# Patient Record
Sex: FEMALE | Race: BLACK OR AFRICAN AMERICAN | Employment: OTHER | URBAN - METROPOLITAN AREA
[De-identification: names, ages, dates, MRNs, and addresses within clinical notes are randomized per-mention and may not be internally consistent; named-entity substitution may affect disease eponyms.]

---

## 2017-01-31 ENCOUNTER — OFFICE VISIT (OUTPATIENT)
Dept: FAMILY MEDICINE CLINIC | Age: 47
End: 2017-01-31

## 2017-01-31 VITALS
RESPIRATION RATE: 16 BRPM | HEIGHT: 62 IN | SYSTOLIC BLOOD PRESSURE: 110 MMHG | OXYGEN SATURATION: 96 % | BODY MASS INDEX: 46.01 KG/M2 | WEIGHT: 250 LBS | HEART RATE: 57 BPM | TEMPERATURE: 97.9 F | DIASTOLIC BLOOD PRESSURE: 70 MMHG

## 2017-01-31 DIAGNOSIS — I10 ESSENTIAL HYPERTENSION: Primary | ICD-10-CM

## 2017-01-31 DIAGNOSIS — Z71.89 ADVANCE CARE PLANNING: ICD-10-CM

## 2017-01-31 DIAGNOSIS — R73.03 PREDIABETES: ICD-10-CM

## 2017-01-31 DIAGNOSIS — Z12.31 ENCOUNTER FOR SCREENING MAMMOGRAM FOR MALIGNANT NEOPLASM OF BREAST: ICD-10-CM

## 2017-01-31 DIAGNOSIS — Z00.00 ROUTINE GENERAL MEDICAL EXAMINATION AT A HEALTH CARE FACILITY: ICD-10-CM

## 2017-01-31 DIAGNOSIS — E78.2 MIXED HYPERLIPIDEMIA: ICD-10-CM

## 2017-01-31 DIAGNOSIS — Z13.39 SCREENING FOR ALCOHOLISM: ICD-10-CM

## 2017-01-31 NOTE — ACP (ADVANCE CARE PLANNING)
Advance Care Planning (ACP) Provider Conversation Snapshot    Date of ACP Conversation: 01/31/17  Persons included in Conversation:  patient  Length of ACP Conversation in minutes:  16 minutes    Authorized Decision Maker (if patient is incapable of making informed decisions): This person is:   Juni Hoang ([phone]) -daughter          For Patients with Decision Making Capacity:   Values/Goals: Exploration of values, goals, and preferences if recovery is not expected, even with continued medical treatment in the event of:  Imminent death  Severe, permanent brain injury  \"In these circumstances, what matters most to you? \"  Care focused more on comfort or quality of life.     Conversation Outcomes / Follow-Up Plan:   Recommended completion of Advance Directive form after review of ACP materials and conversation with prospective healthcare agent   Recommended communicating the plan and making copies for the healthcare agent, personal physician, and others as appropriate (e.g., health system)  Recommended review of completed ACP document annually or upon change in health status

## 2017-01-31 NOTE — PROGRESS NOTES
Chief Complaint   Patient presents with    Hypertension    Other     Pre-DM    Cholesterol Problem    Annual Wellness Visit       This is a Subsequent Medicare Annual Wellness Visit providing Personalized Prevention Plan Services (PPPS) (Performed 12 months after initial AWV and PPPS )    I have reviewed the patient's medical history in detail and updated the computerized patient record. History     Past Medical History   Diagnosis Date    AR (allergic rhinitis)      seasonal    Depression      Dr. Regine Rodriguez, suicide attempt     Endometriosis      s/p hysterectomy     GERD (gastroesophageal reflux disease)     Glaucoma      Dr. Dawna Lefort HTN (hypertension)     Hx of suicide attempt     Hyperlipidemia LDL goal < 130     Insomnia     Nausea & vomiting     OA (osteoarthritis)      both knees, lower back    Preeclampsia     PTSD (post-traumatic stress disorder)     Sleep apnea      does not use cpap machine    Spinal stenosis      Dr. Sonam Myers      Past Surgical History   Procedure Laterality Date    Pr total abdom hysterectomy  2006    Multiple delivery        1990    Hx tubal ligation      Hx salpingo-oophorectomy  2008    Hx  section      Hx hysterectomy      Hx knee replacement Bilateral      (R)  (L)     Hx knee arthroscopy Left      left knee    Pr femur/knee surg unlisted Left      External fixator     Current Outpatient Prescriptions   Medication Sig Dispense Refill    simvastatin (ZOCOR) 20 mg tablet take 1 tablet by mouth at bedtime 90 Tab 2    carisoprodol (SOMA) 350 mg tablet take 1 tablet by mouth twice a day for SPASMS  0    ziprasidone (GEODON) 20 mg capsule Take 1 Cap by mouth two (2) times daily (with meals). 22 Cap 0    ziprasidone (GEODON) 20 mg capsule Take 1 Cap by mouth two (2) times daily (with meals). 22 Cap 0    Dexlansoprazole 60 mg CpDB Take 1 Cap by mouth.       oxyCODONE-acetaminophen (PERCOCET) 7.5-325 mg per tablet Take 1 Tab by mouth every four (4) hours as needed for Pain. Max Daily Amount: 6 Tabs. 20 Tab 0    valsartan (DIOVAN) 320 mg tablet take 1 tablet by mouth once daily 90 Tab 3    amLODIPine (NORVASC) 5 mg tablet Take 1 Tab by mouth daily. 90 Tab 3    hydrochlorothiazide (HYDRODIURIL) 25 mg tablet Take 1 Tab by mouth daily. 90 Tab 3    melatonin 3 mg tablet Take 3 mg by mouth nightly.  PROAIR HFA 90 mcg/actuation inhaler as needed. 0    montelukast (SINGULAIR) 10 mg tablet Take 10 mg by mouth daily. 1    mometasone-formoterol (DULERA) 100-5 mcg/actuation HFA inhaler Take 2 Puffs by inhalation two (2) times a day. 1 Inhaler 5    FIBER, PSYLLIUM HUSK, PO Take  by mouth.  levocetirizine (XYZAL) 5 mg tablet Take 5 mg by mouth daily.  latanoprost (XALATAN) 0.005 % ophthalmic solution Administer 1 Drop to both eyes nightly.          Allergies   Allergen Reactions    Other Medication Hives     seafood    Shellfish Derived Hives     Family History   Problem Relation Age of Onset    Heart Disease Mother      CHF    Diabetes Mother     Hypertension Mother     Kidney Disease Mother     Breast Cancer Mother     Stroke Mother     Diabetes Father     Hypertension Father     Heart Attack Father      MI    Cancer Maternal Grandmother      stomach    Cancer Other      breast    Heart Attack Other      MI    Ovarian Cancer Maternal Aunt      Social History   Substance Use Topics    Smoking status: Never Smoker    Smokeless tobacco: Never Used    Alcohol use No     Patient Active Problem List   Diagnosis Code    OA (osteoarthritis) M19.90    Obesity E66.9    Mixed hyperlipidemia E78.2    Depression F32.9    Menopause Z78.0    Knee pain, right M25.561    GERD (gastroesophageal reflux disease) K21.9    S/P TKR (total knee replacement) Z96.659    PTSD (post-traumatic stress disorder) F43.10    Essential hypertension I10    Morbid obesity ([facility] [address] E66.01    Arthritis, degenerative M19.90    Gastroesophageal reflux disease without esophagitis K21.9    ANAMIKA on CPAP G47.33    Prediabetes R73.03    Advance directive discussed with patient Z70.80    SUAZO (dyspnea on exertion) R06.09    Dental caries K02.9    Chronic periodontal disease K05.6       Depression Risk Factor Screening:   No flowsheet data found. Alcohol Risk Factor Screening:   None per patient report      Functional Ability and Level of Safety:     Hearing Loss   none    Activities of Daily Living   Self-care. Requires assistance with: no ADLs    Fall Risk   No flowsheet data found. Abuse Screen   Patient is not abused    Review of Systems   Pertinent items are noted in HPI. Physical Examination     Evaluation of Cognitive Function:  Mood/affect:  happy  Appearance: age appropriate and casually dressed  Family member/caregiver input: None present        Patient Care Team:  Anders Lam MD as PCP - General (Family Practice)  Cristiane De Souza MD (Orthopedic Surgery)  Ron Ovalle MD (Gastroenterology)  Kasandra Chase (Psychiatry)  Vidhi Benjamin MD (General Surgery)  Vidhi Harris (Psychiatry)  kendra Arango (Psychology)  Olga Cramer MD (Pulmonary Disease)  Brittany Frank MD (Gastroenterology)  Jensen Lockhart MD (Cardiology)  Dr. Nancie Nichols (Psychiatry)  [facility], [facility] as Physician Assistant (Otolaryngology)  Fernando Marcelo MD (Gastroenterology)  [facility], [address] (Physician Assistant)    Advice/Referrals/Counseling   Education and counseling provided:  Are appropriate based on today's review and evaluation  End-of-Life planning (with patient's consent)- discussed, provided form  Screening Mammography-update 3/17, ordered today  Colorectal cancer screening tests- pt reports upcoming colonoscopy next month for chronic RLQ pain and h/o diverticulitis, Dr. Treasure Guevara. Assessment/Plan   reviewed diet, exercise and weight control.       Ileana Coreas, 51 y.o., female    SUBJECTIVE  Ff-up    HTN-taking norvasc/hctz/ diovan. She denies any cp, sob with exetion,  leg edema. Sees pulm dr. Geovanny Moncada for RAD. Prediabetes-has lost weight, following 1500 lynn/diet. Depression follows psychiatrist     Upcoming colonoscopy with dr. Treasure Guevara for chronic RLQ pain and h/o diverticulitis,     ROS:  See HPI, all others negative        Patient Active Problem List   Diagnosis Code    OA (osteoarthritis) M19.90    Obesity E66.9    Mixed hyperlipidemia E78.2    Depression F32.9    Menopause Z78.0    Knee pain, right M25.561    GERD (gastroesophageal reflux disease) K21.9    S/P TKR (total knee replacement) Z96.659    PTSD (post-traumatic stress disorder) F43.10    Essential hypertension I10    Morbid obesity ([facility] [address] E66.01    Arthritis, degenerative M19.90    Gastroesophageal reflux disease without esophagitis K21.9    ANAMIKA on CPAP G47.33    Prediabetes R73.03    SUAZO (dyspnea on exertion) R06.09    Dental caries K02.9    Chronic periodontal disease K05.6    Advance care planning Z71.89       Current Outpatient Prescriptions   Medication Sig Dispense Refill    simvastatin (ZOCOR) 20 mg tablet take 1 tablet by mouth at bedtime 90 Tab 2    carisoprodol (SOMA) 350 mg tablet take 1 tablet by mouth twice a day for SPASMS  0    ziprasidone (GEODON) 20 mg capsule Take 1 Cap by mouth two (2) times daily (with meals). 22 Cap 0    Dexlansoprazole 60 mg CpDB Take 1 Cap by mouth.  oxyCODONE-acetaminophen (PERCOCET) 7.5-325 mg per tablet Take 1 Tab by mouth every four (4) hours as needed for Pain. Max Daily Amount: 6 Tabs. 20 Tab 0    valsartan (DIOVAN) 320 mg tablet take 1 tablet by mouth once daily 90 Tab 3    amLODIPine (NORVASC) 5 mg tablet Take 1 Tab by mouth daily. 90 Tab 3    hydrochlorothiazide (HYDRODIURIL) 25 mg tablet Take 1 Tab by mouth daily. 90 Tab 3    melatonin 3 mg tablet Take 3 mg by mouth nightly.  PROAIR HFA 90 mcg/actuation inhaler as needed. 0    montelukast (SINGULAIR) 10 mg tablet Take 10 mg by mouth daily. 1    mometasone-formoterol (DULERA) 100-5 mcg/actuation HFA inhaler Take 2 Puffs by inhalation two (2) times a day. 1 Inhaler 5    FIBER, PSYLLIUM HUSK, PO Take  by mouth.  latanoprost (XALATAN) 0.005 % ophthalmic solution Administer 1 Drop to both eyes nightly.            Allergies   Allergen Reactions    Other Medication Hives     seafood    Shellfish Derived Hives       Past Medical History   Diagnosis Date    AR (allergic rhinitis)      seasonal    Depression      Dr. Magy Quispe, suicide attempt 2/12    Endometriosis      s/p hysterectomy 2006    GERD (gastroesophageal reflux disease)     Glaucoma      Dr. Terrie Zhou HTN (hypertension)     Hx of suicide attempt     Hyperlipidemia LDL goal < 130     Insomnia     Nausea & vomiting     OA (osteoarthritis)      both knees, lower back    Preeclampsia     PTSD (post-traumatic stress disorder)     Sleep apnea      does not use cpap machine    Spinal stenosis      Dr. Dickson Tejeda       Social History     Social History    Marital status: SINGLE     Spouse name: N/A    Number of children: N/A    Years of education: N/A     Occupational History    disabled     student      Social History Main Topics    Smoking status: Never Smoker    Smokeless tobacco: Never Used    Alcohol use No    Drug use: No    Sexual activity: Not on file     Other Topics Concern    Not on file     Social History Narrative       Family History   Problem Relation Age of Onset    Heart Disease Mother      CHF    Diabetes Mother     Hypertension Mother     Kidney Disease Mother     Breast Cancer Mother     Stroke Mother     Diabetes Father     Hypertension Father     Heart Attack Father      MI    Cancer Maternal Grandmother      stomach    Cancer Other      breast    Heart Attack Other      MI    Ovarian Cancer Maternal Aunt          OBJECTIVE    Physical Exam:     Visit Vitals    /70 (BP 1 Location: Left arm, BP Patient Position: Sitting)    Pulse (!) 57    Temp 97.9 °F (36.6 °C) (Oral)    Resp 16    Ht 5' 2\" (1.575 m)    Wt 250 lb (113.4 kg)    LMP 01/31/2008    SpO2 96%    BMI 45.73 kg/m2       General: alert, well-appearing, obese, AA, in no apparent distress or pain  Breasts: breasts appear normal, no suspicious masses, no skin or nipple changes or axillary nodes. CVS: normal rate, regular rhythm, distinct S1 and S2  Lungs:clear to ausculation bilaterally, no crackles, wheezing or rhonchi noted  Abdomen: normoactive bowel sounds, soft, non-tender  Extremities: no edema, no cyanosis,  Skin: warm, no lesions, rashes noted  Psych:  mood and affect normal    Results for orders placed or performed during the hospital encounter of 05/26/16   GLUCOSE, POC   Result Value Ref Range    Glucose (POC) 100 70 - 110 mg/dL         ASSESSMENT/PLAN  Diaz Bee was seen today for hypertension, other and other. Diagnoses and all orders for this visit:    Essential hypertension-controlled  Cont diovan,  Norvasc, hctz    Moderate single current episode of major depressive disorder ([facility] [address]  Cont care with psych dr. Dan Kain    Mixed hyperlipidemia  At target, cont zocor  Update lipid panel/cmp soon      BMI 45.0-49.9, adult ([facility] [address]  Commended on wt loss, cont 1500 lynn/d    Prediabetes  persistent  TLCs, monitoring    Ff-up in 6 months    Patient understands plan of care. Patient has provided input and agrees with goals.

## 2017-01-31 NOTE — MR AVS SNAPSHOT
Visit Information Date & Time Provider Department Dept. Phone Encounter #  
 1/31/2017 11:00 AM Liv Milligan, [address] [id number] Follow-up Instructions Return in about 6 months (around 7/31/2017), or if symptoms worsen or fail to improve. Your Appointments 4/11/2017  9:40 AM  
Follow Up with Linda Kinsey MD  
[facility] ([address]) Appt Note: 1 year follow up [address] [place] [id number] [phone] [address] [place] [address] [address] Upcoming Health Maintenance Date Due COLONOSCOPY 12/3/2017 DTaP/Tdap/Td series (3 - Td) 12/6/2026 Allergies as of 1/31/2017  Review Complete On: 1/31/2017 By: Liv Milligan MD  
  
 Severity Noted Reaction Type Reactions Other Medication  11/13/2012   Side Effect Hives  
 seafood Shellfish Derived  10/14/2013    Hives Current Immunizations  Never Reviewed Name Date Influenza Vaccine 10/17/2014 Tdap 10/8/2013 Not reviewed this visit You Were Diagnosed With   
  
 Codes Comments Essential hypertension    -  Primary ICD-10-CM: I10 
ICD-9-CM: 401.9 Routine general medical examination at a health care facility     ICD-10-CM: Z00.00 ICD-9-CM: V70.0 Screening for alcoholism     ICD-10-CM: Z13.89 ICD-9-CM: V79.1 Encounter for screening mammogram for malignant neoplasm of breast     ICD-10-CM: Z12.31 
ICD-9-CM: V76.12 Advance care planning     ICD-10-CM: Z71.89 ICD-9-CM: V65.49 Prediabetes     ICD-10-CM: R73.03 
ICD-9-CM: 790.29 Mixed hyperlipidemia     ICD-10-CM: E78.2 ICD-9-CM: 272.2 BMI 45.0-49.9, adult [facility])     ICD-10-CM: R08.61 
ICD-9-CM: V85.42 Vitals BP Pulse Temp Resp Height(growth percentile) Weight(growth percentile)  110/70 (BP 1 Location: Left arm, BP Patient Position: Sitting) (!) 57 97.9 °F (36.6 °C) (Oral) 16 5' 2\" (1.575 m) 250 lb (113.4 kg) LMP SpO2 BMI OB Status Smoking Status 01/31/2008 96% 45.73 kg/m2 Hysterectomy Never Smoker BMI and BSA Data Body Mass Index Body Surface Area 45.73 kg/m 2 2.23 m 2 Preferred Pharmacy Pharmacy Name Phone [phone], [phone] Your Updated Medication List  
  
   
This list is accurate as of: 1/31/17 11:42 AM.  Always use your most recent med list. amLODIPine 5 mg tablet Commonly known as:  Falk Del Take 1 Tab by mouth daily. carisoprodol 350 mg tablet Commonly known as:  SOMA  
take 1 tablet by mouth twice a day for SPASMS Dexlansoprazole 60 mg Cpdb Take 1 Cap by mouth. FIBER (PSYLLIUM HUSK) PO Take  by mouth. hydroCHLOROthiazide 25 mg tablet Commonly known as:  HYDRODIURIL Take 1 Tab by mouth daily. latanoprost 0.005 % ophthalmic solution Commonly known as:  Tonye Rist Administer 1 Drop to both eyes nightly. melatonin 3 mg tablet Take 3 mg by mouth nightly. mometasone-formoterol 100-5 mcg/actuation HFA inhaler Commonly known as:  Alverna Cable Take 2 Puffs by inhalation two (2) times a day. montelukast 10 mg tablet Commonly known as:  SINGULAIR Take 10 mg by mouth daily. oxyCODONE-acetaminophen 7.5-325 mg per tablet Commonly known as:  PERCOCET Take 1 Tab by mouth every four (4) hours as needed for Pain. Max Daily Amount: 6 Tabs. PROAIR HFA 90 mcg/actuation inhaler Generic drug:  albuterol  
as needed. simvastatin 20 mg tablet Commonly known as:  ZOCOR  
take 1 tablet by mouth at bedtime  
  
 valsartan 320 mg tablet Commonly known as:  DIOVAN  
take 1 tablet by mouth once daily  
  
 ziprasidone 20 mg capsule Commonly known as:  Genita Readfield Take 1 Cap by mouth two (2) times daily (with meals). We Performed the Following ADVANCE CARE PLANNING FIRST 30 MINS [59698 CPT(R)] Follow-up Instructions Return in about 6 months (around 7/31/2017), or if symptoms worsen or fail to improve. To-Do List   
 01/31/2017 Lab:  LIPID PANEL   
  
 01/31/2017 Imaging:  [facility] MAMMO BI SCREENING INCL CAD   
  
 01/31/2017 Lab:  METABOLIC PANEL, COMPREHENSIVE Patient Instructions Medicare Part B Preventive Services Limitations Recommendation Scheduled Bone Mass Measurement 
(age 72 & older, biennial) Requires diagnosis related to osteoporosis or estrogen deficiency. Biennial benefit unless patient has history of long-term glucocorticoid tx or baseline is needed because initial test was by other method Not applicable Cardiovascular Screening Blood Tests (every 5 years) Total cholesterol, HDL, Triglycerides Order as a panel if possible 05/20/2016 Colorectal Cancer Screening 
-Fecal occult blood test (annual) -Flexible sigmoidoscopy (5y) 
-Screening colonoscopy (10y) -Barium Enema  12/3/2007 Q10 years Counseling to Prevent Tobacco Use (up to 8 sessions per year) - Counseling greater than 3 and up to 10 minutes - Counseling greater than 10 minutes Patients must be asymptomatic of tobacco-related conditions to receive as preventive service Not applicable Diabetes Screening Tests (at least every 3 years, Medicare covers annually or at 6-month intervals for prediabetic patients) Fasting blood sugar (FBS) or glucose tolerance test (GTT) Patient must be diagnosed with one of the following: 
-Hypertension, Dyslipidemia, obesity, previous impaired FBS or GTT 
Or any two of the following: overweight, FH of diabetes, age ? 72, history of gestational diabetes, birth of baby weighing more than 9 pounds 05/20/2016 Diabetes Self-Management Training (DSMT) (no USPSTF recommendation) Requires referral by treating physician for patient with diabetes or renal disease. 10 hours of initial DSMT session of no less than 30 minutes each in a continuous 12-month period. 2 hours of follow-up DSMT in subsequent years. Not applicable Glaucoma Screening (no USPSTF recommendation) Diabetes mellitus, family history, , age 48 or over,  American, age 72 or over Not applicable Human Immunodeficiency Virus (HIV) Screening (annually for increased risk patients) HIV-1 and HIV-2 by EIA, LYLA, rapid antibody test, or oral mucosa transudate Patient must be at increased risk for HIV infection per USPSTF guidelines or pregnant. Tests covered annually for patients at increased risk. Pregnant patients may receive up to 3 test during pregnancy. Not applicable Medical Nutrition Therapy (MNT) (for diabetes or renal disease not recommended schedule) Requires referral by treating physician for patient with diabetes or renal disease. Can be provided in same year as diabetes self-management training (DSMT), and CMS recommends medical nutrition therapy take place after DSMT. Up to 3 hours for initial year and 2 hours in subsequent years. Not applicable Shingles Vaccination A shingles vaccine is also recommended once in a lifetime after age 61 Not applicable Seasonal Influenza Vaccination (annually) Pneumococcal Vaccination (once after 65) Hepatitis B Vaccinations (if medium/high risk) Medium/high risk factors:  End-stage renal disease, Hemophiliacs who received Factor VIII or IX concentrates, Clients of institutions for the mentally retarded, Persons who live in the same house as a HepB virus carrier, Homosexual men, Illicit injectable drug abusers. Not applicable Screening Mammography (biennial age 54-69) Annually (age 36 or over) 03/22/2016 Screening Pap Tests and Pelvic Examination (up to age 79 and after 79 if unknown history or abnormal study last 10 years) Every 24 months except high risk Not applicable Ultrasound Screening for Abdominal Aortic Aneurysm (AAA) (once) Patient must be referred through IPPE and not have had a screening for abdominal aortic aneurysm before under Medicare. Limited to patients who meet one of the following criteria: 
- Men who are 73-68 years old and have smoked more than 100 cigarettes in their lifetime. 
-Anyone with a FH of AAA 
-Anyone recommended for screening by USPSTF Not applicable Introducing [facility]! [facility] introduces [employer] patient portal. Now you can access parts of your medical record, email your doctor's office, and request medication refills online. 1. In your internet browser, go to [email or web address]/[employer] 2. Click on the First Time User? Click Here link in the Sign In box. You will see the New Member Sign Up page. 3. Enter your [employer] Access Code exactly as it appears below. You will not need to use this code after youve completed the sign-up process. If you do not sign up before the expiration date, you must request a new code. · [employer] Access Code: [email or web address] Expires: 5/1/2017 11:03 AM 
 
4. Enter the last four digits of your Social Security Number (xxxx) and Date of Birth (mm/dd/yyyy) as indicated and click Submit. You will be taken to the next sign-up page. 5. Create a [employer] ID. This will be your [employer] login ID and cannot be changed, so think of one that is secure and easy to remember. 6. Create a [employer] password. You can change your password at any time. 7. Enter your Password Reset Question and Answer. This can be used at a later time if you forget your password. 8. Enter your e-mail address. You will receive e-mail notification when new information is available in [address] 9. Click Sign Up. You can now view and download portions of your medical record. 10. Click the Download Summary menu link to download a portable copy of your medical information. If you have questions, please visit the Frequently Asked Questions section of the [employer] website. Remember, [employer] is NOT to be used for urgent needs. For medical emergencies, dial 911. Now available from your iPhone and Android! Please provide this summary of care documentation to your next provider. Your primary care clinician is listed as Liv Milligan. If you have any questions after today's visit, please call [phone].

## 2017-01-31 NOTE — PATIENT INSTRUCTIONS
Medicare Part B Preventive Services Limitations Recommendation Scheduled   Bone Mass Measurement  (age 72 & older, biennial) Requires diagnosis related to osteoporosis or estrogen deficiency. Biennial benefit unless patient has history of long-term glucocorticoid tx or baseline is needed because initial test was by other method Not applicable    Cardiovascular Screening Blood Tests (every 5 years)  Total cholesterol, HDL, Triglycerides Order as a panel if possible 05/20/2016    Colorectal Cancer Screening  -Fecal occult blood test (annual)  -Flexible sigmoidoscopy (5y)  -Screening colonoscopy (10y)  -Barium Enema  12/3/2007 Q10 years   Counseling to Prevent Tobacco Use (up to 8 sessions per year)  - Counseling greater than 3 and up to 10 minutes  - Counseling greater than 10 minutes Patients must be asymptomatic of tobacco-related conditions to receive as preventive service Not applicable    Diabetes Screening Tests (at least every 3 years, Medicare covers annually or at 6-month intervals for prediabetic patients)    Fasting blood sugar (FBS) or glucose tolerance test (GTT) Patient must be diagnosed with one of the following:  -Hypertension, Dyslipidemia, obesity, previous impaired FBS or GTT  Or any two of the following: overweight, FH of diabetes, age ? 72, history of gestational diabetes, birth of baby weighing more than 9 pounds 05/20/2016    Diabetes Self-Management Training (DSMT) (no USPSTF recommendation) Requires referral by treating physician for patient with diabetes or renal disease. 10 hours of initial DSMT session of no less than 30 minutes each in a continuous 12-month period. 2 hours of follow-up DSMT in subsequent years.  Not applicable    Glaucoma Screening (no USPSTF recommendation) Diabetes mellitus, family history, , age 48 or over,  American, age 72 or over Not applicable    Human Immunodeficiency Virus (HIV) Screening (annually for increased risk patients)  HIV-1 and HIV-2 by EIA, LYLA, rapid antibody test, or oral mucosa transudate Patient must be at increased risk for HIV infection per USPSTF guidelines or pregnant. Tests covered annually for patients at increased risk. Pregnant patients may receive up to 3 test during pregnancy. Not applicable    Medical Nutrition Therapy (MNT) (for diabetes or renal disease not recommended schedule) Requires referral by treating physician for patient with diabetes or renal disease. Can be provided in same year as diabetes self-management training (DSMT), and CMS recommends medical nutrition therapy take place after DSMT. Up to 3 hours for initial year and 2 hours in subsequent years. Not applicable    Shingles Vaccination A shingles vaccine is also recommended once in a lifetime after age 61 Not applicable    Seasonal Influenza Vaccination (annually)  declines    Pneumococcal Vaccination (once after 72)  declines    Hepatitis B Vaccinations (if medium/high risk) Medium/high risk factors:  End-stage renal disease,  Hemophiliacs who received Factor VIII or IX concentrates, Clients of institutions for the mentally retarded, Persons who live in the same house as a HepB virus carrier, Homosexual men, Illicit injectable drug abusers. Not applicable    Screening Mammography (biennial age 54-69) Annually (age 36 or over) 03/22/2016    Screening Pap Tests and Pelvic Examination (up to age 79 and after 79 if unknown history or abnormal study last 10 years) Every 25 months except high risk Not applicable    Ultrasound Screening for Abdominal Aortic Aneurysm (AAA) (once) Patient must be referred through [facility] and not have had a screening for abdominal aortic aneurysm before under Medicare.   Limited to patients who meet one of the following criteria:  - Men who are 73-68 years old and have smoked more than 100 cigarettes in their lifetime.  -Anyone with a FH of AAA  -Anyone recommended for screening by USPSTF Not applicable

## 2017-03-23 RX ORDER — MELOXICAM 15 MG/1
15 TABLET ORAL DAILY
COMMUNITY
End: 2017-09-20

## 2017-03-23 RX ORDER — CYCLOBENZAPRINE HCL 10 MG
TABLET ORAL
COMMUNITY
End: 2018-02-09

## 2017-03-23 RX ORDER — ZIPRASIDONE HYDROCHLORIDE 40 MG/1
40 CAPSULE ORAL
COMMUNITY
End: 2020-05-15

## 2017-03-23 RX ORDER — DICYCLOMINE HYDROCHLORIDE 20 MG/1
20 TABLET ORAL
COMMUNITY
End: 2018-02-09

## 2017-04-04 ENCOUNTER — ANESTHESIA EVENT (OUTPATIENT)
Dept: ENDOSCOPY | Age: 47
End: 2017-04-04
Payer: MEDICARE

## 2017-04-05 ENCOUNTER — ANESTHESIA (OUTPATIENT)
Dept: ENDOSCOPY | Age: 47
End: 2017-04-05
Payer: MEDICARE

## 2017-04-05 ENCOUNTER — SURGERY (OUTPATIENT)
Age: 47
End: 2017-04-05

## 2017-04-05 ENCOUNTER — HOSPITAL ENCOUNTER (OUTPATIENT)
Age: 47
Setting detail: OUTPATIENT SURGERY
Discharge: HOME | End: 2017-04-05
Attending: INTERNAL MEDICINE | Admitting: INTERNAL MEDICINE
Payer: MEDICARE

## 2017-04-05 VITALS
DIASTOLIC BLOOD PRESSURE: 72 MMHG | SYSTOLIC BLOOD PRESSURE: 116 MMHG | RESPIRATION RATE: 20 BRPM | TEMPERATURE: 97.6 F | BODY MASS INDEX: 40.96 KG/M2 | HEIGHT: 63 IN | OXYGEN SATURATION: 100 % | HEART RATE: 64 BPM | WEIGHT: 231.2 LBS

## 2017-04-05 LAB
BUN BLD-MCNC: 16 MG/DL (ref 7–18)
CHLORIDE BLD-SCNC: 97 MMOL/L (ref 100–108)
GLUCOSE BLD STRIP.AUTO-MCNC: 97 MG/DL (ref 74–106)
HCT VFR BLD CALC: 38 % (ref 36–49)
HGB BLD-MCNC: 12.9 G/DL (ref 12–16)
POTASSIUM BLD-SCNC: 4.6 MMOL/L (ref 3.5–5.5)
SODIUM BLD-SCNC: 135 MMOL/L (ref 136–145)

## 2017-04-05 PROCEDURE — 74011250636 HC RX REV CODE- 250/636

## 2017-04-05 PROCEDURE — 77030018846 HC SOL IRR STRL H20 ICUM -A: Performed by: INTERNAL MEDICINE

## 2017-04-05 PROCEDURE — 77030008565 HC TBNG SUC IRR ERBE -B: Performed by: INTERNAL MEDICINE

## 2017-04-05 PROCEDURE — 77030009426 HC FCPS BIOP ENDOSC BSC -B: Performed by: INTERNAL MEDICINE

## 2017-04-05 PROCEDURE — 76040000019: Performed by: INTERNAL MEDICINE

## 2017-04-05 PROCEDURE — 74011250636 HC RX REV CODE- 250/636: Performed by: NURSE ANESTHETIST, CERTIFIED REGISTERED

## 2017-04-05 PROCEDURE — 77030013992 HC SNR POLYP ENDOSC BSC -B: Performed by: INTERNAL MEDICINE

## 2017-04-05 PROCEDURE — 74011000250 HC RX REV CODE- 250

## 2017-04-05 PROCEDURE — 76060000031 HC ANESTHESIA FIRST 0.5 HR: Performed by: INTERNAL MEDICINE

## 2017-04-05 PROCEDURE — 74011000250 HC RX REV CODE- 250: Performed by: NURSE ANESTHETIST, CERTIFIED REGISTERED

## 2017-04-05 PROCEDURE — 82947 ASSAY GLUCOSE BLOOD QUANT: CPT

## 2017-04-05 RX ORDER — ONDANSETRON 2 MG/ML
4 INJECTION INTRAMUSCULAR; INTRAVENOUS AS NEEDED
Status: DISCONTINUED | OUTPATIENT
Start: 2017-04-05 | End: 2017-04-05 | Stop reason: HOSPADM

## 2017-04-05 RX ORDER — SODIUM CHLORIDE, SODIUM LACTATE, POTASSIUM CHLORIDE, CALCIUM CHLORIDE 600; 310; 30; 20 MG/100ML; MG/100ML; MG/100ML; MG/100ML
75 INJECTION, SOLUTION INTRAVENOUS CONTINUOUS
Status: DISCONTINUED | OUTPATIENT
Start: 2017-04-05 | End: 2017-04-05 | Stop reason: HOSPADM

## 2017-04-05 RX ORDER — SODIUM CHLORIDE, SODIUM LACTATE, POTASSIUM CHLORIDE, CALCIUM CHLORIDE 600; 310; 30; 20 MG/100ML; MG/100ML; MG/100ML; MG/100ML
50 INJECTION, SOLUTION INTRAVENOUS CONTINUOUS
Status: DISCONTINUED | OUTPATIENT
Start: 2017-04-05 | End: 2017-04-05 | Stop reason: HOSPADM

## 2017-04-05 RX ORDER — LIDOCAINE HYDROCHLORIDE 10 MG/ML
0.1 INJECTION, SOLUTION EPIDURAL; INFILTRATION; INTRACAUDAL; PERINEURAL AS NEEDED
Status: DISCONTINUED | OUTPATIENT
Start: 2017-04-05 | End: 2017-04-05 | Stop reason: HOSPADM

## 2017-04-05 RX ORDER — SODIUM CHLORIDE 0.9 % (FLUSH) 0.9 %
5-10 SYRINGE (ML) INJECTION AS NEEDED
Status: DISCONTINUED | OUTPATIENT
Start: 2017-04-05 | End: 2017-04-05 | Stop reason: HOSPADM

## 2017-04-05 RX ORDER — LIDOCAINE HYDROCHLORIDE 20 MG/ML
INJECTION, SOLUTION EPIDURAL; INFILTRATION; INTRACAUDAL; PERINEURAL AS NEEDED
Status: DISCONTINUED | OUTPATIENT
Start: 2017-04-05 | End: 2017-04-05 | Stop reason: HOSPADM

## 2017-04-05 RX ORDER — PROPOFOL 10 MG/ML
INJECTION, EMULSION INTRAVENOUS AS NEEDED
Status: DISCONTINUED | OUTPATIENT
Start: 2017-04-05 | End: 2017-04-05 | Stop reason: HOSPADM

## 2017-04-05 RX ORDER — ONDANSETRON 2 MG/ML
INJECTION INTRAMUSCULAR; INTRAVENOUS
Status: COMPLETED
Start: 2017-04-05 | End: 2017-04-05

## 2017-04-05 RX ORDER — FAMOTIDINE 10 MG/ML
20 INJECTION INTRAVENOUS ONCE
Status: COMPLETED | OUTPATIENT
Start: 2017-04-05 | End: 2017-04-05

## 2017-04-05 RX ADMIN — SODIUM CHLORIDE, SODIUM LACTATE, POTASSIUM CHLORIDE, AND CALCIUM CHLORIDE 50 ML/HR: 600; 310; 30; 20 INJECTION, SOLUTION INTRAVENOUS at 08:03

## 2017-04-05 RX ADMIN — ONDANSETRON 4 MG: 2 INJECTION INTRAMUSCULAR; INTRAVENOUS at 09:08

## 2017-04-05 RX ADMIN — PROPOFOL 30 MG: 10 INJECTION, EMULSION INTRAVENOUS at 08:43

## 2017-04-05 RX ADMIN — PROPOFOL 30 MG: 10 INJECTION, EMULSION INTRAVENOUS at 08:55

## 2017-04-05 RX ADMIN — FAMOTIDINE 20 MG: 10 INJECTION, SOLUTION INTRAVENOUS at 08:03

## 2017-04-05 RX ADMIN — PROPOFOL 30 MG: 10 INJECTION, EMULSION INTRAVENOUS at 08:47

## 2017-04-05 RX ADMIN — PROPOFOL 30 MG: 10 INJECTION, EMULSION INTRAVENOUS at 08:51

## 2017-04-05 RX ADMIN — ONDANSETRON HYDROCHLORIDE 4 MG: 2 SOLUTION INTRAMUSCULAR; INTRAVENOUS at 09:08

## 2017-04-05 RX ADMIN — PROPOFOL 30 MG: 10 INJECTION, EMULSION INTRAVENOUS at 08:57

## 2017-04-05 RX ADMIN — PROPOFOL 30 MG: 10 INJECTION, EMULSION INTRAVENOUS at 08:45

## 2017-04-05 RX ADMIN — PROPOFOL 30 MG: 10 INJECTION, EMULSION INTRAVENOUS at 08:53

## 2017-04-05 RX ADMIN — PROPOFOL 10 MG: 10 INJECTION, EMULSION INTRAVENOUS at 08:59

## 2017-04-05 RX ADMIN — LIDOCAINE HYDROCHLORIDE 40 MG: 20 INJECTION, SOLUTION EPIDURAL; INFILTRATION; INTRACAUDAL; PERINEURAL at 08:43

## 2017-04-05 RX ADMIN — PROPOFOL 30 MG: 10 INJECTION, EMULSION INTRAVENOUS at 08:49

## 2017-04-05 NOTE — H&P
Chief Complaint: History of abdominal pain. History of present illness: No complaints. PMH:   Past Medical History:   Diagnosis Date    AR (allergic rhinitis)     seasonal    Asthma     Depression     Dr. Teofilo Sanches, suicide attempt 2/12    Endometriosis     s/p hysterectomy 2006    GERD (gastroesophageal reflux disease)     Glaucoma     Dr. Kristine Infante HTN (hypertension)     Hx of suicide attempt     Hyperlipidemia LDL goal < 130     IBS (irritable bowel syndrome)     Insomnia     Nausea & vomiting     OA (osteoarthritis)     both knees, lower back    Preeclampsia     PTSD (post-traumatic stress disorder)     Sleep apnea     does not use cpap machine    Spinal stenosis     Dr. Bartolo Peterson     Allergies:    Allergies   Allergen Reactions    Other Medication Hives     seafood    Shellfish Derived Hives     Medications:   Current Facility-Administered Medications:     lidocaine (PF) (XYLOCAINE) 10 mg/mL (1 %) injection 0.1 mL, 0.1 mL, SubCUTAneous, PRN, Liv Beard, CRNA    lactated ringers infusion, 50 mL/hr, IntraVENous, CONTINUOUS, Liv Beard, CRNA, Last Rate: 50 mL/hr at 04/05/17 0803, 50 mL/hr at 04/05/17 0803  FH:   Family History   Problem Relation Age of Onset    Heart Disease Mother      CHF    Diabetes Mother     Hypertension Mother     Kidney Disease Mother     Breast Cancer Mother     Stroke Mother     Diabetes Father     Hypertension Father     Heart Attack Father      MI    Cancer Maternal Grandmother      stomach    Cancer Other      breast    Heart Attack Other      MI    Ovarian Cancer Maternal Aunt      Social:   Social History     Social History    Marital status: SINGLE     Spouse name: N/A    Number of children: N/A    Years of education: N/A     Occupational History    disabled     student      Social History Main Topics    Smoking status: Never Smoker    Smokeless tobacco: Never Used    Alcohol use No    Drug use: No    Sexual activity: Not Asked Other Topics Concern    None     Social History Narrative     Surgical H:   Past Surgical History:   Procedure Laterality Date    FEMUR/KNEE SURG UNLISTED Left 2009    External fixator    HX  SECTION      HX HYSTERECTOMY      HX KNEE ARTHROSCOPY Left     left knee    HX KNEE REPLACEMENT Bilateral     (R)  (L)     HX OTHER SURGICAL  2016    all teeth removed    HX SALPINGO-OOPHORECTOMY  2008    HX TUBAL LIGATION      MULTIPLE DELIVERY       1990    TOTAL ABDOM HYSTERECTOMY         ROS: negative    Physical Exam:   Visit Vitals    /79    Pulse 72    Temp 98 °F (36.7 °C)    Resp 18    Ht 5' 3\" (1.6 m)    Wt 104.9 kg (231 lb 3.2 oz)    LMP 2008    SpO2 100%    BMI 40.96 kg/m2     General appearance: alert, no distress  Eyes: pupils equal and reactive, extraocular eye movements intact  Nodes: No gross adenopathy in neck. Skin: no spider angiomata, jaundice, palmar erythema   Respiratory: clear to auscultation bilaterally  Cardiovascular: regular heart rate, no murmurs, no JVD, normal rate and regular rhythm  Abdomen: soft, non-tender, liver not enlarged, spleen not palpable, no obvious ascites  Extremities: no muscle wasting, no gross arthritic changes  Neurologic: alert and oriented, cranial nerves grossly intact, no asterixis    Labs:   Recent Results (from the past 24 hour(s))   POC 6 PLUS    Collection Time: 17  8:04 AM   Result Value Ref Range    Sodium (POC) 135 (L) 136 - 145 MMOL/L    Potassium (POC) 4.6 3.5 - 5.5 MMOL/L    Chloride (POC) 97 (L) 100 - 108 MMOL/L    BUN (POC) 16 7 - 18 MG/DL    Glucose (POC) 97 74 - 106 MG/DL    Hematocrit (POC) 38 36 - 49 %    Hemoglobin (POC) 12.9 12 - 16 G/DL         Imp/ Plan: Will proceed with colonoscopy as planned. Risk benefits alternative including but not limited to infection, bleeding, perforation of viscous, allergic reaction and resultant morbidity and mortality was discussed. Chance of missing a significant lesion due to various reasons were discussed.       Lincoln Mcleod MD  [facility], [place]

## 2017-04-05 NOTE — ANESTHESIA POSTPROCEDURE EVALUATION
Post-Anesthesia Evaluation and Assessment    Patient: Solo Phoenix MRN: [id number]  SSN: [id number]    YOB: 1970  Age: 51 y.o. Sex: female       Cardiovascular Function/Vital Signs  Visit Vitals    /63    Pulse 68    Temp 36.9 °C (98.4 °F)    Resp 18    Ht 5' 3\" (1.6 m)    Wt 104.9 kg (231 lb 3.2 oz)    SpO2 100%    BMI 40.96 kg/m2       Patient is status post MAC anesthesia for Procedure(s):  COLONOSCOPY. Nausea/Vomiting: None    Postoperative hydration reviewed and adequate. Pain:  Pain Scale 1: Numeric (0 - 10) (04/05/17 0910)  Pain Intensity 1: 0 (04/05/17 0910)   Managed    Neurological Status: At baseline    Mental Status and Level of Consciousness: Arousable    Pulmonary Status:   O2 Device: Room air (04/05/17 0910)   Adequate oxygenation and airway patent    Complications related to anesthesia: None    Post-anesthesia assessment completed.  No concerns    Signed By: Dharmesh Messer MD     April 5, 2017

## 2017-04-05 NOTE — IP AVS SNAPSHOT
[address] [address] 
[phone] Patient: Kayla Knox MRN: [id number] :1970 You are allergic to the following Allergen Reactions Other Medication Hives  
 seafood Shellfish Derived Hives Recent Documentation Height Weight BMI OB Status Smoking Status 1.6 m 104.9 kg 40.96 kg/m2 Hysterectomy Never Smoker Emergency Contacts Name Discharge Info Relation Home Work Mobile DanielShana DISCHARGE CAREGIVER [3] Daughter [21]   [phone] About your hospitalization You were admitted on:  2017 You last received care in the:  [facility] PACU You were discharged on:  2017 Unit phone number:  [phone] Why you were hospitalized Your primary diagnosis was:  Not on File Providers Seen During Your Hospitalizations Provider Role Specialty Primary office phone Fritz Hobbs MD Attending Provider Gastroenterology [phone] Your Primary Care Physician (PCP) Primary Care Physician Office Phone Office Fax Perico Dawkins [phone] Follow-up Information Follow up With Details Comments Contact Info [place] [id number] [facility] [address] [facility] [address] 
[phone] Fritz Hobbs MD Call today Arrange an 8-12 week follow up 8 [facility] [address] [facility] [address] [facility] [address] 
[phone] Your Appointments 2017 COLONOSCOPY with Fritz Hobbs MD  
[facility] ENDOSCOPY [facility]) [address] [place] [address] [address] 2017  9:40 AM EDT Follow Up with Ya Anderson MD  
[facility] ([address]) [address] [address] [phone] Current Discharge Medication List  
  
CONTINUE these medications which have NOT CHANGED Dose & Instructions Dispensing Information Comments Morning Noon Evening Bedtime  
 amLODIPine 5 mg tablet Commonly known as:  Brionna Anne Your last dose was: Your next dose is:    
   
   
 Dose:  5 mg Take 1 Tab by mouth daily. Quantity:  90 Tab Refills:  3 BENTYL 20 mg tablet Generic drug:  dicyclomine Your last dose was: Your next dose is:    
   
   
 Dose:  20 mg Take 20 mg by mouth every six (6) hours as needed. Refills:  0  
     
   
   
   
  
 cyclobenzaprine 10 mg tablet Commonly known as:  FLEXERIL Your last dose was: Your next dose is: Take  by mouth three (3) times daily as needed for Muscle Spasm(s). Refills:  0 Dexlansoprazole 60 mg Cpdb Your last dose was: Your next dose is:    
   
   
 Dose:  1 Cap Take 1 Cap by mouth. Refills:  0 FIBER (PSYLLIUM HUSK) PO Your last dose was: Your next dose is: Take  by mouth. Refills:  0  
     
   
   
   
  
 GEODON 40 mg capsule Generic drug:  ziprasidone Your last dose was: Your next dose is:    
   
   
 Dose:  40 mg Take 40 mg by mouth daily. Refills:  0  
     
   
   
   
  
 hydroCHLOROthiazide 25 mg tablet Commonly known as:  HYDRODIURIL Your last dose was: Your next dose is:    
   
   
 Dose:  25 mg Take 1 Tab by mouth daily. Quantity:  90 Tab Refills:  3  
     
   
   
   
  
 latanoprost 0.005 % ophthalmic solution Commonly known as:  Kristene Meme Your last dose was: Your next dose is:    
   
   
 Dose:  1 Drop Administer 1 Drop to both eyes nightly. Refills:  0  
     
   
   
   
  
 melatonin 3 mg tablet Your last dose was: Your next dose is:    
   
   
 Dose:  3 mg Take 3 mg by mouth nightly. Refills:  0 MOBIC 15 mg tablet Generic drug:  meloxicam  
   
Your last dose was: Your next dose is:    
   
   
 Dose:  15 mg Take 15 mg by mouth daily. Refills:  0  
     
   
   
   
  
 mometasone-formoterol 100-5 mcg/actuation HFA inhaler Commonly known as:  Thierry Caicedo Your last dose was: Your next dose is:    
   
   
 Dose:  2 Puff Take 2 Puffs by inhalation two (2) times a day. Quantity:  1 Inhaler Refills:  5  
     
   
   
   
  
 montelukast 10 mg tablet Commonly known as:  SINGULAIR Your last dose was: Your next dose is:    
   
   
 Dose:  10 mg Take 10 mg by mouth nightly. Refills:  1 PROAIR HFA 90 mcg/actuation inhaler Generic drug:  albuterol Your last dose was: Your next dose is:    
   
   
 as needed. Refills:  0  
     
   
   
   
  
 simvastatin 20 mg tablet Commonly known as:  ZOCOR Your last dose was: Your next dose is:    
   
   
 take 1 tablet by mouth at bedtime Quantity:  90 Tab Refills:  2  
     
   
   
   
  
 valsartan 320 mg tablet Commonly known as:  DIOVAN Your last dose was: Your next dose is:    
   
   
 take 1 tablet by mouth once daily Quantity:  90 Tab Refills:  3 Discharge Instructions [facility] After you have a colonoscopy, you will stay at the clinic for 1 to 2 hours until the medicines wear off. Then you can go home. But you will need to arrange for a ride. Your doctor will tell you when you can eat and do your other usual activities. Your doctor will talk to you about when you will need your next colonoscopy. Your doctor can help you decide how often you need to be checked. This will depend on the results of your test and your risk for colorectal cancer. After the test, you may be bloated or have gas pains. You may need to pass gas. If a biopsy was done or a polyp was removed, you may have streaks of blood in your stool (feces) for a few days. This care sheet gives you a general idea about how long it will take for you to recover. But each person recovers at a different pace. Follow the steps below to get better as quickly as possible. How can you care for yourself at home? Activity · Rest when you feel tired. · You can do your normal activities when it feels okay to do so. Diet · Follow your doctor's directions for eating. · Unless your doctor has told you not to, drink plenty of fluids. This helps to replace the fluids that were lost during the colon prep. · Do not drink alcohol. Medicines · Your doctor will tell you if and when you can restart your medicines. He or she will also give you instructions about taking any new medicines. · If you take blood thinners, such as warfarin (Coumadin), clopidogrel (Plavix), or aspirin, be sure to talk to your doctor. He or she will tell you if and when to start taking those medicines again. Make sure that you understand exactly what your doctor wants you to do. · If polyps were removed or a biopsy was done during the test, your doctor may tell you not to take aspirin or other anti-inflammatory medicines for a few days. These include ibuprofen (Advil, Motrin) and naproxen (Aleve). Other instructions · For your safety, do not drive or operate machinery until the medicine wears off and you can think clearly. Your doctor may tell you not to drive or operate machinery until the day after your test. 
· Do not sign legal documents or make major decisions until the medicine wears off and you can think clearly. The anesthesia can make it hard for you to fully understand what you are agreeing to. Follow-up care is a key part of your treatment and safety.  Be sure to make and go to all appointments, and call your doctor if you are having problems. It's also a good idea to know your test results and keep a list of the medicines you take. When should you call for help? Call 911 anytime you think you may need emergency care. For example, call if: 
· You passed out (lost consciousness). · You pass maroon or bloody stools. · You have severe belly pain. Call your doctor now or seek immediate medical care if: 
· Your stools are black and tarlike. · Your stools have streaks of blood, but you did not have a biopsy or any polyps removed. · You have belly pain, or your belly is swollen and firm. · You vomit. · You have a fever. · You are very dizzy. Watch closely for changes in your health, and be sure to contact your doctor if you have any problems. Where can you learn more? Go to http://raul-vee.info/. Enter E264 in the search box to learn more about \"Colonoscopy: What to Expect at Home. \" Current as of: August 9, 2016 Content Version: 11.2 © 8424-8864 Likeeds. Care instructions adapted under license by [employer] (which disclaims liability or warranty for this information). If you have questions about a medical condition or this instruction, always ask your healthcare professional. [address] any warranty or liability for your use of this information. Hemorrhoids: Care Instructions Your Care Instructions Hemorrhoids are enlarged veins that develop in the anal canal. Bleeding during bowel movements, itching, swelling, and rectal pain are the most common symptoms. They can be uncomfortable at times, but hemorrhoids rarely are a serious problem. You can treat most hemorrhoids with simple changes to your diet and bowel habits. These changes include eating more fiber and not straining to pass stools.  Most hemorrhoids do not need surgery or other treatment unless they are very large and painful or bleed a lot. Follow-up care is a key part of your treatment and safety. Be sure to make and go to all appointments, and call your doctor if you are having problems. Its also a good idea to know your test results and keep a list of the medicines you take. How can you care for yourself at home? · Sit in a few inches of warm water (sitz bath) 3 times a day and after bowel movements. The warm water helps with pain and itching. · Put ice on your anal area several times a day for 10 minutes at a time. Put a thin cloth between the ice and your skin. Follow this by placing a warm, wet towel on the area for another 10 to 20 minutes. · Take pain medicines exactly as directed. ¨ If the doctor gave you a prescription medicine for pain, take it as prescribed. ¨ If you are not taking a prescription pain medicine, ask your doctor if you can take an over-the-counter medicine. · Keep the anal area clean, but be gentle. Use water and a fragrance-free soap, such as [place], or use baby wipes or medicated pads, such as Tucks. · Wear cotton underwear and loose clothing to decrease moisture in the anal area. · Eat more fiber. Include foods such as whole-grain breads and cereals, raw vegetables, raw and dried fruits, and beans. · Drink plenty of fluids, enough so that your urine is light yellow or clear like water. If you have kidney, heart, or liver disease and have to limit fluids, talk with your doctor before you increase the amount of fluids you drink. · Use a stool softener that contains bran or psyllium. You can save money by buying bran or psyllium (available in bulk at most health food stores) and sprinkling it on foods or stirring it into fruit juice. Or you can use a product such as Metamucil or Hydrocil. · Practice healthy bowel habits. ¨ Go to the bathroom as soon as you have the urge. ¨ Avoid straining to pass stools.  Relax and give yourself time to let things happen naturally. ¨ Do not hold your breath while passing stools. ¨ Do not read while sitting on the toilet. Get off the toilet as soon as you have finished. · Take your medicines exactly as prescribed. Call your doctor if you think you are having a problem with your medicine. When should you call for help? Call 911 anytime you think you may need emergency care. For example, call if: 
· You pass maroon or very bloody stools. Call your doctor now or seek immediate medical care if: 
· You have increased pain. · You have increased bleeding. Watch closely for changes in your health, and be sure to contact your doctor if: 
· Your symptoms have not improved after 3 or 4 days. Where can you learn more? Go to http://raul-vee.info/. Enter F228 in the search box to learn more about \"Hemorrhoids: Care Instructions. \" Current as of: August 9, 2016 Content Version: 11.2 © 4621-9784 Trademob. Care instructions adapted under license by [employer] (which disclaims liability or warranty for this information). If you have questions about a medical condition or this instruction, always ask your healthcare professional. [address] any warranty or liability for your use of this information. Learning About Diverticulosis and Diverticulitis What are diverticulosis and diverticulitis? In diverticulosis and diverticulitis, pouches called diverticula form in the wall of the large intestine, or colon. · In diverticulosis, the pouches do not cause any pain or other symptoms. · In diverticulitis, the pouches get inflamed or infected and cause symptoms. Doctors aren't sure what causes these pouches in the colon. But they think that a low-fiber diet may play a role. Without fiber to add bulk to the stool, the colon has to work harder than normal to push the stool forward.  The pressure from this may cause pouches to form in weak spots along the colon. 
Some people with diverticulosis get diverticulitis. But experts don't know why this happens. What are the symptoms? · In diverticulosis, most people don't have symptoms. But pouches sometimes bleed. · In diverticulitis, symptoms may last from a few hours to a week or more. They include: ¨ Belly pain. This is usually in the lower left side. It is sometimes worse when you move. This is the most common symptom. ¨ Fever and chills. ¨ Bloating and gas. ¨ Diarrhea or constipation. ¨ Nausea and sometimes vomiting. ¨ Not feeling like eating. How can you prevent these problems? You may be able to lower your chance of getting diverticulitis. You can do this by taking steps to prevent constipation. · Eat fruits, vegetables, beans, and whole grains every day. These foods are high in fiber. · Drink plenty of fluids (enough so that your urine is light yellow or clear like water). If you have kidney, heart, or liver disease and have to limit fluids, talk with your doctor before you increase the amount of fluids you drink. · Get at least 30 minutes of exercise on most days of the week. Walking is a good choice. You also may want to do other activities, such as running, swimming, cycling, or playing tennis or team sports. · Take a fiber supplement, such as Citrucel or Metamucil, every day if needed. Read and follow all instructions on the label. · Schedule time each day for a bowel movement. Having a daily routine may help. Take your time and do not strain when having a bowel movement. Some people avoid nuts, seeds, berries, and popcorn. They believe that these foods might get trapped in the diverticula and cause pain. But there is no proof that these foods cause diverticulitis or make it worse. How are these problems treated? · The best way to treat diverticulosis is to avoid constipation. (See the tips above.) · Treatment for diverticulitis includes antibiotics and often a change in your diet. You may need only liquids at first. Your doctor may suggest pain medicines for pain or belly cramps. In some cases, surgery may be needed. Follow-up care is a key part of your treatment and safety. Be sure to make and go to all appointments, and call your doctor if you are having problems. It's also a good idea to know your test results and keep a list of the medicines you take. Where can you learn more? Go to http://raul-vee.info/. Enter P708 in the search box to learn more about \"Learning About Diverticulosis and Diverticulitis. \" Current as of: August 9, 2016 Content Version: 11.2 © 2175-4409 NetManage. Care instructions adapted under license by [employer] (which disclaims liability or warranty for this information). If you have questions about a medical condition or this instruction, always ask your healthcare professional. [place] [address] any warranty or liability for your use of this information. DISCHARGE SUMMARY from Nurse The following personal items are in your possession at time of discharge: 
 
Dental Appliances: None Visual Aid: Glasses PATIENT INSTRUCTIONS: 
 
 
F-face looks uneven A-arms unable to move or move unevenly S-speech slurred or non-existent T-time-call 911 as soon as signs and symptoms begin-DO NOT go Back to bed or wait to see if you get better-TIME IS BRAIN. Warning Signs of HEART ATTACK Call 911 if you have these symptoms: 
? Chest discomfort. Most heart attacks involve discomfort in the center of the chest that lasts more than a few minutes, or that goes away and comes back. It can feel like uncomfortable pressure, squeezing, fullness, or pain. ? Discomfort in other areas of the upper body. Symptoms can include pain or discomfort in one or both arms, the back, neck, jaw, or stomach. ? Shortness of breath with or without chest discomfort. ? Other signs may include breaking out in a cold sweat, nausea, or lightheadedness. Don't wait more than five minutes to call [address]! Fast action can save your life. Calling 911 is almost always the fastest way to get lifesaving treatment. Emergency Medical Services staff can begin treatment when they arrive  up to an hour sooner than if someone gets to the hospital by car. The discharge information has been reviewed with the patient. The patient verbalized understanding. Discharge medications reviewed with the patient and appropriate educational materials and side effects teaching were provided. Discharge Orders None Introducing hospitals & [facility] SERVICES! Nya Sanders introduces [employer] patient portal. Now you can access parts of your medical record, email your doctor's office, and request medication refills online. 1. In your internet browser, go to [email or web address] 2. Click on the First Time User? Click Here link in the Sign In box. You will see the New Member Sign Up page. 3. Enter your [employer] Access Code exactly as it appears below. You will not need to use this code after youve completed the sign-up process. If you do not sign up before the expiration date, you must request a new code. · [employer] Access Code: [email or web address] Expires: 5/1/2017 12:03 PM 
 
4. Enter the last four digits of your Social Security Number (xxxx) and Date of Birth (mm/dd/yyyy) as indicated and click Submit. You will be taken to the next sign-up page. 5. Create a [employer] ID. This will be your [employer] login ID and cannot be changed, so think of one that is secure and easy to remember. 6. Create a [employer] password. You can change your password at any time. 7. Enter your Password Reset Question and Answer. This can be used at a later time if you forget your password. 8. Enter your e-mail address. You will receive e-mail notification when new information is available in [address] 9. Click Sign Up. You can now view and download portions of your medical record. 10. Click the Download Summary menu link to download a portable copy of your medical information. If you have questions, please visit the Frequently Asked Questions section of the [employer] website. Remember, [employer] is NOT to be used for urgent needs. For medical emergencies, dial 911. Now available from your iPhone and Android! General Information Please provide this summary of care documentation to your next provider. Patient Signature:  ____________________________________________________________ Date:  ____________________________________________________________  
  
Kailey Alfred Provider Signature:  ____________________________________________________________ Date:  ____________________________________________________________

## 2017-04-05 NOTE — PROCEDURES
Ronnie  [facility], [address]      Brief Procedure Note    Emmy Greenberg  1970  [id number]    Date of Procedure: 4/5/2017    Preoperative diagnosis: Abdominal cramping in right lower quadrant [R10.31]  Chronic constipation [K59.09]  Acute diverticulitis [K57.92]    Postoperative diagnosis:  Diverticulosis    Type of Anesthesia: MAC (monitered anesthesia care)    Description of Findings: same as post op dx    Procedure: Procedure(s):  COLONOSCOPY    :  Dr. Emma Suarez MD    Assistant(s): [unfilled]    Type of Anesthesia:MAC     EBL:None    Specimens: * No specimens in log *    Findings: See printed and scanned procedure note    Complications: None    Dr. Emma Suarez MD  4/5/2017  9:08 AM

## 2017-04-05 NOTE — ANESTHESIA PREPROCEDURE EVALUATION
Anesthetic History     PONV          Review of Systems / Medical History  Patient summary reviewed, nursing notes reviewed and pertinent labs reviewed    Pulmonary        Sleep apnea    Asthma        Neuro/Psych         Psychiatric history     Cardiovascular    Hypertension                   GI/Hepatic/Renal     GERD           Endo/Other        Arthritis     Other Findings   Comments:   Risk Factors for Postoperative nausea/vomiting:       History of postoperative nausea/vomiting? YES       Female? YES       Motion sickness? NO       Intended opioid administration for postoperative analgesia?   NO           Physical Exam    Airway  Mallampati: I  TM Distance: 4 - 6 cm  Neck ROM: normal range of motion   Mouth opening: Normal     Cardiovascular    Rhythm: regular  Rate: normal         Dental    Dentition: Full upper dentures     Pulmonary  Breath sounds clear to auscultation               Abdominal  GI exam deferred       Other Findings            Anesthetic Plan    ASA: 3  Anesthesia type: MAC          Induction: Intravenous  Anesthetic plan and risks discussed with: Patient

## 2017-04-05 NOTE — DISCHARGE INSTRUCTIONS
Colonoscopy: What to Expect at [address] [place] [address]  After you have a colonoscopy, you will stay at the clinic for 1 to 2 hours until the medicines wear off. Then you can go home. But you will need to arrange for a ride. Your doctor will tell you when you can eat and do your other usual activities. Your doctor will talk to you about when you will need your next colonoscopy. Your doctor can help you decide how often you need to be checked. This will depend on the results of your test and your risk for colorectal cancer. After the test, you may be bloated or have gas pains. You may need to pass gas. If a biopsy was done or a polyp was removed, you may have streaks of blood in your stool (feces) for a few days. This care sheet gives you a general idea about how long it will take for you to recover. But each person recovers at a different pace. Follow the steps below to get better as quickly as possible. How can you care for yourself at home? Activity  · Rest when you feel tired. · You can do your normal activities when it feels okay to do so. Diet  · Follow your doctor's directions for eating. · Unless your doctor has told you not to, drink plenty of fluids. This helps to replace the fluids that were lost during the colon prep. · Do not drink alcohol. Medicines  · Your doctor will tell you if and when you can restart your medicines. He or she will also give you instructions about taking any new medicines. · If you take blood thinners, such as warfarin (Coumadin), clopidogrel (Plavix), or aspirin, be sure to talk to your doctor. He or she will tell you if and when to start taking those medicines again. Make sure that you understand exactly what your doctor wants you to do. · If polyps were removed or a biopsy was done during the test, your doctor may tell you not to take aspirin or other anti-inflammatory medicines for a few days. These include ibuprofen (Advil, Motrin) and naproxen (Aleve).   Other instructions  · For your safety, do not drive or operate machinery until the medicine wears off and you can think clearly. Your doctor may tell you not to drive or operate machinery until the day after your test.  · Do not sign legal documents or make major decisions until the medicine wears off and you can think clearly. The anesthesia can make it hard for you to fully understand what you are agreeing to. Follow-up care is a key part of your treatment and safety. Be sure to make and go to all appointments, and call your doctor if you are having problems. It's also a good idea to know your test results and keep a list of the medicines you take. When should you call for help? Call 911 anytime you think you may need emergency care. For example, call if:  · You passed out (lost consciousness). · You pass maroon or bloody stools. · You have severe belly pain. Call your doctor now or seek immediate medical care if:  · Your stools are black and tarlike. · Your stools have streaks of blood, but you did not have a biopsy or any polyps removed. · You have belly pain, or your belly is swollen and firm. · You vomit. · You have a fever. · You are very dizzy. Watch closely for changes in your health, and be sure to contact your doctor if you have any problems. Where can you learn more? Go to http://raul-vee.info/. Enter E264 in the search box to learn more about \"Colonoscopy: What to Expect at Home. \"  Current as of: August 9, 2016  Content Version: 11.2  © 2373-8598 [employer]. Care instructions adapted under license by [employer] (which disclaims liability or warranty for this information). If you have questions about a medical condition or this instruction, always ask your healthcare professional. [address] any warranty or liability for your use of this information.   Hemorrhoids: Care Instructions  Your Care Instructions    Hemorrhoids are enlarged veins that develop in the anal canal. Bleeding during bowel movements, itching, swelling, and rectal pain are the most common symptoms. They can be uncomfortable at times, but hemorrhoids rarely are a serious problem. You can treat most hemorrhoids with simple changes to your diet and bowel habits. These changes include eating more fiber and not straining to pass stools. Most hemorrhoids do not need surgery or other treatment unless they are very large and painful or bleed a lot. Follow-up care is a key part of your treatment and safety. Be sure to make and go to all appointments, and call your doctor if you are having problems. Its also a good idea to know your test results and keep a list of the medicines you take. How can you care for yourself at home? · Sit in a few inches of warm water (sitz bath) 3 times a day and after bowel movements. The warm water helps with pain and itching. · Put ice on your anal area several times a day for 10 minutes at a time. Put a thin cloth between the ice and your skin. Follow this by placing a warm, wet towel on the area for another 10 to 20 minutes. · Take pain medicines exactly as directed. ¨ If the doctor gave you a prescription medicine for pain, take it as prescribed. ¨ If you are not taking a prescription pain medicine, ask your doctor if you can take an over-the-counter medicine. · Keep the anal area clean, but be gentle. Use water and a fragrance-free soap, such as [place], or use baby wipes or medicated pads, such as Tucks. · Wear cotton underwear and loose clothing to decrease moisture in the anal area. · Eat more fiber. Include foods such as whole-grain breads and cereals, raw vegetables, raw and dried fruits, and beans. · Drink plenty of fluids, enough so that your urine is light yellow or clear like water.  If you have kidney, heart, or liver disease and have to limit fluids, talk with your doctor before you increase the amount of fluids you drink.  · Use a stool softener that contains bran or psyllium. You can save money by buying bran or psyllium (available in bulk at most health food stores) and sprinkling it on foods or stirring it into fruit juice. Or you can use a product such as Metamucil or Hydrocil. · Practice healthy bowel habits. ¨ Go to the bathroom as soon as you have the urge. ¨ Avoid straining to pass stools. Relax and give yourself time to let things happen naturally. ¨ Do not hold your breath while passing stools. ¨ Do not read while sitting on the toilet. Get off the toilet as soon as you have finished. · Take your medicines exactly as prescribed. Call your doctor if you think you are having a problem with your medicine. When should you call for help? Call 911 anytime you think you may need emergency care. For example, call if:  · You pass maroon or very bloody stools. Call your doctor now or seek immediate medical care if:  · You have increased pain. · You have increased bleeding. Watch closely for changes in your health, and be sure to contact your doctor if:  · Your symptoms have not improved after 3 or 4 days. Where can you learn more? Go to http://raul-vee.info/. Enter F228 in the search box to learn more about \"Hemorrhoids: Care Instructions. \"  Current as of: August 9, 2016  Content Version: 11.2  © 0569-2546 [employer]. Care instructions adapted under license by [employer] (which disclaims liability or warranty for this information). If you have questions about a medical condition or this instruction, always ask your healthcare professional. [place] [address] any warranty or liability for your use of this information. Learning About Diverticulosis and Diverticulitis  What are diverticulosis and diverticulitis? In diverticulosis and diverticulitis, pouches called diverticula form in the wall of the large intestine, or colon.   · In diverticulosis, the pouches do not cause any pain or other symptoms. · In diverticulitis, the pouches get inflamed or infected and cause symptoms. Doctors aren't sure what causes these pouches in the colon. But they think that a low-fiber diet may play a role. Without fiber to add bulk to the stool, the colon has to work harder than normal to push the stool forward. The pressure from this may cause pouches to form in weak spots along the colon. Some people with diverticulosis get diverticulitis. But experts don't know why this happens. What are the symptoms? · In diverticulosis, most people don't have symptoms. But pouches sometimes bleed. · In diverticulitis, symptoms may last from a few hours to a week or more. They include:  ¨ Belly pain. This is usually in the lower left side. It is sometimes worse when you move. This is the most common symptom. ¨ Fever and chills. ¨ Bloating and gas. ¨ Diarrhea or constipation. ¨ Nausea and sometimes vomiting. ¨ Not feeling like eating. How can you prevent these problems? You may be able to lower your chance of getting diverticulitis. You can do this by taking steps to prevent constipation. · Eat fruits, vegetables, beans, and whole grains every day. These foods are high in fiber. · Drink plenty of fluids (enough so that your urine is light yellow or clear like water). If you have kidney, heart, or liver disease and have to limit fluids, talk with your doctor before you increase the amount of fluids you drink. · Get at least 30 minutes of exercise on most days of the week. Walking is a good choice. You also may want to do other activities, such as running, swimming, cycling, or playing tennis or team sports. · Take a fiber supplement, such as Citrucel or Metamucil, every day if needed. Read and follow all instructions on the label. · Schedule time each day for a bowel movement. Having a daily routine may help.  Take your time and do not strain when having a bowel movement. Some people avoid nuts, seeds, berries, and popcorn. They believe that these foods might get trapped in the diverticula and cause pain. But there is no proof that these foods cause diverticulitis or make it worse. How are these problems treated? · The best way to treat diverticulosis is to avoid constipation. (See the tips above.)  · Treatment for diverticulitis includes antibiotics and often a change in your diet. You may need only liquids at first. Your doctor may suggest pain medicines for pain or belly cramps. In some cases, surgery may be needed. Follow-up care is a key part of your treatment and safety. Be sure to make and go to all appointments, and call your doctor if you are having problems. It's also a good idea to know your test results and keep a list of the medicines you take. Where can you learn more? Go to http://raul-vee.info/. Enter L183 in the search box to learn more about \"Learning About Diverticulosis and Diverticulitis. \"  Current as of: August 9, 2016  Content Version: 11.2  © 4964-7113 [employer]. Care instructions adapted under license by [employer] (which disclaims liability or warranty for this information). If you have questions about a medical condition or this instruction, always ask your healthcare professional. [address] any warranty or liability for your use of this information.       DISCHARGE SUMMARY from Nurse    The following personal items are in your possession at time of discharge:    Dental Appliances: None  Visual Aid: Glasses                  PATIENT INSTRUCTIONS:    After general anesthesia or intravenous sedation, for 24 hours or while taking prescription Narcotics:  · Limit your activities  · Do not drive and operate hazardous machinery  · Do not make important personal or business decisions  · Do  not drink alcoholic beverages  · If you have not urinated within 8 hours after discharge, please contact your surgeon on call. Report the following to your surgeon:  · Excessive pain, swelling, redness or odor of or around the surgical area  · Temperature over 100.5  · Nausea and vomiting lasting longer than 4 hours or if unable to take medications  · Any signs of decreased circulation or nerve impairment to extremity: change in color, persistent  numbness, tingling, coldness or increase pain  · Any questions      *  Please give a list of your current medications to your Primary Care Provider. *  Please update this list whenever your medications are discontinued, doses are      changed, or new medications (including over-the-counter products) are added. *  Please carry medication information at all times in case of emergency situations. These are general instructions for a healthy lifestyle:    No smoking/ No tobacco products/ Avoid exposure to second hand smoke    Surgeon General's Warning:  Quitting smoking now greatly reduces serious risk to your health. Obesity, smoking, and sedentary lifestyle greatly increases your risk for illness    A healthy diet, regular physical exercise & weight monitoring are important for maintaining a healthy lifestyle    You may be retaining fluid if you have a history of heart failure or if you experience any of the following symptoms:  Weight gain of 3 pounds or more overnight or 5 pounds in a week, increased swelling in our hands or feet or shortness of breath while lying flat in bed. Please call your doctor as soon as you notice any of these symptoms; do not wait until your next office visit. Recognize signs and symptoms of STROKE:    F-face looks uneven    A-arms unable to move or move unevenly    S-speech slurred or non-existent    T-time-call 911 as soon as signs and symptoms begin-DO NOT go       Back to bed or wait to see if you get better-TIME IS BRAIN.     Warning Signs of HEART ATTACK     Call 911 if you have these symptoms:   Chest discomfort. Most heart attacks involve discomfort in the center of the chest that lasts more than a few minutes, or that goes away and comes back. It can feel like uncomfortable pressure, squeezing, fullness, or pain.  Discomfort in other areas of the upper body. Symptoms can include pain or discomfort in one or both arms, the back, neck, jaw, or stomach.  Shortness of breath with or without chest discomfort.  Other signs may include breaking out in a cold sweat, nausea, or lightheadedness. Don't wait more than five minutes to call 911 - MINUTES MATTER! Fast action can save your life. Calling 911 is almost always the fastest way to get lifesaving treatment. Emergency Medical Services staff can begin treatment when they arrive -- up to an hour sooner than if someone gets to the hospital by car. The discharge information has been reviewed with the patient. The patient verbalized understanding. Discharge medications reviewed with the patient and appropriate educational materials and side effects teaching were provided.

## 2017-04-10 RX ORDER — VALSARTAN 320 MG/1
TABLET ORAL
Qty: 90 TAB | Refills: 3 | Status: SHIPPED | OUTPATIENT
Start: 2017-04-10 | End: 2018-01-30 | Stop reason: SDUPTHER

## 2017-04-10 RX ORDER — AMLODIPINE BESYLATE 5 MG/1
TABLET ORAL
Qty: 90 TAB | Refills: 3 | Status: SHIPPED | OUTPATIENT
Start: 2017-04-10 | End: 2018-02-09 | Stop reason: DRUGHIGH

## 2017-05-08 RX ORDER — SIMVASTATIN 20 MG/1
TABLET, FILM COATED ORAL
Qty: 90 TAB | Refills: 0 | Status: SHIPPED | OUTPATIENT
Start: 2017-05-08 | End: 2017-07-07 | Stop reason: SDUPTHER

## 2017-05-08 NOTE — TELEPHONE ENCOUNTER
Remind to have fasting labs done soon, this week and  pls reschedule appt tomorrow, to next week for us to be able to review labs together.

## 2017-05-26 ENCOUNTER — HOSPITAL ENCOUNTER (OUTPATIENT)
Dept: MAMMOGRAPHY | Age: 47
Discharge: HOME | End: 2017-05-26
Attending: FAMILY MEDICINE
Payer: MEDICARE

## 2017-05-26 DIAGNOSIS — Z12.31 VISIT FOR SCREENING MAMMOGRAM: ICD-10-CM

## 2017-05-26 PROCEDURE — 77067 SCR MAMMO BI INCL CAD: CPT

## 2017-05-26 PROCEDURE — 77063 BREAST TOMOSYNTHESIS BI: CPT

## 2017-05-26 RX ORDER — HYDROCHLOROTHIAZIDE 25 MG/1
25 TABLET ORAL DAILY
Qty: 90 TAB | Refills: 0 | Status: SHIPPED | OUTPATIENT
Start: 2017-05-26 | End: 2017-07-23 | Stop reason: SDUPTHER

## 2017-05-26 NOTE — TELEPHONE ENCOUNTER
Patient called this afternoon. She would like to know if Dr. Melissa Johnson can give her a 90 day supply of her hydrochlorothiazide (HYDRODIURIL) 25 mg tablet.  Please call patient back per request.

## 2017-05-31 ENCOUNTER — OFFICE VISIT (OUTPATIENT)
Dept: CARDIOLOGY CLINIC | Age: 47
End: 2017-05-31

## 2017-05-31 VITALS
BODY MASS INDEX: 41.82 KG/M2 | HEART RATE: 60 BPM | HEIGHT: 63 IN | SYSTOLIC BLOOD PRESSURE: 120 MMHG | WEIGHT: 236 LBS | OXYGEN SATURATION: 98 % | DIASTOLIC BLOOD PRESSURE: 84 MMHG

## 2017-05-31 DIAGNOSIS — E78.2 MIXED HYPERLIPIDEMIA: ICD-10-CM

## 2017-05-31 DIAGNOSIS — I10 ESSENTIAL HYPERTENSION: ICD-10-CM

## 2017-05-31 DIAGNOSIS — R06.09 DOE (DYSPNEA ON EXERTION): Primary | ICD-10-CM

## 2017-05-31 RX ORDER — LEVOCETIRIZINE DIHYDROCHLORIDE 5 MG/1
TABLET, FILM COATED ORAL
COMMUNITY
Start: 2017-05-29 | End: 2020-02-19 | Stop reason: ALTCHOICE

## 2017-05-31 NOTE — PROGRESS NOTES
1. Have you been to the ER, urgent care clinic since your last visit? Hospitalized since your last visit? [facility] ER April 2017 abd pain  2. Have you seen or consulted any other health care providers outside of the [address] [place] [address] since your last visit? Include any pap smears or colon screening.  no

## 2017-05-31 NOTE — PROGRESS NOTES
HISTORY OF PRESENT ILLNESS  Felix Maddox is a 51 y.o. female. HPI  She has been doing well. She denies any cardiac symptoms. She has been very motivated to exercise and lose weight. In fact, she has lost approximately thirty-five pounds in one year. She has been on a diet and exercise protocol. She denies chest pain, dyspnea, orthopnea or PND. She denies palpitations, dizziness or syncope. She has had no symptoms to indicate TIA or amaurosis fugax. She underwent stress nuclear cardiac imaging on 05/05/2016, which demonstrated no evidence of scarring or ischemia. Ejection fraction was estimated at 68%. Her cholesterol profile was not quite satisfactory in 2016, but I do not have the most recent cholesterol profile. Her BUN/creatinine was abnormal in 2016, but her BUN has come down to 16 in 2017. She indicates that she has been hydrating herself. She had a chest x-ray, which suggested cardiomegaly, and she subsequently underwent an echocardiogram on 04/11/16, which demonstrated normal cardiac size and normal LV function with EF in the 55-60% range. There were grade 1 diastolic dysfunction parameters. The right atrial dimension was normal with normal function. The left atrial dimension was normal. There was no significant valvular pathology. She is a nonsmoker. She has history of hypertension and morbid obesity. She has no history of diabetes mellitus. She is known to have had dyslipidemia and has been on simvastatin. She has a family history of coronary artery disease in that both parents had heart attacks in their fifties. Review of Systems   Constitutional: Positive for weight loss. Negative for malaise/fatigue. HENT: Negative for hearing loss. Eyes: Negative for blurred vision and double vision. Respiratory: Positive for shortness of breath. Cardiovascular: Negative for chest pain, palpitations, orthopnea, claudication, leg swelling and PND.    Gastrointestinal: Negative for blood in stool, heartburn and melena. Genitourinary: Negative for dysuria, frequency, hematuria and urgency. Musculoskeletal: Positive for back pain and joint pain. Skin: Negative for itching and rash. Neurological: Negative for dizziness, loss of consciousness, weakness and headaches. Psychiatric/Behavioral: Negative for depression and memory loss. Physical Exam   Constitutional: She is oriented to person, place, and time. She appears well-developed and well-nourished. HENT:   Head: Normocephalic and atraumatic. Eyes: Conjunctivae are normal. Pupils are equal, round, and reactive to light. Neck: Normal range of motion. Neck supple. No JVD present. Cardiovascular: Normal rate, regular rhythm, S1 normal and S2 normal.   No extrasystoles are present. PMI is not displaced. Exam reveals no gallop and no friction rub. No murmur heard. Pulses:       Carotid pulses are 3+ on the right side, and 3+ on the left side. Pulmonary/Chest: Effort normal. She has no rales. Abdominal: Soft. There is no tenderness. Musculoskeletal: She exhibits no edema. Neurological: She is alert and oriented to person, place, and time. No cranial nerve deficit. Skin: Skin is warm and dry. Psychiatric: She has a normal mood and affect. Her behavior is normal.     Visit Vitals    /84    Pulse 60    Ht 5' 3\" (1.6 m)    Wt 107 kg (236 lb)    LMP 01/31/2008    SpO2 98%    BMI 41.81 kg/m2       Past Medical History:   Diagnosis Date    AR (allergic rhinitis)     seasonal    Asthma     Cardiac echocardiogram 04/11/2016    Tech difficult. EF 55-60%. No RWMA. Mild conc LVH. Gr 1 DDfx. RVSP normal.      Cardiac nuclear imaging test 05/05/2016    Low risk. Very patchy radiotracer uptake. Mild anterior artifact, low suspicion for ischemia. EF 68%. No RWMA.   Normal EKG on pharm stress test.    Cardiovascular LE arterial duplex 10/07/2013    No significant arterial disease at rest bilaterally. R JUNE 1.21.  L JUNE 1.16. No significant sm vessel disease.  Depression     Dr. Idalmis Bobo, suicide attempt     Endometriosis     s/p hysterectomy     GERD (gastroesophageal reflux disease)     Glaucoma     Dr. Haley Magaña HTN (hypertension)     Hx of colonoscopy 2017    mild diverticulosis, g1 internal hemorrhoids, normal colon , repeat 10 year     Hx of suicide attempt     Hyperlipidemia LDL goal < 130     IBS (irritable bowel syndrome)     Insomnia     Nausea & vomiting     OA (osteoarthritis)     both knees, lower back    Preeclampsia     PTSD (post-traumatic stress disorder)     Sleep apnea     does not use cpap machine    Spinal stenosis     Dr. Soham Small       Social History     Social History    Marital status: SINGLE     Spouse name: N/A    Number of children: N/A    Years of education: N/A     Occupational History    disabled     student      Social History Main Topics    Smoking status: Never Smoker    Smokeless tobacco: Never Used    Alcohol use No    Drug use: No    Sexual activity: Not on file     Other Topics Concern    Not on file     Social History Narrative       Family History   Problem Relation Age of Onset    Heart Disease Mother      CHF    Diabetes Mother     Hypertension Mother     Kidney Disease Mother     Breast Cancer Mother     Stroke Mother     Diabetes Father     Hypertension Father     Heart Attack Father      MI    Cancer Maternal Grandmother      stomach    Cancer Other      breast    Heart Attack Other      MI    Ovarian Cancer Maternal Aunt        Past Surgical History:   Procedure Laterality Date    COLONOSCOPY N/A 2017    COLONOSCOPY performed by Dmitri Campos MD at [facility] ENDOSCOPY    FEMUR/KNEE SURG UNLISTED Left     External fixator    HX  SECTION      HX COLONOSCOPY  2017    DR. MCRAE 2017     HX HYSTERECTOMY      HX KNEE ARTHROSCOPY Left     left knee    HX KNEE REPLACEMENT Bilateral     (R) 2012 (L) 2013    HX OTHER SURGICAL  2016    all teeth removed    HX SALPINGO-OOPHORECTOMY  2008    HX TUBAL LIGATION      MULTIPLE DELIVERY       1990    TOTAL ABDOM HYSTERECTOMY         Current Outpatient Prescriptions   Medication Sig Dispense Refill    levocetirizine (XYZAL) 5 mg tablet       hydroCHLOROthiazide (HYDRODIURIL) 25 mg tablet Take 1 Tab by mouth daily. 90 Tab 0    simvastatin (ZOCOR) 20 mg tablet take 1 tablet by mouth at bedtime 90 Tab 0    amLODIPine (NORVASC) 5 mg tablet take 1 tablet by mouth once daily 90 Tab 3    valsartan (DIOVAN) 320 mg tablet take 1 tablet by mouth once daily 90 Tab 3    dicyclomine (BENTYL) 20 mg tablet Take 20 mg by mouth every six (6) hours as needed.  ziprasidone (GEODON) 40 mg capsule Take 40 mg by mouth daily.  meloxicam (MOBIC) 15 mg tablet Take 15 mg by mouth daily.  cyclobenzaprine (FLEXERIL) 10 mg tablet Take  by mouth three (3) times daily as needed for Muscle Spasm(s).  Dexlansoprazole 60 mg CpDB Take 1 Cap by mouth.  melatonin 3 mg tablet Take 3 mg by mouth nightly.  PROAIR HFA 90 mcg/actuation inhaler as needed. 0    montelukast (SINGULAIR) 10 mg tablet Take 10 mg by mouth nightly. 1    mometasone-formoterol (DULERA) 100-5 mcg/actuation HFA inhaler Take 2 Puffs by inhalation two (2) times a day. 1 Inhaler 5    FIBER, PSYLLIUM HUSK, PO Take  by mouth.  latanoprost (XALATAN) 0.005 % ophthalmic solution Administer 1 Drop to both eyes nightly. EKG: normal EKG, normal sinus rhythm, unchanged from previous tracings, nonspecific ST and T waves changes, incomplete RBBB  . ASSESSMENT and PLAN  Encounter Diagnoses   Name Primary?  SUAZO (dyspnea on exertion) Yes    Essential hypertension     Mixed hyperlipidemia    She has been doing very well.   Her dyspnea on exertion has improved and is almost completely resolved since she has lost thirty-five pounds by dieting and exercising. She denies any symptoms to indicate angina. Her blood pressure has been under control. She had negative stress nuclear cardiac imaging in May of 2016. She was given ample encouragement to continue to exercise and diet. She was advised to be aware of anginal symptoms such as exertional chest tightness or pressure, throat tightness, jaw pain, upper back pain or arm pain with exertion. For now, she will be continued on the current medical regimen.

## 2017-05-31 NOTE — MR AVS SNAPSHOT
Visit Information Date & Time Provider Department Dept. Phone Encounter #  
 5/31/2017  8:40 AM Padmini Arcos MD [facility] [address] [id number] Your Appointments 7/31/2017  9:30 AM  
ROUTINE CARE with Porsche Hines MD  
[facility] ([facility]) Appt Note: routine f/u 6mo [address] [facility] [address]  
[address] [facility] [address] [facility] [address] Upcoming Health Maintenance Date Due INFLUENZA AGE 9 TO ADULT 8/1/2017 DTaP/Tdap/Td series (3 - Td) 12/6/2026 COLONOSCOPY 4/5/2027 Allergies as of 5/31/2017  Review Complete On: 5/31/2017 By: Padmini Arcos MD  
  
 Severity Noted Reaction Type Reactions Other Medication  11/13/2012   Side Effect Hives  
 seafood Shellfish Derived  10/14/2013    Hives Current Immunizations  Never Reviewed Name Date Influenza Vaccine 10/17/2014 Tdap 10/8/2013 Not reviewed this visit You Were Diagnosed With   
  
 Codes Comments SUAZO (dyspnea on exertion)    -  Primary ICD-10-CM: R06.09 
ICD-9-CM: 786.09 Essential hypertension     ICD-10-CM: I10 
ICD-9-CM: 401.9 Mixed hyperlipidemia     ICD-10-CM: E78.2 ICD-9-CM: 272.2 Vitals BP Pulse Height(growth percentile) Weight(growth percentile) LMP SpO2  
 120/84 60 5' 3\" (1.6 m) 236 lb (107 kg) 01/31/2008 98% BMI OB Status Smoking Status 41.81 kg/m2 Hysterectomy Never Smoker Vitals History BMI and BSA Data Body Mass Index Body Surface Area  
 41.81 kg/m 2 2.18 m 2 Preferred Pharmacy Pharmacy Name Phone [address] [facility], [address] Your Updated Medication List  
  
   
This list is accurate as of: 5/31/17  9:36 AM.  Always use your most recent med list. amLODIPine 5 mg tablet Commonly known as:  Elise Tuttle take 1 tablet by mouth once daily BENTYL 20 mg tablet Generic drug:  dicyclomine Take 20 mg by mouth every six (6) hours as needed. cyclobenzaprine 10 mg tablet Commonly known as:  FLEXERIL Take  by mouth three (3) times daily as needed for Muscle Spasm(s). Dexlansoprazole 60 mg Cpdb Take 1 Cap by mouth. FIBER (PSYLLIUM HUSK) PO Take  by mouth. GEODON 40 mg capsule Generic drug:  ziprasidone Take 40 mg by mouth daily. hydroCHLOROthiazide 25 mg tablet Commonly known as:  HYDRODIURIL Take 1 Tab by mouth daily. latanoprost 0.005 % ophthalmic solution Commonly known as:  Desire Proffer Administer 1 Drop to both eyes nightly. levocetirizine 5 mg tablet Commonly known as:  XYZAL  
  
 melatonin 3 mg tablet Take 3 mg by mouth nightly. MOBIC 15 mg tablet Generic drug:  meloxicam  
Take 15 mg by mouth daily. mometasone-formoterol 100-5 mcg/actuation HFA inhaler Commonly known as:  Claretha Wilma Take 2 Puffs by inhalation two (2) times a day. montelukast 10 mg tablet Commonly known as:  SINGULAIR Take 10 mg by mouth nightly. PROAIR HFA 90 mcg/actuation inhaler Generic drug:  albuterol  
as needed. simvastatin 20 mg tablet Commonly known as:  ZOCOR  
take 1 tablet by mouth at bedtime  
  
 valsartan 320 mg tablet Commonly known as:  DIOVAN  
take 1 tablet by mouth once daily We Performed the Following AMB POC EKG ROUTINE W/ 12 LEADS, INTER & REP [63882 CPT(R)] Introducing [facility]! Emani Martinez introduces [employer] patient portal. Now you can access parts of your medical record, email your doctor's office, and request medication refills online. 1. In your internet browser, go to [email or web address] 2. Click on the First Time User? Click Here link in the Sign In box. You will see the New Member Sign Up page. 3. Enter your [employer] Access Code exactly as it appears below. You will not need to use this code after youve completed the sign-up process. If you do not sign up before the expiration date, you must request a new code. · [employer] Access Code: [email or web address] Expires: 8/16/2017 10:10 AM 
 
4. Enter the last four digits of your Social Security Number (xxxx) and Date of Birth (mm/dd/yyyy) as indicated and click Submit. You will be taken to the next sign-up page. 5. Create a [employer] ID. This will be your [employer] login ID and cannot be changed, so think of one that is secure and easy to remember. 6. Create a [employer] password. You can change your password at any time. 7. Enter your Password Reset Question and Answer. This can be used at a later time if you forget your password. 8. Enter your e-mail address. You will receive e-mail notification when new information is available in [address]. 9. Click Sign Up. You can now view and download portions of your medical record. 10. Click the Download Summary menu link to download a portable copy of your medical information. If you have questions, please visit the Frequently Asked Questions section of the [employer] website. Remember, [employer] is NOT to be used for urgent needs. For medical emergencies, dial 911. Now available from your iPhone and Android! Please provide this summary of care documentation to your next provider. Your primary care clinician is listed as Janae Brewster. If you have any questions after today's visit, please call [phone].

## 2017-07-24 RX ORDER — HYDROCHLOROTHIAZIDE 25 MG/1
TABLET ORAL
Qty: 90 TAB | Refills: 1 | Status: SHIPPED | OUTPATIENT
Start: 2017-07-24 | End: 2018-01-05 | Stop reason: SDUPTHER

## 2017-08-14 ENCOUNTER — HOSPITAL ENCOUNTER (OUTPATIENT)
Dept: GENERAL RADIOLOGY | Age: 47
Discharge: HOME | End: 2017-08-14
Payer: MEDICARE

## 2017-08-14 ENCOUNTER — HOSPITAL ENCOUNTER (OUTPATIENT)
Dept: LAB | Age: 47
Discharge: HOME | End: 2017-08-14
Payer: MEDICARE

## 2017-08-14 DIAGNOSIS — R14.0 ABDOMINAL BLOATING: ICD-10-CM

## 2017-08-14 DIAGNOSIS — K59.00 CONSTIPATION: ICD-10-CM

## 2017-08-14 DIAGNOSIS — R10.32 ABDOMINAL PAIN, LEFT LOWER QUADRANT: ICD-10-CM

## 2017-08-14 PROCEDURE — 74000 XR ABD (KUB): CPT

## 2017-08-14 PROCEDURE — 99001 SPECIMEN HANDLING PT-LAB: CPT | Performed by: PHYSICIAN ASSISTANT

## 2017-08-16 ENCOUNTER — HOSPITAL ENCOUNTER (OUTPATIENT)
Dept: GENERAL RADIOLOGY | Age: 47
Discharge: HOME | End: 2017-08-16
Payer: MEDICARE

## 2017-08-16 DIAGNOSIS — K59.09 CONSTIPATION, CHRONIC: ICD-10-CM

## 2017-08-16 DIAGNOSIS — R10.32 ABDOMINAL PAIN, LEFT LOWER QUADRANT: ICD-10-CM

## 2017-08-16 DIAGNOSIS — R14.0 ABDOMINAL BLOATING: ICD-10-CM

## 2017-08-16 PROCEDURE — 74000 XR ABD (KUB): CPT

## 2017-09-18 ENCOUNTER — HOSPITAL ENCOUNTER (OUTPATIENT)
Dept: LAB | Age: 47
Discharge: HOME | End: 2017-09-18
Payer: MEDICARE

## 2017-09-18 ENCOUNTER — HOSPITAL ENCOUNTER (OUTPATIENT)
Dept: GENERAL RADIOLOGY | Age: 47
Discharge: HOME | End: 2017-09-18
Payer: MEDICARE

## 2017-09-18 DIAGNOSIS — R10.32 LEFT LOWER QUADRANT PAIN: ICD-10-CM

## 2017-09-18 DIAGNOSIS — R14.0 ABDOMINAL BLOATING: ICD-10-CM

## 2017-09-18 DIAGNOSIS — K59.09 CONSTIPATION, CHRONIC: ICD-10-CM

## 2017-09-18 PROCEDURE — 99001 SPECIMEN HANDLING PT-LAB: CPT | Performed by: PHYSICIAN ASSISTANT

## 2017-09-18 PROCEDURE — 74000 XR ABD (KUB): CPT

## 2017-09-20 ENCOUNTER — OFFICE VISIT (OUTPATIENT)
Dept: FAMILY MEDICINE CLINIC | Age: 47
End: 2017-09-20

## 2017-09-20 ENCOUNTER — HOSPITAL ENCOUNTER (OUTPATIENT)
Dept: GENERAL RADIOLOGY | Age: 47
Discharge: HOME | End: 2017-09-20
Payer: MEDICARE

## 2017-09-20 ENCOUNTER — HOSPITAL ENCOUNTER (OUTPATIENT)
Dept: LAB | Age: 47
Discharge: HOME | End: 2017-09-20
Payer: MEDICARE

## 2017-09-20 VITALS
RESPIRATION RATE: 16 BRPM | TEMPERATURE: 98.5 F | HEIGHT: 63 IN | WEIGHT: 231.2 LBS | SYSTOLIC BLOOD PRESSURE: 110 MMHG | BODY MASS INDEX: 40.96 KG/M2 | HEART RATE: 94 BPM | DIASTOLIC BLOOD PRESSURE: 72 MMHG | OXYGEN SATURATION: 98 %

## 2017-09-20 DIAGNOSIS — E11.9 WELL CONTROLLED DIABETES MELLITUS (HCC): ICD-10-CM

## 2017-09-20 DIAGNOSIS — I10 ESSENTIAL HYPERTENSION: ICD-10-CM

## 2017-09-20 DIAGNOSIS — E78.1 HYPERTRIGLYCERIDEMIA: Primary | ICD-10-CM

## 2017-09-20 DIAGNOSIS — K64.9 HEMORRHOIDS, UNSPECIFIED HEMORRHOID TYPE: ICD-10-CM

## 2017-09-20 DIAGNOSIS — K59.09 OTHER CONSTIPATION: ICD-10-CM

## 2017-09-20 DIAGNOSIS — R42 DIZZINESS: ICD-10-CM

## 2017-09-20 DIAGNOSIS — K59.00 CONSTIPATION: ICD-10-CM

## 2017-09-20 DIAGNOSIS — R45.86 MOOD CHANGES: ICD-10-CM

## 2017-09-20 DIAGNOSIS — E66.3 OVERWEIGHT: ICD-10-CM

## 2017-09-20 DIAGNOSIS — Z78.0 POST-MENOPAUSAL: ICD-10-CM

## 2017-09-20 DIAGNOSIS — R23.2 HOT FLASHES: ICD-10-CM

## 2017-09-20 PROCEDURE — 74000 XR ABD (KUB): CPT

## 2017-09-20 PROCEDURE — 99001 SPECIMEN HANDLING PT-LAB: CPT | Performed by: FAMILY MEDICINE

## 2017-09-20 RX ORDER — FLUOCINOLONE ACETONIDE 0.11 MG/ML
OIL AURICULAR (OTIC)
Refills: 0 | COMMUNITY
Start: 2017-08-30 | End: 2018-02-09

## 2017-09-20 RX ORDER — CYCLOBENZAPRINE HCL 10 MG
TABLET ORAL
Status: CANCELLED | OUTPATIENT
Start: 2017-09-20

## 2017-09-20 RX ORDER — HYDROCORTISONE 25 MG/G
CREAM TOPICAL
COMMUNITY
Start: 2017-04-01 | End: 2018-02-09

## 2017-09-20 NOTE — PATIENT INSTRUCTIONS
Hot Flashes During Menopause: Care Instructions  Your Care Instructions  A hot flash is a sudden feeling of intense body heat. Your head, neck, and chest may get red. Your heartbeat may speed up, and you may feel anxious or irritable. You may find that hot flashes occur more often in warm rooms or during stressful times. Hot flashes and other symptoms are a normal response to the hormone changes that occur before your menstrual cycle goes away completely (menopause). Hot flashes often get better and go away with time. Making a few changes, such as exercising more, practicing meditation, quitting smoking, and drinking less alcohol, can help. Some women take hormone pills or other medicine to treat bothersome symptoms. Follow-up care is a key part of your treatment and safety. Be sure to make and go to all appointments, and call your doctor if you are having problems. Its also a good idea to know your test results and keep a list of the medicines you take. How can you care for yourself at home? · If you decide to take medicine to treat hot flashes, take it exactly as prescribed. Call your doctor if you think you are having a problem with your medicine. You will get more details on the specific medicine your doctor prescribes. · Learn to meditate. Sit quietly and focus on your breathing. Try to practice each day. Books, classes, and tapes can help you start a program.  · Wear natural fabrics, such as cotton and silk. Dress in layers so you can take off clothes as needed. · Keep the room temperature cool, or use a fan. You are more likely to have a hot flash when you are too warm than when you are cool. · Use fewer blankets when you sleep at night. · Drink cold fluids rather than hot ones. Limit your intake of caffeine and alcohol. · Eat smaller meals more often during the day so your body makes less heat than when digesting large amounts of food. Eat low-fat and high-fiber foods. · Do not smoke.  Smoking can make hot flashes worse. If you need help quitting, talk to your doctor about stop-smoking programs and medicines. These can increase your chances of quitting for good. · Get at least 30 minutes of exercise on most days of the week. Walking is a good choice. You also may want to do other activities, such as running, swimming, cycling, or playing tennis or team sports. Where can you learn more? Go to http://raul-vee.info/. Enter F700 in the search box to learn more about \"Hot Flashes During Menopause: Care Instructions. \"  Current as of: October 13, 2016  Content Version: 11.3  © 8580-5034 [employer]. Care instructions adapted under license by [employer] (which disclaims liability or warranty for this information). If you have questions about a medical condition or this instruction, always ask your healthcare professional. [place] [address] any warranty or liability for your use of this information. Learning About Diabetes Food Guidelines  Your Care Instructions  Meal planning is important to manage diabetes. It helps keep your blood sugar at a target level (which you set with your doctor). You don't have to eat special foods. You can eat what your family eats, including sweets once in a while. But you do have to pay attention to how often you eat and how much you eat of certain foods. You may want to work with a dietitian or a certified diabetes educator (CDE) to help you plan meals and snacks. A dietitian or CDE can also help you lose weight if that is one of your goals. What should you know about eating carbs? Managing the amount of carbohydrate (carbs) you eat is an important part of healthy meals when you have diabetes. Carbohydrate is found in many foods. · Learn which foods have carbs. And learn the amounts of carbs in different foods. ¨ Bread, cereal, pasta, and rice have about 15 grams of carbs in a serving.  A serving is 1 slice of bread (1 ounce), ½ cup of cooked cereal, or 1/3 cup of cooked pasta or rice. ¨ Fruits have 15 grams of carbs in a serving. A serving is 1 small fresh fruit, such as an apple or orange; ½ of a banana; ½ cup of cooked or canned fruit; ½ cup of fruit juice; 1 cup of melon or raspberries; or 2 tablespoons of dried fruit. ¨ Milk and no-sugar-added yogurt have 15 grams of carbs in a serving. A serving is 1 cup of milk or 2/3 cup of no-sugar-added yogurt. ¨ Starchy vegetables have 15 grams of carbs in a serving. A serving is ½ cup of mashed potatoes or sweet potato; 1 cup winter squash; ½ of a small baked potato; ½ cup of cooked beans; or ½ cup cooked corn or green peas. · Learn how much carbs to eat each day and at each meal. A dietitian or CDE can teach you how to keep track of the amount of carbs you eat. This is called carbohydrate counting. · If you are not sure how to count carbohydrate grams, use the Plate Method to plan meals. It is a good, quick way to make sure that you have a balanced meal. It also helps you spread carbs throughout the day. ¨ Divide your plate by types of foods. Put non-starchy vegetables on half the plate, meat or other protein food on one-quarter of the plate, and a grain or starchy vegetable in the final quarter of the plate. To this you can add a small piece of fruit and 1 cup of milk or yogurt, depending on how many carbs you are supposed to eat at a meal.  · Try to eat about the same amount of carbs at each meal. Do not \"save up\" your daily allowance of carbs to eat at one meal.  · Proteins have very little or no carbs per serving. Examples of proteins are beef, chicken, turkey, fish, eggs, tofu, cheese, cottage cheese, and peanut butter. A serving size of meat is 3 ounces, which is about the size of a deck of cards.  Examples of meat substitute serving sizes (equal to 1 ounce of meat) are 1/4 cup of cottage cheese, 1 egg, 1 tablespoon of peanut butter, and ½ cup of tofu.  How can you eat out and still eat healthy? · Learn to estimate the serving sizes of foods that have carbohydrate. If you measure food at home, it will be easier to estimate the amount in a serving of restaurant food. · If the meal you order has too much carbohydrate (such as potatoes, corn, or baked beans), ask to have a low-carbohydrate food instead. Ask for a salad or green vegetables. · If you use insulin, check your blood sugar before and after eating out to help you plan how much to eat in the future. · If you eat more carbohydrate at a meal than you had planned, take a walk or do other exercise. This will help lower your blood sugar. What else should you know? · Limit saturated fat, such as the fat from meat and dairy products. This is a healthy choice because people who have diabetes are at higher risk of heart disease. So choose lean cuts of meat and nonfat or low-fat dairy products. Use olive or canola oil instead of butter or shortening when cooking. · Don't skip meals. Your blood sugar may drop too low if you skip meals and take insulin or certain medicines for diabetes. · Check with your doctor before you drink alcohol. Alcohol can cause your blood sugar to drop too low. Alcohol can also cause a bad reaction if you take certain diabetes medicines. Follow-up care is a key part of your treatment and safety. Be sure to make and go to all appointments, and call your doctor if you are having problems. It's also a good idea to know your test results and keep a list of the medicines you take. Where can you learn more? Go to http://raul-vee.info/. Enter N337 in the search box to learn more about \"Learning About Diabetes Food Guidelines. \"  Current as of: March 13, 2017  Content Version: 11.3  © 3053-1219 [employer]. Care instructions adapted under license by [email or web address] (which disclaims liability or warranty for this information).  If you have questions about a medical condition or this instruction, always ask your healthcare professional. [address] any warranty or liability for your use of this information. Low Sodium Diet (2,000 Milligram): Care Instructions  Your Care Instructions  Too much sodium causes your body to hold on to extra water. This can raise your blood pressure and force your heart and kidneys to work harder. In very serious cases, this could cause you to be put in the hospital. It might even be life-threatening. By limiting sodium, you will feel better and lower your risk of serious problems. The most common source of sodium is salt. People get most of the salt in their diet from canned, prepared, and packaged foods. Fast food and restaurant meals also are very high in sodium. Your doctor will probably limit your sodium to less than 2,000 milligrams (mg) a day. This limit counts all the sodium in prepared and packaged foods and any salt you add to your food. Follow-up care is a key part of your treatment and safety. Be sure to make and go to all appointments, and call your doctor if you are having problems. It's also a good idea to know your test results and keep a list of the medicines you take. How can you care for yourself at home? Read food labels  · Read labels on cans and food packages. The labels tell you how much sodium is in each serving. Make sure that you look at the serving size. If you eat more than the serving size, you have eaten more sodium. · Food labels also tell you the Percent Daily Value for sodium. Choose products with low Percent Daily Values for sodium. · Be aware that sodium can come in forms other than salt, including monosodium glutamate (MSG), sodium citrate, and sodium bicarbonate (baking soda). MSG is often added to Asian food. When you eat out, you can sometimes ask for food without MSG or added salt.   Buy low-sodium foods  · Buy foods that are labeled \"unsalted\" (no salt added), \"sodium-free\" (less than 5 mg of sodium per serving), or \"low-sodium\" (less than 140 mg of sodium per serving). Foods labeled \"reduced-sodium\" and \"light sodium\" may still have too much sodium. Be sure to read the label to see how much sodium you are getting. · Buy fresh vegetables, or frozen vegetables without added sauces. Buy low-sodium versions of canned vegetables, soups, and other canned goods. Prepare low-sodium meals  · Cut back on the amount of salt you use in cooking. This will help you adjust to the taste. Do not add salt after cooking. One teaspoon of salt has about 2,300 mg of sodium. · Take the salt shaker off the table. · Flavor your food with garlic, lemon juice, onion, vinegar, herbs, and spices. Do not use soy sauce, lite soy sauce, steak sauce, onion salt, garlic salt, celery salt, mustard, or ketchup on your food. · Use low-sodium salad dressings, sauces, and ketchup. Or make your own salad dressings and sauces without adding salt. · Use less salt (or none) when recipes call for it. You can often use half the salt a recipe calls for without losing flavor. Other foods such as rice, pasta, and grains do not need added salt. · Rinse canned vegetables, and cook them in fresh water. This removes some--but not all--of the salt. · Avoid water that is naturally high in sodium or that has been treated with water softeners, which add sodium. Call your local water company to find out the sodium content of your water supply. If you buy bottled water, read the label and choose a sodium-free brand. Avoid high-sodium foods  · Avoid eating:  ¨ Smoked, cured, salted, and canned meat, fish, and poultry. ¨ Ham, silva, hot dogs, and luncheon meats. ¨ Regular, hard, and processed cheese and regular peanut butter. ¨ Crackers with salted tops, and other salted snack foods such as pretzels, chips, and salted popcorn. ¨ Frozen prepared meals, unless labeled low-sodium.   ¨ Canned and dried soups, broths, and bouillon, unless labeled sodium-free or low-sodium. ¨ Canned vegetables, unless labeled sodium-free or low-sodium. ¨ [place] fries, pizza, tacos, and other fast foods. ¨ Pickles, olives, ketchup, and other condiments, especially soy sauce, unless labeled sodium-free or low-sodium. Where can you learn more? Go to http://raul-vee.info/. Enter C207 in the search box to learn more about \"Low Sodium Diet (2,000 Milligram): Care Instructions. \"  Current as of: July 26, 2016  Content Version: 11.3  © 6118-2996 [employer]. Care instructions adapted under license by [employer] (which disclaims liability or warranty for this information). If you have questions about a medical condition or this instruction, always ask your healthcare professional. [place] [address] any warranty or liability for your use of this information. Learning About Physical Activity  What is physical activity? Physical activity is any kind of activity that gets your body moving. The types of physical activity that can help you get fit and stay healthy include:  · Aerobic or \"cardio\" activities that make your heart beat faster and make you breathe harder, such as brisk walking, riding a bike, or running. Aerobic activities strengthen your heart and lungs and build up your endurance. · Strength training activities that make your muscles work against, or \"resist,\" something, such as lifting weights or doing push-ups. These activities help tone and strengthen your muscles. · Stretches that allow you to move your joints and muscles through their full range of motion. Stretching helps you be more flexible and avoid injury. What are the benefits of physical activity? Being active is one of the best things you can do to get fit and stay healthy. It helps you to:  · Feel stronger and have more energy to do all the things you like to do.   · Focus better at school or work and perform better in sports. · Feel, think, and sleep better. · Reach and stay at a healthy weight. · Lose fat and build lean muscle. · Lower your risk for serious health problems. · Keep your bones, muscles, and joints strong. Being fit lets you do more physical activity. And it lets you work out harder without as much effort. How can you make physical activity part of your life? Get at least 30 minutes of exercise on most days of the week. Walking is a good choice. You also may want to do other activities, such as running, swimming, cycling, or playing tennis or team sports. Pick activities that you like--ones that make your heart beat faster, your muscles stronger, and your muscles and joints more flexible. If you find more than one thing you like doing, do them all. You don't have to do the same thing every day. Get your heart pumping every day. Any activity that makes your heart beat faster and keeps it at that rate for a while counts. Here are some great ways to get your heart beating faster:  · Go for a brisk walk, run, or bike ride. · Go for a hike or swim. · Go in-line skating. · Play a game of touch football, basketball, or soccer. · Ride a bike. · Play tennis or racquetball. · Climb stairs. Even some household chores can be aerobic--just do them at a faster pace. Vacuuming, raking or mowing the lawn, sweeping the garage, and washing and waxing the car all can help get your heart rate up. Strengthen your muscles during the week. You don't have to lift heavy weights or grow big, bulky muscles to get stronger. Doing a few simple activities that make your muscles work against, or \"resist,\" something can help you get stronger. For example, you can:  · Do push-ups or sit-ups, which use your own body weight as resistance. · Lift weights or dumbbells or use stretch bands at home or in a gym or community center. Stretch your muscles often. Stretching will help you as you become more active. It can help you stay flexible, loosen tight muscles, and avoid injury. It can also help improve your balance and posture and can be a great way to relax. Be sure to stretch the muscles you'll be using when you work out. It's best to warm your muscles slightly before you stretch them. Walk or do some other light aerobic activity for a few minutes, and then start stretching. When you stretch your muscles:  · Do it slowly. Stretching is not about going fast or making sudden movements. · Don't push or bounce during a stretch. · Hold each stretch for at least 15 to 30 seconds, if you can. You should feel a stretch in the muscle, but not pain. · Breathe out as you do the stretch. Then breathe in as you hold the stretch. Don't hold your breath. If you're worried about how more activity might affect your health, have a checkup before you start. Follow any special advice your doctor gives you for getting a smart start. Where can you learn more? Go to http://raul-vee.info/. Enter Z777 in the search box to learn more about \"Learning About Physical Activity. \"  Current as of: March 13, 2017  Content Version: 11.3  © 9686-9290 [employer]. Care instructions adapted under license by [employer] (which disclaims liability or warranty for this information). If you have questions about a medical condition or this instruction, always ask your healthcare professional. [place] [address] any warranty or liability for your use of this information. High-Fiber Diet: Care Instructions  Your Care Instructions  A high-fiber diet may help you relieve constipation and feel less bloated. Your doctor and dietitian will help you make a high-fiber eating plan based on your personal needs. The plan will include the things you like to eat. It will also make sure that you get 30 grams of fiber a day.   Before you make changes to the way you eat, be sure to talk with your doctor or dietitian. Follow-up care is a key part of your treatment and safety. Be sure to make and go to all appointments, and call your doctor if you are having problems. It's also a good idea to know your test results and keep a list of the medicines you take. How can you care for yourself at home? · You can increase how much fiber you get if you eat more of certain foods. These foods include:  ¨ Whole-grain breads and cereals. ¨ Fruits, such as pears, apples, and peaches. Eat the skins and peels if you can. ¨ Vegetables, such as broccoli, cabbage, spinach, carrots, asparagus, and squash. ¨ Starchy vegetables. These include potatoes with skins, kidney beans, and lima beans. · Take a fiber supplement every day if your doctor recommends it. Examples are Benefiber, Citrucel, FiberCon, and Metamucil. Ask your doctor how much to take. · Drink plenty of fluids, enough so that your urine is light yellow or clear like water. If you have kidney, heart, or liver disease and have to limit fluids, talk with your doctor before you increase the amount of fluids you drink. · Get some exercise every day. Exercise helps stool move through the colon. It also helps prevent constipation. · Keep a food diary. Try to notice and write down what foods cause gas, pain, or other symptoms. Then you can avoid these foods. Where can you learn more? Go to http://raul-vee.info/. Enter S805 in the search box to learn more about \"High-Fiber Diet: Care Instructions. \"  Current as of: December 15, 2016  Content Version: 11.3  © 6354-4416 [employer]. Care instructions adapted under license by [employer] (which disclaims liability or warranty for this information). If you have questions about a medical condition or this instruction, always ask your healthcare professional. [address] any warranty or liability for your use of this information.        Hemorrhoids: Care Instructions  Your Care Instructions    Hemorrhoids are enlarged veins that develop in the anal canal. Bleeding during bowel movements, itching, swelling, and rectal pain are the most common symptoms. They can be uncomfortable at times, but hemorrhoids rarely are a serious problem. You can treat most hemorrhoids with simple changes to your diet and bowel habits. These changes include eating more fiber and not straining to pass stools. Most hemorrhoids do not need surgery or other treatment unless they are very large and painful or bleed a lot. Follow-up care is a key part of your treatment and safety. Be sure to make and go to all appointments, and call your doctor if you are having problems. Its also a good idea to know your test results and keep a list of the medicines you take. How can you care for yourself at home? · Sit in a few inches of warm water (sitz bath) 3 times a day and after bowel movements. The warm water helps with pain and itching. · Put ice on your anal area several times a day for 10 minutes at a time. Put a thin cloth between the ice and your skin. Follow this by placing a warm, wet towel on the area for another 10 to 20 minutes. · Take pain medicines exactly as directed. ¨ If the doctor gave you a prescription medicine for pain, take it as prescribed. ¨ If you are not taking a prescription pain medicine, ask your doctor if you can take an over-the-counter medicine. · Keep the anal area clean, but be gentle. Use water and a fragrance-free soap, such as [place], or use baby wipes or medicated pads, such as Tucks. · Wear cotton underwear and loose clothing to decrease moisture in the anal area. · Eat more fiber. Include foods such as whole-grain breads and cereals, raw vegetables, raw and dried fruits, and beans. · Drink plenty of fluids, enough so that your urine is light yellow or clear like water.  If you have kidney, heart, or liver disease and have to limit fluids, talk with your doctor before you increase the amount of fluids you drink. · Use a stool softener that contains bran or psyllium. You can save money by buying bran or psyllium (available in bulk at most health food stores) and sprinkling it on foods or stirring it into fruit juice. Or you can use a product such as Metamucil or Hydrocil. · Practice healthy bowel habits. ¨ Go to the bathroom as soon as you have the urge. ¨ Avoid straining to pass stools. Relax and give yourself time to let things happen naturally. ¨ Do not hold your breath while passing stools. ¨ Do not read while sitting on the toilet. Get off the toilet as soon as you have finished. · Take your medicines exactly as prescribed. Call your doctor if you think you are having a problem with your medicine. When should you call for help? Call 911 anytime you think you may need emergency care. For example, call if:  · You pass maroon or very bloody stools. Call your doctor now or seek immediate medical care if:  · You have increased pain. · You have increased bleeding. Watch closely for changes in your health, and be sure to contact your doctor if:  · Your symptoms have not improved after 3 or 4 days. Where can you learn more? Go to http://raul-vee.info/. Enter F228 in the search box to learn more about \"Hemorrhoids: Care Instructions. \"  Current as of: August 9, 2016  Content Version: 11.3  © 9878-8957 [employer]. Care instructions adapted under license by [employer] (which disclaims liability or warranty for this information). If you have questions about a medical condition or this instruction, always ask your healthcare professional. [place] [address] any warranty or liability for your use of this information.

## 2017-09-20 NOTE — PROGRESS NOTES
HISTORY OF PRESENT ILLNESS  Stephon Warren is a 49 y.o. female. HPI: Dr. Ceferino Joel patient. Not seen in the office since more than six month. Multiple medical problem. Per her currently her hemorrhoids are giving her more pain. She has been on symptomatic treatment and also having close follow up with GI and following their recommendations. Has coming up appt as well. Continue sitz bath. Also on symptomatic treatment for constipation. H/o hypertension. Stable vitals today. Asymptomatic. Complaint with medication. Also feeling mood changes and hot flashes. She had hysterectomy almost 10 years ago. She is on black cohosh. On Geodon as well. Following psychiatrist. For now I have discussed with her treatment of Effexor which will help for hot flashes and for mood. She will discuss that with her psychiatrist and will f/u next visit. Not in an acute distress. Some sleep concern but that is due to her hemorrhoid discomfort. She has HBA1C 6.6. Last year. Was not ready to except diagnosis of diabetes so not on any medication. Now said she has to. Agree to do labs and will f/u after lab result regarding treatment plan. Discussed with her about diet modification and exercise as importance of weight loss to help diabetes and blood pressure. Said she was feeling on and off dizziness. Noted she is on HCTZ. Discuss that it can cut in half. Noted elevated triglyceride. Since then she has lost weight and also worked on diet modification. Recheck labs. Visit Vitals    /72 (BP 1 Location: Left arm, BP Patient Position: Sitting)    Pulse 94    Temp 98.5 °F (36.9 °C) (Oral)    Resp 16    Ht 5' 3\" (1.6 m)    Wt 231 lb 3.2 oz (104.9 kg)    SpO2 98%    BMI 40.96 kg/m2     Review medication list, vitals, problem list,allergies.    Lab Results   Component Value Date/Time    Hemoglobin A1c 6.6 05/20/2016 08:12 AM    Hemoglobin A1c (POC) 6.9 06/18/2010 12:32 PM     Lab Results   Component Value Date/Time Sodium 139 05/20/2016 08:13 AM    Potassium 3.9 05/20/2016 08:13 AM    Chloride 106 05/20/2016 08:13 AM    CO2 25 05/20/2016 08:13 AM    Anion gap 8 05/20/2016 08:13 AM    Glucose 102 05/20/2016 08:13 AM    BUN 29 05/20/2016 08:13 AM    Creatinine 1.36 05/20/2016 08:13 AM    BUN/Creatinine ratio 21 05/20/2016 08:13 AM    GFR est AA 51 05/20/2016 08:13 AM    GFR est non-AA 42 05/20/2016 08:13 AM    Calcium 9.3 05/20/2016 08:13 AM    Bilirubin, total 0.2 05/20/2016 08:13 AM    AST (SGOT) 22 05/20/2016 08:13 AM    Alk. phosphatase 92 05/20/2016 08:13 AM    Protein, total 7.4 05/20/2016 08:13 AM    Albumin 3.9 05/20/2016 08:13 AM    Globulin 3.5 05/20/2016 08:13 AM    A-G Ratio 1.1 05/20/2016 08:13 AM    ALT (SGPT) 35 05/20/2016 08:13 AM     Lab Results   Component Value Date/Time    Cholesterol, total 184 05/20/2016 08:13 AM    HDL Cholesterol 43 05/20/2016 08:13 AM    LDL, calculated 95.4 05/20/2016 08:13 AM    VLDL, calculated 45.6 05/20/2016 08:13 AM    Triglyceride 228 05/20/2016 08:13 AM    CHOL/HDL Ratio 4.3 05/20/2016 08:13 AM     Lab Results   Component Value Date/Time    TSH 3.610 05/16/2013 12:00 AM     Lab Results   Component Value Date/Time    WBC 7.2 04/12/2016 01:17 PM    WBC 6.1 05/30/2012 12:00 AM    Hemoglobin, POC 12.9 04/05/2017 08:04 AM    HGB 13.2 04/12/2016 01:17 PM    Hematocrit, POC 38 04/05/2017 08:04 AM    HCT 41.4 04/12/2016 01:17 PM    PLATELET 480 20/39/6480 01:17 PM    MCV 91.0 04/12/2016 01:17 PM           ROS: see HPI     Physical Exam   Constitutional: She is oriented to person, place, and time. No distress. Neck: No thyromegaly present. Cardiovascular: Normal rate, regular rhythm and normal heart sounds. Pulmonary/Chest:   CTA   Abdominal: Soft. Bowel sounds are normal. There is no tenderness. Musculoskeletal: She exhibits no edema. Neurological: She is oriented to person, place, and time.    Psychiatric: Her behavior is normal.       ASSESSMENT and PLAN    ICD-10-CM ICD-9-CM 1. Hypertriglyceridemia: for now recheck labs. On statin. Also trying to loose weight and diet modification. Will f/u after result. E78.1 272.1 REFERRAL TO NUTRITION   2. Dizziness: on and off. Discussed that it could be multiple reason. If persist will consider cut the dose half HCTZ. For now will f/u next visit with lab result. R42 780.4    3. Essential hypertension: stable at this time. Low salt diet. Exercise as tolerated. Will continue current plan. If dizziness persist will consider HCTZ half dose or d/c medication. I10 401.9 REFERRAL TO NUTRITION   4. Hemorrhoids, unspecified hemorrhoid type: continue sitz bath and GI recommendations. She is having close follow up. K64.9 455.6    5. Post-menopausal: having hot flashes and mood changes. Already following psychiatrist. Discussed effexor as a treatment. See HPI. She will talk to her psychiatrist and see if it can be started. F/u next visit. Mean time she will find out what dose of black cohosh she is taking and if she can go up on that dose. Z78.0 V49.81    6. Hot flashes R23.2 782.62    7. Well controlled diabetes mellitus ([address] E11.9 250.00 HEMOGLOBIN A1C WITH EAG      METABOLIC PANEL, COMPREHENSIVE      TSH 3RD GENERATION      CBC W/O DIFF      LIPID PANEL      MICROALBUMIN, UR, RAND W/ MICROALBUMIN/CREA RATIO   8. Overweight: she is working on diet and exercise. E66.3 278.02 REFERRAL TO NUTRITION   9. Mood changes ([facility] [address]): see above. F39 296.90    10. Other constipation: on high fiber diet. Will f/u and on symptomatic treatment. Following Gi. K59.09 564.09    Pt understood and agrees with above plan. Review    Follow-up Disposition:  Return in about 2 months (around 11/20/2017).

## 2017-09-20 NOTE — PROGRESS NOTES
1. Have you been to the ER, urgent care clinic since your last visit? Hospitalized since your last visit? [address] [facility] ED 8/21/17. 2. Have you seen or consulted any other health care providers outside of the [address] [place] [address] since your last visit? Include any pap smears or colon screening. No    Patient having diverticulitis flare-ups and issues with hemorrhoids; sees Dr. Anitha Fernando - [facility]: 8/17/17 and has upcoming appointment on Monday. Patient wants to discuss getting hemorrhoids removed surgically; patient states she notified [facility]. Patient declined flu vaccine. Patient states she had pneumonia vaccine at [address] [place] [address] on [address]  (\"months ago, maybe [place]").

## 2017-09-20 NOTE — MR AVS SNAPSHOT
Visit Information Date & Time Provider Department Dept. Phone Encounter #  
 9/20/2017  9:00 AM Raymundo Klein, 0 [facility] [phone] [id number] Follow-up Instructions Return in about 2 months (around 11/20/2017). Your Appointments 5/29/2018  9:40 AM  
Follow Up with Evelyn Buckner MD  
[facility] [place] ([facility]) Appt Note: 1 year follow up with an EKG  
 Rhina Herbert [id number] [phone] [facility] [place] [address] Upcoming Health Maintenance Date Due DTaP/Tdap/Td series (3 - Td) 12/6/2026 COLONOSCOPY 4/5/2027 Allergies as of 9/20/2017  Review Complete On: 9/20/2017 By: Raymundo Klein MD  
  
 Severity Noted Reaction Type Reactions Other Medication  11/13/2012   Side Effect Hives  
 seafood Shellfish Derived  10/14/2013    Hives Current Immunizations  Never Reviewed Name Date Influenza Vaccine 10/17/2014 Tdap 10/8/2013 Not reviewed this visit You Were Diagnosed With   
  
 Codes Comments Hypertriglyceridemia    -  Primary ICD-10-CM: E78.1 ICD-9-CM: 272.1 Dizziness     ICD-10-CM: R48 ICD-9-CM: 780.4 Essential hypertension     ICD-10-CM: I10 
ICD-9-CM: 401.9 Hemorrhoids, unspecified hemorrhoid type     ICD-10-CM: K64.9 ICD-9-CM: 455.6 Post-menopausal     ICD-10-CM: Z78.0 ICD-9-CM: V49.81 Hot flashes     ICD-10-CM: R23.2 ICD-9-CM: 782.62 Well controlled diabetes mellitus ([facility] [address]     ICD-10-CM: E11.9 ICD-9-CM: 250.00 Overweight     ICD-10-CM: J60.6 ICD-9-CM: 278.02 Mood changes (HCC)     ICD-10-CM: F39 
ICD-9-CM: 296.90 Other constipation     ICD-10-CM: K59.09 
ICD-9-CM: 564.09 Vitals BP Pulse Temp Resp Height(growth percentile) Weight(growth percentile)  110/72 (BP 1 Location: Left arm, BP Patient Position: Sitting) 94 98.5 °F (36.9 °C) (Oral) 16 5' 3\" (1.6 m) 231 lb 3.2 oz (104.9 kg) LMP SpO2 BMI OB Status Smoking Status 01/31/2008 98% 40.96 kg/m2 Hysterectomy Never Smoker Vitals History BMI and BSA Data Body Mass Index Body Surface Area 40.96 kg/m 2 2.16 m 2 Preferred Pharmacy Pharmacy Name Phone [phone] [facility], [address] Your Updated Medication List  
  
   
This list is accurate as of: 9/20/17  9:36 AM.  Always use your most recent med list. amLODIPine 5 mg tablet Commonly known as:  NORVASC  
take 1 tablet by mouth once daily BENTYL 20 mg tablet Generic drug:  dicyclomine Take 20 mg by mouth every six (6) hours as needed. BLACK COHOSH PO Take  by mouth. cyclobenzaprine 10 mg tablet Commonly known as:  FLEXERIL Take  by mouth three (3) times daily as needed for Muscle Spasm(s). Dexlansoprazole 60 mg Cpdb Take 1 Cap by mouth. FIBER (PSYLLIUM HUSK) PO Take  by mouth. fluocinolone acetonide oil 0.01 % Drop INSTILL 5 DROPS INTO AFFECTED EAR CANAL TWICE DAILY AS NEEDED  
  
 GEODON 40 mg capsule Generic drug:  ziprasidone Take 40 mg by mouth daily. hydroCHLOROthiazide 25 mg tablet Commonly known as:  HYDRODIURIL  
take 1 tablet by mouth once daily  
  
 hydrocortisone 2.5 % topical cream  
Commonly known as:  HYTONE Use 1 Application to affected area Twice Daily. latanoprost 0.005 % ophthalmic solution Commonly known as:  Omari Nunes Administer 1 Drop to both eyes nightly. levocetirizine 5 mg tablet Commonly known as:  XYZAL  
  
 melatonin 3 mg tablet Take 3 mg by mouth nightly. mometasone-formoterol 100-5 mcg/actuation HFA inhaler Commonly known as:  [place] Take 2 Puffs by inhalation two (2) times a day. montelukast 10 mg tablet Commonly known as:  SINGULAIR Take 10 mg by mouth nightly. PROBIOTIC PO Take  by mouth. simvastatin 20 mg tablet Commonly known as:  ZOCOR  
take 1 tablet by mouth at bedtime  
  
 valsartan 320 mg tablet Commonly known as:  DIOVAN  
take 1 tablet by mouth once daily We Performed the Following REFERRAL TO NUTRITION [REF50 Custom] Comments:  
 Please evaluate patient for diabetes, hypertension. Over weight Follow-up Instructions Return in about 2 months (around 11/20/2017). To-Do List   
 09/20/2017 Lab:  CBC W/O DIFF   
  
 09/20/2017 Lab:  HEMOGLOBIN A1C WITH EAG   
  
 09/20/2017 Lab:  LIPID PANEL   
  
 09/20/2017 Lab:  METABOLIC PANEL, COMPREHENSIVE   
  
 09/20/2017 Lab:  MICROALBUMIN, UR, RAND W/ MICROALBUMIN/CREA RATIO   
  
 09/20/2017 Lab:  TSH 3RD GENERATION   
  
 09/22/2017 7:00 AM  
  Appointment with [facility] MRI RM 1 at [address] [place] [address] ([phone]) GENERAL INSTRUCTIONS  Bring information (ID card) if you have any medically implanted devices. You will be required to lie still while the procedure is being performed. Remove any jewelry (including body piercing, hairpins) prior to MRI. If you have had a creatinine level drawn within the past 30 days, please bring most recent results to your appt. Bring any films, CD's, and reports related to your study with you on the day of your exam.  This only includes studies done outside of [address] [place] [address], [facility], [place], and [facility]. Bring a complete list of all medications you are currently taking to include prescriptions, over-the-counter meds, herbals, vitamins & any dietary supplements. If you were given medications for claustrophobia or anxiety, please arrange to have someone drive you to your appointment. QUESTIONS  Notify the MRI Department if you have any questions concerning your study. [place] - [phone] [address] [phone] Referral Information Referral ID Referred By Referred To [id number] [address] [facility], [address] [place] [address] R [place]. [facility], [address] Phone: [phone] Visits Status Start Date End Date 1 New Request 9/20/17 9/20/18 If your referral has a status of pending review or denied, additional information will be sent to support the outcome of this decision. Patient Instructions Hot Flashes During Menopause: Care Instructions Your Care Instructions A hot flash is a sudden feeling of intense body heat. Your head, neck, and chest may get red. Your heartbeat may speed up, and you may feel anxious or irritable. You may find that hot flashes occur more often in warm rooms or during stressful times. Hot flashes and other symptoms are a normal response to the hormone changes that occur before your menstrual cycle goes away completely (menopause). Hot flashes often get better and go away with time. Making a few changes, such as exercising more, practicing meditation, quitting smoking, and drinking less alcohol, can help. Some women take hormone pills or other medicine to treat bothersome symptoms. Follow-up care is a key part of your treatment and safety. Be sure to make and go to all appointments, and call your doctor if you are having problems. Its also a good idea to know your test results and keep a list of the medicines you take. How can you care for yourself at home? · If you decide to take medicine to treat hot flashes, take it exactly as prescribed. Call your doctor if you think you are having a problem with your medicine. You will get more details on the specific medicine your doctor prescribes. · Learn to meditate. Sit quietly and focus on your breathing. Try to practice each day. Books, classes, and tapes can help you start a program. 
· Wear natural fabrics, such as cotton and silk. Dress in layers so you can take off clothes as needed. · Keep the room temperature cool, or use a fan. You are more likely to have a hot flash when you are too warm than when you are cool. · Use fewer blankets when you sleep at night. · Drink cold fluids rather than hot ones. Limit your intake of caffeine and alcohol. · Eat smaller meals more often during the day so your body makes less heat than when digesting large amounts of food. Eat low-fat and high-fiber foods. · Do not smoke. Smoking can make hot flashes worse. If you need help quitting, talk to your doctor about stop-smoking programs and medicines. These can increase your chances of quitting for good. · Get at least 30 minutes of exercise on most days of the week. Walking is a good choice. You also may want to do other activities, such as running, swimming, cycling, or playing tennis or team sports. Where can you learn more? Go to http://raul-vee.info/. Enter F700 in the search box to learn more about \"Hot Flashes During Menopause: Care Instructions. \" Current as of: October 13, 2016 Content Version: 11.3 © 4985-8327 [employer]. Care instructions adapted under license by [employer] (which disclaims liability or warranty for this information). If you have questions about a medical condition or this instruction, always ask your healthcare professional. [address] any warranty or liability for your use of this information. Learning About Diabetes Food Guidelines Your Care Instructions Meal planning is important to manage diabetes. It helps keep your blood sugar at a target level (which you set with your doctor). You don't have to eat special foods. You can eat what your family eats, including sweets once in a while. But you do have to pay attention to how often you eat and how much you eat of certain foods.  
You may want to work with a dietitian or a certified diabetes educator (CDE) to help you plan meals and snacks. A dietitian or CDE can also help you lose weight if that is one of your goals. What should you know about eating carbs? Managing the amount of carbohydrate (carbs) you eat is an important part of healthy meals when you have diabetes. Carbohydrate is found in many foods. · Learn which foods have carbs. And learn the amounts of carbs in different foods. ¨ Bread, cereal, pasta, and rice have about 15 grams of carbs in a serving. A serving is 1 slice of bread (1 ounce), ½ cup of cooked cereal, or 1/3 cup of cooked pasta or rice. ¨ Fruits have 15 grams of carbs in a serving. A serving is 1 small fresh fruit, such as an apple or orange; ½ of a banana; ½ cup of cooked or canned fruit; ½ cup of fruit juice; 1 cup of melon or raspberries; or 2 tablespoons of dried fruit. ¨ Milk and no-sugar-added yogurt have 15 grams of carbs in a serving. A serving is 1 cup of milk or 2/3 cup of no-sugar-added yogurt. ¨ Starchy vegetables have 15 grams of carbs in a serving. A serving is ½ cup of mashed potatoes or sweet potato; 1 cup winter squash; ½ of a small baked potato; ½ cup of cooked beans; or ½ cup cooked corn or green peas. · Learn how much carbs to eat each day and at each meal. A dietitian or CDE can teach you how to keep track of the amount of carbs you eat. This is called carbohydrate counting. · If you are not sure how to count carbohydrate grams, use the Plate Method to plan meals. It is a good, quick way to make sure that you have a balanced meal. It also helps you spread carbs throughout the day. ¨ Divide your plate by types of foods. Put non-starchy vegetables on half the plate, meat or other protein food on one-quarter of the plate, and a grain or starchy vegetable in the final quarter of the plate.  To this you can add a small piece of fruit and 1 cup of milk or yogurt, depending on how many carbs you are supposed to eat at a meal. 
 · Try to eat about the same amount of carbs at each meal. Do not \"save up\" your daily allowance of carbs to eat at one meal. 
· Proteins have very little or no carbs per serving. Examples of proteins are beef, chicken, turkey, fish, eggs, tofu, cheese, cottage cheese, and peanut butter. A serving size of meat is 3 ounces, which is about the size of a deck of cards. Examples of meat substitute serving sizes (equal to 1 ounce of meat) are 1/4 cup of cottage cheese, 1 egg, 1 tablespoon of peanut butter, and ½ cup of tofu. How can you eat out and still eat healthy? · Learn to estimate the serving sizes of foods that have carbohydrate. If you measure food at home, it will be easier to estimate the amount in a serving of restaurant food. · If the meal you order has too much carbohydrate (such as potatoes, corn, or baked beans), ask to have a low-carbohydrate food instead. Ask for a salad or green vegetables. · If you use insulin, check your blood sugar before and after eating out to help you plan how much to eat in the future. · If you eat more carbohydrate at a meal than you had planned, take a walk or do other exercise. This will help lower your blood sugar. What else should you know? · Limit saturated fat, such as the fat from meat and dairy products. This is a healthy choice because people who have diabetes are at higher risk of heart disease. So choose lean cuts of meat and nonfat or low-fat dairy products. Use olive or canola oil instead of butter or shortening when cooking. · Don't skip meals. Your blood sugar may drop too low if you skip meals and take insulin or certain medicines for diabetes. · Check with your doctor before you drink alcohol. Alcohol can cause your blood sugar to drop too low. Alcohol can also cause a bad reaction if you take certain diabetes medicines. Follow-up care is a key part of your treatment and safety.  Be sure to make and go to all appointments, and call your doctor if you are having problems. It's also a good idea to know your test results and keep a list of the medicines you take. Where can you learn more? Go to http://raul-vee.info/. Enter I782 in the search box to learn more about \"Learning About Diabetes Food Guidelines. \" Current as of: March 13, 2017 Content Version: 11.3 © 2739-5853 [employer]. Care instructions adapted under license by [employer] (which disclaims liability or warranty for this information). If you have questions about a medical condition or this instruction, always ask your healthcare professional. [address] any warranty or liability for your use of this information. Low Sodium Diet (2,000 Milligram): Care Instructions Your Care Instructions Too much sodium causes your body to hold on to extra water. This can raise your blood pressure and force your heart and kidneys to work harder. In very serious cases, this could cause you to be put in the hospital. It might even be life-threatening. By limiting sodium, you will feel better and lower your risk of serious problems. The most common source of sodium is salt. People get most of the salt in their diet from canned, prepared, and packaged foods. Fast food and restaurant meals also are very high in sodium. Your doctor will probably limit your sodium to less than 2,000 milligrams (mg) a day. This limit counts all the sodium in prepared and packaged foods and any salt you add to your food. Follow-up care is a key part of your treatment and safety. Be sure to make and go to all appointments, and call your doctor if you are having problems. It's also a good idea to know your test results and keep a list of the medicines you take. How can you care for yourself at home? Read food labels · Read labels on cans and food packages.  The labels tell you how much sodium is in each serving. Make sure that you look at the serving size. If you eat more than the serving size, you have eaten more sodium. · Food labels also tell you the Percent Daily Value for sodium. Choose products with low Percent Daily Values for sodium. · Be aware that sodium can come in forms other than salt, including monosodium glutamate (MSG), sodium citrate, and sodium bicarbonate (baking soda). MSG is often added to Asian food. When you eat out, you can sometimes ask for food without MSG or added salt. Buy low-sodium foods · Buy foods that are labeled \"unsalted\" (no salt added), \"sodium-free\" (less than 5 mg of sodium per serving), or \"low-sodium\" (less than 140 mg of sodium per serving). Foods labeled \"reduced-sodium\" and \"light sodium\" may still have too much sodium. Be sure to read the label to see how much sodium you are getting. · Buy fresh vegetables, or frozen vegetables without added sauces. Buy low-sodium versions of canned vegetables, soups, and other canned goods. Prepare low-sodium meals · Cut back on the amount of salt you use in cooking. This will help you adjust to the taste. Do not add salt after cooking. One teaspoon of salt has about 2,300 mg of sodium. · Take the salt shaker off the table. · Flavor your food with garlic, lemon juice, onion, vinegar, herbs, and spices. Do not use soy sauce, lite soy sauce, steak sauce, onion salt, garlic salt, celery salt, mustard, or ketchup on your food. · Use low-sodium salad dressings, sauces, and ketchup. Or make your own salad dressings and sauces without adding salt. · Use less salt (or none) when recipes call for it. You can often use half the salt a recipe calls for without losing flavor. Other foods such as rice, pasta, and grains do not need added salt. · Rinse canned vegetables, and cook them in fresh water. This removes somebut not allof the salt.  
· Avoid water that is naturally high in sodium or that has been treated with water softeners, which add sodium. Call your local water company to find out the sodium content of your water supply. If you buy bottled water, read the label and choose a sodium-free brand. Avoid high-sodium foods · Avoid eating: ¨ Smoked, cured, salted, and canned meat, fish, and poultry. ¨ Ham, silva, hot dogs, and luncheon meats. ¨ Regular, hard, and processed cheese and regular peanut butter. ¨ Crackers with salted tops, and other salted snack foods such as pretzels, chips, and salted popcorn. ¨ Frozen prepared meals, unless labeled low-sodium. ¨ Canned and dried soups, broths, and bouillon, unless labeled sodium-free or low-sodium. ¨ Canned vegetables, unless labeled sodium-free or low-sodium. ¨ Western Madhuri fries, pizza, tacos, and other fast foods. ¨ Pickles, olives, ketchup, and other condiments, especially soy sauce, unless labeled sodium-free or low-sodium. Where can you learn more? Go to http://raulFastSoftvee.info/. Enter J687 in the search box to learn more about \"Low Sodium Diet (2,000 Milligram): Care Instructions. \" Current as of: July 26, 2016 Content Version: 11.3 © 8971-2605 [employer]. Care instructions adapted under license by [employer] (which disclaims liability or warranty for this information). If you have questions about a medical condition or this instruction, always ask your healthcare professional. [place] [address] any warranty or liability for your use of this information. Learning About Physical Activity What is physical activity? Physical activity is any kind of activity that gets your body moving. The types of physical activity that can help you get fit and stay healthy include: · Aerobic or \"cardio\" activities that make your heart beat faster and make you breathe harder, such as brisk walking, riding a bike, or running.  Aerobic activities strengthen your heart and lungs and build up your endurance. · Strength training activities that make your muscles work against, or \"resist,\" something, such as lifting weights or doing push-ups. These activities help tone and strengthen your muscles. · Stretches that allow you to move your joints and muscles through their full range of motion. Stretching helps you be more flexible and avoid injury. What are the benefits of physical activity? Being active is one of the best things you can do to get fit and stay healthy. It helps you to: · Feel stronger and have more energy to do all the things you like to do. · Focus better at school or work and perform better in sports. · Feel, think, and sleep better. · Reach and stay at a healthy weight. · Lose fat and build lean muscle. · Lower your risk for serious health problems. · Keep your bones, muscles, and joints strong. Being fit lets you do more physical activity. And it lets you work out harder without as much effort. How can you make physical activity part of your life? Get at least 30 minutes of exercise on most days of the week. Walking is a good choice. You also may want to do other activities, such as running, swimming, cycling, or playing tennis or team sports. Pick activities that you likeones that make your heart beat faster, your muscles stronger, and your muscles and joints more flexible. If you find more than one thing you like doing, do them all. You don't have to do the same thing every day. Get your heart pumping every day. Any activity that makes your heart beat faster and keeps it at that rate for a while counts. Here are some great ways to get your heart beating faster: · Go for a brisk walk, run, or bike ride. · Go for a hike or swim. · Go in-line skating. · Play a game of touch football, basketball, or soccer. · Ride a bike. · Play tennis or racquetball. · Climb stairs. Even some household chores can be aerobicjust do them at a faster pace. Vacuuming, raking or mowing the lawn, sweeping the garage, and washing and waxing the car all can help get your heart rate up. Strengthen your muscles during the week. You don't have to lift heavy weights or grow big, bulky muscles to get stronger. Doing a few simple activities that make your muscles work against, or \"resist,\" something can help you get stronger. For example, you can: · Do push-ups or sit-ups, which use your own body weight as resistance. · Lift weights or dumbbells or use stretch bands at home or in a gym or community center. Stretch your muscles often. Stretching will help you as you become more active. It can help you stay flexible, loosen tight muscles, and avoid injury. It can also help improve your balance and posture and can be a great way to relax. Be sure to stretch the muscles you'll be using when you work out. It's best to warm your muscles slightly before you stretch them. Walk or do some other light aerobic activity for a few minutes, and then start stretching. When you stretch your muscles: · Do it slowly. Stretching is not about going fast or making sudden movements. · Don't push or bounce during a stretch. · Hold each stretch for at least 15 to 30 seconds, if you can. You should feel a stretch in the muscle, but not pain. · Breathe out as you do the stretch. Then breathe in as you hold the stretch. Don't hold your breath. If you're worried about how more activity might affect your health, have a checkup before you start. Follow any special advice your doctor gives you for getting a smart start. Where can you learn more? Go to http://raul-vee.info/. Enter C508 in the search box to learn more about \"Learning About Physical Activity. \" Current as of: March 13, 2017 Content Version: 11.3 © 7988-3656 [employer].  Care instructions adapted under license by [employer] (which disclaims liability or warranty for this information). If you have questions about a medical condition or this instruction, always ask your healthcare professional. [address] any warranty or liability for your use of this information. High-Fiber Diet: Care Instructions Your Care Instructions A high-fiber diet may help you relieve constipation and feel less bloated. Your doctor and dietitian will help you make a high-fiber eating plan based on your personal needs. The plan will include the things you like to eat. It will also make sure that you get 30 grams of fiber a day. Before you make changes to the way you eat, be sure to talk with your doctor or dietitian. Follow-up care is a key part of your treatment and safety. Be sure to make and go to all appointments, and call your doctor if you are having problems. It's also a good idea to know your test results and keep a list of the medicines you take. How can you care for yourself at home? · You can increase how much fiber you get if you eat more of certain foods. These foods include: ¨ Whole-grain breads and cereals. ¨ Fruits, such as pears, apples, and peaches. Eat the skins and peels if you can. ¨ Vegetables, such as broccoli, cabbage, spinach, carrots, asparagus, and squash. ¨ Starchy vegetables. These include potatoes with skins, kidney beans, and lima beans. · Take a fiber supplement every day if your doctor recommends it. Examples are Benefiber, Citrucel, FiberCon, and Metamucil. Ask your doctor how much to take. · Drink plenty of fluids, enough so that your urine is light yellow or clear like water. If you have kidney, heart, or liver disease and have to limit fluids, talk with your doctor before you increase the amount of fluids you drink. · Get some exercise every day. Exercise helps stool move through the colon. It also helps prevent constipation. · Keep a food diary.  Try to notice and write down what foods cause gas, pain, or other symptoms. Then you can avoid these foods. Where can you learn more? Go to http://raul-vee.info/. Enter F882 in the search box to learn more about \"High-Fiber Diet: Care Instructions. \" Current as of: December 15, 2016 Content Version: 11.3 © 8554-2483 [employer]. Care instructions adapted under license by [employer] (which disclaims liability or warranty for this information). If you have questions about a medical condition or this instruction, always ask your healthcare professional. [address] any warranty or liability for your use of this information. Hemorrhoids: Care Instructions Your Care Instructions Hemorrhoids are enlarged veins that develop in the anal canal. Bleeding during bowel movements, itching, swelling, and rectal pain are the most common symptoms. They can be uncomfortable at times, but hemorrhoids rarely are a serious problem. You can treat most hemorrhoids with simple changes to your diet and bowel habits. These changes include eating more fiber and not straining to pass stools. Most hemorrhoids do not need surgery or other treatment unless they are very large and painful or bleed a lot. Follow-up care is a key part of your treatment and safety. Be sure to make and go to all appointments, and call your doctor if you are having problems. Its also a good idea to know your test results and keep a list of the medicines you take. How can you care for yourself at home? · Sit in a few inches of warm water (sitz bath) 3 times a day and after bowel movements. The warm water helps with pain and itching. · Put ice on your anal area several times a day for 10 minutes at a time. Put a thin cloth between the ice and your skin. Follow this by placing a warm, wet towel on the area for another 10 to 20 minutes. · Take pain medicines exactly as directed. ¨ If the doctor gave you a prescription medicine for pain, take it as prescribed. ¨ If you are not taking a prescription pain medicine, ask your doctor if you can take an over-the-counter medicine. · Keep the anal area clean, but be gentle. Use water and a fragrance-free soap, such as [place], or use baby wipes or medicated pads, such as Tucks. · Wear cotton underwear and loose clothing to decrease moisture in the anal area. · Eat more fiber. Include foods such as whole-grain breads and cereals, raw vegetables, raw and dried fruits, and beans. · Drink plenty of fluids, enough so that your urine is light yellow or clear like water. If you have kidney, heart, or liver disease and have to limit fluids, talk with your doctor before you increase the amount of fluids you drink. · Use a stool softener that contains bran or psyllium. You can save money by buying bran or psyllium (available in bulk at most health food stores) and sprinkling it on foods or stirring it into fruit juice. Or you can use a product such as Metamucil or Hydrocil. · Practice healthy bowel habits. ¨ Go to the bathroom as soon as you have the urge. ¨ Avoid straining to pass stools. Relax and give yourself time to let things happen naturally. ¨ Do not hold your breath while passing stools. ¨ Do not read while sitting on the toilet. Get off the toilet as soon as you have finished. · Take your medicines exactly as prescribed. Call your doctor if you think you are having a problem with your medicine. When should you call for help? Call 911 anytime you think you may need emergency care. For example, call if: 
· You pass maroon or very bloody stools. Call your doctor now or seek immediate medical care if: 
· You have increased pain. · You have increased bleeding. Watch closely for changes in your health, and be sure to contact your doctor if: 
· Your symptoms have not improved after 3 or 4 days. Where can you learn more? Go to http://raul-vee.info/. Enter F228 in the search box to learn more about \"Hemorrhoids: Care Instructions. \" Current as of: August 9, 2016 Content Version: 11.3 © 7918-0772 [employer]. Care instructions adapted under license by [employer] (which disclaims liability or warranty for this information). If you have questions about a medical condition or this instruction, always ask your healthcare professional. [place] [address] any warranty or liability for your use of this information. Introducing [facility]! [employer] introduces [employer] patient portal. Now you can access parts of your medical record, email your doctor's office, and request medication refills online. 1. In your internet browser, go to [email or web address] 2. Click on the First Time User? Click Here link in the Sign In box. You will see the New Member Sign Up page. 3. Enter your [employer] Access Code exactly as it appears below. You will not need to use this code after youve completed the sign-up process. If you do not sign up before the expiration date, you must request a new code. · [employer] Access Code: [email or web address] Expires: 11/28/2017  4:41 PM 
 
4. Enter the last four digits of your Social Security Number (xxxx) and Date of Birth (mm/dd/yyyy) as indicated and click Submit. You will be taken to the next sign-up page. 5. Create a [employer] ID. This will be your [employer] login ID and cannot be changed, so think of one that is secure and easy to remember. 6. Create a [employer] password. You can change your password at any time. 7. Enter your Password Reset Question and Answer. This can be used at a later time if you forget your password. 8. Enter your e-mail address. You will receive e-mail notification when new information is available in [address] 9. Click Sign Up. You can now view and download portions of your medical record. 10. Click the Download Summary menu link to download a portable copy of your medical information. If you have questions, please visit the Frequently Asked Questions section of the Happ[employer] website. Remember, [employer] is NOT to be used for urgent needs. For medical emergencies, dial 911. Now available from your iPhone and Android! Please provide this summary of care documentation to your next provider. Your primary care clinician is listed as Jarrett Urbano. If you have any questions after today's visit, please call [phone].

## 2017-09-22 ENCOUNTER — HOSPITAL ENCOUNTER (OUTPATIENT)
Dept: GENERAL RADIOLOGY | Age: 47
Discharge: HOME | End: 2017-09-22
Attending: PHYSICIAN ASSISTANT
Payer: MEDICARE

## 2017-09-22 ENCOUNTER — HOSPITAL ENCOUNTER (OUTPATIENT)
Age: 47
Discharge: HOME | End: 2017-09-22
Attending: PHYSICIAN ASSISTANT
Payer: MEDICARE

## 2017-09-22 DIAGNOSIS — M93.279 OSTEOCHONDRITIS DISSECANS OF ANKLE: ICD-10-CM

## 2017-09-22 DIAGNOSIS — K59.09 CONSTIPATION, CHRONIC: ICD-10-CM

## 2017-09-22 LAB
ALBUMIN SERPL-MCNC: 4.5 G/DL (ref 3.5–5.5)
ALBUMIN/CREAT UR: 60.2 MG/G CREAT (ref 0–30)
ALBUMIN/GLOB SERPL: 1.5 {RATIO} (ref 1.2–2.2)
ALP SERPL-CCNC: 69 IU/L (ref 39–117)
ALT SERPL-CCNC: 21 IU/L (ref 0–32)
AST SERPL-CCNC: 19 IU/L (ref 0–40)
BILIRUB SERPL-MCNC: 0.4 MG/DL (ref 0–1.2)
BUN SERPL-MCNC: 17 MG/DL (ref 6–24)
BUN/CREAT SERPL: 14 (ref 9–23)
CALCIUM SERPL-MCNC: 9.8 MG/DL (ref 8.7–10.2)
CHLORIDE SERPL-SCNC: 99 MMOL/L (ref 96–106)
CHOLEST SERPL-MCNC: 174 MG/DL (ref 100–199)
CO2 SERPL-SCNC: 26 MMOL/L (ref 18–29)
CREAT SERPL-MCNC: 1.2 MG/DL (ref 0.57–1)
CREAT UR-MCNC: 99.9 MG/DL
ERYTHROCYTE [DISTWIDTH] IN BLOOD BY AUTOMATED COUNT: 14.8 % (ref 12.3–15.4)
EST. AVERAGE GLUCOSE BLD GHB EST-MCNC: 114 MG/DL
GLOBULIN SER CALC-MCNC: 3 G/DL (ref 1.5–4.5)
GLUCOSE SERPL-MCNC: 84 MG/DL (ref 65–99)
HBA1C MFR BLD: 5.6 % (ref 4.8–5.6)
HCT VFR BLD AUTO: 38.9 % (ref 34–46.6)
HDLC SERPL-MCNC: 68 MG/DL
HGB BLD-MCNC: 13.3 G/DL (ref 11.1–15.9)
INTERPRETATION, 910389: NORMAL
INTERPRETATION: NORMAL
LDLC SERPL CALC-MCNC: 88 MG/DL (ref 0–99)
Lab: NORMAL
MCH RBC QN AUTO: 30.9 PG (ref 26.6–33)
MCHC RBC AUTO-ENTMCNC: 34.2 G/DL (ref 31.5–35.7)
MCV RBC AUTO: 91 FL (ref 79–97)
MICROALBUMIN UR-MCNC: 60.1 UG/ML
PDF IMAGE, 910387: NORMAL
PLATELET # BLD AUTO: 240 X10E3/UL (ref 150–379)
POTASSIUM SERPL-SCNC: 3.9 MMOL/L (ref 3.5–5.2)
PROT SERPL-MCNC: 7.5 G/DL (ref 6–8.5)
RBC # BLD AUTO: 4.3 X10E6/UL (ref 3.77–5.28)
SODIUM SERPL-SCNC: 142 MMOL/L (ref 134–144)
TRIGL SERPL-MCNC: 91 MG/DL (ref 0–149)
TSH SERPL DL<=0.005 MIU/L-ACNC: 1.86 UIU/ML (ref 0.45–4.5)
VLDLC SERPL CALC-MCNC: 18 MG/DL (ref 5–40)
WBC # BLD AUTO: 8 X10E3/UL (ref 3.4–10.8)

## 2017-09-22 PROCEDURE — 73721 MRI JNT OF LWR EXTRE W/O DYE: CPT

## 2017-09-22 PROCEDURE — 74000 XR ABD (KUB): CPT

## 2017-09-22 NOTE — PROGRESS NOTES
Let pt know that diabetes test is ok. For now will hold off starting any medication till next visit. Also noted mild elevated renal function but better than before. So for now continue current plan and further discussion on follow up visit. Thanks.

## 2017-09-26 ENCOUNTER — HOSPITAL ENCOUNTER (EMERGENCY)
Age: 47
Discharge: HOME | End: 2017-09-26
Attending: EMERGENCY MEDICINE
Payer: MEDICARE

## 2017-09-26 VITALS
HEART RATE: 86 BPM | WEIGHT: 231 LBS | DIASTOLIC BLOOD PRESSURE: 90 MMHG | HEIGHT: 63 IN | OXYGEN SATURATION: 98 % | SYSTOLIC BLOOD PRESSURE: 129 MMHG | RESPIRATION RATE: 18 BRPM | BODY MASS INDEX: 40.93 KG/M2 | TEMPERATURE: 99 F

## 2017-09-26 DIAGNOSIS — K64.9 HEMORRHOIDS, UNSPECIFIED HEMORRHOID TYPE: Primary | ICD-10-CM

## 2017-09-26 PROCEDURE — 99283 EMERGENCY DEPT VISIT LOW MDM: CPT

## 2017-09-26 PROCEDURE — 74011250637 HC RX REV CODE- 250/637: Performed by: EMERGENCY MEDICINE

## 2017-09-26 RX ORDER — ACETAMINOPHEN 500 MG
1000 TABLET ORAL ONCE
Status: COMPLETED | OUTPATIENT
Start: 2017-09-26 | End: 2017-09-26

## 2017-09-26 RX ORDER — HYDROCODONE BITARTRATE AND ACETAMINOPHEN 5; 325 MG/1; MG/1
1 TABLET ORAL
Qty: 5 TAB | Refills: 0 | Status: SHIPPED | OUTPATIENT
Start: 2017-09-26 | End: 2018-02-09

## 2017-09-26 RX ORDER — DOCUSATE SODIUM 100 MG/1
100 CAPSULE, LIQUID FILLED ORAL 2 TIMES DAILY
Qty: 14 CAP | Refills: 0 | Status: SHIPPED | OUTPATIENT
Start: 2017-09-26 | End: 2017-10-03

## 2017-09-26 RX ADMIN — ACETAMINOPHEN 1000 MG: 500 TABLET ORAL at 11:12

## 2017-09-26 NOTE — ED PROVIDER NOTES
HPI Comments: 10:32 AM Khadar Plata is a 49 y.o. female with a history of HTN, hyperlipidemia, GERD, glaucoma, and presents to ED c/o hemorrhoid related rectal pain that started last week and progressively worsened. Pt states she felt worse last week. She tried Sitz baths, used hemorrhoid cream and pads however had no relief. The pain stopped after the 4th day but then returned. She had abdominal pain which has subsided. She described the hemorrhoid pain as \"knives sticking in her\". No vaginal or anal bleeding. Talked to nurse at her PCP office and requested for hemorrhoids to be removed. She is waiting on consult for a specialist. Associated  symptoms include constipation. She hasn't had a BM in three days. Pt strains while taking bm and states she has to \"stick her finger\" inside of her. Denies any other symptoms or complaints at the moment. The history is provided by the patient. Past Medical History:   Diagnosis Date    AR (allergic rhinitis)     seasonal    Asthma     Cardiac echocardiogram 04/11/2016    Tech difficult. EF 55-60%. No RWMA. Mild conc LVH. Gr 1 DDfx. RVSP normal.      Cardiac nuclear imaging test 05/05/2016    Low risk. Very patchy radiotracer uptake. Mild anterior artifact, low suspicion for ischemia. EF 68%. No RWMA. Normal EKG on pharm stress test.    Cardiovascular LE arterial duplex 10/07/2013    No significant arterial disease at rest bilaterally. R JUNE 1.21.  L JUNE 1.16. No significant sm vessel disease.     Depression     Dr. Kike Asif, suicide attempt 2/12    Endometriosis     s/p hysterectomy 2006    GERD (gastroesophageal reflux disease)     Glaucoma     Dr. Caitlin Chanel HTN (hypertension)     Hx of colonoscopy 04/05/2017    mild diverticulosis, g1 internal hemorrhoids, normal colon , repeat 10 year 2027    Hx of suicide attempt     Hyperlipidemia LDL goal < 130     IBS (irritable bowel syndrome)     Insomnia     Nausea & vomiting     OA (osteoarthritis)     both knees, lower back    Preeclampsia     PTSD (post-traumatic stress disorder)     Sleep apnea     does not use cpap machine    Spinal stenosis     Dr. Lázaro Lyman       Past Surgical History:   Procedure Laterality Date    COLONOSCOPY N/A 2017    COLONOSCOPY performed by Saloni Jackson MD at [address] Left     External fixator    HX  SECTION      HX COLONOSCOPY  2017    DR. MCRAE 2017     HX HYSTERECTOMY      HX KNEE ARTHROSCOPY Left     left knee    HX KNEE REPLACEMENT Bilateral     (R)  (L)     HX OTHER SURGICAL  2016    all teeth removed    HX SALPINGO-OOPHORECTOMY  2008    HX TUBAL LIGATION      MULTIPLE DELIVERY       1990    TOTAL ABDOM HYSTERECTOMY           Family History:   Problem Relation Age of Onset    Heart Disease Mother      CHF    Diabetes Mother     Hypertension Mother     Kidney Disease Mother     Breast Cancer Mother     Stroke Mother     Diabetes Father     Hypertension Father     Heart Attack Father      MI    Cancer Maternal Grandmother      stomach    Cancer Other      breast    Heart Attack Other      MI    Ovarian Cancer Maternal Aunt        Social History     Social History    Marital status: SINGLE     Spouse name: N/A    Number of children: N/A    Years of education: N/A     Occupational History    disabled     student      Social History Main Topics    Smoking status: Never Smoker    Smokeless tobacco: Never Used    Alcohol use No    Drug use: No    Sexual activity: Not on file     Other Topics Concern    Not on file     Social History Narrative         ALLERGIES: Other medication and Shellfish derived    Review of Systems   Constitutional: Negative for chills, fatigue and fever. HENT: Negative for congestion, ear pain and sore throat. Eyes: Negative for pain, redness and itching.    Respiratory: Negative for chest tightness, shortness of breath and wheezing. Cardiovascular: Negative for chest pain, palpitations and leg swelling. Gastrointestinal: Positive for constipation and rectal pain. Negative for abdominal pain, blood in stool, diarrhea, nausea and vomiting. Genitourinary: Negative for dysuria, flank pain, hematuria, pelvic pain and vaginal bleeding. Musculoskeletal: Negative for arthralgias, back pain, joint swelling and myalgias. Skin: Negative for color change, pallor and rash. Neurological: Negative for dizziness, weakness and headaches. Hematological: Negative for adenopathy. Does not bruise/bleed easily. All other systems reviewed and are negative. Vitals:    09/26/17 0955   BP: 129/90   Pulse: 86   Resp: 18   Temp: 99 °F (37.2 °C)   SpO2: 98%   Weight: 104.8 kg (231 lb)   Height: 5' 3\" (1.6 m)            Physical Exam   Constitutional: No distress. HENT:   Head: Normocephalic and atraumatic. Mouth/Throat: Oropharynx is clear and moist.   Eyes: Conjunctivae and EOM are normal. Pupils are equal, round, and reactive to light. Neck: Normal range of motion. Neck supple. Cardiovascular: Normal rate, regular rhythm and normal heart sounds. No murmur heard. Pulmonary/Chest: Effort normal and breath sounds normal. She has no wheezes. She has no rales. Abdominal: Soft. Bowel sounds are normal. She exhibits no distension. There is no tenderness. Genitourinary: Rectal exam shows external hemorrhoid. Genitourinary Comments: Rectal exam: non-thrombosed external hemorrhoid. Tender. No fissure. RN Bronson Nino present for exam   Musculoskeletal: Normal range of motion. She exhibits no edema or deformity. Lymphadenopathy:     She has no cervical adenopathy. Neurological: She is alert. She exhibits normal muscle tone. Coordination normal.   Skin: Skin is warm and dry. No rash noted. She is not diaphoretic. No erythema. Psychiatric: She has a normal mood and affect.  Her behavior is normal. [facility]  ED Course       Procedures      Vitals:  Patient Vitals for the past 12 hrs:   Temp Pulse Resp BP SpO2   09/26/17 0955 99 °F (37.2 °C) 86 18 129/90 98 %   Patient is 98% O2 on RA, indicating adequate oxygenation. Medications ordered:   Medications   acetaminophen (TYLENOL) tablet 1,000 mg (1,000 mg Oral Given 9/26/17 1112)         Lab findings:  No results found for this or any previous visit (from the past 12 hour(s)). Progress notes, Consult notes or additional Procedure notes:     11:32 AM Presenting with worsening pain related to known h/o hemorrhoids. Colonoscopy in April showing grade 1 internal hemorrhoids, rec for high fiber diet at that time. Currently not bleeding, not thrombosed. Will refer back to GI for further eval.  Provide short rx of opiate analgesics, stool softener; counseled to continue high fiber, sitz baths, and topical therapy. Disposition:  Diagnosis:   1. Hemorrhoids, unspecified hemorrhoid type        Disposition: discharged. Follow-up Information     Follow up With Details Comments Contact Info    Cody Glover MD In 3 days Further evaluation [address]  [facility]  [phone]             Patient's Medications   Start Taking    DOCUSATE SODIUM (COLACE) 100 MG CAPSULE    Take 1 Cap by mouth two (2) times a day for 7 days. HYDROCODONE-ACETAMINOPHEN (NORCO) 5-325 MG PER TABLET    Take 1 Tab by mouth every six (6) hours as needed for Pain. Max Daily Amount: 4 Tabs. Continue Taking    AMLODIPINE (NORVASC) 5 MG TABLET    take 1 tablet by mouth once daily    BLACK COHOSH PO    Take  by mouth. CYCLOBENZAPRINE (FLEXERIL) 10 MG TABLET    Take  by mouth three (3) times daily as needed for Muscle Spasm(s). DEXLANSOPRAZOLE 60 MG CPDB    Take 1 Cap by mouth. DICYCLOMINE (BENTYL) 20 MG TABLET    Take 20 mg by mouth every six (6) hours as needed.     FIBER, PSYLLIUM HUSK, PO Take  by mouth. FLUOCINOLONE ACETONIDE OIL 0.01 % DROP    INSTILL 5 DROPS INTO AFFECTED EAR CANAL TWICE DAILY AS NEEDED    HYDROCHLOROTHIAZIDE (HYDRODIURIL) 25 MG TABLET    take 1 tablet by mouth once daily    HYDROCORTISONE (HYTONE) 2.5 % TOPICAL CREAM    Use 1 Application to affected area Twice Daily. LACTOBACILLUS ACIDOPHILUS (PROBIOTIC PO)    Take  by mouth. LATANOPROST (XALATAN) 0.005 % OPHTHALMIC SOLUTION    Administer 1 Drop to both eyes nightly. LEVOCETIRIZINE (XYZAL) 5 MG TABLET        MELATONIN 3 MG TABLET    Take 3 mg by mouth nightly. MOMETASONE-FORMOTEROL (DULERA) 100-5 MCG/ACTUATION HFA INHALER    Take 2 Puffs by inhalation two (2) times a day. MONTELUKAST (SINGULAIR) 10 MG TABLET    Take 10 mg by mouth nightly. SIMVASTATIN (ZOCOR) 20 MG TABLET    take 1 tablet by mouth at bedtime    VALSARTAN (DIOVAN) 320 MG TABLET    take 1 tablet by mouth once daily    ZIPRASIDONE (GEODON) 40 MG CAPSULE    Take 40 mg by mouth daily. These Medications have changed    No medications on file   Stop Taking    No medications on file         Scribe Attestation      Susanna Gutierrez (Aj) acting as a scribe for and in the presence of Daiva Kussmaul, MD      September 26, 2017 at 11:39 AM       Provider Attestation:      I personally performed the services described in the documentation, reviewed the documentation, as recorded by the scribe in my presence, and it accurately and completely recmords my words and actions.  September 26, 2017 at 11:39 AM - Daiva Kussmaul, MD

## 2017-09-26 NOTE — ED TRIAGE NOTES
Patient c/o hemorrhoid pain x 3 day, patient states her PCP suppose to consult a specialist for it but haven't heard anything.

## 2017-09-26 NOTE — ED NOTES
Geraldine Márquez is a 49 y.o. female that was discharged in good condition. The patients diagnosis, condition and treatment were explained to  patient and aftercare instructions were given. The patient verbalized understanding. Patient armband removed and shredded.

## 2017-09-26 NOTE — DISCHARGE INSTRUCTIONS
FOLLOW-UP WITH YOUR GI DOCTOR. CONTINUE HIGH FIBER DIET, WARM SITZ BATHS, AND TOPICAL CREAM.    IF YOU HAVE SEVERE PAIN, FEVER, HEAVY BLEEDING OR OTHER WORRYING SIGNS RETURN TO THE ER RIGHT AWAY. Hemorrhoids: Care Instructions  Your Care Instructions    Hemorrhoids are enlarged veins that develop in the anal canal. Bleeding during bowel movements, itching, swelling, and rectal pain are the most common symptoms. They can be uncomfortable at times, but hemorrhoids rarely are a serious problem. You can treat most hemorrhoids with simple changes to your diet and bowel habits. These changes include eating more fiber and not straining to pass stools. Most hemorrhoids do not need surgery or other treatment unless they are very large and painful or bleed a lot. Follow-up care is a key part of your treatment and safety. Be sure to make and go to all appointments, and call your doctor if you are having problems. Its also a good idea to know your test results and keep a list of the medicines you take. How can you care for yourself at home? · Sit in a few inches of warm water (sitz bath) 3 times a day and after bowel movements. The warm water helps with pain and itching. · Put ice on your anal area several times a day for 10 minutes at a time. Put a thin cloth between the ice and your skin. Follow this by placing a warm, wet towel on the area for another 10 to 20 minutes. · Take pain medicines exactly as directed. ¨ If the doctor gave you a prescription medicine for pain, take it as prescribed. ¨ If you are not taking a prescription pain medicine, ask your doctor if you can take an over-the-counter medicine. · Keep the anal area clean, but be gentle. Use water and a fragrance-free soap, such as [place], or use baby wipes or medicated pads, such as Tucks. · Wear cotton underwear and loose clothing to decrease moisture in the anal area. · Eat more fiber.  Include foods such as whole-grain breads and cereals, raw vegetables, raw and dried fruits, and beans. · Drink plenty of fluids, enough so that your urine is light yellow or clear like water. If you have kidney, heart, or liver disease and have to limit fluids, talk with your doctor before you increase the amount of fluids you drink. · Use a stool softener that contains bran or psyllium. You can save money by buying bran or psyllium (available in bulk at most health food stores) and sprinkling it on foods or stirring it into fruit juice. Or you can use a product such as Metamucil or Hydrocil. · Practice healthy bowel habits. ¨ Go to the bathroom as soon as you have the urge. ¨ Avoid straining to pass stools. Relax and give yourself time to let things happen naturally. ¨ Do not hold your breath while passing stools. ¨ Do not read while sitting on the toilet. Get off the toilet as soon as you have finished. · Take your medicines exactly as prescribed. Call your doctor if you think you are having a problem with your medicine. When should you call for help? Call 911 anytime you think you may need emergency care. For example, call if:  · You pass maroon or very bloody stools. Call your doctor now or seek immediate medical care if:  · You have increased pain. · You have increased bleeding. Watch closely for changes in your health, and be sure to contact your doctor if:  · Your symptoms have not improved after 3 or 4 days. Where can you learn more? Go to http://raul-vee.info/. Enter F228 in the search box to learn more about \"Hemorrhoids: Care Instructions. \"  Current as of: August 9, 2016  Content Version: 11.3  © 8699-6581 [employer]. Care instructions adapted under license by [employer] (which disclaims liability or warranty for this information).  If you have questions about a medical condition or this instruction, always ask your healthcare professional. Rodrigue Araya disclaims any warranty or liability for your use of this information. High-Fiber Diet: Care Instructions  Your Care Instructions  A high-fiber diet may help you relieve constipation and feel less bloated. Your doctor and dietitian will help you make a high-fiber eating plan based on your personal needs. The plan will include the things you like to eat. It will also make sure that you get 30 grams of fiber a day. Before you make changes to the way you eat, be sure to talk with your doctor or dietitian. Follow-up care is a key part of your treatment and safety. Be sure to make and go to all appointments, and call your doctor if you are having problems. It's also a good idea to know your test results and keep a list of the medicines you take. How can you care for yourself at home? · You can increase how much fiber you get if you eat more of certain foods. These foods include:  ¨ Whole-grain breads and cereals. ¨ Fruits, such as pears, apples, and peaches. Eat the skins and peels if you can. ¨ Vegetables, such as broccoli, cabbage, spinach, carrots, asparagus, and squash. ¨ Starchy vegetables. These include potatoes with skins, kidney beans, and lima beans. · Take a fiber supplement every day if your doctor recommends it. Examples are Benefiber, Citrucel, FiberCon, and Metamucil. Ask your doctor how much to take. · Drink plenty of fluids, enough so that your urine is light yellow or clear like water. If you have kidney, heart, or liver disease and have to limit fluids, talk with your doctor before you increase the amount of fluids you drink. · Get some exercise every day. Exercise helps stool move through the colon. It also helps prevent constipation. · Keep a food diary. Try to notice and write down what foods cause gas, pain, or other symptoms. Then you can avoid these foods. Where can you learn more? Go to http://raul-vee.info/.   Enter G740 in the search box to learn more about \"High-Fiber Diet: Care Instructions. \"  Current as of: December 15, 2016  Content Version: 11.3  © 0730-3413 [employer]. Care instructions adapted under license by [employer] (which disclaims liability or warranty for this information). If you have questions about a medical condition or this instruction, always ask your healthcare professional. [place] [address] any warranty or liability for your use of this information.

## 2017-10-10 ENCOUNTER — TELEPHONE (OUTPATIENT)
Dept: FAMILY MEDICINE CLINIC | Age: 47
End: 2017-10-10

## 2017-10-10 NOTE — TELEPHONE ENCOUNTER
Dr. Carmencita Muller, [facility] needs diabetes code added to nutrition referral. Please advise, thank you.

## 2017-10-10 NOTE — TELEPHONE ENCOUNTER
Gerber from [facility] called this afternoon. She states referral note states to [facility] evaluate patient for diabetes, hypertension. Over weight\". She needs Diabetes code. Please call her back, Ph# [phone].

## 2017-10-10 NOTE — PROGRESS NOTES
Patient identified with 2 identifiers (name and ). Patient aware of normal HgbA1c. Hold off on medication right now. Patient aware of mild elevated renal function but improving .

## 2017-10-11 DIAGNOSIS — E66.01 MORBID OBESITY (HCC): Primary | ICD-10-CM

## 2017-10-11 DIAGNOSIS — I10 ESSENTIAL HYPERTENSION: ICD-10-CM

## 2017-10-11 DIAGNOSIS — R73.03 PREDIABETES: ICD-10-CM

## 2017-10-11 NOTE — TELEPHONE ENCOUNTER
Noted. The revised order has been faxed to [facility]. They will contact patient to schedule.

## 2017-10-11 NOTE — TELEPHONE ENCOUNTER
Hypertension code is already on referral. [facility] needs prediabetes code added to referral before they can do consult. Please revise referral coding, so I can submit to [facility].

## 2018-01-24 ENCOUNTER — TELEPHONE (OUTPATIENT)
Dept: FAMILY MEDICINE CLINIC | Age: 48
End: 2018-01-24

## 2018-01-24 NOTE — TELEPHONE ENCOUNTER
Patient identified with 2 identifiers (name and ). Patient was advised no available appointment in the am schedule tomorrow.  I did advise patient that I will watch for cancellations and for her to check back with us tomorrow am.

## 2018-01-24 NOTE — TELEPHONE ENCOUNTER
Pt is being discharged today from [facility] today 1/24/2018. She was admitted because of her Diverticulitis and while she was there she was told that her salt in take was low and it might be from the fluid pill she is taking. She is wanting to come in tomorrow after her appt with Dr Salazar tomorrow at 9:00 am. Nyla Leal that the only appts we had for tomorrow was in the afternoon. She would like to know if she could come in the morning because she might not have transpotation for the afternoon appt. Went ahead and scheduled her for 1:30 pm just in case. Please advise.

## 2018-01-25 NOTE — TELEPHONE ENCOUNTER
Pt came by the office today to see if there had been in cancellations this mornings. Advised that there was an appt this afternoon but she was unable to get get transportation to bring her back up her. She would like to know if there is anyway that she can be worked in next Friday around 10:00 am or any other morning next week. She states that she needs to be seen within 7 days of her discharged from [facility]. Please advise.

## 2018-02-09 ENCOUNTER — OFFICE VISIT (OUTPATIENT)
Dept: FAMILY MEDICINE CLINIC | Age: 48
End: 2018-02-09

## 2018-02-09 VITALS
SYSTOLIC BLOOD PRESSURE: 158 MMHG | BODY MASS INDEX: 43.05 KG/M2 | OXYGEN SATURATION: 98 % | DIASTOLIC BLOOD PRESSURE: 88 MMHG | HEART RATE: 75 BPM | WEIGHT: 243 LBS | HEIGHT: 63 IN | TEMPERATURE: 98.3 F

## 2018-02-09 DIAGNOSIS — R73.03 PREDIABETES: ICD-10-CM

## 2018-02-09 DIAGNOSIS — E66.01 MORBID OBESITY (HCC): ICD-10-CM

## 2018-02-09 DIAGNOSIS — K58.2 IRRITABLE BOWEL SYNDROME WITH BOTH CONSTIPATION AND DIARRHEA: ICD-10-CM

## 2018-02-09 DIAGNOSIS — E87.1 HYPONATREMIA: ICD-10-CM

## 2018-02-09 DIAGNOSIS — R73.09 ELEVATED HEMOGLOBIN A1C: ICD-10-CM

## 2018-02-09 DIAGNOSIS — F31.9 BIPOLAR 1 DISORDER (HCC): ICD-10-CM

## 2018-02-09 DIAGNOSIS — E78.2 MIXED HYPERLIPIDEMIA: ICD-10-CM

## 2018-02-09 DIAGNOSIS — I10 ESSENTIAL HYPERTENSION: Primary | ICD-10-CM

## 2018-02-09 RX ORDER — LORAZEPAM 0.5 MG/1
0.5 TABLET ORAL
COMMUNITY
Start: 2018-01-16 | End: 2020-03-04

## 2018-02-09 RX ORDER — HYDROXYZINE PAMOATE 50 MG/1
CAPSULE ORAL
COMMUNITY
Start: 2017-12-27 | End: 2019-07-22

## 2018-02-09 RX ORDER — OXCARBAZEPINE 300 MG/1
300 TABLET, FILM COATED ORAL 2 TIMES DAILY
Refills: 0 | COMMUNITY
Start: 2018-02-07 | End: 2020-09-02

## 2018-02-09 RX ORDER — AMLODIPINE BESYLATE 10 MG/1
10 TABLET ORAL DAILY
Qty: 30 TAB | Refills: 1 | Status: SHIPPED | OUTPATIENT
Start: 2018-02-09 | End: 2018-04-10 | Stop reason: SDUPTHER

## 2018-02-09 RX ORDER — CARISOPRODOL 350 MG/1
TABLET ORAL
Refills: 0 | COMMUNITY
Start: 2017-11-03 | End: 2019-07-22

## 2018-02-09 NOTE — MR AVS SNAPSHOT
[address] [facility] [address] [place] [address] 
[phone] Patient: Shell Beltran MRN: [id number] :1970 Visit Information Date & Time Provider Department Dept. Phone Encounter #  
 2018  9:45 AM Tricia Bourne, [address] [id number] Follow-up Instructions Return in about 2 weeks (around 2018), or if symptoms worsen or fail to improve, plan for AWV then. Your Appointments 2018  9:40 AM  
Follow Up with Chelly Lovell MD  
[facility] ([facility]) Appt Note: 1 year follow up with an EKG  
 [facility] [address] [address] [id number] [phone] [facility] [address] Upcoming Health Maintenance Date Due DTaP/Tdap/Td series (3 - Td) 2026 COLONOSCOPY 2027 Allergies as of 2018  Review Complete On: 2018 By: Tricia Bourne MD  
  
 Severity Noted Reaction Type Reactions Other Medication  2012   Side Effect Hives  
 seafood Shellfish Derived  10/14/2013    Hives Current Immunizations  Never Reviewed Name Date Influenza Vaccine 10/17/2014 Tdap 10/8/2013 Not reviewed this visit You Were Diagnosed With   
  
 Codes Comments [facility]    -  Primary ICD-10-CM: E66.01 
ICD-9-CM: 278.01 Bipolar 1 disorder (HCC)     ICD-10-CM: F31.9 ICD-9-CM: 296.7 Mixed hyperlipidemia     ICD-10-CM: E78.2 ICD-9-CM: 272.2 Essential hypertension     ICD-10-CM: I10 
ICD-9-CM: 401.9 Prediabetes     ICD-10-CM: R73.03 
ICD-9-CM: 790.29 Hyponatremia     ICD-10-CM: E87.1 ICD-9-CM: 276.1 Elevated hemoglobin A1c     ICD-10-CM: R73.09 
ICD-9-CM: 790.29 Vitals  BP Pulse Temp Height(growth percentile) Weight(growth percentile) LMP  
 158/88 (BP 1 Location: Left arm, BP Patient Position: Sitting) 75 98.3 °F (36.8 °C) (Oral) 5' 3\" (1.6 m) 243 lb (110.2 kg) 01/31/2008 SpO2 BMI OB Status Smoking Status 98% 43.05 kg/m2 Hysterectomy Never Smoker BMI and BSA Data Body Mass Index Body Surface Area 43.05 kg/m 2 2.21 m 2 Preferred Pharmacy Pharmacy Name Phone [facility], [address] Your Updated Medication List  
  
   
This list is accurate as of: 2/9/18 10:18 AM.  Always use your most recent med list. amLODIPine 10 mg tablet Commonly known as:  Jillyn Boast Take 1 Tab by mouth daily. BLACK COHOSH PO Take  by mouth.  
  
 carisoprodol 350 mg tablet Commonly known as:  SOMA  
take 1 tablet by mouth three times a day and 1 tablet at bedtime FIBER (PSYLLIUM HUSK) PO Take  by mouth. GEODON 40 mg capsule Generic drug:  ziprasidone Take 40 mg by mouth daily. hydrOXYzine pamoate 50 mg capsule Commonly known as:  VISTARIL  
2 Caps 2 Times Daily As Needed. latanoprost 0.005 % ophthalmic solution Commonly known as:  Elverna Vita Administer 1 Drop to both eyes nightly. levocetirizine 5 mg tablet Commonly known as:  Fernandokavin Tillman LORazepam 0.5 mg tablet Commonly known as:  ATIVAN Take 0.5 mg by mouth two (2) times a day. melatonin 3 mg tablet Take 3 mg by mouth nightly. mometasone-formoterol 100-5 mcg/actuation HFA inhaler Commonly known as:  Kamini [place] Take 2 Puffs by inhalation two (2) times a day. montelukast 10 mg tablet Commonly known as:  SINGULAIR Take 10 mg by mouth nightly. OXcarbazepine 300 mg tablet Commonly known as:  TRILEPTAL Take 300 mg by mouth two (2) times a day. PROBIOTIC PO Take  by mouth. simvastatin 20 mg tablet Commonly known as:  ZOCOR  
take 1 tablet by mouth at bedtime  
  
 valsartan 320 mg tablet Commonly known as:  DIOVAN  
take 1 tablet by mouth once daily Prescriptions Sent to Pharmacy Refills  
 amLODIPine (NORVASC) 10 mg tablet 1 Sig: Take 1 Tab by mouth daily. Class: Normal  
 Pharmacy: [id number], [address] [place] [address] #: [phone] Route: Oral  
  
Follow-up Instructions Return in about 2 weeks (around 2/23/2018), or if symptoms worsen or fail to improve, plan for AWV then. To-Do List   
 02/09/2018 Lab:  HEMOGLOBIN A1C W/O EAG   
  
 02/09/2018 Lab:  LIPID PANEL   
  
 02/09/2018 Lab:  METABOLIC PANEL, COMPREHENSIVE Patient Instructions DASH Diet: Care Instructions Your Care Instructions The DASH diet is an eating plan that can help lower your blood pressure. DASH stands for Dietary Approaches to Stop Hypertension. Hypertension is high blood pressure. The DASH diet focuses on eating foods that are high in calcium, potassium, and magnesium. These nutrients can lower blood pressure. The foods that are highest in these nutrients are fruits, vegetables, low-fat dairy products, nuts, seeds, and legumes. But taking calcium, potassium, and magnesium supplements instead of eating foods that are high in those nutrients does not have the same effect. The DASH diet also includes whole grains, fish, and poultry. The DASH diet is one of several lifestyle changes your doctor may recommend to lower your high blood pressure. Your doctor may also want you to decrease the amount of sodium in your diet. Lowering sodium while following the DASH diet can lower blood pressure even further than just the DASH diet alone. Follow-up care is a key part of your treatment and safety. Be sure to make and go to all appointments, and call your doctor if you are having problems. It's also a good idea to know your test results and keep a list of the medicines you take. How can you care for yourself at home? Following the DASH diet · Eat 4 to 5 servings of fruit each day. A serving is 1 medium-sized piece of fruit, ½ cup chopped or canned fruit, 1/4 cup dried fruit, or 4 ounces (½ cup) of fruit juice. Choose fruit more often than fruit juice. · Eat 4 to 5 servings of vegetables each day. A serving is 1 cup of lettuce or raw leafy vegetables, ½ cup of chopped or cooked vegetables, or 4 ounces (½ cup) of vegetable juice. Choose vegetables more often than vegetable juice. · Get 2 to 3 servings of low-fat and fat-free dairy each day. A serving is 8 ounces of milk, 1 cup of yogurt, or 1 ½ ounces of cheese. · Eat 6 to 8 servings of grains each day. A serving is 1 slice of bread, 1 ounce of dry cereal, or ½ cup of cooked rice, pasta, or cooked cereal. Try to choose whole-grain products as much as possible. · Limit lean meat, poultry, and fish to 2 servings each day. A serving is 3 ounces, about the size of a deck of cards. · Eat 4 to 5 servings of nuts, seeds, and legumes (cooked dried beans, lentils, and split peas) each week. A serving is 1/3 cup of nuts, 2 tablespoons of seeds, or ½ cup of cooked beans or peas. · Limit fats and oils to 2 to 3 servings each day. A serving is 1 teaspoon of vegetable oil or 2 tablespoons of salad dressing. · Limit sweets and added sugars to 5 servings or less a week. A serving is 1 tablespoon jelly or jam, ½ cup sorbet, or 1 cup of lemonade. · Eat less than 2,300 milligrams (mg) of sodium a day. If you limit your sodium to 1,500 mg a day, you can lower your blood pressure even more. Tips for success · Start small. Do not try to make dramatic changes to your diet all at once. You might feel that you are missing out on your favorite foods and then be more likely to not follow the plan. Make small changes, and stick with them. Once those changes become habit, add a few more changes. · Try some of the following: ¨ Make it a goal to eat a fruit or vegetable at every meal and at snacks. This will make it easy to get the recommended amount of fruits and vegetables each day. ¨ Try yogurt topped with fruit and nuts for a snack or healthy dessert. ¨ Add lettuce, tomato, cucumber, and onion to sandwiches. ¨ Combine a ready-made pizza crust with low-fat mozzarella cheese and lots of vegetable toppings. Try using tomatoes, squash, spinach, broccoli, carrots, cauliflower, and onions. ¨ Have a variety of cut-up vegetables with a low-fat dip as an appetizer instead of chips and dip. ¨ Sprinkle sunflower seeds or chopped almonds over salads. Or try adding chopped walnuts or almonds to cooked vegetables. ¨ Try some vegetarian meals using beans and peas. Add garbanzo or kidney beans to salads. Make burritos and tacos with mashed lee beans or black beans. Where can you learn more? Go to http://raul-vee.info/. Enter G040 in the search box to learn more about \"DASH Diet: Care Instructions. \" Current as of: September 21, 2016 Content Version: 11.4 © 2513-8351 [employer]. Care instructions adapted under license by "[employer] (which disclaims liability or warranty for this information). If you have questions about a medical condition or this instruction, always ask your healthcare professional. [place] [address] any warranty or liability for your use of this information. Introducing [facility]! Jae Gonzales introduces Tradier patient portal. Now you can access parts of your medical record, email your doctor's office, and request medication refills online. 1. In your internet browser, go to [email or web address] 2. Click on the First Time User? Click Here link in the Sign In box. You will see the New Member Sign Up page. 3. Enter your Tradier Access Code exactly as it appears below. You will not need to use this code after youve completed the sign-up process.  If you do not sign up before the expiration date, you must request a new code. · Tab [employer] Access Code: [email or web address] Expires: 5/10/2018 10:18 AM 
 
4. Enter the last four digits of your Social Security Number (xxxx) and Date of Birth (mm/dd/yyyy) as indicated and click Submit. You will be taken to the next sign-up page. 5. Create a Tab Solutions ID. This will be your [employer] login ID and cannot be changed, so think of one that is secure and easy to remember. 6. Create a [employer] password. You can change your password at any time. 7. Enter your Password Reset Question and Answer. This can be used at a later time if you forget your password. 8. Enter your e-mail address. You will receive e-mail notification when new information is available in [address] 9. Click Sign Up. You can now view and download portions of your medical record. 10. Click the Download Summary menu link to download a portable copy of your medical information. If you have questions, please visit the Frequently Asked Questions section of the [employer] website. Remember, [employer] is NOT to be used for urgent needs. For medical emergencies, dial 911. Now available from your iPhone and Android! Please provide this summary of care documentation to your next provider. Your primary care clinician is listed as Bora Peace. If you have any questions after today's visit, please call [phone].

## 2018-02-09 NOTE — PROGRESS NOTES
Chief Complaint   Patient presents with   [facility] Follow Up     IPA-Obici- ABD pain- notes printed

## 2018-02-09 NOTE — PROGRESS NOTES
Moiz More, 49 y.o.,  female    [place] ff-up hyponatremia    Pt presented with bouts of vomiting and abdominal , she attributes to IBS. CT scan noted diverticulosis without inflammation. Low sodium level noted and admitted for work up. diurtec was discontinued and vomiting resolved. Reviewed discharge summary. She follows Psychiatry for bipolar d/o NP clarence álvarez [facility]  GI- Dr. Kaylyn Cesar, reports anal fissure management with colorectal specialist  HTN- currently on norvasc, valsartan. Diuretic was discontinued during admission    ROS:  See HPI, all others negative        Patient Active Problem List   Diagnosis Code    OA (osteoarthritis) M19.90    Obesity E66.9    Mixed hyperlipidemia E78.2    Depression F32.9    Menopause Z78.0    Knee pain, right M25.561    GERD (gastroesophageal reflux disease) K21.9    S/P TKR (total knee replacement) Z96.659    PTSD (post-traumatic stress disorder) F43.10    Essential hypertension I10    Morbid obesity ([facility] E66.01    Arthritis, degenerative M19.90    Gastroesophageal reflux disease without esophagitis K21.9    ANAMIKA on CPAP G47.33, Z99.89    Prediabetes R73.03    SUAZO (dyspnea on exertion) R06.09    Dental caries K02.9    Chronic periodontal disease K05.6    Advance care planning Z71.89    Bipolar 1 disorder (HCC) F31.9       Current Outpatient Prescriptions   Medication Sig Dispense Refill    OXcarbazepine (TRILEPTAL) 300 mg tablet Take 300 mg by mouth two (2) times a day.  0    LORazepam (ATIVAN) 0.5 mg tablet Take 0.5 mg by mouth two (2) times a day.  hydrOXYzine pamoate (VISTARIL) 50 mg capsule 2 Caps 2 Times Daily As Needed.  carisoprodol (SOMA) 350 mg tablet take 1 tablet by mouth three times a day and 1 tablet at bedtime  0    amLODIPine (NORVASC) 10 mg tablet Take 1 Tab by mouth daily.  30 Tab 1    valsartan (DIOVAN) 320 mg tablet take 1 tablet by mouth once daily 30 Tab 0    simvastatin (ZOCOR) 20 mg tablet take 1 tablet by mouth at bedtime 90 Tab 0    BLACK COHOSH PO Take  by mouth.  LACTOBACILLUS ACIDOPHILUS (PROBIOTIC PO) Take  by mouth.  levocetirizine (XYZAL) 5 mg tablet       ziprasidone (GEODON) 40 mg capsule Take 40 mg by mouth daily.  melatonin 3 mg tablet Take 3 mg by mouth nightly.  montelukast (SINGULAIR) 10 mg tablet Take 10 mg by mouth nightly. 1    FIBER, PSYLLIUM HUSK, PO Take  by mouth.  latanoprost (XALATAN) 0.005 % ophthalmic solution Administer 1 Drop to both eyes nightly.  mometasone-formoterol (DULERA) 100-5 mcg/actuation HFA inhaler Take 2 Puffs by inhalation two (2) times a day. 1 Inhaler 5       Allergies   Allergen Reactions    Other Medication Hives     seafood    Shellfish Derived Hives       Past Medical History:   Diagnosis Date    AR (allergic rhinitis)     seasonal    Asthma     Cardiac echocardiogram 04/11/2016    Tech difficult. EF 55-60%. No RWMA. Mild conc LVH. Gr 1 DDfx. RVSP normal.      Cardiac nuclear imaging test 05/05/2016    Low risk. Very patchy radiotracer uptake. Mild anterior artifact, low suspicion for ischemia. EF 68%. No RWMA. Normal EKG on pharm stress test.    Cardiovascular LE arterial duplex 10/07/2013    No significant arterial disease at rest bilaterally. R JUNE 1.21.  L JUNE 1.16. No significant sm vessel disease.     Depression     Dr. Doug Barlow, suicide attempt 2/12    Endometriosis     s/p hysterectomy 2006    GERD (gastroesophageal reflux disease)     Glaucoma     Dr. Tanner Espinoza HTN (hypertension)     Hx of colonoscopy 04/05/2017    mild diverticulosis, g1 internal hemorrhoids, normal colon , repeat 10 year 2027    Hx of suicide attempt     Hyperlipidemia LDL goal < 130     IBS (irritable bowel syndrome)     Insomnia     Nausea & vomiting     OA (osteoarthritis)     both knees, lower back    Preeclampsia     PTSD (post-traumatic stress disorder)     Sleep apnea     does not use cpap machine    Spinal stenosis     Dr. Kayleigh Perez       Social History     Social History    Marital status: SINGLE     Spouse name: N/A    Number of children: N/A    Years of education: N/A     Occupational History    disabled     student      Social History Main Topics    Smoking status: Never Smoker    Smokeless tobacco: Never Used    Alcohol use No    Drug use: No    Sexual activity: Not on file     Other Topics Concern    Not on file     Social History Narrative       Family History   Problem Relation Age of Onset    Heart Disease Mother      CHF    Diabetes Mother     Hypertension Mother     Kidney Disease Mother     Breast Cancer Mother     Stroke Mother     Diabetes Father     Hypertension Father     Heart Attack Father      MI    Cancer Maternal Grandmother      stomach    Cancer Other      breast    Heart Attack Other      MI    Ovarian Cancer Maternal Aunt          OBJECTIVE    Physical Exam:     Visit Vitals    /88 (BP 1 Location: Left arm, BP Patient Position: Sitting)    Pulse 75    Temp 98.3 °F (36.8 °C) (Oral)    Ht 5' 3\" (1.6 m)    Wt 243 lb (110.2 kg)    LMP 01/31/2008    SpO2 98%    BMI 43.05 kg/m2       General: alert, obese, AA, in no apparent distress or pain  CVS: normal rate, regular rhythm, distinct S1 and S2  Lungs:clear to ausculation bilaterally, no crackles, wheezing or rhonchi noted  Abdomen: normoactive bowel sounds, soft, non-tender  Extremities: no edema, no cyanosis,  Skin: warm, no lesions, rashes noted  Psych:  mood and affect normal        ASSESSMENT/PLAN  Diagnoses and all orders for this visit:    1. Essential hypertension  Needs better control  Increase norvasc dose  Cont arb  STOP HCTZ  BP Log    2. Morbid obesity ([address]  Encouraged wt loss  3. Bipolar 1 disorder ([address]  Advised to ff-up with psychiatrist and consider hyponatremia secondary to medications as well    4.  Mixed hyperlipidemia  On zocor  -     LIPID PANEL; Future    5. Prediabetes  Monitoring    6. Hyponatremia  -     METABOLIC PANEL, COMPREHENSIVE; Future    7. Elevated hemoglobin A1c  -     HEMOGLOBIN A1C W/O EAG; Future    Other orders  -     amLODIPine (NORVASC) 10 mg tablet; Take 1 Tab by mouth daily. Follow-up Disposition:  Return in about 2 weeks (around 2/23/2018), or if symptoms worsen or fail to improve, plan for AWV then. Patient understands plan of care. Patient has provided input and agrees with goals.

## 2018-02-09 NOTE — PATIENT INSTRUCTIONS

## 2018-02-19 RX ORDER — AMLODIPINE BESYLATE 5 MG/1
TABLET ORAL
Qty: 90 TAB | Refills: 3 | OUTPATIENT
Start: 2018-02-19

## 2018-02-22 RX ORDER — HYDROCHLOROTHIAZIDE 25 MG/1
TABLET ORAL
Qty: 90 TAB | Refills: 0 | OUTPATIENT
Start: 2018-02-22

## 2018-02-26 RX ORDER — SIMVASTATIN 20 MG/1
TABLET, FILM COATED ORAL
Qty: 90 TAB | Refills: 0 | Status: SHIPPED | OUTPATIENT
Start: 2018-02-26 | End: 2018-04-10 | Stop reason: SDUPTHER

## 2018-02-28 ENCOUNTER — HOSPITAL ENCOUNTER (OUTPATIENT)
Dept: LAB | Age: 48
Discharge: HOME | End: 2018-02-28

## 2018-02-28 PROCEDURE — 99001 SPECIMEN HANDLING PT-LAB: CPT | Performed by: FAMILY MEDICINE

## 2018-03-01 LAB
ALBUMIN SERPL-MCNC: 4.3 G/DL (ref 3.5–5.5)
ALBUMIN/GLOB SERPL: 1.7 {RATIO} (ref 1.2–2.2)
ALP SERPL-CCNC: 68 IU/L (ref 39–117)
ALT SERPL-CCNC: 22 IU/L (ref 0–32)
AST SERPL-CCNC: 23 IU/L (ref 0–40)
BILIRUB SERPL-MCNC: <0.2 MG/DL (ref 0–1.2)
BUN SERPL-MCNC: 11 MG/DL (ref 6–24)
BUN/CREAT SERPL: 13 (ref 9–23)
CALCIUM SERPL-MCNC: 9.2 MG/DL (ref 8.7–10.2)
CHLORIDE SERPL-SCNC: 100 MMOL/L (ref 96–106)
CHOLEST SERPL-MCNC: 156 MG/DL (ref 100–199)
CO2 SERPL-SCNC: 24 MMOL/L (ref 18–29)
CREAT SERPL-MCNC: 0.88 MG/DL (ref 0.57–1)
GFR SERPLBLD CREATININE-BSD FMLA CKD-EPI: 78 ML/MIN/1.73
GFR SERPLBLD CREATININE-BSD FMLA CKD-EPI: 90 ML/MIN/1.73
GLOBULIN SER CALC-MCNC: 2.6 G/DL (ref 1.5–4.5)
GLUCOSE SERPL-MCNC: 84 MG/DL (ref 65–99)
HBA1C MFR BLD: 5.6 % (ref 4.8–5.6)
HDLC SERPL-MCNC: 57 MG/DL
INTERPRETATION, 910389: NORMAL
LDLC SERPL CALC-MCNC: 76 MG/DL (ref 0–99)
POTASSIUM SERPL-SCNC: 4.4 MMOL/L (ref 3.5–5.2)
PROT SERPL-MCNC: 6.9 G/DL (ref 6–8.5)
SODIUM SERPL-SCNC: 139 MMOL/L (ref 134–144)
TRIGL SERPL-MCNC: 114 MG/DL (ref 0–149)
VLDLC SERPL CALC-MCNC: 23 MG/DL (ref 5–40)

## 2018-03-06 NOTE — PROGRESS NOTES
Contacted patient and verified identity using name and date of birth (2- identifiers). Patient informed labs look good and will discuss further on 3/16/18 appointment. Patient voiced understanding.

## 2018-04-05 ENCOUNTER — TELEPHONE (OUTPATIENT)
Dept: FAMILY MEDICINE CLINIC | Age: 48
End: 2018-04-05

## 2018-04-05 NOTE — TELEPHONE ENCOUNTER
Patient identified with 2 identifiers (name and ).   Patient has been scheduled appointment with Dr. Scotty Mcclain 04/10/2018

## 2018-04-05 NOTE — TELEPHONE ENCOUNTER
Pt states that sh has been taken off the fluid pill when she was in [facility]. She stats since she has been off the the fluid pill her BP is running high. Around 150//90. She would like to know if there is something else that she could take to help lower her BP and keep it stable. Please advise.

## 2018-04-10 ENCOUNTER — OFFICE VISIT (OUTPATIENT)
Dept: FAMILY MEDICINE CLINIC | Age: 48
End: 2018-04-10

## 2018-04-10 VITALS
SYSTOLIC BLOOD PRESSURE: 154 MMHG | WEIGHT: 239.4 LBS | RESPIRATION RATE: 15 BRPM | TEMPERATURE: 97.7 F | BODY MASS INDEX: 42.42 KG/M2 | DIASTOLIC BLOOD PRESSURE: 94 MMHG | HEART RATE: 77 BPM | OXYGEN SATURATION: 95 % | HEIGHT: 63 IN

## 2018-04-10 DIAGNOSIS — E78.00 PURE HYPERCHOLESTEROLEMIA: ICD-10-CM

## 2018-04-10 DIAGNOSIS — I10 ESSENTIAL HYPERTENSION: Primary | ICD-10-CM

## 2018-04-10 DIAGNOSIS — E66.01 MORBID OBESITY (HCC): ICD-10-CM

## 2018-04-10 DIAGNOSIS — R73.03 PREDIABETES: ICD-10-CM

## 2018-04-10 DIAGNOSIS — F31.9 BIPOLAR 1 DISORDER (HCC): ICD-10-CM

## 2018-04-10 RX ORDER — METOPROLOL SUCCINATE 50 MG/1
50 TABLET, EXTENDED RELEASE ORAL DAILY
Qty: 90 TAB | Refills: 0 | Status: SHIPPED | OUTPATIENT
Start: 2018-04-10 | End: 2018-07-04 | Stop reason: SDUPTHER

## 2018-04-10 RX ORDER — SIMVASTATIN 20 MG/1
TABLET, FILM COATED ORAL
Qty: 90 TAB | Refills: 3 | Status: SHIPPED | OUTPATIENT
Start: 2018-04-10 | End: 2019-02-26 | Stop reason: SDUPTHER

## 2018-04-10 RX ORDER — VALSARTAN 320 MG/1
TABLET ORAL
Qty: 90 TAB | Refills: 0 | Status: SHIPPED | OUTPATIENT
Start: 2018-04-10 | End: 2018-08-14

## 2018-04-10 RX ORDER — AMLODIPINE BESYLATE 10 MG/1
10 TABLET ORAL DAILY
Qty: 90 TAB | Refills: 0 | Status: SHIPPED | OUTPATIENT
Start: 2018-04-10 | End: 2018-05-10 | Stop reason: SDUPTHER

## 2018-04-10 NOTE — PATIENT INSTRUCTIONS

## 2018-04-10 NOTE — PROGRESS NOTES
Chief Complaint   Patient presents with    Hypertension    Other     prediabetes    Cough         1. Have you been to the ER, urgent care clinic since your last visit? Hospitalized since your last visit? No    2. Have you seen or consulted any other health care providers outside of the [address] [place] [address] since your last visit? Include any pap smears or colon screening.  No

## 2018-04-10 NOTE — PROGRESS NOTES
Chance Fay, 49 y.o.,  female    SUBJECTIVE  Ff-up HTN    HTN- increased norvasc dose and continued diovan from last visit. Diuretic was discontinued with h/o hyponatremia. Says BP readings 150/90's. Says following DASH diet    HL- on zocor, reviewed labs    Bipolar d/o- per pt doing well, following psychiatrist.    ROS:  See HPI, all others negative        Patient Active Problem List   Diagnosis Code    OA (osteoarthritis) M19.90    Obesity E66.9    Mixed hyperlipidemia E78.2    Depression F32.9    Menopause Z78.0    Knee pain, right M25.561    GERD (gastroesophageal reflux disease) K21.9    S/P TKR (total knee replacement) Z96.659    PTSD (post-traumatic stress disorder) F43.10    Essential hypertension I10    Morbid obesity ([facility] E66.01    Arthritis, degenerative M19.90    Gastroesophageal reflux disease without esophagitis K21.9    ANAMIKA on CPAP G47.33, Z99.89    Prediabetes R73.03    SUAZO (dyspnea on exertion) R06.09    Dental caries K02.9    Chronic periodontal disease K05.6    Advance care planning Z71.89    Bipolar 1 disorder ([facility] [address] F31.9    Irritable bowel syndrome with both constipation and diarrhea K58.2       Current Outpatient Prescriptions   Medication Sig Dispense Refill    amLODIPine (NORVASC) 10 mg tablet Take 1 Tab by mouth daily. 90 Tab 0    valsartan (DIOVAN) 320 mg tablet take 1 tablet by mouth once daily 90 Tab 0    metoprolol succinate (TOPROL-XL) 50 mg XL tablet Take 1 Tab by mouth daily. 90 Tab 0    simvastatin (ZOCOR) 20 mg tablet take 1 tablet by mouth at bedtime 90 Tab 3    OXcarbazepine (TRILEPTAL) 300 mg tablet Take 300 mg by mouth two (2) times a day.  0    LORazepam (ATIVAN) 0.5 mg tablet Take 0.5 mg by mouth two (2) times a day.  hydrOXYzine pamoate (VISTARIL) 50 mg capsule 2 Caps 2 Times Daily As Needed.  carisoprodol (SOMA) 350 mg tablet take 1 tablet by mouth three times a day and 1 tablet at bedtime  0    BLACK COHOSH PO Take  by mouth.  LACTOBACILLUS ACIDOPHILUS (PROBIOTIC PO) Take  by mouth.  levocetirizine (XYZAL) 5 mg tablet       ziprasidone (GEODON) 40 mg capsule Take 40 mg by mouth daily.  melatonin 3 mg tablet Take 3 mg by mouth nightly.  montelukast (SINGULAIR) 10 mg tablet Take 10 mg by mouth nightly. 1    FIBER, PSYLLIUM HUSK, PO Take  by mouth.  latanoprost (XALATAN) 0.005 % ophthalmic solution Administer 1 Drop to both eyes nightly.  mometasone-formoterol (DULERA) 100-5 mcg/actuation HFA inhaler Take 2 Puffs by inhalation two (2) times a day. 1 Inhaler 5       Allergies   Allergen Reactions    Other Medication Hives     seafood    Shellfish Derived Hives       Past Medical History:   Diagnosis Date    AR (allergic rhinitis)     seasonal    Asthma     Cardiac echocardiogram 04/11/2016    Tech difficult. EF 55-60%. No RWMA. Mild conc LVH. Gr 1 DDfx. RVSP normal.      Cardiac nuclear imaging test 05/05/2016    Low risk. Very patchy radiotracer uptake. Mild anterior artifact, low suspicion for ischemia. EF 68%. No RWMA. Normal EKG on pharm stress test.    Cardiovascular LE arterial duplex 10/07/2013    No significant arterial disease at rest bilaterally. R JUNE 1.21.  L JUNE 1.16. No significant sm vessel disease.     Depression     Dr. Terell Linares, suicide attempt 2/12    Endometriosis     s/p hysterectomy 2006    GERD (gastroesophageal reflux disease)     Glaucoma     Dr. Paul Gant HTN (hypertension)     Hx of colonoscopy 04/05/2017    mild diverticulosis, g1 internal hemorrhoids, normal colon , repeat 10 year 2027    Hx of suicide attempt     Hyperlipidemia LDL goal < 130     IBS (irritable bowel syndrome)     Insomnia     Nausea & vomiting     OA (osteoarthritis)     both knees, lower back    Preeclampsia     PTSD (post-traumatic stress disorder)     Sleep apnea     does not use cpap machine    Spinal stenosis     Dr. Pressley Soulier Marital status: SINGLE     Spouse name: N/A    Number of children: N/A    Years of education: N/A     Occupational History    disabled     student      Social History Main Topics    Smoking status: Never Smoker    Smokeless tobacco: Never Used    Alcohol use No    Drug use: No    Sexual activity: Not on file     Other Topics Concern    Not on file     Social History Narrative       Family History   Problem Relation Age of Onset    Heart Disease Mother      CHF    Diabetes Mother     Hypertension Mother     Kidney Disease Mother     Breast Cancer Mother     Stroke Mother     Diabetes Father     Hypertension Father     Heart Attack Father      MI    Cancer Maternal Grandmother      stomach    Cancer Other      breast    Heart Attack Other      MI    Ovarian Cancer Maternal Aunt          OBJECTIVE    Physical Exam:     Visit Vitals    BP (!) 154/94 (BP 1 Location: Left arm, BP Patient Position: Sitting)    Pulse 77    Temp 97.7 °F (36.5 °C) (Oral)    Resp 15    Ht 5' 3\" (1.6 m)    Wt 239 lb 6.4 oz (108.6 kg)    LMP 01/31/2008    SpO2 95%    BMI 42.41 kg/m2       General: alert, well-appearing, obese, AA, in no apparent distress or pain  CVS: normal rate, regular rhythm, distinct S1 and S2  Lungs:clear to ausculation bilaterally, no crackles, wheezing or rhonchi noted  Extremities: no edema  Psych:  mood and affect normal      Results for orders placed or performed in visit on 21/82/18   METABOLIC PANEL, COMPREHENSIVE   Result Value Ref Range    Glucose 84 65 - 99 mg/dL    BUN 11 6 - 24 mg/dL    Creatinine 0.88 0.57 - 1.00 mg/dL    GFR est non-AA 78 >59 mL/min/1.73    GFR est AA 90 >59 mL/min/1.73    BUN/Creatinine ratio 13 9 - 23    Sodium 139 134 - 144 mmol/L    Potassium 4.4 3.5 - 5.2 mmol/L    Chloride 100 96 - 106 mmol/L    CO2 24 18 - 29 mmol/L    Calcium 9.2 8.7 - 10.2 mg/dL    Protein, total 6.9 6.0 - 8.5 g/dL    Albumin 4.3 3.5 - 5.5 g/dL    GLOBULIN, TOTAL 2.6 1.5 - 4.5 g/dL A-G Ratio 1.7 1.2 - 2.2    Bilirubin, total <0.2 0.0 - 1.2 mg/dL    Alk. phosphatase 68 39 - 117 IU/L    AST (SGOT) 23 0 - 40 IU/L    ALT (SGPT) 22 0 - 32 IU/L   LIPID PANEL   Result Value Ref Range    Cholesterol, total 156 100 - 199 mg/dL    Triglyceride 114 0 - 149 mg/dL    HDL Cholesterol 57 >39 mg/dL    VLDL, calculated 23 5 - 40 mg/dL    LDL, calculated 76 0 - 99 mg/dL   HEMOGLOBIN A1C W/O EAG   Result Value Ref Range    Hemoglobin A1c 5.6 4.8 - 5.6 %   CVD REPORT   Result Value Ref Range    INTERPRETATION Note        ASSESSMENT/PLAN  Diagnoses and all orders for this visit:    1. Essential hypertension  Needs better control  Cont ccb, arb  Add metoprolol 50 mg OD if BP >130/80 in 2 weeks advised to titrate up to 100 mg  BP log  DASH diet  Monitoring  Avoiding diuretic with h/o hyponatremia    2. Bipolar 1 disorder [facility])  Following psychiatry    3. Morbid obesity ([address]  Encouraged wt loss    4. Pure hypercholesterolemia  Good range on zocor, to cont    5. Prediabetes  Improving  Tlcs, monitoring    Other orders  -     amLODIPine (NORVASC) 10 mg tablet; Take 1 Tab by mouth daily. -     valsartan (DIOVAN) 320 mg tablet; take 1 tablet by mouth once daily  -     metoprolol succinate (TOPROL-XL) 50 mg XL tablet; Take 1 Tab by mouth daily. -     simvastatin (ZOCOR) 20 mg tablet; take 1 tablet by mouth at bedtime    Follow-up Disposition:  Return in about 4 weeks (around 5/8/2018), or if symptoms worsen or fail to improve. Plan for medicare wellness on next visit. Patient understands plan of care. Patient has provided input and agrees with goals.

## 2018-04-10 NOTE — MR AVS SNAPSHOT
[address] [facility] 
[phone] Patient: Sophia Arzate MRN: [id number] :[id number] Visit Information Date & Time Provider Department Dept. Phone Encounter #  
 4/10/2018 10:45 AM Colt Santana, [address] [id number] Follow-up Instructions Return in about 4 weeks (around 2018), or if symptoms worsen or fail to improve. Your Appointments 2018 10:30 AM  
Office Visit with Colt Santana MD  
[facility] Appt Note: AWV [id number] and 3wk f/u; =  
 [address] [place] [address] [address] [facility] [address] [place] [id number]  
  
    
 2018  9:40 AM  
Follow Up with Lucio Haider MD  
[facility] ([facility] Appt Note: 1 year follow up with an EKG  
 [address] [address] [phone] [facility] [address] Upcoming Health Maintenance Date Due  
 MEDICARE YEARLY EXAM 3/28/2018 DTaP/Tdap/Td series (3 - Td) 2026 COLONOSCOPY 2027 Allergies as of 4/10/2018  Review Complete On: 4/10/2018 By: Eduar Ortega LPN Severity Noted Reaction Type Reactions Other Medication  2012   Side Effect Hives  
 seafood Shellfish Derived  10/14/2013    Hives Current Immunizations  Reviewed on 3/14/2018 Name Date Influenza Vaccine 10/17/2014 Pneumococcal Polysaccharide (PPSV-23) 2016 Tdap 2016, 10/8/2013 Not reviewed this visit You Were Diagnosed With   
  
 Codes Comments Essential hypertension    -  Primary ICD-10-CM: I10 
ICD-9-CM: 401.9 Bipolar 1 disorder (HCC)     ICD-10-CM: F31.9 ICD-9-CM: 296.7  Morbid obesity ([address]     ICD-10-CM: E66.01 
 ICD-9-CM: 278.01   
 Pure hypercholesterolemia     ICD-10-CM: E78.00 ICD-9-CM: 272.0 Vitals BP Pulse Temp Resp Height(growth percentile) Weight(growth percentile) (!) 154/94 (BP 1 Location: Left arm, BP Patient Position: Sitting) 77 97.7 °F (36.5 °C) (Oral) 15 5' 3\" (1.6 m) 239 lb 6.4 oz (108.6 kg) LMP SpO2 BMI OB Status Smoking Status 01/31/2008 95% 42.41 kg/m2 Hysterectomy Never Smoker Vitals History BMI and BSA Data Body Mass Index Body Surface Area  
 42.41 kg/m 2 2.2 m 2 Preferred Pharmacy Pharmacy Name Phone [address] [facility], [address] Your Updated Medication List  
  
   
This list is accurate as of 4/10/18 11:36 AM.  Always use your most recent med list. amLODIPine 10 mg tablet Commonly known as:  Ellen Russel Take 1 Tab by mouth daily. BLACK COHOSH PO Take  by mouth.  
  
 carisoprodol 350 mg tablet Commonly known as:  SOMA  
take 1 tablet by mouth three times a day and 1 tablet at bedtime FIBER (PSYLLIUM HUSK) PO Take  by mouth. GEODON 40 mg capsule Generic drug:  ziprasidone Take 40 mg by mouth daily. hydrOXYzine pamoate 50 mg capsule Commonly known as:  VISTARIL  
2 Caps 2 Times Daily As Needed. latanoprost 0.005 % ophthalmic solution Commonly known as:  Omari Nunes Administer 1 Drop to both eyes nightly. levocetirizine 5 mg tablet Commonly known as:  Leigh Wilson LORazepam 0.5 mg tablet Commonly known as:  ATIVAN Take 0.5 mg by mouth two (2) times a day. melatonin 3 mg tablet Take 3 mg by mouth nightly. metoprolol succinate 50 mg XL tablet Commonly known as:  TOPROL-XL Take 1 Tab by mouth daily. mometasone-formoterol 100-5 mcg/actuation HFA inhaler Commonly known as:  Mackenzie Clement Take 2 Puffs by inhalation two (2) times a day. montelukast 10 mg tablet Commonly known as:  SINGULAIR  
 Take 10 mg by mouth nightly. OXcarbazepine 300 mg tablet Commonly known as:  TRILEPTAL Take 300 mg by mouth two (2) times a day. PROBIOTIC PO Take  by mouth. simvastatin 20 mg tablet Commonly known as:  ZOCOR  
take 1 tablet by mouth at bedtime  
  
 valsartan 320 mg tablet Commonly known as:  DIOVAN  
take 1 tablet by mouth once daily Prescriptions Sent to Pharmacy Refills  
 amLODIPine (NORVASC) 10 mg tablet 0 Sig: Take 1 Tab by mouth daily. Class: Normal  
 Pharmacy: [address] [place] [address] Ph #: [phone] Route: Oral  
 valsartan (DIOVAN) 320 mg tablet 0 Sig: take 1 tablet by mouth once daily Class: Normal  
 Pharmacy: [address] [place] [address] Ph #: [phone]  
 metoprolol succinate (TOPROL-XL) 50 mg XL tablet 0 Sig: Take 1 Tab by mouth daily. Class: Normal  
 Pharmacy: [address] [place] [address] Ph #: [phone] Route: Oral  
 simvastatin (ZOCOR) 20 mg tablet 3 Sig: take 1 tablet by mouth at bedtime Class: Normal  
 Pharmacy: [address] [place] [address] Ph #: [phone] Follow-up Instructions Return in about 4 weeks (around 5/8/2018), or if symptoms worsen or fail to improve. Patient Instructions DASH Diet: Care Instructions Your Care Instructions The DASH diet is an eating plan that can help lower your blood pressure. DASH stands for Dietary Approaches to Stop Hypertension. Hypertension is high blood pressure. The DASH diet focuses on eating foods that are high in calcium, potassium, and magnesium. These nutrients can lower blood pressure. The foods that are highest in these nutrients are fruits, vegetables, low-fat dairy products, nuts, seeds, and legumes.  But taking calcium, potassium, and magnesium supplements instead of eating foods that are high in those nutrients does not have the same effect. The DASH diet also includes whole grains, fish, and poultry. The DASH diet is one of several lifestyle changes your doctor may recommend to lower your high blood pressure. Your doctor may also want you to decrease the amount of sodium in your diet. Lowering sodium while following the DASH diet can lower blood pressure even further than just the DASH diet alone. Follow-up care is a key part of your treatment and safety. Be sure to make and go to all appointments, and call your doctor if you are having problems. It's also a good idea to know your test results and keep a list of the medicines you take. How can you care for yourself at home? Following the DASH diet · Eat 4 to 5 servings of fruit each day. A serving is 1 medium-sized piece of fruit, ½ cup chopped or canned fruit, 1/4 cup dried fruit, or 4 ounces (½ cup) of fruit juice. Choose fruit more often than fruit juice. · Eat 4 to 5 servings of vegetables each day. A serving is 1 cup of lettuce or raw leafy vegetables, ½ cup of chopped or cooked vegetables, or 4 ounces (½ cup) of vegetable juice. Choose vegetables more often than vegetable juice. · Get 2 to 3 servings of low-fat and fat-free dairy each day. A serving is 8 ounces of milk, 1 cup of yogurt, or 1 ½ ounces of cheese. · Eat 6 to 8 servings of grains each day. A serving is 1 slice of bread, 1 ounce of dry cereal, or ½ cup of cooked rice, pasta, or cooked cereal. Try to choose whole-grain products as much as possible. · Limit lean meat, poultry, and fish to 2 servings each day. A serving is 3 ounces, about the size of a deck of cards. · Eat 4 to 5 servings of nuts, seeds, and legumes (cooked dried beans, lentils, and split peas) each week. A serving is 1/3 cup of nuts, 2 tablespoons of seeds, or ½ cup of cooked beans or peas. · Limit fats and oils to 2 to 3 servings each day. A serving is 1 teaspoon of vegetable oil or 2 tablespoons of salad dressing. · Limit sweets and added sugars to 5 servings or less a week. A serving is 1 tablespoon jelly or jam, ½ cup sorbet, or 1 cup of lemonade. · Eat less than 2,300 milligrams (mg) of sodium a day. If you limit your sodium to 1,500 mg a day, you can lower your blood pressure even more. Tips for success · Start small. Do not try to make dramatic changes to your diet all at once. You might feel that you are missing out on your favorite foods and then be more likely to not follow the plan. Make small changes, and stick with them. Once those changes become habit, add a few more changes. · Try some of the following: ¨ Make it a goal to eat a fruit or vegetable at every meal and at snacks. This will make it easy to get the recommended amount of fruits and vegetables each day. ¨ Try yogurt topped with fruit and nuts for a snack or healthy dessert. ¨ Add lettuce, tomato, cucumber, and onion to sandwiches. ¨ Combine a ready-made pizza crust with low-fat mozzarella cheese and lots of vegetable toppings. Try using tomatoes, squash, spinach, broccoli, carrots, cauliflower, and onions. ¨ Have a variety of cut-up vegetables with a low-fat dip as an appetizer instead of chips and dip. ¨ Sprinkle sunflower seeds or chopped almonds over salads. Or try adding chopped walnuts or almonds to cooked vegetables. ¨ Try some vegetarian meals using beans and peas. Add garbanzo or kidney beans to salads. Make burritos and tacos with mashed lee beans or black beans. Where can you learn more? Go to http://raul-vee.info/. Enter O647 in the search box to learn more about \"DASH Diet: Care Instructions. \" Current as of: September 21, 2016 Content Version: 11.4 © 9130-0362 [employer].  Care instructions adapted under license by [address] (which disclaims liability or warranty for this information). If you have questions about a medical condition or this instruction, always ask your healthcare professional. [address] any warranty or liability for your use of this information. Introducing [address]! [place] [employer] [facility] introduces Partigi patient portal. Now you can access parts of your medical record, email your doctor's office, and request medication refills online. 1. In your internet browser, go to [email or web address] 2. Click on the First Time User? Click Here link in the Sign In box. You will see the New Member Sign Up page. 3. Enter your Partigi Access Code exactly as it appears below. You will not need to use this code after youve completed the sign-up process. If you do not sign up before the expiration date, you must request a new code. · Partigi Access Code: [email or web address] Expires: 5/10/2018 11:18 AM 
 
4. Enter the last four digits of your Social Security Number (xxxx) and Date of Birth (mm/dd/yyyy) as indicated and click Submit. You will be taken to the next sign-up page. 5. Create a Partigi ID. This will be your Partigi login ID and cannot be changed, so think of one that is secure and easy to remember. 6. Create a Partigi password. You can change your password at any time. 7. Enter your Password Reset Question and Answer. This can be used at a later time if you forget your password. 8. Enter your e-mail address. You will receive e-mail notification when new information is available in [address] 9. Click Sign Up. You can now view and download portions of your medical record. 10. Click the Download Summary menu link to download a portable copy of your medical information. If you have questions, please visit the Frequently Asked Questions section of the Partigi website.  Remember, Partigi is NOT to be used for urgent needs. For medical emergencies, dial 911. Now available from your iPhone and Android! Please provide this summary of care documentation to your next provider. Your primary care clinician is listed as Edelmira Perez. If you have any questions after today's visit, please call [phone].

## 2018-05-11 RX ORDER — AMLODIPINE BESYLATE 10 MG/1
TABLET ORAL
Qty: 90 TAB | Refills: 0 | Status: SHIPPED | OUTPATIENT
Start: 2018-05-11 | End: 2018-09-26 | Stop reason: SDUPTHER

## 2018-05-21 ENCOUNTER — TELEPHONE (OUTPATIENT)
Dept: FAMILY MEDICINE CLINIC | Age: 48
End: 2018-05-21

## 2018-05-21 NOTE — TELEPHONE ENCOUNTER
Office called to request referral -     Dr. Sanjuana Cruz  NPI # [id number]  DX: [id number], [address] [facility] [address]  Appointment - 05/29/2018  Fax # [phone]

## 2018-05-21 NOTE — TELEPHONE ENCOUNTER
Spoke with Ellen Phipps. @ [place]. Ref# [id number]. No referral required. Faxed to Dr. Lady Jasso office.

## 2018-07-05 RX ORDER — METOPROLOL SUCCINATE 50 MG/1
TABLET, EXTENDED RELEASE ORAL
Qty: 90 TAB | Refills: 0 | Status: SHIPPED | OUTPATIENT
Start: 2018-07-05 | End: 2018-08-21 | Stop reason: DRUGHIGH

## 2018-07-24 ENCOUNTER — HOSPITAL ENCOUNTER (OUTPATIENT)
Dept: MAMMOGRAPHY | Age: 48
Discharge: HOME | End: 2018-07-24
Attending: FAMILY MEDICINE
Payer: MEDICARE

## 2018-07-24 ENCOUNTER — HOSPITAL ENCOUNTER (OUTPATIENT)
Dept: ULTRASOUND IMAGING | Age: 48
Discharge: HOME | End: 2018-07-24
Attending: FAMILY MEDICINE
Payer: MEDICARE

## 2018-07-24 DIAGNOSIS — N64.4 BREAST PAIN, LEFT: ICD-10-CM

## 2018-07-24 DIAGNOSIS — Z12.39 BREAST CANCER SCREENING: ICD-10-CM

## 2018-07-24 DIAGNOSIS — N64.4 BREAST PAIN, LEFT: Primary | ICD-10-CM

## 2018-07-24 PROCEDURE — 77066 DX MAMMO INCL CAD BI: CPT

## 2018-07-24 PROCEDURE — 76642 ULTRASOUND BREAST LIMITED: CPT

## 2018-07-24 NOTE — PROGRESS NOTES
Breast imaging show No evidence of malignancy   Recommend routine annual screening mammography  pls notify pt.

## 2018-07-25 NOTE — PROGRESS NOTES
Contacted patient and verified identity using name and date of birth (2- identifiers). Patient advised breast imaging shows no evidence of malignancy. Recommended routine annual mammogram screening. Patient voiced understanding.

## 2018-08-14 ENCOUNTER — TELEPHONE (OUTPATIENT)
Dept: FAMILY MEDICINE CLINIC | Age: 48
End: 2018-08-14

## 2018-08-14 RX ORDER — LOSARTAN POTASSIUM 100 MG/1
100 TABLET ORAL DAILY
Qty: 30 TAB | Refills: 0 | Status: SHIPPED | OUTPATIENT
Start: 2018-08-14 | End: 2018-08-21

## 2018-08-14 NOTE — TELEPHONE ENCOUNTER
Contacted patient and verified identity using name and date of birth (2- identifiers). Patient advised Valsartan on recall. Patient informed losartan 100 mg sent to pharmacy. Appointment scheduled for 8/21/18 for [facility] with Dr. Ever Mulligan. Patient voiced understanding.

## 2018-08-21 ENCOUNTER — OFFICE VISIT (OUTPATIENT)
Dept: FAMILY MEDICINE CLINIC | Age: 48
End: 2018-08-21

## 2018-08-21 VITALS
RESPIRATION RATE: 16 BRPM | BODY MASS INDEX: 42.24 KG/M2 | HEART RATE: 64 BPM | HEIGHT: 63 IN | OXYGEN SATURATION: 99 % | SYSTOLIC BLOOD PRESSURE: 138 MMHG | DIASTOLIC BLOOD PRESSURE: 84 MMHG | WEIGHT: 238.4 LBS | TEMPERATURE: 98.2 F

## 2018-08-21 DIAGNOSIS — Z00.00 MEDICARE ANNUAL WELLNESS VISIT, SUBSEQUENT: ICD-10-CM

## 2018-08-21 DIAGNOSIS — Z00.00 MEDICARE ANNUAL WELLNESS VISIT, SUBSEQUENT: Primary | ICD-10-CM

## 2018-08-21 DIAGNOSIS — E78.2 MIXED HYPERLIPIDEMIA: ICD-10-CM

## 2018-08-21 DIAGNOSIS — R23.2 HOT FLASHES: ICD-10-CM

## 2018-08-21 DIAGNOSIS — R73.03 PREDIABETES: ICD-10-CM

## 2018-08-21 DIAGNOSIS — Z71.89 ADVANCE DIRECTIVE DISCUSSED WITH PATIENT: ICD-10-CM

## 2018-08-21 DIAGNOSIS — F32.1 CURRENT MODERATE EPISODE OF MAJOR DEPRESSIVE DISORDER WITHOUT PRIOR EPISODE (HCC): ICD-10-CM

## 2018-08-21 DIAGNOSIS — I10 ESSENTIAL HYPERTENSION: ICD-10-CM

## 2018-08-21 RX ORDER — LOSARTAN POTASSIUM 100 MG/1
100 TABLET ORAL DAILY
Qty: 90 TAB | Refills: 0 | Status: SHIPPED | OUTPATIENT
Start: 2018-08-21 | End: 2018-12-04 | Stop reason: SDUPTHER

## 2018-08-21 RX ORDER — PLECANATIDE 3 MG/1
TABLET ORAL
COMMUNITY
End: 2020-09-02

## 2018-08-21 RX ORDER — LOSARTAN POTASSIUM AND HYDROCHLOROTHIAZIDE 12.5; 1 MG/1; MG/1
1 TABLET ORAL DAILY
Qty: 90 TAB | Refills: 0 | Status: SHIPPED | OUTPATIENT
Start: 2018-08-21 | End: 2018-08-21

## 2018-08-21 RX ORDER — METOPROLOL SUCCINATE 100 MG/1
100 TABLET, EXTENDED RELEASE ORAL DAILY
Qty: 90 TAB | Refills: 0 | Status: SHIPPED | OUTPATIENT
Start: 2018-08-21 | End: 2018-11-27 | Stop reason: SDUPTHER

## 2018-08-21 NOTE — PROGRESS NOTES
1. Have you been to the ER, urgent care clinic since your last visit? Hospitalized since your last visit? No    2. Have you seen or consulted any other health care providers outside of the [address] [place] [address] since your last visit? Include any pap smears or colon screening. Dr. Vivi Elise LT ankle swelling 8/16/18    This is the Subsequent Medicare Annual Wellness Exam, performed 12 months or more after the Initial AWV or the last Subsequent AWV    I have reviewed the patient's medical history in detail and updated the computerized patient record. History     Past Medical History:   Diagnosis Date    AR (allergic rhinitis)     seasonal    Asthma     Cardiac echocardiogram 04/11/2016    Tech difficult. EF 55-60%. No RWMA. Mild conc LVH. Gr 1 DDfx. RVSP normal.      Cardiac nuclear imaging test 05/05/2016    Low risk. Very patchy radiotracer uptake. Mild anterior artifact, low suspicion for ischemia. EF 68%. No RWMA. Normal EKG on pharm stress test.    Cardiovascular LE arterial duplex 10/07/2013    No significant arterial disease at rest bilaterally. R JUNE 1.21.  L JUNE 1.16. No significant sm vessel disease.     Depression     Dr. Gato Richard, suicide attempt 2/12    Endometriosis     s/p hysterectomy 2006    GERD (gastroesophageal reflux disease)     Glaucoma     Dr. Kimbrough Postin HTN (hypertension)     Hx of colonoscopy 04/05/2017    mild diverticulosis, g1 internal hemorrhoids, normal colon , repeat 10 year 2027    Hx of suicide attempt     Hyperlipidemia LDL goal < 130     IBS (irritable bowel syndrome)     Insomnia     Nausea & vomiting     OA (osteoarthritis)     both knees, lower back    Preeclampsia     PTSD (post-traumatic stress disorder)     Sleep apnea     does not use cpap machine    Spinal stenosis     Dr. Vivi Elise      Past Surgical History:   Procedure Laterality Date    COLONOSCOPY N/A 4/5/2017    COLONOSCOPY performed by Ale Owen MD at [facility] FEMUR/KNEE SURG UNLISTED Left     External fixator    HX  SECTION      HX COLONOSCOPY  2017    DR. MCRAE 2017     HX HYSTERECTOMY      HX KNEE ARTHROSCOPY Left     left knee    HX KNEE REPLACEMENT Bilateral     (R)  (L)     HX OTHER SURGICAL  2016    all teeth removed    HX SALPINGO-OOPHORECTOMY  2008    HX TUBAL LIGATION      MULTIPLE DELIVERY       1990    TOTAL ABDOM HYSTERECTOMY       Current Outpatient Prescriptions   Medication Sig Dispense Refill    plecanatide (TRULANCE) 3 mg tab Take  by mouth.  losartan (COZAAR) 100 mg tablet Take 1 Tab by mouth daily. 90 Tab 0    metoprolol succinate (TOPROL-XL) 100 mg tablet Take 1 Tab by mouth daily. 90 Tab 0    amLODIPine (NORVASC) 10 mg tablet take 1 tablet by mouth daily 90 Tab 0    simvastatin (ZOCOR) 20 mg tablet take 1 tablet by mouth at bedtime 90 Tab 3    OXcarbazepine (TRILEPTAL) 300 mg tablet Take 300 mg by mouth two (2) times a day.  0    LORazepam (ATIVAN) 0.5 mg tablet Take 0.5 mg by mouth two (2) times a day.  hydrOXYzine pamoate (VISTARIL) 50 mg capsule 2 Caps 2 Times Daily As Needed.  carisoprodol (SOMA) 350 mg tablet take 1 tablet by mouth three times a day and 1 tablet at bedtime  0    BLACK COHOSH PO Take  by mouth.  levocetirizine (XYZAL) 5 mg tablet       ziprasidone (GEODON) 40 mg capsule Take 40 mg by mouth daily.  melatonin 3 mg tablet Take 3 mg by mouth nightly.  montelukast (SINGULAIR) 10 mg tablet Take 10 mg by mouth nightly. 1    FIBER, PSYLLIUM HUSK, PO Take  by mouth.  latanoprost (XALATAN) 0.005 % ophthalmic solution Administer 1 Drop to both eyes nightly.  LACTOBACILLUS ACIDOPHILUS (PROBIOTIC PO) Take  by mouth.  mometasone-formoterol (DULERA) 100-5 mcg/actuation HFA inhaler Take 2 Puffs by inhalation two (2) times a day.  1 Inhaler 5     Allergies   Allergen Reactions    Other Medication Hives     seafood    Shellfish Derived Hives     Family History   Problem Relation Age of Onset    Heart Disease Mother      CHF    Diabetes Mother     Hypertension Mother     Kidney Disease Mother     Breast Cancer Mother     Stroke Mother     Diabetes Father     Hypertension Father     Heart Attack Father      MI    Cancer Maternal Grandmother      stomach    Cancer Other      breast    Heart Attack Other      MI    Ovarian Cancer Maternal Aunt      Social History   Substance Use Topics    Smoking status: Never Smoker    Smokeless tobacco: Never Used    Alcohol use No     Patient Active Problem List   Diagnosis Code    OA (osteoarthritis) M19.90    Obesity E66.9    Mixed hyperlipidemia E78.2    Depression F32.9    Menopause Z78.0    Knee pain, right M25.561    GERD (gastroesophageal reflux disease) K21.9    S/P TKR (total knee replacement) Z96.659    PTSD (post-traumatic stress disorder) F43.10    Essential hypertension I10    Morbid obesity ([address] E66.01    Arthritis, degenerative M19.90    Gastroesophageal reflux disease without esophagitis K21.9    ANAMIKA on CPAP G47.33, Z99.89    Prediabetes R73.03    SUAZO (dyspnea on exertion) R06.09    Dental caries K02.9    Chronic periodontal disease K05.6    Advance care planning Z71.89    Bipolar 1 disorder (HCC) F31.9    Irritable bowel syndrome with both constipation and diarrhea K58.2       Depression Risk Factor Screening:   No flowsheet data found. Alcohol Risk Factor Screening: You do not drink alcohol or very rarely. Functional Ability and Level of Safety:   Hearing Loss  Hearing is good. Activities of Daily Living  The home contains: handrails in shower  Patient does total self care    Fall Risk  Fall Risk Assessment, last 12 mths 2/9/2018   Able to walk? Yes   Fall in past 12 months?  No       Abuse Screen  Patient is not abused    Cognitive Screening   Evaluation of Cognitive Function:  Has your family/caregiver stated any concerns about your memory: no  Normal    Patient Care Team   Patient Care Team:  Jenna Zelaya MD as PCP - General (Family Practice)  Jodie Monroe MD (Orthopedic Surgery)  Rc Martin (Psychiatry)  Author Ruthy MD (General Surgery)  Anjelica Hardy (Psychiatry)  kendra Montano (Psychology)  Liz Galindo MD (Pulmonary Disease)  David Bradford MD (Cardiology)  Dr. Benson Gupta (Psychiatry)  JAMEY Jung as Physician Assistant (Otolaryngology)  Rufino Chung MD (Gastroenterology)  Audrey Rao MD (Gastroenterology)  Grace Frost, [address] (Physician Assistant)  Christi De La Cruz MD (Colon and Rectal Surgery)    Assessment/Plan   Education and counseling provided:  Are appropriate based on today's review and evaluation  End-of-Life planning (with patient's consent)-discussed, provided form  Influenza Vaccine-annually  Screening Mammography- 7/2018  Screening Pap and pelvic (covered once every 2 years)- no longer indicated s/p hysterectomy  Colorectal cancer screening tests- update 2027  Valeria Mon, 51 y.o.,  female    SUBJECTIVE  Ff-up    HTN- switched  diovan to losartan due to recall. Continues to take metoprolol and norvasc. Diuretic was discontinued with h/o hyponatremia. Says BP readings 160/90's.   Says following DASH diet    HL- on zocor    Bipolar d/o- per pt doing well, following psychiatrist.    ROS:  See HPI, all others negative        Patient Active Problem List   Diagnosis Code    OA (osteoarthritis) M19.90    Obesity E66.9    Mixed hyperlipidemia E78.2    Depression F32.9    Menopause Z78.0    Knee pain, right M25.561    GERD (gastroesophageal reflux disease) K21.9    S/P TKR (total knee replacement) Z96.659    PTSD (post-traumatic stress disorder) F43.10    Essential hypertension I10    Morbid obesity ([facility] [address] E66.01    Arthritis, degenerative M19.90    Gastroesophageal reflux disease without esophagitis K21.9    ANAMIKA on CPAP G47.33, Z99.89    Prediabetes R73.03    SUAZO (dyspnea on exertion) R06.09    Dental caries K02.9    Chronic periodontal disease K05.6    Advance care planning Z71.89    Bipolar 1 disorder (HCC) F31.9    Irritable bowel syndrome with both constipation and diarrhea K58.2       Current Outpatient Prescriptions   Medication Sig Dispense Refill    plecanatide (TRULANCE) 3 mg tab Take  by mouth.  losartan (COZAAR) 100 mg tablet Take 1 Tab by mouth daily. 90 Tab 0    metoprolol succinate (TOPROL-XL) 100 mg tablet Take 1 Tab by mouth daily. 90 Tab 0    amLODIPine (NORVASC) 10 mg tablet take 1 tablet by mouth daily 90 Tab 0    simvastatin (ZOCOR) 20 mg tablet take 1 tablet by mouth at bedtime 90 Tab 3    OXcarbazepine (TRILEPTAL) 300 mg tablet Take 300 mg by mouth two (2) times a day.  0    LORazepam (ATIVAN) 0.5 mg tablet Take 0.5 mg by mouth two (2) times a day.  hydrOXYzine pamoate (VISTARIL) 50 mg capsule 2 Caps 2 Times Daily As Needed.  carisoprodol (SOMA) 350 mg tablet take 1 tablet by mouth three times a day and 1 tablet at bedtime  0    BLACK COHOSH PO Take  by mouth.  levocetirizine (XYZAL) 5 mg tablet       ziprasidone (GEODON) 40 mg capsule Take 40 mg by mouth daily.  melatonin 3 mg tablet Take 3 mg by mouth nightly.  montelukast (SINGULAIR) 10 mg tablet Take 10 mg by mouth nightly. 1    FIBER, PSYLLIUM HUSK, PO Take  by mouth.  latanoprost (XALATAN) 0.005 % ophthalmic solution Administer 1 Drop to both eyes nightly.  LACTOBACILLUS ACIDOPHILUS (PROBIOTIC PO) Take  by mouth.  mometasone-formoterol (DULERA) 100-5 mcg/actuation HFA inhaler Take 2 Puffs by inhalation two (2) times a day. 1 Inhaler 5       Allergies   Allergen Reactions    Other Medication Hives     seafood    Shellfish Derived Hives       Past Medical History:   Diagnosis Date    AR (allergic rhinitis)     seasonal    Asthma     Cardiac echocardiogram 04/11/2016    Tech difficult. EF 55-60%. No RWMA. Mild conc LVH. Gr 1 DDfx. RVSP normal.      Cardiac nuclear imaging test 05/05/2016    Low risk. Very patchy radiotracer uptake. Mild anterior artifact, low suspicion for ischemia. EF 68%. No RWMA. Normal EKG on pharm stress test.    Cardiovascular LE arterial duplex 10/07/2013    No significant arterial disease at rest bilaterally. R JUNE 1.21.  L JUNE 1.16. No significant sm vessel disease.     Depression     Dr. Regine Rodriguez, suicide attempt 2/12    Endometriosis     s/p hysterectomy 2006    GERD (gastroesophageal reflux disease)     Glaucoma     Dr. Dawna Lefort HTN (hypertension)     Hx of colonoscopy 04/05/2017    mild diverticulosis, g1 internal hemorrhoids, normal colon , repeat 10 year 2027    Hx of suicide attempt     Hyperlipidemia LDL goal < 130     IBS (irritable bowel syndrome)     Insomnia     Nausea & vomiting     OA (osteoarthritis)     both knees, lower back    Preeclampsia     PTSD (post-traumatic stress disorder)     Sleep apnea     does not use cpap machine    Spinal stenosis     Dr. Sonam Myers       Social History     Social History    Marital status: SINGLE     Spouse name: N/A    Number of children: N/A    Years of education: N/A     Occupational History    disabled     student      Social History Main Topics    Smoking status: Never Smoker    Smokeless tobacco: Never Used    Alcohol use No    Drug use: No    Sexual activity: Not on file     Other Topics Concern    Not on file     Social History Narrative       Family History   Problem Relation Age of Onset    Heart Disease Mother      CHF    Diabetes Mother     Hypertension Mother     Kidney Disease Mother     Breast Cancer Mother     Stroke Mother     Diabetes Father     Hypertension Father     Heart Attack Father      MI    Cancer Maternal Grandmother      stomach    Cancer Other      breast    Heart Attack Other      MI    Ovarian Cancer Maternal Aunt          OBJECTIVE    Physical Exam:     Visit Vitals    /84 (BP 1 Location: Left arm, BP Patient Position: Sitting)    Pulse 64    Temp 98.2 °F (36.8 °C) (Oral)    Resp 16    Ht 5' 3\" (1.6 m)    Wt 238 lb 6.4 oz (108.1 kg)    LMP 01/31/2008    SpO2 99%    BMI 42.23 kg/m2       General: alert, well-appearing, obese, AA, in no apparent distress or pain  Breasts: breasts appear normal, no suspicious masses, no skin or nipple changes or axillary nodes. CVS: normal rate, regular rhythm, distinct S1 and S2  Lungs:clear to ausculation bilaterally, no crackles, wheezing or rhonchi noted  Abdomen: soft, NT,ND  Extremities: no edema  Psych:  mood and affect normal      Results for orders placed or performed in visit on 76/37/61   METABOLIC PANEL, COMPREHENSIVE   Result Value Ref Range    Glucose 84 65 - 99 mg/dL    BUN 11 6 - 24 mg/dL    Creatinine 0.88 0.57 - 1.00 mg/dL    GFR est non-AA 78 >59 mL/min/1.73    GFR est AA 90 >59 mL/min/1.73    BUN/Creatinine ratio 13 9 - 23    Sodium 139 134 - 144 mmol/L    Potassium 4.4 3.5 - 5.2 mmol/L    Chloride 100 96 - 106 mmol/L    CO2 24 18 - 29 mmol/L    Calcium 9.2 8.7 - 10.2 mg/dL    Protein, total 6.9 6.0 - 8.5 g/dL    Albumin 4.3 3.5 - 5.5 g/dL    GLOBULIN, TOTAL 2.6 1.5 - 4.5 g/dL    A-G Ratio 1.7 1.2 - 2.2    Bilirubin, total <0.2 0.0 - 1.2 mg/dL    Alk. phosphatase 68 39 - 117 IU/L    AST (SGOT) 23 0 - 40 IU/L    ALT (SGPT) 22 0 - 32 IU/L   LIPID PANEL   Result Value Ref Range    Cholesterol, total 156 100 - 199 mg/dL    Triglyceride 114 0 - 149 mg/dL    HDL Cholesterol 57 >39 mg/dL    VLDL, calculated 23 5 - 40 mg/dL    LDL, calculated 76 0 - 99 mg/dL   HEMOGLOBIN A1C W/O EAG   Result Value Ref Range    Hemoglobin A1c 5.6 4.8 - 5.6 %   CVD REPORT   Result Value Ref Range    INTERPRETATION Note        ASSESSMENT/PLAN  Diagnoses and all orders for this visit:    1.  Essential hypertension  Needs better control  Increase metoprolol XL to 100 mg  Cont losartan 100, amlodipine 10 mg  BP log  DASH diet  Monitoring  Avoiding diuretic with h/o hyponatremia  Check BMP    2. Bipolar 1 disorder [facility])  Following psychiatry    3. Morbid obesity ([address]  Encouraged wt loss    4. Pure hypercholesterolemia  Good range on zocor, to cont    5. Prediabetes  Improving  Tlcs, monitoring    Other orders  -     amLODIPine (NORVASC) 10 mg tablet; Take 1 Tab by mouth daily. -     valsartan (DIOVAN) 320 mg tablet; take 1 tablet by mouth once daily  -     metoprolol succinate (TOPROL-XL) 50 mg XL tablet; Take 1 Tab by mouth daily. -     simvastatin (ZOCOR) 20 mg tablet; take 1 tablet by mouth at bedtime    Follow-up Disposition:  Return in about 4 weeks (around 9/18/2018), or if symptoms worsen or fail to improve. Patient understands plan of care. Patient has provided input and agrees with goals.

## 2018-08-21 NOTE — ACP (ADVANCE CARE PLANNING)
Advance Care Planning (ACP) Provider Note - Comprehensive     Date of ACP Conversation: 08/21/18  Persons included in Conversation:  patient  Length of ACP Conversation in minutes:  16 minutes    Authorized Decision Maker (if patient is incapable of making informed decisions): This person is:  Has yet to decide, thinking daughters        General ACP for ALL Patients with Decision Making Capacity:   Importance of advance care planning, including choosing a healthcare agent to communicate patient's healthcare decisions if patient lost the ability to make decisions, such as after a sudden illness or accident  Understanding of the healthcare agent role was assessed and information provided  Exploration of values, goals, and preferences if recovery is not expected, even with continued medical treatment in the event of: Imminent death  Severe, permanent brain injury  \"In these circumstances, what matters most to you? \"  Care focused more on comfort or quality of life. For Serious or Chronic Illness:  Understanding of medical condition    Understanding of CPR, goals and expected outcomes, benefits and burdens discussed. Information on CPR success rates provided (e.g. for CPR in hospital, survival to d/c at two weeks is 22%, for chronically ill or elderly/frail survival is less than 3%); Individual asked to communicate understanding of information in his/her own words.   Explored fears and concerns regarding CPR or possible outcomes    Interventions Provided:  Recommended completion of Advance Directive form after review of ACP materials and conversation with prospective healthcare agent   Recommended communicating the plan and making copies for the healthcare agent, personal physician, and others as appropriate (e.g., health system)  Recommended review of completed ACP document annually or upon change in health status

## 2018-08-21 NOTE — MR AVS SNAPSHOT
[address] [facility] [address] 200 [facility] 
[phone] Patient: Jordan Munoz MRN: [id number] :1970 Visit Information Date & Time Provider Department Dept. Phone Encounter #  
 2018 11:30 AM Sweetie Kong, [address] [facility] [id number] Follow-up Instructions Return in about 4 weeks (around 2018), or if symptoms worsen or fail to improve. Your Appointments 2018 11:30 AM  
Office Visit with Sweetie Kong MD  
[address] [facility] ([facility]) Appt Note: AWV SUB -CQBV Medicare card [address] [facility] [address] 200 [facility] [address]  
[address] [facility] 200 [facility] Upcoming Health Maintenance Date Due  
 MEDICARE YEARLY EXAM 3/28/2018 Influenza Age 5 to Adult 2018 DTaP/Tdap/Td series (3 - Td) 2026 COLONOSCOPY 2027 Allergies as of 2018  Review Complete On: 2018 By: Sweetie Kong MD  
  
 Severity Noted Reaction Type Reactions Other Medication  2012   Side Effect Hives  
 seafood Shellfish Derived  10/14/2013    Hives Current Immunizations  Reviewed on 3/14/2018 Name Date Influenza Vaccine 10/17/2014 Pneumococcal Polysaccharide (PPSV-23) 2016 Tdap 2016, 10/8/2013 Not reviewed this visit You Were Diagnosed With   
  
 Codes Comments Medicare annual wellness visit, subsequent    -  Primary ICD-10-CM: Z00.00 ICD-9-CM: V70.0 Essential hypertension     ICD-10-CM: I10 
ICD-9-CM: 401.9 Current moderate episode of major depressive disorder without prior episode [facility]     ICD-10-CM: F32.1 ICD-9-CM: 296.22 Prediabetes     ICD-10-CM: R73.03 
ICD-9-CM: 790.29 Hot flashes     ICD-10-CM: R23.2 ICD-9-CM: 782.62 Mixed hyperlipidemia     ICD-10-CM: E78.2 ICD-9-CM: 272.2 Vitals BP Pulse Temp Resp Height(growth percentile) Weight(growth percentile) 148/84 (BP 1 Location: Left arm, BP Patient Position: Sitting) (!) 54 98.2 °F (36.8 °C) (Oral) 16 5' 3\" (1.6 m) 238 lb 6.4 oz (108.1 kg) LMP SpO2 BMI OB Status Smoking Status 01/31/2008 99% 42.23 kg/m2 Hysterectomy Never Smoker Vitals History BMI and BSA Data Body Mass Index Body Surface Area  
 42.23 kg/m 2 2.19 m 2 Preferred Pharmacy Pharmacy Name Phone [address] [facility], [address] Your Updated Medication List  
  
   
This list is accurate as of 8/21/18 10:35 AM.  Always use your most recent med list. amLODIPine 10 mg tablet Commonly known as:  NORVASC  
take 1 tablet by mouth daily BLACK COHOSH PO Take  by mouth.  
  
 carisoprodol 350 mg tablet Commonly known as:  SOMA  
take 1 tablet by mouth three times a day and 1 tablet at bedtime FIBER (PSYLLIUM HUSK) PO Take  by mouth. GEODON 40 mg capsule Generic drug:  ziprasidone Take 40 mg by mouth daily. hydrOXYzine pamoate 50 mg capsule Commonly known as:  VISTARIL  
2 Caps 2 Times Daily As Needed. latanoprost 0.005 % ophthalmic solution Commonly known as:  Fletcher Mathis Administer 1 Drop to both eyes nightly. levocetirizine 5 mg tablet Commonly known as:  Shakeel Pimentel LORazepam 0.5 mg tablet Commonly known as:  ATIVAN Take 0.5 mg by mouth two (2) times a day. losartan 100 mg tablet Commonly known as:  COZAAR Take 1 Tab by mouth daily. melatonin 3 mg tablet Take 3 mg by mouth nightly. metoprolol succinate 100 mg tablet Commonly known as:  TOPROL-XL Take 1 Tab by mouth daily. mometasone-formoterol 100-5 mcg/actuation HFA inhaler Commonly known as:  Consuella Bradley Take 2 Puffs by inhalation two (2) times a day. montelukast 10 mg tablet Commonly known as:  SINGULAIR  
 Take 10 mg by mouth nightly. OXcarbazepine 300 mg tablet Commonly known as:  TRILEPTAL Take 300 mg by mouth two (2) times a day. PROBIOTIC PO Take  by mouth. simvastatin 20 mg tablet Commonly known as:  ZOCOR  
take 1 tablet by mouth at bedtime TRULANCE 3 mg Tab Generic drug:  plecanatide Take  by mouth. Prescriptions Sent to Pharmacy Refills  
 losartan (COZAAR) 100 mg tablet 0 Sig: Take 1 Tab by mouth daily. Class: Normal  
 Pharmacy: [facility], [address] [place] [address] Ph #: [phone] Route: Oral  
 metoprolol succinate (TOPROL-XL) 100 mg tablet 0 Sig: Take 1 Tab by mouth daily. Class: Normal  
 Pharmacy: [address] [place] [address], [address] [place] [address] Ph #: [phone] Route: Oral  
  
We Performed the Following REFERRAL TO OBSTETRICS AND GYNECOLOGY [REF51 Custom] Follow-up Instructions Return in about 4 weeks (around 9/18/2018), or if symptoms worsen or fail to improve. To-Do List   
 08/21/2018 Lab:  METABOLIC PANEL, BASIC Referral Information Referral ID Referred By Referred To  
  
 [id number] Phone: [phone] Fax: [phone] Visits Status Start Date End Date 1 New Request 8/21/18 8/21/19 If your referral has a status of pending review or denied, additional information will be sent to support the outcome of this decision. Patient Instructions Medicare Wellness Visit, Female The best way to live healthy is to have a lifestyle where you eat a well-balanced diet, exercise regularly, limit alcohol use, and quit all forms of tobacco/nicotine, if applicable. Regular preventive services are another way to keep healthy.  Preventive services (vaccines, screening tests, monitoring & exams) can help personalize your care plan, which helps you manage your own care. Screening tests can find health problems at the earliest stages, when they are easiest to treat. Mal Fernandez follows the current, evidence-based guidelines published by the [facility] Reji Peter ([facility]) when recommending preventive services for our patients. Because we follow these guidelines, sometimes recommendations change over time as research supports it. (For example, mammograms used to be recommended annually. Even though Medicare will still pay for an annual mammogram, the newer guidelines recommend a mammogram every two years for women of average risk.) Of course, you and your doctor may decide to screen more often for some diseases, based on your risk and your health status. Preventive services for you include: - Medicare offers their members a free annual wellness visit, which is time for you and your primary care provider to discuss and plan for your preventive service needs. Take advantage of this benefit every year! 
-All adults over the age of 72 should receive the recommended pneumonia vaccines. Current USPSTF guidelines recommend a series of two vaccines for the best pneumonia protection.  
-All adults should have a flu vaccine yearly and a tetanus vaccine every 10 years. All adults age 61 and older should receive a shingles vaccine once in their lifetime.   
-A bone mass density test is recommended when a woman turns 65 to screen for osteoporosis. This test is only recommended one time, as a screening. Some providers will use this same test as a disease monitoring tool if you already have osteoporosis.  
-All adults age 38-68 who are overweight should have a diabetes screening test once every three years.  
-Other screening tests and preventive services for persons with diabetes include: an eye exam to screen for diabetic retinopathy, a kidney function test, a foot exam, and stricter control over your cholesterol.  
-Cardiovascular screening for adults with routine risk involves an electrocardiogram (ECG) at intervals determined by your doctor.  
-Colorectal cancer screenings should be done for adults age 54-65 with no increased risk factors for colorectal cancer. There are a number of acceptable methods of screening for this type of cancer. Each test has its own benefits and drawbacks. Discuss with your doctor what is most appropriate for you during your annual wellness visit. The different tests include: colonoscopy (considered the best screening method), a fecal occult blood test, a fecal DNA test, and sigmoidoscopy. -Breast cancer screenings are recommended every other year for women of normal risk, age 54-69. 
-Cervical cancer screenings for women over age 72 are only recommended with certain risk factors.  
-All adults born between [facility] should be screened once for Hepatitis C. Here is a list of your current Health Maintenance items (your personalized list of preventive services) with a due date: 
Health Maintenance Due Topic Date Due  
 Annual Well Visit  03/28/2018  Flu Vaccine  08/01/2018 Introducing [facility]! [facility] introduces ApplyMap patient portal. Now you can access parts of your medical record, email your doctor's office, and request medication refills online. 1. In your internet browser, go to [email or web address] Smartphone Physical 2. Click on the First Time User? Click Here link in the Sign In box. You will see the New Member Sign Up page. 3. Enter your ApplyMap Access Code exactly as it appears below. You will not need to use this code after youve completed the sign-up process. If you do not sign up before the expiration date, you must request a new code. · ApplyMap Access Code: [email or web address] Expires: 9/4/2018 10:20 AM 
 
 4. Enter the last four digits of your Social Security Number (xxxx) and Date of Birth (mm/dd/yyyy) as indicated and click Submit. You will be taken to the next sign-up page. 5. Create a CloudLock ID. This will be your CloudLock login ID and cannot be changed, so think of one that is secure and easy to remember. 6. Create a CloudLock password. You can change your password at any time. 7. Enter your Password Reset Question and Answer. This can be used at a later time if you forget your password. 8. Enter your e-mail address. You will receive e-mail notification when new information is available in [address] 9. Click Sign Up. You can now view and download portions of your medical record. 10. Click the Download Summary menu link to download a portable copy of your medical information. If you have questions, please visit the Frequently Asked Questions section of the CloudLock website. Remember, CloudLock is NOT to be used for urgent needs. For medical emergencies, dial 911. Now available from your iPhone and Android! Please provide this summary of care documentation to your next provider. Your primary care clinician is listed as Nisha Jaime. If you have any questions after today's visit, please call [phone].

## 2018-08-21 NOTE — PATIENT INSTRUCTIONS
Medicare Wellness Visit, Female     The best way to live healthy is to have a lifestyle where you eat a well-balanced diet, exercise regularly, limit alcohol use, and quit all forms of tobacco/nicotine, if applicable. Regular preventive services are another way to keep healthy. Preventive services (vaccines, screening tests, monitoring & exams) can help personalize your care plan, which helps you manage your own care. Screening tests can find health problems at the earliest stages, when they are easiest to treat. Mal Fernandez follows the current, evidence-based guidelines published by the [facility] Reji Brittani ([facility]) when recommending preventive services for our patients. Because we follow these guidelines, sometimes recommendations change over time as research supports it. (For example, mammograms used to be recommended annually. Even though Medicare will still pay for an annual mammogram, the newer guidelines recommend a mammogram every two years for women of average risk.)  Of course, you and your doctor may decide to screen more often for some diseases, based on your risk and your health status. Preventive services for you include:  - Medicare offers their members a free annual wellness visit, which is time for you and your primary care provider to discuss and plan for your preventive service needs. Take advantage of this benefit every year!  -All adults over the age of 72 should receive the recommended pneumonia vaccines. Current USPSTF guidelines recommend a series of two vaccines for the best pneumonia protection.   -All adults should have a flu vaccine yearly and a tetanus vaccine every 10 years. All adults age 61 and older should receive a shingles vaccine once in their lifetime.    -A bone mass density test is recommended when a woman turns 65 to screen for osteoporosis. This test is only recommended one time, as a screening.  Some providers will use this same test as a disease monitoring tool if you already have osteoporosis. -All adults age 38-68 who are overweight should have a diabetes screening test once every three years.   -Other screening tests and preventive services for persons with diabetes include: an eye exam to screen for diabetic retinopathy, a kidney function test, a foot exam, and stricter control over your cholesterol.   -Cardiovascular screening for adults with routine risk involves an electrocardiogram (ECG) at intervals determined by your doctor.   -Colorectal cancer screenings should be done for adults age 54-65 with no increased risk factors for colorectal cancer. There are a number of acceptable methods of screening for this type of cancer. Each test has its own benefits and drawbacks. Discuss with your doctor what is most appropriate for you during your annual wellness visit. The different tests include: colonoscopy (considered the best screening method), a fecal occult blood test, a fecal DNA test, and sigmoidoscopy. -Breast cancer screenings are recommended every other year for women of normal risk, age 54-69.  -Cervical cancer screenings for women over age 72 are only recommended with certain risk factors.   -All adults born between [facility] should be screened once for Hepatitis C.      Here is a list of your current Health Maintenance items (your personalized list of preventive services) with a due date:  Health Maintenance Due   Topic Date Due    Annual Well Visit  03/28/2018    Flu Vaccine  08/01/2018

## 2018-09-10 ENCOUNTER — HOSPITAL ENCOUNTER (OUTPATIENT)
Dept: LAB | Age: 48
Discharge: HOME | End: 2018-09-10

## 2018-09-10 LAB — XX-LABCORP SPECIMEN COL,LCBCF: NORMAL

## 2018-09-10 PROCEDURE — 99001 SPECIMEN HANDLING PT-LAB: CPT | Performed by: FAMILY MEDICINE

## 2018-09-11 LAB
BUN SERPL-MCNC: 13 MG/DL (ref 6–24)
BUN/CREAT SERPL: 12 (ref 9–23)
CALCIUM SERPL-MCNC: 9.3 MG/DL (ref 8.7–10.2)
CHLORIDE SERPL-SCNC: 98 MMOL/L (ref 96–106)
CO2 SERPL-SCNC: 22 MMOL/L (ref 20–29)
CREAT SERPL-MCNC: 1.05 MG/DL (ref 0.57–1)
GLUCOSE SERPL-MCNC: 82 MG/DL (ref 65–99)
POTASSIUM SERPL-SCNC: 5 MMOL/L (ref 3.5–5.2)
SODIUM SERPL-SCNC: 135 MMOL/L (ref 134–144)

## 2018-09-12 NOTE — PROGRESS NOTES
Contacted patient and verified identity using name and date of birth (2- identifiers). Patient advised labs essentially normal. Patient voiced understanding.

## 2018-09-26 RX ORDER — AMLODIPINE BESYLATE 10 MG/1
TABLET ORAL
Qty: 90 TAB | Refills: 0 | Status: SHIPPED | OUTPATIENT
Start: 2018-09-26 | End: 2019-01-02 | Stop reason: SDUPTHER

## 2018-11-27 RX ORDER — METOPROLOL SUCCINATE 100 MG/1
TABLET, EXTENDED RELEASE ORAL
Qty: 90 TAB | Refills: 0 | Status: SHIPPED | OUTPATIENT
Start: 2018-11-27 | End: 2019-02-25 | Stop reason: SDUPTHER

## 2018-12-04 RX ORDER — LOSARTAN POTASSIUM 100 MG/1
TABLET ORAL
Qty: 90 TAB | Refills: 0 | Status: SHIPPED | OUTPATIENT
Start: 2018-12-04 | End: 2019-02-25 | Stop reason: SDUPTHER

## 2019-01-02 RX ORDER — AMLODIPINE BESYLATE 10 MG/1
TABLET ORAL
Qty: 90 TAB | Refills: 0 | Status: SHIPPED | OUTPATIENT
Start: 2019-01-02 | End: 2019-04-02 | Stop reason: SDUPTHER

## 2019-01-02 NOTE — H&P (VIEW-ONLY)
[address], [address], [facility] 
[address] [facility], [address] [place], [address] [facility] 
[phone] Maylin Fagan  Patient ID: 
Name:  Ashly Trent MRN:  [id number] :  1970/48 y.o. Date:  2019 HISTORY OF PRESENT ILLNESS: 
Ashly Trent is a 51 y.o.  postmenopausal female referred by Dr. Joelle Garrett for pelvic mass and postmenopausal bleeding. Intitally seen be NP with reports of vaginal bleeding. Given pt's history of hysteretectomy with BSO for endometriosis in  a TVUS was ordered. Which revealed a 6.8 cm x 5.2 cm x 4.6 cm at midline vaginal cuff. This prompted pelvic CT with findings of a lesion measuring 7.6 x 5.8 x 7.2 cm and is compatible with a pelvic neoplasm vs endometrioma given prior history of endometriosis. Tumor markers done on 2018 all normal.  
Having urinary frequency. Has occasional spotting. Denies any abdominopelvic pain. Normal bowel movements. Labs: 
2018 : 9.3 
2018 CEA: 2.0 
2018 AFP: 3.8 Imaging 2018 CT PELVIS W/CONTRAST FINDINGS: 
 
LYMPH NODES: No enlarged lymph nodes. PELVIC GASTROINTESTINAL TRACT: No bowel dilation or wall thickening. PELVIC ORGANS:  
 
The uterus is absent. Along the right upper margin of the vaginal cuff within the right deep pelvis there is a large irregularly-shaped peripherally enhancing, septated appearing mass with regions of central low attenuation which could be low density-fluid type contents or regions of necrosis. Lesion measures 76 x 58 x 72 mm and is compatible with a pelvic neoplasm. VASCULATURE: Unremarkable. BONES: Lower lumbar hypertrophic osteophytes. Lower lumbar facet hypertrophy with vacuum phenomenon asymmetric leftward. Degenerative vacuum phenomenon within the SI joints noted as well.  No acute or aggressive osseous abnormalities identified. OTHER: Small fat-containing ventral umbilical hernia. Minimal nonspecific edema within the subcutaneous fat of the lumbar region, not uncommon. IMPRESSION: 1.  Along the right upper margin of the vaginal cuff within the right deep pelvis there is a large irregularly-shaped peripherally enhancing, septated appearing mass with regions of central low attenuation which could be low density-fluid type contents or regions of necrosis. Lesion measures 76 x 58 x 72 mm and is compatible with a pelvic neoplasm. Could be a endometrioma given prior history of endometriosis, malignancy not excluded and gynecologic oncology follow-up is suggested. 2.  Small fat-containing ventral umbilical hernia. 12/04/2018 TVUS Uterus: surgically absent Left ovary: surgically absent Right ovary: surgically absent Other findings: midline-at vaginal cuff: 6.8 cm X 5.2 cm x 4.6 cm, volume 85 ml Impression: Complex pelvic mass ROS:   
As above Patient Active Problem List  
 Diagnosis Date Noted  Bipolar 1 disorder ([facility] 02/09/2018  Irritable bowel syndrome with both constipation and diarrhea 02/09/2018  Advance care planning 01/31/2017  Dental caries 05/26/2016  Chronic periodontal disease 05/26/2016  SUAZO (dyspnea on exertion) 02/10/2016  Prediabetes 09/24/2015  Morbid obesity ([facility] 08/31/2015  Arthritis, degenerative 08/31/2015  Gastroesophageal reflux disease without esophagitis 08/31/2015  ANAMIKA on CPAP 08/31/2015  Essential hypertension 08/28/2015  PTSD (post-traumatic stress disorder) 03/24/2014  S/P TKR (total knee replacement) 11/14/2012  Depression 05/08/2012  Menopause 05/08/2012  Knee pain, right 05/08/2012  GERD (gastroesophageal reflux disease) 05/08/2012  OA (osteoarthritis) 06/18/2010  Obesity 06/18/2010  Mixed hyperlipidemia 06/18/2010 Past Medical History:  
Diagnosis Date  AR (allergic rhinitis) seasonal  
 Asthma  Cardiac echocardiogram 2016 Tech difficult. EF 55-60%. No RWMA. Mild conc LVH. Gr 1 DDfx. RVSP normal.    
 Cardiac nuclear imaging test 2016 Low risk. Very patchy radiotracer uptake. Mild anterior artifact, low suspicion for ischemia. EF 68%. No RWMA. Normal EKG on pharm stress test.  
 Cardiovascular LE arterial duplex 10/07/2013 No significant arterial disease at rest bilaterally. R JUNE 1.21.  L JUNE 1.16. No significant sm vessel disease.  Depression Dr. Ismael Hodgkins, suicide attempt   Diverticulosis  Endometriosis s/p hysterectomy   GERD (gastroesophageal reflux disease)  Glaucoma Dr. Ros Waller HTN (hypertension)  Hx of colonoscopy 2017  
 mild diverticulosis, g1 internal hemorrhoids, normal colon , repeat 10 year   Hx of suicide attempt  Hyperlipidemia LDL goal < 130   
 IBS (irritable bowel syndrome)  Insomnia  Nausea & vomiting  OA (osteoarthritis) both knees, lower back  Preeclampsia  PTSD (post-traumatic stress disorder)  Sleep apnea   
 does not use cpap machine  Spinal stenosis Dr. Natalie Montano Past Surgical History:  
Procedure Laterality Date  COLONOSCOPY N/A 2017 COLONOSCOPY performed by Kristopher Mcmullen MD at [facility]COPY  FEMUR/KNEE SURG UNLISTED Left  External fixator  HX  SECTION    
 HX COLONOSCOPY  2017 DR. MCRAE 2017  HX HYSTERECTOMY  HX KNEE ARTHROSCOPY Left   
 left knee  HX KNEE REPLACEMENT Bilateral   
 (R)  (L)   HX OTHER SURGICAL  2016  
 all teeth removed  HX SALPINGO-OOPHORECTOMY  2008  HX TUBAL LIGATION   [address] [place] [address]  TOTAL ABDOM HYSTERECTOMY   OB History  Para Term  AB Living 2 2 2 0 0 0 SAB TAB Ectopic Molar Multiple Live Births  
 0 0 0 0 0 0 Social History Tobacco Use  Smoking status: Never Smoker  Smokeless tobacco: Never Used Substance Use Topics  Alcohol use: No  
  
Family History Problem Relation Age of Onset  Heart Disease Mother CHF  Diabetes Mother  Hypertension Mother  Kidney Disease Mother  Breast Cancer Mother  Stroke Mother  Diabetes Father  Hypertension Father  Heart Attack Father MI  
 Cancer Maternal Grandmother   
     stomach  Ovarian Cancer Maternal Aunt  Cancer Maternal Uncle Current Outpatient Medications Medication Sig  
 amLODIPine (NORVASC) 10 mg tablet take 1 tablet by mouth once daily  losartan (COZAAR) 100 mg tablet take 1 tablet by mouth once daily  metoprolol succinate (TOPROL-XL) 100 mg tablet take 1 tablet by mouth once daily  simvastatin (ZOCOR) 20 mg tablet take 1 tablet by mouth at bedtime  OXcarbazepine (TRILEPTAL) 300 mg tablet Take 300 mg by mouth two (2) times a day.  LORazepam (ATIVAN) 0.5 mg tablet Take 0.5 mg by mouth two (2) times a day.  hydrOXYzine pamoate (VISTARIL) 50 mg capsule 2 Caps 2 Times Daily As Needed.  carisoprodol (SOMA) 350 mg tablet take 1 tablet by mouth three times a day and 1 tablet at bedtime  BLACK COHOSH PO Take  by mouth.  levocetirizine (XYZAL) 5 mg tablet  ziprasidone (GEODON) 40 mg capsule Take 40 mg by mouth daily.  melatonin 3 mg tablet Take 3 mg by mouth nightly.  montelukast (SINGULAIR) 10 mg tablet Take 10 mg by mouth nightly.  FIBER, PSYLLIUM HUSK, PO Take  by mouth daily as needed.  latanoprost (XALATAN) 0.005 % ophthalmic solution Administer 1 Drop to both eyes nightly.  plecanatide (TRULANCE) 3 mg tab Take  by mouth.  LACTOBACILLUS ACIDOPHILUS (PROBIOTIC PO) Take  by mouth.  mometasone-formoterol (DULERA) 100-5 mcg/actuation HFA inhaler Take 2 Puffs by inhalation two (2) times a day. No current facility-administered medications for this visit. Allergies Allergen Reactions  Other Medication Hives  
  seafood  Shellfish Derived Hives OBJECTIVE: 
 
Physical Exam 
VITAL SIGNS: Visit Vitals /76 (BP 1 Location: Left arm, BP Patient Position: Sitting) Pulse (!) 53 Temp 97.6 °F (36.4 °C) (Oral) Resp 16 Ht 5' 3\" (1.6 m) Wt 113.8 kg (250 lb 14.4 oz) LMP 01/31/2008 SpO2 97% BMI 44.44 kg/m² GENERAL EMILY: in no apparent distress and well developed and well nourished MUSCULOSKEL: no joint tenderness, deformity or swelling INTEGUMENT:  warm and dry, no rashes or lesions ABDOMEN . soft,obese, NT, ND, No masses appreciated EXTREMITIES: extremities normal, atraumatic, no cyanosis or edema PELVIC: External genitalia: normal general appearance Vaginal: normal mucosa without prolapse or lesions Cervix: removed surgically Adnexa: pelvic mass in rectovaginal space, firm, immobile Uterus: removed surgically Rectal: smooth rectal mucosa RECTAL: deferred BRY SURVEY: Cervical, supraclavicular, axillary and inguinal nodes normal.  
NEURO: Grossly normal  
 
 
 
IMPRESSION/PLAN: 
1. Pelvic mass 
 -reviewed her imaging and based on exam would recommend surgical evaluation 
 -discussed recommendation for laparotomy, resection of mass. We did discuss possibility of bowel resection +/- colostomy given how low this mass is in pelvis. Also discussed use of frozen section if needed and possible staging if malignant 
 -pt understands and in agreement with plan 
 -surgery to be scheduled at [facility] next week 
 -all of ms melchor questions/concerns addressed The total time spent was 60 minutes regarding this patients diagnosis of pelvic mass and >50% of this time was spent counseling and coordinating care Harpreet Corporal DO Gynecologic Oncology 1/4/201910:05 AM

## 2019-01-02 NOTE — PROGRESS NOTES
[address], [address], [address] [facility] 
[address] [place] [address], [address] [place], [address] 
 [phone] Raman Parag  Patient ID: 
Name:  Gradie Aase MRN:  [id number] :  1970/48 y.o. Date:  2019 HISTORY OF PRESENT ILLNESS: 
Gradie Aase is a 51 y.o.  postmenopausal female referred by Dr. Zonia Rodriguez for pelvic mass and postmenopausal bleeding. Intitally seen be NP with reports of vaginal bleeding. Given pt's history of hysteretectomy with BSO for endometriosis in  a TVUS was ordered. Which revealed a 6.8 cm x 5.2 cm x 4.6 cm at midline vaginal cuff. This prompted pelvic CT with findings of a lesion measuring 7.6 x 5.8 x 7.2 cm and is compatible with a pelvic neoplasm vs endometrioma given prior history of endometriosis. Tumor markers done on 2018 all normal.  
Having urinary frequency. Has occasional spotting. Denies any abdominopelvic pain. Normal bowel movements. Labs: 
2018 : 9.3 
2018 CEA: 2.0 
2018 AFP: 3.8 Imaging 2018 CT PELVIS W/CONTRAST FINDINGS: 
 
LYMPH NODES: No enlarged lymph nodes. PELVIC GASTROINTESTINAL TRACT: No bowel dilation or wall thickening. PELVIC ORGANS:  
 
The uterus is absent. Along the right upper margin of the vaginal cuff within the right deep pelvis there is a large irregularly-shaped peripherally enhancing, septated appearing mass with regions of central low attenuation which could be low density-fluid type contents or regions of necrosis. Lesion measures 76 x 58 x 72 mm and is compatible with a pelvic neoplasm. VASCULATURE: Unremarkable. BONES: Lower lumbar hypertrophic osteophytes. Lower lumbar facet hypertrophy with vacuum phenomenon asymmetric leftward. Degenerative vacuum phenomenon within the SI joints noted as well.  No acute or aggressive osseous abnormalities identified. OTHER: Small fat-containing ventral umbilical hernia. Minimal nonspecific edema within the subcutaneous fat of the lumbar region, not uncommon. IMPRESSION: 1.  Along the right upper margin of the vaginal cuff within the right deep pelvis there is a large irregularly-shaped peripherally enhancing, septated appearing mass with regions of central low attenuation which could be low density-fluid type contents or regions of necrosis. Lesion measures 76 x 58 x 72 mm and is compatible with a pelvic neoplasm. Could be a endometrioma given prior history of endometriosis, malignancy not excluded and gynecologic oncology follow-up is suggested. 2.  Small fat-containing ventral umbilical hernia. 12/04/2018 TVUS Uterus: surgically absent Left ovary: surgically absent Right ovary: surgically absent Other findings: midline-at vaginal cuff: 6.8 cm X 5.2 cm x 4.6 cm, volume 85 ml Impression: Complex pelvic mass ROS:   
As above Patient Active Problem List  
 Diagnosis Date Noted  Bipolar 1 disorder ([facility] [address] 02/09/2018  Irritable bowel syndrome with both constipation and diarrhea 02/09/2018  Advance care planning 01/31/2017  Dental caries 05/26/2016  Chronic periodontal disease 05/26/2016  SUAZO (dyspnea on exertion) 02/10/2016  Prediabetes 09/24/2015  Morbid obesity ([facility] 08/31/2015  Arthritis, degenerative 08/31/2015  Gastroesophageal reflux disease without esophagitis 08/31/2015  ANAMIKA on CPAP 08/31/2015  Essential hypertension 08/28/2015  PTSD (post-traumatic stress disorder) 03/24/2014  S/P TKR (total knee replacement) 11/14/2012  Depression 05/08/2012  Menopause 05/08/2012  Knee pain, right 05/08/2012  GERD (gastroesophageal reflux disease) 05/08/2012  OA (osteoarthritis) 06/18/2010  Obesity 06/18/2010  Mixed hyperlipidemia 06/18/2010 Past Medical History:  
Diagnosis Date  AR (allergic rhinitis) seasonal  
 Asthma  Cardiac echocardiogram 2016 Tech difficult. EF 55-60%. No RWMA. Mild conc LVH. Gr 1 DDfx. RVSP normal.    
 Cardiac nuclear imaging test 2016 Low risk. Very patchy radiotracer uptake. Mild anterior artifact, low suspicion for ischemia. EF 68%. No RWMA. Normal EKG on pharm stress test.  
 Cardiovascular LE arterial duplex 10/07/2013 No significant arterial disease at rest bilaterally. R JUNE 1.21.  L JUNE 1.16. No significant sm vessel disease.  Depression Dr. Randall Armstrong, suicide attempt   Diverticulosis  Endometriosis s/p hysterectomy   GERD (gastroesophageal reflux disease)  Glaucoma Dr. [facility] HTN (hypertension)  Hx of colonoscopy 2017  
 mild diverticulosis, g1 internal hemorrhoids, normal colon , repeat 10 year   Hx of suicide attempt  Hyperlipidemia LDL goal < 130   
 IBS (irritable bowel syndrome)  Insomnia  Nausea & vomiting  OA (osteoarthritis) both knees, lower back  Preeclampsia  PTSD (post-traumatic stress disorder)  Sleep apnea   
 does not use cpap machine  Spinal stenosis Dr. Papi Knight Past Surgical History:  
Procedure Laterality Date  COLONOSCOPY N/A 2017 COLONOSCOPY performed by Bella Soto MD at [facility]COPY  FEMUR/KNEE SURG UNLISTED Left  External fixator  HX  SECTION    
 HX COLONOSCOPY  2017 DR. MCRAE 2017  HX HYSTERECTOMY  HX KNEE ARTHROSCOPY Left   
 left knee  HX KNEE REPLACEMENT Bilateral   
 (R)  (L)   HX OTHER SURGICAL  2016  
 all teeth removed  HX SALPINGO-OOPHORECTOMY  2008  HX TUBAL LIGATION   [address] [place] [address]  TOTAL ABDOM HYSTERECTOMY  2006 OB History  Para Term  AB Living 2 2 2 0 0 0 SAB TAB Ectopic Molar Multiple Live Births  
 0 0 0 0 0 0 Social History Tobacco Use  Smoking status: Never Smoker  Smokeless tobacco: Never Used Substance Use Topics  Alcohol use: No  
  
Family History Problem Relation Age of Onset  Heart Disease Mother CHF  Diabetes Mother  Hypertension Mother  Kidney Disease Mother  Breast Cancer Mother  Stroke Mother  Diabetes Father  Hypertension Father  Heart Attack Father MI  
 Cancer Maternal Grandmother   
     stomach  Ovarian Cancer Maternal Aunt  Cancer Maternal Uncle Current Outpatient Medications Medication Sig  
 amLODIPine (NORVASC) 10 mg tablet take 1 tablet by mouth once daily  losartan (COZAAR) 100 mg tablet take 1 tablet by mouth once daily  metoprolol succinate (TOPROL-XL) 100 mg tablet take 1 tablet by mouth once daily  simvastatin (ZOCOR) 20 mg tablet take 1 tablet by mouth at bedtime  OXcarbazepine (TRILEPTAL) 300 mg tablet Take 300 mg by mouth two (2) times a day.  LORazepam (ATIVAN) 0.5 mg tablet Take 0.5 mg by mouth two (2) times a day.  hydrOXYzine pamoate (VISTARIL) 50 mg capsule 2 Caps 2 Times Daily As Needed.  carisoprodol (SOMA) 350 mg tablet take 1 tablet by mouth three times a day and 1 tablet at bedtime  BLACK COHOSH PO Take  by mouth.  levocetirizine (XYZAL) 5 mg tablet  ziprasidone (GEODON) 40 mg capsule Take 40 mg by mouth daily.  melatonin 3 mg tablet Take 3 mg by mouth nightly.  montelukast (SINGULAIR) 10 mg tablet Take 10 mg by mouth nightly.  FIBER, PSYLLIUM HUSK, PO Take  by mouth daily as needed.  latanoprost (XALATAN) 0.005 % ophthalmic solution Administer 1 Drop to both eyes nightly.  plecanatide (TRULANCE) 3 mg tab Take  by mouth.  LACTOBACILLUS ACIDOPHILUS (PROBIOTIC PO) Take  by mouth.  mometasone-formoterol (DULERA) 100-5 mcg/actuation HFA inhaler Take 2 Puffs by inhalation two (2) times a day. No current facility-administered medications for this visit. Allergies Allergen Reactions  Other Medication Hives  
  seafood  Shellfish Derived Hives OBJECTIVE: 
 
Physical Exam 
VITAL SIGNS: Visit Vitals /76 (BP 1 Location: Left arm, BP Patient Position: Sitting) Pulse (!) 53 Temp 97.6 °F (36.4 °C) (Oral) Resp 16 Ht 5' 3\" (1.6 m) Wt 113.8 kg (250 lb 14.4 oz) LMP 01/31/2008 SpO2 97% BMI 44.44 kg/m² GENERAL EMILY: in no apparent distress and well developed and well nourished MUSCULOSKEL: no joint tenderness, deformity or swelling INTEGUMENT:  warm and dry, no rashes or lesions ABDOMEN . soft,obese, NT, ND, No masses appreciated EXTREMITIES: extremities normal, atraumatic, no cyanosis or edema PELVIC: External genitalia: normal general appearance Vaginal: normal mucosa without prolapse or lesions Cervix: removed surgically Adnexa: pelvic mass in rectovaginal space, firm, immobile Uterus: removed surgically Rectal: smooth rectal mucosa RECTAL: deferred BRY SURVEY: Cervical, supraclavicular, axillary and inguinal nodes normal.  
NEURO: Grossly normal  
 
 
 
IMPRESSION/PLAN: 
1. Pelvic mass 
 -reviewed her imaging and based on exam would recommend surgical evaluation 
 -discussed recommendation for laparotomy, resection of mass. We did discuss possibility of bowel resection +/- colostomy given how low this mass is in pelvis. Also discussed use of frozen section if needed and possible staging if malignant 
 -pt understands and in agreement with plan 
 -surgery to be scheduled at [facility] next week 
 -all of ms melchor questions/concerns addressed The total time spent was 60 minutes regarding this patients diagnosis of pelvic mass and >50% of this time was spent counseling and coordinating care Saravanan Brown DO Gynecologic Oncology 1/4/201910:05 AM

## 2019-01-04 ENCOUNTER — OFFICE VISIT (OUTPATIENT)
Dept: ONCOLOGY | Age: 49
End: 2019-01-04

## 2019-01-04 ENCOUNTER — HOSPITAL ENCOUNTER (OUTPATIENT)
Dept: LAB | Age: 49
Discharge: HOME | End: 2019-01-04
Payer: MEDICARE

## 2019-01-04 VITALS
DIASTOLIC BLOOD PRESSURE: 76 MMHG | HEART RATE: 53 BPM | TEMPERATURE: 97.6 F | HEIGHT: 63 IN | OXYGEN SATURATION: 97 % | WEIGHT: 250.9 LBS | SYSTOLIC BLOOD PRESSURE: 132 MMHG | RESPIRATION RATE: 16 BRPM | BODY MASS INDEX: 44.46 KG/M2

## 2019-01-04 DIAGNOSIS — Z01.818 PREOP TESTING: Primary | ICD-10-CM

## 2019-01-04 DIAGNOSIS — R19.00 PELVIC MASS IN FEMALE: ICD-10-CM

## 2019-01-04 DIAGNOSIS — Z01.818 PREOP TESTING: ICD-10-CM

## 2019-01-04 LAB
ANION GAP SERPL CALC-SCNC: 6 MMOL/L (ref 3–18)
BASOPHILS # BLD: 0 K/UL (ref 0–0.1)
BASOPHILS NFR BLD: 0 % (ref 0–2)
BUN SERPL-MCNC: 16 MG/DL (ref 7–18)
BUN/CREAT SERPL: 12 (ref 12–20)
CALCIUM SERPL-MCNC: 8.7 MG/DL (ref 8.5–10.1)
CHLORIDE SERPL-SCNC: 109 MMOL/L (ref 100–108)
CO2 SERPL-SCNC: 28 MMOL/L (ref 21–32)
CREAT SERPL-MCNC: 1.38 MG/DL (ref 0.6–1.3)
DIFFERENTIAL METHOD BLD: NORMAL
EOSINOPHIL # BLD: 0.1 K/UL (ref 0–0.4)
EOSINOPHIL NFR BLD: 1 % (ref 0–5)
ERYTHROCYTE [DISTWIDTH] IN BLOOD BY AUTOMATED COUNT: 13.7 % (ref 11.6–14.5)
GLUCOSE SERPL-MCNC: 103 MG/DL (ref 74–99)
HCT VFR BLD AUTO: 38.4 % (ref 35–45)
HGB BLD-MCNC: 12.6 G/DL (ref 12–16)
LYMPHOCYTES # BLD: 1.9 K/UL (ref 0.9–3.6)
LYMPHOCYTES NFR BLD: 26 % (ref 21–52)
MCH RBC QN AUTO: 28.6 PG (ref 24–34)
MCHC RBC AUTO-ENTMCNC: 32.8 G/DL (ref 31–37)
MCV RBC AUTO: 87.3 FL (ref 74–97)
MONOCYTES # BLD: 0.2 K/UL (ref 0.05–1.2)
MONOCYTES NFR BLD: 3 % (ref 3–10)
NEUTS SEG # BLD: 5 K/UL (ref 1.8–8)
NEUTS SEG NFR BLD: 70 % (ref 40–73)
PLATELET # BLD AUTO: 296 K/UL (ref 135–420)
PMV BLD AUTO: 9.3 FL (ref 9.2–11.8)
POTASSIUM SERPL-SCNC: 4.3 MMOL/L (ref 3.5–5.5)
RBC # BLD AUTO: 4.4 M/UL (ref 4.2–5.3)
SODIUM SERPL-SCNC: 143 MMOL/L (ref 136–145)
WBC # BLD AUTO: 7.2 K/UL (ref 4.6–13.2)

## 2019-01-04 PROCEDURE — 36415 COLL VENOUS BLD VENIPUNCTURE: CPT

## 2019-01-04 PROCEDURE — 85025 COMPLETE CBC W/AUTO DIFF WBC: CPT

## 2019-01-04 PROCEDURE — 80048 BASIC METABOLIC PNL TOTAL CA: CPT

## 2019-01-04 NOTE — PATIENT INSTRUCTIONS
Pelvic Exam: Care Instructions Your Care Instructions When your doctor examines all of your pelvic organs, it's called a pelvic exam. Two good reasons to have this kind of exam are to check for sexually transmitted infections (STIs) and to get a Pap test. A Pap test is also called a Pap smear. It checks for early changes that can lead to cancer of the cervix. Sometimes a pelvic exam is part of a regular checkup. In this case, you can do some things to make your test results as accurate as possible. · Try to schedule the exam when you don't have your period. · Don't use douches, tampons, or vaginal medicines, sprays, or powders for 24 hours before your exam. 
· Don't have sex for 24 hours before your exam. 
Other times, women have this kind of exam at any time of the month. This is because they have pelvic pain, bleeding, or discharge. Or they may have another pelvic problem. Before your exam, it's important to share some information with your doctor. For example, if you are a survivor of rape or sexual abuse, you can talk about any concerns you may have. Your doctor will also want to know if you are pregnant or use birth control. And he or she will want to hear about any problems, surgeries, or procedures you have had in your pelvic area. You will also need to tell your doctor when your last period was. Follow-up care is a key part of your treatment and safety. Be sure to make and go to all appointments, and call your doctor if you are having problems. It's also a good idea to know your test results and keep a list of the medicines you take. How is a pelvic exam done? · During a pelvic exam, you will: ? Take off your clothes below the waist. You will get a paper or cloth cover to put over the lower half of your body. ? Lie on your back on an exam table. Your feet will be raised above you. Stirrups will support your feet. · The doctor will: ? Ask you to relax your knees. Your knees need to lean out, toward the walls. ? Check the opening of your vagina for sores or swelling. ? Gently put a tool called a speculum into your vagina. It opens the vagina a little bit. You will feel some pressure. But if you are relaxed, it will not hurt. It lets your doctor see inside the vagina. ? Use a small brush, spatula, or swab to get a sample of cells, if you are having a Pap test or culture. The doctor then removes the speculum. ? Put on gloves and put one or two fingers of one hand into your vagina. The other hand goes on your lower belly. This lets your doctor feel your pelvic organs. You will probably feel some pressure. Try to stay relaxed. ? Put one gloved finger into your rectum and one into your vagina, if needed. This can also help check your pelvic organs. This exam takes about 10 minutes. At the end, you will get a washcloth or tissue to clean your vaginal area. It's normal to have some discharge after this exam. You can then get dressed. Some test results may be ready right away. But results from a culture or a Pap test may take several days or a few weeks. Why should you have a pelvic exam? 
· You want to have recommended screening tests. This includes a Pap test. 
· You think you have a vaginal infection. Signs include itching, burning, or unusual discharge. · You might have been exposed to a sexually transmitted infection (STI), such as chlamydia or herpes. · You have vaginal bleeding that is not part of your normal menstrual period. · You have pain in your belly or pelvis. · You have been sexually assaulted. A pelvic exam lets your doctor collect evidence and check for STIs. · You are pregnant. · You are having trouble getting pregnant. What are the risks of a pelvic exam? 
There are no risks from a pelvic exam. 
When should you call for help?  
Watch closely for changes in your health, and be sure to contact your doctor if you have any problems. Where can you learn more? Go to http://raul-vee.info/. Enter O796 in the search box to learn more about \"Pelvic Exam: Care Instructions. \" Current as of: May 15, 2018 Content Version: 11.8 © 4048-6363 [employer]wise, [employer]. Care instructions adapted under license by [employer] (which disclaims liability or warranty for this information). If you have questions about a medical condition or this instruction, always ask your healthcare professional. [address] any warranty or liability for your use of this information.

## 2019-01-04 NOTE — PROGRESS NOTES
Sophia Arzate, a 51 y.o. female,  is here for Chief Complaint Patient presents with  New Patient  
  referred by Dr. Asim Rucker for a pelvic mass and vaginal bleeding Visit Vitals /76 (BP 1 Location: Left arm, BP Patient Position: Sitting) Pulse (!) 53 Temp 97.6 °F (36.4 °C) (Oral) Resp 16 Ht 5' 3\" (1.6 m) Wt 113.8 kg (250 lb 14.4 oz) SpO2 97% BMI 44.44 kg/m² Patient reports pain in her back due to osteoarthritis. Also reports pain in her pelvic area, described as menstrual cramps, has not experienced any pain since November 2018. Does not recognize any aggravating factors. States that she has had the pelvic pain 3 years, however the vaginal bleeding just started recently. Patient denies any constipation or diarrhea currently. Patient reports feeling pressure when she urinates, as well as urinary frequency (Daily fluid intake: four 16.9fl oz bottles of water).

## 2019-01-07 ENCOUNTER — OFFICE VISIT (OUTPATIENT)
Dept: ONCOLOGY | Age: 49
End: 2019-01-07

## 2019-01-07 ENCOUNTER — TELEPHONE (OUTPATIENT)
Dept: ONCOLOGY | Age: 49
End: 2019-01-07

## 2019-01-07 ENCOUNTER — TELEPHONE (OUTPATIENT)
Dept: FAMILY MEDICINE CLINIC | Age: 49
End: 2019-01-07

## 2019-01-07 ENCOUNTER — HOSPITAL ENCOUNTER (OUTPATIENT)
Dept: PREADMISSION TESTING | Age: 49
Discharge: HOME | End: 2019-01-07
Payer: MEDICARE

## 2019-01-07 VITALS
HEART RATE: 63 BPM | BODY MASS INDEX: 44.42 KG/M2 | RESPIRATION RATE: 18 BRPM | SYSTOLIC BLOOD PRESSURE: 140 MMHG | WEIGHT: 250.7 LBS | HEIGHT: 63 IN | OXYGEN SATURATION: 98 % | TEMPERATURE: 97.5 F | DIASTOLIC BLOOD PRESSURE: 80 MMHG

## 2019-01-07 DIAGNOSIS — Z71.89 ENCOUNTER FOR SURGICAL COUNSELING: Primary | ICD-10-CM

## 2019-01-07 DIAGNOSIS — Z01.818 PREOP TESTING: ICD-10-CM

## 2019-01-07 LAB
ABO + RH BLD: NORMAL
BLOOD GROUP ANTIBODIES SERPL: NORMAL
SPECIMEN EXP DATE BLD: NORMAL

## 2019-01-07 PROCEDURE — 86900 BLOOD TYPING SEROLOGIC ABO: CPT

## 2019-01-07 PROCEDURE — 93005 ELECTROCARDIOGRAM TRACING: CPT

## 2019-01-07 PROCEDURE — 36415 COLL VENOUS BLD VENIPUNCTURE: CPT

## 2019-01-07 NOTE — TELEPHONE ENCOUNTER
Patient needing clarification in regards to her bowel prep. Reiterated that she must drink the full 32 fl oz mixture of Miralax and a clear liquid within a 2 hours in the morning, between [id number]. Stressed the importance of drinking plenty of fluids while performing this prep. Patient expressed concern stating she has battled with constipation for years. Instructed her to drink plenty of fluids with this prep and if she finds that she has not had a bowel movement to contact the office. Patient was satisfied with this and had no further questions or concerns, encouraged to call back if she develops any.

## 2019-01-07 NOTE — TELEPHONE ENCOUNTER
Informed pt her ekg was abnormal and she needs a cardiac clearance prior to surgery 1/10/19. Pt informed an appointment was made for 1/8/2019 at 1:20pm with there established cardiologist. Address and phone number given to patient. Patient verbalized understanding. Patient agreed to plan. Patient instructed to contact office for any question or concerns.      Shukri Enriquez NP

## 2019-01-08 ENCOUNTER — OFFICE VISIT (OUTPATIENT)
Dept: CARDIOLOGY CLINIC | Age: 49
End: 2019-01-08

## 2019-01-08 VITALS
OXYGEN SATURATION: 98 % | DIASTOLIC BLOOD PRESSURE: 68 MMHG | HEIGHT: 63 IN | HEART RATE: 60 BPM | SYSTOLIC BLOOD PRESSURE: 112 MMHG | BODY MASS INDEX: 44.29 KG/M2

## 2019-01-08 DIAGNOSIS — I10 ESSENTIAL HYPERTENSION: ICD-10-CM

## 2019-01-08 DIAGNOSIS — R94.31 ABNORMAL EKG: Primary | ICD-10-CM

## 2019-01-08 DIAGNOSIS — E78.00 HYPERCHOLESTEREMIA: ICD-10-CM

## 2019-01-08 LAB
ATRIAL RATE: 55 BPM
CALCULATED P AXIS, ECG09: 49 DEGREES
CALCULATED R AXIS, ECG10: 14 DEGREES
CALCULATED T AXIS, ECG11: 32 DEGREES
DIAGNOSIS, 93000: NORMAL
P-R INTERVAL, ECG05: 188 MS
Q-T INTERVAL, ECG07: 456 MS
QRS DURATION, ECG06: 90 MS
QTC CALCULATION (BEZET), ECG08: 436 MS
VENTRICULAR RATE, ECG03: 55 BPM

## 2019-01-08 NOTE — PROGRESS NOTES
HISTORY OF PRESENT ILLNESS  Roula Corado is a 51 y.o. female. HPI  She is referred to my office for cardiac evaluation prior to abdominal surgery because of abnormal EKG. She denies any cardiac symptoms. She denies chest pain, dyspnea, orthopnea, PND. She denies palpitation, dizziness or syncope. She denies any symptoms of claudication, TIA or amaurosis fugax. Her EKG on 1/7/19 was apparently interpreted as \"cannot rule out anterior infarct age undetermined. \"      She had a chest x-ray, which suggested cardiomegaly, and she subsequently underwent an echocardiogram on 04/11/16, which demonstrated normal cardiac size and normal LV function with EF in the 55-60% range. There were grade 1 diastolic dysfunction parameters. The right atrial dimension was normal with normal function. The left atrial dimension was normal. There was no significant valvular pathology. She is a nonsmoker. She has history of hypertension and morbid obesity. She has no history of diabetes mellitus. She is known to have had dyslipidemia and has been on simvastatin. She has a family history of coronary artery disease in that both parents had heart attacks in their fifties. She underwent stress nuclear cardiac imaging on 05/05/2016, which demonstrated no evidence of scarring or ischemia. Ejection fraction was estimated at 68%. Review of Systems   Constitutional: Negative for malaise/fatigue and weight loss. HENT: Negative for hearing loss. Eyes: Negative for blurred vision and double vision. Respiratory: Negative for shortness of breath. Cardiovascular: Negative for chest pain, palpitations, orthopnea, claudication, leg swelling and PND. Gastrointestinal: Negative for blood in stool, heartburn and melena. Genitourinary: Negative for dysuria, frequency, hematuria and urgency. Musculoskeletal: Negative for back pain and joint pain. Skin: Negative for itching and rash.    Neurological: Negative for dizziness, loss of consciousness and weakness. Psychiatric/Behavioral: Negative for depression and memory loss. Physical Exam   Constitutional: She is oriented to person, place, and time. She appears well-developed and well-nourished. HENT:   Head: Normocephalic and atraumatic. Eyes: Conjunctivae are normal. Pupils are equal, round, and reactive to light. Neck: Normal range of motion. Neck supple. No JVD present. Cardiovascular: Normal rate, regular rhythm, S1 normal and S2 normal.  No extrasystoles are present. PMI is not displaced. Exam reveals no gallop and no friction rub. No murmur heard. Pulses:       Carotid pulses are 3+ on the right side, and 3+ on the left side. Pulmonary/Chest: Effort normal. She has no rales. Abdominal: Soft. There is no tenderness. Musculoskeletal: She exhibits no edema. Neurological: She is alert and oriented to person, place, and time. No cranial nerve deficit. Skin: Skin is warm and dry. Psychiatric: She has a normal mood and affect. Her behavior is normal.     Visit Vitals  /68   Pulse 60   Ht 5' 3\" (1.6 m)   Wt (P) 113.9 kg (251 lb)   LMP 01/31/2008   SpO2 98%   BMI (P) 44.46 kg/m²       Past Medical History:   Diagnosis Date    AR (allergic rhinitis)     seasonal    Asthma     allergy induced    Cardiac echocardiogram 04/11/2016    Tech difficult. EF 55-60%. No RWMA. Mild conc LVH. Gr 1 DDfx. RVSP normal.      Cardiac nuclear imaging test 05/05/2016    Low risk. Very patchy radiotracer uptake. Mild anterior artifact, low suspicion for ischemia. EF 68%. No RWMA. Normal EKG on pharm stress test.    Cardiovascular LE arterial duplex 10/07/2013    No significant arterial disease at rest bilaterally. R JUNE 1.21.  L JUNE 1.16. No significant sm vessel disease.     Depression     Dr. Anneliese Kiser, suicide attempt 2/12    Diverticulosis     Endometriosis     s/p hysterectomy 2006    GERD (gastroesophageal reflux disease)     Glaucoma     Dr. Jory Amaya HTN (hypertension)     Hx of colonoscopy 2017    mild diverticulosis, g1 internal hemorrhoids, normal colon , repeat 10 year     Hx of suicide attempt     Hyperlipidemia LDL goal < 130     IBS (irritable bowel syndrome)     Insomnia     Nausea & vomiting     OA (osteoarthritis)     both knees, lower back    Preeclampsia     PTSD (post-traumatic stress disorder)     Sleep apnea     does not use cpap machine    Spinal stenosis     Dr. Kelly Victor       Social History     Socioeconomic History    Marital status: SINGLE     Spouse name: Not on file    Number of children: Not on file    Years of education: Not on file    Highest education level: Not on file   Social Needs    Financial resource strain: Not on file    Food insecurity - worry: Not on file    Food insecurity - inability: Not on file    Transportation needs - medical: Not on file   [employer] needs - non-medical: Not on file   Occupational History    Occupation: disabled    Occupation: student   Tobacco Use    Smoking status: Never Smoker    Smokeless tobacco: Never Used   Substance and Sexual Activity    Alcohol use: No    Drug use: No    Sexual activity: No   Other Topics Concern    Not on file   Social History Narrative    Not on file       Family History   Problem Relation Age of Onset    Heart Disease Mother         CHF    Diabetes Mother     Hypertension Mother     Kidney Disease Mother     Breast Cancer Mother     Stroke Mother     Diabetes Father     Hypertension Father     Heart Attack Father         MI    Cancer Maternal Grandmother         stomach    Ovarian Cancer Maternal Aunt     Cancer Maternal Uncle        Past Surgical History:   Procedure Laterality Date    COLONOSCOPY N/A 2017    COLONOSCOPY performed by Cristóbal Ennis MD at [facility]    FEMUR/KNEE SURG UNLISTED Left     External fixator    HX  SECTION      HX COLONOSCOPY  2017    DR. MCRAE 2017     HX HYSTERECTOMY      HX KNEE ARTHROSCOPY Left     left knee    HX KNEE REPLACEMENT Bilateral     (R)  (L)     HX OTHER SURGICAL  2016    all teeth removed    HX SALPINGO-OOPHORECTOMY  2008    HX TUBAL LIGATION      MULTIPLE DELIVERY       1990    TOTAL ABDOM HYSTERECTOMY         Current Outpatient Medications   Medication Sig Dispense Refill    amLODIPine (NORVASC) 10 mg tablet take 1 tablet by mouth once daily 90 Tab 0    losartan (COZAAR) 100 mg tablet take 1 tablet by mouth once daily 90 Tab 0    metoprolol succinate (TOPROL-XL) 100 mg tablet take 1 tablet by mouth once daily 90 Tab 0    plecanatide (TRULANCE) 3 mg tab Take  by mouth.  simvastatin (ZOCOR) 20 mg tablet take 1 tablet by mouth at bedtime 90 Tab 3    OXcarbazepine (TRILEPTAL) 300 mg tablet Take 300 mg by mouth two (2) times a day.  0    LORazepam (ATIVAN) 0.5 mg tablet Take 0.5 mg by mouth two (2) times a day.  hydrOXYzine pamoate (VISTARIL) 50 mg capsule 2 Caps 2 Times Daily As Needed.  carisoprodol (SOMA) 350 mg tablet take 1 tablet by mouth three times a day and 1 tablet at bedtime  0    BLACK COHOSH PO Take 200 mg by mouth.  levocetirizine (XYZAL) 5 mg tablet       ziprasidone (GEODON) 40 mg capsule Take 40 mg by mouth daily.  melatonin 3 mg tablet Take 3 mg by mouth nightly.  montelukast (SINGULAIR) 10 mg tablet Take 10 mg by mouth nightly. 1    FIBER, PSYLLIUM HUSK, PO Take  by mouth daily as needed.  latanoprost (XALATAN) 0.005 % ophthalmic solution Administer 1 Drop to both eyes nightly. EKG: unchanged from previous tracings, normal sinus rhythm, nonspecific ST and T waves changes, left axis deviation. ASSESSMENT and PLAN  Encounter Diagnoses   Name Primary?  Abnormal EKG Yes    Essential hypertension     Hypercholesteremia    She has had no symptoms to indicate angina or cardiac decompensation.  She has no physical findings to indicate cardiac decompensation. She has had no symptoms to indicate tachyarrhythmia. She does have nonspecific ST changes on electrocardiogram which seem to be somewhat more pronounced compared to old electrocardiogram. Because of her risk factors for coronary artery disease, I would recommend stress nuclear cardiac imaging prior to surgery although on clinical grounds, there appears to be no contraindication for scheduled surgery.

## 2019-01-08 NOTE — PROGRESS NOTES
Joelle Harris presents today for   Chief Complaint   Patient presents with    Follow-up     Surgical clearance for pelvic mass resection with Dr. Nazia Law preferred language for health care discussion is english/other. Is someone accompanying this pt? No    Is the patient using any DME equipment during OV? No    Depression Screening:  PHQ over the last two weeks 1/4/2019   Little interest or pleasure in doing things Not at all   Feeling down, depressed, irritable, or hopeless Not at all   Total Score PHQ 2 0       Learning Assessment:  Learning Assessment 1/4/2019   PRIMARY LEARNER Patient   HIGHEST LEVEL OF EDUCATION - PRIMARY LEARNER  GRADUATED HIGH SCHOOL OR GED   BARRIERS PRIMARY LEARNER NONE   CO-LEARNER CAREGIVER No   PRIMARY LANGUAGE ENGLISH   LEARNER PREFERENCE PRIMARY READING     -   ANSWERED BY patient   RELATIONSHIP SELF       Abuse Screening:  Abuse Screening Questionnaire 1/4/2019   Do you ever feel afraid of your partner? N   Are you in a relationship with someone who physically or mentally threatens you? N   Is it safe for you to go home? Y       Fall Risk  Fall Risk Assessment, last 12 mths 2/9/2018   Able to walk? Yes   Fall in past 12 months? No       Pt currently taking Antiplatelet therapy? No  Coordination of Care:  1. Have you been to the ER, urgent care clinic since your last visit? Hospitalized since your last visit? No    2. Have you seen or consulted any other health care providers outside of the [address] [place] [address] since your last visit? Include any pap smears or colon screening.  No

## 2019-01-14 ENCOUNTER — TELEPHONE (OUTPATIENT)
Dept: ONCOLOGY | Age: 49
End: 2019-01-14

## 2019-01-14 NOTE — TELEPHONE ENCOUNTER
Patient called asking if she can drink orange juice or grape juice with her bowel prep. Explained that she cannot drink orange juice, however she can drink grape juice as long as it does not have a lot of sediment in it. Patient was satisfied with this and had no further questions or concerns, encouraged to call back if she develops any.

## 2019-01-16 ENCOUNTER — TELEPHONE (OUTPATIENT)
Dept: ONCOLOGY | Age: 49
End: 2019-01-16

## 2019-01-16 ENCOUNTER — ANESTHESIA EVENT (OUTPATIENT)
Dept: SURGERY | Age: 49
DRG: 746 | End: 2019-01-16
Payer: MEDICARE

## 2019-01-16 NOTE — TELEPHONE ENCOUNTER
Patient called asking if Miralax is a laxative or a stool softener, educated her that it is a stool softener. Explained that she needs to continue to drink fluids throughout the day. Patient expressed understanding and had no further questions or concerns, encouraged to call back if she develops any.

## 2019-01-16 NOTE — TELEPHONE ENCOUNTER
Patient called stating that she drank the Miralax solution at 0800 today. She states that \"it has worked a little bit,\" explaining that she has had 4-5 small to moderate bowel movements, \"sometimes it's liquid, other times it's regular\", last bowel movement was regular formed stool. She expresses concern that the bowel prep is not working efficiently. She would like to know if she should drink magnesium Citrate or perform a enema. Worried about drinking the magnesium citrate due to her IBS, afraid it will cause cramping. Per NP Jodie Don, patient was instructed to perform a fleet's enema. Patient was satisfied with this and had no further questions or concerns, encouraged to call back if she develops any.

## 2019-01-17 ENCOUNTER — ANESTHESIA (OUTPATIENT)
Dept: SURGERY | Age: 49
DRG: 746 | End: 2019-01-17
Payer: MEDICARE

## 2019-01-17 ENCOUNTER — HOSPITAL ENCOUNTER (INPATIENT)
Age: 49
LOS: 4 days | Discharge: HOME | DRG: 746 | End: 2019-01-21
Attending: OBSTETRICS & GYNECOLOGY | Admitting: OBSTETRICS & GYNECOLOGY
Payer: MEDICARE

## 2019-01-17 DIAGNOSIS — G89.18 POSTOPERATIVE PAIN: Primary | ICD-10-CM

## 2019-01-17 PROBLEM — R19.00 PELVIC MASS IN FEMALE: Status: ACTIVE | Noted: 2019-01-17

## 2019-01-17 LAB
ABO + RH BLD: NORMAL
BLOOD GROUP ANTIBODIES SERPL: NORMAL
SPECIMEN EXP DATE BLD: NORMAL

## 2019-01-17 PROCEDURE — 77030032490 HC SLV COMPR SCD KNE COVD -B: Performed by: OBSTETRICS & GYNECOLOGY

## 2019-01-17 PROCEDURE — 74011250637 HC RX REV CODE- 250/637: Performed by: OBSTETRICS & GYNECOLOGY

## 2019-01-17 PROCEDURE — 0UBG7ZX EXCISION OF VAGINA, VIA NATURAL OR ARTIFICIAL OPENING, DIAGNOSTIC: ICD-10-PCS | Performed by: OBSTETRICS & GYNECOLOGY

## 2019-01-17 PROCEDURE — 36415 COLL VENOUS BLD VENIPUNCTURE: CPT

## 2019-01-17 PROCEDURE — 77030034849: Performed by: OBSTETRICS & GYNECOLOGY

## 2019-01-17 PROCEDURE — 77030003503 HC NDL BIOP TISS BD -B: Performed by: OBSTETRICS & GYNECOLOGY

## 2019-01-17 PROCEDURE — 74011250636 HC RX REV CODE- 250/636: Performed by: OBSTETRICS & GYNECOLOGY

## 2019-01-17 PROCEDURE — 74011250637 HC RX REV CODE- 250/637: Performed by: ANESTHESIOLOGY

## 2019-01-17 PROCEDURE — 77030011278 HC ELECTRD LIG IMPT COVD -F: Performed by: OBSTETRICS & GYNECOLOGY

## 2019-01-17 PROCEDURE — 86900 BLOOD TYPING SEROLOGIC ABO: CPT

## 2019-01-17 PROCEDURE — 88331 PATH CONSLTJ SURG 1 BLK 1SPC: CPT

## 2019-01-17 PROCEDURE — 76010000131 HC OR TIME 2 TO 2.5 HR: Performed by: OBSTETRICS & GYNECOLOGY

## 2019-01-17 PROCEDURE — 74011250636 HC RX REV CODE- 250/636

## 2019-01-17 PROCEDURE — 88305 TISSUE EXAM BY PATHOLOGIST: CPT

## 2019-01-17 PROCEDURE — 74011000250 HC RX REV CODE- 250

## 2019-01-17 PROCEDURE — 76060000035 HC ANESTHESIA 2 TO 2.5 HR: Performed by: OBSTETRICS & GYNECOLOGY

## 2019-01-17 PROCEDURE — 0WBH0ZX EXCISION OF RETROPERITONEUM, OPEN APPROACH, DIAGNOSTIC: ICD-10-PCS | Performed by: OBSTETRICS & GYNECOLOGY

## 2019-01-17 PROCEDURE — 74011250636 HC RX REV CODE- 250/636: Performed by: ANESTHESIOLOGY

## 2019-01-17 PROCEDURE — 0WBH0ZZ EXCISION OF RETROPERITONEUM, OPEN APPROACH: ICD-10-PCS | Performed by: OBSTETRICS & GYNECOLOGY

## 2019-01-17 PROCEDURE — 74011000250 HC RX REV CODE- 250: Performed by: OBSTETRICS & GYNECOLOGY

## 2019-01-17 PROCEDURE — 65270000029 HC RM PRIVATE

## 2019-01-17 PROCEDURE — 77030020782 HC GWN BAIR PAWS FLX 3M -B: Performed by: OBSTETRICS & GYNECOLOGY

## 2019-01-17 PROCEDURE — 77030031139 HC SUT VCRL2 J&J -A: Performed by: OBSTETRICS & GYNECOLOGY

## 2019-01-17 PROCEDURE — 77030011264 HC ELECTRD BLD EXT COVD -A: Performed by: OBSTETRICS & GYNECOLOGY

## 2019-01-17 PROCEDURE — 77030018832 HC SOL IRR H20 ICUM -A: Performed by: OBSTETRICS & GYNECOLOGY

## 2019-01-17 PROCEDURE — 77030002966 HC SUT PDS J&J -A: Performed by: OBSTETRICS & GYNECOLOGY

## 2019-01-17 PROCEDURE — 76210000006 HC OR PH I REC 0.5 TO 1 HR: Performed by: OBSTETRICS & GYNECOLOGY

## 2019-01-17 PROCEDURE — 77030018836 HC SOL IRR NACL ICUM -A: Performed by: OBSTETRICS & GYNECOLOGY

## 2019-01-17 PROCEDURE — C1765 ADHESION BARRIER: HCPCS | Performed by: OBSTETRICS & GYNECOLOGY

## 2019-01-17 RX ORDER — FENTANYL CITRATE 50 UG/ML
25 INJECTION, SOLUTION INTRAMUSCULAR; INTRAVENOUS AS NEEDED
Status: DISCONTINUED | OUTPATIENT
Start: 2019-01-17 | End: 2019-01-17 | Stop reason: HOSPADM

## 2019-01-17 RX ORDER — HYDROMORPHONE HYDROCHLORIDE 2 MG/ML
INJECTION, SOLUTION INTRAMUSCULAR; INTRAVENOUS; SUBCUTANEOUS AS NEEDED
Status: DISCONTINUED | OUTPATIENT
Start: 2019-01-17 | End: 2019-01-17 | Stop reason: HOSPADM

## 2019-01-17 RX ORDER — CEFAZOLIN SODIUM 2 G/50ML
2 SOLUTION INTRAVENOUS EVERY 8 HOURS
Status: COMPLETED | OUTPATIENT
Start: 2019-01-17 | End: 2019-01-18

## 2019-01-17 RX ORDER — HYDROMORPHONE HYDROCHLORIDE 2 MG/ML
0.5 INJECTION, SOLUTION INTRAMUSCULAR; INTRAVENOUS; SUBCUTANEOUS
Status: DISCONTINUED | OUTPATIENT
Start: 2019-01-17 | End: 2019-01-17 | Stop reason: HOSPADM

## 2019-01-17 RX ORDER — LORAZEPAM 0.5 MG/1
0.5 TABLET ORAL
Status: DISCONTINUED | OUTPATIENT
Start: 2019-01-17 | End: 2019-01-21 | Stop reason: HOSPADM

## 2019-01-17 RX ORDER — FLUMAZENIL 0.1 MG/ML
0.2 INJECTION INTRAVENOUS
Status: DISCONTINUED | OUTPATIENT
Start: 2019-01-17 | End: 2019-01-17 | Stop reason: HOSPADM

## 2019-01-17 RX ORDER — SODIUM CHLORIDE 0.9 % (FLUSH) 0.9 %
5-40 SYRINGE (ML) INJECTION AS NEEDED
Status: DISCONTINUED | OUTPATIENT
Start: 2019-01-17 | End: 2019-01-21 | Stop reason: HOSPADM

## 2019-01-17 RX ORDER — GLYCOPYRROLATE 0.2 MG/ML
INJECTION INTRAMUSCULAR; INTRAVENOUS AS NEEDED
Status: DISCONTINUED | OUTPATIENT
Start: 2019-01-17 | End: 2019-01-17 | Stop reason: HOSPADM

## 2019-01-17 RX ORDER — DEXAMETHASONE SODIUM PHOSPHATE 4 MG/ML
INJECTION, SOLUTION INTRA-ARTICULAR; INTRALESIONAL; INTRAMUSCULAR; INTRAVENOUS; SOFT TISSUE AS NEEDED
Status: DISCONTINUED | OUTPATIENT
Start: 2019-01-17 | End: 2019-01-17 | Stop reason: HOSPADM

## 2019-01-17 RX ORDER — ONDANSETRON 2 MG/ML
INJECTION INTRAMUSCULAR; INTRAVENOUS AS NEEDED
Status: DISCONTINUED | OUTPATIENT
Start: 2019-01-17 | End: 2019-01-17 | Stop reason: HOSPADM

## 2019-01-17 RX ORDER — PROPOFOL 10 MG/ML
INJECTION, EMULSION INTRAVENOUS AS NEEDED
Status: DISCONTINUED | OUTPATIENT
Start: 2019-01-17 | End: 2019-01-17 | Stop reason: HOSPADM

## 2019-01-17 RX ORDER — LATANOPROST 50 UG/ML
1 SOLUTION/ DROPS OPHTHALMIC
Status: DISCONTINUED | OUTPATIENT
Start: 2019-01-17 | End: 2019-01-21 | Stop reason: HOSPADM

## 2019-01-17 RX ORDER — ROCURONIUM BROMIDE 10 MG/ML
INJECTION, SOLUTION INTRAVENOUS AS NEEDED
Status: DISCONTINUED | OUTPATIENT
Start: 2019-01-17 | End: 2019-01-17 | Stop reason: HOSPADM

## 2019-01-17 RX ORDER — SODIUM CHLORIDE 0.9 % (FLUSH) 0.9 %
5-40 SYRINGE (ML) INJECTION EVERY 8 HOURS
Status: DISCONTINUED | OUTPATIENT
Start: 2019-01-17 | End: 2019-01-21 | Stop reason: HOSPADM

## 2019-01-17 RX ORDER — DEXTROSE 50 % IN WATER (D50W) INTRAVENOUS SYRINGE
25-50 AS NEEDED
Status: DISCONTINUED | OUTPATIENT
Start: 2019-01-17 | End: 2019-01-17 | Stop reason: HOSPADM

## 2019-01-17 RX ORDER — OXYCODONE AND ACETAMINOPHEN 5; 325 MG/1; MG/1
1 TABLET ORAL AS NEEDED
Status: DISCONTINUED | OUTPATIENT
Start: 2019-01-17 | End: 2019-01-17 | Stop reason: HOSPADM

## 2019-01-17 RX ORDER — HYDROMORPHONE HYDROCHLORIDE 1 MG/ML
1 INJECTION, SOLUTION INTRAMUSCULAR; INTRAVENOUS; SUBCUTANEOUS
Status: DISCONTINUED | OUTPATIENT
Start: 2019-01-17 | End: 2019-01-20

## 2019-01-17 RX ORDER — CEFAZOLIN SODIUM 2 G/50ML
2 SOLUTION INTRAVENOUS ONCE
Status: COMPLETED | OUTPATIENT
Start: 2019-01-17 | End: 2019-01-17

## 2019-01-17 RX ORDER — ENOXAPARIN SODIUM 100 MG/ML
40 INJECTION SUBCUTANEOUS EVERY 24 HOURS
Status: DISCONTINUED | OUTPATIENT
Start: 2019-01-18 | End: 2019-01-18

## 2019-01-17 RX ORDER — LIDOCAINE HYDROCHLORIDE 20 MG/ML
INJECTION, SOLUTION EPIDURAL; INFILTRATION; INTRACAUDAL; PERINEURAL AS NEEDED
Status: DISCONTINUED | OUTPATIENT
Start: 2019-01-17 | End: 2019-01-17 | Stop reason: HOSPADM

## 2019-01-17 RX ORDER — NALOXONE HYDROCHLORIDE 0.4 MG/ML
0.1 INJECTION, SOLUTION INTRAMUSCULAR; INTRAVENOUS; SUBCUTANEOUS
Status: DISCONTINUED | OUTPATIENT
Start: 2019-01-17 | End: 2019-01-17 | Stop reason: HOSPADM

## 2019-01-17 RX ORDER — LOSARTAN POTASSIUM 50 MG/1
100 TABLET ORAL DAILY
Status: DISCONTINUED | OUTPATIENT
Start: 2019-01-18 | End: 2019-01-21 | Stop reason: HOSPADM

## 2019-01-17 RX ORDER — ONDANSETRON 2 MG/ML
4 INJECTION INTRAMUSCULAR; INTRAVENOUS ONCE
Status: DISCONTINUED | OUTPATIENT
Start: 2019-01-17 | End: 2019-01-17 | Stop reason: HOSPADM

## 2019-01-17 RX ORDER — HEPARIN SODIUM 5000 [USP'U]/ML
5000 INJECTION, SOLUTION INTRAVENOUS; SUBCUTANEOUS ONCE
Status: COMPLETED | OUTPATIENT
Start: 2019-01-17 | End: 2019-01-17

## 2019-01-17 RX ORDER — SCOLOPAMINE TRANSDERMAL SYSTEM 1 MG/1
1 PATCH, EXTENDED RELEASE TRANSDERMAL
Status: DISCONTINUED | OUTPATIENT
Start: 2019-01-17 | End: 2019-01-17

## 2019-01-17 RX ORDER — OXCARBAZEPINE 150 MG/1
300 TABLET, FILM COATED ORAL 2 TIMES DAILY
Status: DISCONTINUED | OUTPATIENT
Start: 2019-01-17 | End: 2019-01-21 | Stop reason: HOSPADM

## 2019-01-17 RX ORDER — EPHEDRINE SULFATE/0.9% NACL/PF 25 MG/5 ML
SYRINGE (ML) INTRAVENOUS AS NEEDED
Status: DISCONTINUED | OUTPATIENT
Start: 2019-01-17 | End: 2019-01-17 | Stop reason: HOSPADM

## 2019-01-17 RX ORDER — FENTANYL CITRATE 50 UG/ML
INJECTION, SOLUTION INTRAMUSCULAR; INTRAVENOUS AS NEEDED
Status: DISCONTINUED | OUTPATIENT
Start: 2019-01-17 | End: 2019-01-17 | Stop reason: HOSPADM

## 2019-01-17 RX ORDER — METOPROLOL SUCCINATE 100 MG/1
100 TABLET, EXTENDED RELEASE ORAL DAILY
Status: DISCONTINUED | OUTPATIENT
Start: 2019-01-18 | End: 2019-01-21 | Stop reason: HOSPADM

## 2019-01-17 RX ORDER — DIPHENHYDRAMINE HYDROCHLORIDE 50 MG/ML
12.5 INJECTION, SOLUTION INTRAMUSCULAR; INTRAVENOUS
Status: DISCONTINUED | OUTPATIENT
Start: 2019-01-17 | End: 2019-01-17 | Stop reason: HOSPADM

## 2019-01-17 RX ORDER — SODIUM CHLORIDE, SODIUM LACTATE, POTASSIUM CHLORIDE, CALCIUM CHLORIDE 600; 310; 30; 20 MG/100ML; MG/100ML; MG/100ML; MG/100ML
50 INJECTION, SOLUTION INTRAVENOUS CONTINUOUS
Status: DISCONTINUED | OUTPATIENT
Start: 2019-01-17 | End: 2019-01-17 | Stop reason: HOSPADM

## 2019-01-17 RX ORDER — SIMVASTATIN 40 MG/1
40 TABLET, FILM COATED ORAL
Status: DISCONTINUED | OUTPATIENT
Start: 2019-01-17 | End: 2019-01-21 | Stop reason: HOSPADM

## 2019-01-17 RX ORDER — NALOXONE HYDROCHLORIDE 0.4 MG/ML
0.2 INJECTION, SOLUTION INTRAMUSCULAR; INTRAVENOUS; SUBCUTANEOUS AS NEEDED
Status: DISCONTINUED | OUTPATIENT
Start: 2019-01-17 | End: 2019-01-17 | Stop reason: HOSPADM

## 2019-01-17 RX ORDER — SODIUM CHLORIDE, SODIUM LACTATE, POTASSIUM CHLORIDE, CALCIUM CHLORIDE 600; 310; 30; 20 MG/100ML; MG/100ML; MG/100ML; MG/100ML
125 INJECTION, SOLUTION INTRAVENOUS CONTINUOUS
Status: DISCONTINUED | OUTPATIENT
Start: 2019-01-17 | End: 2019-01-17

## 2019-01-17 RX ORDER — SODIUM CHLORIDE, SODIUM LACTATE, POTASSIUM CHLORIDE, CALCIUM CHLORIDE 600; 310; 30; 20 MG/100ML; MG/100ML; MG/100ML; MG/100ML
1000 INJECTION, SOLUTION INTRAVENOUS CONTINUOUS
Status: DISCONTINUED | OUTPATIENT
Start: 2019-01-17 | End: 2019-01-17 | Stop reason: HOSPADM

## 2019-01-17 RX ORDER — MAGNESIUM SULFATE 100 %
4 CRYSTALS MISCELLANEOUS AS NEEDED
Status: DISCONTINUED | OUTPATIENT
Start: 2019-01-17 | End: 2019-01-17 | Stop reason: HOSPADM

## 2019-01-17 RX ORDER — FENTANYL CITRATE 50 UG/ML
25 INJECTION, SOLUTION INTRAMUSCULAR; INTRAVENOUS
Status: ACTIVE | OUTPATIENT
Start: 2019-01-17 | End: 2019-01-17

## 2019-01-17 RX ORDER — ALBUTEROL SULFATE 0.83 MG/ML
2.5 SOLUTION RESPIRATORY (INHALATION) AS NEEDED
Status: DISCONTINUED | OUTPATIENT
Start: 2019-01-17 | End: 2019-01-17 | Stop reason: HOSPADM

## 2019-01-17 RX ORDER — MONTELUKAST SODIUM 10 MG/1
10 TABLET ORAL
Status: DISCONTINUED | OUTPATIENT
Start: 2019-01-17 | End: 2019-01-21 | Stop reason: HOSPADM

## 2019-01-17 RX ORDER — MIDAZOLAM HYDROCHLORIDE 1 MG/ML
INJECTION, SOLUTION INTRAMUSCULAR; INTRAVENOUS AS NEEDED
Status: DISCONTINUED | OUTPATIENT
Start: 2019-01-17 | End: 2019-01-17 | Stop reason: HOSPADM

## 2019-01-17 RX ORDER — INSULIN LISPRO 100 [IU]/ML
INJECTION, SOLUTION INTRAVENOUS; SUBCUTANEOUS ONCE
Status: DISCONTINUED | OUTPATIENT
Start: 2019-01-17 | End: 2019-01-17 | Stop reason: HOSPADM

## 2019-01-17 RX ORDER — AMLODIPINE BESYLATE 5 MG/1
10 TABLET ORAL DAILY
Status: DISCONTINUED | OUTPATIENT
Start: 2019-01-18 | End: 2019-01-21 | Stop reason: HOSPADM

## 2019-01-17 RX ADMIN — SIMVASTATIN 40 MG: 40 TABLET, FILM COATED ORAL at 21:39

## 2019-01-17 RX ADMIN — FENTANYL CITRATE 100 MCG: 50 INJECTION, SOLUTION INTRAMUSCULAR; INTRAVENOUS at 13:49

## 2019-01-17 RX ADMIN — HYDROMORPHONE HYDROCHLORIDE 1 MG: 1 INJECTION, SOLUTION INTRAMUSCULAR; INTRAVENOUS; SUBCUTANEOUS at 22:23

## 2019-01-17 RX ADMIN — MONTELUKAST SODIUM 10 MG: 10 TABLET, FILM COATED ORAL at 21:39

## 2019-01-17 RX ADMIN — Medication 10 ML: at 17:56

## 2019-01-17 RX ADMIN — SODIUM CHLORIDE, SODIUM LACTATE, POTASSIUM CHLORIDE, AND CALCIUM CHLORIDE 50 ML/HR: 600; 310; 30; 20 INJECTION, SOLUTION INTRAVENOUS at 17:33

## 2019-01-17 RX ADMIN — ROCURONIUM BROMIDE 50 MG: 10 INJECTION, SOLUTION INTRAVENOUS at 13:49

## 2019-01-17 RX ADMIN — HYDROMORPHONE HYDROCHLORIDE: 10 INJECTION, SOLUTION INTRAMUSCULAR; INTRAVENOUS; SUBCUTANEOUS at 18:51

## 2019-01-17 RX ADMIN — SODIUM CHLORIDE, SODIUM LACTATE, POTASSIUM CHLORIDE, AND CALCIUM CHLORIDE: 600; 310; 30; 20 INJECTION, SOLUTION INTRAVENOUS at 14:12

## 2019-01-17 RX ADMIN — MIDAZOLAM HYDROCHLORIDE 2 MG: 1 INJECTION, SOLUTION INTRAMUSCULAR; INTRAVENOUS at 13:42

## 2019-01-17 RX ADMIN — ROCURONIUM BROMIDE 10 MG: 10 INJECTION, SOLUTION INTRAVENOUS at 15:28

## 2019-01-17 RX ADMIN — OXCARBAZEPINE 300 MG: 150 TABLET ORAL at 21:39

## 2019-01-17 RX ADMIN — ROCURONIUM BROMIDE 10 MG: 10 INJECTION, SOLUTION INTRAVENOUS at 14:29

## 2019-01-17 RX ADMIN — LATANOPROST 1 DROP: 50 SOLUTION OPHTHALMIC at 21:39

## 2019-01-17 RX ADMIN — DEXAMETHASONE SODIUM PHOSPHATE 4 MG: 4 INJECTION, SOLUTION INTRA-ARTICULAR; INTRALESIONAL; INTRAMUSCULAR; INTRAVENOUS; SOFT TISSUE at 14:12

## 2019-01-17 RX ADMIN — LIDOCAINE HYDROCHLORIDE 100 MG: 20 INJECTION, SOLUTION EPIDURAL; INFILTRATION; INTRACAUDAL; PERINEURAL at 13:49

## 2019-01-17 RX ADMIN — ONDANSETRON 4 MG: 2 INJECTION INTRAMUSCULAR; INTRAVENOUS at 14:12

## 2019-01-17 RX ADMIN — HEPARIN SODIUM 5000 UNITS: 5000 INJECTION INTRAVENOUS; SUBCUTANEOUS at 12:28

## 2019-01-17 RX ADMIN — SCOLOPAMINE TRANSDERMAL SYSTEM 1 PATCH: 1 PATCH, EXTENDED RELEASE TRANSDERMAL at 14:13

## 2019-01-17 RX ADMIN — PROPOFOL 200 MG: 10 INJECTION, EMULSION INTRAVENOUS at 13:49

## 2019-01-17 RX ADMIN — CEFAZOLIN SODIUM 2 G: 2 SOLUTION INTRAVENOUS at 13:58

## 2019-01-17 RX ADMIN — CEFAZOLIN SODIUM 2 G: 2 SOLUTION INTRAVENOUS at 21:38

## 2019-01-17 RX ADMIN — SODIUM CHLORIDE, SODIUM LACTATE, POTASSIUM CHLORIDE, AND CALCIUM CHLORIDE 125 ML/HR: 600; 310; 30; 20 INJECTION, SOLUTION INTRAVENOUS at 12:21

## 2019-01-17 RX ADMIN — FENTANYL CITRATE 50 MCG: 50 INJECTION, SOLUTION INTRAMUSCULAR; INTRAVENOUS at 14:24

## 2019-01-17 RX ADMIN — LORAZEPAM 0.5 MG: 0.5 TABLET ORAL at 21:39

## 2019-01-17 RX ADMIN — ROCURONIUM BROMIDE 10 MG: 10 INJECTION, SOLUTION INTRAVENOUS at 14:17

## 2019-01-17 RX ADMIN — FENTANYL CITRATE 50 MCG: 50 INJECTION, SOLUTION INTRAMUSCULAR; INTRAVENOUS at 14:02

## 2019-01-17 RX ADMIN — Medication 10 MG: at 14:11

## 2019-01-17 RX ADMIN — GLYCOPYRROLATE 0.2 MG: 0.2 INJECTION INTRAMUSCULAR; INTRAVENOUS at 14:08

## 2019-01-17 RX ADMIN — SODIUM CHLORIDE, SODIUM LACTATE, POTASSIUM CHLORIDE, AND CALCIUM CHLORIDE: 600; 310; 30; 20 INJECTION, SOLUTION INTRAVENOUS at 14:59

## 2019-01-17 RX ADMIN — HYDROMORPHONE HYDROCHLORIDE 0.5 MG: 2 INJECTION, SOLUTION INTRAMUSCULAR; INTRAVENOUS; SUBCUTANEOUS at 15:37

## 2019-01-17 NOTE — ROUTINE PROCESS
Called security for pt belongings. [facility] PACU regarding PCA setup, states will be bringing up PCA since it was not setup downstairs.

## 2019-01-17 NOTE — OP NOTES
[address] REPORT    Name:Alfredo JEFFERSON  MR#: [id number]  : 1970  ACCOUNT #: [de-identified]   DATE OF SERVICE: 2019    PREOPERATIVE DIAGNOSES:  Pelvic mass, vaginal bleeding. POSTOPERATIVE DIAGNOSIS:  Pelvic mass. PROCEDURES PERFORMED:  Exploratory laparotomy, exploration of retroperitoneal spaces, exam under anesthesia with biopsy of rectovaginal mass. SURGEON:  Saravanan Brown DO    ASSISTANT:  Johnson Orlando    ANESTHESIA:  General.    ESTIMATED BLOOD LOSS:  200 mL. SPECIMENS REMOVED:  Rectovaginal mass and pelvic mass. COMPLICATIONS:  None. IMPLANTS:  none     FLUIDS:  2600 mL crystalloid. URINE OUTPUT:  100 mL clear urine. FINDINGS:  Upon laparotomy she had a 4-5 cm firm mass in the rectovaginal space deep in the pelvis, palpable from the right paravesical space, which felt quite fixed to the sacrum posteriorly. This mass was also palpable vaginally and rectally at approximately 2 cm from the introitus. Otherwise, all peritoneal surfaces are smooth. The omentum was normal.  She did have some adhesion of the rectosigmoid colon over the pelvis. INDICATION:  The patient is a 50-year-old who initially was seen by GYN for vaginal bleeding. The patient has a history of hysterectomy with BSO for endometriosis in . An ultrasound was ordered which revealed a 6.8 x 5.2 x 4.6 cm mass seen at the vaginal cuff, which prompted a pelvic CT finding a 7.6 x 5.8 x 7.2 cm pelvic compatible with pelvic neoplasm versus endometrioma. Tumor markers were done, which were normal.  She presents today for definitive surgical management. DESCRIPTION OF PROCEDURE:  The patient was taken to the operating room where general anesthesia was obtained without difficulty. She was placed in dorsal lithotomy position, prepped and draped in normal sterile fashion. A surgical timeout was taken with the entire surgical team.  A White catheter was placed.   At this point, a midline laparotomy was made from the pubic symphysis up to 3 cm above her umbilical remnant. This was carried down to underlying fascia with the Bovie. The fascia was then incised. This incision was extended superior and inferiorly. Rectus muscles were  in the midline. The peritoneum was identified and entered sharply with Metzenbaum scissors. The peritoneal incision was extended superior and inferiorly. At this point, the Bookwalter retractor was placed. Some careful blunt dissection allowed us to mobilize the rectosigmoid colon out of the pelvis. There was some bleeding noted from the rectosigmoid mesentery which was addressed using a 3-0 Vicryl and 0 Vicryl figure-of-8 stitch which offered better hemostasis. The bowel was packed away with moist laparotomy sponges. At this point, the mass was palpable very deep in the pelvis, more on the right side. Therefore, attention was turned to the right pelvic sidewall where the perineum and the psoas muscle was elevated and incised. The retroperitoneal space was opened. The ureter was identified. Ureterolysis was performed in the right paravesical space was developed. Once we were able to enter the right perivesical space this mass was palpable very, very deep in the pelvis. Initial attempt at blunt dissection felt this mass was quite fixed posteriorly at the sacrum. Attention was then turned to the left side where the peritoneum and the psoas muscle was incised and the retroperitoneal space was entered. This peritoneal incision was then extended superiorly and inferiorly. The retroperitoneal space was developed. The left paravesical space was developed, and this mass was also palpable on the left side, although not as clearly. At this point, given history of her prior oophorectomies and location this was not thought to be a GYN malignancy and also given location unresectable.   Therefore, the decision was made to proceed vaginally to try to obtain tissue. Vaginal retractors were placed. The posterior vaginal mucosa was opened which allowed us to visualize the mass. A mPortal biopsy instrument was used to obtain multiple biopsies sent for frozen section, consistent with carcinoma. Further biopsies were taken to be sent for pathology. Naveen-Cut biopsies were also taken. The vaginal mucosa was reapproximated with 0 Vicryl interrupted stitches which offered good hemostasis. Exam more cephalad in the vagina revealed that the mass had penetrated somewhat through the vaginal mucosa. At this point, attention was turned back to the abdomen where the abdomen and pelvis was irrigated copiously. There was good hemostasis. Seprafilm was placed over the pelvic floor as well as in the right retroperitoneal space. At this point, all laparotomy and sponges were removed and Bookwalter retractor was removed. The fascial incision was reapproximated with a #1 PDS in a modified Smead-Miller fashion. The was subcutaneous was irrigated and reapproximated with 3-0 Vicryl running stitch, and the skin was closed with staples. The patient tolerated the procedure well. Sponge, needle and instrument counts correct x2 and the patient taken to recovery in stable condition.       Gail Lucas DO CM / Espinoza Monteiro  D: 01/17/2019 16:25     T: 01/17/2019 17:14  JOB #: [id number]

## 2019-01-17 NOTE — ANESTHESIA POSTPROCEDURE EVALUATION
Procedure(s): EXPLORATORY LAPAROTOMY, RESECTION OF PELVIC MASS, POSSIBLE STAGING. Anesthesia Post Evaluation Comments: Post-Anesthesia Evaluation and Assessment Cardiovascular Function/Vital Signs /71   Pulse 66   Temp 37.1 °C (98.7 °F)   Resp 23   Ht 5' 3\" (1.6 m)   Wt 110.9 kg (244 lb 8 oz)   SpO2 97%   BMI 43.31 kg/m² Patient is status post Procedure(s): EXPLORATORY LAPAROTOMY, RESECTION OF PELVIC MASS, POSSIBLE STAGING. Nausea/Vomiting: Controlled. Postoperative hydration reviewed and adequate. Pain: 
Pain Scale 1: Numeric (0 - 10) (01/17/19 1628) Pain Intensity 1: 0 (01/17/19 1628) Managed. Neurological Status:  
Neuro (WDL): Exceptions to WDL (01/17/19 1628) At baseline. Mental Status and Level of Consciousness: Arousable. Pulmonary Status:  
O2 Device: Nasal cannula (01/17/19 1628) Adequate oxygenation and airway patent. Complications related to anesthesia: None Post-anesthesia assessment completed. No concerns. Patient has met all discharge requirements. Signed By: Chet Bosworth, MD  
 January 17, 2019 Post-Anesthesia Evaluation and Assessment Cardiovascular Function/Vital Signs /72   Pulse 61   Temp 37.1 °C (98.7 °F)   Resp 22   Ht 5' 3\" (1.6 m)   Wt 110.9 kg (244 lb 8 oz)   SpO2 95%   BMI 43.31 kg/m² Patient is status post Procedure(s): EXPLORATORY LAPAROTOMY, RESECTION OF PELVIC MASS, POSSIBLE STAGING. Nausea/Vomiting: Controlled. Postoperative hydration reviewed and adequate. Pain: 
Pain Scale 1: Visual (01/17/19 1604) Pain Intensity 1: 0 (01/17/19 1604) Managed. Neurological Status:  
Neuro (WDL): Exceptions to WDL (01/17/19 1604) At baseline. Mental Status and Level of Consciousness: Arousable. Pulmonary Status:  
O2 Device: Nasal cannula (01/17/19 1620) Adequate oxygenation and airway patent. Complications related to anesthesia: None Post-anesthesia assessment completed. No concerns. Patient has met all discharge requirements. Signed By: Martir Martinez MD  
 January 17, 2019 Visit Vitals /72 Pulse 61 Temp 37.1 °C (98.7 °F) Resp 22 Ht 5' 3\" (1.6 m) Wt 110.9 kg (244 lb 8 oz) SpO2 97% BMI 43.31 kg/m²

## 2019-01-17 NOTE — PERIOP NOTES
TRANSFER - OUT REPORT:    Verbal report given to THE [facility] RN(name) on Khadar Plata  being transferred to [address] [place], [place] [address]unit) for routine post - op       Report consisted of patients Situation, Background, Assessment and   Recommendations(SBAR). Information from the following report(s) OR Summary, Intake/Output and MAR was reviewed with the receiving nurse. Lines:   Peripheral IV 01/17/19 Left Hand (Active)   Site Assessment Clean, dry, & intact 1/17/2019 12:20 PM   Phlebitis Assessment 0 1/17/2019 12:20 PM   Infiltration Assessment 0 1/17/2019 12:20 PM   Dressing Status Clean, dry, & intact 1/17/2019 12:20 PM   Dressing Type Transparent;Tape 1/17/2019 12:20 PM   Hub Color/Line Status Green;Patent; Infusing 1/17/2019 12:20 PM   Alcohol Cap Used No 1/17/2019 12:20 PM        Opportunity for questions and clarification was provided.       Patient transported with:   Registered Nurse  Tech

## 2019-01-17 NOTE — ANESTHESIA PREPROCEDURE EVALUATION
Anesthetic History PONV Review of Systems / Medical History Patient summary reviewed, nursing notes reviewed and pertinent labs reviewed Pulmonary Sleep apnea Neuro/Psych  
 
seizures Cardiovascular Hypertension GI/Hepatic/Renal 
  
GERD Endo/Other Morbid obesity and arthritis Other Findings Physical Exam 
 
Airway Mallampati: II 
TM Distance: 4 - 6 cm Neck ROM: normal range of motion Mouth opening: Normal 
 
 Cardiovascular Regular rate and rhythm,  S1 and S2 normal,  no murmur, click, rub, or gallop Dental 
 
Dentition: Edentulous Pulmonary Breath sounds clear to auscultation Abdominal 
GI exam deferred Other Findings Anesthetic Plan ASA: 3 Anesthesia type: general 
 
 
 
 
Induction: Intravenous Anesthetic plan and risks discussed with: Patient

## 2019-01-17 NOTE — PROGRESS NOTES
Transferred Pt to Chnao Lutz RN at bedside. Dressing CDI. Left pt stable. Dual skin assessment complete     01/17/19 1747   Vital Signs   Temp 97.5 °F (36.4 °C)   Temp Source Oral   Pulse (Heart Rate) 70   Resp Rate 18   /77   Oxygen Therapy   O2 Sat (%) 98 %   O2 Device Nasal cannula   O2 Flow Rate (L/min) 2 l/min

## 2019-01-17 NOTE — INTERVAL H&P NOTE
H&P Update:  Nancy Banks was seen and examined. History and physical has been reviewed. The patient has been examined.  There have been no significant clinical changes since the completion of the originally dated History and Physical.    Signed By: Rodena Cogan, MD     January 17, 2019 1:15 PM

## 2019-01-17 NOTE — BRIEF OP NOTE
BRIEF OPERATIVE NOTE    Date of Procedure: 1/17/2019   Preoperative Diagnosis: PELVIC  MASS  Postoperative Diagnosis: PELVIC  MASS    Procedure(s):  EXPLORATORY LAPAROTOMY, retroperitoneal exploartion, biopsy of rectovaginal mass  Surgeon(s) and Role:     * Neo Leal MD - Primary         Surgical Assistant: Melly De Jesus    Surgical Staff:  Circ-1: Nohemy Li RN  Scrub Tech-1: Mikey Rodriguez  Surg Asst-1: Tali PEREZ  Event Time In Time Out   Incision Start 1405    Incision Close 1555      Anesthesia: General   Estimated Blood Loss: 200  Specimens:   ID Type Source Tests Collected by Time Destination   1 : RECTO VAGINAL MASS Frozen Section   Neo Leal MD 1/17/2019 1446 Pathology   2 : PELVIC MASS BIOPSY Preservative   Neo Leal MD 1/17/2019 1458 Pathology   3 : RECTO VAGINAL MASS #2 Preservative   Neo Leal MD 1/17/2019 1521 Pathology      Findings: adhesions of rectosigmoid colon to pelvis.  4-5 cm firm mass in rectovaginal space at about 2 cm from introitus   Complications: none  Implants: * No implants in log *

## 2019-01-18 LAB
ANION GAP SERPL CALC-SCNC: 10 MMOL/L (ref 3–18)
BASOPHILS # BLD: 0 K/UL (ref 0–0.1)
BASOPHILS NFR BLD: 0 % (ref 0–2)
BUN SERPL-MCNC: 17 MG/DL (ref 7–18)
BUN/CREAT SERPL: 10 (ref 12–20)
CALCIUM SERPL-MCNC: 8.4 MG/DL (ref 8.5–10.1)
CHLORIDE SERPL-SCNC: 106 MMOL/L (ref 100–108)
CO2 SERPL-SCNC: 24 MMOL/L (ref 21–32)
CREAT SERPL-MCNC: 1.72 MG/DL (ref 0.6–1.3)
DIFFERENTIAL METHOD BLD: ABNORMAL
EOSINOPHIL # BLD: 0 K/UL (ref 0–0.4)
EOSINOPHIL NFR BLD: 0 % (ref 0–5)
ERYTHROCYTE [DISTWIDTH] IN BLOOD BY AUTOMATED COUNT: 13.9 % (ref 11.6–14.5)
GLUCOSE SERPL-MCNC: 147 MG/DL (ref 74–99)
HCT VFR BLD AUTO: 38.8 % (ref 35–45)
HGB BLD-MCNC: 12.4 G/DL (ref 12–16)
LYMPHOCYTES # BLD: 0.7 K/UL (ref 0.9–3.6)
LYMPHOCYTES NFR BLD: 7 % (ref 21–52)
MCH RBC QN AUTO: 28.6 PG (ref 24–34)
MCHC RBC AUTO-ENTMCNC: 32 G/DL (ref 31–37)
MCV RBC AUTO: 89.6 FL (ref 74–97)
MONOCYTES # BLD: 0.7 K/UL (ref 0.05–1.2)
MONOCYTES NFR BLD: 7 % (ref 3–10)
NEUTS SEG # BLD: 8.5 K/UL (ref 1.8–8)
NEUTS SEG NFR BLD: 86 % (ref 40–73)
PLATELET # BLD AUTO: 185 K/UL (ref 135–420)
PMV BLD AUTO: 8.1 FL (ref 9.2–11.8)
POTASSIUM SERPL-SCNC: 4.4 MMOL/L (ref 3.5–5.5)
RBC # BLD AUTO: 4.33 M/UL (ref 4.2–5.3)
SODIUM SERPL-SCNC: 140 MMOL/L (ref 136–145)
WBC # BLD AUTO: 9.9 K/UL (ref 4.6–13.2)

## 2019-01-18 PROCEDURE — 80048 BASIC METABOLIC PNL TOTAL CA: CPT

## 2019-01-18 PROCEDURE — 85025 COMPLETE CBC W/AUTO DIFF WBC: CPT

## 2019-01-18 PROCEDURE — 74011250636 HC RX REV CODE- 250/636: Performed by: OBSTETRICS & GYNECOLOGY

## 2019-01-18 PROCEDURE — 36415 COLL VENOUS BLD VENIPUNCTURE: CPT

## 2019-01-18 PROCEDURE — 65270000029 HC RM PRIVATE

## 2019-01-18 PROCEDURE — 74011250637 HC RX REV CODE- 250/637: Performed by: OBSTETRICS & GYNECOLOGY

## 2019-01-18 RX ORDER — SODIUM CHLORIDE, SODIUM LACTATE, POTASSIUM CHLORIDE, CALCIUM CHLORIDE 600; 310; 30; 20 MG/100ML; MG/100ML; MG/100ML; MG/100ML
125 INJECTION, SOLUTION INTRAVENOUS CONTINUOUS
Status: DISCONTINUED | OUTPATIENT
Start: 2019-01-18 | End: 2019-01-21 | Stop reason: HOSPADM

## 2019-01-18 RX ADMIN — SODIUM CHLORIDE, SODIUM LACTATE, POTASSIUM CHLORIDE, AND CALCIUM CHLORIDE 500 ML: 600; 310; 30; 20 INJECTION, SOLUTION INTRAVENOUS at 04:22

## 2019-01-18 RX ADMIN — Medication 10 ML: at 16:49

## 2019-01-18 RX ADMIN — OXCARBAZEPINE 300 MG: 150 TABLET ORAL at 10:20

## 2019-01-18 RX ADMIN — LATANOPROST 1 DROP: 50 SOLUTION OPHTHALMIC at 22:08

## 2019-01-18 RX ADMIN — Medication 10 ML: at 16:48

## 2019-01-18 RX ADMIN — HYDROMORPHONE HYDROCHLORIDE 1 MG: 1 INJECTION, SOLUTION INTRAMUSCULAR; INTRAVENOUS; SUBCUTANEOUS at 03:08

## 2019-01-18 RX ADMIN — HYDROMORPHONE HYDROCHLORIDE 1 MG: 1 INJECTION, SOLUTION INTRAMUSCULAR; INTRAVENOUS; SUBCUTANEOUS at 07:38

## 2019-01-18 RX ADMIN — HYDROMORPHONE HYDROCHLORIDE 1 MG: 1 INJECTION, SOLUTION INTRAMUSCULAR; INTRAVENOUS; SUBCUTANEOUS at 16:46

## 2019-01-18 RX ADMIN — SODIUM CHLORIDE, SODIUM LACTATE, POTASSIUM CHLORIDE, AND CALCIUM CHLORIDE 125 ML/HR: 600; 310; 30; 20 INJECTION, SOLUTION INTRAVENOUS at 22:07

## 2019-01-18 RX ADMIN — SIMVASTATIN 40 MG: 40 TABLET, FILM COATED ORAL at 22:08

## 2019-01-18 RX ADMIN — HYDROMORPHONE HYDROCHLORIDE 1 MG: 1 INJECTION, SOLUTION INTRAMUSCULAR; INTRAVENOUS; SUBCUTANEOUS at 13:34

## 2019-01-18 RX ADMIN — SODIUM CHLORIDE, SODIUM LACTATE, POTASSIUM CHLORIDE, AND CALCIUM CHLORIDE 125 ML/HR: 600; 310; 30; 20 INJECTION, SOLUTION INTRAVENOUS at 13:39

## 2019-01-18 RX ADMIN — SODIUM CHLORIDE, SODIUM LACTATE, POTASSIUM CHLORIDE, AND CALCIUM CHLORIDE 125 ML/HR: 600; 310; 30; 20 INJECTION, SOLUTION INTRAVENOUS at 05:19

## 2019-01-18 RX ADMIN — OXCARBAZEPINE 300 MG: 150 TABLET ORAL at 22:08

## 2019-01-18 RX ADMIN — METOPROLOL SUCCINATE 100 MG: 100 TABLET, EXTENDED RELEASE ORAL at 10:20

## 2019-01-18 RX ADMIN — HYDROMORPHONE HYDROCHLORIDE 1 MG: 1 INJECTION, SOLUTION INTRAMUSCULAR; INTRAVENOUS; SUBCUTANEOUS at 19:34

## 2019-01-18 RX ADMIN — AMLODIPINE BESYLATE 10 MG: 5 TABLET ORAL at 10:20

## 2019-01-18 RX ADMIN — LOSARTAN POTASSIUM 100 MG: 50 TABLET ORAL at 10:20

## 2019-01-18 RX ADMIN — HYDROMORPHONE HYDROCHLORIDE 1 MG: 1 INJECTION, SOLUTION INTRAMUSCULAR; INTRAVENOUS; SUBCUTANEOUS at 10:21

## 2019-01-18 RX ADMIN — HYDROMORPHONE HYDROCHLORIDE 1 MG: 1 INJECTION, SOLUTION INTRAMUSCULAR; INTRAVENOUS; SUBCUTANEOUS at 22:47

## 2019-01-18 RX ADMIN — CEFAZOLIN SODIUM 2 G: 2 SOLUTION INTRAVENOUS at 05:31

## 2019-01-18 RX ADMIN — MONTELUKAST SODIUM 10 MG: 10 TABLET, FILM COATED ORAL at 22:08

## 2019-01-18 NOTE — PROGRESS NOTES
Problem: Falls - Risk of  Goal: *Absence of Falls  Document Lloyd Fall Risk and appropriate interventions in the flowsheet.   Outcome: Progressing Towards Goal  Fall Risk Interventions:  Mobility Interventions: Patient to call before getting OOB         Medication Interventions: Evaluate medications/consider consulting pharmacy, Patient to call before getting OOB, Teach patient to arise slowly

## 2019-01-18 NOTE — ROUTINE PROCESS
Bedside shift change report given to Rosalinda Mar RN (oncoming nurse) by Jenny Estevez RN (offgoing nurse). Report included the following information SBAR, Kardex, Intake/Output, MAR, Recent Results and Alarm Parameters .

## 2019-01-18 NOTE — PROGRESS NOTES
2110- Patient c/o of pain 9/10. Pt stating PCA is not working. PCA shows 11 demands and 8 doses delivered. Pt requesting something else for pain. 2112- Paged Dr. Marii Hurley to see if he would like to order more pain medication or add basal dose to PCA. 2130- Re-paged Dr. Marii Hurley  2144- Pt now asking if ford catheter can be removed. Pt stating she is still in a lot of pain and very uncomfortable. Still waiting for call back from Dr. Marii Hurley.  2103- Called answering service again who connected me with Dr. Pamela Natarajan cell phone. MD ordered that PCA pump be d/c'd and that Dilaudid 1mg Q3H PRN be started. MD does not want ford out. 2330- Pt up out of bed and ambulated to bathroom. Bright red blood on bed pad and linen. Linen and bed pad changed. Pt given warm wipes to clean herself up. Mesh panties and two ruslan pads placed on pt. Pt then ambulated in the hallway to the nurses station. Pt tolerated fairly with dizziness and nausea noted. Pt assisted back into bed. SCD's placed back on. Pt given water, gingerale, orange juice and apple juice. 0330- Pt assisted up to bathroom. RN noticed that patients urine color has become dark red instead of the orange color at the beginning of the shift. 2360- RN asked charge nurse Ericka Lynne RN to take a look at patient urine color. Charge nurse agreed that urine looked bloody. 2313- This RN requested that phlebotomist draw patients lab work STAT so RN can run patients blood to lab and get results prior to calling MD. At this time patient only complaints are of nausea and dizziness and was given crackers and gingerale. Vaginal bleeding has decreased and is now serosanguinous in color. Vitals are stable at this time:.  Blood pressure 126/76, pulse 62, temperature 98.2 °F (36.8 °C), resp. rate 18, height 5' 3\" (1.6 m), weight 116.6 kg (257 lb 1.6 oz), last menstrual period 01/31/2008, SpO2 94 %.   0- RN hand delivered labs to [facility] in the lab and requested that they be run ASAP.    Results for Marissa Steele (MRN [id number]) as of 1/18/2019 03:45   Ref. Range 1/18/2019 03:35   HGB Latest Ref Range: 12.0 - 16.0 g/dL 12.4   HCT Latest Ref Range: 35.0 - 45.0 % 38.8     6507- Paged Dr. Yazmin Andrews. 0410- Still no call back from Dr. Yazmin Andrews. Called answering service back. 5- Spoke with Dr. Abigail Browning who stated to give patient a 500cc bolus of LR and continue to monitor patient urine color and output. 1249- Dr. Yazmin Andrews called back and requested that lovenox and toradol be held. MD also ordered LR infusion at 125/hr.  1839- RN flushed patients ford to check for patency as little to no urine output since last check. Ford flushed with 10cc of normal saline. Ford currently patent with low urine output. Pt receiving bolus. Pt asymptomatic.  0630- Pt tolerating fluids and crackers with minimal nausea. 0715- Urine now yellow with red sediment/flecks.     Patient Vitals for the past 12 hrs:   Temp Pulse Resp BP SpO2   01/18/19 0255 98.2 °F (36.8 °C) 62 18 126/76 94 %   01/17/19 2253 97.9 °F (36.6 °C) 60 16 125/68 98 %   01/17/19 2057 97.8 °F (36.6 °C) 72 18 126/46 100 %   01/17/19 1949 97.8 °F (36.6 °C) 70 18 138/76 100 %

## 2019-01-18 NOTE — ROUTINE PROCESS
Bedside and Verbal shift change report given to Alexy Marques (oncoming nurse) by Jase Nava RN (offgoing nurse). Report included the following information SBAR.

## 2019-01-18 NOTE — PROGRESS NOTES
Gyn onc   Called and spoke with patient and reviewed operative findings. Explained that mass was not able to be removed surgically given its location. Biopsy c/w carcinoma. Given prior oophorectomy unlikely gyn primary.  Need to await final pathology to decide on appropriate treatment/referral.     Marita Mccracken DO

## 2019-01-18 NOTE — PROGRESS NOTES
1101 Lovenox 40 mg SC order is still active. Asked Dr. Ernie Meadows if it should be given. At this time, continue to hold lovenox and maintain ford. SHIFT SUMMARY: No acute events. Urine at end of shift appears sj with some red flecks. Poor appetite. Continues to request pain medication, dilaudid q3h. Needs to continue ambulation; informed her that she should be ambulating x3 times in post-op period. Stated she would be willing to ambulate after dinner.

## 2019-01-18 NOTE — PROGRESS NOTES
Admit date: 1/17/2019        ASSESSMENT/PLAN  Barbie Costa is a 51 y. o.female POD#1 s/p  Exploratory laparotomy, exploration of retroperitoneal spaces, exam under anesthesia with biopsy of rectovaginal mass  Onc - carcinoma, unknown origin, final pathology pending  GI - no n/v  MOOKIE [email or web address] Lytes stable. Diet as tolerated  ID - WBC nl, afebrile  Renal - creat 1.72, UOP good s/p bolus this am,   Heme - hgb stable, 12.4  DVT Prophylaxis - hold Lovenox, SCDs  CV - VSS  Pulm - encourage IS  Pain - PCA d/c'd, well controlled with IV Dilaudid 1mg q3h PRN. Will add PO Dilaudid PRN for use if tolerating diet  Disp - continue inpatient management      SUBJECTIVE  Pain controlled with IV Dilaudid, some pain currently. No nausea with clears this morning. Ambulating within room.       OBJECTIVE  Physical Exam:  Patient Vitals for the past 24 hrs:   BP Temp Pulse Resp SpO2 Height Weight   01/18/19 0723 114/66 98.7 °F (37.1 °C) 76 16 97 % -- --   01/18/19 0255 126/76 98.2 °F (36.8 °C) 62 18 94 % -- --   01/17/19 2253 125/68 97.9 °F (36.6 °C) 60 16 98 % -- 116.6 kg (257 lb 1.6 oz)   01/17/19 2057 126/46 97.8 °F (36.6 °C) 72 18 100 % -- --   01/17/19 1949 138/76 97.8 °F (36.6 °C) 70 18 100 % -- --   01/17/19 1847 139/79 98.2 °F (36.8 °C) 67 20 100 % -- --   01/17/19 1747 136/77 97.5 °F (36.4 °C) 70 18 98 % -- --   01/17/19 1712 101/55 -- (!) 53 17 96 % -- --   01/17/19 1710 126/73 -- (!) 59 20 97 % -- --   01/17/19 1705 132/72 -- 60 17 97 % -- --   01/17/19 1700 130/69 -- (!) 59 24 96 % -- --   01/17/19 1659 -- -- (!) 58 20 97 % -- --   01/17/19 1655 126/71 -- (!) 59 22 97 % -- --   01/17/19 1654 -- -- (!) 57 16 98 % -- --   01/17/19 1653 -- -- 65 22 98 % -- --   01/17/19 1650 116/77 -- (!) 58 18 96 % -- --   01/17/19 1645 128/64 -- 61 20 97 % -- --   01/17/19 1642 -- -- 60 19 96 % -- --   01/17/19 1640 129/71 -- 60 22 96 % -- --   01/17/19 1639 -- -- 60 17 96 % -- --   01/17/19 1638 -- -- 60 20 97 % -- --   01/17/19 1637 -- -- (!) 58 18 95 % -- --   01/17/19 1635 132/72 -- (!) 58 18 95 % -- --   01/17/19 1632 -- -- 66 23 97 % -- --   01/17/19 1631 -- -- -- -- 97 % -- --   01/17/19 1630 139/71 -- 60 20 95 % -- --   01/17/19 1628 -- -- 61 22 95 % -- --   01/17/19 1625 143/72 -- (!) 58 17 94 % -- --   01/17/19 1623 -- -- (!) 58 24 95 % -- --   01/17/19 1622 -- -- (!) 57 25 93 % -- --   01/17/19 1621 -- -- (!) 57 20 94 % -- --   01/17/19 1620 134/74 -- (!) 59 18 94 % -- --   01/17/19 1619 -- -- (!) 59 22 95 % -- --   01/17/19 1615 138/73 -- (!) 59 25 100 % -- --   01/17/19 1610 140/71 -- 62 24 100 % -- --   01/17/19 1606 -- -- 63 19 100 % -- --   01/17/19 1605 129/78 -- -- 20 100 % -- --   01/17/19 1604 131/68 98.7 °F (37.1 °C) 61 20 100 % -- --   01/17/19 1125 (!) 144/94 98.7 °F (37.1 °C) 63 18 98 % 5' 3\" (1.6 m) 110.9 kg (244 lb 8 oz)         Intake/Output Summary (Last 24 hours) at 1/18/2019 0900  Last data filed at 1/18/2019 0825  Gross per 24 hour   Intake 4935.33 ml   Output 900 ml   Net 4035.33 ml        General - resting in no acute distress, alert and oriented, tearful  HEENT - within normal limits  Heart - regular rate and rhythm  Lungs - clear to auscultation  Abdomen - soft, nontender, nondistended, normal bowel sounds  Incision - shadowing unchanged to MLI, dry, intact  Extremities - no edema  White draining pink tinged urine    Labs  CBC  Recent Labs     01/18/19  0335   WBC 9.9   HCT 38.8   MCV 89.6   MONOS 7   EOS 0   BASOS 0        CMP  Recent Labs     01/18/19  0335      CO2 24   BUN 17        Lab Results   Component Value Date/Time    Glucose 147 (H) 01/18/2019 03:35 AM    Glucose, POC 97 04/05/2017 08:04 AM          Current Hospital Medications    Current Facility-Administered Medications:     lactated Ringers infusion, 125 mL/hr, IntraVENous, CONTINUOUS, Chanda Barth MD, Last Rate: 125 mL/hr at 01/18/19 0519, 125 mL/hr at 01/18/19 0519    amLODIPine (NORVASC) tablet 10 mg, 10 mg, Oral, DAILY, Mariaa Estevez Tam Sampson MD    latanoprost (XALATAN) 0.005 % ophthalmic solution 1 Drop, 1 Drop, Both Eyes, QHS, Kyree Garcia MD, 1 Drop at 01/17/19 2139    LORazepam (ATIVAN) tablet 0.5 mg, 0.5 mg, Oral, Q6H PRN, Kyree Garcia MD, 0.5 mg at 01/17/19 2139    losartan (COZAAR) tablet 100 mg, 100 mg, Oral, DAILY, Kyree Garcia MD    metoprolol succinate (TOPROL-XL) tablet 100 mg, 100 mg, Oral, DAILY, Kyree Garcia MD    montelukast (SINGULAIR) tablet 10 mg, 10 mg, Oral, QHS, Kyree Garcia MD, 10 mg at 01/17/19 2139    OXcarbazepine (TRILEPTAL) tablet 300 mg, 300 mg, Oral, BID, Kyree Garcia MD, 300 mg at 01/17/19 2139    simvastatin (ZOCOR) tablet 40 mg, 40 mg, Oral, QHS, Kyree Garcia MD, 40 mg at 01/17/19 2139    sodium chloride (NS) flush 5-40 mL, 5-40 mL, IntraVENous, Q8H, Kyree Garcia MD    sodium chloride (NS) flush 5-40 mL, 5-40 mL, IntraVENous, PRN, Kyree Garcia MD    sodium chloride (NS) flush 5-40 mL, 5-40 mL, IntraVENous, Q8H, Kyree Garcia MD, 10 mL at 01/17/19 1756    sodium chloride (NS) flush 5-40 mL, 5-40 mL, IntraVENous, PRN, Kyree Garcia MD    enoxaparin (LOVENOX) injection 40 mg, 40 mg, SubCUTAneous, Q24H, Kyree Garcia MD, Stopped at 01/18/19 0800    HYDROmorphone (PF) (DILAUDID) injection 1 mg, 1 mg, IntraVENous, Q3H PRN, Kyree Garcia MD, 1 mg at 01/18/19 [address] [place] [address]  Pager  [phone]

## 2019-01-18 NOTE — PROGRESS NOTES
Transition of Care (JACINDA) Plan:        CM met with pt at bedside to discuss transition of care. Pt lives alone and has a daughter in the area that will assist as needed. Pt admitted for an elective surgical procedure (exploratory laparotomy, exploration of retroperitoneal spaces, exam under anesthesia with biopsy of rectovaginal mass). Pt is independent and has a RW and a cane at home if needed. Please encourage ambulation. No plan of care needs identified. Anticipate pt will be medically stable for discharge within the next 24-48 hours with physician follow up. Pt has indicated she will need assistance with transport home through her insurance [employer] ([phone]). CM available to assist as needed. JACINDA Transportation:   How is patient being transported at discharge? Family/Friend      When? Once cleared by physician     Is transport scheduled? N/A      Follow-up appointment and transportation:   PCP/Specialist?  See AVS for Appointment         Who is transporting to the follow-up appointment? Self/Family/Friend      Is transport for follow up appointment scheduled? N/A    Communication plan (with patient/family): Who is being called? Patient or Next of Kin? Responsible party? Patient      What number(s) is to be used? See Facesheet      What service provider is calling for [facility] services? When are they calling? Readmission Risk? (Green/Low; Yellow/Moderate; Red/High):  Green      Care Management Interventions  PCP Verified by CM: Yes  Mode of Transport at Discharge:  Other (see comment)(Medicaid Transport)  Transition of Care Consult (CM Consult): Discharge Planning  Health Maintenance Reviewed: Yes  Current Support Network: Lives Alone, Family Lives Nearby  Confirm Follow Up Transport: Other (see comment)(Medicaid transport)  Plan discussed with Pt/Family/Caregiver: Yes  Discharge Location  Discharge Placement: Home with family assistance [id number]

## 2019-01-19 LAB
ANION GAP SERPL CALC-SCNC: 5 MMOL/L (ref 3–18)
BASOPHILS # BLD: 0 K/UL (ref 0–0.1)
BASOPHILS NFR BLD: 0 % (ref 0–2)
BUN SERPL-MCNC: 13 MG/DL (ref 7–18)
BUN/CREAT SERPL: 10 (ref 12–20)
CALCIUM SERPL-MCNC: 8.3 MG/DL (ref 8.5–10.1)
CHLORIDE SERPL-SCNC: 104 MMOL/L (ref 100–108)
CO2 SERPL-SCNC: 28 MMOL/L (ref 21–32)
CREAT SERPL-MCNC: 1.25 MG/DL (ref 0.6–1.3)
DIFFERENTIAL METHOD BLD: ABNORMAL
EOSINOPHIL # BLD: 0.1 K/UL (ref 0–0.4)
EOSINOPHIL NFR BLD: 1 % (ref 0–5)
ERYTHROCYTE [DISTWIDTH] IN BLOOD BY AUTOMATED COUNT: 14.3 % (ref 11.6–14.5)
GLUCOSE SERPL-MCNC: 124 MG/DL (ref 74–99)
HCT VFR BLD AUTO: 34 % (ref 35–45)
HGB BLD-MCNC: 10.7 G/DL (ref 12–16)
LYMPHOCYTES # BLD: 1.2 K/UL (ref 0.9–3.6)
LYMPHOCYTES NFR BLD: 13 % (ref 21–52)
MCH RBC QN AUTO: 28.2 PG (ref 24–34)
MCHC RBC AUTO-ENTMCNC: 31.5 G/DL (ref 31–37)
MCV RBC AUTO: 89.7 FL (ref 74–97)
MONOCYTES # BLD: 0.5 K/UL (ref 0.05–1.2)
MONOCYTES NFR BLD: 5 % (ref 3–10)
NEUTS SEG # BLD: 7.2 K/UL (ref 1.8–8)
NEUTS SEG NFR BLD: 81 % (ref 40–73)
PLATELET # BLD AUTO: 183 K/UL (ref 135–420)
PMV BLD AUTO: 8.6 FL (ref 9.2–11.8)
POTASSIUM SERPL-SCNC: 4.5 MMOL/L (ref 3.5–5.5)
RBC # BLD AUTO: 3.79 M/UL (ref 4.2–5.3)
SODIUM SERPL-SCNC: 137 MMOL/L (ref 136–145)
WBC # BLD AUTO: 8.9 K/UL (ref 4.6–13.2)

## 2019-01-19 PROCEDURE — 74011250636 HC RX REV CODE- 250/636: Performed by: OBSTETRICS & GYNECOLOGY

## 2019-01-19 PROCEDURE — 74011250637 HC RX REV CODE- 250/637: Performed by: OBSTETRICS & GYNECOLOGY

## 2019-01-19 PROCEDURE — 74011250636 HC RX REV CODE- 250/636: Performed by: NURSE PRACTITIONER

## 2019-01-19 PROCEDURE — 80048 BASIC METABOLIC PNL TOTAL CA: CPT

## 2019-01-19 PROCEDURE — 77030037875 HC DRSG MEPILEX <16IN BORD MOLN -A

## 2019-01-19 PROCEDURE — 74011250637 HC RX REV CODE- 250/637: Performed by: NURSE PRACTITIONER

## 2019-01-19 PROCEDURE — 36415 COLL VENOUS BLD VENIPUNCTURE: CPT

## 2019-01-19 PROCEDURE — 65270000029 HC RM PRIVATE

## 2019-01-19 PROCEDURE — 77010033678 HC OXYGEN DAILY

## 2019-01-19 PROCEDURE — 85025 COMPLETE CBC W/AUTO DIFF WBC: CPT

## 2019-01-19 RX ORDER — ONDANSETRON 2 MG/ML
4 INJECTION INTRAMUSCULAR; INTRAVENOUS
Status: DISCONTINUED | OUTPATIENT
Start: 2019-01-19 | End: 2019-01-20

## 2019-01-19 RX ORDER — HYDROMORPHONE HYDROCHLORIDE 2 MG/1
2-4 TABLET ORAL
Status: DISCONTINUED | OUTPATIENT
Start: 2019-01-19 | End: 2019-01-21 | Stop reason: HOSPADM

## 2019-01-19 RX ORDER — ACETAMINOPHEN 325 MG/1
650 TABLET ORAL
Status: DISCONTINUED | OUTPATIENT
Start: 2019-01-19 | End: 2019-01-21 | Stop reason: HOSPADM

## 2019-01-19 RX ORDER — DOCUSATE SODIUM 100 MG/1
100 CAPSULE, LIQUID FILLED ORAL 2 TIMES DAILY
Status: DISCONTINUED | OUTPATIENT
Start: 2019-01-19 | End: 2019-01-21 | Stop reason: HOSPADM

## 2019-01-19 RX ADMIN — HYDROMORPHONE HYDROCHLORIDE 1 MG: 1 INJECTION, SOLUTION INTRAMUSCULAR; INTRAVENOUS; SUBCUTANEOUS at 03:58

## 2019-01-19 RX ADMIN — SODIUM CHLORIDE, SODIUM LACTATE, POTASSIUM CHLORIDE, AND CALCIUM CHLORIDE 125 ML/HR: 600; 310; 30; 20 INJECTION, SOLUTION INTRAVENOUS at 22:01

## 2019-01-19 RX ADMIN — DOCUSATE SODIUM 100 MG: 100 CAPSULE, LIQUID FILLED ORAL at 10:44

## 2019-01-19 RX ADMIN — SIMVASTATIN 40 MG: 40 TABLET, FILM COATED ORAL at 22:00

## 2019-01-19 RX ADMIN — MONTELUKAST SODIUM 10 MG: 10 TABLET, FILM COATED ORAL at 22:01

## 2019-01-19 RX ADMIN — OXCARBAZEPINE 300 MG: 150 TABLET ORAL at 22:01

## 2019-01-19 RX ADMIN — ONDANSETRON 4 MG: 2 INJECTION INTRAMUSCULAR; INTRAVENOUS at 13:45

## 2019-01-19 RX ADMIN — HYDROMORPHONE HYDROCHLORIDE 4 MG: 2 TABLET ORAL at 13:39

## 2019-01-19 RX ADMIN — ONDANSETRON 4 MG: 2 INJECTION INTRAMUSCULAR; INTRAVENOUS at 03:58

## 2019-01-19 RX ADMIN — LOSARTAN POTASSIUM 100 MG: 50 TABLET ORAL at 08:17

## 2019-01-19 RX ADMIN — HYDROMORPHONE HYDROCHLORIDE 1 MG: 1 INJECTION, SOLUTION INTRAMUSCULAR; INTRAVENOUS; SUBCUTANEOUS at 08:23

## 2019-01-19 RX ADMIN — LATANOPROST 1 DROP: 50 SOLUTION OPHTHALMIC at 22:00

## 2019-01-19 RX ADMIN — AMLODIPINE BESYLATE 10 MG: 5 TABLET ORAL at 08:17

## 2019-01-19 RX ADMIN — HYDROMORPHONE HYDROCHLORIDE 4 MG: 2 TABLET ORAL at 18:17

## 2019-01-19 RX ADMIN — ACETAMINOPHEN 650 MG: 325 TABLET ORAL at 03:58

## 2019-01-19 RX ADMIN — DOCUSATE SODIUM 100 MG: 100 CAPSULE, LIQUID FILLED ORAL at 22:01

## 2019-01-19 RX ADMIN — SODIUM CHLORIDE, SODIUM LACTATE, POTASSIUM CHLORIDE, AND CALCIUM CHLORIDE 125 ML/HR: 600; 310; 30; 20 INJECTION, SOLUTION INTRAVENOUS at 14:16

## 2019-01-19 RX ADMIN — SODIUM CHLORIDE, SODIUM LACTATE, POTASSIUM CHLORIDE, AND CALCIUM CHLORIDE 125 ML/HR: 600; 310; 30; 20 INJECTION, SOLUTION INTRAVENOUS at 06:16

## 2019-01-19 RX ADMIN — METOPROLOL SUCCINATE 100 MG: 100 TABLET, EXTENDED RELEASE ORAL at 08:17

## 2019-01-19 RX ADMIN — OXCARBAZEPINE 300 MG: 150 TABLET ORAL at 08:17

## 2019-01-19 NOTE — PROGRESS NOTES
Received bedside report from night shift RN. Patient resting quietly in bed. Patient had White out at 0500. Midline dressing dry/intact with small amount of old drainage. Light vaginal bleeding, no clots. Patient urinated clear/yellow, blood not observed in urine. She ate roughly half of breakfast, stated she did not have nausea, she just did not have an appetite. 0815 Cramping abdominal pain rated 7/10, IV Dilaudid given. Dominick Edwards in to assess patient. Abd dressing changed, Colace added for constipation. PO pain medication added. 1340 PO Dilaudid given, 4mg for 6/10 abd pain. Zofran also. Reassessed and patient stated relief. 1817 PO Dilaudid given, 4mg, for 7/10 abd pain. Reassessed and patient stated relief. Patient ambulated to restroom multiple times, did not ambulate in hallway.       Patient Vitals for the past 12 hrs:   Temp Pulse Resp BP SpO2   01/19/19 1520 99.1 °F (37.3 °C) 74 18 143/79 94 %   01/19/19 1057 98.5 °F (36.9 °C) 64 16 135/74 94 %   01/19/19 0753 98.4 °F (36.9 °C) 66 18 141/71 94 %

## 2019-01-19 NOTE — ROUTINE PROCESS
Bedside and Verbal shift change report given to ZEUS Herrera (oncoming nurse) by Grace Moore RN  (offgoing nurse). Report included the following information SBAR, Kardex, Intake/Output and MAR.

## 2019-01-19 NOTE — ROUTINE PROCESS
Bedside and Verbal shift change report given to DIPAK June (oncoming nurse) by Valente Camejo RN (offgoing nurse). Report included the following information SBAR, Kardex, Intake/Output, MAR and Recent Results.

## 2019-01-19 NOTE — PROGRESS NOTES
65 - CNA notified charge nurse of pt's spO2 in the 80's. Pt put on 2L via n.c by Carmita Salazar RN. SpO2 96%. Will continue to monitor. 2242 - VSS. SpO2 96%. 2330 - Pt sleeping; respirations observed. 0330 - Pt reports H. A on awakening. Does not have Tylenol ordered. Repositioned pillow to assist with comfort of head. 2036 - Paged provider on call for Dr. Marvin Seo     9776 - Received telephone orders from DIPAK Lamar NP for Tylenol 650 mg PO Q6 PRN and Zofran 4 mg IV Q6 PRN. 9804 - Pt rates current pain level 8/10 to abdomen and head. PRN medications given. 0530 - White d/c.    0617 - Pt awake. Rates pain level 3/10. No other needs at this time. Shift summary: Pt assisted OOB into hallway to ambulate with CNA. Denies passing flatus but states that walking does help; encouraged to walk frequently throughout day. White drained adequate amount of sj urine. Mepilex to abdomen dry and intact with minimal shadowing.      Intake/Output Summary (Last 24 hours) at 1/19/2019 0619  Last data filed at 1/19/2019 0618  Gross per 24 hour   Intake 3989.77 ml   Output 1425 ml   Net 2564.77 ml

## 2019-01-19 NOTE — ROUTINE PROCESS
Bedside and Verbal shift change report given to Saima Zhou RN (oncoming nurse) by Ferny Washington RN (offgoing nurse). Report included the following information SBAR, Kardex, Intake/Output, MAR and Recent Results.

## 2019-01-19 NOTE — PROGRESS NOTES
Admit date: 1/17/2019        ASSESSMENT/PLAN  Janina Luther is a 51 y. o.female POD#2 s/p  Exploratory laparotomy, exploration of retroperitoneal spaces, exam under anesthesia with biopsy of rectovaginal mass  Onc - carcinoma, unknown origin, final pathology pending  GI - zofran PRN. Tolerating small amounts of regular diet  MOOKIE - [email or web address]. Lytes stable. Diet as tolerated  ID - WBC nl, afebrile  Renal - creat 1.25, UOP good, urine sj, ford d/c, voided without difficulty. Heme - hgb 10.7   DVT Prophylaxis - hold Lovenox, SCDs  CV - VSS  Pulm - encourage IS  Pain - PO Dilaudid PRN, Tylenol for HA  Disp - continue inpatient management, anticipate d/c home tomorrow if tolerating PO pain meds      SUBJECTIVE  Tolerating small amount of regular diet, slight nausea, no vomiting. No flatus/BM. Has not been OOB yet today. Scant vaginal spotting. Voided without difficulty this morning.      OBJECTIVE  Physical Exam:  Patient Vitals for the past 24 hrs:   BP Temp Pulse Resp SpO2 Weight   01/19/19 0355 146/81 99 °F (37.2 °C) 74 18 -- --   01/18/19 2242 126/67 99.2 °F (37.3 °C) 76 16 96 % 116.7 kg (257 lb 4.8 oz)   01/18/19 1907 150/83 99.4 °F (37.4 °C) 81 18 94 % --   01/18/19 1529 122/68 98.7 °F (37.1 °C) 73 16 96 % --   01/18/19 1204 120/64 99.2 °F (37.3 °C) 81 16 95 % --         Intake/Output Summary (Last 24 hours) at 1/19/2019 0742  Last data filed at 1/19/2019 0618  Gross per 24 hour   Intake 3281.44 ml   Output 1425 ml   Net 1856.44 ml        General - resting in no acute distress, alert and oriented, flat affect   HEENT - within normal limits  Heart - regular rate and rhythm  Lungs - clear to auscultation  Abdomen - soft, nontender, nondistended, normal bowel sounds  Incision - CDI, dressing changed  Extremities - no edema    Labs  CBC  Recent Labs     01/19/19  0450 01/18/19  0335   WBC 8.9 9.9   HCT 34.0* 38.8   MCV 89.7 89.6   MONOS 5 7   EOS 1 0   BASOS 0 0        CMP  Recent Labs     01/19/19  0450 01/18/19  0335    140   CO2 28 24   BUN 13 17        Lab Results   Component Value Date/Time    Glucose 124 (H) 01/19/2019 04:50 AM    Glucose, POC 97 04/05/2017 08:04 AM          Current Hospital Medications    Current Facility-Administered Medications:     acetaminophen (TYLENOL) tablet 650 mg, 650 mg, Oral, Q6H PRN, Harvey Lamar, NP, 650 mg at 01/19/19 0358    ondansetron (ZOFRAN) injection 4 mg, 4 mg, IntraVENous, Q6H PRN, Harvey Lamar, NP, 4 mg at 01/19/19 0358    lactated Ringers infusion, 125 mL/hr, IntraVENous, CONTINUOUS, Roberto Coburn MD, Last Rate: 125 mL/hr at 01/19/19 0616, 125 mL/hr at 01/19/19 0616    amLODIPine (NORVASC) tablet 10 mg, 10 mg, Oral, DAILY, Roberto Coburn MD, 10 mg at 01/18/19 1020    latanoprost (XALATAN) 0.005 % ophthalmic solution 1 Drop, 1 Drop, Both Eyes, QHS, Roberto Coburn MD, 1 Drop at 01/18/19 2208    LORazepam (ATIVAN) tablet 0.5 mg, 0.5 mg, Oral, Q6H PRN, Roberto Coburn MD, 0.5 mg at 01/17/19 2139    losartan (COZAAR) tablet 100 mg, 100 mg, Oral, DAILY, Roberto Coburn MD, 100 mg at 01/18/19 1020    metoprolol succinate (TOPROL-XL) tablet 100 mg, 100 mg, Oral, DAILY, Roberto Coburn MD, 100 mg at 01/18/19 1020    montelukast (SINGULAIR) tablet 10 mg, 10 mg, Oral, QHS, Roberto Coburn MD, 10 mg at 01/18/19 2208    OXcarbazepine (TRILEPTAL) tablet 300 mg, 300 mg, Oral, BID, Roberto Coburn MD, 300 mg at 01/18/19 2208    simvastatin (ZOCOR) tablet 40 mg, 40 mg, Oral, QHS, Roberto Coburn MD, 40 mg at 01/18/19 2208    sodium chloride (NS) flush 5-40 mL, 5-40 mL, IntraVENous, Q8H, Roberto Coburn MD, 10 mL at 01/18/19 1648    sodium chloride (NS) flush 5-40 mL, 5-40 mL, IntraVENous, PRN, Roberto Coburn MD    sodium chloride (NS) flush 5-40 mL, 5-40 mL, IntraVENous, Q8H, Roberto Coburn MD, 10 mL at 01/18/19 1649    sodium chloride (NS) flush 5-40 mL, 5-40 mL, IntraVENous, PRN, Kyree Garcia MD    HYDROmorphone (PF) (DILAUDID) injection 1 mg, 1 mg, IntraVENous, Q3H PRN, Kyree Garcia MD, 1 mg at 01/19/19 [facility]  Pager  [phone]

## 2019-01-20 LAB
ANION GAP SERPL CALC-SCNC: 6 MMOL/L (ref 3–18)
BASOPHILS # BLD: 0 K/UL (ref 0–0.1)
BASOPHILS NFR BLD: 0 % (ref 0–2)
BUN SERPL-MCNC: 12 MG/DL (ref 7–18)
BUN/CREAT SERPL: 12 (ref 12–20)
CALCIUM SERPL-MCNC: 8.4 MG/DL (ref 8.5–10.1)
CHLORIDE SERPL-SCNC: 102 MMOL/L (ref 100–108)
CO2 SERPL-SCNC: 28 MMOL/L (ref 21–32)
CREAT SERPL-MCNC: 1.02 MG/DL (ref 0.6–1.3)
DIFFERENTIAL METHOD BLD: ABNORMAL
EOSINOPHIL # BLD: 0.1 K/UL (ref 0–0.4)
EOSINOPHIL NFR BLD: 1 % (ref 0–5)
ERYTHROCYTE [DISTWIDTH] IN BLOOD BY AUTOMATED COUNT: 14.1 % (ref 11.6–14.5)
GLUCOSE SERPL-MCNC: 118 MG/DL (ref 74–99)
HCT VFR BLD AUTO: 33.6 % (ref 35–45)
HGB BLD-MCNC: 10.7 G/DL (ref 12–16)
LYMPHOCYTES # BLD: 0.9 K/UL (ref 0.9–3.6)
LYMPHOCYTES NFR BLD: 13 % (ref 21–52)
MCH RBC QN AUTO: 28.6 PG (ref 24–34)
MCHC RBC AUTO-ENTMCNC: 31.8 G/DL (ref 31–37)
MCV RBC AUTO: 89.8 FL (ref 74–97)
MONOCYTES # BLD: 0.3 K/UL (ref 0.05–1.2)
MONOCYTES NFR BLD: 4 % (ref 3–10)
NEUTS SEG # BLD: 5.9 K/UL (ref 1.8–8)
NEUTS SEG NFR BLD: 82 % (ref 40–73)
PLATELET # BLD AUTO: 199 K/UL (ref 135–420)
PMV BLD AUTO: 9 FL (ref 9.2–11.8)
POTASSIUM SERPL-SCNC: 4.5 MMOL/L (ref 3.5–5.5)
RBC # BLD AUTO: 3.74 M/UL (ref 4.2–5.3)
SODIUM SERPL-SCNC: 136 MMOL/L (ref 136–145)
WBC # BLD AUTO: 7.2 K/UL (ref 4.6–13.2)

## 2019-01-20 PROCEDURE — 74011250637 HC RX REV CODE- 250/637: Performed by: OBSTETRICS & GYNECOLOGY

## 2019-01-20 PROCEDURE — 74011250637 HC RX REV CODE- 250/637: Performed by: NURSE PRACTITIONER

## 2019-01-20 PROCEDURE — 74011250636 HC RX REV CODE- 250/636: Performed by: NURSE PRACTITIONER

## 2019-01-20 PROCEDURE — 74011250636 HC RX REV CODE- 250/636: Performed by: OBSTETRICS & GYNECOLOGY

## 2019-01-20 PROCEDURE — 65270000029 HC RM PRIVATE

## 2019-01-20 PROCEDURE — 85025 COMPLETE CBC W/AUTO DIFF WBC: CPT

## 2019-01-20 PROCEDURE — 80048 BASIC METABOLIC PNL TOTAL CA: CPT

## 2019-01-20 PROCEDURE — 36415 COLL VENOUS BLD VENIPUNCTURE: CPT

## 2019-01-20 RX ORDER — ONDANSETRON 4 MG/1
4 TABLET, FILM COATED ORAL
Status: DISCONTINUED | OUTPATIENT
Start: 2019-01-20 | End: 2019-01-21 | Stop reason: HOSPADM

## 2019-01-20 RX ADMIN — HYDROMORPHONE HYDROCHLORIDE 4 MG: 2 TABLET ORAL at 02:34

## 2019-01-20 RX ADMIN — SODIUM CHLORIDE, SODIUM LACTATE, POTASSIUM CHLORIDE, AND CALCIUM CHLORIDE 125 ML/HR: 600; 310; 30; 20 INJECTION, SOLUTION INTRAVENOUS at 16:09

## 2019-01-20 RX ADMIN — ONDANSETRON 4 MG: 2 INJECTION INTRAMUSCULAR; INTRAVENOUS at 03:09

## 2019-01-20 RX ADMIN — Medication 10 ML: at 03:20

## 2019-01-20 RX ADMIN — MONTELUKAST SODIUM 10 MG: 10 TABLET, FILM COATED ORAL at 21:03

## 2019-01-20 RX ADMIN — HYDROMORPHONE HYDROCHLORIDE 4 MG: 2 TABLET ORAL at 18:55

## 2019-01-20 RX ADMIN — Medication 10 ML: at 06:00

## 2019-01-20 RX ADMIN — LATANOPROST 1 DROP: 50 SOLUTION OPHTHALMIC at 22:43

## 2019-01-20 RX ADMIN — LOSARTAN POTASSIUM 100 MG: 50 TABLET ORAL at 08:37

## 2019-01-20 RX ADMIN — METOPROLOL SUCCINATE 100 MG: 100 TABLET, EXTENDED RELEASE ORAL at 08:37

## 2019-01-20 RX ADMIN — DOCUSATE SODIUM 100 MG: 100 CAPSULE, LIQUID FILLED ORAL at 08:37

## 2019-01-20 RX ADMIN — DOCUSATE SODIUM 100 MG: 100 CAPSULE, LIQUID FILLED ORAL at 21:03

## 2019-01-20 RX ADMIN — HYDROMORPHONE HYDROCHLORIDE 4 MG: 2 TABLET ORAL at 22:43

## 2019-01-20 RX ADMIN — SIMVASTATIN 40 MG: 40 TABLET, FILM COATED ORAL at 21:03

## 2019-01-20 RX ADMIN — HYDROMORPHONE HYDROCHLORIDE 4 MG: 2 TABLET ORAL at 07:04

## 2019-01-20 RX ADMIN — OXCARBAZEPINE 300 MG: 150 TABLET ORAL at 21:03

## 2019-01-20 RX ADMIN — ONDANSETRON HYDROCHLORIDE 4 MG: 4 TABLET, FILM COATED ORAL at 18:55

## 2019-01-20 RX ADMIN — OXCARBAZEPINE 300 MG: 150 TABLET ORAL at 08:37

## 2019-01-20 RX ADMIN — AMLODIPINE BESYLATE 10 MG: 5 TABLET ORAL at 08:37

## 2019-01-20 NOTE — PROGRESS NOTES
Problem: Falls - Risk of  Goal: *Absence of Falls  Document Lloyd Fall Risk and appropriate interventions in the flowsheet.   Outcome: Progressing Towards Goal  Fall Risk Interventions:  Mobility Interventions: Patient to call before getting OOB     Medication Interventions: Patient to call before getting OOB

## 2019-01-20 NOTE — PROGRESS NOTES
Admit date: 1/17/2019        ASSESSMENT/PLAN  Gardenia Reed is a 51 y. o.female POD#3 s/p  Exploratory laparotomy, exploration of retroperitoneal spaces, exam under anesthesia with biopsy of rectovaginal mass  Onc - carcinoma, unknown origin, final pathology pending  GI - PO zofran PRN. Tolerating small amounts of regular diet  MOOKIE - [email or web address]. Lytes stable. Diet as tolerated  ID - WBC nl, afebrile  Renal - creat 1.02, UOP good  Heme - hgb 10.7, stable   DVT Prophylaxis - hold Lovenox, SCDs  CV - VSS  Pulm - encourage IS  Pain - PO Dilaudid PRN, Tylenol for HA  Disp - continue inpatient management, anticipate d/c home tomorrow      SUBJECTIVE  Tolerating small amount of regular diet, slight nausea, no vomiting. No flatus/BM. Has walked some this morning. Scant vaginal spotting. Voided without difficulty this morning.      OBJECTIVE  Physical Exam:  Patient Vitals for the past 24 hrs:   BP Temp Pulse Resp SpO2   01/20/19 0730 145/81 98.9 °F (37.2 °C) 71 18 94 %   01/20/19 0349 153/84 99.3 °F (37.4 °C) 65 18 92 %   01/19/19 2235 125/67 98.7 °F (37.1 °C) 68 16 92 %   01/19/19 1916 133/73 98.9 °F (37.2 °C) 64 18 94 %   01/19/19 1520 143/79 99.1 °F (37.3 °C) 74 18 94 %   01/19/19 1057 135/74 98.5 °F (36.9 °C) 64 16 94 %         Intake/Output Summary (Last 24 hours) at 1/20/2019 1049  Last data filed at 1/20/2019 0959  Gross per 24 hour   Intake 1688.75 ml   Output 1600 ml   Net 88.75 ml        General - resting in no acute distress, alert and oriented, flat affect   HEENT - within normal limits  Heart - regular rate and rhythm  Lungs - clear to auscultation  Abdomen - soft, nontender, nondistended, normal bowel sounds  Incision - CDI  Extremities - no edema    Labs  CBC  Recent Labs     01/20/19  0428 01/19/19  0450 01/18/19  0335   WBC 7.2 8.9 9.9   HCT 33.6* 34.0* 38.8   MCV 89.8 89.7 89.6   MONOS 4 5 7   EOS 1 1 0   BASOS 0 0 0        CMP  Recent Labs     01/20/19  0428 01/19/19  0450 01/18/19  0335    137 140 CO2 28 28 24   BUN 12 13 17        Lab Results   Component Value Date/Time    Glucose 118 (H) 01/20/2019 04:28 AM    Glucose, POC 97 04/05/2017 08:04 AM          Current Hospital Medications    Current Facility-Administered Medications:     ondansetron hcl (ZOFRAN) tablet 4 mg, 4 mg, Oral, Q4H PRN, Camryn Frank NP    acetaminophen (TYLENOL) tablet 650 mg, 650 mg, Oral, Q6H PRN, Clarissa Lamar NP, 650 mg at 01/19/19 0358    HYDROmorphone (DILAUDID) tablet 2-4 mg, 2-4 mg, Oral, Q4H PRN, Camryn Frank NP, 4 mg at 01/20/19 0704    docusate sodium (COLACE) capsule 100 mg, 100 mg, Oral, BID, Clarissa Lamar NP, 100 mg at 01/20/19 0837    lactated Ringers infusion, 125 mL/hr, IntraVENous, CONTINUOUS, Pauly Kenny MD, Last Rate: 125 mL/hr at 01/19/19 2201, 125 mL/hr at 01/19/19 2201    amLODIPine (NORVASC) tablet 10 mg, 10 mg, Oral, DAILY, Pauly Kenny MD, 10 mg at 01/20/19 0837    latanoprost (XALATAN) 0.005 % ophthalmic solution 1 Drop, 1 Drop, Both Eyes, QHS, Pauly Kenny MD, 1 Drop at 01/19/19 2200    LORazepam (ATIVAN) tablet 0.5 mg, 0.5 mg, Oral, Q6H PRN, Pauly Kenny MD, 0.5 mg at 01/17/19 2139    losartan (COZAAR) tablet 100 mg, 100 mg, Oral, DAILY, Pauly Kenny MD, 100 mg at 01/20/19 7773    metoprolol succinate (TOPROL-XL) tablet 100 mg, 100 mg, Oral, DAILY, Pauly Kenny MD, 100 mg at 01/20/19 0837    montelukast (SINGULAIR) tablet 10 mg, 10 mg, Oral, QHS, Pauly Kenny MD, 10 mg at 01/19/19 2201    OXcarbazepine (TRILEPTAL) tablet 300 mg, 300 mg, Oral, BID, Pauly Kenny MD, 300 mg at 01/20/19 6247    simvastatin (ZOCOR) tablet 40 mg, 40 mg, Oral, QHS, Pauly Kenny MD, 40 mg at 01/19/19 2200    sodium chloride (NS) flush 5-40 mL, 5-40 mL, IntraVENous, Q8H, Pauly Kenny MD, 10 mL at 01/20/19 0600    sodium chloride (NS) flush 5-40 mL, 5-40 mL, IntraVENous, PRN, Pauly Kenny, MD    sodium chloride (NS) flush 5-40 mL, 5-40 mL, IntraVENous, Q8H, Emir Murray MD, 10 mL at 01/20/19 0600    sodium chloride (NS) flush 5-40 mL, 5-40 mL, IntraVENous, PRN, Emir Murray MD      [facility]  Pager  [phone]

## 2019-01-20 NOTE — PROGRESS NOTES
0715 Received bedside report from night shift RN. Patient resting quietly watching television. Patient ambulated hallway once today with walker and no assist, tolerated well. 1745 Silverio Saunders, NP in to assess patient. Stated she will be in another night. Patient had Dilaudid twice, and Zofran once today during shift.      Patient Vitals for the past 12 hrs:   Temp Pulse Resp BP SpO2   01/20/19 1601 98.5 °F (36.9 °C) 60 18 107/67 93 %   01/20/19 1136 98.6 °F (37 °C) 76 20 144/82 93 %   01/20/19 0730 98.9 °F (37.2 °C) 71 18 145/81 94 %

## 2019-01-20 NOTE — ROUTINE PROCESS
Bedside and Verbal shift change report given to Bola Andrews RN (oncoming nurse) by Ev Landers RN (offgoing nurse). Report included the following information SBAR, Kardex, Intake/Output, MAR and Recent Results.

## 2019-01-20 NOTE — PROGRESS NOTES
Problem: Falls - Risk of  Goal: *Absence of Falls  Document Lloyd Fall Risk and appropriate interventions in the flowsheet.   Outcome: Progressing Towards Goal  Fall Risk Interventions:  Mobility Interventions: Patient to call before getting OOB         Medication Interventions: Patient to call before getting OOB, Teach patient to arise slowly

## 2019-01-20 NOTE — ROUTINE PROCESS
19:45: Received verbal/bedside change of shift report from DIPAK Ley. Report included SBAR, KARDEX, MAR and recent results. 20:10: Received pt resting quietly and in NAD. Respirations even and unlabored, skin W&D. IVF infusing as ordered. Denies C/O at this time. 06:30: Pt slept soundly entire shift overnight with three brief periods of wakefulness noted. Waking periods coincided with requests for PRN dilaudid PO, all of which pt tolerated without issue. Bedside, Verbal and Written shift change report given to DIPAK Ley (oncoming nurse) by Abigail Bucio. Sushma Langley (offgoing nurse). Report included the following information SBAR, Kardex, MAR and Recent Results.

## 2019-01-21 VITALS
RESPIRATION RATE: 18 BRPM | HEART RATE: 60 BPM | OXYGEN SATURATION: 92 % | WEIGHT: 250 LBS | DIASTOLIC BLOOD PRESSURE: 84 MMHG | HEIGHT: 63 IN | BODY MASS INDEX: 44.3 KG/M2 | SYSTOLIC BLOOD PRESSURE: 134 MMHG | TEMPERATURE: 98.6 F

## 2019-01-21 PROCEDURE — 74011250637 HC RX REV CODE- 250/637: Performed by: NURSE PRACTITIONER

## 2019-01-21 PROCEDURE — 74011250637 HC RX REV CODE- 250/637: Performed by: OBSTETRICS & GYNECOLOGY

## 2019-01-21 RX ORDER — HYDROMORPHONE HYDROCHLORIDE 2 MG/1
2-4 TABLET ORAL
Qty: 60 TAB | Refills: 0 | Status: SHIPPED | OUTPATIENT
Start: 2019-01-21 | End: 2019-02-18 | Stop reason: SDUPTHER

## 2019-01-21 RX ORDER — POLYETHYLENE GLYCOL 3350 17 G/17G
17 POWDER, FOR SOLUTION ORAL DAILY
Status: DISCONTINUED | OUTPATIENT
Start: 2019-01-21 | End: 2019-01-21 | Stop reason: HOSPADM

## 2019-01-21 RX ORDER — ONDANSETRON 4 MG/1
4 TABLET, FILM COATED ORAL
Qty: 30 TAB | Refills: 0 | Status: SHIPPED | OUTPATIENT
Start: 2019-01-21 | End: 2019-02-14 | Stop reason: SDUPTHER

## 2019-01-21 RX ORDER — FACIAL-BODY WIPES
10 EACH TOPICAL DAILY PRN
Status: DISCONTINUED | OUTPATIENT
Start: 2019-01-21 | End: 2019-01-21 | Stop reason: HOSPADM

## 2019-01-21 RX ADMIN — DOCUSATE SODIUM 100 MG: 100 CAPSULE, LIQUID FILLED ORAL at 08:20

## 2019-01-21 RX ADMIN — OXCARBAZEPINE 300 MG: 150 TABLET ORAL at 08:20

## 2019-01-21 RX ADMIN — AMLODIPINE BESYLATE 10 MG: 5 TABLET ORAL at 08:20

## 2019-01-21 RX ADMIN — POLYETHYLENE GLYCOL 3350 17 G: 17 POWDER, FOR SOLUTION ORAL at 10:14

## 2019-01-21 RX ADMIN — HYDROMORPHONE HYDROCHLORIDE 4 MG: 2 TABLET ORAL at 08:27

## 2019-01-21 RX ADMIN — METOPROLOL SUCCINATE 100 MG: 100 TABLET, EXTENDED RELEASE ORAL at 08:19

## 2019-01-21 RX ADMIN — LOSARTAN POTASSIUM 100 MG: 50 TABLET ORAL at 08:20

## 2019-01-21 NOTE — DISCHARGE SUMMARY
GYN Discharge Summary                        Patient ID:  Pascual Castillo  51 y.o. Admission Date: 1/17/2019  Discharge Date:  01/21/19                                                 Attending: Jean-Pierre Knight MD   PCP: Faith Harris MD                                                                                               Reason for admission: Pelvic mass. Discharge  diagnosis: Active Problems:    Pelvic mass in female (1/17/2019)        Associated Conditions:        Patient Active Problem List   Diagnosis Code    OA (osteoarthritis) M19.90    Obesity E66.9    Mixed hyperlipidemia E78.2    Depression F32.9    Menopause Z78.0    Knee pain, right M25.561    GERD (gastroesophageal reflux disease) K21.9    S/P TKR (total knee replacement) Z96.659    PTSD (post-traumatic stress disorder) F43.10    Essential hypertension I10    Morbid obesity ([facility] [address] E66.01    Arthritis, degenerative M19.90    Gastroesophageal reflux disease without esophagitis K21.9    ANAMIKA on CPAP G47.33, Z99.89    Prediabetes R73.03    SUAZO (dyspnea on exertion) R06.09    Dental caries K02.9    Chronic periodontal disease K05.6    Advance care planning Z71.89    Bipolar 1 disorder ([facility] [address] F31.9    Irritable bowel syndrome with both constipation and diarrhea K58.2    Pelvic mass in female R19.00              Past Medical History:   Diagnosis Date    AR (allergic rhinitis)     seasonal    Cardiac echocardiogram 04/11/2016    Tech difficult. EF 55-60%. No RWMA. Mild conc LVH. Gr 1 DDfx. RVSP normal.      Cardiac nuclear imaging test 05/05/2016    Low risk. Very patchy radiotracer uptake. Mild anterior artifact, low suspicion for ischemia. EF 68%. No RWMA. Normal EKG on pharm stress test.    Cardiovascular LE arterial duplex 10/07/2013    No significant arterial disease at rest bilaterally. R JUNE 1.21.  L JUNE 1.16.   No significant sm vessel disease.  Depression     Dr. Ismael Hodgkins, suicide attempt 2/12    Diverticulosis     Endometriosis     s/p hysterectomy 2006    GERD (gastroesophageal reflux disease)     Glaucoma     Dr. Sridevi Rodriguez HTN (hypertension)     Hx of colonoscopy 04/05/2017    mild diverticulosis, g1 internal hemorrhoids, normal colon , repeat 10 year 2027    Hx of suicide attempt     Hyperlipidemia LDL goal < 130     IBS (irritable bowel syndrome)     Ill-defined condition     environmental allergies    Insomnia     Nausea & vomiting     OA (osteoarthritis)     both knees, lower back    Preeclampsia     PTSD (post-traumatic stress disorder)     Seizures ([address]     1 x with childbirth, had toxemia    Sleep apnea     does not use cpap machine    Spinal stenosis     Dr. Natalie Montano       Operative Procedure: Exploratory laparotomy, exploration of retroperitoneal spaces, exam under anesthesia with biopsy of rectovaginal mass.     Complications:  none                   Transfusion:  none    Hospital Course: uncomplicated    Condition at discharge: good, stable, Afebrile, Ambulating and Eating, Drinking, Voiding    Labs:  Lab Results   Component Value Date/Time    WBC 7.2 01/20/2019 04:28 AM    HGB 10.7 (L) 01/20/2019 04:28 AM    HCT 33.6 (L) 01/20/2019 04:28 AM    PLATELET 024 89/09/4126 04:28 AM    MCV 89.8 01/20/2019 04:28 AM     Lab Results   Component Value Date/Time    Sodium 136 01/20/2019 04:28 AM    Potassium 4.5 01/20/2019 04:28 AM    Chloride 102 01/20/2019 04:28 AM    CO2 28 01/20/2019 04:28 AM    Anion gap 6 01/20/2019 04:28 AM    Glucose 118 (H) 01/20/2019 04:28 AM    BUN 12 01/20/2019 04:28 AM    Creatinine 1.02 01/20/2019 04:28 AM    BUN/Creatinine ratio 12 01/20/2019 04:28 AM    GFR est AA >60 01/20/2019 04:28 AM    GFR est non-AA 58 (L) 01/20/2019 04:28 AM         Current Discharge Medication List      START taking these medications    Details   HYDROmorphone (DILAUDID) 2 mg tablet Take 1-2 Tabs by mouth every four (4) hours as needed. Max Daily Amount: 24 mg. Qty: 60 Tab, Refills: 0    Associated Diagnoses: Postoperative pain      ondansetron hcl (ZOFRAN) 4 mg tablet Take 1 Tab by mouth every six (6) hours as needed. Qty: 30 Tab, Refills: 0         CONTINUE these medications which have NOT CHANGED    Details   amLODIPine (NORVASC) 10 mg tablet take 1 tablet by mouth once daily  Qty: 90 Tab, Refills: 0      metoprolol succinate (TOPROL-XL) 100 mg tablet take 1 tablet by mouth once daily  Qty: 90 Tab, Refills: 0      plecanatide (TRULANCE) 3 mg tab Take  by mouth. OXcarbazepine (TRILEPTAL) 300 mg tablet Take 300 mg by mouth two (2) times a day. Refills: 0      hydrOXYzine pamoate (VISTARIL) 50 mg capsule 2 Caps 2 Times Daily As Needed. carisoprodol (SOMA) 350 mg tablet take 1 tablet by mouth three times a day and 1 tablet at bedtime (currently out of Rx)  Refills: 0      BLACK COHOSH PO Take 200 mg by mouth.      melatonin 3 mg tablet Take 3 mg by mouth nightly. losartan (COZAAR) 100 mg tablet take 1 tablet by mouth once daily  Qty: 90 Tab, Refills: 0      simvastatin (ZOCOR) 20 mg tablet take 1 tablet by mouth at bedtime  Qty: 90 Tab, Refills: 3      LORazepam (ATIVAN) 0.5 mg tablet Take 0.5 mg by mouth two (2) times daily as needed. levocetirizine (XYZAL) 5 mg tablet daily as needed. ziprasidone (GEODON) 40 mg capsule Take 40 mg by mouth nightly. montelukast (SINGULAIR) 10 mg tablet Take 10 mg by mouth nightly. Refills: 1      FIBER, PSYLLIUM HUSK, PO Take  by mouth daily as needed. Associated Diagnoses: Cough; SUAZO (dyspnea on exertion); Cardiomegaly      latanoprost (XALATAN) 0.005 % ophthalmic solution Administer 1 Drop to both eyes nightly.                  Patient Instructions:        Disposition:  Home    Follow-up:  Follow up 10-14 days postop for staple removal and for postop appointment with Dr. Beth Glover  in 4 weeks    Author:   Bayron Elizabeth [facility]  Gynecologic Oncology  1/21/2019  9:41 AM

## 2019-01-21 NOTE — PROGRESS NOTES
Discharge instructions reviewed with the patient. Patient verbalized understanding. All questions answered. IV discontinued, no redness, swelling or pain noted. Patient discharged off the unit via wheelchair with Jenifer Hernandez RN. Patient armband removed and shredded.

## 2019-01-21 NOTE — ROUTINE PROCESS
Bedside and Verbal shift change report given to KITTY Beard RN (oncoming nurse) by Daphnie Short (offgoing nurse). Report included the following information SBAR, Kardex, Intake/Output and MAR.

## 2019-01-21 NOTE — PROGRESS NOTES
Noted pt is to be discharged today. CM met with pt at bedside to discuss transition of care. Pt will have her medications filled at the [place] [id number]. Pt will need medicaid transport arranged. CM has confirmed pt's address. Pt has indicated she received a RW in 2012/2013. Given this pt is requesting a RW to assist with transition home today. CM notified pt that she could have a cost if she received a RW within the last 5 years. Pt would like to move forward with assistance with a RW. Pt has been provided a RW. Pt has been ambulating independently in the halls with a RW. Pt will require assistance with transport home through her insurance [employer] ([phone]). Pt's address has been confirmed. No other transition of care needs have been identified. 1150:  CM contacted [facility] [employer]) and have been notified pt does not have a transportation benefit. Given this pt will be assisted with a Lyft home today. No other transition of care needs have been identified. Care Management Interventions  PCP Verified by CM: Yes  Mode of Transport at Discharge:  Other (see comment)(Medicaid Transport)  Transition of Care Consult (CM Consult): Discharge Planning  Health Maintenance Reviewed: Yes  Current Support Network: Lives Alone, Family Lives Nearby  Confirm Follow Up Transport: Other (see comment)(Medicaid transport)  Plan discussed with Pt/Family/Caregiver: Yes  Discharge Location  Discharge Placement: Home with family assistance

## 2019-01-21 NOTE — ROUTINE PROCESS
19:45: Received verbal/bedside change of shift report from DIPAK Lucas. Report included SBAR, KARDEX, MAR and recent results. 20:20: Pt received resting quietly in bed with visitors in attendance at bedside and in NAD. Respirations even and unlabored, skin W&D. IVF infusing as ordered. Denies C/O at this time. Monitoring ongoing. Bedside, Verbal and Written shift change report given to Guillermo Yip RN (oncoming nurse) by Royer Lieberman. Juanjo Estrada (offgoing nurse). Report included the following information SBAR, Kardex, MAR and Recent Results.

## 2019-01-21 NOTE — PROGRESS NOTES
Patient alert and oriented x4, denies pain, lung sound clear bilaterally, bowel sounds hypoactive in all four quadrants. Complained of pain to the anterior of 6/10 in the pain scale. Dilaudid 4mg PO given at this time. 1000hrs MD at bedside. 1015hrs CM at bedside. 1245hrs Patient was escorted to the main entrance via wheelchair. Discharge education given by Ade Mcleod RN all questions answered.

## 2019-01-21 NOTE — PROGRESS NOTES
Shift Summary:  Patient slept off and on through the night. Denied pain and discomfort. Dressing to abdomen CDI. Bowel sounds active.     Patient Vitals for the past 8 hrs:   Temp Pulse Resp BP SpO2   01/21/19 0324 97.7 °F (36.5 °C) 66 18 147/87 95 %   01/20/19 2246 97.7 °F (36.5 °C) 66 18 150/90 95 %

## 2019-01-23 ENCOUNTER — TELEPHONE (OUTPATIENT)
Dept: ONCOLOGY | Age: 49
End: 2019-01-23

## 2019-01-23 NOTE — TELEPHONE ENCOUNTER
Postop call  Patient states she is doing ok. \"Pain not as bad as it was\". Still having some vaginal spotting. BM today. Recommend stool softener during postop period. Incision looks good, no urinary complaints. No n/v/f. Patient transferred to [facility] to make staple removal appointment. All questions answered. Patient advised to return call to office with any additional questions or concerns. Patient agreeable.

## 2019-01-28 ENCOUNTER — TELEPHONE (OUTPATIENT)
Dept: ONCOLOGY | Age: 49
End: 2019-01-28

## 2019-01-28 NOTE — TELEPHONE ENCOUNTER
Spoke with patient re: call from Dr. Beatrice Reddy. Explained that he is in the OR today and that he was given the message. She can expect a call either later this afternoon or by lunchtime tomorrow. Patient anxious to discuss next steps and states that she will be awaiting a call back.

## 2019-01-28 NOTE — TELEPHONE ENCOUNTER
Patient called stating she has not heard from Dr. Mariaa Estevez since surgery and would like a call back from him to discuss the next step in her treatment. I informed the patient that Dr. Mariaa Estevez is in surgery today but I could relay the message to his NP instead. She states that she would prefer a call back from Dr. Mariaa Estevez. Her number is [phone].

## 2019-01-28 NOTE — TELEPHONE ENCOUNTER
Patient called stating that she has still not received a phone call from Dr. Varghese Franks. She is eager to hear her results and to know what further treatment she may need. Transferred to JOHN Polanco for further assistance.

## 2019-02-04 ENCOUNTER — DOCUMENTATION ONLY (OUTPATIENT)
Dept: ONCOLOGY | Age: 49
End: 2019-02-04

## 2019-02-04 ENCOUNTER — OFFICE VISIT (OUTPATIENT)
Dept: ONCOLOGY | Age: 49
End: 2019-02-04

## 2019-02-04 VITALS
HEART RATE: 65 BPM | SYSTOLIC BLOOD PRESSURE: 123 MMHG | OXYGEN SATURATION: 99 % | BODY MASS INDEX: 42.45 KG/M2 | WEIGHT: 239.6 LBS | HEIGHT: 63 IN | RESPIRATION RATE: 18 BRPM | TEMPERATURE: 98 F | DIASTOLIC BLOOD PRESSURE: 80 MMHG

## 2019-02-04 DIAGNOSIS — C48.2 PRIMARY PERITONEAL CARCINOMATOSIS (HCC): Primary | ICD-10-CM

## 2019-02-04 NOTE — PATIENT INSTRUCTIONS
Learning About Stitches and Staples Removal  When are stitches and staples removed? Your doctor will tell you when to have your stitches or staples removed, usually in 7 to 14 days. How long you'll be told to wait will depend on things like where the wound is located, how big and how deep the wound is, and what your general health is like. Do not remove the stitches on your own. Stitches on the face are usually removed within a week. But stitches and staples on other areas of the body, such as on the back or belly or over a joint, may need to stay in place longer, often a week or two. Be sure to follow your doctor's instructions. How are stitches and staples removed? It usually doesn't hurt when the doctor removes the stitches or staples. You may feel a tug as each stitch or staple is removed. · You will either be seated or lying down. · To remove stitches, the doctor will use scissors to cut each of the knots and then pull the threads out. · To remove staples, the doctor will use a tool to take out the staples one at a time. · The area may still feel tender after the stitches or staples are gone. But it should feel better within a few minutes or up to a few hours. What can you expect after stitches and staples are removed? Depending on the type and location of the cut, you will have a scar. Scars usually fade over time. Keep the area clean, but you won't need a bandage. When should you call for help? Call your doctor now or seek immediate medical care if :  · You have new pain, or your pain gets worse. · You have trouble moving the area near the scar. · You have symptoms of infection, such as:  ? Increased pain, swelling, warmth, or redness around the scar. ? Red streaks leading from the scar. ? Pus draining from the scar. ? A fever. Watch closely for changes in your health, and be sure to contact your doctor if:  · The scar opens. · You do not get better as expected.   Follow-up care is a key part of your treatment and safety. Be sure to make and go to all appointments, and call your doctor if you do not get better as expected. It's also a good idea to keep a list of the medicines you take. Where can you learn more? Go to http://raul-vee.info/. Enter J229 in the search box to learn more about \"Learning About Stitches and Staples Removal.\"  Current as of: September 23, 2018  Content Version: 11.9  © 0581-2254 [employer]. Care instructions adapted under license by [employer] (which disclaims liability or warranty for this information). If you have questions about a medical condition or this instruction, always ask your healthcare professional. [address] any warranty or liability for your use of this information. Wound Care: After Your Visit  Your Care Instructions  Taking good care of your wound at home will help it heal quickly and reduce your chance of infection. The doctor has checked you carefully, but problems can develop later. If you notice any problems or new symptoms, get medical treatment right away. Follow-up care is a key part of your treatment and safety. Be sure to make and go to all appointments, and call your doctor if you are having problems. It's also a good idea to know your test results and keep a list of the medicines you take. How can you care for yourself at home? · Clean the area with soap and water 2 times a day unless your doctor gives you different instructions. Don't use hydrogen peroxide or alcohol, which can slow healing. ¨ You may cover the wound with a thin layer of antibiotic ointment, such as bacitracin, and a nonstick bandage. ¨ Apply more ointment and replace the bandage as needed. · Take pain medicines exactly as directed. Some pain is normal with a wound, but do not ignore pain that is getting worse instead of better. You could have an infection.   ¨ If the doctor gave you a prescription medicine for pain, take it as prescribed. ¨ If you are not taking a prescription pain medicine, ask your doctor if you can take an over-the-counter medicine. · Your doctor may have closed your wound with stitches (sutures), staples, or skin glue. ¨ If you have stitches, your doctor may remove them after several days to 2 weeks. Or you may have stitches that dissolve on their own. ¨ If you have staples, your doctor may remove them after 7 to 10 days. ¨ If your wound was closed with skin glue, the glue will wear off in a few days to 2 weeks. When should you call for help? Call your doctor now or seek immediate medical care if:  · You have signs of infection, such as:  ¨ Increased pain, swelling, warmth, or redness near the wound. ¨ Red streaks leading from the wound. ¨ Pus draining from the wound. ¨ A fever. · You bleed so much from your incision that you soak one or more bandages over 2 to 4 hours. Watch closely for changes in your health, and be sure to contact your doctor if:  · The wound is not getting better each day. Where can you learn more? Go to [employer]  Enter M973 in the search box to learn more about \"Wound Care: After Your Visit. \"   © 7786-9793 [employer]. Care instructions adapted under license by [facility] (which disclaims liability or warranty for this information). This care instruction is for use with your licensed healthcare professional. If you have questions about a medical condition or this instruction, always ask your healthcare professional. [place] [address] any warranty or liability for your use of this information. Content Version: 50.8.227136;  Last Revised: April 23, 2012

## 2019-02-04 NOTE — PROGRESS NOTES
[facility]  [address] [place] [address], [address], [address] [facility]  [address] [facility], [address] [facility]  [place], [address]   [phone]  Saw Genevieve RG      Patient ID:  Name:  Juliet Toro  MRN:  [id number]  :  1970/48 y.o. Date:  2019      HISTORY OF PRESENT ILLNESS:  Juliet Toro is a 51 y.o.  postmenopausal female referred by Dr. Isaiah Michel for pelvic mass and postmenopausal bleeding. Intitally seen be NP with reports of vaginal bleeding. Given pt's history of hysteretectomy with BSO for endometriosis in  a TVUS was ordered. Which revealed a 6.8 cm x 5.2 cm x 4.6 cm at midline vaginal cuff. This prompted pelvic CT with findings of a lesion measuring 7.6 x 5.8 x 7.2 cm and is compatible with a pelvic neoplasm vs endometrioma given prior history of endometriosis. Tumor markers done on 2018 all normal.  S/p  Exploratory laparotomy, exploration of retroperitoneal spaces, exam under anesthesia with biopsy of rectovaginal mass on 2019. Denies Vaginal bleeding, change in vaginal discharge, new abdominopelvic pain, change in bladder/bowel habits          Labs:    2018 : 9.3  2018 CEA: 2.0  2018 AFP: 3.8    Pathology  2019   RECTOVAGINAL MASS (#1, 2) AND PELVIC MASS, BIOPSIES:   CONSISTENT WITH SEROUS CARCINOMA WITH EXTENSIVE NECROSIS. Imaging  2018 CT PELVIS W/CONTRAST  FINDINGS:    LYMPH NODES: No enlarged lymph nodes. PELVIC GASTROINTESTINAL TRACT: No bowel dilation or wall thickening. PELVIC ORGANS:     The uterus is absent. Along the right upper margin of the vaginal cuff within the right deep pelvis there is a large irregularly-shaped peripherally enhancing, septated appearing mass with regions of central low attenuation which could be low density-fluid type contents or regions of necrosis.  Lesion measures 76 x 58 x 72 mm and is compatible with a pelvic neoplasm. VASCULATURE: Unremarkable. BONES: Lower lumbar hypertrophic osteophytes. Lower lumbar facet hypertrophy with vacuum phenomenon asymmetric leftward. Degenerative vacuum phenomenon within the SI joints noted as well. No acute or aggressive osseous abnormalities identified. OTHER: Small fat-containing ventral umbilical hernia. Minimal nonspecific edema within the subcutaneous fat of the lumbar region, not uncommon. IMPRESSION:   1.  Along the right upper margin of the vaginal cuff within the right deep pelvis there is a large irregularly-shaped peripherally enhancing, septated appearing mass with regions of central low attenuation which could be low density-fluid type contents or regions of necrosis. Lesion measures 76 x 58 x 72 mm and is compatible with a pelvic neoplasm. Could be a endometrioma given prior history of endometriosis, malignancy not excluded and gynecologic oncology follow-up is suggested. 2.  Small fat-containing ventral umbilical hernia.      12/04/2018 TVUS  Uterus: surgically absent  Left ovary: surgically absent  Right ovary: surgically absent  Other findings: midline-at vaginal cuff: 6.8 cm X 5.2 cm x 4.6 cm, volume 85 ml    Impression: Complex pelvic mass       ROS:    As above      Patient Active Problem List    Diagnosis Date Noted    Pelvic mass in female 01/17/2019    Bipolar 1 disorder ([facility] [address] 02/09/2018    Irritable bowel syndrome with both constipation and diarrhea 02/09/2018    Advance care planning 01/31/2017    Dental caries 05/26/2016    Chronic periodontal disease 05/26/2016    SUAZO (dyspnea on exertion) 02/10/2016    Prediabetes 09/24/2015    Morbid obesity ([address] 08/31/2015    Arthritis, degenerative 08/31/2015    Gastroesophageal reflux disease without esophagitis 08/31/2015    ANAMIKA on CPAP 08/31/2015    Essential hypertension 08/28/2015    PTSD (post-traumatic stress disorder) 03/24/2014    S/P TKR (total knee replacement) 2012    Depression 2012    Menopause 2012    Knee pain, right 2012    GERD (gastroesophageal reflux disease) 2012    OA (osteoarthritis) 2010    Obesity 2010    Mixed hyperlipidemia 2010     Past Medical History:   Diagnosis Date    AR (allergic rhinitis)     seasonal    Cardiac echocardiogram 2016    Tech difficult. EF 55-60%. No RWMA. Mild conc LVH. Gr 1 DDfx. RVSP normal.      Cardiac nuclear imaging test 2016    Low risk. Very patchy radiotracer uptake. Mild anterior artifact, low suspicion for ischemia. EF 68%. No RWMA. Normal EKG on pharm stress test.    Cardiovascular LE arterial duplex 10/07/2013    No significant arterial disease at rest bilaterally. R JUNE 1.21.  L JUNE 1.16. No significant sm vessel disease.  Depression     Dr. Merlinda Leyland, suicide attempt     Diverticulosis     Endometriosis     s/p hysterectomy     GERD (gastroesophageal reflux disease)     Glaucoma     Dr. Yisel Najera HTN (hypertension)     Hx of colonoscopy 2017    mild diverticulosis, g1 internal hemorrhoids, normal colon , repeat 10 year     Hx of suicide attempt     Hyperlipidemia LDL goal < 130     IBS (irritable bowel syndrome)     Ill-defined condition     environmental allergies    Insomnia     Nausea & vomiting     OA (osteoarthritis)     both knees, lower back    Preeclampsia     PTSD (post-traumatic stress disorder)     Seizures ([address]     1 x with childbirth, had toxemia    Sleep apnea     does not use cpap machine    Spinal stenosis     Dr. Katheryn Jarquin      Past Surgical History:   Procedure Laterality Date    COLONOSCOPY N/A 2017    COLONOSCOPY performed by Keke Allen MD at [address] FEMUR/KNEE SURG UNLISTED Left     External fixator    HX  SECTION      HX COLONOSCOPY  2017    DR. MCRAE 2017     HX HYSTERECTOMY      HX KNEE ARTHROSCOPY Left     left knee    HX KNEE REPLACEMENT Bilateral     (R) 2012 (L)     HX OTHER SURGICAL  2016    all teeth removed    HX SALPINGO-OOPHORECTOMY  2008    HX TUBAL LIGATION      MULTIPLE DELIVERY       1990    TOTAL ABDOM HYSTERECTOMY        OB History      Para Term  AB Living    2 2 2 0 0 0    SAB TAB Ectopic Molar Multiple Live Births    0 0 0 0 0 0        Social History     Tobacco Use    Smoking status: Never Smoker    Smokeless tobacco: Never Used   Substance Use Topics    Alcohol use: No      Family History   Problem Relation Age of Onset    Heart Disease Mother         CHF    Diabetes Mother     Hypertension Mother     Kidney Disease Mother     Breast Cancer Mother     Stroke Mother     Diabetes Father     Hypertension Father     Heart Attack Father         MI    Cancer Maternal Grandmother         stomach    Ovarian Cancer Maternal Aunt     Cancer Maternal Uncle       Current Outpatient Medications   Medication Sig    HYDROmorphone (DILAUDID) 2 mg tablet Take 1-2 Tabs by mouth every four (4) hours as needed. Max Daily Amount: 24 mg.    ondansetron hcl (ZOFRAN) 4 mg tablet Take 1 Tab by mouth every six (6) hours as needed.  melatonin 3 mg tablet Take 3 mg by mouth nightly.  amLODIPine (NORVASC) 10 mg tablet take 1 tablet by mouth once daily    losartan (COZAAR) 100 mg tablet take 1 tablet by mouth once daily    metoprolol succinate (TOPROL-XL) 100 mg tablet take 1 tablet by mouth once daily    plecanatide (TRULANCE) 3 mg tab Take  by mouth.  simvastatin (ZOCOR) 20 mg tablet take 1 tablet by mouth at bedtime    OXcarbazepine (TRILEPTAL) 300 mg tablet Take 300 mg by mouth two (2) times a day.  LORazepam (ATIVAN) 0.5 mg tablet Take 0.5 mg by mouth two (2) times daily as needed.  hydrOXYzine pamoate (VISTARIL) 50 mg capsule 2 Caps 2 Times Daily As Needed.     carisoprodol (SOMA) 350 mg tablet take 1 tablet by mouth three times a day and 1 tablet at bedtime (currently out of Rx)    BLACK COHOSH PO Take 200 mg by mouth.  levocetirizine (XYZAL) 5 mg tablet daily as needed.  ziprasidone (GEODON) 40 mg capsule Take 40 mg by mouth nightly.  montelukast (SINGULAIR) 10 mg tablet Take 10 mg by mouth nightly.  FIBER, PSYLLIUM HUSK, PO Take  by mouth daily as needed.  latanoprost (XALATAN) 0.005 % ophthalmic solution Administer 1 Drop to both eyes nightly. No current facility-administered medications for this visit. Allergies   Allergen Reactions    Other Medication Hives     seafood    Shellfish Derived Hives          OBJECTIVE:    Physical Exam  VITAL SIGNS: Visit Vitals  LMP 01/31/2008      GENERAL EMILY: in no apparent distress and well developed and well nourished   MUSCULOSKEL: no joint tenderness, deformity or swelling   INTEGUMENT:  warm and dry, no rashes or lesions   ABDOMEN . soft,obese, NT, ND, No masses appreciated, INC: C/D/I with staples   EXTREMITIES: extremities normal, atraumatic, no cyanosis or edema   PELVIC: deferred   RECTAL: deferred   BRY SURVEY: Cervical, supraclavicular, axillary and inguinal nodes normal.   NEURO: Grossly normal         IMPRESSION/PLAN:  1. Primary peritoneal cancer   -reviewed her pathology   -discussed recommendation for chemotherapy   -consult with colorectal for possible resection   -will get PETCT to eval for metastatic disease   -needs mediport   -ok for cycle #1   -f/u prior to cycle #2   -plan for carbo (auc=6), taxol (175 mg/m2) IV every 3 wks for 6 cycles   -repeat CT after 3 cycles    The total time spent was 60 minutes regarding this patients diagnosis of pelvic mass and >50% of this time was spent counseling and coordinating care    [address] [place] [address]  Gynecologic Oncology  7/0/171754:33 AM

## 2019-02-04 NOTE — PROGRESS NOTES
Patient in the office for follow up with Dr. Cuco Sanders. I spoke with patient regarding starting chemo, chemo teaching (2-14-19), scheduling her PET scan (2-14-19)and Medi-Port placement (2-20-19), Chemo start date; 2-21-19. Calendar and instructions for her PET scan were reviewed and given to her in writing. Dr. Cuco Sanders also requested an appointment for her to be evaluated by Dr. Karina Garcia. This appointment was made and patient was called for [email or web address]. Information to Dr. Karina Garcia was faxed with confirmation and She knows to be there by 2:45. I will mail her a calendar for Feb and March.

## 2019-02-04 NOTE — PROGRESS NOTES
Perico Hamm, a 51 y.o. female,  is here for   Chief Complaint   Patient presents with    Surgical Follow-up     2 week post op Exploratory Laparotomy, rectovaginal mass bx performed on 1/17/19    Staple Removal       Visit Vitals  /80 (BP 1 Location: Left arm, BP Patient Position: Sitting)   Pulse 65   Temp 98 °F (36.7 °C) (Oral)   Resp 18   Ht 5' 3\" (1.6 m)   Wt 108.7 kg (239 lb 9.6 oz)   SpO2 99%   BMI 42.44 kg/m²       Patient reports current pain rated 7 out of 10 in her lower abdomen, currently using Norco to help alleviate it. \"It doesn't do as well for me, in the past I was taking Vicodin and soma. \" Reports current constipation, has not been using a stool softener, recommended using Miralax from bowel prep. Patient verbalized understanding. Patient also reports a small amount of vaginal bleeding, has turned to a brownish color, \"it always goes back and forth from bright red to brown. \"   Also reports feeling constant pressure on her bladder, is not alleviated with urination. Experiencing nausea and dizziness at times, cannot recognize any aggravating factors. Denies any vomiting or fevers. Received verbal confirmation from Dr. Patterson Nageotte that the patient's staples were okay for removal. 17 staples were counted prior to removal, 17 staples counted afterwards. Incision site was cleaned with normal saline, patted dry. Triple Antibiotic Ointment was applied to incision site. Steri-strips were applied. Patient did tolerate procedure well. 1. Have you been to the ER, urgent care clinic since your last visit? Hospitalized since your last visit? No    2. Have you seen or consulted any other health care providers outside of the [address] [place] [address] since your last visit? Include any pap smears or colon screening.  No

## 2019-02-06 ENCOUNTER — TELEPHONE (OUTPATIENT)
Dept: ONCOLOGY | Age: 49
End: 2019-02-06

## 2019-02-06 NOTE — TELEPHONE ENCOUNTER
Ms. Armida Whitfield contacted the office regarding her appointment with Dr. Marii Hurley on 2/11/19. During call patient asked that a nurse call her back to let her know if she has stage 1 or stage 2 cancer as she forgot to ask at her last appointment. Please contact her at [phone].

## 2019-02-06 NOTE — TELEPHONE ENCOUNTER
Returned call to patient, having small amount of vaginal bleeding. Reviewed bleeding precautions, explained r/t to the mass and also increasing activity postop. Explained that staging is pending results of PETCT scheduled for 2/14. All questions answered. Patient advised to return call to office with any additional questions or concerns. Patient agreeable.

## 2019-02-11 ENCOUNTER — OFFICE VISIT (OUTPATIENT)
Dept: SURGERY | Age: 49
End: 2019-02-11

## 2019-02-11 VITALS
WEIGHT: 239 LBS | HEART RATE: 66 BPM | HEIGHT: 63 IN | DIASTOLIC BLOOD PRESSURE: 74 MMHG | OXYGEN SATURATION: 98 % | BODY MASS INDEX: 42.35 KG/M2 | TEMPERATURE: 97.4 F | RESPIRATION RATE: 20 BRPM | SYSTOLIC BLOOD PRESSURE: 120 MMHG

## 2019-02-11 DIAGNOSIS — R19.00 PELVIC MASS IN FEMALE: Primary | ICD-10-CM

## 2019-02-11 NOTE — LETTER
2/11/2019 2:26 PM 
 
Patient:  Jorge Alberto Gomez YOB: 1970 Date of Visit: 2/11/2019 Kofi Jeronimo, [address] [address] VIA In Basket Rehan Rucker, [address] [id number] [address] [address] VIA In Basket Dear Kirill Brown, I saw Edgard Garcia in the office today regarding the rectovaginal mass you identified on recent surgical exploration. She states lower abdominal discomfort and rectal bleeding. On digital rectal exam the mass is anterior, approximately 2 cm above the anorectal ring. It does feels if the mucosa is intact, this would favor a tumor of the rectovaginal septum as opposed to a primary rectal tumor. CEA level was drawn in December and was normal.  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 I will bring her to the endoscopy suite for flexible sigmoidoscopy and schedule her for an MRI of the pelvis. She will follow-up in the office after the studies are complete. It does seem reasonable to proceed with chemotherapy as currently planned by you. Thank you very much for your referral of Ms. Yazmin Parra. If you have questions, please do not hesitate to call me. I look forward to following Ms. Gabe Walsh along with you and will keep you updated as to her progress. Sincerely, Janay Patiño MD

## 2019-02-11 NOTE — PROGRESS NOTES
HPI: Magdaleno Arndt is a 51 y.o. female presenting with chief complain of pelvic mass. She feels lower abdominal discomfort. She has occasional nausea. She has lost 15 pounds over the last month. She has bowel movements every 5 days. She has constipation. She has blood per rectum often. She denies fecal incontinence. Her last colonoscopy was 2 years ago by Dr. Gómez Luque which was negative. A pelvic mass was identified and exploration was performed by Dr. Scott Aguayo. 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 He was unable to remove the mass at that time. She had a CEA level in December which was 2. He is concerned this is a gynecologic malignancy and plans to initiate chemotherapy. Past Medical History:   Diagnosis Date    AR (allergic rhinitis)     seasonal    Cancer ([facility] [address]     pt states between vgina and rectal area    Cardiac echocardiogram 04/11/2016    Tech difficult. EF 55-60%. No RWMA. Mild conc LVH. Gr 1 DDfx. RVSP normal.      Cardiac nuclear imaging test 05/05/2016    Low risk. Very patchy radiotracer uptake. Mild anterior artifact, low suspicion for ischemia. EF 68%. No RWMA. Normal EKG on pharm stress test.    Cardiovascular LE arterial duplex 10/07/2013    No significant arterial disease at rest bilaterally. R JUNE 1.21.  L JUNE 1.16. No significant sm vessel disease.     Depression     Dr. Pedrito Vickers, suicide attempt 2/12    Diverticulosis     Endometriosis     s/p hysterectomy 2006    GERD (gastroesophageal reflux disease)     Glaucoma     Dr. Bipin Garcia HTN (hypertension)     Hx of colonoscopy 04/05/2017    mild diverticulosis, g1 internal hemorrhoids, normal colon , repeat 10 year 2027    Hx of suicide attempt     Hyperlipidemia LDL goal < 130     IBS (irritable bowel syndrome)     Ill-defined condition     environmental allergies    Insomnia     Nausea & vomiting     OA (osteoarthritis)     both knees, lower back    Preeclampsia     PTSD (post-traumatic stress disorder)     Seizures (HCC)     1 x with childbirth, had toxemia    Sleep apnea     does not use cpap machine    Spinal stenosis     Dr. Papi Knight       Past Surgical History:   Procedure Laterality Date    COLONOSCOPY N/A 2017    COLONOSCOPY performed by Bella Soto MD at [facility] Left 2009    External fixator    HX  SECTION      HX COLONOSCOPY  2017    DR. MCRAE 2017     HX HYSTERECTOMY      HX KNEE ARTHROSCOPY Left     left knee    HX KNEE REPLACEMENT Bilateral     (R)  (L)     HX LAPAROTOMY N/A 2019    and rectovaginal bx    HX OTHER SURGICAL  2016    all teeth removed    HX SALPINGO-OOPHORECTOMY  2008    HX TUBAL LIGATION      MULTIPLE DELIVERY       1990    TOTAL ABDOM HYSTERECTOMY         Family History   Problem Relation Age of Onset    Heart Disease Mother         CHF    Diabetes Mother     Hypertension Mother     Kidney Disease Mother     Breast Cancer Mother     Stroke Mother     Diabetes Father     Hypertension Father     Heart Attack Father         MI    Cancer Maternal Grandmother         stomach    Ovarian Cancer Maternal Aunt     Cancer Maternal Uncle        Social History     Socioeconomic History    Marital status: SINGLE     Spouse name: Not on file    Number of children: Not on file    Years of education: Not on file    Highest education level: Not on file   Occupational History    Occupation: disabled    Occupation: student   Tobacco Use    Smoking status: Never Smoker    Smokeless tobacco: Never Used   Substance and Sexual Activity    Alcohol use: No    Drug use: No    Sexual activity: No       Review of Systems - Review of Systems   Constitutional: Positive for weight loss. Negative for chills, diaphoresis, fever and malaise/fatigue. HENT: Negative. Eyes: Negative. Respiratory: Negative. Cardiovascular: Negative. Gastrointestinal: Positive for abdominal pain, blood in stool, constipation, heartburn and nausea. Negative for melena and vomiting. Genitourinary: Positive for frequency. Negative for dysuria, flank pain, hematuria and urgency. Musculoskeletal: Positive for joint pain and myalgias. Negative for back pain, falls and neck pain. Skin: Negative. Neurological: Negative. Negative for weakness. Endo/Heme/Allergies: Positive for environmental allergies. Negative for polydipsia. Does not bruise/bleed easily. Psychiatric/Behavioral: Positive for depression. Negative for hallucinations, memory loss, substance abuse and suicidal ideas. The patient is nervous/anxious and has insomnia. Outpatient Medications Marked as Taking for the 2/11/19 encounter (Office Visit) with Bella Anne MD   Medication Sig Dispense Refill    ondansetron hcl (ZOFRAN) 4 mg tablet Take 1 Tab by mouth every six (6) hours as needed. 30 Tab 0    melatonin 3 mg tablet Take 3 mg by mouth nightly.  amLODIPine (NORVASC) 10 mg tablet take 1 tablet by mouth once daily 90 Tab 0    losartan (COZAAR) 100 mg tablet take 1 tablet by mouth once daily 90 Tab 0    metoprolol succinate (TOPROL-XL) 100 mg tablet take 1 tablet by mouth once daily 90 Tab 0    simvastatin (ZOCOR) 20 mg tablet take 1 tablet by mouth at bedtime 90 Tab 3    OXcarbazepine (TRILEPTAL) 300 mg tablet Take 300 mg by mouth two (2) times a day.  0    LORazepam (ATIVAN) 0.5 mg tablet Take 0.5 mg by mouth two (2) times daily as needed.  levocetirizine (XYZAL) 5 mg tablet daily as needed.  ziprasidone (GEODON) 40 mg capsule Take 40 mg by mouth nightly.  montelukast (SINGULAIR) 10 mg tablet Take 10 mg by mouth nightly. 1    latanoprost (XALATAN) 0.005 % ophthalmic solution Administer 1 Drop to both eyes nightly.            Allergies   Allergen Reactions    Other Medication Hives     seafood    Shellfish Derived Hives Vitals:    02/11/19 1334   BP: 120/74   Pulse: 66   Resp: 20   Temp: 97.4 °F (36.3 °C)   SpO2: 98%   Weight: 108.4 kg (239 lb)   Height: 5' 3\" (1.6 m)   PainSc:   7   PainLoc: Abdomen   LMP: 01/31/2008       Physical Exam   Constitutional: She appears well-developed and well-nourished. HENT:   Head: Normocephalic and atraumatic. Eyes: Conjunctivae and EOM are normal.   Abdominal: Soft. She exhibits no distension. There is no tenderness. Musculoskeletal: Normal range of motion. Lymphadenopathy:     She has no cervical adenopathy. Right: No inguinal adenopathy present. Left: No inguinal adenopathy present. Neurological: She exhibits normal muscle tone. Skin: Skin is warm and dry. No rash noted. Psychiatric: She has a normal mood and affect. Her speech is normal.   Rectum: Anterior fixed rectal mass, mucosa appears intact and this feels submucosal  It is perhaps 2 cm above the anorectal ring    CEA 2 12/2018  Case discussed with Dr. Scott Aguayo by phone with the above information  Gynecology notes reviewed in epic    Assessment / Plan    Rectovaginal mass, favoring gynecologic organ over rectal  Schedule flexible sigmoidoscopy  Order MRI  Chemotherapy per Dr. Scott Aguayo  Patient to follow-up in the office after sigmoidoscopy and MRI    The diagnoses and plan were discussed with the patient. All questions answered. Plan of care agreed to by all concerned.

## 2019-02-11 NOTE — H&P (VIEW-ONLY)
HPI: Gradie Aase is a 51 y.o. female presenting with chief complain of pelvic mass. She feels lower abdominal discomfort. She has occasional nausea. She has lost 15 pounds over the last month. She has bowel movements every 5 days. She has constipation. She has blood per rectum often. She denies fecal incontinence. Her last colonoscopy was 2 years ago by Dr. Danelle Duong which was negative. A pelvic mass was identified and exploration was performed by Dr. Cristian Nation. 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 He was unable to remove the mass at that time. She had a CEA level in December which was 2. He is concerned this is a gynecologic malignancy and plans to initiate chemotherapy. Past Medical History:  
Diagnosis Date  AR (allergic rhinitis) seasonal  
 Cancer [facility])   
 pt states between vgina and rectal area  Cardiac echocardiogram 04/11/2016 Tech difficult. EF 55-60%. No RWMA. Mild conc LVH. Gr 1 DDfx. RVSP normal.    
 Cardiac nuclear imaging test 05/05/2016 Low risk. Very patchy radiotracer uptake. Mild anterior artifact, low suspicion for ischemia. EF 68%. No RWMA. Normal EKG on pharm stress test.  
 Cardiovascular LE arterial duplex 10/07/2013 No significant arterial disease at rest bilaterally. R JUNE 1.21.  L JUNE 1.16. No significant sm vessel disease.  Depression Dr. Chanelle Mendieta, suicide attempt 2/12  Diverticulosis  Endometriosis s/p hysterectomy 2006  GERD (gastroesophageal reflux disease)  Glaucoma Dr. Carlos Pang HTN (hypertension)  Hx of colonoscopy 04/05/2017  
 mild diverticulosis, g1 internal hemorrhoids, normal colon , repeat 10 year 2027  Hx of suicide attempt  Hyperlipidemia LDL goal < 130   
 IBS (irritable bowel syndrome)  Ill-defined condition   
 environmental allergies  Insomnia  Nausea & vomiting  OA (osteoarthritis) both knees, lower back  Preeclampsia  PTSD (post-traumatic stress disorder)  Seizures ([facility] [address] 1 x with childbirth, had toxemia  Sleep apnea   
 does not use cpap machine  Spinal stenosis Dr. Dian Moran Past Surgical History:  
Procedure Laterality Date  COLONOSCOPY N/A 2017 COLONOSCOPY performed by Ranjeet Murdock MD at [facility]COPY  FEMUR/KNEE SURG UNLISTED Left  External fixator  HX  SECTION    
 HX COLONOSCOPY  2017 DR. MCRAE 2017  HX HYSTERECTOMY  HX KNEE ARTHROSCOPY Left   
 left knee  HX KNEE REPLACEMENT Bilateral   
 (R) 2012 (L)   HX LAPAROTOMY N/A 2019  
 and rectovaginal bx  HX OTHER SURGICAL  2016  
 all teeth removed  HX SALPINGO-OOPHORECTOMY  2008  HX TUBAL LIGATION   [address] [place] [address]  TOTAL ABDOM HYSTERECTOMY  2006 Family History Problem Relation Age of Onset  Heart Disease Mother CHF  Diabetes Mother  Hypertension Mother  Kidney Disease Mother  Breast Cancer Mother  Stroke Mother  Diabetes Father  Hypertension Father  Heart Attack Father MI  
 Cancer Maternal Grandmother   
     stomach  Ovarian Cancer Maternal Aunt  Cancer Maternal Uncle Social History Socioeconomic History  Marital status: SINGLE Spouse name: Not on file  Number of children: Not on file  Years of education: Not on file  Highest education level: Not on file Occupational History  Occupation: disabled  Occupation: student Tobacco Use  Smoking status: Never Smoker  Smokeless tobacco: Never Used Substance and Sexual Activity  Alcohol use: No  
 Drug use: No  
 Sexual activity: No  
 
 
Review of Systems - Review of Systems Constitutional: Positive for weight loss. Negative for chills, diaphoresis, fever and malaise/fatigue. HENT: Negative. Eyes: Negative. Respiratory: Negative. Cardiovascular: Negative. Gastrointestinal: Positive for abdominal pain, blood in stool, constipation, heartburn and nausea. Negative for melena and vomiting. Genitourinary: Positive for frequency. Negative for dysuria, flank pain, hematuria and urgency. Musculoskeletal: Positive for joint pain and myalgias. Negative for back pain, falls and neck pain. Skin: Negative. Neurological: Negative. Negative for weakness. Endo/Heme/Allergies: Positive for environmental allergies. Negative for polydipsia. Does not bruise/bleed easily. Psychiatric/Behavioral: Positive for depression. Negative for hallucinations, memory loss, substance abuse and suicidal ideas. The patient is nervous/anxious and has insomnia. Outpatient Medications Marked as Taking for the 2/11/19 encounter (Office Visit) with Daniela Vázquez MD  
Medication Sig Dispense Refill  ondansetron hcl (ZOFRAN) 4 mg tablet Take 1 Tab by mouth every six (6) hours as needed. 30 Tab 0  
 melatonin 3 mg tablet Take 3 mg by mouth nightly.  amLODIPine (NORVASC) 10 mg tablet take 1 tablet by mouth once daily 90 Tab 0  
 losartan (COZAAR) 100 mg tablet take 1 tablet by mouth once daily 90 Tab 0  
 metoprolol succinate (TOPROL-XL) 100 mg tablet take 1 tablet by mouth once daily 90 Tab 0  
 simvastatin (ZOCOR) 20 mg tablet take 1 tablet by mouth at bedtime 90 Tab 3  
 OXcarbazepine (TRILEPTAL) 300 mg tablet Take 300 mg by mouth two (2) times a day.  0  
 LORazepam (ATIVAN) 0.5 mg tablet Take 0.5 mg by mouth two (2) times daily as needed.  levocetirizine (XYZAL) 5 mg tablet daily as needed.  ziprasidone (GEODON) 40 mg capsule Take 40 mg by mouth nightly.  montelukast (SINGULAIR) 10 mg tablet Take 10 mg by mouth nightly. 1  
 latanoprost (XALATAN) 0.005 % ophthalmic solution Administer 1 Drop to both eyes nightly. Allergies Allergen Reactions  Other Medication Hives  
  seafood  Shellfish Derived Hives Vitals:  
 02/11/19 1334 BP: 120/74 Pulse: 66 Resp: 20 Temp: 97.4 °F (36.3 °C) SpO2: 98% Weight: 108.4 kg (239 lb) Height: 5' 3\" (1.6 m) PainSc:   7 PainLoc: Abdomen LMP: 01/31/2008 Physical Exam  
Constitutional: She appears well-developed and well-nourished. HENT:  
Head: Normocephalic and atraumatic. Eyes: Conjunctivae and EOM are normal.  
Abdominal: Soft. She exhibits no distension. There is no tenderness. Musculoskeletal: Normal range of motion. Lymphadenopathy:  
  She has no cervical adenopathy. Right: No inguinal adenopathy present. Left: No inguinal adenopathy present. Neurological: She exhibits normal muscle tone. Skin: Skin is warm and dry. No rash noted. Psychiatric: She has a normal mood and affect. Her speech is normal.  
Rectum: Anterior fixed rectal mass, mucosa appears intact and this feels submucosal 
It is perhaps 2 cm above the anorectal ring CEA 2 12/2018 Case discussed with Dr. Beth Glover by phone with the above information Gynecology notes reviewed in epic Assessment / Plan Rectovaginal mass, favoring gynecologic organ over rectal 
Schedule flexible sigmoidoscopy Order MRI Chemotherapy per Dr. Beth Glover Patient to follow-up in the office after sigmoidoscopy and MRI The diagnoses and plan were discussed with the patient. All questions answered. Plan of care agreed to by all concerned.

## 2019-02-13 ENCOUNTER — HOSPITAL ENCOUNTER (OUTPATIENT)
Age: 49
Setting detail: OUTPATIENT SURGERY
Discharge: HOME | End: 2019-02-13
Attending: COLON & RECTAL SURGERY | Admitting: COLON & RECTAL SURGERY
Payer: MEDICARE

## 2019-02-13 VITALS
OXYGEN SATURATION: 100 % | WEIGHT: 238 LBS | HEART RATE: 63 BPM | BODY MASS INDEX: 42.17 KG/M2 | DIASTOLIC BLOOD PRESSURE: 66 MMHG | SYSTOLIC BLOOD PRESSURE: 121 MMHG | TEMPERATURE: 98.4 F | RESPIRATION RATE: 16 BRPM | HEIGHT: 63 IN

## 2019-02-13 PROCEDURE — 76040000019: Performed by: COLON & RECTAL SURGERY

## 2019-02-13 RX ORDER — SODIUM CHLORIDE 0.9 % (FLUSH) 0.9 %
5-40 SYRINGE (ML) INJECTION AS NEEDED
Status: DISCONTINUED | OUTPATIENT
Start: 2019-02-13 | End: 2019-02-13 | Stop reason: HOSPADM

## 2019-02-13 RX ORDER — SODIUM CHLORIDE 0.9 % (FLUSH) 0.9 %
5-40 SYRINGE (ML) INJECTION EVERY 8 HOURS
Status: DISCONTINUED | OUTPATIENT
Start: 2019-02-13 | End: 2019-02-13 | Stop reason: HOSPADM

## 2019-02-13 RX ORDER — INSULIN LISPRO 100 [IU]/ML
INJECTION, SOLUTION INTRAVENOUS; SUBCUTANEOUS ONCE
Status: DISCONTINUED | OUTPATIENT
Start: 2019-02-13 | End: 2019-02-13 | Stop reason: HOSPADM

## 2019-02-13 RX ORDER — SODIUM CHLORIDE, SODIUM LACTATE, POTASSIUM CHLORIDE, CALCIUM CHLORIDE 600; 310; 30; 20 MG/100ML; MG/100ML; MG/100ML; MG/100ML
25 INJECTION, SOLUTION INTRAVENOUS CONTINUOUS
Status: DISCONTINUED | OUTPATIENT
Start: 2019-02-13 | End: 2019-02-13 | Stop reason: HOSPADM

## 2019-02-13 NOTE — INTERVAL H&P NOTE
H&P Update:  Amanda Stratton was seen and examined. History and physical has been reviewed. The patient has been examined.  There have been no significant clinical changes since the completion of the originally dated History and Physical.    Signed By: Deonna Jimenez MD     February 13, 2019 1:41 PM

## 2019-02-13 NOTE — PROCEDURES
[facility]  [address], [address]   [place], [address]  [phone]                    Sigmoidoscopy Procedure Note      Ryan Lockwood  1970  [id number]                Date of Procedure: 2/13/2019    Preoperative diagnosis: R19.00,  Pelvis mass in female    Postoperative diagnosis: Pelvic mass in female    :  Paramjit Easley MD    Assistant(s): Endoscopy Technician-1: Ingrid Mary  Endoscopy RN-1: Emily Lorenzana RN  Endoscopy RN-2: Maribeth Coon RN    Sedation: None    Procedure Details:  Prior to the procedure, a history and physical were performed. The patients medications, allergies and sensitivities were reviewed and all questions were answered. After informed consent was obtained for the procedure, with all risks and benefits of procedure explained. The patient was taken to the endoscopy suite and placed in the left lateral decubitus position. Patient identification and proposed procedure were verified prior to the procedure by the nurse and I. A digital rectal exam was performed and was normal.  The Olympus video colonoscope was introduced through the anus and advanced to sigmoid colon. The quality of preparation was fair. The colonoscope was slowly withdrawn and the mucosa examined for any abnormalities. The patient tolerated the procedure well. There were no complications. Findings/Interventions:   Submucosal mass 2 cm above anorectal ring. Tethered. Anterior position. Not typical in appearance for adenocarcinoma of rectum. EBL: none    Recommendations: MRI. F/U with myself and Dr. Cindy Michel. Resume normal medication(s).      Discharge Disposition:  Katie Malik MD  2/13/2019  1:51 PM

## 2019-02-13 NOTE — DISCHARGE INSTRUCTIONS
Patient Education      FOLLOW UP VISIT Appointment in: Other ([facility]) After MRI Hannah Maldonado      Sigmoidoscopy: What to Expect at [address] sigmoidoscopy lets your doctor look inside the lower part of your large intestine. This is also called the colon. The doctor uses a lighted tube called a sigmoidoscope (or scope). This test let the doctor look for small growths (called polyps), cancer, bleeding, hemorrhoids, or other problems. The doctor also may have used the scope to remove polyps. Or he or she may have used it to take tissue samples that need to be tested. You shouldn't have any pain after the procedure. But it is normal to pass gas. You may have mild discomfort from having gas. If your doctor removed polyps, you will likely need to schedule a colonoscopy to look at the whole colon. This care sheet gives you a general idea about how long it will take for you to recover. But each person recovers at a different pace. Follow the steps below to get better as quickly as possible. How can you care for yourself at home? Activity    · Most people are able to return to work right away unless they have had a sedative during the procedure.     · You may need someone to drive you home if you have had a sedative. In most cases, you can drive yourself home. Diet    · You can eat your normal diet. If your stomach is upset, try bland, low-fat foods like plain rice, broiled chicken, toast, and yogurt.     · Be sure to drink plenty of liquids to replace those you have lost during the preparation for the procedure. Exercise    · You can return to normal exercise right away.    Medicine    · Your doctor will tell you if and when you can restart your medicines. He or she will also give you instructions about taking any new medicines.     · If you take blood thinners, such as warfarin (Coumadin), clopidogrel (Plavix), or aspirin, be sure to talk to your doctor.  He or she will tell you if and when to start taking those medicines again. Make sure that you understand exactly what your doctor wants you to do. Follow-up care is a key part of your treatment and safety. Be sure to make and go to all appointments, and call your doctor if you are having problems. It's also a good idea to know your test results and keep a list of the medicines you take. When should you call for help? Call your doctor now or seek immediate medical care if:    · You have new or worse belly pain.     · You have blood in your stools.    Watch closely for changes in your health, and be sure to contact your doctor if you have any problems. Where can you learn more? Go to http://raul-vee.info/. Enter N813 in the search box to learn more about \"Sigmoidoscopy: What to Expect at Home. \"  Current as of: March 27, 2018  Content Version: 11.9  © 9414-1137 [employer]. Care instructions adapted under license by [employer] (which disclaims liability or warranty for this information). If you have questions about a medical condition or this instruction, always ask your healthcare professional. [address] any warranty or liability for your use of this information.

## 2019-02-14 ENCOUNTER — HOSPITAL ENCOUNTER (OUTPATIENT)
Dept: PET IMAGING | Age: 49
Discharge: HOME | End: 2019-02-14
Attending: OBSTETRICS & GYNECOLOGY
Payer: MEDICARE

## 2019-02-14 ENCOUNTER — OFFICE VISIT (OUTPATIENT)
Dept: ONCOLOGY | Age: 49
End: 2019-02-14

## 2019-02-14 DIAGNOSIS — C48.2 PERITONEAL CARCINOMA (HCC): Primary | ICD-10-CM

## 2019-02-14 DIAGNOSIS — C48.2 PRIMARY PERITONEAL CARCINOMATOSIS (HCC): ICD-10-CM

## 2019-02-14 PROCEDURE — A9552 F18 FDG: HCPCS

## 2019-02-14 RX ORDER — DEXAMETHASONE 4 MG/1
40 TABLET ORAL AS NEEDED
Qty: 20 TAB | Refills: 0 | Status: SHIPPED | OUTPATIENT
Start: 2019-02-14 | End: 2020-05-11

## 2019-02-14 RX ORDER — ONDANSETRON 4 MG/1
4 TABLET, FILM COATED ORAL
Qty: 30 TAB | Refills: 3 | Status: SHIPPED | OUTPATIENT
Start: 2019-02-14 | End: 2019-09-13 | Stop reason: SDUPTHER

## 2019-02-14 RX ORDER — PROMETHAZINE HYDROCHLORIDE 25 MG/1
25 TABLET ORAL
Qty: 30 TAB | Refills: 3 | Status: SHIPPED | OUTPATIENT
Start: 2019-02-14 | End: 2019-04-24 | Stop reason: SDUPTHER

## 2019-02-14 NOTE — PROGRESS NOTES
Patient here ambulatory for chemo teaching (Taxol/Carbo every 21 days for 6 Cycles ) Informed Consent signed. Patient given chemo folder with written information and it was also reviewed verbally with all questions answered. Information reviewed regarding lab work prior to chemo. (WBC'c, RBC's, Platelets and their basic functions) Patient aware of premedications both orally and intravenously given prior to chemo. Possible side effects of chemo discussed, which include:    Nausea/vomiting: Scripts called in for Zofran (every 6-8 hrs)and Phenergan(every 4-6 hrs). Patient knows to take the Zofran at the first sign of nausea and if after 2-3 hours they still have nausea to take the Phenergan. They have been made aware the phenergan may cause drowsiness. Medications may be alternated and patient knows to call if vomiting. CASANDRA is also suggested for nausea. Given samples of casandra chews, may also try casandra tea, gum, snaps. Constipation:  May take Senokot-S, miralax, colace, prunes, activia, power pudding, Milk of Magnesia. Patient knows that constipation may occur up to five days after chemo due to the premeds. They are to introduce the suggestions individually. Power pudding recipe given. Pt knows to call if has not had a bowel movement for 2 days. Patient states she has constipation due to her \"diverticulosis\". Advised to monitor her constipation very closely due to the Aloxi. She verbalized understanding. Diarrhea: [facility] the [facility]. Bananas,Rice, Apples, Toast, Yogurt. Patient knows they may take [address] [employer] [place]. They are to start with 1 tablet after each episode. NO MORE than 8 tablets a day, and to call if more than 2 watery stools. Fever: Report all fevers greater than 100.5. Skin: Report all rashes. Use sunscreen, as the chemo will make you more sensitive and you run the risk of getting severe sunburn. Fatigue: Stay well hydrated, pace yourself.  Supplement diet with [employer], Ensure or Boost. Samples given of Ensure and Ensure clear. .Add fruit or yogurt to make them more of a smoothie to help with taste. Eat small frequent meals that include protein. Scrambled eggs, cheese, peanut butter are good sources. Continue to try to maintain normal activities of daily living. If unable to get out of bed, Call. Dehydration: You will note to have extreme fatigue, increase in nausea). Should be drinking 1000-1500cc a day. Try to drink a cup of something every hour you are awake. May substitute with ice pops, soup. Gatorade, low caffeine drinks. Dry Mouth: Biotene mouthwash or gum. Trident gum. Taste changes: Lemon drops, cold drinks, hard candies, ice pops. DO NOT use metal silverware or drink from cans if you have a metallic taste. .   Neuropathy: Numbness/tingling/burning sensation, diff buttoning clothes. Advised to take B-6 and B-12 or  B-Complex twice a day to help with this. Hair loss: MAY occur 2-3 weeks after starting treatment. Use baby shampoo for tender scalp. Given information on the [employer]. Tour of [facility] given to pt, along with a tote with information and samples. Medications e-scribed to pharmacy with confirmation. (zofran, phenergan, dex). Emla not ordered due to cost. Advised patient that [facility] will place an ice pack over site, but her first treatment will have her port already accessed. Patient given a calendar for (Feb, March.) with all appointments scheduled. She saw Dr. Willy Alfaro and she said he did a sigmoidoscopy  (2-13-19) and scheduled an MRI on 2-191-9. Phone numbers given on how to reach the office for any questions or concerns. She is having her port placement at [facility] on 2-10-19 (they are leaving the port accessed) and will start her Chemo on 2-21-19.

## 2019-02-15 RX ORDER — MAGNESIUM SULFATE HEPTAHYDRATE 40 MG/ML
2 INJECTION, SOLUTION INTRAVENOUS ONCE
Status: CANCELLED
Start: 2019-02-21 | End: 2019-02-21

## 2019-02-15 RX ORDER — HYDROCORTISONE SODIUM SUCCINATE 100 MG/2ML
100 INJECTION, POWDER, FOR SOLUTION INTRAMUSCULAR; INTRAVENOUS AS NEEDED
Status: CANCELLED | OUTPATIENT
Start: 2019-02-21

## 2019-02-15 RX ORDER — PROCHLORPERAZINE EDISYLATE 5 MG/ML
10 INJECTION INTRAMUSCULAR; INTRAVENOUS ONCE
Status: CANCELLED
Start: 2019-02-21 | End: 2019-02-21

## 2019-02-15 RX ORDER — SODIUM CHLORIDE 0.9 % (FLUSH) 0.9 %
10 SYRINGE (ML) INJECTION AS NEEDED
Status: CANCELLED
Start: 2019-02-21

## 2019-02-15 RX ORDER — LORAZEPAM 2 MG/ML
0.26 INJECTION INTRAMUSCULAR ONCE
Status: CANCELLED
Start: 2019-02-21 | End: 2019-02-21

## 2019-02-15 RX ORDER — HEPARIN 100 UNIT/ML
300-500 SYRINGE INTRAVENOUS AS NEEDED
Status: CANCELLED
Start: 2019-02-21

## 2019-02-15 RX ORDER — ONDANSETRON 2 MG/ML
8 INJECTION INTRAMUSCULAR; INTRAVENOUS AS NEEDED
Status: CANCELLED | OUTPATIENT
Start: 2019-02-21

## 2019-02-15 RX ORDER — EPINEPHRINE 1 MG/ML
0.3 INJECTION, SOLUTION, CONCENTRATE INTRAVENOUS AS NEEDED
Status: CANCELLED | OUTPATIENT
Start: 2019-02-21

## 2019-02-15 RX ORDER — POTASSIUM CHLORIDE 7.45 MG/ML
10 INJECTION INTRAVENOUS ONCE
Status: CANCELLED
Start: 2019-02-21 | End: 2019-02-21

## 2019-02-15 RX ORDER — ACETAMINOPHEN 325 MG/1
650 TABLET ORAL AS NEEDED
Status: CANCELLED
Start: 2019-02-21

## 2019-02-15 RX ORDER — PALONOSETRON 0.05 MG/ML
0.25 INJECTION, SOLUTION INTRAVENOUS ONCE
Status: CANCELLED
Start: 2019-02-21 | End: 2019-02-21

## 2019-02-15 RX ORDER — DIPHENHYDRAMINE HYDROCHLORIDE 50 MG/ML
50 INJECTION, SOLUTION INTRAMUSCULAR; INTRAVENOUS AS NEEDED
Status: CANCELLED
Start: 2019-02-21

## 2019-02-15 RX ORDER — DIPHENHYDRAMINE HCL 25 MG
50 CAPSULE ORAL ONCE
Status: CANCELLED
Start: 2019-02-21 | End: 2019-02-21

## 2019-02-15 RX ORDER — SODIUM CHLORIDE 9 MG/ML
500 INJECTION, SOLUTION INTRAVENOUS ONCE
Status: CANCELLED | OUTPATIENT
Start: 2019-02-21 | End: 2019-02-21

## 2019-02-15 RX ORDER — SODIUM CHLORIDE 9 MG/ML
10 INJECTION INTRAMUSCULAR; INTRAVENOUS; SUBCUTANEOUS AS NEEDED
Status: CANCELLED | OUTPATIENT
Start: 2019-02-21

## 2019-02-15 RX ORDER — ALBUTEROL SULFATE 0.83 MG/ML
2.5 SOLUTION RESPIRATORY (INHALATION) AS NEEDED
Status: CANCELLED
Start: 2019-02-21

## 2019-02-18 ENCOUNTER — OFFICE VISIT (OUTPATIENT)
Dept: ONCOLOGY | Age: 49
End: 2019-02-18

## 2019-02-18 VITALS
TEMPERATURE: 96.7 F | WEIGHT: 235.3 LBS | RESPIRATION RATE: 18 BRPM | OXYGEN SATURATION: 100 % | BODY MASS INDEX: 41.68 KG/M2 | SYSTOLIC BLOOD PRESSURE: 133 MMHG | HEART RATE: 63 BPM | DIASTOLIC BLOOD PRESSURE: 90 MMHG

## 2019-02-18 DIAGNOSIS — G89.18 POSTOPERATIVE PAIN: ICD-10-CM

## 2019-02-18 DIAGNOSIS — C48.2 PERITONEAL CARCINOMA (HCC): Primary | ICD-10-CM

## 2019-02-18 RX ORDER — HYDROMORPHONE HYDROCHLORIDE 2 MG/1
2-4 TABLET ORAL
Qty: 20 TAB | Refills: 0 | Status: SHIPPED | OUTPATIENT
Start: 2019-02-18 | End: 2019-03-21 | Stop reason: ALTCHOICE

## 2019-02-18 NOTE — PATIENT INSTRUCTIONS
Acute Pain After Surgery: Care Instructions  Your Care Instructions    It's common to have some pain after surgery. Pain doesn't mean that something is wrong or that the surgery didn't go well. But when the pain is severe, it's important to work with your doctor to manage it. It's also important to be aware of a few facts about pain and pain medicine. · You are the only person who knows what your pain feels like. So be sure to tell your doctor when you are in pain or when the pain changes. Then he or she will know how to adjust your medicines. · Pain is often easier to control right after it starts. So it may be better to take regular doses of pain medicine and not wait until the pain gets bad. · Medicine can help control pain. But this doesn't mean you'll have no pain. Medicine works to keep the pain at a level you can live with. With time, you will feel better. Follow-up care is a key part of your treatment and safety. Be sure to make and go to all appointments, and call your doctor if you are having problems. It's also a good idea to know your test results and keep a list of the medicines you take. How can you care for yourself at home? · Be safe with medicines. Read and follow all instructions on the label. ? If the doctor gave you a prescription medicine for pain, take it as prescribed. ? If you are not taking a prescription pain medicine, ask your doctor if you can take an over-the-counter medicine. · If you take an over-the-counter pain medicine, such as acetaminophen (Tylenol), ibuprofen (Advil, Motrin), or naproxen (Aleve), read and follow all instructions on the label. · Do not take two or more pain medicines at the same time unless the doctor told you to. · Do not drink alcohol while you are taking pain medicines. · Try to walk each day if your doctor recommends it. Start by walking a little more than you did the day before. Bit by bit, increase the amount you walk.  Walking increases blood flow. It also helps prevent pneumonia and constipation. · To prevent constipation from opioid pain medicines:  ? Talk to your doctor about a laxative. ? Include fruits, vegetables, beans, and whole grains in your diet each day. These foods are high in fiber. ? Drink plenty of fluids, enough so that your urine is light yellow or clear like water. Drink water, fruit juice, or other drinks that do not contain caffeine or alcohol. If you have kidney, heart, or liver disease and have to limit fluids, talk with your doctor before you increase the amount of fluids you drink. ? Take a fiber supplement, such as Citrucel or Metamucil, every day if needed. Read and follow all instructions on the label. If you take pain medicine for more than a few days, talk to your doctor before you take fiber. When should you call for help? Call your doctor now or seek immediate medical care if:    · Your pain gets worse.     · Your pain is not controlled by medicine.    Watch closely for changes in your health, and be sure to contact your doctor if you have any problems. Where can you learn more? Go to http://raul-vee.info/. Enter [phone] in the search box to learn more about \"Acute Pain After Surgery: Care Instructions. \"  Current as of: September 23, 2018  Content Version: 11.9  © 0784-8234 [employer]. Care instructions adapted under license by [employer] (which disclaims liability or warranty for this information). If you have questions about a medical condition or this instruction, always ask your healthcare professional. [place] [address] any warranty or liability for your use of this information.

## 2019-02-18 NOTE — PROGRESS NOTES
Patient here ambulatory for scan review. Also answered questions regarding her port insertion on Wednesday and how to the dex prior to Cycle 1 and Cycle 2. Education provided and patient to follow up as scheduled.

## 2019-02-18 NOTE — PROGRESS NOTES
[facility]  [address] [place] [address], [address], [address] [facility]  [address] [facility], [address] [facility], [place], operated by [facility]  [place], [address]   [phone]  Shelli Birmingham DO      Patient ID:  Name:  Lovey Kanner  MRN:  [id number]  :  1970/48 y.o. Date:  2019      HISTORY OF PRESENT ILLNESS:  Lovey Kanner is a 51 y.o.  postmenopausal female referred by Dr. Mark Anthony Michel for pelvic mass and postmenopausal bleeding. Intitally seen be NP with reports of vaginal bleeding. Given pt's history of hysteretectomy with BSO for endometriosis in  a TVUS was ordered. Which revealed a 6.8 cm x 5.2 cm x 4.6 cm at midline vaginal cuff. This prompted pelvic CT with findings of a lesion measuring 7.6 x 5.8 x 7.2 cm and is compatible with a pelvic neoplasm vs endometrioma given prior history of endometriosis. Tumor markers done on 2018 all normal.  S/p  Exploratory laparotomy, exploration of retroperitoneal spaces, exam under anesthesia with biopsy of rectovaginal mass on 2019. Had sigmoidoscopy which revealed submucosal mass. Pt still having some bladder spasm. Labs:    2018 : 9.3  2018 CEA: 2.0  2018 AFP: 3.8    Pathology  2019   RECTOVAGINAL MASS (#1, 2) AND PELVIC MASS, BIOPSIES:   CONSISTENT WITH SEROUS CARCINOMA WITH EXTENSIVE NECROSIS. Imaging  PETCT 2019   --  PET FINDINGS  --    No abnormal hypermetabolic activity in the neck. No abnormal hypermetabolic activity in the chest.      Low-attenuation possible lesion in the medial dome right lobe liver in region  of heterogeneous hypermetabolic activity, with SUV Max reaching 11.7 on image  98. Underlying lesion difficult to visualize due to extensive motion from  respirations. Cannot well separate from the IVC or diaphragm.  No definite  corresponding lung lesion however.     There is low level metabolic activity associated with stranding and presumed  scarring in the subcutaneous fat of the lower midline abdominal and pelvic wall. This may simply be inflammatory. .      There is a lobulated soft tissue conglomerate in the midline to right lateral  cul-de-sac and perirectal pelvis. The periphery of this is diffusely  hypermetabolic, with SUV Max reaching 18.3 on image 200. Portions centrally are  photopenic and presumed necrotic. This measures up to 7.5 x 6.1 cm axial on  image 196. Anterior margin of this hypermetabolic lesion is in direct contact  with the high intensity decompressed urinary bladder, involvement cannot be  assessed. Left lateral margin of the mass is in contact with the surface of the  lateral wall of the sigmoid colon. Direct involvement cannot be assessed.  -Additional focal hypermetabolic density 2.7 cm on image 189 inseparable from  the caudal wall of the sigmoid. -Tapering hypermetabolic activity extends to approximately image 210, not to the  level of the perineum.     There are no other areas of abnormal metabolic activity appreciated in the  abdomen or pelvis which cannot be attributed to renal collecting system, ureter,  bladder or bowel wall activity>]. No definite hypermetabolic bone lesions appreciated, although bone scan can be  more sensitive in this respect. --  CT FINDINGS  --     These limited CT images do not replace diagnostic quality contrast enhanced CT  scan for evaluation of solid organs, bowel, retroperitoneum and vasculature. CT NECK:    Severely limited by lack of intravenous contrast.  Paranasal sinuses free of  disease. No appreciably enlarged lymph nodes. Extensive cervical endplate  osteophytes anterior and left lateral..      CT CHEST:    Limited evaluation of the hilum and vasculature without intravenous contrast.  No pleural effusion. No appreciably enlarged lymph nodes.  Patchy parenchymal  opacities medial basal left lower lobe on image 89 demonstrates low level  metabolic activity, SUV Max 3.2, nonspecific, favored as inflammatory based on  imaging appearance subjectively. Annamary Ripa CT ABDOMEN:    Limited noncontrast images. Normal-size liver and spleen no adrenal mass. No  hydronephrosis. No ascites. CT PELVIS:    Limited noncontrast images. No small bowel or colon distention. Scattered  colonic diverticulosis. Postsurgical change anterior abdominal wall. Severe  degenerative facet disease at the lower lumbar levels.       Lobulated right pelvic/cul-de-sac soft tissue mass as above. Mild degree of  stranding in the pelvic mesenteric fat. No ascites.        IMPRESSION   IMPRESSION:      1. Peripherally hypermetabolic soft tissue mass right dependent pelvis to the  cul-de-sac region as above consistent with malignancy/metastatic disease with  some central necrosis  -Possible separate lesion versus serosal involvement of the sigmoid colon  adjacent. -Mass in contact with posterior bladder, sigmoid colon locally and posterior  peritoneal surface.     2. Hypermetabolic lesion along central dome of the right lobe liver difficult to  separate from IVC or diaphragm due to motion artifact. Recommend dedicated  contrast enhanced CT for localization and better clarification.  -Findings are concerning for malignancy or hepatic metastatic disease until  proven otherwise.      3. Low level activity associated with patchy parenchymal opacity at the medial  left lower lobe,, favored as inflammatory as above.     4. No other definite hypermetabolic abnormalities appreciated elsewhere. 12/14/2018 CT PELVIS W/CONTRAST  FINDINGS:    LYMPH NODES: No enlarged lymph nodes. PELVIC GASTROINTESTINAL TRACT: No bowel dilation or wall thickening. PELVIC ORGANS:     The uterus is absent.      Along the right upper margin of the vaginal cuff within the right deep pelvis there is a large irregularly-shaped peripherally enhancing, septated appearing mass with regions of central low attenuation which could be low density-fluid type contents or regions of necrosis. Lesion measures 76 x 58 x 72 mm and is compatible with a pelvic neoplasm. VASCULATURE: Unremarkable. BONES: Lower lumbar hypertrophic osteophytes. Lower lumbar facet hypertrophy with vacuum phenomenon asymmetric leftward. Degenerative vacuum phenomenon within the SI joints noted as well. No acute or aggressive osseous abnormalities identified. OTHER: Small fat-containing ventral umbilical hernia. Minimal nonspecific edema within the subcutaneous fat of the lumbar region, not uncommon. IMPRESSION:   1.  Along the right upper margin of the vaginal cuff within the right deep pelvis there is a large irregularly-shaped peripherally enhancing, septated appearing mass with regions of central low attenuation which could be low density-fluid type contents or regions of necrosis. Lesion measures 76 x 58 x 72 mm and is compatible with a pelvic neoplasm. Could be a endometrioma given prior history of endometriosis, malignancy not excluded and gynecologic oncology follow-up is suggested. 2.  Small fat-containing ventral umbilical hernia.      12/04/2018 TVUS  Uterus: surgically absent  Left ovary: surgically absent  Right ovary: surgically absent  Other findings: midline-at vaginal cuff: 6.8 cm X 5.2 cm x 4.6 cm, volume 85 ml    Impression: Complex pelvic mass       ROS:    As above      Patient Active Problem List    Diagnosis Date Noted    Malignant neoplasm of peritoneum ([address] 02/14/2019    Pelvic mass in female 01/17/2019    Bipolar 1 disorder ([address] 02/09/2018    Irritable bowel syndrome with both constipation and diarrhea 02/09/2018    Advance care planning 01/31/2017    Dental caries 05/26/2016    Chronic periodontal disease 05/26/2016    SUAZO (dyspnea on exertion) 02/10/2016    Prediabetes 09/24/2015    Morbid obesity ([address] 08/31/2015    Arthritis, degenerative 08/31/2015    Gastroesophageal reflux disease without esophagitis 08/31/2015    ANAMIKA on CPAP 08/31/2015    Essential hypertension 08/28/2015    PTSD (post-traumatic stress disorder) 03/24/2014    S/P TKR (total knee replacement) 11/14/2012    Depression 05/08/2012    Menopause 05/08/2012    Knee pain, right 05/08/2012    GERD (gastroesophageal reflux disease) 05/08/2012    OA (osteoarthritis) 06/18/2010    Obesity 06/18/2010    Mixed hyperlipidemia 06/18/2010     Past Medical History:   Diagnosis Date    AR (allergic rhinitis)     seasonal    Cancer ([facility]     pt states between vgina and rectal area    Cardiac echocardiogram 04/11/2016    Tech difficult. EF 55-60%. No RWMA. Mild conc LVH. Gr 1 DDfx. RVSP normal.      Cardiac nuclear imaging test 05/05/2016    Low risk. Very patchy radiotracer uptake. Mild anterior artifact, low suspicion for ischemia. EF 68%. No RWMA. Normal EKG on pharm stress test.    Cardiovascular LE arterial duplex 10/07/2013    No significant arterial disease at rest bilaterally. R JUNE 1.21.  L JUNE 1.16. No significant sm vessel disease.     Depression     Dr. Harshil Urrutia, suicide attempt 2/12    Diverticulosis     Endometriosis     s/p hysterectomy 2006    GERD (gastroesophageal reflux disease)     Glaucoma     Dr. Amanda Garcia HTN (hypertension)     Hx of colonoscopy 04/05/2017    mild diverticulosis, g1 internal hemorrhoids, normal colon , repeat 10 year 2027    Hx of suicide attempt     Hyperlipidemia LDL goal < 130     IBS (irritable bowel syndrome)     Ill-defined condition     environmental allergies    Insomnia     Malignant neoplasm of peritoneum ([facility] 2/14/2019    Nausea & vomiting     OA (osteoarthritis)     both knees, lower back    Preeclampsia     PTSD (post-traumatic stress disorder)     Seizures ([address]     1 x with childbirth, had toxemia    Sleep apnea     does not use cpap machine    Spinal stenosis     Dr. Darrius Agustin      Past Surgical History:   Procedure Laterality Date    COLONOSCOPY N/A 2017    COLONOSCOPY performed by Carla More MD at [address] Left 2009    External fixator    FLEXIBLE SIGMOIDOSCOPY N/A 2019    SIGMOIDOSCOPY FLEXIBLE performed by Mary Jo Castillo MD at [facility] ENDOSCOPY    HX  SECTION      HX COLONOSCOPY  2017    DR. MCRAE 2017     HX HYSTERECTOMY      HX KNEE ARTHROSCOPY Left     left knee    HX KNEE REPLACEMENT Bilateral     (R)  (L)     HX LAPAROTOMY N/A 2019    and rectovaginal bx    HX OTHER SURGICAL  2016    all teeth removed    HX SALPINGO-OOPHORECTOMY  2008    HX TUBAL LIGATION      MULTIPLE DELIVERY       1990    TOTAL ABDOM HYSTERECTOMY        OB History      Para Term  AB Living    2 2 2 0 0 0    SAB TAB Ectopic Molar Multiple Live Births    0 0 0 0 0 0        Social History     Tobacco Use    Smoking status: Never Smoker    Smokeless tobacco: Never Used   Substance Use Topics    Alcohol use: No      Family History   Problem Relation Age of Onset    Heart Disease Mother         CHF    Diabetes Mother     Hypertension Mother     Kidney Disease Mother     Breast Cancer Mother     Stroke Mother     Diabetes Father     Hypertension Father     Heart Attack Father         MI    Cancer Maternal Grandmother         stomach    Ovarian Cancer Maternal Aunt     Cancer Maternal Uncle       Current Outpatient Medications   Medication Sig    dexamethasone (DECADRON) 4 mg tablet Take 40 mg by mouth as needed. Take 10 tabs the night before Chemo #1 and Chemo #2.  promethazine (PHENERGAN) 25 mg tablet Take 1 Tab by mouth every six (6) hours as needed for Nausea.  ondansetron hcl (ZOFRAN) 4 mg tablet Take 1 Tab by mouth every six (6) hours as needed for Nausea.  HYDROmorphone (DILAUDID) 2 mg tablet Take 1-2 Tabs by mouth every four (4) hours as needed.  Max Daily Amount: 24 mg.    melatonin 3 mg tablet Take 3 mg by mouth nightly.  amLODIPine (NORVASC) 10 mg tablet take 1 tablet by mouth once daily    losartan (COZAAR) 100 mg tablet take 1 tablet by mouth once daily    metoprolol succinate (TOPROL-XL) 100 mg tablet take 1 tablet by mouth once daily    plecanatide (TRULANCE) 3 mg tab Take  by mouth.  simvastatin (ZOCOR) 20 mg tablet take 1 tablet by mouth at bedtime    OXcarbazepine (TRILEPTAL) 300 mg tablet Take 300 mg by mouth two (2) times a day.  LORazepam (ATIVAN) 0.5 mg tablet Take 0.5 mg by mouth two (2) times daily as needed.  hydrOXYzine pamoate (VISTARIL) 50 mg capsule 2 Caps 2 Times Daily As Needed.  carisoprodol (SOMA) 350 mg tablet take 1 tablet by mouth three times a day and 1 tablet at bedtime (currently out of Rx)    BLACK COHOSH PO Take 200 mg by mouth.  levocetirizine (XYZAL) 5 mg tablet daily as needed.  ziprasidone (GEODON) 40 mg capsule Take 40 mg by mouth nightly.  montelukast (SINGULAIR) 10 mg tablet Take 10 mg by mouth nightly.  FIBER, PSYLLIUM HUSK, PO Take  by mouth daily as needed.  latanoprost (XALATAN) 0.005 % ophthalmic solution Administer 1 Drop to both eyes nightly. No current facility-administered medications for this visit. Allergies   Allergen Reactions    Other Medication Hives     seafood    Shellfish Derived Hives          OBJECTIVE:    Physical Exam  VITAL SIGNS: Visit Vitals  LMP 01/31/2008      GENERAL EMILY: in no apparent distress and well developed and well nourished   MUSCULOSKEL: no joint tenderness, deformity or swelling   INTEGUMENT:  warm and dry, no rashes or lesions   ABDOMEN . soft,obese, NT, ND, No masses appreciated, INC: C/D/I with staples   EXTREMITIES: extremities normal, atraumatic, no cyanosis or edema   PELVIC: deferred   RECTAL: deferred   BRY SURVEY: Cervical, supraclavicular, axillary and inguinal nodes normal.   NEURO: Grossly normal         IMPRESSION/PLAN:  1. Primary peritoneal cancer   -reviewed her pathology   -discussed recommendation for chemotherapy   -reviewed PETCT with ?  Liver involvement but explained given operative findings it is unlikely   -f/u MRI results   -ok for cycle #1   -f/u prior to cycle #2   -plan for carbo (auc=6), taxol (175 mg/m2) IV every 3 wks for 6 cycles   -repeat CT after 3 cycles    The total time spent was 40 minutes regarding this patients diagnosis of pelvic mass and >50% of this time was spent counseling and coordinating care    [address] [place] [address]  Gynecologic Oncology  0/50/301193:74 AM

## 2019-02-19 ENCOUNTER — HOSPITAL ENCOUNTER (OUTPATIENT)
Age: 49
Discharge: HOME | End: 2019-02-19
Attending: COLON & RECTAL SURGERY
Payer: MEDICARE

## 2019-02-19 DIAGNOSIS — R19.00 PELVIC MASS IN FEMALE: ICD-10-CM

## 2019-02-19 PROCEDURE — A9577 INJ MULTIHANCE: HCPCS | Performed by: COLON & RECTAL SURGERY

## 2019-02-19 PROCEDURE — 74011250636 HC RX REV CODE- 250/636: Performed by: COLON & RECTAL SURGERY

## 2019-02-19 PROCEDURE — 72197 MRI PELVIS W/O & W/DYE: CPT

## 2019-02-19 RX ADMIN — GADOBENATE DIMEGLUMINE 20 ML: 529 INJECTION, SOLUTION INTRAVENOUS at 20:00

## 2019-02-20 ENCOUNTER — HOSPITAL ENCOUNTER (OUTPATIENT)
Dept: INTERVENTIONAL RADIOLOGY/VASCULAR | Age: 49
Discharge: HOME | End: 2019-02-20
Attending: OBSTETRICS & GYNECOLOGY | Admitting: RADIOLOGY
Payer: MEDICARE

## 2019-02-20 VITALS
TEMPERATURE: 98.4 F | DIASTOLIC BLOOD PRESSURE: 72 MMHG | RESPIRATION RATE: 16 BRPM | OXYGEN SATURATION: 99 % | HEART RATE: 63 BPM | BODY MASS INDEX: 41.64 KG/M2 | HEIGHT: 63 IN | WEIGHT: 235 LBS | SYSTOLIC BLOOD PRESSURE: 126 MMHG

## 2019-02-20 DIAGNOSIS — R19.00 PELVIC MASS: ICD-10-CM

## 2019-02-20 LAB
ANION GAP SERPL CALC-SCNC: 4 MMOL/L (ref 3–18)
APTT PPP: 40.3 SEC (ref 23–36.4)
BUN SERPL-MCNC: 16 MG/DL (ref 7–18)
BUN/CREAT SERPL: 13 (ref 12–20)
CALCIUM SERPL-MCNC: 8.9 MG/DL (ref 8.5–10.1)
CHLORIDE SERPL-SCNC: 109 MMOL/L (ref 100–108)
CO2 SERPL-SCNC: 26 MMOL/L (ref 21–32)
CREAT SERPL-MCNC: 1.24 MG/DL (ref 0.6–1.3)
ERYTHROCYTE [DISTWIDTH] IN BLOOD BY AUTOMATED COUNT: 14.2 % (ref 11.6–14.5)
GLUCOSE SERPL-MCNC: 96 MG/DL (ref 74–99)
HCT VFR BLD AUTO: 36 % (ref 35–45)
HGB BLD-MCNC: 11.9 G/DL (ref 12–16)
INR PPP: 1 (ref 0.8–1.2)
MCH RBC QN AUTO: 28.3 PG (ref 24–34)
MCHC RBC AUTO-ENTMCNC: 33.1 G/DL (ref 31–37)
MCV RBC AUTO: 85.7 FL (ref 74–97)
PLATELET # BLD AUTO: 281 K/UL (ref 135–420)
PMV BLD AUTO: 8.7 FL (ref 9.2–11.8)
POTASSIUM SERPL-SCNC: 4.5 MMOL/L (ref 3.5–5.5)
PROTHROMBIN TIME: 12.8 SEC (ref 11.5–15.2)
RBC # BLD AUTO: 4.2 M/UL (ref 4.2–5.3)
SODIUM SERPL-SCNC: 139 MMOL/L (ref 136–145)
WBC # BLD AUTO: 7.9 K/UL (ref 4.6–13.2)

## 2019-02-20 PROCEDURE — 74011250636 HC RX REV CODE- 250/636: Performed by: RADIOLOGY

## 2019-02-20 PROCEDURE — 85730 THROMBOPLASTIN TIME PARTIAL: CPT

## 2019-02-20 PROCEDURE — 74011000250 HC RX REV CODE- 250: Performed by: RADIOLOGY

## 2019-02-20 PROCEDURE — 36561 INSERT TUNNELED CV CATH: CPT

## 2019-02-20 PROCEDURE — 77030022017 HC DRSG HEMO QCLOT ZMED -A

## 2019-02-20 PROCEDURE — 85610 PROTHROMBIN TIME: CPT

## 2019-02-20 PROCEDURE — C1893 INTRO/SHEATH, FIXED,NON-PEEL: HCPCS

## 2019-02-20 PROCEDURE — C1788 PORT, INDWELLING, IMP: HCPCS

## 2019-02-20 PROCEDURE — C1769 GUIDE WIRE: HCPCS

## 2019-02-20 PROCEDURE — 85027 COMPLETE CBC AUTOMATED: CPT

## 2019-02-20 PROCEDURE — 77030031139 HC SUT VCRL2 J&J -A

## 2019-02-20 PROCEDURE — 80048 BASIC METABOLIC PNL TOTAL CA: CPT

## 2019-02-20 RX ORDER — SODIUM CHLORIDE 9 MG/ML
20 INJECTION, SOLUTION INTRAVENOUS CONTINUOUS
Status: DISCONTINUED | OUTPATIENT
Start: 2019-02-20 | End: 2019-02-20 | Stop reason: HOSPADM

## 2019-02-20 RX ORDER — SODIUM CHLORIDE 0.9 % (FLUSH) 0.9 %
5-40 SYRINGE (ML) INJECTION AS NEEDED
Status: DISCONTINUED | OUTPATIENT
Start: 2019-02-20 | End: 2019-02-20 | Stop reason: HOSPADM

## 2019-02-20 RX ORDER — MIDAZOLAM HYDROCHLORIDE 1 MG/ML
1 INJECTION, SOLUTION INTRAMUSCULAR; INTRAVENOUS
Status: DISCONTINUED | OUTPATIENT
Start: 2019-02-20 | End: 2019-02-20

## 2019-02-20 RX ORDER — CEFAZOLIN SODIUM 2 G/50ML
2 SOLUTION INTRAVENOUS ONCE
Status: COMPLETED | OUTPATIENT
Start: 2019-02-20 | End: 2019-02-20

## 2019-02-20 RX ORDER — SODIUM CHLORIDE 0.9 % (FLUSH) 0.9 %
5-40 SYRINGE (ML) INJECTION EVERY 8 HOURS
Status: DISCONTINUED | OUTPATIENT
Start: 2019-02-20 | End: 2019-02-20 | Stop reason: HOSPADM

## 2019-02-20 RX ORDER — HEPARIN SODIUM (PORCINE) LOCK FLUSH IV SOLN 100 UNIT/ML 100 UNIT/ML
500 SOLUTION INTRAVENOUS AS NEEDED
Status: DISCONTINUED | OUTPATIENT
Start: 2019-02-20 | End: 2019-02-20 | Stop reason: HOSPADM

## 2019-02-20 RX ORDER — FLUMAZENIL 0.1 MG/ML
0.2 INJECTION INTRAVENOUS
Status: DISCONTINUED | OUTPATIENT
Start: 2019-02-20 | End: 2019-02-20 | Stop reason: HOSPADM

## 2019-02-20 RX ORDER — ONDANSETRON 2 MG/ML
4 INJECTION INTRAMUSCULAR; INTRAVENOUS AS NEEDED
Status: DISCONTINUED | OUTPATIENT
Start: 2019-02-20 | End: 2019-02-20 | Stop reason: HOSPADM

## 2019-02-20 RX ORDER — NALOXONE HYDROCHLORIDE 0.4 MG/ML
0.1 INJECTION, SOLUTION INTRAMUSCULAR; INTRAVENOUS; SUBCUTANEOUS
Status: DISCONTINUED | OUTPATIENT
Start: 2019-02-20 | End: 2019-02-20 | Stop reason: HOSPADM

## 2019-02-20 RX ORDER — FENTANYL CITRATE 50 UG/ML
50 INJECTION, SOLUTION INTRAMUSCULAR; INTRAVENOUS
Status: DISCONTINUED | OUTPATIENT
Start: 2019-02-20 | End: 2019-02-20

## 2019-02-20 RX ADMIN — SODIUM CHLORIDE 20 ML/HR: 900 INJECTION, SOLUTION INTRAVENOUS at 08:44

## 2019-02-20 RX ADMIN — MIDAZOLAM HYDROCHLORIDE 1 MG: 2 INJECTION, SOLUTION INTRAMUSCULAR; INTRAVENOUS at 09:05

## 2019-02-20 RX ADMIN — FENTANYL CITRATE 50 MCG: 50 INJECTION INTRAMUSCULAR; INTRAVENOUS at 09:05

## 2019-02-20 RX ADMIN — FENTANYL CITRATE 50 MCG: 50 INJECTION INTRAMUSCULAR; INTRAVENOUS at 09:00

## 2019-02-20 RX ADMIN — HEPARIN SODIUM (PORCINE) LOCK FLUSH IV SOLN 100 UNIT/ML 500 UNITS: 100 SOLUTION at 09:31

## 2019-02-20 RX ADMIN — MIDAZOLAM HYDROCHLORIDE 1 MG: 2 INJECTION, SOLUTION INTRAMUSCULAR; INTRAVENOUS at 09:00

## 2019-02-20 RX ADMIN — MIDAZOLAM HYDROCHLORIDE 1 MG: 2 INJECTION, SOLUTION INTRAMUSCULAR; INTRAVENOUS at 09:20

## 2019-02-20 RX ADMIN — MIDAZOLAM HYDROCHLORIDE 1 MG: 2 INJECTION, SOLUTION INTRAMUSCULAR; INTRAVENOUS at 09:15

## 2019-02-20 RX ADMIN — FENTANYL CITRATE 50 MCG: 50 INJECTION INTRAMUSCULAR; INTRAVENOUS at 09:20

## 2019-02-20 RX ADMIN — ONDANSETRON 4 MG: 2 INJECTION INTRAMUSCULAR; INTRAVENOUS at 08:48

## 2019-02-20 RX ADMIN — FENTANYL CITRATE 50 MCG: 50 INJECTION INTRAMUSCULAR; INTRAVENOUS at 09:15

## 2019-02-20 RX ADMIN — LIDOCAINE HYDROCHLORIDE 300 MG: 10; .005 INJECTION, SOLUTION EPIDURAL; INFILTRATION; INTRACAUDAL; PERINEURAL at 09:11

## 2019-02-20 RX ADMIN — CEFAZOLIN SODIUM 2 G: 2 SOLUTION INTRAVENOUS at 08:45

## 2019-02-20 NOTE — DISCHARGE INSTRUCTIONS
Implanted Port Care for Chemotherapy: Care Instructions  Your Care Instructions  An implanted port is a device placed, in most cases, under the skin of your chest below your collarbone. It is made of plastic, stainless steel, or titanium. The port is about the size of a quarter, but thicker. A thin, flexible tube called a catheter runs from the port into a large vein. A membrane (septum) similar to a pencil eraser is in the center of the port. A nurse uses a needle to put chemotherapy or other medicine and fluids through the septum into a blood vessel. A health professional also can take blood for tests through the port. An implanted port can be used for months. A special needle (called a Iglesias needle) may stay in the port for a short time. The port and catheter need regular care to make sure they do not get blocked. Tell your doctor if you take aspirin or some other blood thinner. These medicines can increase the chance of bleeding inside your body. Follow-up care is a key part of your treatment and safety. Be sure to make and go to all appointments, and call your doctor if you are having problems. It's also a good idea to know your test results and keep a list of the medicines you take. How can you care for yourself at home? · You will probably need to take 1 day off from work and will be able return to normal activities shortly after. This depends on the type of work you do, why you have the port, and how you feel. · You probably will be able to bathe and swim. But you may need to avoid some activities if a Iglesias needle is left in the port. Talk to your doctor about any limits on your activity. · Some clothes may rub the skin over the port. Do not wear a bra or suspenders that irritate your skin near the port. Do not have your blood pressure taken on the arm with the port. · You will get a medical alert card with information about your port. Carry this with you.  It will tell health care workers you have a port in case you need emergency care. · Your port will need regular flushing to keep it open. A nurse or other health professional will do this for you. When should you call for help? Call your doctor now or seek immediate medical care if:    · You have signs of infection, such as:  ? Increased pain, swelling, warmth, or redness near the port. ? Red streaks leading from the port. ? Pus draining from the port. ? A fever.     · You have pain or swelling in your neck or arm.     · You have trouble breathing.    Watch closely for changes in your health, and be sure to contact your doctor if:    · You have any problems with your port. Where can you learn more? Go to http://raul-vee.info/. Enter [phone] in the search box to learn more about \"Implanted [facility] [place] for Chemotherapy: Care Instructions. \"  Current as of: September 23, 2018  Content Version: 11.9  © 3043-1756 [employer]. Care instructions adapted under license by [employer] (which disclaims liability or warranty for this information). If you have questions about a medical condition or this instruction, always ask your healthcare professional. [place] [address] any warranty or liability for your use of this information.

## 2019-02-20 NOTE — PROGRESS NOTES
Report received from Onofre, [facility]. No acute distress noted. Pt just returned from procedure. Vitals stable. Pt has no complaints.

## 2019-02-20 NOTE — PROGRESS NOTES
TRANSFER - OUT REPORT:    Verbal report given to MOISES KIMBROUGH RN(name) on Berneice Sessions  being transferred to [facility](unit) for routine post - op       Report consisted of patients Situation, Background, Assessment and   Recommendations(SBAR). Information from the following report(s) SBAR, Kardex and MAR was reviewed with the receiving nurse. Lines:       Opportunity for questions and clarification was provided.       Patient transported with:   [employer]

## 2019-02-20 NOTE — PROGRESS NOTES
I have reviewed discharge instructions with the patient. The patient verbalized understanding. Pt still has Mediport accessed. Per MD, pt allowed to have this while going home for Chemo tomorrow. Pt appears in no acute distress. Ambulatory w/ no complaints. No bleeding, or sweeling noted at mediport site. Pt knows to avoid showers and baths while port is accessed and to maintain dressing integrity.

## 2019-02-20 NOTE — H&P
OUTPATIENT HISTORY AND PHYSICAL      Today 2/20/2019     Indication/Symptoms:   Lovey Kanner is a 51 y.o. female with peritoneal carcinoma who presents for an image-guided mediport placement with moderate sedation. Patient has been NPO since midnight and takes no blood thinning medications. Current Meds:    Prior to Admission medications    Medication Sig Start Date End Date Taking? Authorizing Provider   melatonin 3 mg tablet Take 3 mg by mouth nightly. Yes Provider, Historical   amLODIPine (NORVASC) 10 mg tablet take 1 tablet by mouth once daily 1/2/19  Yes Edie Gant MD   losartan (COZAAR) 100 mg tablet take 1 tablet by mouth once daily 12/4/18  Yes Edie Gant MD   metoprolol succinate (TOPROL-XL) 100 mg tablet take 1 tablet by mouth once daily 11/27/18  Yes Edie Gant MD   plecanatide (TRULANCE) 3 mg tab Take  by mouth. Yes Provider, Historical   simvastatin (ZOCOR) 20 mg tablet take 1 tablet by mouth at bedtime 4/10/18  Yes Edie Gant MD   OXcarbazepine (TRILEPTAL) 300 mg tablet Take 300 mg by mouth two (2) times a day. 2/7/18  Yes Provider, Historical   levocetirizine (XYZAL) 5 mg tablet daily as needed. 5/29/17  Yes Provider, Historical   ziprasidone (GEODON) 40 mg capsule Take 40 mg by mouth nightly. Yes Provider, Historical   montelukast (SINGULAIR) 10 mg tablet Take 10 mg by mouth nightly. 2/2/16  Yes Provider, Historical   FIBER, PSYLLIUM HUSK, PO Take  by mouth daily as needed. Yes Provider, Historical   latanoprost (XALATAN) 0.005 % ophthalmic solution Administer 1 Drop to both eyes nightly. Yes Provider, Historical   HYDROmorphone (DILAUDID) 2 mg tablet Take 1-2 Tabs by mouth every four (4) hours as needed. Max Daily Amount: 24 mg. 2/18/19   Johnnie Franklin MD   dexamethasone (DECADRON) 4 mg tablet Take 40 mg by mouth as needed.  Take 10 tabs the night before Chemo #1 and Chemo #2. 2/14/19   Johnnie Franklin MD   promethazine (PHENERGAN) 25 mg tablet Take 1 Tab by mouth every six (6) hours as needed for Nausea. 2/14/19   Dalton Coffey MD   ondansetron hcl [facility]) 4 mg tablet Take 1 Tab by mouth every six (6) hours as needed for Nausea. 2/14/19   Dalton Coffey MD   LORazepam (ATIVAN) 0.5 mg tablet Take 0.5 mg by mouth two (2) times daily as needed. 1/16/18   Provider, Historical   hydrOXYzine pamoate (VISTARIL) 50 mg capsule 2 Caps 2 Times Daily As Needed. 12/27/17   Provider, Historical   carisoprodol (SOMA) 350 mg tablet take 1 tablet by mouth three times a day and 1 tablet at bedtime (currently out of Rx) 11/3/17   Provider, Historical       Allergies: Allergies   Allergen Reactions    Other Medication Hives     seafood    Shellfish Derived Hives       Comorbid Conditions:    Past Medical History:   Diagnosis Date    AR (allergic rhinitis)     seasonal    Cancer ([facility] [address]     pt states between vgina and rectal area    Cardiac echocardiogram 04/11/2016    Tech difficult. EF 55-60%. No RWMA. Mild conc LVH. Gr 1 DDfx. RVSP normal.      Cardiac nuclear imaging test 05/05/2016    Low risk. Very patchy radiotracer uptake. Mild anterior artifact, low suspicion for ischemia. EF 68%. No RWMA. Normal EKG on pharm stress test.    Cardiovascular LE arterial duplex 10/07/2013    No significant arterial disease at rest bilaterally. R JUNE 1.21.  L JUNE 1.16. No significant sm vessel disease.     Depression     Dr. Inga Sims, suicide attempt 2/12    Diverticulosis     Endometriosis     s/p hysterectomy 2006    GERD (gastroesophageal reflux disease)     Glaucoma     Dr. Laith Toth HTN (hypertension)     Hx of colonoscopy 04/05/2017    mild diverticulosis, g1 internal hemorrhoids, normal colon , repeat 10 year 2027    Hx of suicide attempt     Hyperlipidemia LDL goal < 130     IBS (irritable bowel syndrome)     Ill-defined condition     environmental allergies    Insomnia     Malignant neoplasm of peritoneum ([address] 2019    Nausea & vomiting     OA (osteoarthritis)     both knees, lower back    Preeclampsia     PTSD (post-traumatic stress disorder)     Seizures (HCC)     1 x with childbirth, had toxemia    Sleep apnea     does not use cpap machine    Spinal stenosis     Dr. Darrius Agustin          Past Surgical History:   Procedure Laterality Date    COLONOSCOPY N/A 2017    COLONOSCOPY performed by Sander Rogel MD at [facility] Left 2009    External fixator    FLEXIBLE SIGMOIDOSCOPY N/A 2019    SIGMOIDOSCOPY FLEXIBLE performed by Gayle Daniel MD at [facility] ENDOSCOPY    HX  SECTION      HX COLONOSCOPY  2017    DR. MCRAE 2017     HX HYSTERECTOMY      HX KNEE ARTHROSCOPY Left     left knee    HX KNEE REPLACEMENT Bilateral     (R)  (L)     HX LAPAROTOMY N/A 2019    and rectovaginal bx    HX OTHER SURGICAL  2016    all teeth removed    HX SALPINGO-OOPHORECTOMY  2008    HX TUBAL LIGATION      MULTIPLE DELIVERY       1990    TOTAL ABDOM HYSTERECTOMY       Data:    Visit Vitals  /73   Pulse 60   Temp 98.4 °F (36.9 °C)   Resp 21   Ht 5' 3\" (1.6 m)   Wt 106.6 kg (235 lb)   LMP 2008   SpO2 100%   BMI 41.63 kg/m²   :  Recent Labs     19  0748        Recent Labs     19  0748   INR 1.0   APTT 40.3*       The H & P and/or progress notes and any available imaging were reviewed. The risks, indications and possible alternatives to the procedure, including doing nothing, were discussed and informed consent was obtained. Physical Exam:      Mental status:   Alert and oriented. Examination specific to the procedure proposed to be performed and any co morbid conditions:   Mallampati classification 2 ,  ASA 2   Heart:   Regular rate. Lungs:   Normal respiratory effort.  Symmetrical rise and fall of chest    The patient is an appropriate candidate to undergo the planned procedure and sedation.     Cynthia Torres

## 2019-02-20 NOTE — PROCEDURES
Interventional Radiology Brief Post Procedure Note     Procedure Performed: Mediport     : Kwasi Yanez PA-C     Assistant: None    Attending: Dr. Lily Mary     Pre-operative Diagnosis: Peritoneal carcinoma requiring chemotherapy treatment     Post-operative Diagnosis: Same      Anesthesia: 1% lidocaine and IV moderate sedation with Versed and Fentanyl administered and monitored by qualified nursing staff.      Findings:    - Written and verbal informed consent was obtained. - Time Out was performed. - Permanent image storage performed on PACS. - Image-guided right chest single lumen Mediport placement performed using maximum barrier precautions and sterile technique.    - Please refer to report on PACS for full details.      Specimens: None     Complications: No immediate     Estimated Blood Loss:  Minimal     Blood transfusions:  None.     Implants: Please see PACS report.      Condition: Stable      Disposition: Nursing recovery unit      Impression: Successful right chest single lumen Mediport placement     Cynthia Collins [address]

## 2019-02-21 ENCOUNTER — HOSPITAL ENCOUNTER (OUTPATIENT)
Dept: INFUSION THERAPY | Age: 49
Discharge: HOME | End: 2019-02-21
Payer: MEDICARE

## 2019-02-21 VITALS
HEIGHT: 63 IN | OXYGEN SATURATION: 95 % | SYSTOLIC BLOOD PRESSURE: 126 MMHG | TEMPERATURE: 98.8 F | HEART RATE: 84 BPM | BODY MASS INDEX: 41.99 KG/M2 | WEIGHT: 237 LBS | RESPIRATION RATE: 18 BRPM | DIASTOLIC BLOOD PRESSURE: 81 MMHG

## 2019-02-21 DIAGNOSIS — C48.2 MALIGNANT NEOPLASM OF PERITONEUM (HCC): Primary | ICD-10-CM

## 2019-02-21 LAB
ALBUMIN SERPL-MCNC: 3.4 G/DL (ref 3.4–5)
ALBUMIN/GLOB SERPL: 0.8 {RATIO} (ref 0.8–1.7)
ALP SERPL-CCNC: 78 U/L (ref 45–117)
ALT SERPL-CCNC: 12 U/L (ref 13–56)
ANION GAP SERPL CALC-SCNC: 8 MMOL/L (ref 3–18)
APPEARANCE UR: CLEAR
AST SERPL-CCNC: 10 U/L (ref 15–37)
BASO+EOS+MONOS # BLD AUTO: 0.1 K/UL (ref 0–2.3)
BASO+EOS+MONOS NFR BLD AUTO: 1 % (ref 0.1–17)
BILIRUB SERPL-MCNC: 0.3 MG/DL (ref 0.2–1)
BILIRUB UR QL: NEGATIVE
BUN SERPL-MCNC: 20 MG/DL (ref 7–18)
BUN/CREAT SERPL: 17 (ref 12–20)
CALCIUM SERPL-MCNC: 9.7 MG/DL (ref 8.5–10.1)
CHLORIDE SERPL-SCNC: 109 MMOL/L (ref 100–108)
CO2 SERPL-SCNC: 24 MMOL/L (ref 21–32)
COLOR UR: YELLOW
CREAT SERPL-MCNC: 1.15 MG/DL (ref 0.6–1.3)
DIFFERENTIAL METHOD BLD: ABNORMAL
EPITH CASTS URNS QL MICRO: NORMAL /LPF (ref 0–5)
ERYTHROCYTE [DISTWIDTH] IN BLOOD BY AUTOMATED COUNT: 13.5 % (ref 11.5–14.5)
GLOBULIN SER CALC-MCNC: 4.3 G/DL (ref 2–4)
GLUCOSE SERPL-MCNC: 146 MG/DL (ref 74–99)
GLUCOSE UR STRIP.AUTO-MCNC: NEGATIVE MG/DL
HCT VFR BLD AUTO: 36 % (ref 36–48)
HGB BLD-MCNC: 11.8 G/DL (ref 12–16)
HGB UR QL STRIP: ABNORMAL
KETONES UR QL STRIP.AUTO: NEGATIVE MG/DL
LEUKOCYTE ESTERASE UR QL STRIP.AUTO: ABNORMAL
LYMPHOCYTES # BLD: 0.6 K/UL (ref 1.1–5.9)
LYMPHOCYTES NFR BLD: 7 % (ref 14–44)
MAGNESIUM SERPL-MCNC: 2.2 MG/DL (ref 1.6–2.6)
MCH RBC QN AUTO: 28.6 PG (ref 25–35)
MCHC RBC AUTO-ENTMCNC: 32.8 G/DL (ref 31–37)
MCV RBC AUTO: 87.4 FL (ref 78–102)
NEUTS SEG # BLD: 8 K/UL (ref 1.8–9.5)
NEUTS SEG NFR BLD: 92 % (ref 40–70)
NITRITE UR QL STRIP.AUTO: NEGATIVE
PH UR STRIP: 5.5 [PH] (ref 5–8)
PLATELET # BLD AUTO: 320 K/UL (ref 140–440)
POTASSIUM SERPL-SCNC: 4.3 MMOL/L (ref 3.5–5.5)
PROT SERPL-MCNC: 7.7 G/DL (ref 6.4–8.2)
PROT UR STRIP-MCNC: 100 MG/DL
RBC # BLD AUTO: 4.12 M/UL (ref 4.1–5.1)
RBC #/AREA URNS HPF: NORMAL /HPF (ref 0–5)
SODIUM SERPL-SCNC: 141 MMOL/L (ref 136–145)
SP GR UR REFRACTOMETRY: 1.01 (ref 1–1.03)
UROBILINOGEN UR QL STRIP.AUTO: 1 EU/DL (ref 0.2–1)
WBC # BLD AUTO: 8.7 K/UL (ref 4.5–13)
WBC URNS QL MICRO: NORMAL /HPF (ref 0–5)

## 2019-02-21 PROCEDURE — 87086 URINE CULTURE/COLONY COUNT: CPT

## 2019-02-21 PROCEDURE — 96375 TX/PRO/DX INJ NEW DRUG ADDON: CPT

## 2019-02-21 PROCEDURE — 81001 URINALYSIS AUTO W/SCOPE: CPT

## 2019-02-21 PROCEDURE — 96367 TX/PROPH/DG ADDL SEQ IV INF: CPT

## 2019-02-21 PROCEDURE — 86304 IMMUNOASSAY TUMOR CA 125: CPT

## 2019-02-21 PROCEDURE — 96413 CHEMO IV INFUSION 1 HR: CPT

## 2019-02-21 PROCEDURE — 85025 COMPLETE CBC W/AUTO DIFF WBC: CPT

## 2019-02-21 PROCEDURE — 74011250637 HC RX REV CODE- 250/637: Performed by: OBSTETRICS & GYNECOLOGY

## 2019-02-21 PROCEDURE — 96415 CHEMO IV INFUSION ADDL HR: CPT

## 2019-02-21 PROCEDURE — 74011000258 HC RX REV CODE- 258: Performed by: OBSTETRICS & GYNECOLOGY

## 2019-02-21 PROCEDURE — 96417 CHEMO IV INFUS EACH ADDL SEQ: CPT

## 2019-02-21 PROCEDURE — 74011000250 HC RX REV CODE- 250: Performed by: OBSTETRICS & GYNECOLOGY

## 2019-02-21 PROCEDURE — 80053 COMPREHEN METABOLIC PANEL: CPT

## 2019-02-21 PROCEDURE — 83735 ASSAY OF MAGNESIUM: CPT

## 2019-02-21 PROCEDURE — 36415 COLL VENOUS BLD VENIPUNCTURE: CPT

## 2019-02-21 PROCEDURE — 74011250636 HC RX REV CODE- 250/636: Performed by: OBSTETRICS & GYNECOLOGY

## 2019-02-21 RX ORDER — SODIUM CHLORIDE 0.9 % (FLUSH) 0.9 %
10 SYRINGE (ML) INJECTION AS NEEDED
Status: DISPENSED | OUTPATIENT
Start: 2019-02-21 | End: 2019-02-21

## 2019-02-21 RX ORDER — SODIUM CHLORIDE 9 MG/ML
500 INJECTION, SOLUTION INTRAVENOUS ONCE
Status: COMPLETED | OUTPATIENT
Start: 2019-02-21 | End: 2019-02-21

## 2019-02-21 RX ORDER — SODIUM CHLORIDE 9 MG/ML
10 INJECTION INTRAMUSCULAR; INTRAVENOUS; SUBCUTANEOUS AS NEEDED
Status: ACTIVE | OUTPATIENT
Start: 2019-02-21 | End: 2019-02-21

## 2019-02-21 RX ORDER — HEPARIN 100 UNIT/ML
300-500 SYRINGE INTRAVENOUS AS NEEDED
Status: DISPENSED | OUTPATIENT
Start: 2019-02-21 | End: 2019-02-21

## 2019-02-21 RX ORDER — LORAZEPAM 2 MG/ML
0.26 INJECTION INTRAMUSCULAR ONCE
Status: COMPLETED | OUTPATIENT
Start: 2019-02-21 | End: 2019-02-21

## 2019-02-21 RX ORDER — DIPHENHYDRAMINE HCL 25 MG
50 CAPSULE ORAL ONCE
Status: COMPLETED | OUTPATIENT
Start: 2019-02-21 | End: 2019-02-21

## 2019-02-21 RX ORDER — PALONOSETRON 0.05 MG/ML
0.25 INJECTION, SOLUTION INTRAVENOUS ONCE
Status: COMPLETED | OUTPATIENT
Start: 2019-02-21 | End: 2019-02-21

## 2019-02-21 RX ADMIN — SODIUM CHLORIDE, PRESERVATIVE FREE 500 UNITS: 5 INJECTION INTRAVENOUS at 14:51

## 2019-02-21 RX ADMIN — SODIUM CHLORIDE 10 ML: 9 INJECTION INTRAMUSCULAR; INTRAVENOUS; SUBCUTANEOUS at 14:51

## 2019-02-21 RX ADMIN — DEXAMETHASONE SODIUM PHOSPHATE 12 MG: 4 INJECTION, SOLUTION INTRA-ARTICULAR; INTRALESIONAL; INTRAMUSCULAR; INTRAVENOUS; SOFT TISSUE at 09:24

## 2019-02-21 RX ADMIN — DIPHENHYDRAMINE HYDROCHLORIDE 50 MG: 25 CAPSULE ORAL at 09:21

## 2019-02-21 RX ADMIN — PACLITAXEL 360 MG: 6 INJECTION, SOLUTION INTRAVENOUS at 10:29

## 2019-02-21 RX ADMIN — FAMOTIDINE 20 MG: 10 INJECTION, SOLUTION INTRAVENOUS at 09:24

## 2019-02-21 RX ADMIN — PALONOSETRON HYDROCHLORIDE 0.25 MG: 0.25 INJECTION, SOLUTION INTRAVENOUS at 09:23

## 2019-02-21 RX ADMIN — CARBOPLATIN 620 MG: 10 INJECTION, SOLUTION INTRAVENOUS at 13:41

## 2019-02-21 RX ADMIN — SODIUM CHLORIDE 150 MG: 900 INJECTION, SOLUTION INTRAVENOUS at 09:30

## 2019-02-21 RX ADMIN — SODIUM CHLORIDE 500 ML: 9 INJECTION, SOLUTION INTRAVENOUS at 09:21

## 2019-02-21 RX ADMIN — LORAZEPAM 0.26 MG: 2 INJECTION INTRAMUSCULAR; INTRAVENOUS at 09:24

## 2019-02-21 NOTE — PROGRESS NOTES
[facility] Progress Note    Date: 2019    Name: Varsha Mccracken              MRN: [id number]              : 1970    Chemotherapy Cycle:1  Paclitaxel/Carboplatin       Pt to [facility], ambulatory, at 0800 accompanied by her daughter. Ms. Tatyana Islas was assessed and education was provided. Ms. Cannon's vitals were reviewed. Visit Vitals  /81 (BP 1 Location: Right arm, BP Patient Position: Sitting)   Pulse 84   Temp 98.8 °F (37.1 °C)   Resp 18   Ht 5' 3\" (1.6 m)   Wt 107.5 kg (237 lb)   SpO2 95%   Breastfeeding? No   BMI 41.98 kg/m²       Right chest mediport accessed with 20 g 1 inch upton needle yesterday. Port flushed easily and had brisk blood return. Blood drawn off and sent for CBC, CMP, CA-125 and Magnesium per written orders after 10 ml waste. NS initiated @ 25. Urine specimen sent to lab for UA and culture. Lab results were obtained and reviewed. Recent Results (from the past 12 hour(s))   CBC WITH 3 PART DIFF    Collection Time: 19  8:32 AM   Result Value Ref Range    WBC 8.7 4.5 - 13.0 K/uL    RBC 4.12 4.10 - 5.10 M/uL    HGB 11.8 (L) 12.0 - 16 g/dL    HCT 36.0 36 - 48 %    MCV 87.4 78 - 102 FL    MCH 28.6 25.0 - 35.0 PG    MCHC 32.8 31 - 37 g/dL    RDW 13.5 11.5 - 14.5 %    PLATELET 189 762 - 923 K/uL    NEUTROPHILS 92 (H) 40 - 70 %    MIXED CELLS 1 0.1 - 17 %    LYMPHOCYTES 7 (L) 14 - 44 %    ABS. NEUTROPHILS 8.0 1.8 - 9.5 K/UL    ABS. MIXED CELLS 0.1 0.0 - 2.3 K/uL    ABS.  LYMPHOCYTES 0.6 (L) 1.1 - 5.9 K/UL    DF AUTOMATED     METABOLIC PANEL, COMPREHENSIVE    Collection Time: 19  8:32 AM   Result Value Ref Range    Sodium 141 136 - 145 mmol/L    Potassium 4.3 3.5 - 5.5 mmol/L    Chloride 109 (H) 100 - 108 mmol/L    CO2 24 21 - 32 mmol/L    Anion gap 8 3.0 - 18 mmol/L    Glucose 146 (H) 74 - 99 mg/dL    BUN 20 (H) 7.0 - 18 MG/DL    Creatinine 1.15 0.6 - 1.3 MG/DL    BUN/Creatinine ratio 17 12 - 20      GFR est AA >60 >60 ml/min/1.73m2    GFR est non-AA 50 (L) >60 ml/min/1.73m2    Calcium 9.7 8.5 - 10.1 MG/DL    Bilirubin, total 0.3 0.2 - 1.0 MG/DL    ALT (SGPT) 12 (L) 13 - 56 U/L    AST (SGOT) 10 (L) 15 - 37 U/L    Alk. phosphatase 78 45 - 117 U/L    Protein, total 7.7 6.4 - 8.2 g/dL    Albumin 3.4 3.4 - 5.0 g/dL    Globulin 4.3 (H) 2.0 - 4.0 g/dL    A-G Ratio 0.8 0.8 - 1.7     MAGNESIUM    Collection Time: 02/21/19  8:32 AM   Result Value Ref Range    Magnesium 2.2 1.6 - 2.6 mg/dL   URINALYSIS W/ RFLX MICROSCOPIC    Collection Time: 02/21/19  9:58 AM   Result Value Ref Range    Color YELLOW      Appearance CLEAR      Specific gravity 1.010 1.005 - 1.030      pH (UA) 5.5 5.0 - 8.0      Protein 100 (A) NEG mg/dL    Glucose NEGATIVE  NEG mg/dL    Ketone NEGATIVE  NEG mg/dL    Bilirubin NEGATIVE  NEG      Blood SMALL (A) NEG      Urobilinogen 1.0 0.2 - 1.0 EU/dL    Nitrites NEGATIVE  NEG      Leukocyte Esterase LARGE (A) NEG     URINE MICROSCOPIC ONLY    Collection Time: 02/21/19  9:58 AM   Result Value Ref Range    WBC TOO NUMEROUS TO COUNT 0 - 5 /hpf    RBC 1 to 3 0 - 5 /hpf    Epithelial cells FEW 0 - 5 /lpf       Lab results within ordered parameters to give chemo today. ANC = 8, PLT = 320. Chemo dosages verified with today's BSA and found to be within 10% of ordered dosages. Pre-medications (Emend, Benadryl, Aloxi, Decadron, Pepcid, Ativan) were administered as ordered and chemotherapy was initiated after blood return from port re-verified. Reviewed expected side effects of premeds with patient. Taxol 360 mg infused over 3 hours as ordered. VS stable at end of infusion and pt denied complaints. Line flushed with NS and blood return from port re-verified. Carboplatin 620 mg infused over 1 hour as ordered. VS stable at end of infusion and pt denied complaints. Line flushed with NS and blood return from port re-verified. Patient stayed for 30 minutes post infusion for observation and to complete 500ml NS bag.     Ms. Lorelie Arabia tolerated infusion, and had no complaints at this time. Mediport flushed with NS 20 ml and Heparin 500 units then de-accessed. No irritation or bleeding noted. Bandaid applied. Patient armband removed and shredded. Ms. Faiza Ochoa was discharged from [place] [address] in stable condition at 1510. She is to return on 2/28/19 at 0800 for her next Hydration appointment.     Say Tavares RN  February 21, 2019  1510

## 2019-02-22 LAB
BACTERIA SPEC CULT: NORMAL
CANCER AG125 SERPL-ACNC: 18.4 U/ML (ref 0–38.1)
SERVICE CMNT-IMP: NORMAL

## 2019-02-25 ENCOUNTER — TELEPHONE (OUTPATIENT)
Dept: ONCOLOGY | Age: 49
End: 2019-02-25

## 2019-02-25 RX ORDER — METOPROLOL SUCCINATE 100 MG/1
TABLET, EXTENDED RELEASE ORAL
Qty: 90 TAB | Refills: 0 | Status: SHIPPED | OUTPATIENT
Start: 2019-02-25 | End: 2019-02-26 | Stop reason: SDUPTHER

## 2019-02-25 RX ORDER — LOSARTAN POTASSIUM 100 MG/1
TABLET ORAL
Qty: 90 TAB | Refills: 0 | Status: SHIPPED | OUTPATIENT
Start: 2019-02-25 | End: 2019-02-26 | Stop reason: SDUPTHER

## 2019-02-25 NOTE — TELEPHONE ENCOUNTER
Patient called to say she was having \"a little numbness/tingling\" in her hands and feet. She also had some nausea. She had completed Cycle  #1 on Thursday (2-1-19). She said she is taking her B vitamins, but has not taken any Zofran. I advised to take the zofran as prescribed per Dr. Loraine Skiff, to increase her fluids and report if her numbness/tingling gets worse. Discussed with Dr. Loraine Skiff. She will see him in follow up prior to Cycle #2.

## 2019-02-26 ENCOUNTER — OFFICE VISIT (OUTPATIENT)
Dept: FAMILY MEDICINE CLINIC | Age: 49
End: 2019-02-26

## 2019-02-26 VITALS
RESPIRATION RATE: 17 BRPM | HEART RATE: 63 BPM | TEMPERATURE: 98.7 F | SYSTOLIC BLOOD PRESSURE: 118 MMHG | BODY MASS INDEX: 42.59 KG/M2 | OXYGEN SATURATION: 98 % | HEIGHT: 63 IN | WEIGHT: 240.4 LBS | DIASTOLIC BLOOD PRESSURE: 70 MMHG

## 2019-02-26 DIAGNOSIS — C48.2 MALIGNANT NEOPLASM OF PERITONEUM (HCC): ICD-10-CM

## 2019-02-26 DIAGNOSIS — R19.00 PELVIC MASS IN FEMALE: ICD-10-CM

## 2019-02-26 DIAGNOSIS — L24.A9 WOUND DRAINAGE: Primary | ICD-10-CM

## 2019-02-26 DIAGNOSIS — I10 ESSENTIAL HYPERTENSION: ICD-10-CM

## 2019-02-26 RX ORDER — SIMVASTATIN 20 MG/1
TABLET, FILM COATED ORAL
Qty: 90 TAB | Refills: 3 | Status: SHIPPED | OUTPATIENT
Start: 2019-02-26 | End: 2020-01-13

## 2019-02-26 RX ORDER — LOSARTAN POTASSIUM 100 MG/1
TABLET ORAL
Qty: 90 TAB | Refills: 1 | Status: SHIPPED | OUTPATIENT
Start: 2019-02-26 | End: 2019-11-09 | Stop reason: SDUPTHER

## 2019-02-26 RX ORDER — METOPROLOL SUCCINATE 100 MG/1
TABLET, EXTENDED RELEASE ORAL
Qty: 90 TAB | Refills: 1 | Status: SHIPPED | OUTPATIENT
Start: 2019-02-26 | End: 2019-11-09 | Stop reason: SDUPTHER

## 2019-02-26 NOTE — PROGRESS NOTES
1. Have you been to the ER, urgent care clinic since your last visit? Hospitalized since your last visit? [facility] 2/2019 Pelvic mass    2. Have you seen or consulted any other health care providers outside of the [address] [place] [address] since your last visit? Include any pap smears or colon screening.  No    Chief Complaint   Patient presents with    Hypertension    Other     Pre-DM    Cholesterol Problem    Fatigue     times 2 weeks    Laceration     stomach, times 3 days

## 2019-02-26 NOTE — PROGRESS NOTES
SUBJECTIVE  Ff-up  Recently diagnosed with peritoneal cancer/pelvic mass. S/p  Exploratory laparotomy, exploration of retroperitoneal spaces, exam under anesthesia with biopsy of rectovaginal mass on 1/17/2019. Had sigmoidoscopy which revealed submucosal mass. she started her first chemo last week    HTN- taking medications without problems. HL- on zocor    Bipolar d/o- per pt doing well, following psychiatrist.    Reports pain and bleeding on L groin area past 2 days. She says may have used a different lotion, other than than no fever, known trauma. ROS:  See HPI, all others negative        Patient Active Problem List   Diagnosis Code    OA (osteoarthritis) M19.90    Obesity E66.9    Mixed hyperlipidemia E78.2    Depression F32.9    Menopause Z78.0    Knee pain, right M25.561    GERD (gastroesophageal reflux disease) K21.9    S/P TKR (total knee replacement) Z96.659    PTSD (post-traumatic stress disorder) F43.10    Essential hypertension I10    Morbid obesity (HCC) E66.01    Arthritis, degenerative M19.90    Gastroesophageal reflux disease without esophagitis K21.9    ANAMIKA on CPAP G47.33, Z99.89    Prediabetes R73.03    SUAOZ (dyspnea on exertion) R06.09    Dental caries K02.9    Chronic periodontal disease K05.6    Advance care planning Z71.89    Bipolar 1 disorder (HCC) F31.9    Irritable bowel syndrome with both constipation and diarrhea K58.2       Current Outpatient Prescriptions   Medication Sig Dispense Refill    plecanatide (TRULANCE) 3 mg tab Take  by mouth.  losartan (COZAAR) 100 mg tablet Take 1 Tab by mouth daily. 90 Tab 0    metoprolol succinate (TOPROL-XL) 100 mg tablet Take 1 Tab by mouth daily.  90 Tab 0    amLODIPine (NORVASC) 10 mg tablet take 1 tablet by mouth daily 90 Tab 0    simvastatin (ZOCOR) 20 mg tablet take 1 tablet by mouth at bedtime 90 Tab 3    OXcarbazepine (TRILEPTAL) 300 mg tablet Take 300 mg by mouth two (2) times a day.  0    LORazepam (ATIVAN) 0.5 mg tablet Take 0.5 mg by mouth two (2) times a day.  hydrOXYzine pamoate (VISTARIL) 50 mg capsule 2 Caps 2 Times Daily As Needed.  carisoprodol (SOMA) 350 mg tablet take 1 tablet by mouth three times a day and 1 tablet at bedtime  0    BLACK COHOSH PO Take  by mouth.  levocetirizine (XYZAL) 5 mg tablet       ziprasidone (GEODON) 40 mg capsule Take 40 mg by mouth daily.  melatonin 3 mg tablet Take 3 mg by mouth nightly.  montelukast (SINGULAIR) 10 mg tablet Take 10 mg by mouth nightly. 1    FIBER, PSYLLIUM HUSK, PO Take  by mouth.  latanoprost (XALATAN) 0.005 % ophthalmic solution Administer 1 Drop to both eyes nightly.  LACTOBACILLUS ACIDOPHILUS (PROBIOTIC PO) Take  by mouth.  mometasone-formoterol (DULERA) 100-5 mcg/actuation HFA inhaler Take 2 Puffs by inhalation two (2) times a day. 1 Inhaler 5       Allergies   Allergen Reactions    Other Medication Hives     seafood    Shellfish Derived Hives       Past Medical History:   Diagnosis Date    AR (allergic rhinitis)     seasonal    Asthma     Cardiac echocardiogram 04/11/2016    Tech difficult. EF 55-60%. No RWMA. Mild conc LVH. Gr 1 DDfx. RVSP normal.      Cardiac nuclear imaging test 05/05/2016    Low risk. Very patchy radiotracer uptake. Mild anterior artifact, low suspicion for ischemia. EF 68%. No RWMA. Normal EKG on pharm stress test.    Cardiovascular LE arterial duplex 10/07/2013    No significant arterial disease at rest bilaterally. R JUNE 1.21.  L JUNE 1.16. No significant sm vessel disease.     Depression     Dr. Chanelle Mendieta, suicide attempt 2/12    Endometriosis     s/p hysterectomy 2006    GERD (gastroesophageal reflux disease)     Glaucoma     Dr. Mary Whitfield HTN (hypertension)     Hx of colonoscopy 04/05/2017    mild diverticulosis, g1 internal hemorrhoids, normal colon , repeat 10 year 2027    Hx of suicide attempt     Hyperlipidemia LDL goal < 130     IBS (irritable bowel syndrome)     Insomnia     Nausea & vomiting     OA (osteoarthritis)     both knees, lower back    Preeclampsia     PTSD (post-traumatic stress disorder)     Sleep apnea     does not use cpap machine    Spinal stenosis     Dr. Dian Moran       Social History     Social History    Marital status: SINGLE     Spouse name: N/A    Number of children: N/A    Years of education: N/A     Occupational History    disabled     student      Social History Main Topics    Smoking status: Never Smoker    Smokeless tobacco: Never Used    Alcohol use No    Drug use: No    Sexual activity: Not on file     Other Topics Concern    Not on file     Social History Narrative       Family History   Problem Relation Age of Onset    Heart Disease Mother      CHF    Diabetes Mother     Hypertension Mother     Kidney Disease Mother     Breast Cancer Mother     Stroke Mother     Diabetes Father     Hypertension Father     Heart Attack Father      MI    Cancer Maternal Grandmother      stomach    Cancer Other      breast    Heart Attack Other      MI    Ovarian Cancer Maternal Aunt          OBJECTIVE    Physical Exam:     Visit Vitals  /70 (BP 1 Location: Left arm, BP Patient Position: Sitting)   Pulse 63   Temp 98.7 °F (37.1 °C) (Oral)   Resp 17   Ht 5' 3\" (1.6 m)   Wt 240 lb 6.4 oz (109 kg)   LMP 01/31/2008   SpO2 98%   BMI 42.58 kg/m²       General: alert,obese, AA, in no apparent distress or pain  CVS: normal rate, regular rhythm, distinct S1 and S2  Lungs:clear to ausculation bilaterally, no crackles, wheezing or rhonchi noted  Abdomen: soft, NT,ND  Skin: large approx 4x 6 cm superficial bleeding wound with pinkish base, without surrounding erythema on L groin under abdominal pannus. Applied pressure until hemostasis achieved. Cleaned with NSS, betadyne and applied bacitracin and packed with gauze. Psych:  mood and affect normal    ASSESSMENT/PLAN  Diagnoses and all orders for this visit:    1. Wound drainage  Cleaned and packed  ? Cause, does not appear infected. will inform Dr. Leatha Holder, she has appt with them in 2 days for her 2nd round of chemo  She lives alone and does not drive with limited transportation, will have home health established. Refer to wound care  -     [address]    2. Pelvic mass in female  -     [address] [place] [address]    3. Malignant neoplasm of peritoneum ([facility] [address]  -     [address] [place] [address]    4. Essential hypertension  Controlled, cont current meds    Other orders  -     simvastatin (ZOCOR) 20 mg tablet; take 1 tablet by mouth at bedtime  -     metoprolol succinate (TOPROL-XL) 100 mg tablet; take 1 tablet by mouth once daily  -     losartan (COZAAR) 100 mg tablet; take 1 tablet by mouth once daily    Follow-up Disposition:  Return in about  2 days or sooner prn  Patient understands plan of care. Patient has provided input and agrees with goals.

## 2019-02-26 NOTE — PATIENT INSTRUCTIONS
Wound Care: After Your Visit  Your Care Instructions  Taking good care of your wound at home will help it heal quickly and reduce your chance of infection. The doctor has checked you carefully, but problems can develop later. If you notice any problems or new symptoms, get medical treatment right away. Follow-up care is a key part of your treatment and safety. Be sure to make and go to all appointments, and call your doctor if you are having problems. It's also a good idea to know your test results and keep a list of the medicines you take. How can you care for yourself at home? · Clean the area with soap and water 2 times a day unless your doctor gives you different instructions. Don't use hydrogen peroxide or alcohol, which can slow healing. ¨ You may cover the wound with a thin layer of antibiotic ointment, such as bacitracin, and a nonstick bandage. ¨ Apply more ointment and replace the bandage as needed. · Take pain medicines exactly as directed. Some pain is normal with a wound, but do not ignore pain that is getting worse instead of better. You could have an infection. ¨ If the doctor gave you a prescription medicine for pain, take it as prescribed. ¨ If you are not taking a prescription pain medicine, ask your doctor if you can take an over-the-counter medicine. · Your doctor may have closed your wound with stitches (sutures), staples, or skin glue. ¨ If you have stitches, your doctor may remove them after several days to 2 weeks. Or you may have stitches that dissolve on their own. ¨ If you have staples, your doctor may remove them after 7 to 10 days. ¨ If your wound was closed with skin glue, the glue will wear off in a few days to 2 weeks. When should you call for help? Call your doctor now or seek immediate medical care if:  · You have signs of infection, such as:  ¨ Increased pain, swelling, warmth, or redness near the wound. ¨ Red streaks leading from the wound.   ¨ Pus draining from the wound. ¨ A fever. · You bleed so much from your incision that you soak one or more bandages over 2 to 4 hours. Watch closely for changes in your health, and be sure to contact your doctor if:  · The wound is not getting better each day. Where can you learn more? Go to [employer]  Enter M973 in the search box to learn more about \"Wound Care: After Your Visit. \"   © 8103-3694 [employer]orporated. Care instructions adapted under license by [facility] (which disclaims liability or warranty for this information). This care instruction is for use with your licensed healthcare professional. If you have questions about a medical condition or this instruction, always ask your healthcare professional. [address] any warranty or liability for your use of this information. Content Version: 47.8.146936;  Last Revised: April 23, 2012

## 2019-02-27 ENCOUNTER — HOSPITAL ENCOUNTER (OUTPATIENT)
Dept: INTERVENTIONAL RADIOLOGY/VASCULAR | Age: 49
Discharge: HOME | End: 2019-02-27
Admitting: RADIOLOGY
Payer: MEDICARE

## 2019-02-27 PROCEDURE — 99211 OFF/OP EST MAY X REQ PHY/QHP: CPT

## 2019-02-28 ENCOUNTER — HOME HEALTH ADMISSION (OUTPATIENT)
Dept: HOME HEALTH SERVICES | Facility: HOME HEALTH | Age: 49
End: 2019-02-28

## 2019-02-28 ENCOUNTER — TELEPHONE (OUTPATIENT)
Dept: FAMILY MEDICINE CLINIC | Age: 49
End: 2019-02-28

## 2019-02-28 ENCOUNTER — HOSPITAL ENCOUNTER (OUTPATIENT)
Dept: INFUSION THERAPY | Age: 49
Discharge: HOME | End: 2019-02-28
Payer: MEDICARE

## 2019-02-28 ENCOUNTER — HOME CARE VISIT (OUTPATIENT)
Dept: HOME HEALTH SERVICES | Facility: HOME HEALTH | Age: 49
End: 2019-02-28

## 2019-02-28 VITALS
DIASTOLIC BLOOD PRESSURE: 67 MMHG | SYSTOLIC BLOOD PRESSURE: 111 MMHG | OXYGEN SATURATION: 96 % | TEMPERATURE: 99.4 F | RESPIRATION RATE: 18 BRPM | HEART RATE: 68 BPM

## 2019-02-28 PROCEDURE — 77030012965 HC NDL HUBR BBMI -A

## 2019-02-28 PROCEDURE — 96360 HYDRATION IV INFUSION INIT: CPT

## 2019-02-28 PROCEDURE — 74011250636 HC RX REV CODE- 250/636: Performed by: OBSTETRICS & GYNECOLOGY

## 2019-02-28 PROCEDURE — 74011250636 HC RX REV CODE- 250/636: Performed by: INTERNAL MEDICINE

## 2019-02-28 PROCEDURE — 96361 HYDRATE IV INFUSION ADD-ON: CPT

## 2019-02-28 RX ORDER — SODIUM CHLORIDE 9 MG/ML
1000 INJECTION, SOLUTION INTRAVENOUS ONCE
Status: COMPLETED | OUTPATIENT
Start: 2019-02-28 | End: 2019-02-28

## 2019-02-28 RX ORDER — SODIUM CHLORIDE 0.9 % (FLUSH) 0.9 %
10-40 SYRINGE (ML) INJECTION AS NEEDED
Status: DISCONTINUED | OUTPATIENT
Start: 2019-02-28 | End: 2019-03-04 | Stop reason: HOSPADM

## 2019-02-28 RX ORDER — HEPARIN 100 UNIT/ML
500 SYRINGE INTRAVENOUS ONCE
Status: COMPLETED | OUTPATIENT
Start: 2019-02-28 | End: 2019-02-28

## 2019-02-28 RX ADMIN — Medication 10 ML: at 11:48

## 2019-02-28 RX ADMIN — SODIUM CHLORIDE 1000 ML/HR: 9 INJECTION, SOLUTION INTRAVENOUS at 10:10

## 2019-02-28 RX ADMIN — SODIUM CHLORIDE, PRESERVATIVE FREE 500 UNITS: 5 INJECTION INTRAVENOUS at 11:48

## 2019-02-28 NOTE — TELEPHONE ENCOUNTER
Sabiha from [facility] [employer] states that thePt is unavailable today and tomorrow for hiome care and they are going to try again on Saturday 3/2/2019 and Sabiha needs a late start of care order. Please advise and fx order to [phone].

## 2019-02-28 NOTE — PROGRESS NOTES
[facility] OPI Progress Note Date: 2019 Name: Lu Jaffe MRN: [id number] : 1970 Hydration Ms. William Blackwell to [facility], ambulatory, at 5694. Pt was assessed and education was provided. Ms. Cannon's vitals were reviewed. Visit Vitals /67 (BP 1 Location: Right arm, BP Patient Position: Sitting) Pulse 68 Temp 99.4 °F (37.4 °C) Resp 18 SpO2 96% Right chest mediport was accessed with 20 gauge 1 inch upton(s) after chloroprep. Port flushed easily and had brisk blood return. 1L NS infused over 1.5 hours. Mediport flushed with NS 20 ml and Heparin 500 units then de-accessed. No irritation or bleeding noted. Band-Aid applied. Ms. William Blackwell tolerated the procedure, and had no complaints. Patient armband removed and shredded. Ms. William Blackwell was discharged from [place] [address] in stable condition at 1155. She is to return on 19 at 1000 for her next appointment. Vasile Smith RN 2019

## 2019-03-02 ENCOUNTER — HOME CARE VISIT (OUTPATIENT)
Dept: HOME HEALTH SERVICES | Facility: HOME HEALTH | Age: 49
End: 2019-03-02

## 2019-03-04 ENCOUNTER — HOME CARE VISIT (OUTPATIENT)
Dept: HOME HEALTH SERVICES | Facility: HOME HEALTH | Age: 49
End: 2019-03-04

## 2019-03-04 NOTE — TELEPHONE ENCOUNTER
[address] [facility] for Sabiha to call [facility] for clarification for order.

## 2019-03-05 ENCOUNTER — OFFICE VISIT (OUTPATIENT)
Dept: FAMILY MEDICINE CLINIC | Age: 49
End: 2019-03-05

## 2019-03-05 VITALS
SYSTOLIC BLOOD PRESSURE: 114 MMHG | HEIGHT: 63 IN | WEIGHT: 243 LBS | TEMPERATURE: 98.6 F | OXYGEN SATURATION: 98 % | DIASTOLIC BLOOD PRESSURE: 78 MMHG | RESPIRATION RATE: 16 BRPM | HEART RATE: 74 BPM | BODY MASS INDEX: 43.05 KG/M2

## 2019-03-05 DIAGNOSIS — E78.2 MIXED HYPERLIPIDEMIA: ICD-10-CM

## 2019-03-05 DIAGNOSIS — I10 ESSENTIAL HYPERTENSION: ICD-10-CM

## 2019-03-05 DIAGNOSIS — C48.2 MALIGNANT NEOPLASM OF PERITONEUM (HCC): ICD-10-CM

## 2019-03-05 DIAGNOSIS — L24.A9 WOUND DRAINAGE: ICD-10-CM

## 2019-03-05 DIAGNOSIS — L30.4 INTERTRIGO: Primary | ICD-10-CM

## 2019-03-05 DIAGNOSIS — F31.9 BIPOLAR 1 DISORDER (HCC): ICD-10-CM

## 2019-03-05 RX ORDER — ACETAMINOPHEN 325 MG/1
650 TABLET ORAL AS NEEDED
Status: CANCELLED
Start: 2019-03-14

## 2019-03-05 RX ORDER — PROCHLORPERAZINE EDISYLATE 5 MG/ML
10 INJECTION INTRAMUSCULAR; INTRAVENOUS ONCE
Status: CANCELLED
Start: 2019-06-06 | End: 2019-06-06

## 2019-03-05 RX ORDER — LORAZEPAM 2 MG/ML
0.26 INJECTION INTRAMUSCULAR ONCE
Status: CANCELLED
Start: 2019-05-16 | End: 2019-05-16

## 2019-03-05 RX ORDER — DIPHENHYDRAMINE HYDROCHLORIDE 50 MG/ML
50 INJECTION, SOLUTION INTRAMUSCULAR; INTRAVENOUS AS NEEDED
Status: CANCELLED
Start: 2019-06-06

## 2019-03-05 RX ORDER — ALBUTEROL SULFATE 0.83 MG/ML
2.5 SOLUTION RESPIRATORY (INHALATION) AS NEEDED
Status: CANCELLED
Start: 2019-06-06

## 2019-03-05 RX ORDER — SODIUM CHLORIDE 0.9 % (FLUSH) 0.9 %
10-40 SYRINGE (ML) INJECTION AS NEEDED
Status: CANCELLED
Start: 2019-06-06

## 2019-03-05 RX ORDER — PALONOSETRON 0.05 MG/ML
0.25 INJECTION, SOLUTION INTRAVENOUS ONCE
Status: CANCELLED
Start: 2019-04-04 | End: 2019-04-04

## 2019-03-05 RX ORDER — EPINEPHRINE 1 MG/ML
0.3 INJECTION, SOLUTION, CONCENTRATE INTRAVENOUS AS NEEDED
Status: CANCELLED | OUTPATIENT
Start: 2019-03-14

## 2019-03-05 RX ORDER — HYDROCORTISONE SODIUM SUCCINATE 100 MG/2ML
100 INJECTION, POWDER, FOR SOLUTION INTRAMUSCULAR; INTRAVENOUS AS NEEDED
Status: CANCELLED | OUTPATIENT
Start: 2019-06-06

## 2019-03-05 RX ORDER — ALBUTEROL SULFATE 0.83 MG/ML
2.5 SOLUTION RESPIRATORY (INHALATION) AS NEEDED
Status: CANCELLED
Start: 2019-03-14

## 2019-03-05 RX ORDER — MAGNESIUM SULFATE HEPTAHYDRATE 40 MG/ML
2 INJECTION, SOLUTION INTRAVENOUS ONCE
Status: CANCELLED
Start: 2019-06-06 | End: 2019-06-06

## 2019-03-05 RX ORDER — POTASSIUM CHLORIDE 7.45 MG/ML
10 INJECTION INTRAVENOUS ONCE
Status: CANCELLED
Start: 2019-03-14 | End: 2019-03-14

## 2019-03-05 RX ORDER — DIPHENHYDRAMINE HYDROCHLORIDE 50 MG/ML
50 INJECTION, SOLUTION INTRAMUSCULAR; INTRAVENOUS AS NEEDED
Status: CANCELLED
Start: 2019-04-04

## 2019-03-05 RX ORDER — DIPHENHYDRAMINE HYDROCHLORIDE 50 MG/ML
50 INJECTION, SOLUTION INTRAMUSCULAR; INTRAVENOUS AS NEEDED
Status: CANCELLED
Start: 2019-03-14

## 2019-03-05 RX ORDER — ALBUTEROL SULFATE 0.83 MG/ML
2.5 SOLUTION RESPIRATORY (INHALATION) AS NEEDED
Status: CANCELLED
Start: 2019-04-25

## 2019-03-05 RX ORDER — ONDANSETRON 2 MG/ML
8 INJECTION INTRAMUSCULAR; INTRAVENOUS AS NEEDED
Status: CANCELLED | OUTPATIENT
Start: 2019-06-06

## 2019-03-05 RX ORDER — DIPHENHYDRAMINE HCL 25 MG
50 CAPSULE ORAL ONCE
Status: CANCELLED
Start: 2019-04-25 | End: 2019-04-25

## 2019-03-05 RX ORDER — DIPHENHYDRAMINE HYDROCHLORIDE 50 MG/ML
50 INJECTION, SOLUTION INTRAMUSCULAR; INTRAVENOUS AS NEEDED
Status: CANCELLED
Start: 2019-05-16

## 2019-03-05 RX ORDER — PROCHLORPERAZINE EDISYLATE 5 MG/ML
10 INJECTION INTRAMUSCULAR; INTRAVENOUS ONCE
Status: CANCELLED
Start: 2019-03-14 | End: 2019-03-14

## 2019-03-05 RX ORDER — CYCLOSPORINE 0.5 MG/ML
1 EMULSION OPHTHALMIC AS NEEDED
COMMUNITY
Start: 2017-11-10 | End: 2020-09-02

## 2019-03-05 RX ORDER — ACETAMINOPHEN 325 MG/1
650 TABLET ORAL AS NEEDED
Status: CANCELLED
Start: 2019-04-04

## 2019-03-05 RX ORDER — EPINEPHRINE 1 MG/ML
0.3 INJECTION, SOLUTION, CONCENTRATE INTRAVENOUS AS NEEDED
Status: CANCELLED | OUTPATIENT
Start: 2019-06-06

## 2019-03-05 RX ORDER — CLOTRIMAZOLE AND BETAMETHASONE DIPROPIONATE 10; .64 MG/G; MG/G
CREAM TOPICAL
Qty: 30 G | Refills: 0 | Status: SHIPPED | OUTPATIENT
Start: 2019-03-05 | End: 2019-07-22

## 2019-03-05 RX ORDER — SODIUM CHLORIDE 9 MG/ML
500 INJECTION, SOLUTION INTRAVENOUS ONCE
Status: CANCELLED | OUTPATIENT
Start: 2019-05-16 | End: 2019-05-16

## 2019-03-05 RX ORDER — LORAZEPAM 2 MG/ML
0.26 INJECTION INTRAMUSCULAR ONCE
Status: CANCELLED
Start: 2019-04-04 | End: 2019-04-04

## 2019-03-05 RX ORDER — POTASSIUM CHLORIDE 7.45 MG/ML
10 INJECTION INTRAVENOUS ONCE
Status: CANCELLED
Start: 2019-04-25 | End: 2019-04-25

## 2019-03-05 RX ORDER — ALBUTEROL SULFATE 0.83 MG/ML
2.5 SOLUTION RESPIRATORY (INHALATION) AS NEEDED
Status: CANCELLED
Start: 2019-04-04

## 2019-03-05 RX ORDER — SODIUM CHLORIDE 0.9 % (FLUSH) 0.9 %
10-40 SYRINGE (ML) INJECTION AS NEEDED
Status: CANCELLED
Start: 2019-04-25

## 2019-03-05 RX ORDER — SODIUM CHLORIDE 9 MG/ML
500 INJECTION, SOLUTION INTRAVENOUS ONCE
Status: CANCELLED | OUTPATIENT
Start: 2019-04-04 | End: 2019-04-04

## 2019-03-05 RX ORDER — VALSARTAN 320 MG/1
320 TABLET ORAL DAILY
COMMUNITY
End: 2019-03-05

## 2019-03-05 RX ORDER — EPINEPHRINE 1 MG/ML
0.3 INJECTION, SOLUTION, CONCENTRATE INTRAVENOUS AS NEEDED
Status: CANCELLED | OUTPATIENT
Start: 2019-05-16

## 2019-03-05 RX ORDER — LUBIPROSTONE 24 UG/1
1 CAPSULE, GELATIN COATED ORAL AS NEEDED
COMMUNITY
Start: 2018-01-20 | End: 2019-07-22

## 2019-03-05 RX ORDER — MAGNESIUM SULFATE HEPTAHYDRATE 40 MG/ML
2 INJECTION, SOLUTION INTRAVENOUS ONCE
Status: CANCELLED
Start: 2019-03-14 | End: 2019-03-14

## 2019-03-05 RX ORDER — EPINEPHRINE 1 MG/ML
0.3 INJECTION, SOLUTION, CONCENTRATE INTRAVENOUS AS NEEDED
Status: CANCELLED | OUTPATIENT
Start: 2019-04-04

## 2019-03-05 RX ORDER — ONDANSETRON 2 MG/ML
8 INJECTION INTRAMUSCULAR; INTRAVENOUS AS NEEDED
Status: CANCELLED | OUTPATIENT
Start: 2019-04-04

## 2019-03-05 RX ORDER — HYDROCORTISONE SODIUM SUCCINATE 100 MG/2ML
100 INJECTION, POWDER, FOR SOLUTION INTRAMUSCULAR; INTRAVENOUS AS NEEDED
Status: CANCELLED | OUTPATIENT
Start: 2019-04-25

## 2019-03-05 RX ORDER — MAGNESIUM SULFATE HEPTAHYDRATE 40 MG/ML
2 INJECTION, SOLUTION INTRAVENOUS ONCE
Status: CANCELLED
Start: 2019-04-25 | End: 2019-04-25

## 2019-03-05 RX ORDER — PALONOSETRON 0.05 MG/ML
0.25 INJECTION, SOLUTION INTRAVENOUS ONCE
Status: CANCELLED
Start: 2019-06-06 | End: 2019-06-06

## 2019-03-05 RX ORDER — PROCHLORPERAZINE EDISYLATE 5 MG/ML
10 INJECTION INTRAMUSCULAR; INTRAVENOUS ONCE
Status: CANCELLED
Start: 2019-04-04 | End: 2019-04-04

## 2019-03-05 RX ORDER — MAGNESIUM SULFATE HEPTAHYDRATE 40 MG/ML
2 INJECTION, SOLUTION INTRAVENOUS ONCE
Status: CANCELLED
Start: 2019-05-16 | End: 2019-05-16

## 2019-03-05 RX ORDER — HEPARIN 100 UNIT/ML
300-500 SYRINGE INTRAVENOUS AS NEEDED
Status: CANCELLED
Start: 2019-04-04

## 2019-03-05 RX ORDER — DIPHENHYDRAMINE HCL 25 MG
50 CAPSULE ORAL ONCE
Status: CANCELLED
Start: 2019-05-16 | End: 2019-05-16

## 2019-03-05 RX ORDER — HYDROCORTISONE SODIUM SUCCINATE 100 MG/2ML
100 INJECTION, POWDER, FOR SOLUTION INTRAMUSCULAR; INTRAVENOUS AS NEEDED
Status: CANCELLED | OUTPATIENT
Start: 2019-03-14

## 2019-03-05 RX ORDER — DIPHENHYDRAMINE HYDROCHLORIDE 50 MG/ML
50 INJECTION, SOLUTION INTRAMUSCULAR; INTRAVENOUS AS NEEDED
Status: CANCELLED
Start: 2019-04-25

## 2019-03-05 RX ORDER — LORAZEPAM 2 MG/ML
0.26 INJECTION INTRAMUSCULAR ONCE
Status: CANCELLED
Start: 2019-06-06 | End: 2019-06-06

## 2019-03-05 RX ORDER — PROCHLORPERAZINE EDISYLATE 5 MG/ML
10 INJECTION INTRAMUSCULAR; INTRAVENOUS ONCE
Status: CANCELLED
Start: 2019-05-16 | End: 2019-05-16

## 2019-03-05 RX ORDER — POTASSIUM CHLORIDE 7.45 MG/ML
10 INJECTION INTRAVENOUS ONCE
Status: CANCELLED
Start: 2019-04-04 | End: 2019-04-04

## 2019-03-05 RX ORDER — SODIUM CHLORIDE 0.9 % (FLUSH) 0.9 %
10-40 SYRINGE (ML) INJECTION AS NEEDED
Status: CANCELLED
Start: 2019-05-16

## 2019-03-05 RX ORDER — SODIUM CHLORIDE 0.9 % (FLUSH) 0.9 %
10-40 SYRINGE (ML) INJECTION AS NEEDED
Status: CANCELLED
Start: 2019-04-04

## 2019-03-05 RX ORDER — PROCHLORPERAZINE EDISYLATE 5 MG/ML
10 INJECTION INTRAMUSCULAR; INTRAVENOUS ONCE
Status: CANCELLED
Start: 2019-04-25 | End: 2019-04-25

## 2019-03-05 RX ORDER — ALBUTEROL SULFATE 0.83 MG/ML
2.5 SOLUTION RESPIRATORY (INHALATION) AS NEEDED
Status: CANCELLED
Start: 2019-05-16

## 2019-03-05 RX ORDER — EPINEPHRINE 1 MG/ML
0.3 INJECTION, SOLUTION, CONCENTRATE INTRAVENOUS AS NEEDED
Status: CANCELLED | OUTPATIENT
Start: 2019-04-25

## 2019-03-05 RX ORDER — SODIUM CHLORIDE 9 MG/ML
500 INJECTION, SOLUTION INTRAVENOUS ONCE
Status: CANCELLED | OUTPATIENT
Start: 2019-04-25 | End: 2019-04-25

## 2019-03-05 RX ORDER — PALONOSETRON 0.05 MG/ML
0.25 INJECTION, SOLUTION INTRAVENOUS ONCE
Status: CANCELLED
Start: 2019-05-16 | End: 2019-05-16

## 2019-03-05 RX ORDER — HEPARIN 100 UNIT/ML
300-500 SYRINGE INTRAVENOUS AS NEEDED
Status: CANCELLED
Start: 2019-04-25

## 2019-03-05 RX ORDER — HEPARIN 100 UNIT/ML
300-500 SYRINGE INTRAVENOUS AS NEEDED
Status: CANCELLED
Start: 2019-05-16

## 2019-03-05 RX ORDER — DIPHENHYDRAMINE HCL 25 MG
50 CAPSULE ORAL ONCE
Status: CANCELLED
Start: 2019-06-06 | End: 2019-06-06

## 2019-03-05 RX ORDER — ONDANSETRON 2 MG/ML
8 INJECTION INTRAMUSCULAR; INTRAVENOUS AS NEEDED
Status: CANCELLED | OUTPATIENT
Start: 2019-03-14

## 2019-03-05 RX ORDER — POTASSIUM CHLORIDE 7.45 MG/ML
10 INJECTION INTRAVENOUS ONCE
Status: CANCELLED
Start: 2019-06-06 | End: 2019-06-06

## 2019-03-05 RX ORDER — MAGNESIUM SULFATE HEPTAHYDRATE 40 MG/ML
2 INJECTION, SOLUTION INTRAVENOUS ONCE
Status: CANCELLED
Start: 2019-04-04 | End: 2019-04-04

## 2019-03-05 RX ORDER — PALONOSETRON 0.05 MG/ML
0.25 INJECTION, SOLUTION INTRAVENOUS ONCE
Status: CANCELLED
Start: 2019-04-25 | End: 2019-04-25

## 2019-03-05 RX ORDER — SODIUM CHLORIDE 9 MG/ML
500 INJECTION, SOLUTION INTRAVENOUS ONCE
Status: CANCELLED | OUTPATIENT
Start: 2019-06-06 | End: 2019-06-06

## 2019-03-05 RX ORDER — ACETAMINOPHEN 325 MG/1
650 TABLET ORAL AS NEEDED
Status: CANCELLED
Start: 2019-05-16

## 2019-03-05 RX ORDER — DIPHENHYDRAMINE HCL 25 MG
50 CAPSULE ORAL ONCE
Status: CANCELLED
Start: 2019-04-04 | End: 2019-04-04

## 2019-03-05 RX ORDER — ACETAMINOPHEN 325 MG/1
650 TABLET ORAL AS NEEDED
Status: CANCELLED
Start: 2019-06-06

## 2019-03-05 RX ORDER — HYDROCORTISONE SODIUM SUCCINATE 100 MG/2ML
100 INJECTION, POWDER, FOR SOLUTION INTRAMUSCULAR; INTRAVENOUS AS NEEDED
Status: CANCELLED | OUTPATIENT
Start: 2019-05-16

## 2019-03-05 RX ORDER — HEPARIN 100 UNIT/ML
300-500 SYRINGE INTRAVENOUS AS NEEDED
Status: CANCELLED
Start: 2019-06-06

## 2019-03-05 RX ORDER — LORAZEPAM 2 MG/ML
0.26 INJECTION INTRAMUSCULAR ONCE
Status: CANCELLED
Start: 2019-04-25 | End: 2019-04-25

## 2019-03-05 RX ORDER — ONDANSETRON 2 MG/ML
8 INJECTION INTRAMUSCULAR; INTRAVENOUS AS NEEDED
Status: CANCELLED | OUTPATIENT
Start: 2019-05-16

## 2019-03-05 RX ORDER — ONDANSETRON 2 MG/ML
8 INJECTION INTRAMUSCULAR; INTRAVENOUS AS NEEDED
Status: CANCELLED | OUTPATIENT
Start: 2019-04-25

## 2019-03-05 RX ORDER — HYDROCORTISONE SODIUM SUCCINATE 100 MG/2ML
100 INJECTION, POWDER, FOR SOLUTION INTRAMUSCULAR; INTRAVENOUS AS NEEDED
Status: CANCELLED | OUTPATIENT
Start: 2019-04-04

## 2019-03-05 RX ORDER — POTASSIUM CHLORIDE 7.45 MG/ML
10 INJECTION INTRAVENOUS ONCE
Status: CANCELLED
Start: 2019-05-16 | End: 2019-05-16

## 2019-03-05 RX ORDER — ACETAMINOPHEN 325 MG/1
650 TABLET ORAL AS NEEDED
Status: CANCELLED
Start: 2019-04-25

## 2019-03-05 NOTE — PATIENT INSTRUCTIONS
Yeast Skin Infection: Care Instructions  Your Care Instructions    Yeast normally lives on your skin. Sometimes too much yeast can overgrow in certain areas of the skin and cause an infection. The infection causes red, scaly, moist patches on your skin that may itch. Common areas for skin yeast infections are skin folds under the breasts or belly area. The warm and moist areas in the skin folds can make it easier for yeast to overgrow. Yeast infections also can be found on other parts of the body such as the groin or armpits. You will probably get a cream or ointment that contains an antifungal medicine. Examples of these are miconazole and clotrimazole. You put it on your skin to treat the infection. Your doctor may give you a prescription for the cream or ointment. Or you may be able to buy it without a prescription at most drugstores. If the infection is severe, the doctor will prescribe antifungal pills. A yeast infection usually goes away after about a week of treatment. But it's important to use the medicine for as long as your doctor tells you to. Follow-up care is a key part of your treatment and safety. Be sure to make and go to all appointments, and call your doctor if you are having problems. It's also a good idea to know your test results and keep a list of the medicines you take. How can you care for yourself at home? · Be safe with medicines. Take your medicines exactly as prescribed. Call your doctor if you think you are having a problem with your medicine. · Keep your skin clean and dry. Your doctor may suggest using powder that contains an antifungal medicine in the skin folds. · Wear loose clothing. When should you call for help? Call your doctor now or seek immediate medical care if:    · You have symptoms of infection, such as:  ? Increased pain, swelling, warmth, or redness. ? Red streaks leading from the area. ? Pus draining from the area.   ? A fever.    Watch closely for changes in your health, and be sure to contact your doctor if:    · You do not get better as expected. Where can you learn more? Go to http://raul-vee.info/. Enter X291 in the search box to learn more about \"Yeast Skin Infection: Care Instructions. \"  Current as of: April 17, 2018  Content Version: 11.9  © 4148-9829 [employer]. Care instructions adapted under license by [employer] (which disclaims liability or warranty for this information). If you have questions about a medical condition or this instruction, always ask your healthcare professional. [address] any warranty or liability for your use of this information.

## 2019-03-05 NOTE — PROGRESS NOTES
SUBJECTIVE  Ff-up Left groin wound    Reports no longer bleeding, home health nurse inspected area and discharged with resultant improvement. She has itching on the area and has been applying clotrimazole cream with some improvement    peritoneal cancer/pelvic mass. -S/p  Exploratory laparotomy, exploration of retroperitoneal spaces, biopsy of rectovaginal mass on 1/17/2019. Had sigmoidoscopy which revealed submucosal mass. ongoing chemotherapy with dr. Miguel Angel Us    HTN- taking medications without problems. HL- on zocor    Bipolar d/o- per pt doing well, following psychiatrist.      ROS:  See HPI, all others negative        Patient Active Problem List   Diagnosis Code    OA (osteoarthritis) M19.90    Obesity E66.9    Mixed hyperlipidemia E78.2    Depression F32.9    Menopause Z78.0    Knee pain, right M25.561    GERD (gastroesophageal reflux disease) K21.9    S/P TKR (total knee replacement) Z96.659    PTSD (post-traumatic stress disorder) F43.10    Essential hypertension I10    Morbid obesity ([facility] [address] E66.01    Arthritis, degenerative M19.90    Gastroesophageal reflux disease without esophagitis K21.9    ANAMIKA on CPAP G47.33, Z99.89    Prediabetes R73.03    SUAZO (dyspnea on exertion) R06.09    Dental caries K02.9    Chronic periodontal disease K05.6    Advance care planning Z71.89    Bipolar 1 disorder ([facility] [address] F31.9    Irritable bowel syndrome with both constipation and diarrhea K58.2       Current Outpatient Prescriptions   Medication Sig Dispense Refill    plecanatide (TRULANCE) 3 mg tab Take  by mouth.  losartan (COZAAR) 100 mg tablet Take 1 Tab by mouth daily. 90 Tab 0    metoprolol succinate (TOPROL-XL) 100 mg tablet Take 1 Tab by mouth daily.  90 Tab 0    amLODIPine (NORVASC) 10 mg tablet take 1 tablet by mouth daily 90 Tab 0    simvastatin (ZOCOR) 20 mg tablet take 1 tablet by mouth at bedtime 90 Tab 3    OXcarbazepine (TRILEPTAL) 300 mg tablet Take 300 mg by mouth two (2) times a day.  0    LORazepam (ATIVAN) 0.5 mg tablet Take 0.5 mg by mouth two (2) times a day.  hydrOXYzine pamoate (VISTARIL) 50 mg capsule 2 Caps 2 Times Daily As Needed.  carisoprodol (SOMA) 350 mg tablet take 1 tablet by mouth three times a day and 1 tablet at bedtime  0    BLACK COHOSH PO Take  by mouth.  levocetirizine (XYZAL) 5 mg tablet       ziprasidone (GEODON) 40 mg capsule Take 40 mg by mouth daily.  melatonin 3 mg tablet Take 3 mg by mouth nightly.  montelukast (SINGULAIR) 10 mg tablet Take 10 mg by mouth nightly. 1    FIBER, PSYLLIUM HUSK, PO Take  by mouth.  latanoprost (XALATAN) 0.005 % ophthalmic solution Administer 1 Drop to both eyes nightly.  LACTOBACILLUS ACIDOPHILUS (PROBIOTIC PO) Take  by mouth.  mometasone-formoterol (DULERA) 100-5 mcg/actuation HFA inhaler Take 2 Puffs by inhalation two (2) times a day. 1 Inhaler 5       Allergies   Allergen Reactions    Other Medication Hives     seafood    Shellfish Derived Hives       Past Medical History:   Diagnosis Date    AR (allergic rhinitis)     seasonal    Asthma     Cardiac echocardiogram 04/11/2016    Tech difficult. EF 55-60%. No RWMA. Mild conc LVH. Gr 1 DDfx. RVSP normal.      Cardiac nuclear imaging test 05/05/2016    Low risk. Very patchy radiotracer uptake. Mild anterior artifact, low suspicion for ischemia. EF 68%. No RWMA. Normal EKG on pharm stress test.    Cardiovascular LE arterial duplex 10/07/2013    No significant arterial disease at rest bilaterally. R JUNE 1.21.  L JUNE 1.16. No significant sm vessel disease.     Depression     Dr. Armando, suicide attempt 2/12    Endometriosis     s/p hysterectomy 2006    GERD (gastroesophageal reflux disease)     Glaucoma     Dr. Mary Whitfield HTN (hypertension)     Hx of colonoscopy 04/05/2017    mild diverticulosis, g1 internal hemorrhoids, normal colon , repeat 10 year 2027    Hx of suicide attempt     Hyperlipidemia LDL goal < 130     IBS (irritable bowel syndrome)     Insomnia     Nausea & vomiting     OA (osteoarthritis)     both knees, lower back    Preeclampsia     PTSD (post-traumatic stress disorder)     Sleep apnea     does not use cpap machine    Spinal stenosis     Dr. Vlad Heath       Social History     Social History    Marital status: SINGLE     Spouse name: N/A    Number of children: N/A    Years of education: N/A     Occupational History    disabled     student      Social History Main Topics    Smoking status: Never Smoker    Smokeless tobacco: Never Used    Alcohol use No    Drug use: No    Sexual activity: Not on file     Other Topics Concern    Not on file     Social History Narrative       Family History   Problem Relation Age of Onset    Heart Disease Mother      CHF    Diabetes Mother     Hypertension Mother     Kidney Disease Mother     Breast Cancer Mother     Stroke Mother     Diabetes Father     Hypertension Father     Heart Attack Father      MI    Cancer Maternal Grandmother      stomach    Cancer Other      breast    Heart Attack Other      MI    Ovarian Cancer Maternal Aunt          OBJECTIVE    Physical Exam:     Visit Vitals  /78 (BP 1 Location: Left arm, BP Patient Position: Sitting)   Pulse 74   Temp 98.6 °F (37 °C) (Oral)   Resp 16   Ht 5' 3\" (1.6 m)   Wt 243 lb (110.2 kg)   LMP 01/31/2008   SpO2 98%   BMI 43.05 kg/m²       General: alert,obese, AA, in no apparent distress or pain  CVS: normal rate, regular rhythm, distinct S1 and S2  Lungs:clear to ausculation bilaterally, no crackles, wheezing or rhonchi noted  Abdomen: soft, NT,ND  Skin: left groin wound is healed, L groin appears hyperpigmented  Psych:  mood and affect normal    ASSESSMENT/PLAN  Diagnoses and all orders for this visit:    1. Intertrigo  Start lotrisone  Keep area dry     2. Wound drainage  Improved  Cont to wash with soap and water  Keep area dry    3.  Essential hypertension  Controlled  Cont metoprolol and cozaar    4. Bipolar 1 disorder ([facility] [address]  Stable, following psychiatrist    5. Mixed hyperlipidemia  On zocor    6. Malignant neoplasm of peritoneum (HCC)  Ongoing chemotherapy  Following dr. Honey Elaine    Other orders  -     clotrimazole-betamethasone (LOTRISONE) topical cream; Apply to affected area twice daily      Follow-up Disposition:  Return in about 3 months or sooner prn  Patient understands plan of care. Patient has provided input and agrees with goals.

## 2019-03-05 NOTE — PROGRESS NOTES
1. Have you been to the ER, urgent care clinic since your last visit? Hospitalized since your last visit? No    2. Have you seen or consulted any other health care providers outside of the [address] [place] [address] since your last visit? Include any pap smears or colon screening.  Yes, Dr. Barron Sites

## 2019-03-06 ENCOUNTER — TELEPHONE (OUTPATIENT)
Dept: ONCOLOGY | Age: 49
End: 2019-03-06

## 2019-03-07 ENCOUNTER — HOSPITAL ENCOUNTER (OUTPATIENT)
Dept: INFUSION THERAPY | Age: 49
Discharge: HOME | End: 2019-03-07
Payer: MEDICARE

## 2019-03-07 VITALS
SYSTOLIC BLOOD PRESSURE: 150 MMHG | DIASTOLIC BLOOD PRESSURE: 82 MMHG | RESPIRATION RATE: 18 BRPM | TEMPERATURE: 97.6 F | OXYGEN SATURATION: 99 % | HEART RATE: 64 BPM

## 2019-03-07 PROCEDURE — 96360 HYDRATION IV INFUSION INIT: CPT

## 2019-03-07 PROCEDURE — 77030012965 HC NDL HUBR BBMI -A

## 2019-03-07 PROCEDURE — 74011250636 HC RX REV CODE- 250/636: Performed by: INTERNAL MEDICINE

## 2019-03-07 PROCEDURE — 96361 HYDRATE IV INFUSION ADD-ON: CPT

## 2019-03-07 PROCEDURE — 74011250636 HC RX REV CODE- 250/636: Performed by: OBSTETRICS & GYNECOLOGY

## 2019-03-07 RX ORDER — HEPARIN 100 UNIT/ML
500 SYRINGE INTRAVENOUS AS NEEDED
Status: DISCONTINUED | OUTPATIENT
Start: 2019-03-07 | End: 2019-03-11 | Stop reason: HOSPADM

## 2019-03-07 RX ORDER — SODIUM CHLORIDE 0.9 % (FLUSH) 0.9 %
10-40 SYRINGE (ML) INJECTION AS NEEDED
Status: DISCONTINUED | OUTPATIENT
Start: 2019-03-07 | End: 2019-03-11 | Stop reason: HOSPADM

## 2019-03-07 RX ORDER — SODIUM CHLORIDE 9 MG/ML
1000 INJECTION, SOLUTION INTRAVENOUS ONCE
Status: COMPLETED | OUTPATIENT
Start: 2019-03-07 | End: 2019-03-07

## 2019-03-07 RX ORDER — HEPARIN 100 UNIT/ML
SYRINGE INTRAVENOUS
Status: DISPENSED
Start: 2019-03-07 | End: 2019-03-08

## 2019-03-07 RX ADMIN — SODIUM CHLORIDE, PRESERVATIVE FREE 500 UNITS: 5 INJECTION INTRAVENOUS at 12:32

## 2019-03-07 RX ADMIN — Medication 10 ML: at 12:30

## 2019-03-07 RX ADMIN — SODIUM CHLORIDE 1000 ML: 9 INJECTION, SOLUTION INTRAVENOUS at 10:25

## 2019-03-07 RX ADMIN — Medication 10 ML: at 10:22

## 2019-03-11 ENCOUNTER — TELEPHONE (OUTPATIENT)
Dept: ONCOLOGY | Age: 49
End: 2019-03-11

## 2019-03-11 NOTE — TELEPHONE ENCOUNTER
Patient called and I spoke with her. She wanted to know if it was \"normal that she was losing her hair\"  It has been 2 weeks following her Cycle 1 treatment and I reassurred her that this was an anticipated side effect and to use aloe on her head if it was sore as discussed in teaching. She agreed with this.

## 2019-03-13 ENCOUNTER — OFFICE VISIT (OUTPATIENT)
Dept: ONCOLOGY | Age: 49
End: 2019-03-13

## 2019-03-13 VITALS
SYSTOLIC BLOOD PRESSURE: 138 MMHG | HEART RATE: 57 BPM | OXYGEN SATURATION: 99 % | BODY MASS INDEX: 43.63 KG/M2 | WEIGHT: 246.3 LBS | RESPIRATION RATE: 18 BRPM | DIASTOLIC BLOOD PRESSURE: 81 MMHG | TEMPERATURE: 98.2 F

## 2019-03-13 DIAGNOSIS — C48.2 PRIMARY PERITONEAL CARCINOMATOSIS (HCC): Primary | ICD-10-CM

## 2019-03-13 RX ORDER — LANOLIN ALCOHOL/MO/W.PET/CERES
1000 CREAM (GRAM) TOPICAL DAILY
COMMUNITY
End: 2019-07-22

## 2019-03-13 RX ORDER — ACETAMINOPHEN 500 MG
TABLET ORAL
COMMUNITY
End: 2019-03-21 | Stop reason: ALTCHOICE

## 2019-03-13 NOTE — PROGRESS NOTES
Pt is here ambulatory for follow up appointment with Dr. Andree Tidwell. Pt completed chemo # 1 (Taxol/Carbo). Patient had some nausea the after the first week of chemo, resolved with zofran. Denies any vomiting, diarrhea. Constipation controlled with stool softener. Does have fatigue(encouaged to try to walk every day), \"taste like lead\" taste changes and some additional joint aches in addition to her arthritis aches and pains. Port has a scab over site. Had numb/tingling in her fingers and feet the week after chemo, none now. She is taking B12 only. Patient complained of having nausea now and took a zofran that she brought with her. Given April calendar. Education provided and patient to follow up as scheduled.

## 2019-03-13 NOTE — PROGRESS NOTES
[address] [facility]  [address] [place] [address], [address], [address] [facility]  [address] [facility], [address] [facility]  [place], [address]   [phone]  Shelli Birmingham DO      Patient ID:  Name:  Lovey Kanner  MRN:  [id number]  :  1970/48 y.o. Date:  3/13/2019      HISTORY OF PRESENT ILLNESS:  Lovey Kanner is a 51 y.o.  postmenopausal female referred by Dr. Mark Anthony Michel for pelvic mass and postmenopausal bleeding. Intitally seen be NP with reports of vaginal bleeding. Given pt's history of hysteretectomy with BSO for endometriosis in  a TVUS was ordered. Which revealed a 6.8 cm x 5.2 cm x 4.6 cm at midline vaginal cuff. This prompted pelvic CT with findings of a lesion measuring 7.6 x 5.8 x 7.2 cm and is compatible with a pelvic neoplasm vs endometrioma given prior history of endometriosis. Tumor markers done on 2018 all normal.  S/p  Exploratory laparotomy, exploration of retroperitoneal spaces, exam under anesthesia with biopsy of rectovaginal mass on 2019. Had sigmoidoscopy which revealed submucosal mass. S/p cycle #1 of carbo/taxol on 2019. Had infection under  [place] that is improved. Has some fatigue. No numbness/tingling. Labs:  Component      Latest Ref Rng & Units 2019           8:32 AM   Cancer Ag (CA) 125      0.0 - 38.1 U/mL 18.4       2018 : 9.3  2018 CEA: 2.0  2018 AFP: 3.8    Pathology  2019   RECTOVAGINAL MASS (#1, 2) AND PELVIC MASS, BIOPSIES:   CONSISTENT WITH SEROUS CARCINOMA WITH EXTENSIVE NECROSIS. Imaging  MRI 19  IMPRESSION:    1. Large up to 6.8 cm mass centered at the right cul-de-sac  is most  intimately associated with the vagina, favored to be either endometrial, vaginal  or cervical in origin. It does abut and may involve the right rectum but this is  favored secondary involvement. Ovaries are not visualized separately. Correlate  for history of oophorectomy since ovarian malignancy also possible. 2.  There is an associated site of favored endometriotic implant at the cranial  aspect of the mass raising the possibility of malignant transformation. 3.  No upstream bowel obstruction  PETCT 2/14/2019   --  PET FINDINGS  --    No abnormal hypermetabolic activity in the neck. No abnormal hypermetabolic activity in the chest.      Low-attenuation possible lesion in the medial dome right lobe liver in region  of heterogeneous hypermetabolic activity, with SUV Max reaching 11.7 on image  98. Underlying lesion difficult to visualize due to extensive motion from  respirations. Cannot well separate from the IVC or diaphragm. No definite  corresponding lung lesion however.     There is low level metabolic activity associated with stranding and presumed  scarring in the subcutaneous fat of the lower midline abdominal and pelvic wall. This may simply be inflammatory. .      There is a lobulated soft tissue conglomerate in the midline to right lateral  cul-de-sac and perirectal pelvis. The periphery of this is diffusely  hypermetabolic, with SUV Max reaching 18.3 on image 200. Portions centrally are  photopenic and presumed necrotic. This measures up to 7.5 x 6.1 cm axial on  image 196. Anterior margin of this hypermetabolic lesion is in direct contact  with the high intensity decompressed urinary bladder, involvement cannot be  assessed. Left lateral margin of the mass is in contact with the surface of the  lateral wall of the sigmoid colon. Direct involvement cannot be assessed.  -Additional focal hypermetabolic density 2.7 cm on image 189 inseparable from  the caudal wall of the sigmoid.   -Tapering hypermetabolic activity extends to approximately image 210, not to the  level of the perineum.     There are no other areas of abnormal metabolic activity appreciated in the  abdomen or pelvis which cannot be attributed to renal collecting system, ureter,  bladder or bowel wall activity>]. No definite hypermetabolic bone lesions appreciated, although bone scan can be  more sensitive in this respect. --  CT FINDINGS  --     These limited CT images do not replace diagnostic quality contrast enhanced CT  scan for evaluation of solid organs, bowel, retroperitoneum and vasculature. CT NECK:    Severely limited by lack of intravenous contrast.  Paranasal sinuses free of  disease. No appreciably enlarged lymph nodes. Extensive cervical endplate  osteophytes anterior and left lateral..      CT CHEST:    Limited evaluation of the hilum and vasculature without intravenous contrast.  No pleural effusion. No appreciably enlarged lymph nodes. Patchy parenchymal  opacities medial basal left lower lobe on image 89 demonstrates low level  metabolic activity, SUV Max 3.2, nonspecific, favored as inflammatory based on  imaging appearance subjectively. [facility] CT ABDOMEN:    Limited noncontrast images. Normal-size liver and spleen no adrenal mass. No  hydronephrosis. No ascites. CT PELVIS:    Limited noncontrast images. No small bowel or colon distention. Scattered  colonic diverticulosis. Postsurgical change anterior abdominal wall. Severe  degenerative facet disease at the lower lumbar levels.       Lobulated right pelvic/cul-de-sac soft tissue mass as above. Mild degree of  stranding in the pelvic mesenteric fat. No ascites.        IMPRESSION   IMPRESSION:      1. Peripherally hypermetabolic soft tissue mass right dependent pelvis to the  cul-de-sac region as above consistent with malignancy/metastatic disease with  some central necrosis  -Possible separate lesion versus serosal involvement of the sigmoid colon  adjacent. -Mass in contact with posterior bladder, sigmoid colon locally and posterior  peritoneal surface.     2.  Hypermetabolic lesion along central dome of the right lobe liver difficult to  separate from IVC or diaphragm due to motion artifact. Recommend dedicated  contrast enhanced CT for localization and better clarification.  -Findings are concerning for malignancy or hepatic metastatic disease until  proven otherwise.      3. Low level activity associated with patchy parenchymal opacity at the medial  left lower lobe,, favored as inflammatory as above.     4. No other definite hypermetabolic abnormalities appreciated elsewhere. 12/14/2018 CT PELVIS W/CONTRAST  FINDINGS:    LYMPH NODES: No enlarged lymph nodes. PELVIC GASTROINTESTINAL TRACT: No bowel dilation or wall thickening. PELVIC ORGANS:     The uterus is absent. Along the right upper margin of the vaginal cuff within the right deep pelvis there is a large irregularly-shaped peripherally enhancing, septated appearing mass with regions of central low attenuation which could be low density-fluid type contents or regions of necrosis. Lesion measures 76 x 58 x 72 mm and is compatible with a pelvic neoplasm. VASCULATURE: Unremarkable. BONES: Lower lumbar hypertrophic osteophytes. Lower lumbar facet hypertrophy with vacuum phenomenon asymmetric leftward. Degenerative vacuum phenomenon within the SI joints noted as well. No acute or aggressive osseous abnormalities identified. OTHER: Small fat-containing ventral umbilical hernia. Minimal nonspecific edema within the subcutaneous fat of the lumbar region, not uncommon. IMPRESSION:   1.  Along the right upper margin of the vaginal cuff within the right deep pelvis there is a large irregularly-shaped peripherally enhancing, septated appearing mass with regions of central low attenuation which could be low density-fluid type contents or regions of necrosis. Lesion measures 76 x 58 x 72 mm and is compatible with a pelvic neoplasm. Could be a endometrioma given prior history of endometriosis, malignancy not excluded and gynecologic oncology follow-up is suggested. 2.  Small fat-containing ventral umbilical hernia. 12/04/2018 TVUS  Uterus: surgically absent  Left ovary: surgically absent  Right ovary: surgically absent  Other findings: midline-at vaginal cuff: 6.8 cm X 5.2 cm x 4.6 cm, volume 85 ml    Impression: Complex pelvic mass       ROS:    As above      Patient Active Problem List    Diagnosis Date Noted    Malignant neoplasm of peritoneum ([facility] 02/14/2019    Pelvic mass in female 01/17/2019    Bipolar 1 disorder ([facility] 02/09/2018    Irritable bowel syndrome with both constipation and diarrhea 02/09/2018    Advance care planning 01/31/2017    Dental caries 05/26/2016    Chronic periodontal disease 05/26/2016    SUAZO (dyspnea on exertion) 02/10/2016    Prediabetes 09/24/2015    Morbid obesity ([facility] 08/31/2015    Arthritis, degenerative 08/31/2015    Gastroesophageal reflux disease without esophagitis 08/31/2015    ANAMIKA on CPAP 08/31/2015    Essential hypertension 08/28/2015    PTSD (post-traumatic stress disorder) 03/24/2014    S/P TKR (total knee replacement) 11/14/2012    Depression 05/08/2012    Menopause 05/08/2012    Knee pain, right 05/08/2012    GERD (gastroesophageal reflux disease) 05/08/2012    OA (osteoarthritis) 06/18/2010    Obesity 06/18/2010    Mixed hyperlipidemia 06/18/2010     Past Medical History:   Diagnosis Date    AR (allergic rhinitis)     seasonal    [facility] ([facility]     pt states between vgina and rectal area    Cardiac echocardiogram 04/11/2016    Tech difficult. EF 55-60%. No RWMA. Mild conc LVH. Gr 1 DDfx. RVSP normal.      Cardiac nuclear imaging test 05/05/2016    Low risk. Very patchy radiotracer uptake. Mild anterior artifact, low suspicion for ischemia. EF 68%. No RWMA. Normal EKG on pharm stress test.    Cardiovascular LE arterial duplex 10/07/2013    No significant arterial disease at rest bilaterally. R JUNE 1.21.  L JUNE 1.16. No significant sm vessel disease.     Depression     Dr. Jerilyn Márquez, suicide attempt 2/12    Diverticulosis     Endometriosis s/p hysterectomy     GERD (gastroesophageal reflux disease)     Glaucoma     Dr. Omid Plummer HTN (hypertension)     Hx of colonoscopy 2017    mild diverticulosis, g1 internal hemorrhoids, normal colon , repeat 10 year     Hx of suicide attempt     Hyperlipidemia LDL goal < 130     IBS (irritable bowel syndrome)     Ill-defined condition     environmental allergies    Insomnia     Malignant neoplasm of peritoneum ([address] 2019    Nausea & vomiting     OA (osteoarthritis)     both knees, lower back    Preeclampsia     PTSD (post-traumatic stress disorder)     Seizures (HCC)     1 x with childbirth, had toxemia    Sleep apnea     does not use cpap machine    Spinal stenosis     Dr. Charlie Ennis      Past Surgical History:   Procedure Laterality Date    COLONOSCOPY N/A 2017    COLONOSCOPY performed by Sunny Guadalupe MD at [address] Left 2009    External fixator    FLEXIBLE SIGMOIDOSCOPY N/A 2019    SIGMOIDOSCOPY FLEXIBLE performed by Eddie Jeans, MD at [facility] ENDOSCOPY    HX  SECTION      HX COLONOSCOPY  2017    DR. MCRAE 2017     HX HYSTERECTOMY      HX KNEE ARTHROSCOPY Left     left knee    HX KNEE REPLACEMENT Bilateral     (R)  (L)     HX LAPAROTOMY N/A 2019    and rectovaginal bx    HX OTHER SURGICAL  2016    all teeth removed    HX SALPINGO-OOPHORECTOMY  2008    HX TUBAL LIGATION      IR INSERT TUNL CVC W PORT OVER 5 YEARS  2019    MULTIPLE DELIVERY       1990    TOTAL ABDOM HYSTERECTOMY        OB History      Para Term  AB Living    2 2 2 0 0 0    SAB TAB Ectopic Molar Multiple Live Births    0 0 0 0 0 0        Social History     Tobacco Use    Smoking status: Never Smoker    Smokeless tobacco: Never Used   Substance Use Topics    Alcohol use: No      Family History   Problem Relation Age of Onset    Heart Disease Mother         [facility] Diabetes Mother     Hypertension Mother     Kidney Disease Mother     Breast Cancer Mother     Stroke Mother     Diabetes Father     Hypertension Father     Heart Attack Father         MI    Cancer Maternal Grandmother         stomach    Ovarian Cancer Maternal Aunt     Cancer Maternal Uncle       Current Outpatient Medications   Medication Sig    cycloSPORINE (RESTASIS) 0.05 % dpet Apply 1 Drop to eye as needed.  lubiPROStone (AMITIZA) 24 mcg capsule 1 Cap by Mouth/Throat route as needed.  clotrimazole-betamethasone (LOTRISONE) topical cream Apply to affected area twice daily    simvastatin (ZOCOR) 20 mg tablet take 1 tablet by mouth at bedtime    metoprolol succinate (TOPROL-XL) 100 mg tablet take 1 tablet by mouth once daily    losartan (COZAAR) 100 mg tablet take 1 tablet by mouth once daily    HYDROmorphone (DILAUDID) 2 mg tablet Take 1-2 Tabs by mouth every four (4) hours as needed. Max Daily Amount: 24 mg.    dexamethasone (DECADRON) 4 mg tablet Take 40 mg by mouth as needed. Take 10 tabs the night before Chemo #1 and Chemo #2.  promethazine (PHENERGAN) 25 mg tablet Take 1 Tab by mouth every six (6) hours as needed for Nausea.  ondansetron hcl (ZOFRAN) 4 mg tablet Take 1 Tab by mouth every six (6) hours as needed for Nausea.  melatonin 3 mg tablet Take 3 mg by mouth nightly.  amLODIPine (NORVASC) 10 mg tablet take 1 tablet by mouth once daily    LORazepam (ATIVAN) 0.5 mg tablet Take 0.5 mg by mouth two (2) times daily as needed.  hydrOXYzine pamoate (VISTARIL) 50 mg capsule 2 Caps 2 Times Daily As Needed.  carisoprodol (SOMA) 350 mg tablet take 1 tablet by mouth three times a day and 1 tablet at bedtime (currently out of Rx)    levocetirizine (XYZAL) 5 mg tablet daily as needed.  FIBER, PSYLLIUM HUSK, PO Take  by mouth daily as needed.  latanoprost (XALATAN) 0.005 % ophthalmic solution Administer 1 Drop to both eyes nightly.       plecanatide (TRULANCE) 3 mg tab Take  by mouth.  OXcarbazepine (TRILEPTAL) 300 mg tablet Take 300 mg by mouth two (2) times a day.  ziprasidone (GEODON) 40 mg capsule Take 40 mg by mouth nightly.  montelukast (SINGULAIR) 10 mg tablet Take 10 mg by mouth nightly. No current facility-administered medications for this visit. Allergies   Allergen Reactions    Other Medication Hives     seafood    Shellfish Derived Hives          OBJECTIVE:    Physical Exam  VITAL SIGNS: Visit Vitals  /81 (BP 1 Location: Right arm, BP Patient Position: Sitting)   Pulse (!) 57   Temp 98.2 °F (36.8 °C) (Oral)   Resp 18   Wt 111.7 kg (246 lb 4.8 oz)   LMP 01/31/2008   SpO2 99%   BMI 43.63 kg/m²      GENERAL EMILY: in no apparent distress and well developed and well nourished   MUSCULOSKEL: no joint tenderness, deformity or swelling   INTEGUMENT:  warm and dry, no rashes or lesions   ABDOMEN . soft,obese, NT, ND, No masses appreciated, INC: C/D/I with staples   EXTREMITIES: extremities normal, atraumatic, no cyanosis or edema   PELVIC: deferred   RECTAL: deferred   BRY SURVEY: Cervical, supraclavicular, axillary and inguinal nodes normal.   NEURO: Grossly normal         IMPRESSION/PLAN:  1. Primary peritoneal cancer currently undergoing chemotherapy   -previously reviewed her pathology   -tolerating chemo well.  No G3 or G4 toxicity   -encouraged hydration   -labs reviewed   -ok for cycle #2   -f/u prior to cycle #3   -plan for carbo (auc=6), taxol (175 mg/m2) IV every 3 wks for 6 cycles   -needs BRCA testing- to be done at next visit   -repeat CT after 3 cycles    The total time spent was 40 minutes regarding this patients diagnosis of  Peritoneal cancer and >50% of this time was spent counseling and coordinating care    [facility]  4/45/170306:82 AM

## 2019-03-14 ENCOUNTER — HOSPITAL ENCOUNTER (OUTPATIENT)
Dept: INFUSION THERAPY | Age: 49
Discharge: HOME | End: 2019-03-14
Payer: MEDICARE

## 2019-03-14 VITALS
WEIGHT: 247.1 LBS | OXYGEN SATURATION: 95 % | RESPIRATION RATE: 18 BRPM | HEIGHT: 63 IN | SYSTOLIC BLOOD PRESSURE: 136 MMHG | BODY MASS INDEX: 43.78 KG/M2 | HEART RATE: 71 BPM | DIASTOLIC BLOOD PRESSURE: 78 MMHG | TEMPERATURE: 98 F

## 2019-03-14 DIAGNOSIS — C48.2 MALIGNANT NEOPLASM OF PERITONEUM (HCC): Primary | ICD-10-CM

## 2019-03-14 LAB
ALBUMIN SERPL-MCNC: 3.4 G/DL (ref 3.4–5)
ALBUMIN/GLOB SERPL: 0.9 {RATIO} (ref 0.8–1.7)
ALP SERPL-CCNC: 88 U/L (ref 45–117)
ALT SERPL-CCNC: 21 U/L (ref 13–56)
ANION GAP SERPL CALC-SCNC: 9 MMOL/L (ref 3–18)
APPEARANCE UR: CLEAR
AST SERPL-CCNC: 14 U/L (ref 15–37)
BASO+EOS+MONOS # BLD AUTO: 0.3 K/UL (ref 0–2.3)
BASO+EOS+MONOS NFR BLD AUTO: 2 % (ref 0.1–17)
BILIRUB SERPL-MCNC: 0.2 MG/DL (ref 0.2–1)
BILIRUB UR QL: NEGATIVE
BUN SERPL-MCNC: 20 MG/DL (ref 7–18)
BUN/CREAT SERPL: 19 (ref 12–20)
CALCIUM SERPL-MCNC: 8.9 MG/DL (ref 8.5–10.1)
CHLORIDE SERPL-SCNC: 107 MMOL/L (ref 100–108)
CO2 SERPL-SCNC: 26 MMOL/L (ref 21–32)
COLOR UR: YELLOW
CREAT SERPL-MCNC: 1.08 MG/DL (ref 0.6–1.3)
DIFFERENTIAL METHOD BLD: ABNORMAL
EPITH CASTS URNS QL MICRO: NORMAL /LPF (ref 0–5)
ERYTHROCYTE [DISTWIDTH] IN BLOOD BY AUTOMATED COUNT: 13.7 % (ref 11.5–14.5)
GLOBULIN SER CALC-MCNC: 4 G/DL (ref 2–4)
GLUCOSE SERPL-MCNC: 133 MG/DL (ref 74–99)
GLUCOSE UR STRIP.AUTO-MCNC: 500 MG/DL
HCT VFR BLD AUTO: 34.8 % (ref 36–48)
HGB BLD-MCNC: 11.4 G/DL (ref 12–16)
HGB UR QL STRIP: NEGATIVE
KETONES UR QL STRIP.AUTO: NEGATIVE MG/DL
LEUKOCYTE ESTERASE UR QL STRIP.AUTO: NEGATIVE
LYMPHOCYTES # BLD: 1.1 K/UL (ref 1.1–5.9)
LYMPHOCYTES NFR BLD: 9 % (ref 14–44)
MAGNESIUM SERPL-MCNC: 1.8 MG/DL (ref 1.6–2.6)
MCH RBC QN AUTO: 28.2 PG (ref 25–35)
MCHC RBC AUTO-ENTMCNC: 32.8 G/DL (ref 31–37)
MCV RBC AUTO: 86.1 FL (ref 78–102)
NEUTS SEG # BLD: 10.4 K/UL (ref 1.8–9.5)
NEUTS SEG NFR BLD: 88 % (ref 40–70)
NITRITE UR QL STRIP.AUTO: NEGATIVE
PH UR STRIP: 6.5 [PH] (ref 5–8)
PLATELET # BLD AUTO: 213 K/UL (ref 140–440)
POTASSIUM SERPL-SCNC: 3.9 MMOL/L (ref 3.5–5.5)
PROT SERPL-MCNC: 7.4 G/DL (ref 6.4–8.2)
PROT UR STRIP-MCNC: 300 MG/DL
RBC # BLD AUTO: 4.04 M/UL (ref 4.1–5.1)
RBC #/AREA URNS HPF: NORMAL /HPF (ref 0–5)
SODIUM SERPL-SCNC: 142 MMOL/L (ref 136–145)
SP GR UR REFRACTOMETRY: 1.02 (ref 1–1.03)
UROBILINOGEN UR QL STRIP.AUTO: 0.2 EU/DL (ref 0.2–1)
WBC # BLD AUTO: 11.8 K/UL (ref 4.5–13)
WBC URNS QL MICRO: NORMAL /HPF (ref 0–5)

## 2019-03-14 PROCEDURE — 74011250637 HC RX REV CODE- 250/637: Performed by: OBSTETRICS & GYNECOLOGY

## 2019-03-14 PROCEDURE — 74011250636 HC RX REV CODE- 250/636: Performed by: OBSTETRICS & GYNECOLOGY

## 2019-03-14 PROCEDURE — 86304 IMMUNOASSAY TUMOR CA 125: CPT

## 2019-03-14 PROCEDURE — 74011000250 HC RX REV CODE- 250: Performed by: OBSTETRICS & GYNECOLOGY

## 2019-03-14 PROCEDURE — 85025 COMPLETE CBC W/AUTO DIFF WBC: CPT

## 2019-03-14 PROCEDURE — 96415 CHEMO IV INFUSION ADDL HR: CPT

## 2019-03-14 PROCEDURE — 81001 URINALYSIS AUTO W/SCOPE: CPT

## 2019-03-14 PROCEDURE — 77030012965 HC NDL HUBR BBMI -A

## 2019-03-14 PROCEDURE — 96417 CHEMO IV INFUS EACH ADDL SEQ: CPT

## 2019-03-14 PROCEDURE — 74011000258 HC RX REV CODE- 258: Performed by: OBSTETRICS & GYNECOLOGY

## 2019-03-14 PROCEDURE — 96375 TX/PRO/DX INJ NEW DRUG ADDON: CPT

## 2019-03-14 PROCEDURE — 80053 COMPREHEN METABOLIC PANEL: CPT

## 2019-03-14 PROCEDURE — 96413 CHEMO IV INFUSION 1 HR: CPT

## 2019-03-14 PROCEDURE — 96367 TX/PROPH/DG ADDL SEQ IV INF: CPT

## 2019-03-14 PROCEDURE — 83735 ASSAY OF MAGNESIUM: CPT

## 2019-03-14 PROCEDURE — 87086 URINE CULTURE/COLONY COUNT: CPT

## 2019-03-14 RX ORDER — DIPHENHYDRAMINE HCL 25 MG
50 CAPSULE ORAL ONCE
Status: COMPLETED | OUTPATIENT
Start: 2019-03-14 | End: 2019-03-14

## 2019-03-14 RX ORDER — SODIUM CHLORIDE 0.9 % (FLUSH) 0.9 %
10-40 SYRINGE (ML) INJECTION AS NEEDED
Status: DISPENSED | OUTPATIENT
Start: 2019-03-14 | End: 2019-03-14

## 2019-03-14 RX ORDER — LORAZEPAM 2 MG/ML
0.26 INJECTION INTRAMUSCULAR ONCE
Status: COMPLETED | OUTPATIENT
Start: 2019-03-14 | End: 2019-03-14

## 2019-03-14 RX ORDER — SODIUM CHLORIDE 9 MG/ML
500 INJECTION, SOLUTION INTRAVENOUS ONCE
Status: COMPLETED | OUTPATIENT
Start: 2019-03-14 | End: 2019-03-14

## 2019-03-14 RX ORDER — PALONOSETRON 0.05 MG/ML
0.25 INJECTION, SOLUTION INTRAVENOUS ONCE
Status: COMPLETED | OUTPATIENT
Start: 2019-03-14 | End: 2019-03-14

## 2019-03-14 RX ORDER — HEPARIN 100 UNIT/ML
300-500 SYRINGE INTRAVENOUS AS NEEDED
Status: DISPENSED | OUTPATIENT
Start: 2019-03-14 | End: 2019-03-14

## 2019-03-14 RX ADMIN — PACLITAXEL 360 MG: 6 INJECTION INTRAVENOUS at 10:41

## 2019-03-14 RX ADMIN — FAMOTIDINE 20 MG: 10 INJECTION, SOLUTION INTRAVENOUS at 10:05

## 2019-03-14 RX ADMIN — DEXAMETHASONE SODIUM PHOSPHATE 12 MG: 4 INJECTION, SOLUTION INTRA-ARTICULAR; INTRALESIONAL; INTRAMUSCULAR; INTRAVENOUS; SOFT TISSUE at 10:00

## 2019-03-14 RX ADMIN — SODIUM CHLORIDE, PRESERVATIVE FREE 500 UNITS: 5 INJECTION INTRAVENOUS at 14:55

## 2019-03-14 RX ADMIN — PALONOSETRON HYDROCHLORIDE 0.25 MG: 0.25 INJECTION, SOLUTION INTRAVENOUS at 10:05

## 2019-03-14 RX ADMIN — SODIUM CHLORIDE 150 MG: 900 INJECTION, SOLUTION INTRAVENOUS at 09:57

## 2019-03-14 RX ADMIN — LORAZEPAM 0.26 MG: 2 INJECTION INTRAMUSCULAR; INTRAVENOUS at 10:05

## 2019-03-14 RX ADMIN — DIPHENHYDRAMINE HYDROCHLORIDE 50 MG: 25 CAPSULE ORAL at 09:59

## 2019-03-14 RX ADMIN — Medication 20 ML: at 14:55

## 2019-03-14 RX ADMIN — SODIUM CHLORIDE 500 ML: 9 INJECTION, SOLUTION INTRAVENOUS at 09:57

## 2019-03-14 RX ADMIN — CARBOPLATIN 620 MG: 10 INJECTION, SOLUTION INTRAVENOUS at 13:50

## 2019-03-14 NOTE — PROGRESS NOTES
[address] [facility] Progress Note    Date: 2019    Name: Pina No              MRN: [id number]              : 1970    Chemotherapy Cycle:2  Paclitaxel/Carboplatin       Pt to [facility], ambulatory, at [facility]. Ms. Kathy Dumont was assessed and education was provided. Ms. Cannon's vitals were reviewed. Visit Vitals  /78   Pulse 71   Temp 98 °F (36.7 °C)   Resp 18   Ht 5' 3\" (1.6 m)   Wt 112.1 kg (247 lb 1.6 oz)   SpO2 95%   Breastfeeding? No   BMI 43.77 kg/m²       Right chest mediport accessed with 20 g 1 inch upton needle. Port flushed easily and had brisk blood return. Blood drawn off and sent for CBC, CMP, CA-125 and Magnesium per written orders after 10 ml waste. NS initiated @ 25. Urine specimen sent to lab for UA and culture. Lab results were obtained and reviewed. Recent Results (from the past 12 hour(s))   URINALYSIS W/ RFLX MICROSCOPIC    Collection Time: 19  8:12 AM   Result Value Ref Range    Color YELLOW      Appearance CLEAR      Specific gravity 1.023 1.005 - 1.030      pH (UA) 6.5 5.0 - 8.0      Protein 300 (A) NEG mg/dL    Glucose 500 (A) NEG mg/dL    Ketone NEGATIVE  NEG mg/dL    Bilirubin NEGATIVE  NEG      Blood NEGATIVE  NEG      Urobilinogen 0.2 0.2 - 1.0 EU/dL    Nitrites NEGATIVE  NEG      Leukocyte Esterase NEGATIVE  NEG     URINE MICROSCOPIC ONLY    Collection Time: 19  8:12 AM   Result Value Ref Range    WBC 1 to 2 0 - 5 /hpf    RBC 1 to 2 0 - 5 /hpf    Epithelial cells FEW 0 - 5 /lpf   CBC WITH 3 PART DIFF    Collection Time: 19  8:15 AM   Result Value Ref Range    WBC 11.8 4.5 - 13.0 K/uL    RBC 4.04 (L) 4.10 - 5.10 M/uL    HGB 11.4 (L) 12.0 - 16 g/dL    HCT 34.8 (L) 36 - 48 %    MCV 86.1 78 - 102 FL    MCH 28.2 25.0 - 35.0 PG    MCHC 32.8 31 - 37 g/dL    RDW 13.7 11.5 - 14.5 %    PLATELET 666 552 - 210 K/uL    NEUTROPHILS 88 (H) 40 - 70 %    MIXED CELLS 2 0.1 - 17 %    LYMPHOCYTES 9 (L) 14 - 44 %    ABS. NEUTROPHILS 10.4 (H) 1.8 - 9.5 K/UL    ABS.  MIXED CELLS 0.3 0.0 - 2.3 K/uL    ABS. LYMPHOCYTES 1.1 1.1 - 5.9 K/UL    DF AUTOMATED     MAGNESIUM    Collection Time: 03/14/19  8:15 AM   Result Value Ref Range    Magnesium 1.8 1.6 - 2.6 mg/dL   METABOLIC PANEL, COMPREHENSIVE    Collection Time: 03/14/19  8:15 AM   Result Value Ref Range    Sodium 142 136 - 145 mmol/L    Potassium 3.9 3.5 - 5.5 mmol/L    Chloride 107 100 - 108 mmol/L    CO2 26 21 - 32 mmol/L    Anion gap 9 3.0 - 18 mmol/L    Glucose 133 (H) 74 - 99 mg/dL    BUN 20 (H) 7.0 - 18 MG/DL    Creatinine 1.08 0.6 - 1.3 MG/DL    BUN/Creatinine ratio 19 12 - 20      GFR est AA >60 >60 ml/min/1.73m2    GFR est non-AA 54 (L) >60 ml/min/1.73m2    Calcium 8.9 8.5 - 10.1 MG/DL    Bilirubin, total 0.2 0.2 - 1.0 MG/DL    ALT (SGPT) 21 13 - 56 U/L    AST (SGOT) 14 (L) 15 - 37 U/L    Alk. phosphatase 88 45 - 117 U/L    Protein, total 7.4 6.4 - 8.2 g/dL    Albumin 3.4 3.4 - 5.0 g/dL    Globulin 4.0 2.0 - 4.0 g/dL    A-G Ratio 0.9 0.8 - 1.7         Lab results within ordered parameters to give chemo today. ANC = 10.4, PLT = 213. Chemo dosages verified with today's BSA and found to be within 10% of ordered dosages. Pre-medications (Emend, Benadryl, Aloxi, Decadron, Pepcid, Ativan) were administered as ordered and chemotherapy was initiated after blood return from port re-verified. Reviewed expected side effects of premeds with patient. Taxol 360 mg infused over 3 hours as ordered. VS stable at end of infusion and pt denied complaints. Line flushed with NS and blood return from port re-verified. Carboplatin 620 mg infused over 1 hour as ordered. VS stable at end of infusion and pt denied complaints. Line flushed with NS and blood return from port re-verified. Ms. Yessica Chang tolerated infusion, and had no complaints at this time. Mediport flushed with NS 20 ml and Heparin 500 units then de-accessed. No irritation or bleeding noted. Bandaid applied. Patient armband removed and shredded.     Ms. Yessica Chang was discharged from [address] in stable condition at 1505. She is to return on 3/21/19 at 1000 for her next Hydration appointment.     Floyd Carroll RN  March 14, 2019  1505

## 2019-03-15 LAB
BACTERIA SPEC CULT: NORMAL
CANCER AG125 SERPL-ACNC: 10.4 U/ML (ref 0–38.1)
SERVICE CMNT-IMP: NORMAL

## 2019-03-20 ENCOUNTER — TELEPHONE (OUTPATIENT)
Dept: ONCOLOGY | Age: 49
End: 2019-03-20

## 2019-03-20 NOTE — TELEPHONE ENCOUNTER
Patient called and I spoke with her. She is complaining of joint and body aches and pains. She completed cycle #2 last week (3-14-19). She admits to \"drinking a lot of water\" and is also getting weekly hydration. Tylenol \"is not helping\". She will be in [address] [place] [address] for hydration tomorrow. Discussed with ALIX Zamarripa NP and she will see patient in clinic tomorrow for pain management.

## 2019-03-21 ENCOUNTER — HOSPITAL ENCOUNTER (OUTPATIENT)
Dept: INFUSION THERAPY | Age: 49
Discharge: HOME | End: 2019-03-21
Payer: MEDICARE

## 2019-03-21 ENCOUNTER — OFFICE VISIT (OUTPATIENT)
Dept: ONCOLOGY | Age: 49
End: 2019-03-21

## 2019-03-21 VITALS
RESPIRATION RATE: 18 BRPM | OXYGEN SATURATION: 98 % | HEART RATE: 63 BPM | SYSTOLIC BLOOD PRESSURE: 125 MMHG | TEMPERATURE: 98 F | DIASTOLIC BLOOD PRESSURE: 82 MMHG

## 2019-03-21 VITALS
SYSTOLIC BLOOD PRESSURE: 135 MMHG | TEMPERATURE: 97.7 F | RESPIRATION RATE: 18 BRPM | HEART RATE: 69 BPM | DIASTOLIC BLOOD PRESSURE: 87 MMHG | OXYGEN SATURATION: 97 %

## 2019-03-21 DIAGNOSIS — M25.50 JOINT PAIN FOLLOWING CHEMOTHERAPY: ICD-10-CM

## 2019-03-21 DIAGNOSIS — C48.2 PRIMARY PERITONEAL CARCINOMATOSIS (HCC): Primary | ICD-10-CM

## 2019-03-21 DIAGNOSIS — G89.18 JOINT PAIN FOLLOWING CHEMOTHERAPY: ICD-10-CM

## 2019-03-21 PROCEDURE — 74011250636 HC RX REV CODE- 250/636: Performed by: INTERNAL MEDICINE

## 2019-03-21 PROCEDURE — 96361 HYDRATE IV INFUSION ADD-ON: CPT

## 2019-03-21 PROCEDURE — 96360 HYDRATION IV INFUSION INIT: CPT

## 2019-03-21 PROCEDURE — 77030012965 HC NDL HUBR BBMI -A

## 2019-03-21 RX ORDER — POLYETHYLENE GLYCOL 3350 17 G/17G
17 POWDER, FOR SOLUTION ORAL DAILY
Qty: 528 G | Refills: 3 | Status: SHIPPED | OUTPATIENT
Start: 2019-03-21 | End: 2020-12-17

## 2019-03-21 RX ORDER — OXYCODONE AND ACETAMINOPHEN 5; 325 MG/1; MG/1
1 TABLET ORAL
Qty: 20 TAB | Refills: 0 | Status: SHIPPED | OUTPATIENT
Start: 2019-03-21 | End: 2019-03-24

## 2019-03-21 RX ORDER — HEPARIN 100 UNIT/ML
500 SYRINGE INTRAVENOUS ONCE
Status: COMPLETED | OUTPATIENT
Start: 2019-03-21 | End: 2019-03-21

## 2019-03-21 RX ORDER — SODIUM CHLORIDE 0.9 % (FLUSH) 0.9 %
10-40 SYRINGE (ML) INJECTION AS NEEDED
Status: DISCONTINUED | OUTPATIENT
Start: 2019-03-21 | End: 2019-03-24 | Stop reason: HOSPADM

## 2019-03-21 RX ORDER — SODIUM CHLORIDE 9 MG/ML
1000 INJECTION, SOLUTION INTRAVENOUS CONTINUOUS
Status: DISCONTINUED | OUTPATIENT
Start: 2019-03-21 | End: 2019-03-22 | Stop reason: HOSPADM

## 2019-03-21 RX ADMIN — SODIUM CHLORIDE 1000 ML: 9 INJECTION, SOLUTION INTRAVENOUS at 09:42

## 2019-03-21 RX ADMIN — SODIUM CHLORIDE, PRESERVATIVE FREE 500 UNITS: 5 INJECTION INTRAVENOUS at 11:53

## 2019-03-21 RX ADMIN — Medication 20 ML: at 11:52

## 2019-03-21 NOTE — PROGRESS NOTES
[facility]  [address] [place] [address] Drive, [address], [address] [facility]  [address] [facility], [address] [facility]  [place] figueroa, [facility]ly [facility]   [phone]  Neva Dileeppedro       Patient ID:  Name:  Robb Michael  MRN:  [id number]  :  1970/48 y.o. Date:  3/21/2019      HISTORY OF PRESENT ILLNESS:  Robb Michael is a 51 y.o.  postmenopausal female referred by Dr. Sanjiv Cheung for pelvic mass and postmenopausal bleeding. Intitally seen be NP with reports of vaginal bleeding. Given pt's history of hysteretectomy with BSO for endometriosis in  a TVUS was ordered. Which revealed a 6.8 cm x 5.2 cm x 4.6 cm at midline vaginal cuff. This prompted pelvic CT with findings of a lesion measuring 7.6 x 5.8 x 7.2 cm and is compatible with a pelvic neoplasm vs endometrioma given prior history of endometriosis. Tumor markers done on 2018 all normal.  S/p  Exploratory laparotomy, exploration of retroperitoneal spaces, exam under anesthesia with biopsy of rectovaginal mass on 2019. Had sigmoidoscopy which revealed submucosal mass. Pt still having some bladder spasm. She reports diffuse body aches that prevent her from sleeping at night. She also reports increase in numbness and tingling bilateral hands and feet. Labs:  Component      Latest Ref Rng & Units 3/14/2019 2019           8:15 AM  8:32 AM   Cancer Ag (CA) 125      0.0 - 38.1 U/mL 10.4 18.4     2018 : 9.3  2018 CEA: 2.0  2018 AFP: 3.8    Pathology  2019   RECTOVAGINAL MASS (#1, 2) AND PELVIC MASS, BIOPSIES:   CONSISTENT WITH SEROUS CARCINOMA WITH EXTENSIVE NECROSIS. Imaging  PETCT 2019   --  PET FINDINGS  --    No abnormal hypermetabolic activity in the neck.       No abnormal hypermetabolic activity in the chest.      Low-attenuation possible lesion in the medial dome right lobe liver in region  of heterogeneous hypermetabolic activity, with SUV Max reaching 11.7 on image  98. Underlying lesion difficult to visualize due to extensive motion from  respirations. Cannot well separate from the IVC or diaphragm. No definite  corresponding lung lesion however.     There is low level metabolic activity associated with stranding and presumed  scarring in the subcutaneous fat of the lower midline abdominal and pelvic wall. This may simply be inflammatory. .      There is a lobulated soft tissue conglomerate in the midline to right lateral  cul-de-sac and perirectal pelvis. The periphery of this is diffusely  hypermetabolic, with SUV Max reaching 18.3 on image 200. Portions centrally are  photopenic and presumed necrotic. This measures up to 7.5 x 6.1 cm axial on  image 196. Anterior margin of this hypermetabolic lesion is in direct contact  with the high intensity decompressed urinary bladder, involvement cannot be  assessed. Left lateral margin of the mass is in contact with the surface of the  lateral wall of the sigmoid colon. Direct involvement cannot be assessed.  -Additional focal hypermetabolic density 2.7 cm on image 189 inseparable from  the caudal wall of the sigmoid. -Tapering hypermetabolic activity extends to approximately image 210, not to the  level of the perineum.     There are no other areas of abnormal metabolic activity appreciated in the  abdomen or pelvis which cannot be attributed to renal collecting system, ureter,  bladder or bowel wall activity>]. No definite hypermetabolic bone lesions appreciated, although bone scan can be  more sensitive in this respect. --  CT FINDINGS  --     These limited CT images do not replace diagnostic quality contrast enhanced CT  scan for evaluation of solid organs, bowel, retroperitoneum and vasculature. CT NECK:    Severely limited by lack of intravenous contrast.  Paranasal sinuses free of  disease. No appreciably enlarged lymph nodes.  Extensive cervical endplate  osteophytes anterior and left lateral..      CT CHEST:    Limited evaluation of the hilum and vasculature without intravenous contrast.  No pleural effusion. No appreciably enlarged lymph nodes. Patchy parenchymal  opacities medial basal left lower lobe on image 89 demonstrates low level  metabolic activity, SUV Max 3.2, nonspecific, favored as inflammatory based on  imaging appearance subjectively. [place] CT ABDOMEN:    Limited noncontrast images. Normal-size liver and spleen no adrenal mass. No  hydronephrosis. No ascites. CT PELVIS:    Limited noncontrast images. No small bowel or colon distention. Scattered  colonic diverticulosis. Postsurgical change anterior abdominal wall. Severe  degenerative facet disease at the lower lumbar levels.       Lobulated right pelvic/cul-de-sac soft tissue mass as above. Mild degree of  stranding in the pelvic mesenteric fat. No ascites.        IMPRESSION   IMPRESSION:      1. Peripherally hypermetabolic soft tissue mass right dependent pelvis to the  cul-de-sac region as above consistent with malignancy/metastatic disease with  some central necrosis  -Possible separate lesion versus serosal involvement of the sigmoid colon  adjacent. -Mass in contact with posterior bladder, sigmoid colon locally and posterior  peritoneal surface.     2. Hypermetabolic lesion along central dome of the right lobe liver difficult to  separate from IVC or diaphragm due to motion artifact. Recommend dedicated  contrast enhanced CT for localization and better clarification.  -Findings are concerning for malignancy or hepatic metastatic disease until  proven otherwise.      3. Low level activity associated with patchy parenchymal opacity at the medial  left lower lobe,, favored as inflammatory as above.     4. No other definite hypermetabolic abnormalities appreciated elsewhere. 12/14/2018 CT PELVIS W/CONTRAST  FINDINGS:    LYMPH NODES: No enlarged lymph nodes.     PELVIC GASTROINTESTINAL TRACT: No bowel dilation or wall thickening. PELVIC ORGANS:     The uterus is absent. Along the right upper margin of the vaginal cuff within the right deep pelvis there is a large irregularly-shaped peripherally enhancing, septated appearing mass with regions of central low attenuation which could be low density-fluid type contents or regions of necrosis. Lesion measures 76 x 58 x 72 mm and is compatible with a pelvic neoplasm. VASCULATURE: Unremarkable. BONES: Lower lumbar hypertrophic osteophytes. Lower lumbar facet hypertrophy with vacuum phenomenon asymmetric leftward. Degenerative vacuum phenomenon within the SI joints noted as well. No acute or aggressive osseous abnormalities identified. OTHER: Small fat-containing ventral umbilical hernia. Minimal nonspecific edema within the subcutaneous fat of the lumbar region, not uncommon. IMPRESSION:   1.  Along the right upper margin of the vaginal cuff within the right deep pelvis there is a large irregularly-shaped peripherally enhancing, septated appearing mass with regions of central low attenuation which could be low density-fluid type contents or regions of necrosis. Lesion measures 76 x 58 x 72 mm and is compatible with a pelvic neoplasm. Could be a endometrioma given prior history of endometriosis, malignancy not excluded and gynecologic oncology follow-up is suggested. 2.  Small fat-containing ventral umbilical hernia.      12/04/2018 TVUS  Uterus: surgically absent  Left ovary: surgically absent  Right ovary: surgically absent  Other findings: midline-at vaginal cuff: 6.8 cm X 5.2 cm x 4.6 cm, volume 85 ml    Impression: Complex pelvic mass       ROS:    As above      Patient Active Problem List    Diagnosis Date Noted    Malignant neoplasm of peritoneum ([facility] [address] 02/14/2019    Pelvic mass in female 01/17/2019    Bipolar 1 disorder ([address] 02/09/2018    Irritable bowel syndrome with both constipation and diarrhea 02/09/2018    Advance care planning 01/31/2017    Dental caries 05/26/2016    Chronic periodontal disease 05/26/2016    SUAZO (dyspnea on exertion) 02/10/2016    Prediabetes 09/24/2015    Morbid obesity ([facility] [address] 08/31/2015    Arthritis, degenerative 08/31/2015    Gastroesophageal reflux disease without esophagitis 08/31/2015    ANAMIKA on CPAP 08/31/2015    Essential hypertension 08/28/2015    PTSD (post-traumatic stress disorder) 03/24/2014    S/P TKR (total knee replacement) 11/14/2012    Depression 05/08/2012    Menopause 05/08/2012    Knee pain, right 05/08/2012    GERD (gastroesophageal reflux disease) 05/08/2012    OA (osteoarthritis) 06/18/2010    Obesity 06/18/2010    Mixed hyperlipidemia 06/18/2010     Past Medical History:   Diagnosis Date    AR (allergic rhinitis)     seasonal    Cancer ([facility] [address]     pt states between vgina and rectal area    Cardiac echocardiogram 04/11/2016    Tech difficult. EF 55-60%. No RWMA. Mild conc LVH. Gr 1 DDfx. RVSP normal.      Cardiac nuclear imaging test 05/05/2016    Low risk. Very patchy radiotracer uptake. Mild anterior artifact, low suspicion for ischemia. EF 68%. No RWMA. Normal EKG on pharm stress test.    Cardiovascular LE arterial duplex 10/07/2013    No significant arterial disease at rest bilaterally. R JUNE 1.21.  L JUNE 1.16. No significant sm vessel disease.     Depression     Dr. Jerilyn Márquez, suicide attempt 2/12    Diverticulosis     Endometriosis     s/p hysterectomy 2006    GERD (gastroesophageal reflux disease)     Glaucoma     Dr. Jessica Bear HTN (hypertension)     Hx of colonoscopy 04/05/2017    mild diverticulosis, g1 internal hemorrhoids, normal colon , repeat 10 year 2027    Hx of suicide attempt     Hyperlipidemia LDL goal < 130     IBS (irritable bowel syndrome)     Ill-defined condition     environmental allergies    Insomnia     Malignant neoplasm of peritoneum ([facility] [address] 2/14/2019    Nausea & vomiting     OA (osteoarthritis)     both knees, lower back    Preeclampsia     PTSD (post-traumatic stress disorder)     Seizures (HCC)     1 x with childbirth, had toxemia    Sleep apnea     does not use cpap machine    Spinal stenosis     Dr. Yarely Turner      Past Surgical History:   Procedure Laterality Date    COLONOSCOPY N/A 2017    COLONOSCOPY performed by Isela Zazueta MD at [facility] Left     External fixator    FLEXIBLE SIGMOIDOSCOPY N/A 2019    SIGMOIDOSCOPY FLEXIBLE performed by Tamica Tolliver MD at [facility]COPY    HX  SECTION      HX COLONOSCOPY  2017    DR. MCRAE 2017     HX HYSTERECTOMY      HX KNEE ARTHROSCOPY Left     left knee    HX KNEE REPLACEMENT Bilateral     (R)  (L)     HX LAPAROTOMY N/A 2019    and rectovaginal bx    HX OTHER SURGICAL  2016    all teeth removed    HX SALPINGO-OOPHORECTOMY  2008    HX TUBAL LIGATION      IR INSERT TUNL CVC W PORT OVER 5 YEARS  2019    MULTIPLE DELIVERY       1990    TOTAL ABDOM HYSTERECTOMY        OB History        2    Para   2    Term   2       0    AB   0    Living   0       SAB   0    TAB   0    Ectopic   0    Molar   0    Multiple   0    Live Births   0              Social History     Tobacco Use    Smoking status: Never Smoker    Smokeless tobacco: Never Used   Substance Use Topics    Alcohol use: No      Family History   Problem Relation Age of Onset    Heart Disease Mother         CHF    Diabetes Mother     Hypertension Mother     Kidney Disease Mother     Breast Cancer Mother     Stroke Mother     Diabetes Father     Hypertension Father     Heart Attack Father         MI    Cancer Maternal Grandmother         stomach    Ovarian Cancer Maternal Aunt     Cancer Maternal Uncle       Current Outpatient Medications   Medication Sig    polyethylene glycol (MIRALAX) 17 gram packet Take 1 Packet by mouth daily.  oxyCODONE-acetaminophen (PERCOCET) 5-325 mg per tablet Take 1 Tab by mouth every four (4) hours as needed for Pain for up to 3 days. Max Daily Amount: 6 Tabs.  cyanocobalamin (VITAMIN B-12) 1,000 mcg tablet Take 1,000 mcg by mouth daily.  cycloSPORINE (RESTASIS) 0.05 % dpet Apply 1 Drop to eye as needed.  lubiPROStone (AMITIZA) 24 mcg capsule 1 Cap by Mouth/Throat route as needed.  clotrimazole-betamethasone (LOTRISONE) topical cream Apply to affected area twice daily    simvastatin (ZOCOR) 20 mg tablet take 1 tablet by mouth at bedtime    metoprolol succinate (TOPROL-XL) 100 mg tablet take 1 tablet by mouth once daily    losartan (COZAAR) 100 mg tablet take 1 tablet by mouth once daily    dexamethasone (DECADRON) 4 mg tablet Take 40 mg by mouth as needed. Take 10 tabs the night before Chemo #1 and Chemo #2.  promethazine (PHENERGAN) 25 mg tablet Take 1 Tab by mouth every six (6) hours as needed for Nausea.  ondansetron hcl (ZOFRAN) 4 mg tablet Take 1 Tab by mouth every six (6) hours as needed for Nausea.  melatonin 3 mg tablet Take 3 mg by mouth nightly.  amLODIPine (NORVASC) 10 mg tablet take 1 tablet by mouth once daily    plecanatide (TRULANCE) 3 mg tab Take  by mouth.  OXcarbazepine (TRILEPTAL) 300 mg tablet Take 300 mg by mouth two (2) times a day.  LORazepam (ATIVAN) 0.5 mg tablet Take 0.5 mg by mouth two (2) times daily as needed.  hydrOXYzine pamoate (VISTARIL) 50 mg capsule 2 Caps 2 Times Daily As Needed.  carisoprodol (SOMA) 350 mg tablet take 1 tablet by mouth three times a day and 1 tablet at bedtime (currently out of Rx)    levocetirizine (XYZAL) 5 mg tablet daily as needed.  ziprasidone (GEODON) 40 mg capsule Take 40 mg by mouth nightly.  montelukast (SINGULAIR) 10 mg tablet Take 10 mg by mouth nightly.  FIBER, PSYLLIUM HUSK, PO Take  by mouth daily as needed.     latanoprost (XALATAN) 0.005 % ophthalmic solution Administer 1 Drop to both eyes nightly. No current facility-administered medications for this visit. Facility-Administered Medications Ordered in Other Visits   Medication Dose Route Frequency    sodium chloride (NS) flush 10-40 mL  10-40 mL IntraVENous PRN    heparin (porcine) pf 500 Units  500 Units InterCATHeter ONCE    0.9% sodium chloride infusion 1,000 mL  1,000 mL IntraVENous CONTINUOUS     Allergies   Allergen Reactions    Other Medication Hives     seafood    Shellfish Derived Hives          OBJECTIVE:    Physical Exam  VITAL SIGNS: Visit Vitals  /82   Pulse 63   Temp 98 °F (36.7 °C) (Oral)   Resp 18   LMP 01/31/2008   SpO2 98%      GENERAL EMILY: in no apparent distress and well developed and well nourished   MUSCULOSKEL: no joint tenderness, deformity or swelling   INTEGUMENT:  warm and dry, no rashes or lesions   ABDOMEN . soft,obese, NT, ND, No masses appreciated, INC: C/D/I with staples   EXTREMITIES: extremities normal, atraumatic, no cyanosis or edema   PELVIC: deferred   RECTAL: deferred   BRY SURVEY: Cervical, supraclavicular, axillary and inguinal nodes normal.   NEURO: Grossly normal         IMPRESSION/PLAN:  1. Primary peritoneal cancer   -reviewed her pathology   -discussed recommendation for chemotherapy   -reviewed PETCT with ? Liver involvement but explained given operative findings it is unlikely   -f/u MRI results   -ok for cycle #1   -f/u prior to cycle #2   -plan for carbo (auc=6), taxol (175 mg/m2) IV every 3 wks for 6 cycles   -repeat CT after 3 cycles  2. Chemo induced pain-Percocet 5/325 PRN as needed for pain.      The total time spent was 25 minutes regarding this patients diagnosis of pelvic mass and >50% of this time was spent counseling and coordinating care    Lindsay Fraire NP  Gynecologic Oncology  1/[id number]/[id number] AM

## 2019-03-21 NOTE — PROGRESS NOTES
[facility] Progress Note Date: 2019 Name: Cl Bell MRN: [id number] : 1970 Hydration Ms. Richie Vazquez to [facility], [facility], at 2534. No complaints or concerns voiced. Pt was assessed and education was provided. Ms. Cannon's vitals were reviewed. Visit Vitals /87 (BP 1 Location: Right arm, BP Patient Position: Sitting) Pulse 69 Temp 97.7 °F (36.5 °C) Resp 18 SpO2 97% Right chest mediport was accessed with 20 gauge 1 inch upton(s) after chloroprep. Scab above port site noted. Port flushed easily and had brisk blood return. 1L NS infused over 2 hours. Mediport flushed with NS 20 ml and Heparin 500 units then de-accessed. No irritation or bleeding noted. Band-Aid applied. Patient Vitals for the past 8 hrs: 
 Temp Pulse Resp BP SpO2  
19 1155 97.7 °F (36.5 °C) 69 18 135/87 97 % 19 0929 98 °F (36.7 °C) 63 18 125/82 97 % Ms. Cannon tolerated the procedure, and had no complaints. Patient armband removed and shredded. Ms. Richie Vazquez was discharged from [place] [address] in stable condition at 1200. She is to return on 19  for her next appointment. Huber Jones RN 
2019

## 2019-03-21 NOTE — PROGRESS NOTES
Patient in [facility] for hydration (ordered weekly) and follow up with Steven Allen NP for pain assessment. Genetic testing done ([facility]) and patient given the follow up contact information for [facility].

## 2019-03-26 ENCOUNTER — TELEPHONE (OUTPATIENT)
Dept: ONCOLOGY | Age: 49
End: 2019-03-26

## 2019-03-26 NOTE — TELEPHONE ENCOUNTER
Patient contacted the office stating she has an appointment with Dr. Sunshine Caro tomorrow and would like to know if he would be 'ordering another MRI to check on the status of her tumor.'  Please call her back at [phone].

## 2019-03-27 ENCOUNTER — OFFICE VISIT (OUTPATIENT)
Dept: ONCOLOGY | Age: 49
End: 2019-03-27

## 2019-03-27 VITALS
OXYGEN SATURATION: 100 % | DIASTOLIC BLOOD PRESSURE: 85 MMHG | TEMPERATURE: 97.1 F | WEIGHT: 248.9 LBS | RESPIRATION RATE: 18 BRPM | SYSTOLIC BLOOD PRESSURE: 143 MMHG | HEART RATE: 62 BPM | BODY MASS INDEX: 44.09 KG/M2

## 2019-03-27 DIAGNOSIS — C48.2 PERITONEAL CARCINOMA (HCC): Primary | ICD-10-CM

## 2019-03-27 NOTE — PROGRESS NOTES
[address] [facility]  [address] [place] [address], [address], [address] [facility]  [address] [facility], [address] [facility]  [place], [address]   [phone]  Mally Siegel DO      Patient ID:  Name:  Alanna Sierra  MRN:  [id number]  :  1970/48 y.o. Date:  3/27/2019      HISTORY OF PRESENT ILLNESS:  Alanna Sierra is a 51 y.o.  postmenopausal female referred by Dr. Kenan Garcia  With peritoneal cancer. Intitally seen be NP with reports of vaginal bleeding. Given pt's history of hysteretectomy with BSO for endometriosis in  a TVUS was ordered. Which revealed a 6.8 cm x 5.2 cm x 4.6 cm at midline vaginal cuff. This prompted pelvic CT with findings of a lesion measuring 7.6 x 5.8 x 7.2 cm and is compatible with a pelvic neoplasm vs endometrioma given prior history of endometriosis. Tumor markers done on 2018 all normal.  S/p  Exploratory laparotomy, exploration of retroperitoneal spaces, exam under anesthesia with biopsy of rectovaginal mass on 2019. Had sigmoidoscopy which revealed submucosal mass. S/p cycle #2 of carbo/taxol on 3/14/2019. Has some tingling and body aches week after chemo. Doing better with hydration      Labs:  Component      Latest Ref Rng & Units 3/14/2019 2019           8:15 AM  8:32 AM   Cancer Ag (CA) 125      0.0 - 38.1 U/mL 10.4 18.4     2018 : 9.3  2018 CEA: 2.0  2018 AFP: 3.8    Pathology  2019   RECTOVAGINAL MASS (#1, 2) AND PELVIC MASS, BIOPSIES:   CONSISTENT WITH SEROUS CARCINOMA WITH EXTENSIVE NECROSIS. Imaging  MRI 2019  FINDINGS:  Presumed hysterectomy. There is a lobulated 6.8 x 6 x 5.9 cm mass centered at  the right cul-de-sac involving the right and posterior upper to mid vagina but  also abutting the right anterior rectum from 1-8 o'clock.  On coronal and  sagittal views tumor is favored to not be originating from the rectum but this  is not definite. There is a oval ring of T2 hypointensity and T1 hyperintensity  with central necrosis within the mass. The mass extends to the posterior medial  right mesorectal fascia. The mass does not invade the bladder. At the cranial  aspect of the mass is a spiculated 3.4 x 2 cm T2 hypointense structure with  tethering to adjacent bowel and fascia. Ovaries are not visualized separately. No pelvic ascites.     Bowel is otherwise normal in caliber. No upstream dilation. Bladder is normal.  No lymphadenopathy.     Postsurgical changes anterior midline pelvic wall. Bones are grossly  unremarkable.     IMPRESSION  IMPRESSION:    1. Large up to 6.8 cm mass centered at the right cul-de-sac  is most  intimately associated with the vagina, favored to be either endometrial, vaginal  or cervical in origin. It does abut and may involve the right rectum but this is  favored secondary involvement. Ovaries are not visualized separately. Correlate  for history of oophorectomy since ovarian malignancy also possible. 2.  There is an associated site of favored endometriotic implant at the cranial  aspect of the mass raising the possibility of malignant transformation. 3.  No upstream bowel obstruction. PETCT 2/14/2019   --  PET FINDINGS  --    No abnormal hypermetabolic activity in the neck. No abnormal hypermetabolic activity in the chest.      Low-attenuation possible lesion in the medial dome right lobe liver in region  of heterogeneous hypermetabolic activity, with SUV Max reaching 11.7 on image  98. Underlying lesion difficult to visualize due to extensive motion from  respirations. Cannot well separate from the IVC or diaphragm. No definite  corresponding lung lesion however.     There is low level metabolic activity associated with stranding and presumed  scarring in the subcutaneous fat of the lower midline abdominal and pelvic wall. This may simply be inflammatory. .      There is a lobulated soft tissue conglomerate in the midline to right lateral  cul-de-sac and perirectal pelvis. The periphery of this is diffusely  hypermetabolic, with SUV Max reaching 18.3 on image 200. Portions centrally are  photopenic and presumed necrotic. This measures up to 7.5 x 6.1 cm axial on  image 196. Anterior margin of this hypermetabolic lesion is in direct contact  with the high intensity decompressed urinary bladder, involvement cannot be  assessed. Left lateral margin of the mass is in contact with the surface of the  lateral wall of the sigmoid colon. Direct involvement cannot be assessed.  -Additional focal hypermetabolic density 2.7 cm on image 189 inseparable from  the caudal wall of the sigmoid. -Tapering hypermetabolic activity extends to approximately image 210, not to the  level of the perineum.     There are no other areas of abnormal metabolic activity appreciated in the  abdomen or pelvis which cannot be attributed to renal collecting system, ureter,  bladder or bowel wall activity>]. No definite hypermetabolic bone lesions appreciated, although bone scan can be  more sensitive in this respect. --  CT FINDINGS  --     These limited CT images do not replace diagnostic quality contrast enhanced CT  scan for evaluation of solid organs, bowel, retroperitoneum and vasculature. CT NECK:    Severely limited by lack of intravenous contrast.  Paranasal sinuses free of  disease. No appreciably enlarged lymph nodes. Extensive cervical endplate  osteophytes anterior and left lateral..      CT CHEST:    Limited evaluation of the hilum and vasculature without intravenous contrast.  No pleural effusion. No appreciably enlarged lymph nodes. Patchy parenchymal  opacities medial basal left lower lobe on image 89 demonstrates low level  metabolic activity, SUV Max 3.2, nonspecific, favored as inflammatory based on  imaging appearance subjectively. [place] CT ABDOMEN:    Limited noncontrast images.   Normal-size liver and spleen no adrenal mass. No  hydronephrosis. No ascites. CT PELVIS:    Limited noncontrast images. No small bowel or colon distention. Scattered  colonic diverticulosis. Postsurgical change anterior abdominal wall. Severe  degenerative facet disease at the lower lumbar levels.       Lobulated right pelvic/cul-de-sac soft tissue mass as above. Mild degree of  stranding in the pelvic mesenteric fat. No ascites.        IMPRESSION   IMPRESSION:      1. Peripherally hypermetabolic soft tissue mass right dependent pelvis to the  cul-de-sac region as above consistent with malignancy/metastatic disease with  some central necrosis  -Possible separate lesion versus serosal involvement of the sigmoid colon  adjacent. -Mass in contact with posterior bladder, sigmoid colon locally and posterior  peritoneal surface.     2. Hypermetabolic lesion along central dome of the right lobe liver difficult to  separate from IVC or diaphragm due to motion artifact. Recommend dedicated  contrast enhanced CT for localization and better clarification.  -Findings are concerning for malignancy or hepatic metastatic disease until  proven otherwise.      3. Low level activity associated with patchy parenchymal opacity at the medial  left lower lobe,, favored as inflammatory as above.     4. No other definite hypermetabolic abnormalities appreciated elsewhere. 12/14/2018 CT PELVIS W/CONTRAST  FINDINGS:    LYMPH NODES: No enlarged lymph nodes. PELVIC GASTROINTESTINAL TRACT: No bowel dilation or wall thickening. PELVIC ORGANS:     The uterus is absent. Along the right upper margin of the vaginal cuff within the right deep pelvis there is a large irregularly-shaped peripherally enhancing, septated appearing mass with regions of central low attenuation which could be low density-fluid type contents or regions of necrosis. Lesion measures 76 x 58 x 72 mm and is compatible with a pelvic neoplasm.      VASCULATURE: Unremarkable. BONES: Lower lumbar hypertrophic osteophytes. Lower lumbar facet hypertrophy with vacuum phenomenon asymmetric leftward. Degenerative vacuum phenomenon within the SI joints noted as well. No acute or aggressive osseous abnormalities identified. OTHER: Small fat-containing ventral umbilical hernia. Minimal nonspecific edema within the subcutaneous fat of the lumbar region, not uncommon. IMPRESSION:   1.  Along the right upper margin of the vaginal cuff within the right deep pelvis there is a large irregularly-shaped peripherally enhancing, septated appearing mass with regions of central low attenuation which could be low density-fluid type contents or regions of necrosis. Lesion measures 76 x 58 x 72 mm and is compatible with a pelvic neoplasm. Could be a endometrioma given prior history of endometriosis, malignancy not excluded and gynecologic oncology follow-up is suggested. 2.  Small fat-containing ventral umbilical hernia.      12/04/2018 TVUS  Uterus: surgically absent  Left ovary: surgically absent  Right ovary: surgically absent  Other findings: midline-at vaginal cuff: 6.8 cm X 5.2 cm x 4.6 cm, volume 85 ml    Impression: Complex pelvic mass       ROS:    As above      Patient Active Problem List    Diagnosis Date Noted    Malignant neoplasm of peritoneum ([address] 02/14/2019    Pelvic mass in female 01/17/2019    Bipolar 1 disorder ([address] 02/09/2018    Irritable bowel syndrome with both constipation and diarrhea 02/09/2018    Advance care planning 01/31/2017    Dental caries 05/26/2016    Chronic periodontal disease 05/26/2016    SUAZO (dyspnea on exertion) 02/10/2016    Prediabetes 09/24/2015    Morbid obesity ([address] 08/31/2015    Arthritis, degenerative 08/31/2015    Gastroesophageal reflux disease without esophagitis 08/31/2015    ANAMIKA on CPAP 08/31/2015    Essential hypertension 08/28/2015    PTSD (post-traumatic stress disorder) 03/24/2014    S/P TKR (total knee replacement) 11/14/2012    Depression 05/08/2012    Menopause 05/08/2012    Knee pain, right 05/08/2012    GERD (gastroesophageal reflux disease) 05/08/2012    OA (osteoarthritis) 06/18/2010    Obesity 06/18/2010    Mixed hyperlipidemia 06/18/2010     Past Medical History:   Diagnosis Date    AR (allergic rhinitis)     seasonal    Cancer ([address]     pt states between vgina and rectal area    Cardiac echocardiogram 04/11/2016    Tech difficult. EF 55-60%. No RWMA. Mild conc LVH. Gr 1 DDfx. RVSP normal.      Cardiac nuclear imaging test 05/05/2016    Low risk. Very patchy radiotracer uptake. Mild anterior artifact, low suspicion for ischemia. EF 68%. No RWMA. Normal EKG on pharm stress test.    Cardiovascular LE arterial duplex 10/07/2013    No significant arterial disease at rest bilaterally. R JUNE 1.21.  L JUNE 1.16. No significant sm vessel disease.     Depression     Dr. Anneliese Kiser, suicide attempt 2/12    Diverticulosis     Endometriosis     s/p hysterectomy 2006    GERD (gastroesophageal reflux disease)     Glaucoma     Dr. Jory Amaya HTN (hypertension)     Hx of colonoscopy 04/05/2017    mild diverticulosis, g1 internal hemorrhoids, normal colon , repeat 10 year 2027    Hx of suicide attempt     Hyperlipidemia LDL goal < 130     IBS (irritable bowel syndrome)     Ill-defined condition     environmental allergies    Insomnia     Malignant neoplasm of peritoneum ([address] 2/14/2019    Nausea & vomiting     OA (osteoarthritis)     both knees, lower back    Preeclampsia     PTSD (post-traumatic stress disorder)     Seizures ([address]     1 x with childbirth, had toxemia    Sleep apnea     does not use cpap machine    Spinal stenosis     Dr. Jasvir Plaza      Past Surgical History:   Procedure Laterality Date    COLONOSCOPY N/A 4/5/2017    COLONOSCOPY performed by Brown Collins MD at [address] FEMUR/KNEE SURG UNLISTED Left 2009    External fixator    FLEXIBLE SIGMOIDOSCOPY N/A 2019    SIGMOIDOSCOPY FLEXIBLE performed by Marina Seth MD at [facility] ENDOSCOPY    HX  SECTION      HX COLONOSCOPY  2017    DR. MCRAE 2017     HX HYSTERECTOMY      HX KNEE ARTHROSCOPY Left     left knee    HX KNEE REPLACEMENT Bilateral     (R)  (L)     HX LAPAROTOMY N/A 2019    and rectovaginal bx    HX OTHER SURGICAL  2016    all teeth removed    HX SALPINGO-OOPHORECTOMY  2008    HX TUBAL LIGATION      IR INSERT TUNL CVC W PORT OVER 5 YEARS  2019    MULTIPLE DELIVERY       1990    TOTAL ABDOM HYSTERECTOMY        OB History        2    Para   2    Term   2       0    AB   0    Living   0       SAB   0    TAB   0    Ectopic   0    Molar   0    Multiple   0    Live Births   0              Social History     Tobacco Use    Smoking status: Never Smoker    Smokeless tobacco: Never Used   Substance Use Topics    Alcohol use: No      Family History   Problem Relation Age of Onset    Heart Disease Mother         CHF    Diabetes Mother     Hypertension Mother     Kidney Disease Mother     Breast Cancer Mother     Stroke Mother     Diabetes Father     Hypertension Father     Heart Attack Father         MI    Cancer Maternal Grandmother         stomach    Ovarian Cancer Maternal Aunt     Cancer Maternal Uncle       Current Outpatient Medications   Medication Sig    polyethylene glycol (MIRALAX) 17 gram packet Take 1 Packet by mouth daily.  cyanocobalamin (VITAMIN B-12) 1,000 mcg tablet Take 1,000 mcg by mouth daily.  cycloSPORINE (RESTASIS) 0.05 % dpet Apply 1 Drop to eye as needed.  lubiPROStone (AMITIZA) 24 mcg capsule 1 Cap by Mouth/Throat route as needed.     clotrimazole-betamethasone (LOTRISONE) topical cream Apply to affected area twice daily    simvastatin (ZOCOR) 20 mg tablet take 1 tablet by mouth at bedtime    metoprolol succinate (TOPROL-XL) 100 mg tablet take 1 tablet by mouth once daily    losartan (COZAAR) 100 mg tablet take 1 tablet by mouth once daily    dexamethasone (DECADRON) 4 mg tablet Take 40 mg by mouth as needed. Take 10 tabs the night before Chemo #1 and Chemo #2.  promethazine (PHENERGAN) 25 mg tablet Take 1 Tab by mouth every six (6) hours as needed for Nausea.  ondansetron hcl (ZOFRAN) 4 mg tablet Take 1 Tab by mouth every six (6) hours as needed for Nausea.  melatonin 3 mg tablet Take 3 mg by mouth nightly.  amLODIPine (NORVASC) 10 mg tablet take 1 tablet by mouth once daily    plecanatide (TRULANCE) 3 mg tab Take  by mouth.  OXcarbazepine (TRILEPTAL) 300 mg tablet Take 300 mg by mouth two (2) times a day.  LORazepam (ATIVAN) 0.5 mg tablet Take 0.5 mg by mouth two (2) times daily as needed.  hydrOXYzine pamoate (VISTARIL) 50 mg capsule 2 Caps 2 Times Daily As Needed.  carisoprodol (SOMA) 350 mg tablet take 1 tablet by mouth three times a day and 1 tablet at bedtime (currently out of Rx)    levocetirizine (XYZAL) 5 mg tablet daily as needed.  ziprasidone (GEODON) 40 mg capsule Take 40 mg by mouth nightly.  montelukast (SINGULAIR) 10 mg tablet Take 10 mg by mouth nightly.  FIBER, PSYLLIUM HUSK, PO Take  by mouth daily as needed.  latanoprost (XALATAN) 0.005 % ophthalmic solution Administer 1 Drop to both eyes nightly. No current facility-administered medications for this visit. Allergies   Allergen Reactions    Other Medication Hives     seafood    Shellfish Derived Hives          OBJECTIVE:    Physical Exam  VITAL SIGNS: Visit Vitals  LMP 01/31/2008      GENERAL EMILY: in no apparent distress and well developed and well nourished   MUSCULOSKEL: no joint tenderness, deformity or swelling   INTEGUMENT:  warm and dry, no rashes or lesions   ABDOMEN . soft,obese, NT, ND, No masses appreciated, INC: C/D/I with staples   EXTREMITIES: extremities normal, atraumatic, no cyanosis or edema   PELVIC: deferred   RECTAL: deferred   BRY SURVEY: Cervical, supraclavicular, axillary and inguinal nodes normal.   NEURO: Grossly normal         IMPRESSION/PLAN:  1. Primary peritoneal cancer   -reviewed her pathology   -discussed recommendation for chemotherapy   -reviewed PETCT with ? Liver involvement but explained given operative findings it is unlikely   -tolerating chemo well. No G3 or G4 toxicity   -ok for cycle #3   -f/u prior to cycle #4   -plan for carbo (auc=6), taxol (175 mg/m2) IV every 3 wks for 6 cycles   -repeat CT after 3 cycles- ordered today  2. Chemo induced pain-Percocet 5/325 PRN as needed for pain.      The total time spent was 40 minutes regarding this patients diagnosis of primary peritoneal cancer and >50% of this time was spent counseling and coordinating care    Michael Gonzalez MD  Gynecologic Oncology  6/66/801026:37 AM

## 2019-03-27 NOTE — PATIENT INSTRUCTIONS
Computed Tomography (CT) Scan: About This Test  What is it? A computed tomography (CT) scan uses X-rays to make detailed pictures of parts of your body and the structures inside your body. During the test, you will lie on a table that is attached to the Cartavi. The CT scanner is a large doughnut-shaped machine. Why is this test done? Doctors use CT scans to study areas of the body, such as the brain, chest, or belly. CT scans are also used to assist or check on the success of a procedure or surgery. An example of this is when a CT is used to guide a needle into the body during a tissue biopsy. How can you prepare for the test?  Talk to your doctor about all your health conditions before the test. For example, tell your doctor if:  · You are or might be pregnant. · You are breastfeeding. · You have diabetes. · You take metformin. · You are allergic to any medicines. · You have had an X-ray test using barium contrast material in the past 4 days. · You get nervous in confined spaces. You may need medicine to help you relax. What happens before the test?  · You may be asked to take off your jewelry. · You will take off all or most of your clothes and then change into a gown. · If you do leave some clothes on, make sure you take everything out of your pockets. · You may have contrast materials (dye) put into an IV in your arm. In some cases, you may have to drink a contrast material. Contrast material helps doctors see specific organs, blood vessels, and most tumors. What happens during the test?  · You will lie on a table that is attached to the CT scanner. · The table slides into the round opening of the scanner. The table will move during the scan. The scanner moves within the doughnut-shaped casing around your body. · You will be asked to hold still during the scan. You may be asked to hold your breath for short periods.   · You may be alone in the scanning room, but a technologist will be watching you through a window and talking with you during the test.  What else should you know about the test?  · A CT scan does not hurt. · If a dye is used, you may feel a quick sting or pinch when the IV is started. The dye may make you feel warm and flushed and give you a metallic taste in your mouth. Some people feel sick to their stomach or get a headache. · If you breastfeed and are concerned about whether the dye used in this test is safe, talk to your doctor. Most experts believe that very little dye passes into breast milk and even less is passed on to the baby. But if you prefer, you can store some of your breast milk ahead of time and use it for a day or two after the test.  · There is a small chance of getting cancer from some types of CT scans. The risk is higher in children, young adults, and people who have many radiation tests. If you are concerned about this risk, talk to your doctor about the benefits and risks of a CT scan and confirm that the test is needed. How long does the test take? · The test will take about 30 to 60 minutes. Most of this time is spent getting ready for the scan. The actual test only takes a few seconds. What happens after the test?  · Depending on the reason for the test, you will probably be able to go home right away. · If contrast material was used, drink lots of liquids for 24 hours after the test unless your doctor tells you not to. Follow-up care is a key part of your treatment and safety. Be sure to make and go to all appointments, and call your doctor if you are having problems. It's also a good idea to keep a list of the medicines you take. Ask your doctor when you can expect to have your test results. Where can you learn more? Go to http://raul-vee.info/.   Enter F745 in the search box to learn more about \"Computed Tomography (CT) Scan: About This Test.\"  Current as of: June 25, 2018  Content Version: 11.9  © 9419-1125 [employer]. Care instructions adapted under license by [employer] (which disclaims liability or warranty for this information). If you have questions about a medical condition or this instruction, always ask your healthcare professional. [address] any warranty or liability for your use of this information.

## 2019-03-27 NOTE — PROGRESS NOTES
Patient here for follow up with Dr. Ernie Meadows. Completed Cycle 2 taxol/carbo. She said she is feeling \"good, no bleeding or pressure\". Denies n/v/d, or riinging in ears, taste changes. Using stool softener for constipation. Has pain and tinging in hands, legs and feet the first week after chemo. Eating and drinking with no difficulties. Some discoloration of nailbeds noted. She said her energy level \"is fine\". She just started exercising again. Education provided and patient to follow up as scheduled.

## 2019-03-28 ENCOUNTER — DOCUMENTATION ONLY (OUTPATIENT)
Dept: ONCOLOGY | Age: 49
End: 2019-03-28

## 2019-03-28 ENCOUNTER — HOSPITAL ENCOUNTER (OUTPATIENT)
Dept: INFUSION THERAPY | Age: 49
Discharge: HOME | End: 2019-03-28
Payer: MEDICARE

## 2019-03-28 VITALS
OXYGEN SATURATION: 98 % | TEMPERATURE: 97.6 F | SYSTOLIC BLOOD PRESSURE: 100 MMHG | DIASTOLIC BLOOD PRESSURE: 65 MMHG | HEART RATE: 57 BPM | RESPIRATION RATE: 18 BRPM

## 2019-03-28 DIAGNOSIS — C48.2 PERITONEAL CARCINOMA (HCC): Primary | ICD-10-CM

## 2019-03-28 PROCEDURE — 77030012965 HC NDL HUBR BBMI -A

## 2019-03-28 PROCEDURE — 96366 THER/PROPH/DIAG IV INF ADDON: CPT

## 2019-03-28 PROCEDURE — 74011250636 HC RX REV CODE- 250/636: Performed by: OBSTETRICS & GYNECOLOGY

## 2019-03-28 PROCEDURE — 96365 THER/PROPH/DIAG IV INF INIT: CPT

## 2019-03-28 PROCEDURE — 74011250636 HC RX REV CODE- 250/636

## 2019-03-28 RX ORDER — HEPARIN 100 UNIT/ML
SYRINGE INTRAVENOUS
Status: COMPLETED
Start: 2019-03-28 | End: 2019-03-28

## 2019-03-28 RX ORDER — SODIUM CHLORIDE 0.9 % (FLUSH) 0.9 %
10-40 SYRINGE (ML) INJECTION AS NEEDED
Status: DISCONTINUED | OUTPATIENT
Start: 2019-03-28 | End: 2019-04-01 | Stop reason: HOSPADM

## 2019-03-28 RX ORDER — HEPARIN 100 UNIT/ML
500 SYRINGE INTRAVENOUS AS NEEDED
Status: DISCONTINUED | OUTPATIENT
Start: 2019-03-28 | End: 2019-04-01 | Stop reason: HOSPADM

## 2019-03-28 RX ORDER — LIDOCAINE AND PRILOCAINE 25; 25 MG/G; MG/G
CREAM TOPICAL AS NEEDED
Qty: 30 G | Refills: 0 | OUTPATIENT
Start: 2019-03-28 | End: 2019-07-29 | Stop reason: SDUPTHER

## 2019-03-28 RX ORDER — SODIUM CHLORIDE 9 MG/ML
1000 INJECTION, SOLUTION INTRAVENOUS ONCE
Status: COMPLETED | OUTPATIENT
Start: 2019-03-28 | End: 2019-03-28

## 2019-03-28 RX ADMIN — SODIUM CHLORIDE 1000 ML: 9 INJECTION, SOLUTION INTRAVENOUS at 09:50

## 2019-03-28 RX ADMIN — HEPARIN 500 UNITS: 100 SYRINGE at 11:54

## 2019-03-28 RX ADMIN — Medication 10 ML: at 09:50

## 2019-03-28 RX ADMIN — Medication 10 ML: at 11:53

## 2019-03-28 NOTE — PROGRESS NOTES
[facility] Progress Note Date: 2019 Name: Eliza Booker MRN: [id number] : 1970 Hydration Ms. Cannon to [facility], ambulatory, at 1035. No complaints or concerns voiced. Pt was assessed and education was provided. Ms. Cannon's vitals were reviewed. Visit Vitals /65 (BP 1 Location: Left arm, BP Patient Position: At rest) Pulse (!) 57 Temp 97.6 °F (36.4 °C) Resp 18 SpO2 98% Right chest mediport was accessed with 20 gauge 1 inch upton(s) after chloroprep. Scab above port site noted. Port flushed easily and had brisk blood return. 1L NS infused over 2 hours. Mediport flushed with NS 20 ml and Heparin 500 units then de-accessed. No irritation or bleeding noted. Band-Aid applied. Patient Vitals for the past 8 hrs: 
 Temp Pulse Resp BP SpO2  
19 1153 97.6 °F (36.4 °C) (!) 57 18 100/65 98 % 19 0945 98.1 °F (36.7 °C) (!) 58 18 114/71 99 % Ms. Cannon tolerated the procedure, and had no complaints. Patient armband removed and shredded. Ms. Mona Galvan was discharged from [place] [address] in stable condition at 1200. She is to return on 19 at 0800 for her next appointment. Russel Savage RN 
2019

## 2019-03-28 NOTE — PROGRESS NOTES
Patient in [facility] for hydration . She asked to have Emla cream ordered. I explained if it was too expensive to ask her nurse to place an ice pack over her port site prior to access. She agreed. I also received a call today from [facility] that the saliva specimen did not give them enough DNA for testing.  A blood sample was taken and resubmitted to [facility]

## 2019-04-01 ENCOUNTER — TELEPHONE (OUTPATIENT)
Dept: ONCOLOGY | Age: 49
End: 2019-04-01

## 2019-04-02 RX ORDER — AMLODIPINE BESYLATE 10 MG/1
TABLET ORAL
Qty: 90 TAB | Refills: 0 | Status: SHIPPED | OUTPATIENT
Start: 2019-04-02 | End: 2019-07-02 | Stop reason: SDUPTHER

## 2019-04-04 ENCOUNTER — HOSPITAL ENCOUNTER (OUTPATIENT)
Dept: INFUSION THERAPY | Age: 49
Discharge: HOME | End: 2019-04-04
Payer: MEDICARE

## 2019-04-04 VITALS
HEIGHT: 63 IN | DIASTOLIC BLOOD PRESSURE: 65 MMHG | BODY MASS INDEX: 44.4 KG/M2 | SYSTOLIC BLOOD PRESSURE: 131 MMHG | HEART RATE: 72 BPM | RESPIRATION RATE: 18 BRPM | TEMPERATURE: 98.1 F | OXYGEN SATURATION: 95 % | WEIGHT: 250.6 LBS

## 2019-04-04 DIAGNOSIS — C48.2 MALIGNANT NEOPLASM OF PERITONEUM (HCC): Primary | ICD-10-CM

## 2019-04-04 LAB
ALBUMIN SERPL-MCNC: 3.3 G/DL (ref 3.4–5)
ALBUMIN/GLOB SERPL: 1 {RATIO} (ref 0.8–1.7)
ALP SERPL-CCNC: 77 U/L (ref 45–117)
ALT SERPL-CCNC: 23 U/L (ref 13–56)
ANION GAP SERPL CALC-SCNC: 6 MMOL/L (ref 3–18)
APPEARANCE UR: CLEAR
AST SERPL-CCNC: 17 U/L (ref 15–37)
BACTERIA URNS QL MICRO: ABNORMAL /HPF
BASO+EOS+MONOS # BLD AUTO: 0.6 K/UL (ref 0–2.3)
BASO+EOS+MONOS NFR BLD AUTO: 9 % (ref 0.1–17)
BILIRUB SERPL-MCNC: 0.2 MG/DL (ref 0.2–1)
BILIRUB UR QL: NEGATIVE
BUN SERPL-MCNC: 17 MG/DL (ref 7–18)
BUN/CREAT SERPL: 14 (ref 12–20)
CALCIUM SERPL-MCNC: 8.3 MG/DL (ref 8.5–10.1)
CHLORIDE SERPL-SCNC: 108 MMOL/L (ref 100–108)
CO2 SERPL-SCNC: 28 MMOL/L (ref 21–32)
COLOR UR: YELLOW
CREAT SERPL-MCNC: 1.23 MG/DL (ref 0.6–1.3)
DIFFERENTIAL METHOD BLD: ABNORMAL
EPITH CASTS URNS QL MICRO: ABNORMAL /LPF (ref 0–5)
ERYTHROCYTE [DISTWIDTH] IN BLOOD BY AUTOMATED COUNT: 14.9 % (ref 11.5–14.5)
GLOBULIN SER CALC-MCNC: 3.4 G/DL (ref 2–4)
GLUCOSE SERPL-MCNC: 83 MG/DL (ref 74–99)
GLUCOSE UR STRIP.AUTO-MCNC: NEGATIVE MG/DL
HCT VFR BLD AUTO: 32.8 % (ref 36–48)
HGB BLD-MCNC: 10.9 G/DL (ref 12–16)
HGB UR QL STRIP: NEGATIVE
KETONES UR QL STRIP.AUTO: NEGATIVE MG/DL
LEUKOCYTE ESTERASE UR QL STRIP.AUTO: NEGATIVE
LYMPHOCYTES # BLD: 2.1 K/UL (ref 1.1–5.9)
LYMPHOCYTES NFR BLD: 33 % (ref 14–44)
MAGNESIUM SERPL-MCNC: 1.9 MG/DL (ref 1.6–2.6)
MCH RBC QN AUTO: 29.1 PG (ref 25–35)
MCHC RBC AUTO-ENTMCNC: 33.2 G/DL (ref 31–37)
MCV RBC AUTO: 87.7 FL (ref 78–102)
NEUTS SEG # BLD: 3.6 K/UL (ref 1.8–9.5)
NEUTS SEG NFR BLD: 58 % (ref 40–70)
NITRITE UR QL STRIP.AUTO: NEGATIVE
PH UR STRIP: 6.5 [PH] (ref 5–8)
PLATELET # BLD AUTO: 176 K/UL (ref 140–440)
POTASSIUM SERPL-SCNC: 4.3 MMOL/L (ref 3.5–5.5)
PROT SERPL-MCNC: 6.7 G/DL (ref 6.4–8.2)
PROT UR STRIP-MCNC: 100 MG/DL
RBC # BLD AUTO: 3.74 M/UL (ref 4.1–5.1)
SODIUM SERPL-SCNC: 142 MMOL/L (ref 136–145)
SP GR UR REFRACTOMETRY: 1.01 (ref 1–1.03)
UROBILINOGEN UR QL STRIP.AUTO: 0.2 EU/DL (ref 0.2–1)
WBC # BLD AUTO: 6.3 K/UL (ref 4.5–13)
WBC URNS QL MICRO: ABNORMAL /HPF (ref 0–4)

## 2019-04-04 PROCEDURE — 87086 URINE CULTURE/COLONY COUNT: CPT

## 2019-04-04 PROCEDURE — 86304 IMMUNOASSAY TUMOR CA 125: CPT

## 2019-04-04 PROCEDURE — 96375 TX/PRO/DX INJ NEW DRUG ADDON: CPT

## 2019-04-04 PROCEDURE — 74011250637 HC RX REV CODE- 250/637: Performed by: OBSTETRICS & GYNECOLOGY

## 2019-04-04 PROCEDURE — 74011000250 HC RX REV CODE- 250: Performed by: OBSTETRICS & GYNECOLOGY

## 2019-04-04 PROCEDURE — 96413 CHEMO IV INFUSION 1 HR: CPT

## 2019-04-04 PROCEDURE — 83735 ASSAY OF MAGNESIUM: CPT

## 2019-04-04 PROCEDURE — 74011000258 HC RX REV CODE- 258: Performed by: OBSTETRICS & GYNECOLOGY

## 2019-04-04 PROCEDURE — 96367 TX/PROPH/DG ADDL SEQ IV INF: CPT

## 2019-04-04 PROCEDURE — 96415 CHEMO IV INFUSION ADDL HR: CPT

## 2019-04-04 PROCEDURE — 77030012965 HC NDL HUBR BBMI -A

## 2019-04-04 PROCEDURE — 81001 URINALYSIS AUTO W/SCOPE: CPT

## 2019-04-04 PROCEDURE — 74011250636 HC RX REV CODE- 250/636: Performed by: OBSTETRICS & GYNECOLOGY

## 2019-04-04 PROCEDURE — 74011250636 HC RX REV CODE- 250/636: Performed by: NURSE PRACTITIONER

## 2019-04-04 PROCEDURE — 85025 COMPLETE CBC W/AUTO DIFF WBC: CPT

## 2019-04-04 PROCEDURE — 80053 COMPREHEN METABOLIC PANEL: CPT

## 2019-04-04 PROCEDURE — 36415 COLL VENOUS BLD VENIPUNCTURE: CPT

## 2019-04-04 PROCEDURE — 96417 CHEMO IV INFUS EACH ADDL SEQ: CPT

## 2019-04-04 RX ORDER — PALONOSETRON 0.05 MG/ML
0.25 INJECTION, SOLUTION INTRAVENOUS ONCE
Status: COMPLETED | OUTPATIENT
Start: 2019-04-04 | End: 2019-04-04

## 2019-04-04 RX ORDER — SODIUM CHLORIDE 0.9 % (FLUSH) 0.9 %
10-40 SYRINGE (ML) INJECTION AS NEEDED
Status: DISPENSED | OUTPATIENT
Start: 2019-04-04 | End: 2019-04-04

## 2019-04-04 RX ORDER — SODIUM CHLORIDE 9 MG/ML
500 INJECTION, SOLUTION INTRAVENOUS ONCE
Status: COMPLETED | OUTPATIENT
Start: 2019-04-04 | End: 2019-04-04

## 2019-04-04 RX ORDER — HEPARIN 100 UNIT/ML
300-500 SYRINGE INTRAVENOUS AS NEEDED
Status: DISPENSED | OUTPATIENT
Start: 2019-04-04 | End: 2019-04-04

## 2019-04-04 RX ORDER — DIPHENHYDRAMINE HCL 25 MG
50 CAPSULE ORAL ONCE
Status: COMPLETED | OUTPATIENT
Start: 2019-04-04 | End: 2019-04-04

## 2019-04-04 RX ORDER — LORAZEPAM 2 MG/ML
0.26 INJECTION INTRAMUSCULAR ONCE
Status: COMPLETED | OUTPATIENT
Start: 2019-04-04 | End: 2019-04-04

## 2019-04-04 RX ADMIN — FAMOTIDINE 20 MG: 10 INJECTION, SOLUTION INTRAVENOUS at 09:27

## 2019-04-04 RX ADMIN — PACLITAXEL 360 MG: 6 INJECTION, SOLUTION INTRAVENOUS at 10:28

## 2019-04-04 RX ADMIN — Medication 10 ML: at 14:53

## 2019-04-04 RX ADMIN — CARBOPLATIN 550 MG: 10 INJECTION, SOLUTION INTRAVENOUS at 13:42

## 2019-04-04 RX ADMIN — DIPHENHYDRAMINE HYDROCHLORIDE 50 MG: 25 CAPSULE ORAL at 09:26

## 2019-04-04 RX ADMIN — PALONOSETRON HYDROCHLORIDE 0.25 MG: 0.25 INJECTION, SOLUTION INTRAVENOUS at 09:26

## 2019-04-04 RX ADMIN — DEXAMETHASONE SODIUM PHOSPHATE 12 MG: 4 INJECTION, SOLUTION INTRA-ARTICULAR; INTRALESIONAL; INTRAMUSCULAR; INTRAVENOUS; SOFT TISSUE at 09:58

## 2019-04-04 RX ADMIN — Medication 500 UNITS: at 14:53

## 2019-04-04 RX ADMIN — SODIUM CHLORIDE 150 MG: 900 INJECTION, SOLUTION INTRAVENOUS at 09:51

## 2019-04-04 RX ADMIN — SODIUM CHLORIDE 500 ML: 9 INJECTION, SOLUTION INTRAVENOUS at 09:20

## 2019-04-04 RX ADMIN — LORAZEPAM 0.26 MG: 2 INJECTION INTRAMUSCULAR; INTRAVENOUS at 09:27

## 2019-04-04 NOTE — PROGRESS NOTES
[facility] Progress Note    Date: 2019    Name: Daniel Beth              MRN: [id number]              : 1970    Chemotherapy Cycle:3  Paclitaxel/Carboplatin       Pt to [facility], ambulatory, at 0815. Ms. Scott Marie was assessed and education was provided. Ms. Cannon's vitals were reviewed. Visit Vitals  /65 (BP 1 Location: Left arm, BP Patient Position: Sitting)   Pulse 72   Temp 98.1 °F (36.7 °C)   Resp 18   Ht 5' 2.99\" (1.6 m)   Wt 113.7 kg (250 lb 9.6 oz)   SpO2 95%   Breastfeeding? No   BMI 44.40 kg/m²       Right chest mediport accessed with 20 g 1 inch upton needle. Port flushed easily and had brisk blood return. Blood drawn off and sent for CBC, CMP, CA-125 and Magnesium per written orders after 10 ml waste. NS initiated @ 25. Urine specimen sent to lab for UA and culture. Lab results were obtained and reviewed. Recent Results (from the past 12 hour(s))   CBC WITH 3 PART DIFF    Collection Time: 19  8:28 AM   Result Value Ref Range    WBC 6.3 4.5 - 13.0 K/uL    RBC 3.74 (L) 4.10 - 5.10 M/uL    HGB 10.9 (L) 12.0 - 16 g/dL    HCT 32.8 (L) 36 - 48 %    MCV 87.7 78 - 102 FL    MCH 29.1 25.0 - 35.0 PG    MCHC 33.2 31 - 37 g/dL    RDW 14.9 (H) 11.5 - 14.5 %    PLATELET 990 181 - 567 K/uL    NEUTROPHILS 58 40 - 70 %    MIXED CELLS 9 0.1 - 17 %    LYMPHOCYTES 33 14 - 44 %    ABS. NEUTROPHILS 3.6 1.8 - 9.5 K/UL    ABS. MIXED CELLS 0.6 0.0 - 2.3 K/uL    ABS.  LYMPHOCYTES 2.1 1.1 - 5.9 K/UL    DF AUTOMATED     METABOLIC PANEL, COMPREHENSIVE    Collection Time: 19  8:28 AM   Result Value Ref Range    Sodium 142 136 - 145 mmol/L    Potassium 4.3 3.5 - 5.5 mmol/L    Chloride 108 100 - 108 mmol/L    CO2 28 21 - 32 mmol/L    Anion gap 6 3.0 - 18 mmol/L    Glucose 83 74 - 99 mg/dL    BUN 17 7.0 - 18 MG/DL    Creatinine 1.23 0.6 - 1.3 MG/DL    BUN/Creatinine ratio 14 12 - 20      GFR est AA 56 (L) >60 ml/min/1.73m2    GFR est non-AA 46 (L) >60 ml/min/1.73m2    Calcium 8.3 (L) 8.5 - 10.1 MG/DL    Bilirubin, total 0.2 0.2 - 1.0 MG/DL    ALT (SGPT) 23 13 - 56 U/L    AST (SGOT) 17 15 - 37 U/L    Alk. phosphatase 77 45 - 117 U/L    Protein, total 6.7 6.4 - 8.2 g/dL    Albumin 3.3 (L) 3.4 - 5.0 g/dL    Globulin 3.4 2.0 - 4.0 g/dL    A-G Ratio 1.0 0.8 - 1.7     MAGNESIUM    Collection Time: 04/04/19  8:28 AM   Result Value Ref Range    Magnesium 1.9 1.6 - 2.6 mg/dL   URINALYSIS W/ RFLX MICROSCOPIC    Collection Time: 04/04/19  8:28 AM   Result Value Ref Range    Color YELLOW      Appearance CLEAR      Specific gravity 1.014 1.005 - 1.030      pH (UA) 6.5 5.0 - 8.0      Protein 100 (A) NEG mg/dL    Glucose NEGATIVE  NEG mg/dL    Ketone NEGATIVE  NEG mg/dL    Bilirubin NEGATIVE  NEG      Blood NEGATIVE  NEG      Urobilinogen 0.2 0.2 - 1.0 EU/dL    Nitrites NEGATIVE  NEG      Leukocyte Esterase NEGATIVE  NEG     URINE MICROSCOPIC ONLY    Collection Time: 04/04/19  8:28 AM   Result Value Ref Range    WBC 0 to 3 0 - 4 /hpf    Epithelial cells 1+ 0 - 5 /lpf    Bacteria FEW (A) NEG /hpf       Lab results within ordered parameters to give chemo today. ANC = 3.6, PLT = 176. Chemo dosages verified with today's BSA and found to be within 10% of ordered dosages. Pre-medications (Emend, Benadryl, Aloxi, Decadron, Pepcid, Ativan) were administered as ordered and chemotherapy was initiated after blood return from port re-verified. Reviewed expected side effects of premeds with patient. Taxol 360 mg infused over 3 hours as ordered. VS stable at end of infusion and pt denied complaints. Line flushed with NS and blood return from port re-verified. Carboplatin 550 mg (dose reduced today from 620 mg) infused over 1 hour as ordered. VS stable at end of infusion and pt denied complaints. Line flushed with NS and blood return from port re-verified. 500 mL NS given over the course of treatment today. Ms. Chance Goldman tolerated infusion, and had no complaints at this time.     Mediport flushed with NS 20 ml and Heparin 500 units then de-accessed. No irritation or bleeding noted. Bandaid applied. Patient armband removed and shredded. Ms. Alfredo Fink was discharged from [place] [address] in stable condition at 1455. She is to return on 4/11/19 at 1000 for her next Hydration appointment.     Rigoberto Neumann RN  April 4, 2019

## 2019-04-05 ENCOUNTER — OFFICE VISIT (OUTPATIENT)
Dept: ORTHOPEDIC SURGERY | Age: 49
End: 2019-04-05

## 2019-04-05 VITALS
DIASTOLIC BLOOD PRESSURE: 81 MMHG | OXYGEN SATURATION: 99 % | SYSTOLIC BLOOD PRESSURE: 117 MMHG | WEIGHT: 248 LBS | TEMPERATURE: 97.7 F | RESPIRATION RATE: 16 BRPM | HEART RATE: 75 BPM | BODY MASS INDEX: 43.94 KG/M2 | HEIGHT: 63 IN

## 2019-04-05 DIAGNOSIS — R20.0 LEFT LEG NUMBNESS: ICD-10-CM

## 2019-04-05 DIAGNOSIS — M47.819 ARTHRITIS OF LOW BACK: ICD-10-CM

## 2019-04-05 DIAGNOSIS — Z96.651 HISTORY OF TOTAL KNEE REPLACEMENT, RIGHT: ICD-10-CM

## 2019-04-05 DIAGNOSIS — M70.62 TROCHANTERIC BURSITIS OF LEFT HIP: Primary | ICD-10-CM

## 2019-04-05 DIAGNOSIS — Z96.652 HISTORY OF TOTAL KNEE REPLACEMENT, LEFT: ICD-10-CM

## 2019-04-05 DIAGNOSIS — M54.5 LOW BACK PAIN, UNSPECIFIED BACK PAIN LATERALITY, UNSPECIFIED CHRONICITY, WITH SCIATICA PRESENCE UNSPECIFIED: ICD-10-CM

## 2019-04-05 LAB — CANCER AG125 SERPL-ACNC: 8.8 U/ML (ref 0–38.1)

## 2019-04-05 RX ORDER — DOCUSATE SODIUM 100 MG/1
100 CAPSULE, LIQUID FILLED ORAL 2 TIMES DAILY
COMMUNITY
End: 2019-07-22

## 2019-04-05 RX ORDER — TRIAMCINOLONE ACETONIDE 40 MG/ML
40 INJECTION, SUSPENSION INTRA-ARTICULAR; INTRAMUSCULAR ONCE
Qty: 1 ML | Refills: 0
Start: 2019-04-05 | End: 2019-04-05

## 2019-04-05 NOTE — PROGRESS NOTES
Patient: Shelby Muñoz                MRN: [id number]       SSN: [id number]  YOB: 1970        AGE: 51 y.o. SEX: female  Body mass index is 43.95 kg/m². PCP: Bg Macdonald MD  04/05/19    HISTORY:  I had the pleasure of reviewing Malick Patrickire today. She is a very nice lady with post-traumatic arthritis involving the left knee with a knee reconstruction by Dr. Aleida Vargas including tibial tubercle osteotomy and a right knee replacement on the right side. She is overall fairly pleased with the knees. Her main complaint is with low back pain and left lateral hip pain, not too much in the way of groin discomfort. She gets radiculopathy going down the left leg as well. Denies fevers or chills and the pain is moderate. She is requesting injections today, and she also has known neuropathy. She is also a cancer survivor and still gets some treatment for that as well. She has known neuropathy and gets some burning down the legs. She denies fevers, chills, night sweats or weight loss. PHYSICAL EXAMINATION:  On examination today, very pleasant lady. Body mass index is 44. She moves her head and neck adequately. She actually walks fairly well. The left knee may be in a touch of recurvatum. The incisions are very nicely healed. The hips rotate very well. No irritability with internal rotation. She is tender over the trochanter on the left side. The low back is also tender at L4-5. She has some decreased sensation to L4 and L5 on the left side although tibialis anterior seems to be 5/5 bilaterally and straight leg raise is just equivocal.  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 Calf nontender. Homans sign negative. RADIOGRAPHS:  X-rays today, AP and lateral of the lumbar spine.   She may have had a mild L3 compression fracture at one point in time, relatively significant degenerative disc disease with foraminal stenosis at L5-S1 and some bridging syndesmophytes at L1-2 on the left side. X-rays show that she has Dep[employer] knee replacements bilaterally with wire from previous tibial tubercle osteotomy. They appear to be well aligned and well fixed at this point. IMPRESSION:  Considerable low back pain with radiculopathy, some neuropathy and trochanteric bursitis left hip. PROCEDURE:  Under aseptic conditions and after informed written consent with a timeout, the left trochanteric bursa injected with 1 mL of the Kenalog 40 preparation, well tolerated. PLAN:   Return to see us in 3 months' time. I will [facility] as [facility]. It has been a pleasure to share in her care, and I appreciate the opportunity. CC:  Dori Klein MD          Art Missy Mitchell MD            REVIEW OF SYSTEMS:      CON: negative for weight loss, fever  EYE: negative for double vision  ENT: negative for hoarseness  RS:   negative for Tb  GI:    negative for blood in stool  :  negative for blood in urine  Other systems reviewed and noted below. Past Medical History:   Diagnosis Date    AR (allergic rhinitis)     seasonal    Cancer ([facility] [address]     pt states between vgina and rectal area    Cardiac echocardiogram 04/11/2016    Tech difficult. EF 55-60%. No RWMA. Mild conc LVH. Gr 1 DDfx. RVSP normal.      Cardiac nuclear imaging test 05/05/2016    Low risk. Very patchy radiotracer uptake. Mild anterior artifact, low suspicion for ischemia. EF 68%. No RWMA. Normal EKG on pharm stress test.    Cardiovascular LE arterial duplex 10/07/2013    No significant arterial disease at rest bilaterally. R JUNE 1.21.  L JUNE 1.16. No significant sm vessel disease.     Depression     Dr. Darrius Mcfarlane, suicide attempt 2/12    Diverticulosis     Endometriosis     s/p hysterectomy 2006    GERD (gastroesophageal reflux disease)     Glaucoma     Dr. Yoselin Joel HTN (hypertension)     Hx of colonoscopy 04/05/2017    mild diverticulosis, g1 internal hemorrhoids, normal colon , repeat 10 year 2027    Hx of suicide attempt     Hyperlipidemia LDL goal < 130     IBS (irritable bowel syndrome)     Ill-defined condition     environmental allergies    Insomnia     Malignant neoplasm of peritoneum ([address] 2/14/2019    Nausea & vomiting     OA (osteoarthritis)     both knees, lower back    Preeclampsia     PTSD (post-traumatic stress disorder)     Seizures (HCC)     1 x with childbirth, had toxemia    Sleep apnea     does not use cpap machine    Spinal stenosis     Dr. Rima Wolf       Family History   Problem Relation Age of Onset    Heart Disease Mother         CHF    Diabetes Mother     Hypertension Mother     Kidney Disease Mother     Breast Cancer Mother     Stroke Mother     Diabetes Father     Hypertension Father     Heart Attack Father         MI    Cancer Maternal Grandmother         stomach    Ovarian Cancer Maternal Aunt     Cancer Maternal Uncle        Current Outpatient Medications   Medication Sig Dispense Refill    docusate sodium (COLACE) 100 mg capsule Take 100 mg by mouth two (2) times a day.  amLODIPine (NORVASC) 10 mg tablet take 1 tablet by mouth once daily 90 Tab 0    lidocaine-prilocaine (EMLA) topical cream Apply  to affected area as needed for Pain. 30 g 0    polyethylene glycol (MIRALAX) 17 gram packet Take 1 Packet by mouth daily. 528 g 3    cyanocobalamin (VITAMIN B-12) 1,000 mcg tablet Take 1,000 mcg by mouth daily.  cycloSPORINE (RESTASIS) 0.05 % dpet Apply 1 Drop to eye as needed.       clotrimazole-betamethasone (LOTRISONE) topical cream Apply to affected area twice daily 30 g 0    simvastatin (ZOCOR) 20 mg tablet take 1 tablet by mouth at bedtime 90 Tab 3    metoprolol succinate (TOPROL-XL) 100 mg tablet take 1 tablet by mouth once daily 90 Tab 1    losartan (COZAAR) 100 mg tablet take 1 tablet by mouth once daily 90 Tab 1    dexamethasone (DECADRON) 4 mg tablet Take 40 mg by mouth as needed. Take 10 tabs the night before Chemo #1 and Chemo #2. 20 Tab 0    promethazine (PHENERGAN) 25 mg tablet Take 1 Tab by mouth every six (6) hours as needed for Nausea. 30 Tab 3    ondansetron hcl (ZOFRAN) 4 mg tablet Take 1 Tab by mouth every six (6) hours as needed for Nausea. 30 Tab 3    melatonin 3 mg tablet Take 3 mg by mouth nightly.  LORazepam (ATIVAN) 0.5 mg tablet Take 0.5 mg by mouth two (2) times daily as needed.  hydrOXYzine pamoate (VISTARIL) 50 mg capsule 2 Caps 2 Times Daily As Needed.  levocetirizine (XYZAL) 5 mg tablet daily as needed.  ziprasidone (GEODON) 40 mg capsule Take 40 mg by mouth nightly.  montelukast (SINGULAIR) 10 mg tablet Take 10 mg by mouth nightly. 1    FIBER, PSYLLIUM HUSK, PO Take  by mouth daily as needed.  latanoprost (XALATAN) 0.005 % ophthalmic solution Administer 1 Drop to both eyes nightly.  lubiPROStone (AMITIZA) 24 mcg capsule 1 Cap by Mouth/Throat route as needed.  plecanatide (TRULANCE) 3 mg tab Take  by mouth.  OXcarbazepine (TRILEPTAL) 300 mg tablet Take 300 mg by mouth two (2) times a day.  0    carisoprodol (SOMA) 350 mg tablet take 1 tablet by mouth three times a day and 1 tablet at bedtime (currently out of Rx)  0       Allergies   Allergen Reactions    Other Medication Hives     seafood    Shellfish Derived Hives       Past Surgical History:   Procedure Laterality Date    COLONOSCOPY N/A 2017    COLONOSCOPY performed by Harsh Mccarthy MD at [facility] Left     External fixator    FLEXIBLE SIGMOIDOSCOPY N/A 2019    SIGMOIDOSCOPY FLEXIBLE performed by Phillip Frazier MD at [facility] ENDOSCOPY    HX  SECTION      HX COLONOSCOPY  2017    DR. MCRAE 2017     HX HYSTERECTOMY      HX KNEE ARTHROSCOPY Left     left knee    HX KNEE REPLACEMENT Bilateral     (R)  (L) 2013    HX LAPAROTOMY N/A 2019    and rectovaginal bx    HX OTHER SURGICAL  2016    all teeth removed    HX SALPINGO-OOPHORECTOMY  2008    HX TUBAL LIGATION      IR INSERT TUNL CVC W PORT OVER 5 YEARS  2019    MULTIPLE DELIVERY       1990    TOTAL ABDOM HYSTERECTOMY         Social History     Socioeconomic History    Marital status: SINGLE     Spouse name: Not on file    Number of children: Not on file    Years of education: Not on file    Highest education level: Not on file   Occupational History    Occupation: disabled    Occupation: student   Social Needs    Financial resource strain: Not on file    Food insecurity:     Worry: Not on file     Inability: Not on file   Kashmi needs:     Medical: Not on file     Non-medical: Not on file   Tobacco Use    Smoking status: Never Smoker    Smokeless tobacco: Never Used   Substance and Sexual Activity    Alcohol use: No    Drug use: No    Sexual activity: Never   Lifestyle    Physical activity:     Days per week: Not on file     Minutes per session: Not on file    Stress: Not on file   Relationships    Social connections:     Talks on phone: Not on file     Gets together: Not on file     Attends Latter day service: Not on file     Active member of club or organization: Not on file     Attends meetings of clubs or organizations: Not on file     Relationship status: Not on file    Intimate partner violence:     Fear of current or ex partner: Not on file     Emotionally abused: Not on file     Physically abused: Not on file     Forced sexual activity: Not on file   Other Topics Concern    Not on file   Social History Narrative    Not on file       Visit Vitals  /81 (BP 1 Location: Left arm, BP Patient Position: Sitting)   Pulse 75   Temp 97.7 °F (36.5 °C) (Oral)   Resp 16   Ht 5' 2.99\" (1.6 m)   Wt 248 lb (112.5 kg)   LMP 2008   SpO2 99%   BMI 43.95 kg/m²         PHYSICAL EXAMINATION:  GENERAL: Alert and oriented x3, in no acute distress, well-developed, well-nourished, afebrile. HEART: No JVD. EYES: No scleral icterus   NECK: No significant lymphadenopathy   LUNGS: No respiratory compromise or indrawing  ABDOMEN: Soft, non-tender, non-distended. Electronically signed by:  Sanam Morillo MD

## 2019-04-05 NOTE — PROGRESS NOTES
1. Have you been to the ER, urgent care clinic since your last visit? Hospitalized since your last visit? NO    2. Have you seen or consulted any other health care providers outside of the [address] [place] [address] since your last visit? Include any pap smears or colon screening.  NO

## 2019-04-06 LAB
BACTERIA SPEC CULT: NORMAL
SERVICE CMNT-IMP: NORMAL

## 2019-04-11 ENCOUNTER — OFFICE VISIT (OUTPATIENT)
Dept: ONCOLOGY | Age: 49
End: 2019-04-11

## 2019-04-11 ENCOUNTER — HOSPITAL ENCOUNTER (OUTPATIENT)
Dept: INFUSION THERAPY | Age: 49
Discharge: HOME | End: 2019-04-11
Payer: MEDICARE

## 2019-04-11 VITALS
SYSTOLIC BLOOD PRESSURE: 153 MMHG | HEART RATE: 75 BPM | DIASTOLIC BLOOD PRESSURE: 81 MMHG | TEMPERATURE: 98.7 F | OXYGEN SATURATION: 95 % | RESPIRATION RATE: 18 BRPM

## 2019-04-11 DIAGNOSIS — C48.2 PRIMARY PERITONEAL CARCINOMATOSIS (HCC): ICD-10-CM

## 2019-04-11 DIAGNOSIS — L30.4 INTERTRIGO: Primary | ICD-10-CM

## 2019-04-11 PROCEDURE — 96360 HYDRATION IV INFUSION INIT: CPT

## 2019-04-11 PROCEDURE — 96361 HYDRATE IV INFUSION ADD-ON: CPT

## 2019-04-11 PROCEDURE — 77030012965 HC NDL HUBR BBMI -A

## 2019-04-11 PROCEDURE — 74011250636 HC RX REV CODE- 250/636: Performed by: INTERNAL MEDICINE

## 2019-04-11 RX ORDER — SODIUM CHLORIDE 9 MG/ML
1000 INJECTION, SOLUTION INTRAVENOUS ONCE
Status: COMPLETED | OUTPATIENT
Start: 2019-04-11 | End: 2019-04-11

## 2019-04-11 RX ORDER — SODIUM CHLORIDE 0.9 % (FLUSH) 0.9 %
10-40 SYRINGE (ML) INJECTION AS NEEDED
Status: DISCONTINUED | OUTPATIENT
Start: 2019-04-11 | End: 2019-04-15 | Stop reason: HOSPADM

## 2019-04-11 RX ORDER — HEPARIN 100 UNIT/ML
500 SYRINGE INTRAVENOUS ONCE
Status: COMPLETED | OUTPATIENT
Start: 2019-04-11 | End: 2019-04-11

## 2019-04-11 RX ADMIN — Medication 10 ML: at 10:15

## 2019-04-11 RX ADMIN — Medication 10 ML: at 11:55

## 2019-04-11 RX ADMIN — SODIUM CHLORIDE 1000 ML: 9 INJECTION, SOLUTION INTRAVENOUS at 10:20

## 2019-04-11 RX ADMIN — SODIUM CHLORIDE, PRESERVATIVE FREE 500 UNITS: 5 INJECTION INTRAVENOUS at 11:55

## 2019-04-11 NOTE — PROGRESS NOTES
[facility] Progress Note Date: 2019 Name: Sahil Barba MRN: [id number] : 1970 Hydration Ms. Cannon to [facility], ambulatory, at 1005. No complaints or concerns voiced. Pt was assessed and education was provided. Ms. Cannon's vitals were reviewed. Visit Vitals /81 (BP 1 Location: Right arm, BP Patient Position: Sitting) Pulse 75 Temp 98.7 °F (37.1 °C) Resp 18 SpO2 95% Right chest mediport was accessed with 20 gauge 1 inch upton(s) after chloroprep. Port flushed easily and had brisk blood return. 1L NS infused over 2 hours. Mediport flushed with NS 20 ml and Heparin 500 units then de-accessed. No irritation or bleeding noted. Band-Aid applied. Patient Vitals for the past 8 hrs: 
 Temp Pulse Resp BP SpO2  
19 1013 98.7 °F (37.1 °C) 75 18 153/81 95 % Ms. Cannon tolerated the procedure, and had no complaints. Patient armband removed and shredded. Ms. Fallon Enriquez was discharged from [place] [address] in stable condition at 1155. She is to return on 19 at 0800 for her next appointment. Shanel Evans RN 2019 
1155

## 2019-04-11 NOTE — LETTER
4/11/19 Patient: Jennifer Farley YOB: 1970 Date of Visit: 4/11/2019 Birdie Renee, [address] [address] [place] [address] VIA In Basket Dear Birdie Renee MD, Thank you for referring Ms. Domenic Saunders to [address] [place] [address] for evaluation. My notes for this consultation are attached. If you have questions, please do not hesitate to call me. I look forward to following your patient along with you.  
 
 
Sincerely, 
 
Yaneli Lyon NP

## 2019-04-11 NOTE — PROGRESS NOTES
[facility]  [address] [place] [address], [address], [address] [facility]  [address] [facility], [address] [facility] [place], [address]   [phone]  Heide Briones DO      Patient ID:  Name:  Malia Girard  MRN:  [id number]  :  1970/48 y.o. Date:  2019      HISTORY OF PRESENT ILLNESS:  Malia Girard is a 51 y.o.  postmenopausal female referred by Dr. Dallas Cruz  With peritoneal cancer. Intitally seen be NP with reports of vaginal bleeding. Given pt's history of hysteretectomy with BSO for endometriosis in  a TVUS was ordered. Which revealed a 6.8 cm x 5.2 cm x 4.6 cm at midline vaginal cuff. This prompted pelvic CT with findings of a lesion measuring 7.6 x 5.8 x 7.2 cm and is compatible with a pelvic neoplasm vs endometrioma given prior history of endometriosis. Tumor markers done on 2018 all normal.  S/p  Exploratory laparotomy, exploration of retroperitoneal spaces, exam under anesthesia with biopsy of rectovaginal mass on 2019. Had sigmoidoscopy which revealed submucosal mass. S/p cycle #3 of carbo/taxol on 2019. Report she believes her yeast infection has return on her abdomen and a few areas are bleeding. She also reports starting anti-fungal cream prescribed by PCP. Labs:  Component      Latest Ref Rng & Units 3/14/2019 2019           8:15 AM  8:32 AM   Cancer Ag (CA) 125      0.0 - 38.1 U/mL 10.4 18.4     2018 : 9.3  2018 CEA: 2.0  2018 AFP: 3.8    Pathology  2019   RECTOVAGINAL MASS (#1, 2) AND PELVIC MASS, BIOPSIES:   CONSISTENT WITH SEROUS CARCINOMA WITH EXTENSIVE NECROSIS. Imaging  MRI 2019  FINDINGS:  Presumed hysterectomy. There is a lobulated 6.8 x 6 x 5.9 cm mass centered at  the right cul-de-sac involving the right and posterior upper to mid vagina but  also abutting the right anterior rectum from 1-8 o'clock.  On coronal and  sagittal views tumor is favored to not be originating from the rectum but this  is not definite. There is a oval ring of T2 hypointensity and T1 hyperintensity  with central necrosis within the mass. The mass extends to the posterior medial  right mesorectal fascia. The mass does not invade the bladder. At the cranial  aspect of the mass is a spiculated 3.4 x 2 cm T2 hypointense structure with  tethering to adjacent bowel and fascia. Ovaries are not visualized separately. No pelvic ascites.     Bowel is otherwise normal in caliber. No upstream dilation. Bladder is normal.  No lymphadenopathy.     Postsurgical changes anterior midline pelvic wall. Bones are grossly  unremarkable.     IMPRESSION  IMPRESSION:    1. Large up to 6.8 cm mass centered at the right cul-de-sac  is most  intimately associated with the vagina, favored to be either endometrial, vaginal  or cervical in origin. It does abut and may involve the right rectum but this is  favored secondary involvement. Ovaries are not visualized separately. Correlate  for history of oophorectomy since ovarian malignancy also possible. 2.  There is an associated site of favored endometriotic implant at the cranial  aspect of the mass raising the possibility of malignant transformation. 3.  No upstream bowel obstruction. PETCT 2/14/2019   --  PET FINDINGS  --    No abnormal hypermetabolic activity in the neck. No abnormal hypermetabolic activity in the chest.      Low-attenuation possible lesion in the medial dome right lobe liver in region  of heterogeneous hypermetabolic activity, with SUV Max reaching 11.7 on image  98. Underlying lesion difficult to visualize due to extensive motion from  respirations. Cannot well separate from the IVC or diaphragm.  No definite  corresponding lung lesion however.     There is low level metabolic activity associated with stranding and presumed  scarring in the subcutaneous fat of the lower midline abdominal and pelvic wall. This may simply be inflammatory. .      There is a lobulated soft tissue conglomerate in the midline to right lateral  cul-de-sac and perirectal pelvis. The periphery of this is diffusely  hypermetabolic, with SUV Max reaching 18.3 on image 200. Portions centrally are  photopenic and presumed necrotic. This measures up to 7.5 x 6.1 cm axial on  image 196. Anterior margin of this hypermetabolic lesion is in direct contact  with the high intensity decompressed urinary bladder, involvement cannot be  assessed. Left lateral margin of the mass is in contact with the surface of the  lateral wall of the sigmoid colon. Direct involvement cannot be assessed.  -Additional focal hypermetabolic density 2.7 cm on image 189 inseparable from  the caudal wall of the sigmoid. -Tapering hypermetabolic activity extends to approximately image 210, not to the  level of the perineum.     There are no other areas of abnormal metabolic activity appreciated in the  abdomen or pelvis which cannot be attributed to renal collecting system, ureter,  bladder or bowel wall activity>]. No definite hypermetabolic bone lesions appreciated, although bone scan can be  more sensitive in this respect. --  CT FINDINGS  --     These limited CT images do not replace diagnostic quality contrast enhanced CT  scan for evaluation of solid organs, bowel, retroperitoneum and vasculature. CT NECK:    Severely limited by lack of intravenous contrast.  Paranasal sinuses free of  disease. No appreciably enlarged lymph nodes. Extensive cervical endplate  osteophytes anterior and left lateral..      CT CHEST:    Limited evaluation of the hilum and vasculature without intravenous contrast.  No pleural effusion. No appreciably enlarged lymph nodes.  Patchy parenchymal  opacities medial basal left lower lobe on image 89 demonstrates low level  metabolic activity, SUV Max 3.2, nonspecific, favored as inflammatory based on  imaging appearance subjectively. [facility] CT ABDOMEN:    Limited noncontrast images. Normal-size liver and spleen no adrenal mass. No  hydronephrosis. No ascites. CT PELVIS:    Limited noncontrast images. No small bowel or colon distention. Scattered  colonic diverticulosis. Postsurgical change anterior abdominal wall. Severe  degenerative facet disease at the lower lumbar levels.       Lobulated right pelvic/cul-de-sac soft tissue mass as above. Mild degree of  stranding in the pelvic mesenteric fat. No ascites.        IMPRESSION   IMPRESSION:      1. Peripherally hypermetabolic soft tissue mass right dependent pelvis to the  cul-de-sac region as above consistent with malignancy/metastatic disease with  some central necrosis  -Possible separate lesion versus serosal involvement of the sigmoid colon  adjacent. -Mass in contact with posterior bladder, sigmoid colon locally and posterior  peritoneal surface.     2. Hypermetabolic lesion along central dome of the right lobe liver difficult to  separate from IVC or diaphragm due to motion artifact. Recommend dedicated  contrast enhanced CT for localization and better clarification.  -Findings are concerning for malignancy or hepatic metastatic disease until  proven otherwise.      3. Low level activity associated with patchy parenchymal opacity at the medial  left lower lobe,, favored as inflammatory as above.     4. No other definite hypermetabolic abnormalities appreciated elsewhere. 12/14/2018 CT PELVIS W/CONTRAST  FINDINGS:    LYMPH NODES: No enlarged lymph nodes. PELVIC GASTROINTESTINAL TRACT: No bowel dilation or wall thickening. PELVIC ORGANS:     The uterus is absent. Along the right upper margin of the vaginal cuff within the right deep pelvis there is a large irregularly-shaped peripherally enhancing, septated appearing mass with regions of central low attenuation which could be low density-fluid type contents or regions of necrosis.  Lesion measures 76 x 58 x 72 mm and is compatible with a pelvic neoplasm. VASCULATURE: Unremarkable. BONES: Lower lumbar hypertrophic osteophytes. Lower lumbar facet hypertrophy with vacuum phenomenon asymmetric leftward. Degenerative vacuum phenomenon within the SI joints noted as well. No acute or aggressive osseous abnormalities identified. OTHER: Small fat-containing ventral umbilical hernia. Minimal nonspecific edema within the subcutaneous fat of the lumbar region, not uncommon. IMPRESSION:   1.  Along the right upper margin of the vaginal cuff within the right deep pelvis there is a large irregularly-shaped peripherally enhancing, septated appearing mass with regions of central low attenuation which could be low density-fluid type contents or regions of necrosis. Lesion measures 76 x 58 x 72 mm and is compatible with a pelvic neoplasm. Could be a endometrioma given prior history of endometriosis, malignancy not excluded and gynecologic oncology follow-up is suggested. 2.  Small fat-containing ventral umbilical hernia.      12/04/2018 TVUS  Uterus: surgically absent  Left ovary: surgically absent  Right ovary: surgically absent  Other findings: midline-at vaginal cuff: 6.8 cm X 5.2 cm x 4.6 cm, volume 85 ml    Impression: Complex pelvic mass       ROS:    As above      Patient Active Problem List    Diagnosis Date Noted    Malignant neoplasm of peritoneum ([address] 02/14/2019    Pelvic mass in female 01/17/2019    Bipolar 1 disorder ([address] 02/09/2018    Irritable bowel syndrome with both constipation and diarrhea 02/09/2018    Advance care planning 01/31/2017    Dental caries 05/26/2016    Chronic periodontal disease 05/26/2016    SUAZO (dyspnea on exertion) 02/10/2016    Prediabetes 09/24/2015    Morbid obesity ([address] 08/31/2015    Arthritis, degenerative 08/31/2015    Gastroesophageal reflux disease without esophagitis 08/31/2015    ANAMIKA on CPAP 08/31/2015    Essential hypertension 08/28/2015    PTSD (post-traumatic stress disorder) 03/24/2014    S/P TKR (total knee replacement) 11/14/2012    Depression 05/08/2012    Menopause 05/08/2012    Knee pain, right 05/08/2012    GERD (gastroesophageal reflux disease) 05/08/2012    OA (osteoarthritis) 06/18/2010    Obesity 06/18/2010    Mixed hyperlipidemia 06/18/2010     Past Medical History:   Diagnosis Date    AR (allergic rhinitis)     seasonal    Cancer ([facility]     pt states between vgina and rectal area    Cardiac echocardiogram 04/11/2016    Tech difficult. EF 55-60%. No RWMA. Mild conc LVH. Gr 1 DDfx. RVSP normal.      Cardiac nuclear imaging test 05/05/2016    Low risk. Very patchy radiotracer uptake. Mild anterior artifact, low suspicion for ischemia. EF 68%. No RWMA. Normal EKG on pharm stress test.    Cardiovascular LE arterial duplex 10/07/2013    No significant arterial disease at rest bilaterally. R JUNE 1.21.  L JUNE 1.16. No significant sm vessel disease.     Depression     Dr. Surekha Grossman, suicide attempt 2/12    Diverticulosis     Endometriosis     s/p hysterectomy 2006    GERD (gastroesophageal reflux disease)     Glaucoma     Dr. Adams Brain HTN (hypertension)     Hx of colonoscopy 04/05/2017    mild diverticulosis, g1 internal hemorrhoids, normal colon , repeat 10 year 2027    Hx of suicide attempt     Hyperlipidemia LDL goal < 130     IBS (irritable bowel syndrome)     Ill-defined condition     environmental allergies    Insomnia     Malignant neoplasm of peritoneum ([facility] 2/14/2019    Nausea & vomiting     OA (osteoarthritis)     both knees, lower back    Preeclampsia     PTSD (post-traumatic stress disorder)     Seizures ([facility]     1 x with childbirth, had toxemia    Sleep apnea     does not use cpap machine    Spinal stenosis     Dr. Ashley Madrid      Past Surgical History:   Procedure Laterality Date    COLONOSCOPY N/A 4/5/2017    COLONOSCOPY performed by Conchis Granado MD at [place] [address] UNLISTED Left 2009    External fixator    FLEXIBLE SIGMOIDOSCOPY N/A 2019    SIGMOIDOSCOPY FLEXIBLE performed by Deanna Weston MD at [facility] ENDOSCOPY    HX  SECTION      HX COLONOSCOPY  2017    DR. MCRAE 2017     HX HYSTERECTOMY      HX KNEE ARTHROSCOPY Left     left knee    HX KNEE REPLACEMENT Bilateral     (R)  (L)     HX LAPAROTOMY N/A 2019    and rectovaginal bx    HX OTHER SURGICAL  2016    all teeth removed    HX SALPINGO-OOPHORECTOMY  2008    HX TUBAL LIGATION      IR INSERT TUNL CVC W PORT OVER 5 YEARS  2019    MULTIPLE DELIVERY       1990    TOTAL ABDOM HYSTERECTOMY        OB History        2    Para   2    Term   2       0    AB   0    Living   0       SAB   0    TAB   0    Ectopic   0    Molar   0    Multiple   0    Live Births   0              Social History     Tobacco Use    Smoking status: Never Smoker    Smokeless tobacco: Never Used   Substance Use Topics    Alcohol use: No      Family History   Problem Relation Age of Onset    Heart Disease Mother         CHF    Diabetes Mother     Hypertension Mother     Kidney Disease Mother     Breast Cancer Mother     Stroke Mother     Diabetes Father     Hypertension Father     Heart Attack Father         MI    Cancer Maternal Grandmother         stomach    Ovarian Cancer Maternal Aunt     Cancer Maternal Uncle       Current Outpatient Medications   Medication Sig    dimethicone (SOOTHE AND COOL INZO BARRIER) 5 % topical cream Apply  to affected area two (2) times a day.  docusate sodium (COLACE) 100 mg capsule Take 100 mg by mouth two (2) times a day.  amLODIPine (NORVASC) 10 mg tablet take 1 tablet by mouth once daily    lidocaine-prilocaine (EMLA) topical cream Apply  to affected area as needed for Pain.  polyethylene glycol (MIRALAX) 17 gram packet Take 1 Packet by mouth daily.     cyanocobalamin (VITAMIN B-12) 1,000 mcg tablet Take 1,000 mcg by mouth daily.  cycloSPORINE (RESTASIS) 0.05 % dpet Apply 1 Drop to eye as needed.  lubiPROStone (AMITIZA) 24 mcg capsule 1 Cap by Mouth/Throat route as needed.  clotrimazole-betamethasone (LOTRISONE) topical cream Apply to affected area twice daily    simvastatin (ZOCOR) 20 mg tablet take 1 tablet by mouth at bedtime    metoprolol succinate (TOPROL-XL) 100 mg tablet take 1 tablet by mouth once daily    losartan (COZAAR) 100 mg tablet take 1 tablet by mouth once daily    dexamethasone (DECADRON) 4 mg tablet Take 40 mg by mouth as needed. Take 10 tabs the night before Chemo #1 and Chemo #2.  promethazine (PHENERGAN) 25 mg tablet Take 1 Tab by mouth every six (6) hours as needed for Nausea.  ondansetron hcl (ZOFRAN) 4 mg tablet Take 1 Tab by mouth every six (6) hours as needed for Nausea.  melatonin 3 mg tablet Take 3 mg by mouth nightly.  plecanatide (TRULANCE) 3 mg tab Take  by mouth.  OXcarbazepine (TRILEPTAL) 300 mg tablet Take 300 mg by mouth two (2) times a day.  LORazepam (ATIVAN) 0.5 mg tablet Take 0.5 mg by mouth two (2) times daily as needed.  hydrOXYzine pamoate (VISTARIL) 50 mg capsule 2 Caps 2 Times Daily As Needed.  carisoprodol (SOMA) 350 mg tablet take 1 tablet by mouth three times a day and 1 tablet at bedtime (currently out of Rx)    levocetirizine (XYZAL) 5 mg tablet daily as needed.  ziprasidone (GEODON) 40 mg capsule Take 40 mg by mouth nightly.  montelukast (SINGULAIR) 10 mg tablet Take 10 mg by mouth nightly.  FIBER, PSYLLIUM HUSK, PO Take  by mouth daily as needed.  latanoprost (XALATAN) 0.005 % ophthalmic solution Administer 1 Drop to both eyes nightly. No current facility-administered medications for this visit.       Facility-Administered Medications Ordered in Other Visits   Medication Dose Route Frequency    heparin (porcine) pf 500 Units  500 Units InterCATHeter ONCE    sodium chloride (NS) flush 10-40 mL  10-40 mL IntraVENous PRN    0.9% sodium chloride infusion 1,000 mL  1,000 mL IntraVENous ONCE     Allergies   Allergen Reactions    Other Medication Hives     seafood    Shellfish Derived Hives          OBJECTIVE:    Physical Exam  VITAL SIGNS: Visit Vitals  LMP 01/31/2008      GENERAL EMILY: in no apparent distress and well developed and well nourished   MUSCULOSKEL: no joint tenderness, deformity or swelling   INTEGUMENT:  warm and dry, no rashes or lesions   ABDOMEN . soft,obese, NT, ND, No masses appreciated. Varies brown/red/pink areas under pannus abdomen bilaterally in multiple stages of healing. No evidence of active fungal infection. EXTREMITIES: extremities normal, atraumatic, no cyanosis or edema   PELVIC: deferred   RECTAL: deferred   BRY SURVEY: Cervical, supraclavicular, axillary and inguinal nodes normal.   NEURO: Grossly normal         IMPRESSION/PLAN:  1. Intertrigo   -Apply prescribed dimethicone barrier cream to skin fold twice daily.    -Reviewed personal hygiene measures with pt to include using mild soap, washing twice daily and keep skin fold as dry as possible. -F/U with PCP for further management. 2..Primary peritoneal cancer   -reviewed her pathology   -discussed recommendation for chemotherapy   -reviewed PETCT with ? Liver involvement but explained given operative findings it is unlikely   -tolerating chemo well.  No G3 or G4 toxicity   -ok for cycle #3   -f/u prior to cycle #4   -plan for carbo (auc=6), taxol (175 mg/m2) IV every 3 wks for 6 cycles   -repeat CT after 3 cycles scheduled 4/19/19    The total time spent was 25 minutes regarding this patients diagnosis of primary peritoneal cancer and >50% of this time was spent counseling and coordinating care    Edris Lombard, NP  Gynecologic Oncology  4/37/195747:74 AM

## 2019-04-18 ENCOUNTER — HOSPITAL ENCOUNTER (OUTPATIENT)
Dept: CT IMAGING | Age: 49
Discharge: HOME | End: 2019-04-18
Attending: OBSTETRICS & GYNECOLOGY
Payer: MEDICARE

## 2019-04-18 ENCOUNTER — HOSPITAL ENCOUNTER (OUTPATIENT)
Dept: INFUSION THERAPY | Age: 49
Discharge: HOME | End: 2019-04-18
Payer: MEDICARE

## 2019-04-18 VITALS
TEMPERATURE: 98.1 F | RESPIRATION RATE: 18 BRPM | HEART RATE: 65 BPM | SYSTOLIC BLOOD PRESSURE: 154 MMHG | OXYGEN SATURATION: 97 % | DIASTOLIC BLOOD PRESSURE: 89 MMHG

## 2019-04-18 DIAGNOSIS — C48.2 PERITONEAL CARCINOMA (HCC): ICD-10-CM

## 2019-04-18 PROCEDURE — 74177 CT ABD & PELVIS W/CONTRAST: CPT

## 2019-04-18 PROCEDURE — 74011250636 HC RX REV CODE- 250/636: Performed by: OBSTETRICS & GYNECOLOGY

## 2019-04-18 PROCEDURE — 77030012965 HC NDL HUBR BBMI -A

## 2019-04-18 PROCEDURE — 74011636320 HC RX REV CODE- 636/320: Performed by: OBSTETRICS & GYNECOLOGY

## 2019-04-18 PROCEDURE — 96360 HYDRATION IV INFUSION INIT: CPT

## 2019-04-18 RX ORDER — SODIUM CHLORIDE 0.9 % (FLUSH) 0.9 %
10-40 SYRINGE (ML) INJECTION AS NEEDED
Status: DISCONTINUED | OUTPATIENT
Start: 2019-04-18 | End: 2019-04-21 | Stop reason: HOSPADM

## 2019-04-18 RX ORDER — HEPARIN 100 UNIT/ML
500 SYRINGE INTRAVENOUS ONCE
Status: COMPLETED | OUTPATIENT
Start: 2019-04-18 | End: 2019-04-18

## 2019-04-18 RX ORDER — SODIUM CHLORIDE 9 MG/ML
1000 INJECTION, SOLUTION INTRAVENOUS ONCE
Status: COMPLETED | OUTPATIENT
Start: 2019-04-18 | End: 2019-04-18

## 2019-04-18 RX ADMIN — Medication 20 ML: at 10:47

## 2019-04-18 RX ADMIN — IOPAMIDOL 80 ML: 612 INJECTION, SOLUTION INTRAVENOUS at 09:04

## 2019-04-18 RX ADMIN — SODIUM CHLORIDE 1000 ML/HR: 9 INJECTION, SOLUTION INTRAVENOUS at 09:38

## 2019-04-18 RX ADMIN — HEPARIN 500 UNITS: 100 SYRINGE at 10:48

## 2019-04-18 NOTE — PROGRESS NOTES
[facility] Progress Note Date: 2019 Name: Leonel Brand MRN: [id number] : 1970 Hydration Ms. Bravo Mae to [facility], ambulatory, at 2428. No complaints or concerns voiced. Pt was assessed and education was provided. Ms. Cannon's vitals were reviewed. Visit Vitals /89 (BP 1 Location: Left arm, BP Patient Position: Sitting) Pulse 65 Temp 98.1 °F (36.7 °C) Resp 18 SpO2 97% Right chest mediport was accessed with 20 gauge 1 inch upton(s) after chloroprep. Port flushed easily and had brisk blood return. 1L NS infused over an hour. Mediport flushed with NS 20 ml and Heparin 500 units then de-accessed. No irritation or bleeding noted. Band-Aid applied. Patient Vitals for the past 8 hrs: 
 Temp Pulse Resp BP SpO2  
19 0929 98.1 °F (36.7 °C) 65 18 154/89 97 % Ms. Cannon tolerated the procedure, and had no complaints. Patient armband removed and shredded. Ms. Bravo Mae was discharged from [place] [address] in stable condition at 1055. She is to return on 19 at 0800 for her next appointment. Chio Meza 2019 
1155

## 2019-04-22 ENCOUNTER — TELEPHONE (OUTPATIENT)
Dept: ONCOLOGY | Age: 49
End: 2019-04-22

## 2019-04-22 NOTE — TELEPHONE ENCOUNTER
Reviewed CT scan findings with patient. Pt verbalized understanding. Confirmed f/u appointment for 04/24/2019 with Dr. Livia Cao. Patient agreed to plan. Patient instructed to contact office for any question or concerns.      Priya Zhou NP

## 2019-04-22 NOTE — TELEPHONE ENCOUNTER
Patient contacted the office to inquire about her scan results. She requests a call back at [phone].

## 2019-04-24 ENCOUNTER — OFFICE VISIT (OUTPATIENT)
Dept: ONCOLOGY | Age: 49
End: 2019-04-24

## 2019-04-24 VITALS
HEART RATE: 60 BPM | TEMPERATURE: 97.4 F | HEIGHT: 63 IN | OXYGEN SATURATION: 99 % | BODY MASS INDEX: 44.12 KG/M2 | WEIGHT: 249 LBS | SYSTOLIC BLOOD PRESSURE: 129 MMHG | DIASTOLIC BLOOD PRESSURE: 87 MMHG | RESPIRATION RATE: 18 BRPM

## 2019-04-24 DIAGNOSIS — C48.2 PRIMARY PERITONEAL CARCINOMATOSIS (HCC): ICD-10-CM

## 2019-04-24 DIAGNOSIS — R11.2 CINV (CHEMOTHERAPY-INDUCED NAUSEA AND VOMITING): Primary | ICD-10-CM

## 2019-04-24 DIAGNOSIS — T45.1X5A CINV (CHEMOTHERAPY-INDUCED NAUSEA AND VOMITING): Primary | ICD-10-CM

## 2019-04-24 RX ORDER — PROMETHAZINE HYDROCHLORIDE 25 MG/1
25 TABLET ORAL
Qty: 30 TAB | Refills: 3 | Status: SHIPPED | OUTPATIENT
Start: 2019-04-24 | End: 2019-10-09 | Stop reason: SDUPTHER

## 2019-04-24 NOTE — PROGRESS NOTES
Georgina Forde, a 51 y.o. female,  is here for   Chief Complaint   Patient presents with    Chemotherapy     follow up       Visit Vitals  /87 (BP 1 Location: Left arm, BP Patient Position: Sitting)   Pulse 60   Temp 97.4 °F (36.3 °C) (Oral)   Resp 18   Ht 5' 2.99\" (1.6 m)   Wt 112.9 kg (249 lb)   SpO2 99%   BMI 44.12 kg/m²       Patient reports pain rated 7 out of 10, generalized due to arthritis. Also reports feeling spasms following her chemotherapy treatments. 1. Have you been to the ER, urgent care clinic since your last visit? Hospitalized since your last visit? No    2. Have you seen or consulted any other health care providers outside of the [facility] since your last visit? Include any pap smears or colon screening.  No

## 2019-04-24 NOTE — PATIENT INSTRUCTIONS
Learning About Chemotherapy Side Effects and Safety  What is chemotherapy? Chemotherapy uses medicines to kill cancer cells. It's often called \"chemo. \" Chemo may slow cancer growth, stop cancer from spreading, or help get rid of the cancer. Chemo can be given at different locations, such as a hospital, a doctor's office, or a clinic. Sometimes chemo treatments may be done at home. You may get chemo in \"cycles. \" This means that you get a number of treatments over a set period of time. Then you take a break before you start again. Chemo helps to treat many kinds of cancer. But it can also affect healthy cells along with the cancer cells. This is why some types of chemo cause side effects, like nausea, losing your hair, or feeling tired. What are the possible side effects of chemotherapy? Side effects depend on which medicines you take, how much you take, and how the medicines affect you. Your doctor can tell you what to expect when you take these medicines. Some of them may cause symptoms such as:  · Fatigue. · Nausea. · Vomiting. · A rash. · Hair loss. · Pain or tingling in your hands or the soles of your feet. Chemo treatment can be hard. It can keep you from doing the things you were doing every day, like going to work or school. But keep in mind that most side effects don't last. They will go away after you finish the treatment. And many side effects can be managed with medicine. Your doctor will tell you what to do if you have side effects. Purnima Lie also learn which ones you need to tell your doctor about right away. How can you keep yourself and your family safe? If you take chemo at home, take steps to protect your family. Keep your medicine in a locked cabinet if you can. Or keep it on a high shelf out of the reach of children. Make sure the containers have childproof lids.   IV chemo treatments  Chemo medicines that you get in your vein through an IV can stay in your body fluids (vomit, urine, or stool) for several days. Some medicines are radioactive, so they can be harmful if someone touches waste from your body. If your medicine is radioactive, any of your clothes or bed linens or cloth diapers that have medicine or body fluids on them need to be handled separately from other household laundry. Have caregivers use gloves when they wash your bed linens and clothes. Bed linens and cloth diapers should be machine washed twice in hot water, using regular detergent. For the first 48 hours after each treatment:  · Sit on the toilet seat to prevent splashing. · Put the toilet lid down before you flush. · Always flush twice after you use the toilet. Couples should use a condom during sex while a partner is getting chemo treatments and for several days after treatment ends. Follow-up care is a key part of your treatment and safety. Be sure to make and go to all appointments, and call your doctor if you are having problems. It's also a good idea to know your test results and keep a list of the medicines you take. Where can you learn more? Go to http://raul-vee.info/. Enter 91 21 06 in the search box to learn more about \"Learning About Chemotherapy Side Effects and Safety. \"  Current as of: March 27, 2018  Content Version: 11.9  © 8211-8098 [employer]. Care instructions adapted under license by [employer] (which disclaims liability or warranty for this information). If you have questions about a medical condition or this instruction, always ask your healthcare professional. [place] [address] any warranty or liability for your use of this information.

## 2019-04-24 NOTE — PROGRESS NOTES
[facility]  [address] [place] [address], [address], [address] [facility]  [address] [facility], [address] [facility]  [place], [address]   [phone]  Hanna Loera DO      Patient ID:  Name:  Rohan Adams  MRN:  [id number]  :  1970/48 y.o. Date:  2019      HISTORY OF PRESENT ILLNESS:  Rohan Adams is a 51 y.o.  postmenopausal female referred by Dr. Pranay Christianson  With peritoneal cancer. Intitally seen be NP with reports of vaginal bleeding. Given pt's history of hysteretectomy with BSO for endometriosis in  a TVUS was ordered. Which revealed a 6.8 cm x 5.2 cm x 4.6 cm at midline vaginal cuff. This prompted pelvic CT with findings of a lesion measuring 7.6 x 5.8 x 7.2 cm and is compatible with a pelvic neoplasm vs endometrioma given prior history of endometriosis. Tumor markers done on 2018 all normal.  S/p  Exploratory laparotomy, exploration of retroperitoneal spaces, exam under anesthesia with biopsy of rectovaginal mass on 2019. Had sigmoidoscopy which revealed submucosal mass. S/p cycle #3 of carbo/taxol on 2019. Has some body aches week after chemo. Reports neurphathy has resolved. Doing better with hydration. Reports constipation that is relived with stool softeners. Nausea controlled with meds. Denies vaginal bleeding. Labs:  Component      Latest Ref Rng & Units 2019           8:28 AM   Cancer Ag (CA) 125      0.0 - 38.1 U/mL 8.8     Component      Latest Ref Rng & Units 3/14/2019 2019           8:15 AM  8:32 AM   Cancer Ag (CA) 125      0.0 - 38.1 U/mL 10.4 18.4     2018 : 9.3  2018 CEA: 2.0  2018 AFP: 3.8    Pathology  2019   RECTOVAGINAL MASS (#1, 2) AND PELVIC MASS, BIOPSIES:   CONSISTENT WITH SEROUS CARCINOMA WITH EXTENSIVE NECROSIS.      Imaging  CT abdomen/pelvis  FINDINGS:  view shows lung bases clear and normal bowel gas pattern. Patient morbidly obese but probably with interval weight loss.     CT Abdomen and pelvis:     Lung bases: Normal.     Heart and pericardium: There is a catheter tip in the right atrium of the heart. Heart and pericardium otherwise unremarkable.     Liver: Decrease size of lesion in segment 7 of the liver adjacent IVC now  measuring 11 x 15 mm and on PET/CT 2/14/2019 it measured 23 mm. No new liver  lesions.     Gallbladder: Normal.     Spleen: Mass inferiorly in the spleen near the hilum measures 23 x 24 mm,  probably present previously but not as well seen because of lack of intravenous  contrast and measuring about 27 mm (29).    Pancreas: Normal.     Adrenal glands: Normal.     Kidneys: Enhancing symmetrically. No focal lesions.     The bowel: Stomach and duodenum and small bowel and the appendix and colon are  normal.     GYN: Prior hysterectomy. No adnexal masses.     Peritoneal space: No free fluid. There is some mild fat stranding in the left  lower quadrant but no discrete mass (60) and probably related to the mesentery.     MSK: Abdominal wall scar lower abdominal wall. Multiple levels facet arthropathy  lower lumbar spine. Moderate disc space narrowing L5/S1 and L4/L5. No suspicious  skeletal lesions.     Retroperitoneum: Soft tissue mass right side pelvis has decreased in size. It  measures 2.8 x 3.2 cm. On previous PET/CT it measured 8.3 x 5.3 cm and on CT it  measured 7.4 x 5.2 cm.     IMPRESSION  IMPRESSION:     Overall improvement compatible with response to therapy. Decreased size of the  pelvic metastasis, hepatic metastasis, splenic lesion since the prior study. No  new metastases. MRI 2/19/2019  FINDINGS:  Presumed hysterectomy. There is a lobulated 6.8 x 6 x 5.9 cm mass centered at  the right cul-de-sac involving the right and posterior upper to mid vagina but  also abutting the right anterior rectum from 1-8 o'clock.  On coronal and  sagittal views tumor is favored to not be originating from the rectum but this  is not definite. There is a oval ring of T2 hypointensity and T1 hyperintensity  with central necrosis within the mass. The mass extends to the posterior medial  right mesorectal fascia. The mass does not invade the bladder. At the cranial  aspect of the mass is a spiculated 3.4 x 2 cm T2 hypointense structure with  tethering to adjacent bowel and fascia. Ovaries are not visualized separately. No pelvic ascites.     Bowel is otherwise normal in caliber. No upstream dilation. Bladder is normal.  No lymphadenopathy.     Postsurgical changes anterior midline pelvic wall. Bones are grossly  unremarkable.     IMPRESSION  IMPRESSION:    1. Large up to 6.8 cm mass centered at the right cul-de-sac  is most  intimately associated with the vagina, favored to be either endometrial, vaginal  or cervical in origin. It does abut and may involve the right rectum but this is  favored secondary involvement. Ovaries are not visualized separately. Correlate  for history of oophorectomy since ovarian malignancy also possible. 2.  There is an associated site of favored endometriotic implant at the cranial  aspect of the mass raising the possibility of malignant transformation. 3.  No upstream bowel obstruction. PETCT 2/14/2019   --  PET FINDINGS  --    No abnormal hypermetabolic activity in the neck. No abnormal hypermetabolic activity in the chest.      Low-attenuation possible lesion in the medial dome right lobe liver in region  of heterogeneous hypermetabolic activity, with SUV Max reaching 11.7 on image  98. Underlying lesion difficult to visualize due to extensive motion from  respirations. Cannot well separate from the IVC or diaphragm. No definite  corresponding lung lesion however.     There is low level metabolic activity associated with stranding and presumed  scarring in the subcutaneous fat of the lower midline abdominal and pelvic wall. This may simply be inflammatory. Mabeline Sink There is a lobulated soft tissue conglomerate in the midline to right lateral  cul-de-sac and perirectal pelvis. The periphery of this is diffusely  hypermetabolic, with SUV Max reaching 18.3 on image 200. Portions centrally are  photopenic and presumed necrotic. This measures up to 7.5 x 6.1 cm axial on  image 196. Anterior margin of this hypermetabolic lesion is in direct contact  with the high intensity decompressed urinary bladder, involvement cannot be  assessed. Left lateral margin of the mass is in contact with the surface of the  lateral wall of the sigmoid colon. Direct involvement cannot be assessed.  -Additional focal hypermetabolic density 2.7 cm on image 189 inseparable from  the caudal wall of the sigmoid. -Tapering hypermetabolic activity extends to approximately image 210, not to the  level of the perineum.     There are no other areas of abnormal metabolic activity appreciated in the  abdomen or pelvis which cannot be attributed to renal collecting system, ureter,  bladder or bowel wall activity>]. No definite hypermetabolic bone lesions appreciated, although bone scan can be  more sensitive in this respect. --  CT FINDINGS  --     These limited CT images do not replace diagnostic quality contrast enhanced CT  scan for evaluation of solid organs, bowel, retroperitoneum and vasculature. CT NECK:    Severely limited by lack of intravenous contrast.  Paranasal sinuses free of  disease. No appreciably enlarged lymph nodes. Extensive cervical endplate  osteophytes anterior and left lateral..      CT CHEST:    Limited evaluation of the hilum and vasculature without intravenous contrast.  No pleural effusion. No appreciably enlarged lymph nodes. Patchy parenchymal  opacities medial basal left lower lobe on image 89 demonstrates low level  metabolic activity, SUV Max 3.2, nonspecific, favored as inflammatory based on  imaging appearance subjectively. [facility]         CT ABDOMEN:    Limited noncontrast images. Normal-size liver and spleen no adrenal mass. No  hydronephrosis. No ascites. CT PELVIS:    Limited noncontrast images. No small bowel or colon distention. Scattered  colonic diverticulosis. Postsurgical change anterior abdominal wall. Severe  degenerative facet disease at the lower lumbar levels.       Lobulated right pelvic/cul-de-sac soft tissue mass as above. Mild degree of  stranding in the pelvic mesenteric fat. No ascites.        IMPRESSION   IMPRESSION:      1. Peripherally hypermetabolic soft tissue mass right dependent pelvis to the  cul-de-sac region as above consistent with malignancy/metastatic disease with  some central necrosis  -Possible separate lesion versus serosal involvement of the sigmoid colon  adjacent. -Mass in contact with posterior bladder, sigmoid colon locally and posterior  peritoneal surface.     2. Hypermetabolic lesion along central dome of the right lobe liver difficult to  separate from IVC or diaphragm due to motion artifact. Recommend dedicated  contrast enhanced CT for localization and better clarification.  -Findings are concerning for malignancy or hepatic metastatic disease until  proven otherwise.      3. Low level activity associated with patchy parenchymal opacity at the medial  left lower lobe,, favored as inflammatory as above.     4. No other definite hypermetabolic abnormalities appreciated elsewhere. 12/14/2018 CT PELVIS W/CONTRAST  FINDINGS:    LYMPH NODES: No enlarged lymph nodes. PELVIC GASTROINTESTINAL TRACT: No bowel dilation or wall thickening. PELVIC ORGANS:     The uterus is absent. Along the right upper margin of the vaginal cuff within the right deep pelvis there is a large irregularly-shaped peripherally enhancing, septated appearing mass with regions of central low attenuation which could be low density-fluid type contents or regions of necrosis.  Lesion measures 76 x 58 x 72 mm and is compatible with a pelvic neoplasm. VASCULATURE: Unremarkable. BONES: Lower lumbar hypertrophic osteophytes. Lower lumbar facet hypertrophy with vacuum phenomenon asymmetric leftward. Degenerative vacuum phenomenon within the SI joints noted as well. No acute or aggressive osseous abnormalities identified. OTHER: Small fat-containing ventral umbilical hernia. Minimal nonspecific edema within the subcutaneous fat of the lumbar region, not uncommon. IMPRESSION:   1.  Along the right upper margin of the vaginal cuff within the right deep pelvis there is a large irregularly-shaped peripherally enhancing, septated appearing mass with regions of central low attenuation which could be low density-fluid type contents or regions of necrosis. Lesion measures 76 x 58 x 72 mm and is compatible with a pelvic neoplasm. Could be a endometrioma given prior history of endometriosis, malignancy not excluded and gynecologic oncology follow-up is suggested. 2.  Small fat-containing ventral umbilical hernia.      12/04/2018 TVUS  Uterus: surgically absent  Left ovary: surgically absent  Right ovary: surgically absent  Other findings: midline-at vaginal cuff: 6.8 cm X 5.2 cm x 4.6 cm, volume 85 ml    Impression: Complex pelvic mass       ROS:    As above      Patient Active Problem List    Diagnosis Date Noted    Malignant neoplasm of peritoneum ([address] 02/14/2019    Pelvic mass in female 01/17/2019    Bipolar 1 disorder ([address] 02/09/2018    Irritable bowel syndrome with both constipation and diarrhea 02/09/2018    Advance care planning 01/31/2017    Dental caries 05/26/2016    Chronic periodontal disease 05/26/2016    SUAZO (dyspnea on exertion) 02/10/2016    Prediabetes 09/24/2015    Morbid obesity ([address] 08/31/2015    Arthritis, degenerative 08/31/2015    Gastroesophageal reflux disease without esophagitis 08/31/2015    ANAMIKA on CPAP 08/31/2015    Essential hypertension 08/28/2015    PTSD (post-traumatic stress disorder) 03/24/2014    S/P TKR (total knee replacement) 11/14/2012    Depression 05/08/2012    Menopause 05/08/2012    Knee pain, right 05/08/2012    GERD (gastroesophageal reflux disease) 05/08/2012    OA (osteoarthritis) 06/18/2010    Obesity 06/18/2010    Mixed hyperlipidemia 06/18/2010     Past Medical History:   Diagnosis Date    AR (allergic rhinitis)     seasonal    Cancer [facility]     pt states between vgina and rectal area    Cardiac echocardiogram 04/11/2016    Tech difficult. EF 55-60%. No RWMA. Mild conc LVH. Gr 1 DDfx. RVSP normal.      Cardiac nuclear imaging test 05/05/2016    Low risk. Very patchy radiotracer uptake. Mild anterior artifact, low suspicion for ischemia. EF 68%. No RWMA. Normal EKG on pharm stress test.    Cardiovascular LE arterial duplex 10/07/2013    No significant arterial disease at rest bilaterally. R JUNE 1.21.  L JUNE 1.16. No significant sm vessel disease.     Depression     Dr. Aury Melendrez, suicide attempt 2/12    Diverticulosis     Endometriosis     s/p hysterectomy 2006    GERD (gastroesophageal reflux disease)     Glaucoma     Dr. Alden Greene HTN (hypertension)     Hx of colonoscopy 04/05/2017    mild diverticulosis, g1 internal hemorrhoids, normal colon , repeat 10 year 2027    Hx of suicide attempt     Hyperlipidemia LDL goal < 130     IBS (irritable bowel syndrome)     Ill-defined condition     environmental allergies    Insomnia     Malignant neoplasm of peritoneum ([facility] 2/14/2019    Nausea & vomiting     OA (osteoarthritis)     both knees, lower back    Preeclampsia     PTSD (post-traumatic stress disorder)     Seizures ([facility]     1 x with childbirth, had toxemia    Sleep apnea     does not use cpap machine    Spinal stenosis     Dr. Amrita Maria      Past Surgical History:   Procedure Laterality Date    COLONOSCOPY N/A 4/5/2017    [facility]    External fixator    FLEXIBLE SIGMOIDOSCOPY N/A 2019    SIGMOIDOSCOPY FLEXIBLE performed by Deanna Weston MD at [facility] ENDOSCOPY    HX  SECTION      HX COLONOSCOPY  2017    DR. MCRAE 2017     HX HYSTERECTOMY      HX KNEE ARTHROSCOPY Left     left knee    HX KNEE REPLACEMENT Bilateral     (R)  (L)     HX LAPAROTOMY N/A 2019    and rectovaginal bx    HX OTHER SURGICAL  2016    all teeth removed    HX SALPINGO-OOPHORECTOMY  2008    HX TUBAL LIGATION      IR INSERT TUNL CVC W PORT OVER 5 YEARS  2019    MULTIPLE DELIVERY       1990    TOTAL ABDOM HYSTERECTOMY        OB History        2    Para   2    Term   2       0    AB   0    Living   0       SAB   0    TAB   0    Ectopic   0    Molar   0    Multiple   0    Live Births   0              Social History     Tobacco Use    Smoking status: Never Smoker    Smokeless tobacco: Never Used   Substance Use Topics    Alcohol use: No      Family History   Problem Relation Age of Onset    Heart Disease Mother         CHF    Diabetes Mother     Hypertension Mother     Kidney Disease Mother     Breast Cancer Mother     Stroke Mother     Diabetes Father     Hypertension Father     Heart Attack Father         MI    Cancer Maternal Grandmother         stomach    Ovarian Cancer Maternal Aunt     Cancer Maternal Uncle       Current Outpatient Medications   Medication Sig    dimethicone (SOOTHE AND COOL INZO BARRIER) 5 % topical cream Apply  to affected area two (2) times a day.  docusate sodium (COLACE) 100 mg capsule Take 100 mg by mouth two (2) times a day.  amLODIPine (NORVASC) 10 mg tablet take 1 tablet by mouth once daily    lidocaine-prilocaine (EMLA) topical cream Apply  to affected area as needed for Pain.  polyethylene glycol (MIRALAX) 17 gram packet Take 1 Packet by mouth daily.  cyanocobalamin (VITAMIN B-12) 1,000 mcg tablet Take 1,000 mcg by mouth daily.     cycloSPORINE (RESTASIS) 0.05 % dpet Apply 1 Drop to eye as needed.  lubiPROStone (AMITIZA) 24 mcg capsule 1 Cap by Mouth/Throat route as needed.  clotrimazole-betamethasone (LOTRISONE) topical cream Apply to affected area twice daily    simvastatin (ZOCOR) 20 mg tablet take 1 tablet by mouth at bedtime    metoprolol succinate (TOPROL-XL) 100 mg tablet take 1 tablet by mouth once daily    losartan (COZAAR) 100 mg tablet take 1 tablet by mouth once daily    dexamethasone (DECADRON) 4 mg tablet Take 40 mg by mouth as needed. Take 10 tabs the night before Chemo #1 and Chemo #2.  promethazine (PHENERGAN) 25 mg tablet Take 1 Tab by mouth every six (6) hours as needed for Nausea.  ondansetron hcl (ZOFRAN) 4 mg tablet Take 1 Tab by mouth every six (6) hours as needed for Nausea.  melatonin 3 mg tablet Take 3 mg by mouth nightly.  plecanatide (TRULANCE) 3 mg tab Take  by mouth.  OXcarbazepine (TRILEPTAL) 300 mg tablet Take 300 mg by mouth two (2) times a day.  LORazepam (ATIVAN) 0.5 mg tablet Take 0.5 mg by mouth two (2) times daily as needed.  hydrOXYzine pamoate (VISTARIL) 50 mg capsule 2 Caps 2 Times Daily As Needed.  carisoprodol (SOMA) 350 mg tablet take 1 tablet by mouth three times a day and 1 tablet at bedtime (currently out of Rx)    levocetirizine (XYZAL) 5 mg tablet daily as needed.  ziprasidone (GEODON) 40 mg capsule Take 40 mg by mouth nightly.  montelukast (SINGULAIR) 10 mg tablet Take 10 mg by mouth nightly.  FIBER, PSYLLIUM HUSK, PO Take  by mouth daily as needed.  latanoprost (XALATAN) 0.005 % ophthalmic solution Administer 1 Drop to both eyes nightly. No current facility-administered medications for this visit.       Allergies   Allergen Reactions    Other Medication Hives     seafood    Shellfish Derived Hives          OBJECTIVE:    Physical Exam  VITAL SIGNS: Visit Vitals  LMP 01/31/2008      GENERAL EMILY: in no apparent distress and well developed and well nourished   MUSCULOSKEL: no joint tenderness, deformity or swelling   INTEGUMENT:  warm and dry, no rashes or lesions   ABDOMEN . soft,obese, NT, ND, No masses appreciated, INC: C/D/I    EXTREMITIES: extremities normal, atraumatic, no cyanosis or edema   PELVIC: deferred   RECTAL: deferred   BRY SURVEY: Cervical, supraclavicular, axillary and inguinal nodes normal.   NEURO: Grossly normal         IMPRESSION/PLAN:  1.stage IV Primary peritoneal cancer   -reviewed her pathology   -discussed recommendation for chemotherapy   -reviewed CT and explained now with IV contrast able to see lesion in spleen and liver and appears to be responding to chemo as well as good shrinkage in pelvic mass   -tolerating chemo well. No G3 or G4 toxicity   -ok for cycle #4   -f/u prior to cycle #5   -plan for carbo (auc=6), taxol (175 mg/m2) IV every 3 wks for 6 cycles   -given disease in liver and spleen with good response will continue for 6 cycles and then plan for surgery at that time. If has persistent liver/spleen disease it may require resection  2. Chemo induced pain-Percocet 5/325 PRN as needed for pain.      The total time spent was 40 minutes regarding this patients diagnosis of primary peritoneal cancer and >50% of this time was spent counseling and coordinating care    Vero Tamez MD  Gynecologic Oncology  2/48/027774:25 AM

## 2019-04-25 ENCOUNTER — HOSPITAL ENCOUNTER (OUTPATIENT)
Dept: INFUSION THERAPY | Age: 49
Discharge: HOME | End: 2019-04-25
Payer: MEDICARE

## 2019-04-25 VITALS
RESPIRATION RATE: 18 BRPM | OXYGEN SATURATION: 97 % | DIASTOLIC BLOOD PRESSURE: 87 MMHG | SYSTOLIC BLOOD PRESSURE: 127 MMHG | HEIGHT: 63 IN | BODY MASS INDEX: 44.33 KG/M2 | HEART RATE: 62 BPM | TEMPERATURE: 98.1 F | WEIGHT: 250.2 LBS

## 2019-04-25 DIAGNOSIS — C48.2 MALIGNANT NEOPLASM OF PERITONEUM (HCC): Primary | ICD-10-CM

## 2019-04-25 LAB
ALBUMIN SERPL-MCNC: 3.4 G/DL (ref 3.4–5)
ALBUMIN/GLOB SERPL: 0.9 {RATIO} (ref 0.8–1.7)
ALP SERPL-CCNC: 73 U/L (ref 45–117)
ALT SERPL-CCNC: 24 U/L (ref 13–56)
ANION GAP SERPL CALC-SCNC: 8 MMOL/L (ref 3–18)
APPEARANCE UR: CLEAR
AST SERPL-CCNC: 24 U/L (ref 15–37)
BACTERIA URNS QL MICRO: ABNORMAL /HPF
BASO+EOS+MONOS # BLD AUTO: 0.5 K/UL (ref 0–2.3)
BASO+EOS+MONOS NFR BLD AUTO: 9 % (ref 0.1–17)
BILIRUB SERPL-MCNC: 0.2 MG/DL (ref 0.2–1)
BILIRUB UR QL: NEGATIVE
BUN SERPL-MCNC: 18 MG/DL (ref 7–18)
BUN/CREAT SERPL: 15 (ref 12–20)
CALCIUM SERPL-MCNC: 8.8 MG/DL (ref 8.5–10.1)
CHLORIDE SERPL-SCNC: 107 MMOL/L (ref 100–108)
CO2 SERPL-SCNC: 26 MMOL/L (ref 21–32)
COLOR UR: YELLOW
CREAT SERPL-MCNC: 1.22 MG/DL (ref 0.6–1.3)
DIFFERENTIAL METHOD BLD: ABNORMAL
EPITH CASTS URNS QL MICRO: ABNORMAL /LPF (ref 0–5)
ERYTHROCYTE [DISTWIDTH] IN BLOOD BY AUTOMATED COUNT: 16.2 % (ref 11.5–14.5)
GLOBULIN SER CALC-MCNC: 3.7 G/DL (ref 2–4)
GLUCOSE SERPL-MCNC: 77 MG/DL (ref 74–99)
GLUCOSE UR STRIP.AUTO-MCNC: NEGATIVE MG/DL
HCT VFR BLD AUTO: 34.9 % (ref 36–48)
HGB BLD-MCNC: 11.7 G/DL (ref 12–16)
HGB UR QL STRIP: NEGATIVE
HYALINE CASTS URNS QL MICRO: ABNORMAL /LPF (ref 0–2)
KETONES UR QL STRIP.AUTO: NEGATIVE MG/DL
LEUKOCYTE ESTERASE UR QL STRIP.AUTO: ABNORMAL
LYMPHOCYTES # BLD: 2 K/UL (ref 1.1–5.9)
LYMPHOCYTES NFR BLD: 39 % (ref 14–44)
MAGNESIUM SERPL-MCNC: 2.1 MG/DL (ref 1.6–2.6)
MCH RBC QN AUTO: 29.5 PG (ref 25–35)
MCHC RBC AUTO-ENTMCNC: 33.5 G/DL (ref 31–37)
MCV RBC AUTO: 88.1 FL (ref 78–102)
NEUTS SEG # BLD: 2.7 K/UL (ref 1.8–9.5)
NEUTS SEG NFR BLD: 53 % (ref 40–70)
NITRITE UR QL STRIP.AUTO: NEGATIVE
PH UR STRIP: 6.5 [PH] (ref 5–8)
PLATELET # BLD AUTO: 222 K/UL (ref 140–440)
POTASSIUM SERPL-SCNC: 4.3 MMOL/L (ref 3.5–5.5)
PROT SERPL-MCNC: 7.1 G/DL (ref 6.4–8.2)
PROT UR STRIP-MCNC: 100 MG/DL
RBC # BLD AUTO: 3.96 M/UL (ref 4.1–5.1)
SODIUM SERPL-SCNC: 141 MMOL/L (ref 136–145)
SP GR UR REFRACTOMETRY: 1.01 (ref 1–1.03)
UROBILINOGEN UR QL STRIP.AUTO: 0.2 EU/DL (ref 0.2–1)
WBC # BLD AUTO: 5.2 K/UL (ref 4.5–13)
WBC URNS QL MICRO: ABNORMAL /HPF (ref 0–4)

## 2019-04-25 PROCEDURE — 83735 ASSAY OF MAGNESIUM: CPT

## 2019-04-25 PROCEDURE — 96413 CHEMO IV INFUSION 1 HR: CPT

## 2019-04-25 PROCEDURE — 86304 IMMUNOASSAY TUMOR CA 125: CPT

## 2019-04-25 PROCEDURE — 80053 COMPREHEN METABOLIC PANEL: CPT

## 2019-04-25 PROCEDURE — 96366 THER/PROPH/DIAG IV INF ADDON: CPT

## 2019-04-25 PROCEDURE — 96417 CHEMO IV INFUS EACH ADDL SEQ: CPT

## 2019-04-25 PROCEDURE — 96375 TX/PRO/DX INJ NEW DRUG ADDON: CPT

## 2019-04-25 PROCEDURE — 74011000258 HC RX REV CODE- 258: Performed by: OBSTETRICS & GYNECOLOGY

## 2019-04-25 PROCEDURE — 74011000250 HC RX REV CODE- 250: Performed by: OBSTETRICS & GYNECOLOGY

## 2019-04-25 PROCEDURE — 87086 URINE CULTURE/COLONY COUNT: CPT

## 2019-04-25 PROCEDURE — 96415 CHEMO IV INFUSION ADDL HR: CPT

## 2019-04-25 PROCEDURE — 81001 URINALYSIS AUTO W/SCOPE: CPT

## 2019-04-25 PROCEDURE — 74011250637 HC RX REV CODE- 250/637: Performed by: OBSTETRICS & GYNECOLOGY

## 2019-04-25 PROCEDURE — 77030012965 HC NDL HUBR BBMI -A

## 2019-04-25 PROCEDURE — 85025 COMPLETE CBC W/AUTO DIFF WBC: CPT

## 2019-04-25 PROCEDURE — 96361 HYDRATE IV INFUSION ADD-ON: CPT

## 2019-04-25 PROCEDURE — 74011250636 HC RX REV CODE- 250/636: Performed by: OBSTETRICS & GYNECOLOGY

## 2019-04-25 RX ORDER — PALONOSETRON 0.05 MG/ML
0.25 INJECTION, SOLUTION INTRAVENOUS ONCE
Status: COMPLETED | OUTPATIENT
Start: 2019-04-25 | End: 2019-04-25

## 2019-04-25 RX ORDER — HEPARIN 100 UNIT/ML
300-500 SYRINGE INTRAVENOUS AS NEEDED
Status: DISPENSED | OUTPATIENT
Start: 2019-04-25 | End: 2019-04-25

## 2019-04-25 RX ORDER — DIPHENHYDRAMINE HCL 25 MG
50 CAPSULE ORAL ONCE
Status: COMPLETED | OUTPATIENT
Start: 2019-04-25 | End: 2019-04-25

## 2019-04-25 RX ORDER — SODIUM CHLORIDE 9 MG/ML
500 INJECTION, SOLUTION INTRAVENOUS ONCE
Status: COMPLETED | OUTPATIENT
Start: 2019-04-25 | End: 2019-04-25

## 2019-04-25 RX ORDER — SODIUM CHLORIDE 0.9 % (FLUSH) 0.9 %
10-40 SYRINGE (ML) INJECTION AS NEEDED
Status: DISPENSED | OUTPATIENT
Start: 2019-04-25 | End: 2019-04-25

## 2019-04-25 RX ORDER — LORAZEPAM 2 MG/ML
0.26 INJECTION INTRAMUSCULAR ONCE
Status: COMPLETED | OUTPATIENT
Start: 2019-04-25 | End: 2019-04-25

## 2019-04-25 RX ORDER — SODIUM CHLORIDE 9 MG/ML
250 INJECTION, SOLUTION INTRAVENOUS CONTINUOUS
Status: DISCONTINUED | OUTPATIENT
Start: 2019-04-25 | End: 2019-04-26 | Stop reason: HOSPADM

## 2019-04-25 RX ADMIN — FAMOTIDINE 20 MG: 10 INJECTION, SOLUTION INTRAVENOUS at 09:35

## 2019-04-25 RX ADMIN — SODIUM CHLORIDE 250 ML: 9 INJECTION, SOLUTION INTRAVENOUS at 10:45

## 2019-04-25 RX ADMIN — PALONOSETRON HYDROCHLORIDE 0.25 MG: 0.25 INJECTION, SOLUTION INTRAVENOUS at 09:30

## 2019-04-25 RX ADMIN — Medication 30 ML: at 08:20

## 2019-04-25 RX ADMIN — HEPARIN 500 UNITS: 100 SYRINGE at 15:47

## 2019-04-25 RX ADMIN — SODIUM CHLORIDE 150 MG: 900 INJECTION, SOLUTION INTRAVENOUS at 09:48

## 2019-04-25 RX ADMIN — Medication 10 ML: at 15:46

## 2019-04-25 RX ADMIN — CARBOPLATIN 550 MG: 10 INJECTION, SOLUTION INTRAVENOUS at 14:40

## 2019-04-25 RX ADMIN — DIPHENHYDRAMINE HYDROCHLORIDE 50 MG: 25 CAPSULE ORAL at 09:28

## 2019-04-25 RX ADMIN — SODIUM CHLORIDE 500 ML: 9 INJECTION, SOLUTION INTRAVENOUS at 09:25

## 2019-04-25 RX ADMIN — DEXAMETHASONE SODIUM PHOSPHATE 12 MG: 4 INJECTION, SOLUTION INTRA-ARTICULAR; INTRALESIONAL; INTRAMUSCULAR; INTRAVENOUS; SOFT TISSUE at 10:59

## 2019-04-25 RX ADMIN — PACLITAXEL 360 MG: 6 INJECTION, SOLUTION INTRAVENOUS at 11:35

## 2019-04-25 RX ADMIN — LORAZEPAM 0.26 MG: 2 INJECTION INTRAMUSCULAR; INTRAVENOUS at 09:42

## 2019-04-25 NOTE — PROGRESS NOTES
[facility] Progress Note    Date: 2019    Name: Xavi Bosch              MRN: [id number]              : 1970    Chemotherapy Cycle:4  Paclitaxel/Carboplatin       Pt to [facility], ambulatory, at [address] [place] [address]. No complaints of pain. Patient has slight nausea. Ginger ale provided. Ms. Suki Oquendo was assessed and education was provided. Ms. Cannon's vitals were reviewed. Visit Vitals  /68 (BP 1 Location: Left arm, BP Patient Position: At rest)   Pulse (!) 57   Temp 97.6 °F (36.4 °C)   Resp 16   Ht 5' 2.99\" (1.6 m)   Wt 113.5 kg (250 lb 3.2 oz)   SpO2 99%   BMI 44.34 kg/m²       Right chest mediport accessed with 20 g 1 inch upton needle. Port flushed easily and had brisk blood return. Blood drawn off and sent for CBC, CMP, CA-125 and Magnesium per written orders. Urine specimen sent to lab for UA and culture. Lab results were obtained and reviewed. Recent Results (from the past 12 hour(s))   CBC WITH 3 PART DIFF    Collection Time: 19  8:20 AM   Result Value Ref Range    WBC 5.2 4.5 - 13.0 K/uL    RBC 3.96 (L) 4.10 - 5.10 M/uL    HGB 11.7 (L) 12.0 - 16 g/dL    HCT 34.9 (L) 36 - 48 %    MCV 88.1 78 - 102 FL    MCH 29.5 25.0 - 35.0 PG    MCHC 33.5 31 - 37 g/dL    RDW 16.2 (H) 11.5 - 14.5 %    PLATELET 151 860 - 053 K/uL    NEUTROPHILS 53 40 - 70 %    MIXED CELLS 9 0.1 - 17 %    LYMPHOCYTES 39 14 - 44 %    ABS. NEUTROPHILS 2.7 1.8 - 9.5 K/UL    ABS. MIXED CELLS 0.5 0.0 - 2.3 K/uL    ABS.  LYMPHOCYTES 2.0 1.1 - 5.9 K/UL    DF AUTOMATED     MAGNESIUM    Collection Time: 19  8:20 AM   Result Value Ref Range    Magnesium 2.1 1.6 - 2.6 mg/dL   METABOLIC PANEL, COMPREHENSIVE    Collection Time: 19  8:20 AM   Result Value Ref Range    Sodium 141 136 - 145 mmol/L    Potassium 4.3 3.5 - 5.5 mmol/L    Chloride 107 100 - 108 mmol/L    CO2 26 21 - 32 mmol/L    Anion gap 8 3.0 - 18 mmol/L    Glucose 77 74 - 99 mg/dL    BUN 18 7.0 - 18 MG/DL    Creatinine 1.22 0.6 - 1.3 MG/DL    BUN/Creatinine ratio 15 12 - 20      GFR est AA 57 (L) >60 ml/min/1.73m2    GFR est non-AA 47 (L) >60 ml/min/1.73m2    Calcium 8.8 8.5 - 10.1 MG/DL    Bilirubin, total 0.2 0.2 - 1.0 MG/DL    ALT (SGPT) 24 13 - 56 U/L    AST (SGOT) 24 15 - 37 U/L    Alk. phosphatase 73 45 - 117 U/L    Protein, total 7.1 6.4 - 8.2 g/dL    Albumin 3.4 3.4 - 5.0 g/dL    Globulin 3.7 2.0 - 4.0 g/dL    A-G Ratio 0.9 0.8 - 1.7     URINALYSIS W/ RFLX MICROSCOPIC    Collection Time: 04/25/19  8:30 AM   Result Value Ref Range    Color YELLOW      Appearance CLEAR      Specific gravity 1.009 1.005 - 1.030      pH (UA) 6.5 5.0 - 8.0      Protein 100 (A) NEG mg/dL    Glucose NEGATIVE  NEG mg/dL    Ketone NEGATIVE  NEG mg/dL    Bilirubin NEGATIVE  NEG      Blood NEGATIVE  NEG      Urobilinogen 0.2 0.2 - 1.0 EU/dL    Nitrites NEGATIVE  NEG      Leukocyte Esterase TRACE (A) NEG     URINE MICROSCOPIC ONLY    Collection Time: 04/25/19  8:30 AM   Result Value Ref Range    WBC 1 to 3 0 - 4 /hpf    Epithelial cells FEW 0 - 5 /lpf    Bacteria FEW (A) NEG /hpf    Hyaline cast 0 to 1 0 - 2 /lpf       Lab results within ordered parameters to give chemo today. Chemo dosages verified with today's BSA and found to be within 10% of ordered dosages. Normal saline 500 ml bag given over one hour, followed by N.S flush bag      Pre-medications (Emend, Benadryl, Aloxi, Decadron, Pepcid, Ativan) were administered as ordered and chemotherapy was initiated after blood return from port re-verified. Reviewed expected side effects of premeds with patient. Taxol 360 mg infused over 3 hours as ordered. VS stable at end of infusion and pt denied complaints. Line flushed with NS and blood return from port re-verified. Carboplatin 550 mg (dose reduced today from 620 mg) infused over 1 hour as ordered. VS stable at end of infusion and pt denied complaints. Line flushed with NS and blood return from port re-verified.     Patient Vitals for the past 12 hrs:   Temp Pulse Resp BP SpO2 04/25/19 1542 98.1 °F (36.7 °C) 62 18 127/87 97 %   04/25/19 1429 -- 61 16 107/70 --   04/25/19 1305 -- 60 16 118/68 --   04/25/19 0805 97.6 °F (36.4 °C) (!) 57 18 123/83 99 %       Ms. Cannon tolerated infusion, and had no complaints. Mediport flushed with NS 20 ml and Heparin 500 units then de-accessed. No irritation or bleeding noted. Bandaid applied. Patient armband removed and shredded. Ms. Charanjit Armstrong was discharged from [place] [address] in stable condition at 1550. She is to return on 5/2/19 at 1000 for her next Hydration appointment.     Arthur Trejo RN  April 25, 2019

## 2019-04-26 ENCOUNTER — TELEPHONE (OUTPATIENT)
Dept: ONCOLOGY | Age: 49
End: 2019-04-26

## 2019-04-26 LAB
BACTERIA SPEC CULT: NORMAL
CANCER AG125 SERPL-ACNC: 8.7 U/ML (ref 0–38.1)
SERVICE CMNT-IMP: NORMAL

## 2019-04-26 NOTE — TELEPHONE ENCOUNTER
Explained to patient that she needs to see Dr. Lavelle Oshea prior to the start of each chemotherapy cycle. Explained that her 05/08/2019 appointment is prior to her cycle 5 chemo on 5/16/19 and her appointment on 05/29/19 is prior to her cycle 6 chemo on 06/6/2019. Also explained to patient that her CT scan will be done after cycle 6. Patient verbalized understanding. Patient agreed to plan. Patient instructed to contact office for any question or concerns.

## 2019-04-26 NOTE — TELEPHONE ENCOUNTER
Patient contacted the office stating that her May calendar has an appointment on 5/8 and 5/29, she is asking if that is correct and she needs to see him twice. She also wants to know when her CT will be that Dr. Livia Cao mentioned at her last visit. Please call her back at [phone].

## 2019-05-02 ENCOUNTER — HOSPITAL ENCOUNTER (OUTPATIENT)
Dept: INFUSION THERAPY | Age: 49
Discharge: HOME | End: 2019-05-02
Payer: MEDICARE

## 2019-05-02 VITALS — RESPIRATION RATE: 18 BRPM | TEMPERATURE: 98.2 F | OXYGEN SATURATION: 98 % | HEART RATE: 65 BPM

## 2019-05-02 PROCEDURE — 96360 HYDRATION IV INFUSION INIT: CPT

## 2019-05-02 PROCEDURE — 74011250636 HC RX REV CODE- 250/636: Performed by: OBSTETRICS & GYNECOLOGY

## 2019-05-02 PROCEDURE — 96361 HYDRATE IV INFUSION ADD-ON: CPT

## 2019-05-02 PROCEDURE — 77030012965 HC NDL HUBR BBMI -A

## 2019-05-02 PROCEDURE — 74011250636 HC RX REV CODE- 250/636

## 2019-05-02 RX ORDER — SODIUM CHLORIDE 9 MG/ML
1000 INJECTION, SOLUTION INTRAVENOUS CONTINUOUS
Status: DISCONTINUED | OUTPATIENT
Start: 2019-05-02 | End: 2019-05-03 | Stop reason: HOSPADM

## 2019-05-02 RX ORDER — HEPARIN 100 UNIT/ML
500 SYRINGE INTRAVENOUS AS NEEDED
Status: DISCONTINUED | OUTPATIENT
Start: 2019-05-02 | End: 2019-05-06 | Stop reason: HOSPADM

## 2019-05-02 RX ORDER — SODIUM CHLORIDE 0.9 % (FLUSH) 0.9 %
10-40 SYRINGE (ML) INJECTION AS NEEDED
Status: DISCONTINUED | OUTPATIENT
Start: 2019-05-02 | End: 2019-05-06 | Stop reason: HOSPADM

## 2019-05-02 RX ORDER — HEPARIN 100 UNIT/ML
SYRINGE INTRAVENOUS
Status: COMPLETED
Start: 2019-05-02 | End: 2019-05-02

## 2019-05-02 RX ADMIN — Medication 10 ML: at 09:55

## 2019-05-02 RX ADMIN — HEPARIN 500 UNITS: 100 SYRINGE at 12:00

## 2019-05-02 RX ADMIN — SODIUM CHLORIDE 1000 ML: 9 INJECTION, SOLUTION INTRAVENOUS at 09:56

## 2019-05-02 RX ADMIN — Medication 500 UNITS: at 12:00

## 2019-05-02 RX ADMIN — Medication 10 ML: at 12:00

## 2019-05-02 NOTE — PROGRESS NOTES
[facility] Progress Note Date: May 2, 2019 Name: Argelia Geiger MRN: [id number] : 1970 Hydration Ms. Cannon to [facility], ambulatory, at 9795. No complaints or concerns voiced. Pt was assessed and education was provided. Ms. Cannon's vitals were reviewed. Visit Vitals Pulse 65 Temp 98.2 °F (36.8 °C) Resp 18 SpO2 98% Breastfeeding? No  
 
 
Right chest mediport was accessed with 20 gauge 1 inch upton(s) after chloroprep. Port flushed easily and had brisk blood return. 1L NS infused over 2 hours. Mediport flushed with NS 20 ml and Heparin 500 units then de-accessed. No irritation or bleeding noted. Band-Aid applied. Patient Vitals for the past 8 hrs: 
 Temp Pulse Resp SpO2  
19 0948 98.2 °F (36.8 °C) 65 18 98 % Ms. Cannon tolerated the procedure, and had no complaints. Patient armband removed and shredded. Ms. Yamilet Sahni was discharged from [place] [address] in stable condition at 1205. She is to return on 19 at 1000 for her next appointment. Precious Natarajan RN May 2, 2019 
1215

## 2019-05-08 ENCOUNTER — TELEPHONE (OUTPATIENT)
Dept: ONCOLOGY | Age: 49
End: 2019-05-08

## 2019-05-08 NOTE — TELEPHONE ENCOUNTER
Patient contacted the office stating she will be unable to come to her appointment today due to a mix up in transportation but she will be at her next chemo follow up appointment on 5/22/19.

## 2019-05-08 NOTE — TELEPHONE ENCOUNTER
Returned call to patient, explained need for appt prior to each treatment, and that Dr. Tasha Joe is not at [facility] for clinic until 5/22. Offered patient 5/10 or 5/14, patient prefers 5/10, address provided. She states that transportation may call to verify urgency of appt, and I explained we will certainly confirm. Appt scheduled for 5/10 at 1045.

## 2019-05-09 ENCOUNTER — HOSPITAL ENCOUNTER (OUTPATIENT)
Dept: INFUSION THERAPY | Age: 49
Discharge: HOME | End: 2019-05-09
Payer: MEDICARE

## 2019-05-09 VITALS
HEART RATE: 65 BPM | SYSTOLIC BLOOD PRESSURE: 123 MMHG | TEMPERATURE: 97.5 F | OXYGEN SATURATION: 99 % | RESPIRATION RATE: 18 BRPM | DIASTOLIC BLOOD PRESSURE: 75 MMHG

## 2019-05-09 PROCEDURE — 77030012965 HC NDL HUBR BBMI -A

## 2019-05-09 PROCEDURE — 96360 HYDRATION IV INFUSION INIT: CPT

## 2019-05-09 PROCEDURE — 74011250636 HC RX REV CODE- 250/636: Performed by: INTERNAL MEDICINE

## 2019-05-09 PROCEDURE — 74011250636 HC RX REV CODE- 250/636: Performed by: OBSTETRICS & GYNECOLOGY

## 2019-05-09 PROCEDURE — 96361 HYDRATE IV INFUSION ADD-ON: CPT

## 2019-05-09 RX ORDER — SODIUM CHLORIDE 9 MG/ML
1000 INJECTION, SOLUTION INTRAVENOUS CONTINUOUS
Status: DISCONTINUED | OUTPATIENT
Start: 2019-05-09 | End: 2019-05-10 | Stop reason: HOSPADM

## 2019-05-09 RX ORDER — SODIUM CHLORIDE 0.9 % (FLUSH) 0.9 %
10-40 SYRINGE (ML) INJECTION AS NEEDED
Status: DISCONTINUED | OUTPATIENT
Start: 2019-05-09 | End: 2019-05-13 | Stop reason: HOSPADM

## 2019-05-09 RX ORDER — HEPARIN 100 UNIT/ML
500 SYRINGE INTRAVENOUS AS NEEDED
Status: DISCONTINUED | OUTPATIENT
Start: 2019-05-09 | End: 2019-05-13 | Stop reason: HOSPADM

## 2019-05-09 RX ADMIN — Medication 20 ML: at 12:25

## 2019-05-09 RX ADMIN — SODIUM CHLORIDE 1000 ML: 9 INJECTION, SOLUTION INTRAVENOUS at 10:20

## 2019-05-09 RX ADMIN — SODIUM CHLORIDE, PRESERVATIVE FREE 500 UNITS: 5 INJECTION INTRAVENOUS at 12:25

## 2019-05-09 NOTE — PROGRESS NOTES
[facility] Progress Note Date: May 9, 2019 Name: Pina No MRN: [id number] : 1970 Hydration Ms. Cannon to [place], ambulatory, at 1000. No complaints or concerns voiced. Pt was assessed and education was provided. Ms. Cannon's vitals were reviewed. Visit Vitals /75 (BP 1 Location: Right arm, BP Patient Position: Sitting) Pulse 65 Temp 97.5 °F (36.4 °C) Resp 18 SpO2 99% Right chest mediport was accessed with 20 gauge 1 inch upton(s) after chloroprep. Port flushed easily and had brisk blood return. 1L NS infused over 2 hours as ordered. Mediport flushed with NS 20 ml and Heparin 500 units then de-accessed. No irritation or bleeding noted. Band-Aid applied. Patient Vitals for the past 8 hrs: 
 Temp Pulse Resp BP SpO2  
19 1225 97.5 °F (36.4 °C) 65 18 123/75 99 % 19 1010 97.9 °F (36.6 °C) 61 18 117/70 98 % Ms. Cannon tolerated the procedure, and had no complaints. Patient armband removed and shredded. Ms. Kathy Dumont was discharged from [place] [address] in stable condition at 1230. She is to return on  for her next appointment for Chemo. Felix Perez RN May 9, 2019 
0852

## 2019-05-10 ENCOUNTER — OFFICE VISIT (OUTPATIENT)
Dept: ONCOLOGY | Age: 49
End: 2019-05-10

## 2019-05-10 VITALS
OXYGEN SATURATION: 98 % | HEIGHT: 63 IN | BODY MASS INDEX: 45.18 KG/M2 | RESPIRATION RATE: 16 BRPM | DIASTOLIC BLOOD PRESSURE: 85 MMHG | TEMPERATURE: 98.1 F | WEIGHT: 255 LBS | SYSTOLIC BLOOD PRESSURE: 142 MMHG | HEART RATE: 65 BPM

## 2019-05-10 DIAGNOSIS — G89.3 CANCER ASSOCIATED PAIN: Primary | ICD-10-CM

## 2019-05-10 DIAGNOSIS — C48.2 PERITONEAL CARCINOMA (HCC): ICD-10-CM

## 2019-05-10 RX ORDER — OXYCODONE AND ACETAMINOPHEN 5; 325 MG/1; MG/1
1 TABLET ORAL
Qty: 30 TAB | Refills: 0 | Status: SHIPPED | OUTPATIENT
Start: 2019-05-10 | End: 2019-05-24

## 2019-05-10 NOTE — LETTER
5/10/19 Patient: Mingo Heading YOB: 1970 Date of Visit: 5/10/2019 Gildardo Claire, [address] [address] [place] [address] VIA In Basket Dear Gildardo Claire MD, Thank you for referring Ms. Devin Kidd to [address] [place] [address] for evaluation. My notes for this consultation are attached. If you have questions, please do not hesitate to call me. I look forward to following your patient along with you. Sincerely, Shameka Archuleta MD

## 2019-05-10 NOTE — PROGRESS NOTES
Justin Dillon, a 51 y.o. female,  is here for   Chief Complaint   Patient presents with    Chemotherapy     follow up       Visit Vitals  /85 (BP 1 Location: Right arm, BP Patient Position: Sitting)   Pulse 65   Temp 98.1 °F (36.7 °C) (Oral)   Resp 16   Ht 5' 2.99\" (1.6 m)   Wt 115.7 kg (255 lb)   SpO2 98%   BMI 45.19 kg/m²       Patient reports fatigue, \" I don't have any energy. \"    1. Have you been to the ER, urgent care clinic since your last visit? Hospitalized since your last visit? No    2. Have you seen or consulted any other health care providers outside of the [address] [place] [address] since your last visit? Include any pap smears or colon screening.  No

## 2019-05-10 NOTE — PATIENT INSTRUCTIONS
Learning About Chemotherapy Side Effects and Safety  What is chemotherapy? Chemotherapy uses medicines to kill cancer cells. It's often called \"chemo. \" Chemo may slow cancer growth, stop cancer from spreading, or help get rid of the cancer. Chemo can be given at different locations, such as a hospital, a doctor's office, or a clinic. Sometimes chemo treatments may be done at home. You may get chemo in \"cycles. \" This means that you get a number of treatments over a set period of time. Then you take a break before you start again. Chemo helps to treat many kinds of cancer. But it can also affect healthy cells along with the cancer cells. This is why some types of chemo cause side effects, like nausea, losing your hair, or feeling tired. What are the possible side effects of chemotherapy? Side effects depend on which medicines you take, how much you take, and how the medicines affect you. Your doctor can tell you what to expect when you take these medicines. Some of them may cause symptoms such as:  · Fatigue. · Nausea. · Vomiting. · A rash. · Hair loss. · Pain or tingling in your hands or the soles of your feet. Chemo treatment can be hard. It can keep you from doing the things you were doing every day, like going to work or school. But keep in mind that most side effects don't last. They will go away after you finish the treatment. And many side effects can be managed with medicine. Your doctor will tell you what to do if you have side effects. Sherley Bring also learn which ones you need to tell your doctor about right away. How can you keep yourself and your family safe? If you take chemo at home, take steps to protect your family. Keep your medicine in a locked cabinet if you can. Or keep it on a high shelf out of the reach of children. Make sure the containers have childproof lids.   IV chemo treatments  Chemo medicines that you get in your vein through an IV can stay in your body fluids (vomit, urine, or stool) for several days. Some medicines are radioactive, so they can be harmful if someone touches waste from your body. If your medicine is radioactive, any of your clothes or bed linens or cloth diapers that have medicine or body fluids on them need to be handled separately from other household laundry. Have caregivers use gloves when they wash your bed linens and clothes. Bed linens and cloth diapers should be machine washed twice in hot water, using regular detergent. For the first 48 hours after each treatment:  · Sit on the toilet seat to prevent splashing. · Put the toilet lid down before you flush. · Always flush twice after you use the toilet. Couples should use a condom during sex while a partner is getting chemo treatments and for several days after treatment ends. Follow-up care is a key part of your treatment and safety. Be sure to make and go to all appointments, and call your doctor if you are having problems. It's also a good idea to know your test results and keep a list of the medicines you take. Where can you learn more? Go to http://raul-vee.info/. Enter 91 21 06 in the search box to learn more about \"Learning About Chemotherapy Side Effects and Safety. \"  Current as of: March 27, 2018  Content Version: 11.9  © 9307-2478 [employer]. Care instructions adapted under license by [employer] (which disclaims liability or warranty for this information). If you have questions about a medical condition or this instruction, always ask your healthcare professional. [place] [address] any warranty or liability for your use of this information.

## 2019-05-10 NOTE — PROGRESS NOTES
[facility]  [address] [place] [address], [address], [address]  [address] [facility], [address] [facility]  [place], [address]   [phone]  Grant Arthur DO      Patient ID:  Name:  Hang Pittman  MRN:  [id number]  :  1970/48 y.o. Date:  5/10/2019      HISTORY OF PRESENT ILLNESS:  Hang Pittman is a 51 y.o.  postmenopausal female referred by Dr. Denia Tello  With peritoneal cancer. Intitally seen be NP with reports of vaginal bleeding. Given pt's history of hysteretectomy with BSO for endometriosis in  a TVUS was ordered. Which revealed a 6.8 cm x 5.2 cm x 4.6 cm at midline vaginal cuff. This prompted pelvic CT with findings of a lesion measuring 7.6 x 5.8 x 7.2 cm and is compatible with a pelvic neoplasm vs endometrioma given prior history of endometriosis. Tumor markers done on 2018 all normal.  S/p  Exploratory laparotomy, exploration of retroperitoneal spaces, exam under anesthesia with biopsy of rectovaginal mass on 2019. Had sigmoidoscopy which revealed submucosal mass. S/p cycle #4 of carbo/taxol on 2019. Admits to fatigue. Numbness and tingling after chemo, improved currently. No n/v. Pain after chemo. Labs:  Component      Latest Ref Rng & Units 2019           8:20 AM   Cancer Ag (CA) 125      0.0 - 38.1 U/mL 8.7     Component      Latest Ref Rng & Units 2019           8:28 AM   Cancer Ag (CA) 125      0.0 - 38.1 U/mL 8.8     Component      Latest Ref Rng & Units 3/14/2019 2019           8:15 AM  8:32 AM   Cancer Ag (CA) 125      0.0 - 38.1 U/mL 10.4 18.4     2018 : 9.3  2018 CEA: 2.0  2018 AFP: 3.8    Pathology  2019   RECTOVAGINAL MASS (#1, 2) AND PELVIC MASS, BIOPSIES:   CONSISTENT WITH SEROUS CARCINOMA WITH EXTENSIVE NECROSIS.      Imaging  CT abdomen/pelvis  FINDINGS:  view shows lung bases clear and normal bowel gas pattern. Patient morbidly obese but probably with interval weight loss.     CT Abdomen and pelvis:     Lung bases: Normal.     Heart and pericardium: There is a catheter tip in the right atrium of the heart. Heart and pericardium otherwise unremarkable.     Liver: Decrease size of lesion in segment 7 of the liver adjacent IVC now  measuring 11 x 15 mm and on PET/CT 2/14/2019 it measured 23 mm. No new liver  lesions.     Gallbladder: Normal.     Spleen: Mass inferiorly in the spleen near the hilum measures 23 x 24 mm,  probably present previously but not as well seen because of lack of intravenous  contrast and measuring about 27 mm (29).    Pancreas: Normal.     Adrenal glands: Normal.     Kidneys: Enhancing symmetrically. No focal lesions.     The bowel: Stomach and duodenum and small bowel and the appendix and colon are  normal.     GYN: Prior hysterectomy. No adnexal masses.     Peritoneal space: No free fluid. There is some mild fat stranding in the left  lower quadrant but no discrete mass (60) and probably related to the mesentery.     MSK: Abdominal wall scar lower abdominal wall. Multiple levels facet arthropathy  lower lumbar spine. Moderate disc space narrowing L5/S1 and L4/L5. No suspicious  skeletal lesions.     Retroperitoneum: Soft tissue mass right side pelvis has decreased in size. It  measures 2.8 x 3.2 cm. On previous PET/CT it measured 8.3 x 5.3 cm and on CT it  measured 7.4 x 5.2 cm.     IMPRESSION  IMPRESSION:     Overall improvement compatible with response to therapy. Decreased size of the  pelvic metastasis, hepatic metastasis, splenic lesion since the prior study. No  new metastases. MRI 2/19/2019  FINDINGS:  Presumed hysterectomy. There is a lobulated 6.8 x 6 x 5.9 cm mass centered at  the right cul-de-sac involving the right and posterior upper to mid vagina but  also abutting the right anterior rectum from 1-8 o'clock.  On coronal and  sagittal views tumor is favored to not be originating from the rectum but this  is not definite. There is a oval ring of T2 hypointensity and T1 hyperintensity  with central necrosis within the mass. The mass extends to the posterior medial  right mesorectal fascia. The mass does not invade the bladder. At the cranial  aspect of the mass is a spiculated 3.4 x 2 cm T2 hypointense structure with  tethering to adjacent bowel and fascia. Ovaries are not visualized separately. No pelvic ascites.     Bowel is otherwise normal in caliber. No upstream dilation. Bladder is normal.  No lymphadenopathy.     Postsurgical changes anterior midline pelvic wall. Bones are grossly  unremarkable.     IMPRESSION  IMPRESSION:    1. Large up to 6.8 cm mass centered at the right cul-de-sac  is most  intimately associated with the vagina, favored to be either endometrial, vaginal  or cervical in origin. It does abut and may involve the right rectum but this is  favored secondary involvement. Ovaries are not visualized separately. Correlate  for history of oophorectomy since ovarian malignancy also possible. 2.  There is an associated site of favored endometriotic implant at the cranial  aspect of the mass raising the possibility of malignant transformation. 3.  No upstream bowel obstruction. PETCT 2/14/2019   --  PET FINDINGS  --    No abnormal hypermetabolic activity in the neck. No abnormal hypermetabolic activity in the chest.      Low-attenuation possible lesion in the medial dome right lobe liver in region  of heterogeneous hypermetabolic activity, with SUV Max reaching 11.7 on image  98. Underlying lesion difficult to visualize due to extensive motion from  respirations. Cannot well separate from the IVC or diaphragm. No definite  corresponding lung lesion however.     There is low level metabolic activity associated with stranding and presumed  scarring in the subcutaneous fat of the lower midline abdominal and pelvic wall. This may simply be inflammatory. Chance Mancera There is a lobulated soft tissue conglomerate in the midline to right lateral  cul-de-sac and perirectal pelvis. The periphery of this is diffusely  hypermetabolic, with SUV Max reaching 18.3 on image 200. Portions centrally are  photopenic and presumed necrotic. This measures up to 7.5 x 6.1 cm axial on  image 196. Anterior margin of this hypermetabolic lesion is in direct contact  with the high intensity decompressed urinary bladder, involvement cannot be  assessed. Left lateral margin of the mass is in contact with the surface of the  lateral wall of the sigmoid colon. Direct involvement cannot be assessed.  -Additional focal hypermetabolic density 2.7 cm on image 189 inseparable from  the caudal wall of the sigmoid. -Tapering hypermetabolic activity extends to approximately image 210, not to the  level of the perineum.     There are no other areas of abnormal metabolic activity appreciated in the  abdomen or pelvis which cannot be attributed to renal collecting system, ureter,  bladder or bowel wall activity>]. No definite hypermetabolic bone lesions appreciated, although bone scan can be  more sensitive in this respect. --  CT FINDINGS  --     These limited CT images do not replace diagnostic quality contrast enhanced CT  scan for evaluation of solid organs, bowel, retroperitoneum and vasculature. CT NECK:    Severely limited by lack of intravenous contrast.  Paranasal sinuses free of  disease. No appreciably enlarged lymph nodes. Extensive cervical endplate  osteophytes anterior and left lateral..      CT CHEST:    Limited evaluation of the hilum and vasculature without intravenous contrast.  No pleural effusion. No appreciably enlarged lymph nodes. Patchy parenchymal  opacities medial basal left lower lobe on image 89 demonstrates low level  metabolic activity, SUV Max 3.2, nonspecific, favored as inflammatory based on  imaging appearance subjectively. Milan Monsivais         CT ABDOMEN:    Limited noncontrast images. Normal-size liver and spleen no adrenal mass. No  hydronephrosis. No ascites. CT PELVIS:    Limited noncontrast images. No small bowel or colon distention. Scattered  colonic diverticulosis. Postsurgical change anterior abdominal wall. Severe  degenerative facet disease at the lower lumbar levels.       Lobulated right pelvic/cul-de-sac soft tissue mass as above. Mild degree of  stranding in the pelvic mesenteric fat. No ascites.        IMPRESSION   IMPRESSION:      1. Peripherally hypermetabolic soft tissue mass right dependent pelvis to the  cul-de-sac region as above consistent with malignancy/metastatic disease with  some central necrosis  -Possible separate lesion versus serosal involvement of the sigmoid colon  adjacent. -Mass in contact with posterior bladder, sigmoid colon locally and posterior  peritoneal surface.     2. Hypermetabolic lesion along central dome of the right lobe liver difficult to  separate from IVC or diaphragm due to motion artifact. Recommend dedicated  contrast enhanced CT for localization and better clarification.  -Findings are concerning for malignancy or hepatic metastatic disease until  proven otherwise.      3. Low level activity associated with patchy parenchymal opacity at the medial  left lower lobe,, favored as inflammatory as above.     4. No other definite hypermetabolic abnormalities appreciated elsewhere. 12/14/2018 CT PELVIS W/CONTRAST  FINDINGS:    LYMPH NODES: No enlarged lymph nodes. PELVIC GASTROINTESTINAL TRACT: No bowel dilation or wall thickening. PELVIC ORGANS:     The uterus is absent. Along the right upper margin of the vaginal cuff within the right deep pelvis there is a large irregularly-shaped peripherally enhancing, septated appearing mass with regions of central low attenuation which could be low density-fluid type contents or regions of necrosis.  Lesion measures 76 x 58 x 72 mm and is compatible with a pelvic neoplasm. VASCULATURE: Unremarkable. BONES: Lower lumbar hypertrophic osteophytes. Lower lumbar facet hypertrophy with vacuum phenomenon asymmetric leftward. Degenerative vacuum phenomenon within the SI joints noted as well. No acute or aggressive osseous abnormalities identified. OTHER: Small fat-containing ventral umbilical hernia. Minimal nonspecific edema within the subcutaneous fat of the lumbar region, not uncommon. IMPRESSION:   1.  Along the right upper margin of the vaginal cuff within the right deep pelvis there is a large irregularly-shaped peripherally enhancing, septated appearing mass with regions of central low attenuation which could be low density-fluid type contents or regions of necrosis. Lesion measures 76 x 58 x 72 mm and is compatible with a pelvic neoplasm. Could be a endometrioma given prior history of endometriosis, malignancy not excluded and gynecologic oncology follow-up is suggested. 2.  Small fat-containing ventral umbilical hernia.      12/04/2018 TVUS  Uterus: surgically absent  Left ovary: surgically absent  Right ovary: surgically absent  Other findings: midline-at vaginal cuff: 6.8 cm X 5.2 cm x 4.6 cm, volume 85 ml    Impression: Complex pelvic mass       ROS:    As above      Patient Active Problem List    Diagnosis Date Noted    Malignant neoplasm of peritoneum ([address] 02/14/2019    Pelvic mass in female 01/17/2019    Bipolar 1 disorder ([address] 02/09/2018    Irritable bowel syndrome with both constipation and diarrhea 02/09/2018    Advance care planning 01/31/2017    Dental caries 05/26/2016    Chronic periodontal disease 05/26/2016    SUAZO (dyspnea on exertion) 02/10/2016    Prediabetes 09/24/2015    Morbid obesity ([address] 08/31/2015    Arthritis, degenerative 08/31/2015    Gastroesophageal reflux disease without esophagitis 08/31/2015    ANAMIKA on CPAP 08/31/2015    Essential hypertension 08/28/2015    PTSD (post-traumatic stress disorder) 03/24/2014    S/P TKR (total knee replacement) 11/14/2012    Depression 05/08/2012    Menopause 05/08/2012    Knee pain, right 05/08/2012    GERD (gastroesophageal reflux disease) 05/08/2012    OA (osteoarthritis) 06/18/2010    Obesity 06/18/2010    Mixed hyperlipidemia 06/18/2010     Past Medical History:   Diagnosis Date    AR (allergic rhinitis)     seasonal    Cancer ([address]     pt states between vgina and rectal area    Cardiac echocardiogram 04/11/2016    Tech difficult. EF 55-60%. No RWMA. Mild conc LVH. Gr 1 DDfx. RVSP normal.      Cardiac nuclear imaging test 05/05/2016    Low risk. Very patchy radiotracer uptake. Mild anterior artifact, low suspicion for ischemia. EF 68%. No RWMA. Normal EKG on pharm stress test.    Cardiovascular LE arterial duplex 10/07/2013    No significant arterial disease at rest bilaterally. R JUNE 1.21.  L JUNE 1.16. No significant sm vessel disease.     Depression     Dr. Pili Painter, suicide attempt 2/12    Diverticulosis     Endometriosis     s/p hysterectomy 2006    GERD (gastroesophageal reflux disease)     Glaucoma     Dr. Peggy Phipps HTN (hypertension)     Hx of colonoscopy 04/05/2017    mild diverticulosis, g1 internal hemorrhoids, normal colon , repeat 10 year 2027    Hx of suicide attempt     Hyperlipidemia LDL goal < 130     IBS (irritable bowel syndrome)     Ill-defined condition     environmental allergies    Insomnia     Malignant neoplasm of per[address]um ([facility] 2/14/2019    Nausea & vomiting     OA (osteoarthritis)     both knees, lower back    Preeclampsia     PTSD (post-traumatic stress disorder)     Seizures ([facility] [address]     1 x with childbirth, had toxemia    Sleep apnea     does not use cpap machine    Spinal stenosis     Dr. Ernesto Olivera      Past Surgical History:   Procedure Laterality Date    COLONOSCOPY N/A 4/5/2017    [facility]    External fixator    FLEXIBLE SIGMOIDOSCOPY N/A 2019    SIGMOIDOSCOPY FLEXIBLE performed by Stevo Delcid MD at [facility] ENDOSCOPY    HX  SECTION      HX COLONOSCOPY  2017    DR. MCRAE 2017     HX HYSTERECTOMY      HX KNEE ARTHROSCOPY Left     left knee    HX KNEE REPLACEMENT Bilateral     (R)  (L)     HX LAPAROTOMY N/A 2019    and rectovaginal bx    HX OTHER SURGICAL  2016    all teeth removed    HX SALPINGO-OOPHORECTOMY  2008    HX TUBAL LIGATION      IR INSERT TUNL CVC W PORT OVER 5 YEARS  2019    MULTIPLE DELIVERY       1990    TOTAL ABDOM HYSTERECTOMY        OB History        2    Para   2    Term   2       0    AB   0    Living   0       SAB   0    TAB   0    Ectopic   0    Molar   0    Multiple   0    Live Births   0              Social History     Tobacco Use    Smoking status: Never Smoker    Smokeless tobacco: Never Used   Substance Use Topics    Alcohol use: No      Family History   Problem Relation Age of Onset    Heart Disease Mother         CHF    Diabetes Mother     Hypertension Mother     Kidney Disease Mother     Breast Cancer Mother     Stroke Mother     Diabetes Father     Hypertension Father     Heart Attack Father         MI    Cancer Maternal Grandmother         stomach    Ovarian Cancer Maternal Aunt     Cancer Maternal Uncle       Current Outpatient Medications   Medication Sig    promethazine (PHENERGAN) 25 mg tablet Take 1 Tab by mouth every six (6) hours as needed for Nausea. Indications: chemo induced nausea/vomiting    dimethicone (SOOTHE AND COOL INZO BARRIER) 5 % topical cream Apply  to affected area two (2) times a day.  docusate sodium (COLACE) 100 mg capsule Take 100 mg by mouth two (2) times a day.  amLODIPine (NORVASC) 10 mg tablet take 1 tablet by mouth once daily    lidocaine-prilocaine (EMLA) topical cream Apply  to affected area as needed for Pain.     polyethylene glycol (MIRALAX) 17 gram packet Take 1 Packet by mouth daily.  cyanocobalamin (VITAMIN B-12) 1,000 mcg tablet Take 1,000 mcg by mouth daily.  cycloSPORINE (RESTASIS) 0.05 % dpet Apply 1 Drop to eye as needed.  lubiPROStone (AMITIZA) 24 mcg capsule 1 Cap by Mouth/Throat route as needed.  clotrimazole-betamethasone (LOTRISONE) topical cream Apply to affected area twice daily    simvastatin (ZOCOR) 20 mg tablet take 1 tablet by mouth at bedtime    metoprolol succinate (TOPROL-XL) 100 mg tablet take 1 tablet by mouth once daily    losartan (COZAAR) 100 mg tablet take 1 tablet by mouth once daily    dexamethasone (DECADRON) 4 mg tablet Take 40 mg by mouth as needed. Take 10 tabs the night before Chemo #1 and Chemo #2.  ondansetron hcl (ZOFRAN) 4 mg tablet Take 1 Tab by mouth every six (6) hours as needed for Nausea.  melatonin 3 mg tablet Take 3 mg by mouth nightly.  plecanatide (TRULANCE) 3 mg tab Take  by mouth.  OXcarbazepine (TRILEPTAL) 300 mg tablet Take 300 mg by mouth two (2) times a day.  LORazepam (ATIVAN) 0.5 mg tablet Take 0.5 mg by mouth two (2) times daily as needed.  hydrOXYzine pamoate (VISTARIL) 50 mg capsule 2 Caps 2 Times Daily As Needed.  carisoprodol (SOMA) 350 mg tablet take 1 tablet by mouth three times a day and 1 tablet at bedtime (currently out of Rx)    levocetirizine (XYZAL) 5 mg tablet daily as needed.  ziprasidone (GEODON) 40 mg capsule Take 40 mg by mouth nightly.  montelukast (SINGULAIR) 10 mg tablet Take 10 mg by mouth nightly.  FIBER, PSYLLIUM HUSK, PO Take  by mouth daily as needed.  latanoprost (XALATAN) 0.005 % ophthalmic solution Administer 1 Drop to both eyes nightly. No current facility-administered medications for this visit.       Facility-Administered Medications Ordered in Other Visits   Medication Dose Route Frequency    0.9% sodium chloride infusion 1,000 mL  1,000 mL IntraVENous CONTINUOUS    heparin (porcine) pf 500 Units  500 Units InterCATHeter PRN    sodium chloride (NS) flush 10-40 mL  10-40 mL IntraVENous PRN     Allergies   Allergen Reactions    Other Medication Hives     seafood    Shellfish Derived Hives          OBJECTIVE:    Physical Exam  VITAL SIGNS: Visit Vitals  LMP 01/31/2008      GENERAL EMILY: in no apparent distress and well developed and well nourished   MUSCULOSKEL: no joint tenderness, deformity or swelling   INTEGUMENT:  warm and dry, no rashes or lesions   ABDOMEN . soft,obese, NT, ND, No masses appreciated, INC: C/D/I    EXTREMITIES: extremities normal, atraumatic, no cyanosis or edema   PELVIC: deferred   RECTAL: deferred   BRY SURVEY: Cervical, supraclavicular, axillary and inguinal nodes normal.   NEURO: Grossly normal         IMPRESSION/PLAN:  1.stage IV Primary peritoneal cancer   -reviewed her pathology   -discussed recommendation for chemotherapy   -reviewed CT and explained now with IV contrast able to see lesion in spleen and liver and appears to be responding to chemo as well as good shrinkage in pelvic mass   -tolerating chemo well. No G3 or G4 toxicity   -ok for cycle #5   -f/u prior to cycle #6   -plan for carbo (auc=6), taxol (175 mg/m2) IV every 3 wks for 6 cycles   -given disease in liver and spleen with good response will continue for 6 cycles and then plan for surgery at that time. If has persistent liver/spleen disease it may require resection  2. Chemo induced pain-Percocet 5/325 PRN as needed for pain.      The total time spent was 40 minutes regarding this patients diagnosis of primary peritoneal cancer and >50% of this time was spent counseling and coordinating care    Hal Johnson MD  Gynecologic Oncology  5/10/185317:05 AM

## 2019-05-16 ENCOUNTER — HOSPITAL ENCOUNTER (OUTPATIENT)
Dept: INFUSION THERAPY | Age: 49
Discharge: HOME | End: 2019-05-16
Payer: MEDICARE

## 2019-05-16 VITALS
SYSTOLIC BLOOD PRESSURE: 131 MMHG | TEMPERATURE: 97.7 F | HEIGHT: 63 IN | BODY MASS INDEX: 44.99 KG/M2 | HEART RATE: 64 BPM | RESPIRATION RATE: 18 BRPM | OXYGEN SATURATION: 95 % | DIASTOLIC BLOOD PRESSURE: 80 MMHG | WEIGHT: 253.9 LBS

## 2019-05-16 DIAGNOSIS — C48.2 MALIGNANT NEOPLASM OF PERITONEUM (HCC): Primary | ICD-10-CM

## 2019-05-16 LAB
ALBUMIN SERPL-MCNC: 3.3 G/DL (ref 3.4–5)
ALBUMIN/GLOB SERPL: 1 {RATIO} (ref 0.8–1.7)
ALP SERPL-CCNC: 82 U/L (ref 45–117)
ALT SERPL-CCNC: 29 U/L (ref 13–56)
ANION GAP SERPL CALC-SCNC: 7 MMOL/L (ref 3–18)
APPEARANCE UR: CLEAR
AST SERPL-CCNC: 18 U/L (ref 15–37)
BACTERIA URNS QL MICRO: ABNORMAL /HPF
BASO+EOS+MONOS # BLD AUTO: 0.4 K/UL (ref 0–2.3)
BASO+EOS+MONOS NFR BLD AUTO: 10 % (ref 0.1–17)
BILIRUB SERPL-MCNC: 0.2 MG/DL (ref 0.2–1)
BILIRUB UR QL: NEGATIVE
BUN SERPL-MCNC: 24 MG/DL (ref 7–18)
BUN/CREAT SERPL: 18 (ref 12–20)
CALCIUM SERPL-MCNC: 8.9 MG/DL (ref 8.5–10.1)
CHLORIDE SERPL-SCNC: 109 MMOL/L (ref 100–108)
CO2 SERPL-SCNC: 27 MMOL/L (ref 21–32)
COLOR UR: YELLOW
CREAT SERPL-MCNC: 1.32 MG/DL (ref 0.6–1.3)
DIFFERENTIAL METHOD BLD: ABNORMAL
EPITH CASTS URNS QL MICRO: ABNORMAL /LPF (ref 0–5)
ERYTHROCYTE [DISTWIDTH] IN BLOOD BY AUTOMATED COUNT: 16.8 % (ref 11.5–14.5)
GLOBULIN SER CALC-MCNC: 3.3 G/DL (ref 2–4)
GLUCOSE SERPL-MCNC: 80 MG/DL (ref 74–99)
GLUCOSE UR STRIP.AUTO-MCNC: NEGATIVE MG/DL
HCT VFR BLD AUTO: 33.2 % (ref 36–48)
HGB BLD-MCNC: 11 G/DL (ref 12–16)
HGB UR QL STRIP: NEGATIVE
KETONES UR QL STRIP.AUTO: NEGATIVE MG/DL
LEUKOCYTE ESTERASE UR QL STRIP.AUTO: NEGATIVE
LYMPHOCYTES # BLD: 1.7 K/UL (ref 1.1–5.9)
LYMPHOCYTES NFR BLD: 39 % (ref 14–44)
MAGNESIUM SERPL-MCNC: 1.8 MG/DL (ref 1.6–2.6)
MCH RBC QN AUTO: 29.3 PG (ref 25–35)
MCHC RBC AUTO-ENTMCNC: 33.1 G/DL (ref 31–37)
MCV RBC AUTO: 88.3 FL (ref 78–102)
NEUTS SEG # BLD: 2.2 K/UL (ref 1.8–9.5)
NEUTS SEG NFR BLD: 52 % (ref 40–70)
NITRITE UR QL STRIP.AUTO: NEGATIVE
PH UR STRIP: 6 [PH] (ref 5–8)
PLATELET # BLD AUTO: 195 K/UL (ref 140–440)
POTASSIUM SERPL-SCNC: 4 MMOL/L (ref 3.5–5.5)
PROT SERPL-MCNC: 6.6 G/DL (ref 6.4–8.2)
PROT UR STRIP-MCNC: 300 MG/DL
RBC # BLD AUTO: 3.76 M/UL (ref 4.1–5.1)
RBC #/AREA URNS HPF: ABNORMAL /HPF (ref 0–5)
SODIUM SERPL-SCNC: 143 MMOL/L (ref 136–145)
SP GR UR REFRACTOMETRY: 1.01 (ref 1–1.03)
UROBILINOGEN UR QL STRIP.AUTO: 0.2 EU/DL (ref 0.2–1)
WBC # BLD AUTO: 4.3 K/UL (ref 4.5–13)
WBC URNS QL MICRO: ABNORMAL /HPF (ref 0–4)

## 2019-05-16 PROCEDURE — 74011000250 HC RX REV CODE- 250: Performed by: OBSTETRICS & GYNECOLOGY

## 2019-05-16 PROCEDURE — 96375 TX/PRO/DX INJ NEW DRUG ADDON: CPT

## 2019-05-16 PROCEDURE — 83735 ASSAY OF MAGNESIUM: CPT

## 2019-05-16 PROCEDURE — 81001 URINALYSIS AUTO W/SCOPE: CPT

## 2019-05-16 PROCEDURE — 77030012965 HC NDL HUBR BBMI -A

## 2019-05-16 PROCEDURE — 74011250636 HC RX REV CODE- 250/636: Performed by: OBSTETRICS & GYNECOLOGY

## 2019-05-16 PROCEDURE — 80053 COMPREHEN METABOLIC PANEL: CPT

## 2019-05-16 PROCEDURE — 96367 TX/PROPH/DG ADDL SEQ IV INF: CPT

## 2019-05-16 PROCEDURE — 96413 CHEMO IV INFUSION 1 HR: CPT

## 2019-05-16 PROCEDURE — 74011000258 HC RX REV CODE- 258: Performed by: OBSTETRICS & GYNECOLOGY

## 2019-05-16 PROCEDURE — 96415 CHEMO IV INFUSION ADDL HR: CPT

## 2019-05-16 PROCEDURE — 96417 CHEMO IV INFUS EACH ADDL SEQ: CPT

## 2019-05-16 PROCEDURE — 36415 COLL VENOUS BLD VENIPUNCTURE: CPT

## 2019-05-16 PROCEDURE — 85025 COMPLETE CBC W/AUTO DIFF WBC: CPT

## 2019-05-16 PROCEDURE — 87086 URINE CULTURE/COLONY COUNT: CPT

## 2019-05-16 PROCEDURE — 74011250637 HC RX REV CODE- 250/637: Performed by: OBSTETRICS & GYNECOLOGY

## 2019-05-16 PROCEDURE — 86304 IMMUNOASSAY TUMOR CA 125: CPT

## 2019-05-16 RX ORDER — HEPARIN 100 UNIT/ML
300-500 SYRINGE INTRAVENOUS AS NEEDED
Status: DISPENSED | OUTPATIENT
Start: 2019-05-16 | End: 2019-05-16

## 2019-05-16 RX ORDER — LORAZEPAM 2 MG/ML
0.26 INJECTION INTRAMUSCULAR ONCE
Status: COMPLETED | OUTPATIENT
Start: 2019-05-16 | End: 2019-05-16

## 2019-05-16 RX ORDER — DIPHENHYDRAMINE HCL 25 MG
50 CAPSULE ORAL ONCE
Status: COMPLETED | OUTPATIENT
Start: 2019-05-16 | End: 2019-05-16

## 2019-05-16 RX ORDER — PALONOSETRON 0.05 MG/ML
0.25 INJECTION, SOLUTION INTRAVENOUS ONCE
Status: COMPLETED | OUTPATIENT
Start: 2019-05-16 | End: 2019-05-16

## 2019-05-16 RX ORDER — SODIUM CHLORIDE 0.9 % (FLUSH) 0.9 %
10-40 SYRINGE (ML) INJECTION AS NEEDED
Status: DISPENSED | OUTPATIENT
Start: 2019-05-16 | End: 2019-05-16

## 2019-05-16 RX ORDER — SODIUM CHLORIDE 9 MG/ML
500 INJECTION, SOLUTION INTRAVENOUS ONCE
Status: COMPLETED | OUTPATIENT
Start: 2019-05-16 | End: 2019-05-16

## 2019-05-16 RX ADMIN — DIPHENHYDRAMINE HYDROCHLORIDE 50 MG: 25 CAPSULE ORAL at 09:31

## 2019-05-16 RX ADMIN — PALONOSETRON HYDROCHLORIDE 0.25 MG: 0.25 INJECTION, SOLUTION INTRAVENOUS at 09:35

## 2019-05-16 RX ADMIN — FAMOTIDINE 20 MG: 10 INJECTION, SOLUTION INTRAVENOUS at 09:35

## 2019-05-16 RX ADMIN — LORAZEPAM 0.26 MG: 2 INJECTION, SOLUTION INTRAMUSCULAR; INTRAVENOUS at 09:36

## 2019-05-16 RX ADMIN — SODIUM CHLORIDE, PRESERVATIVE FREE 500 UNITS: 5 INJECTION INTRAVENOUS at 14:50

## 2019-05-16 RX ADMIN — PACLITAXEL 360 MG: 6 INJECTION, SOLUTION INTRAVENOUS at 10:36

## 2019-05-16 RX ADMIN — SODIUM CHLORIDE 150 MG: 900 INJECTION, SOLUTION INTRAVENOUS at 09:32

## 2019-05-16 RX ADMIN — SODIUM CHLORIDE 500 ML: 9 INJECTION, SOLUTION INTRAVENOUS at 08:30

## 2019-05-16 RX ADMIN — Medication 20 ML: at 14:50

## 2019-05-16 RX ADMIN — CARBOPLATIN 550 MG: 10 INJECTION, SOLUTION INTRAVENOUS at 13:39

## 2019-05-16 RX ADMIN — DEXAMETHASONE SODIUM PHOSPHATE 12 MG: 4 INJECTION, SOLUTION INTRA-ARTICULAR; INTRALESIONAL; INTRAMUSCULAR; INTRAVENOUS; SOFT TISSUE at 09:36

## 2019-05-16 NOTE — PROGRESS NOTES
[facility] Progress Note    Date: May 16, 2019    Name: Janeen Alvarenga              MRN: [id number]              : 1970    Chemotherapy Cycle:6  Paclitaxel/Carboplatin       Pt to [facility], ambulatory, at 0810. Ms. Jocelyne Nunez was assessed and education was provided. Ms. Cannon's vitals were reviewed. Visit Vitals  /80 (BP 1 Location: Left arm, BP Patient Position: Sitting)   Pulse 64   Temp 97.7 °F (36.5 °C)   Resp 18   Ht 5' 2.99\" (1.6 m)   Wt 115.2 kg (253 lb 14.4 oz)   SpO2 95%   BMI 44.99 kg/m²       Right chest mediport accessed with 20 g 1 inch upton needle. Port flushed easily and had brisk blood return. Blood drawn off and sent for CBC, CMP, CA-125 and Magnesium per written orders after 10 ml waste. NS initiated @ 25. Urine specimen sent to lab for UA and culture. Lab results were obtained and reviewed. Recent Results (from the past 12 hour(s))   CBC WITH 3 PART DIFF    Collection Time: 19  8:26 AM   Result Value Ref Range    WBC 4.3 (L) 4.5 - 13.0 K/uL    RBC 3.76 (L) 4.10 - 5.10 M/uL    HGB 11.0 (L) 12.0 - 16 g/dL    HCT 33.2 (L) 36 - 48 %    MCV 88.3 78 - 102 FL    MCH 29.3 25.0 - 35.0 PG    MCHC 33.1 31 - 37 g/dL    RDW 16.8 (H) 11.5 - 14.5 %    PLATELET 094 160 - 405 K/uL    NEUTROPHILS 52 40 - 70 %    MIXED CELLS 10 0.1 - 17 %    LYMPHOCYTES 39 14 - 44 %    ABS. NEUTROPHILS 2.2 1.8 - 9.5 K/UL    ABS. MIXED CELLS 0.4 0.0 - 2.3 K/uL    ABS.  LYMPHOCYTES 1.7 1.1 - 5.9 K/UL    DF AUTOMATED     METABOLIC PANEL, COMPREHENSIVE    Collection Time: 19  8:26 AM   Result Value Ref Range    Sodium 143 136 - 145 mmol/L    Potassium 4.0 3.5 - 5.5 mmol/L    Chloride 109 (H) 100 - 108 mmol/L    CO2 27 21 - 32 mmol/L    Anion gap 7 3.0 - 18 mmol/L    Glucose 80 74 - 99 mg/dL    BUN 24 (H) 7.0 - 18 MG/DL    Creatinine 1.32 (H) 0.6 - 1.3 MG/DL    BUN/Creatinine ratio 18 12 - 20      GFR est AA 52 (L) >60 ml/min/1.73m2    GFR est non-AA 43 (L) >60 ml/min/1.73m2    Calcium 8.9 8.5 - 10.1 MG/DL Bilirubin, total 0.2 0.2 - 1.0 MG/DL    ALT (SGPT) 29 13 - 56 U/L    AST (SGOT) 18 15 - 37 U/L    Alk. phosphatase 82 45 - 117 U/L    Protein, total 6.6 6.4 - 8.2 g/dL    Albumin 3.3 (L) 3.4 - 5.0 g/dL    Globulin 3.3 2.0 - 4.0 g/dL    A-G Ratio 1.0 0.8 - 1.7     MAGNESIUM    Collection Time: 05/16/19  8:26 AM   Result Value Ref Range    Magnesium 1.8 1.6 - 2.6 mg/dL   URINALYSIS W/ RFLX MICROSCOPIC    Collection Time: 05/16/19  9:37 AM   Result Value Ref Range    Color YELLOW      Appearance CLEAR      Specific gravity 1.014 1.005 - 1.030      pH (UA) 6.0 5.0 - 8.0      Protein 300 (A) NEG mg/dL    Glucose NEGATIVE  NEG mg/dL    Ketone NEGATIVE  NEG mg/dL    Bilirubin NEGATIVE  NEG      Blood NEGATIVE  NEG      Urobilinogen 0.2 0.2 - 1.0 EU/dL    Nitrites NEGATIVE  NEG      Leukocyte Esterase NEGATIVE  NEG         Lab results within ordered parameters to give chemo today. ANC = 2.2, PLT = 195. Chemo dosages verified with today's BSA and found to be within 10% of ordered dosages. Pre-medications (Emend, Benadryl, Aloxi, Decadron, Pepcid, Ativan) were administered as ordered and chemotherapy was initiated after blood return from port re-verified. Reviewed expected side effects of premeds with patient. Taxol 360 mg infused over 3 hours as ordered. VS stable at end of infusion and pt denied complaints. Line flushed with NS and blood return from port re-verified. Carboplatin 620 mg infused over 1 hour as ordered. VS stable at end of infusion and pt denied complaints. Line flushed with NS and blood return from port re-verified. Ms. Suki Oquendo tolerated infusion, and had no complaints at this time. Mediport flushed with NS 20 ml and Heparin 500 units then de-accessed. No irritation or bleeding noted. Bandaid applied. Patient armband removed and shredded. Ms. Suki Oquendo was discharged from [place] [address] in stable condition at 1505.  She is to return on 5/23/19 at 1000 for her next Hydration appointment.     Ron Kelsey RN  May 16, 2019

## 2019-05-17 LAB
BACTERIA SPEC CULT: NORMAL
CANCER AG125 SERPL-ACNC: 7.8 U/ML (ref 0–38.1)
SERVICE CMNT-IMP: NORMAL

## 2019-05-22 ENCOUNTER — TELEPHONE (OUTPATIENT)
Dept: ONCOLOGY | Age: 49
End: 2019-05-22

## 2019-05-22 ENCOUNTER — OFFICE VISIT (OUTPATIENT)
Dept: ONCOLOGY | Age: 49
End: 2019-05-22

## 2019-05-22 VITALS
WEIGHT: 252.8 LBS | SYSTOLIC BLOOD PRESSURE: 130 MMHG | BODY MASS INDEX: 44.79 KG/M2 | HEIGHT: 63 IN | OXYGEN SATURATION: 99 % | TEMPERATURE: 97.8 F | HEART RATE: 66 BPM | DIASTOLIC BLOOD PRESSURE: 87 MMHG

## 2019-05-22 DIAGNOSIS — R19.00 PELVIC MASS: ICD-10-CM

## 2019-05-22 DIAGNOSIS — C48.2 PERITONEAL CARCINOMA (HCC): Primary | ICD-10-CM

## 2019-05-22 NOTE — LETTER
5/22/19 Patient: Amparo Brown YOB: 1970 Date of Visit: 5/22/2019 Sherita Myers, [address] [address] [place] [address] VIA In Basket Dear Sherita Myers MD, Thank you for referring Ms. Gretta Pickens to [facility] for evaluation. My notes for this consultation are attached. If you have questions, please do not hesitate to call me. I look forward to following your patient along with you. Sincerely, Rodney Tovar MD

## 2019-05-22 NOTE — TELEPHONE ENCOUNTER
Patient contacted the office requesting to speak with JOHN Kumar regarding calendar clarification. Please contact her at [phone].

## 2019-05-22 NOTE — PROGRESS NOTES
Ignacia Snyder, a 51 y.o. female,  is here for   Chief Complaint   Patient presents with    Chemotherapy     follow up       Visit Vitals  /87 (BP 1 Location: Left arm, BP Patient Position: Sitting)   Pulse 66   Temp 97.8 °F (36.6 °C) (Oral)   Ht 5' 2.99\" (1.6 m)   Wt 114.7 kg (252 lb 12.8 oz)   SpO2 99%   BMI 44.80 kg/m²       Patient reports feeling abdominal cramping yesterday, 5/21/2019, however she had an episode of diarrhea and has not had the pain since. She also experienced some slight tingling and pain in her hands, legs, and feet bilaterally, however it stopped and was not as intense as it was a few months ago. 1. Have you been to the ER, urgent care clinic since your last visit? Hospitalized since your last visit? No    2. Have you seen or consulted any other health care providers outside of the [address] [place] [address] since your last visit? Include any pap smears or colon screening.  No

## 2019-05-22 NOTE — PATIENT INSTRUCTIONS
Learning About Chemotherapy Side Effects and Safety  What is chemotherapy? Chemotherapy uses medicines to kill cancer cells. It's often called \"chemo. \" Chemo may slow cancer growth, stop cancer from spreading, or help get rid of the cancer. Chemo can be given at different locations, such as a hospital, a doctor's office, or a clinic. Sometimes chemo treatments may be done at home. You may get chemo in \"cycles. \" This means that you get a number of treatments over a set period of time. Then you take a break before you start again. Chemo helps to treat many kinds of cancer. But it can also affect healthy cells along with the cancer cells. This is why some types of chemo cause side effects, like nausea, losing your hair, or feeling tired. What are the possible side effects of chemotherapy? Side effects depend on which medicines you take, how much you take, and how the medicines affect you. Your doctor can tell you what to expect when you take these medicines. Some of them may cause symptoms such as:  · Fatigue. · Nausea. · Vomiting. · A rash. · Hair loss. · Pain or tingling in your hands or the soles of your feet. Chemo treatment can be hard. It can keep you from doing the things you were doing every day, like going to work or school. But keep in mind that most side effects don't last. They will go away after you finish the treatment. And many side effects can be managed with medicine. Your doctor will tell you what to do if you have side effects. Holland Tobar also learn which ones you need to tell your doctor about right away. How can you keep yourself and your family safe? If you take chemo at home, take steps to protect your family. Keep your medicine in a locked cabinet if you can. Or keep it on a high shelf out of the reach of children. Make sure the containers have childproof lids.   IV chemo treatments  Chemo medicines that you get in your vein through an IV can stay in your body fluids (vomit, urine, or stool) for several days. Some medicines are radioactive, so they can be harmful if someone touches waste from your body. If your medicine is radioactive, any of your clothes or bed linens or cloth diapers that have medicine or body fluids on them need to be handled separately from other household laundry. Have caregivers use gloves when they wash your bed linens and clothes. Bed linens and cloth diapers should be machine washed twice in hot water, using regular detergent. For the first 48 hours after each treatment:  · Sit on the toilet seat to prevent splashing. · Put the toilet lid down before you flush. · Always flush twice after you use the toilet. Couples should use a condom during sex while a partner is getting chemo treatments and for several days after treatment ends. Follow-up care is a key part of your treatment and safety. Be sure to make and go to all appointments, and call your doctor if you are having problems. It's also a good idea to know your test results and keep a list of the medicines you take. Where can you learn more? Go to http://raul-vee.info/. Enter 91 21 06 in the search box to learn more about \"Learning About Chemotherapy Side Effects and Safety. \"  Current as of: March 27, 2018  Content Version: 11.9  © 9354-4100 [employer]. Care instructions adapted under license by [employer] (which disclaims liability or warranty for this information). If you have questions about a medical condition or this instruction, always ask your healthcare professional. [place] [address] any warranty or liability for your use of this information.

## 2019-05-22 NOTE — PROGRESS NOTES
[facility] [place] [facility]  [address] [place] [address], [address], [address] [facility]  [address] [facility], [address] [facility]  [place], [address]   [phone]  Ava Snowball DO      Patient ID:  Name:  Jennifer Farley  MRN:  [id number]  :  1970/48 y.o. Date:  2019      HISTORY OF PRESENT ILLNESS:  Jennifer Farley is a 51 y.o.  postmenopausal female referred by Dr. Nara Chase  With peritoneal cancer. Intitally seen be NP with reports of vaginal bleeding. Given pt's history of hysteretectomy with BSO for endometriosis in  a TVUS was ordered. Which revealed a 6.8 cm x 5.2 cm x 4.6 cm at midline vaginal cuff. This prompted pelvic CT with findings of a lesion measuring 7.6 x 5.8 x 7.2 cm and is compatible with a pelvic neoplasm vs endometrioma given prior history of endometriosis. Tumor markers done on 2018 all normal.  S/p  Exploratory laparotomy, exploration of retroperitoneal spaces, exam under anesthesia with biopsy of rectovaginal mass on 2019. Had sigmoidoscopy which revealed submucosal mass. S/p cycle #5 of carbo/taxol on 2019. Has some body aches week after chemo. Reports neurphathy has improved  Doing better with hydration. Reports constipation that is relived with stool softeners. Nausea controlled with meds. Denies vaginal bleeding.        Labs:  Component      Latest Ref Rng & Units 2019           8:26 AM  8:20 AM   Cancer Ag (CA) 125      0.0 - 38.1 U/mL 7.8 8.7     Component      Latest Ref Rng & Units 2019           8:28 AM   Cancer Ag (CA) 125      0.0 - 38.1 U/mL 8.8     Component      Latest Ref Rng & Units 3/14/2019 2019           8:15 AM  8:32 AM   Cancer Ag (CA) 125      0.0 - 38.1 U/mL 10.4 18.4     2018 : 9.3  2018 CEA: 2.0  2018 AFP: 3.8    Pathology  2019   RECTOVAGINAL MASS (#1, 2) AND PELVIC MASS, BIOPSIES:   CONSISTENT WITH SEROUS CARCINOMA WITH EXTENSIVE NECROSIS. Imaging  CT abdomen/pelvis  FINDINGS:  view shows lung bases clear and normal bowel gas pattern. Patient morbidly obese but probably with interval weight loss.     CT Abdomen and pelvis:     Lung bases: Normal.     Heart and pericardium: There is a catheter tip in the right atrium of the heart. Heart and pericardium otherwise unremarkable.     Liver: Decrease size of lesion in segment 7 of the liver adjacent IVC now  measuring 11 x 15 mm and on PET/CT 2/14/2019 it measured 23 mm. No new liver  lesions.     Gallbladder: Normal.     Spleen: Mass inferiorly in the spleen near the hilum measures 23 x 24 mm,  probably present previously but not as well seen because of lack of intravenous  contrast and measuring about 27 mm (29).    Pancreas: Normal.     Adrenal glands: Normal.     Kidneys: Enhancing symmetrically. No focal lesions.     The bowel: Stomach and duodenum and small bowel and the appendix and colon are  normal.     GYN: Prior hysterectomy. No adnexal masses.     Peritoneal space: No free fluid. There is some mild fat stranding in the left  lower quadrant but no discrete mass (60) and probably related to the mesentery.     MSK: Abdominal wall scar lower abdominal wall. Multiple levels facet arthropathy  lower lumbar spine. Moderate disc space narrowing L5/S1 and L4/L5. No suspicious  skeletal lesions.     Retroperitoneum: Soft tissue mass right side pelvis has decreased in size. It  measures 2.8 x 3.2 cm. On previous PET/CT it measured 8.3 x 5.3 cm and on CT it  measured 7.4 x 5.2 cm.     IMPRESSION  IMPRESSION:     Overall improvement compatible with response to therapy. Decreased size of the  pelvic metastasis, hepatic metastasis, splenic lesion since the prior study. No  new metastases. MRI 2/19/2019  FINDINGS:  Presumed hysterectomy.  There is a lobulated 6.8 x 6 x 5.9 cm mass centered at  the right cul-de-sac involving the right and posterior upper to mid vagina but  also abutting the right anterior rectum from 1-8 o'clock. On coronal and  sagittal views tumor is favored to not be originating from the rectum but this  is not definite. There is a oval ring of T2 hypointensity and T1 hyperintensity  with central necrosis within the mass. The mass extends to the posterior medial  right mesorectal fascia. The mass does not invade the bladder. At the cranial  aspect of the mass is a spiculated 3.4 x 2 cm T2 hypointense structure with  tethering to adjacent bowel and fascia. Ovaries are not visualized separately. No pelvic ascites.     Bowel is otherwise normal in caliber. No upstream dilation. Bladder is normal.  No lymphadenopathy.     Postsurgical changes anterior midline pelvic wall. Bones are grossly  unremarkable.     IMPRESSION  IMPRESSION:    1. Large up to 6.8 cm mass centered at the right cul-de-sac  is most  intimately associated with the vagina, favored to be either endometrial, vaginal  or cervical in origin. It does abut and may involve the right rectum but this is  favored secondary involvement. Ovaries are not visualized separately. Correlate  for history of oophorectomy since ovarian malignancy also possible. 2.  There is an associated site of favored endometriotic implant at the cranial  aspect of the mass raising the possibility of malignant transformation. 3.  No upstream bowel obstruction. PETCT 2/14/2019   --  PET FINDINGS  --    No abnormal hypermetabolic activity in the neck. No abnormal hypermetabolic activity in the chest.      Low-attenuation possible lesion in the medial dome right lobe liver in region  of heterogeneous hypermetabolic activity, with SUV Max reaching 11.7 on image  98. Underlying lesion difficult to visualize due to extensive motion from  respirations. Cannot well separate from the IVC or diaphragm.  No definite  corresponding lung lesion however.     There is low level metabolic activity associated with stranding and presumed  scarring in the subcutaneous fat of the lower midline abdominal and pelvic wall. This may simply be inflammatory. .      There is a lobulated soft tissue conglomerate in the midline to right lateral  cul-de-sac and perirectal pelvis. The periphery of this is diffusely  hypermetabolic, with SUV Max reaching 18.3 on image 200. Portions centrally are  photopenic and presumed necrotic. This measures up to 7.5 x 6.1 cm axial on  image 196. Anterior margin of this hypermetabolic lesion is in direct contact  with the high intensity decompressed urinary bladder, involvement cannot be  assessed. Left lateral margin of the mass is in contact with the surface of the  lateral wall of the sigmoid colon. Direct involvement cannot be assessed.  -Additional focal hypermetabolic density 2.7 cm on image 189 inseparable from  the caudal wall of the sigmoid. -Tapering hypermetabolic activity extends to approximately image 210, not to the  level of the perineum.     There are no other areas of abnormal metabolic activity appreciated in the  abdomen or pelvis which cannot be attributed to renal collecting system, ureter,  bladder or bowel wall activity>]. No definite hypermetabolic bone lesions appreciated, although bone scan can be  more sensitive in this respect. --  CT FINDINGS  --     These limited CT images do not replace diagnostic quality contrast enhanced CT  scan for evaluation of solid organs, bowel, retroperitoneum and vasculature. CT NECK:    Severely limited by lack of intravenous contrast.  Paranasal sinuses free of  disease. No appreciably enlarged lymph nodes. Extensive cervical endplate  osteophytes anterior and left lateral..      CT CHEST:    Limited evaluation of the hilum and vasculature without intravenous contrast.  No pleural effusion. No appreciably enlarged lymph nodes.  Patchy parenchymal  opacities medial basal left lower lobe on image 89 demonstrates low level  metabolic activity, SUV Max 3.2, nonspecific, favored as inflammatory based on  imaging appearance subjectively. Mliss Ceballos CT ABDOMEN:    Limited noncontrast images. Normal-size liver and spleen no adrenal mass. No  hydronephrosis. No ascites. CT PELVIS:    Limited noncontrast images. No small bowel or colon distention. Scattered  colonic diverticulosis. Postsurgical change anterior abdominal wall. Severe  degenerative facet disease at the lower lumbar levels.       Lobulated right pelvic/cul-de-sac soft tissue mass as above. Mild degree of  stranding in the pelvic mesenteric fat. No ascites.        IMPRESSION   IMPRESSION:      1. Peripherally hypermetabolic soft tissue mass right dependent pelvis to the  cul-de-sac region as above consistent with malignancy/metastatic disease with  some central necrosis  -Possible separate lesion versus serosal involvement of the sigmoid colon  adjacent. -Mass in contact with posterior bladder, sigmoid colon locally and posterior  peritoneal surface.     2. Hypermetabolic lesion along central dome of the right lobe liver difficult to  separate from IVC or diaphragm due to motion artifact. Recommend dedicated  contrast enhanced CT for localization and better clarification.  -Findings are concerning for malignancy or hepatic metastatic disease until  proven otherwise.      3. Low level activity associated with patchy parenchymal opacity at the medial  left lower lobe,, favored as inflammatory as above.     4. No other definite hypermetabolic abnormalities appreciated elsewhere. 12/14/2018 CT PELVIS W/CONTRAST  FINDINGS:    LYMPH NODES: No enlarged lymph nodes. PELVIC GASTROINTESTINAL TRACT: No bowel dilation or wall thickening. PELVIC ORGANS:     The uterus is absent.      Along the right upper margin of the vaginal cuff within the right deep pelvis there is a large irregularly-shaped peripherally enhancing, septated appearing mass with regions of central low attenuation which could be low density-fluid type contents or regions of necrosis. Lesion measures 76 x 58 x 72 mm and is compatible with a pelvic neoplasm. VASCULATURE: Unremarkable. BONES: Lower lumbar hypertrophic osteophytes. Lower lumbar facet hypertrophy with vacuum phenomenon asymmetric leftward. Degenerative vacuum phenomenon within the SI joints noted as well. No acute or aggressive osseous abnormalities identified. OTHER: Small fat-containing ventral umbilical hernia. Minimal nonspecific edema within the subcutaneous fat of the lumbar region, not uncommon. IMPRESSION:   1.  Along the right upper margin of the vaginal cuff within the right deep pelvis there is a large irregularly-shaped peripherally enhancing, septated appearing mass with regions of central low attenuation which could be low density-fluid type contents or regions of necrosis. Lesion measures 76 x 58 x 72 mm and is compatible with a pelvic neoplasm. Could be a endometrioma given prior history of endometriosis, malignancy not excluded and gynecologic oncology follow-up is suggested. 2.  Small fat-containing ventral umbilical hernia.      12/04/2018 TVUS  Uterus: surgically absent  Left ovary: surgically absent  Right ovary: surgically absent  Other findings: midline-at vaginal cuff: 6.8 cm X 5.2 cm x 4.6 cm, volume 85 ml    Impression: Complex pelvic mass       ROS:    As above      Patient Active Problem List    Diagnosis Date Noted    Malignant neoplasm of peritoneum ([address] 02/14/2019    Pelvic mass in female 01/17/2019    Bipolar 1 disorder ([address] 02/09/2018    Irritable bowel syndrome with both constipation and diarrhea 02/09/2018    Advance care planning 01/31/2017    Dental caries 05/26/2016    Chronic periodontal disease 05/26/2016    SUAZO (dyspnea on exertion) 02/10/2016    Prediabetes 09/24/2015    Morbid obesity ([address] 08/31/2015    Arthritis, degenerative 08/31/2015    Gastroesophageal reflux disease without esophagitis 08/31/2015    ANAMIKA on CPAP 08/31/2015    Essential hypertension 08/28/2015    PTSD (post-traumatic stress disorder) 03/24/2014    S/P TKR (total knee replacement) 11/14/2012    Depression 05/08/2012    Menopause 05/08/2012    Knee pain, right 05/08/2012    GERD (gastroesophageal reflux disease) 05/08/2012    OA (osteoarthritis) 06/18/2010    Obesity 06/18/2010    Mixed hyperlipidemia 06/18/2010     Past Medical History:   Diagnosis Date    AR (allergic rhinitis)     seasonal    Cancer ([facility]     pt states between vgina and rectal area    Cardiac echocardiogram 04/11/2016    Tech difficult. EF 55-60%. No RWMA. Mild conc LVH. Gr 1 DDfx. RVSP normal.      Cardiac nuclear imaging test 05/05/2016    Low risk. Very patchy radiotracer uptake. Mild anterior artifact, low suspicion for ischemia. EF 68%. No RWMA. Normal EKG on pharm stress test.    Cardiovascular LE arterial duplex 10/07/2013    No significant arterial disease at rest bilaterally. R JUNE 1.21.  L JUNE 1.16. No significant sm vessel disease.     Depression     Dr. Princess Bullard, suicide attempt 2/12    Diverticulosis     Endometriosis     s/p hysterectomy 2006    GERD (gastroesophageal reflux disease)     Glaucoma     Dr. Joe Naqvi HTN (hypertension)     Hx of colonoscopy 04/05/2017    mild diverticulosis, g1 internal hemorrhoids, normal colon , repeat 10 year 2027    Hx of suicide attempt     Hyperlipidemia LDL goal < 130     IBS (irritable bowel syndrome)     Ill-defined condition     environmental allergies    Insomnia     Malignant neoplasm of peritoneum ([facility] 2/14/2019    Nausea & vomiting     OA (osteoarthritis)     both knees, lower back    Preeclampsia     PTSD (post-traumatic stress disorder)     Seizures ([address]     1 x with childbirth, had toxemia    Sleep apnea     does not use cpap machine    Spinal stenosis     Dr. Aleida Vargas      Past Surgical History:   Procedure Laterality Date    COLONOSCOPY N/A 4/5/2017    COLONOSCOPY performed by Lucy Garcia MD at [facility] Left     External fixator    FLEXIBLE SIGMOIDOSCOPY N/A 2019    SIGMOIDOSCOPY FLEXIBLE performed by Deanna Weston MD at [facility] ENDOSCOPY    HX  SECTION      HX COLONOSCOPY  2017    DR. MCRAE 2017     HX HYSTERECTOMY      HX KNEE ARTHROSCOPY Left     left knee    HX KNEE REPLACEMENT Bilateral     (R)  (L)     HX LAPAROTOMY N/A 2019    and rectovaginal bx    HX OTHER SURGICAL  2016    all teeth removed    HX SALPINGO-OOPHORECTOMY  2008    HX TUBAL LIGATION      IR INSERT TUNL CVC W PORT OVER 5 YEARS  2019    MULTIPLE DELIVERY       1990    TOTAL ABDOM HYSTERECTOMY        OB History        2    Para   2    Term   2       0    AB   0    Living   0       SAB   0    TAB   0    Ectopic   0    Molar   0    Multiple   0    Live Births   0              Social History     Tobacco Use    Smoking status: Never Smoker    Smokeless tobacco: Never Used   Substance Use Topics    Alcohol use: No      Family History   Problem Relation Age of Onset    Heart Disease Mother         CHF    Diabetes Mother     Hypertension Mother     Kidney Disease Mother     Breast Cancer Mother     Stroke Mother     Diabetes Father     Hypertension Father     Heart Attack Father         MI    Cancer Maternal Grandmother         stomach    Ovarian Cancer Maternal Aunt     Cancer Maternal Uncle       Current Outpatient Medications   Medication Sig    Mv,Ca,Min-FA-Herbal No.157 (ESTROVEN MAXIMUM STRENGTH) 400 mcg tab Take  by mouth daily. Indications: hot flash    oxyCODONE-acetaminophen (PERCOCET) 5-325 mg per tablet Take 1 Tab by mouth every four (4) hours as needed for Pain for up to 14 days. Max Daily Amount: 6 Tabs.  promethazine (PHENERGAN) 25 mg tablet Take 1 Tab by mouth every six (6) hours as needed for Nausea.  Indications: chemo induced nausea/vomiting    dimethicone (SOOTHE AND COOL INZO BARRIER) 5 % topical cream Apply  to affected area two (2) times a day.  docusate sodium (COLACE) 100 mg capsule Take 100 mg by mouth two (2) times a day.  amLODIPine (NORVASC) 10 mg tablet take 1 tablet by mouth once daily    lidocaine-prilocaine (EMLA) topical cream Apply  to affected area as needed for Pain.  polyethylene glycol (MIRALAX) 17 gram packet Take 1 Packet by mouth daily.  cyanocobalamin (VITAMIN B-12) 1,000 mcg tablet Take 1,000 mcg by mouth daily.  cycloSPORINE (RESTASIS) 0.05 % dpet Apply 1 Drop to eye as needed.  lubiPROStone (AMITIZA) 24 mcg capsule 1 Cap by Mouth/Throat route as needed.  clotrimazole-betamethasone (LOTRISONE) topical cream Apply to affected area twice daily    simvastatin (ZOCOR) 20 mg tablet take 1 tablet by mouth at bedtime    metoprolol succinate (TOPROL-XL) 100 mg tablet take 1 tablet by mouth once daily    losartan (COZAAR) 100 mg tablet take 1 tablet by mouth once daily    dexamethasone (DECADRON) 4 mg tablet Take 40 mg by mouth as needed. Take 10 tabs the night before Chemo #1 and Chemo #2.  ondansetron hcl (ZOFRAN) 4 mg tablet Take 1 Tab by mouth every six (6) hours as needed for Nausea.  melatonin 3 mg tablet Take 3 mg by mouth nightly.  plecanatide (TRULANCE) 3 mg tab Take  by mouth.  OXcarbazepine (TRILEPTAL) 300 mg tablet Take 300 mg by mouth two (2) times a day.  LORazepam (ATIVAN) 0.5 mg tablet Take 0.5 mg by mouth two (2) times daily as needed.  hydrOXYzine pamoate (VISTARIL) 50 mg capsule 2 Caps 2 Times Daily As Needed.  carisoprodol (SOMA) 350 mg tablet take 1 tablet by mouth three times a day and 1 tablet at bedtime (currently out of Rx)    levocetirizine (XYZAL) 5 mg tablet daily as needed.  ziprasidone (GEODON) 40 mg capsule Take 40 mg by mouth nightly.  montelukast (SINGULAIR) 10 mg tablet Take 10 mg by mouth nightly.     FIBER, PSYLLIUM HUSK, PO Take by mouth daily as needed.  latanoprost (XALATAN) 0.005 % ophthalmic solution Administer 1 Drop to both eyes nightly. No current facility-administered medications for this visit. Allergies   Allergen Reactions    Other Medication Hives     seafood    Shellfish Derived Hives          OBJECTIVE:    Physical Exam  VITAL SIGNS: Visit Vitals  /87 (BP 1 Location: Left arm, BP Patient Position: Sitting)   Pulse 66   Temp 97.8 °F (36.6 °C) (Oral)   Ht 5' 2.99\" (1.6 m)   Wt 114.7 kg (252 lb 12.8 oz)   LMP 01/31/2008   SpO2 99%   BMI 44.80 kg/m²      GENERAL EMILY: in no apparent distress and well developed and well nourished   MUSCULOSKEL: no joint tenderness, deformity or swelling   INTEGUMENT:  warm and dry, no rashes or lesions   ABDOMEN . soft,obese, NT, ND, No masses appreciated, INC: C/D/I    EXTREMITIES: extremities normal, atraumatic, no cyanosis or edema   PELVIC: deferred   RECTAL: deferred   BRY SURVEY: Cervical, supraclavicular, axillary and inguinal nodes normal.   NEURO: Grossly normal         IMPRESSION/PLAN:  1.stage IV Primary peritoneal cancer   -reviewed her pathology   -discussed recommendation for chemotherapy   -reviewed CT and explained now with IV contrast able to see lesion in spleen and liver and appears to be responding to chemo as well as good shrinkage in pelvic mass   -tolerating chemo well. No G3 or G4 toxicity   -s/p 5 cycles   -ok for cycle #6   -   -plan for carbo (auc=6), taxol (175 mg/m2) IV every 3 wks for 6 cycles   -given disease in liver and spleen with good response will continue for 6 cycles and then plan for surgery at that time. If has persistent liver/spleen disease it may require resection   -CT scan ordered to be done after cycle #6. F/u with dr wheeler as well  2. Chemo induced pain-Percocet 5/325 PRN as needed for pain.      The total time spent was 40 minutes regarding this patients diagnosis of primary peritoneal cancer and >50% of this time was spent counseling and coordinating care    Dao Perry NP  Gynecologic Oncology  [id number] AM

## 2019-05-23 ENCOUNTER — HOSPITAL ENCOUNTER (OUTPATIENT)
Dept: INFUSION THERAPY | Age: 49
Discharge: HOME | End: 2019-05-23
Payer: MEDICARE

## 2019-05-23 VITALS
RESPIRATION RATE: 18 BRPM | OXYGEN SATURATION: 99 % | DIASTOLIC BLOOD PRESSURE: 65 MMHG | HEART RATE: 61 BPM | SYSTOLIC BLOOD PRESSURE: 101 MMHG | TEMPERATURE: 98.5 F

## 2019-05-23 PROCEDURE — 96361 HYDRATE IV INFUSION ADD-ON: CPT

## 2019-05-23 PROCEDURE — 74011250636 HC RX REV CODE- 250/636: Performed by: OBSTETRICS & GYNECOLOGY

## 2019-05-23 PROCEDURE — 77030012965 HC NDL HUBR BBMI -A

## 2019-05-23 PROCEDURE — 96360 HYDRATION IV INFUSION INIT: CPT

## 2019-05-23 PROCEDURE — 74011250636 HC RX REV CODE- 250/636: Performed by: INTERNAL MEDICINE

## 2019-05-23 RX ORDER — HEPARIN 100 UNIT/ML
500 SYRINGE INTRAVENOUS AS NEEDED
Status: DISCONTINUED | OUTPATIENT
Start: 2019-05-23 | End: 2019-05-27 | Stop reason: HOSPADM

## 2019-05-23 RX ORDER — HEPARIN 100 UNIT/ML
SYRINGE INTRAVENOUS
Status: DISCONTINUED
Start: 2019-05-23 | End: 2019-05-24 | Stop reason: HOSPADM

## 2019-05-23 RX ORDER — SODIUM CHLORIDE 0.9 % (FLUSH) 0.9 %
10-40 SYRINGE (ML) INJECTION AS NEEDED
Status: DISCONTINUED | OUTPATIENT
Start: 2019-05-23 | End: 2019-05-27 | Stop reason: HOSPADM

## 2019-05-23 RX ORDER — SODIUM CHLORIDE 9 MG/ML
1000 INJECTION, SOLUTION INTRAVENOUS ONCE
Status: COMPLETED | OUTPATIENT
Start: 2019-05-23 | End: 2019-05-23

## 2019-05-23 RX ADMIN — Medication 10 ML: at 10:15

## 2019-05-23 RX ADMIN — Medication 500 UNITS: at 12:19

## 2019-05-23 RX ADMIN — Medication 10 ML: at 12:18

## 2019-05-23 RX ADMIN — SODIUM CHLORIDE 1000 ML: 9 INJECTION, SOLUTION INTRAVENOUS at 10:18

## 2019-05-23 NOTE — PROGRESS NOTES
[facility] Progress Note    Date: May 23, 2019    Name: Janeen Alvarenga    MRN: [id number]         : 1970    Hydration    Ms. Cannon to [facility], ambulatory, at 1010. No complaints or concerns voiced. Pt was assessed and education was provided. Ms. Cannon's vitals were reviewed. Visit Vitals  /65 (BP 1 Location: Left arm, BP Patient Position: At rest)   Pulse 61   Temp 98.5 °F (36.9 °C)   Resp 18   SpO2 99%       Right chest mediport was accessed with 20 gauge 1 inch upton(s) after chloroprep. Scab above port site noted. Port flushed easily and had brisk blood return. 1L NS infused over 2 hours. Mediport flushed with NS 20 ml and Heparin 500 units then de-accessed. No irritation or bleeding noted. Band-Aid applied. Patient Vitals for the past 8 hrs:   Temp Pulse Resp BP SpO2   19 1212 98.5 °F (36.9 °C) 61 18 101/65 99 %   19 1010 98.6 °F (37 °C) 60 18 112/74 97 %         Ms. Cannon tolerated the procedure, and had no complaints. Patient armband removed and shredded. Ms. Jocelyne Nunez was discharged from [place] [address] in stable condition at 1220. She is to return on 19 at 1000 for her next chemo appointment.     Montana Crenshaw RN  May 23, 2019

## 2019-05-30 ENCOUNTER — HOSPITAL ENCOUNTER (OUTPATIENT)
Dept: INFUSION THERAPY | Age: 49
Discharge: HOME | End: 2019-05-30
Payer: MEDICARE

## 2019-05-30 VITALS
HEART RATE: 71 BPM | SYSTOLIC BLOOD PRESSURE: 129 MMHG | RESPIRATION RATE: 18 BRPM | DIASTOLIC BLOOD PRESSURE: 85 MMHG | TEMPERATURE: 98.1 F | OXYGEN SATURATION: 98 %

## 2019-05-30 PROCEDURE — 74011250636 HC RX REV CODE- 250/636: Performed by: INTERNAL MEDICINE

## 2019-05-30 PROCEDURE — 74011250636 HC RX REV CODE- 250/636: Performed by: OBSTETRICS & GYNECOLOGY

## 2019-05-30 PROCEDURE — 96360 HYDRATION IV INFUSION INIT: CPT

## 2019-05-30 PROCEDURE — 77030012965 HC NDL HUBR BBMI -A

## 2019-05-30 PROCEDURE — 96361 HYDRATE IV INFUSION ADD-ON: CPT

## 2019-05-30 RX ORDER — HEPARIN 100 UNIT/ML
500 SYRINGE INTRAVENOUS AS NEEDED
Status: DISCONTINUED | OUTPATIENT
Start: 2019-05-30 | End: 2019-06-03 | Stop reason: HOSPADM

## 2019-05-30 RX ORDER — SODIUM CHLORIDE 0.9 % (FLUSH) 0.9 %
10-40 SYRINGE (ML) INJECTION AS NEEDED
Status: DISCONTINUED | OUTPATIENT
Start: 2019-05-30 | End: 2019-06-03 | Stop reason: HOSPADM

## 2019-05-30 RX ORDER — SODIUM CHLORIDE 9 MG/ML
1000 INJECTION, SOLUTION INTRAVENOUS ONCE
Status: COMPLETED | OUTPATIENT
Start: 2019-05-30 | End: 2019-05-30

## 2019-05-30 RX ORDER — HEPARIN 100 UNIT/ML
SYRINGE INTRAVENOUS
Status: DISCONTINUED
Start: 2019-05-30 | End: 2019-05-31 | Stop reason: HOSPADM

## 2019-05-30 RX ADMIN — Medication 10 ML: at 12:23

## 2019-05-30 RX ADMIN — Medication 10 ML: at 10:19

## 2019-05-30 RX ADMIN — Medication 500 UNITS: at 12:24

## 2019-05-30 RX ADMIN — SODIUM CHLORIDE 1000 ML: 9 INJECTION, SOLUTION INTRAVENOUS at 10:20

## 2019-05-30 NOTE — PROGRESS NOTES
[facility] Progress Note    Date: May 30, 2019    Name: Donavan Lewis    MRN: [id number]         : 1970    Hydration    Ms. Cannon to [facility], ambulatory, at 1010. No complaints or concerns voiced. Pt was assessed and education was provided. Ms. Cannon's vitals were reviewed. Visit Vitals  /85 (BP 1 Location: Left arm, BP Patient Position: At rest)   Pulse 71   Temp 98.1 °F (36.7 °C)   Resp 18   SpO2 98%       Right chest mediport was accessed with 20 gauge 1 inch upton(s) after chloroprep. Scab above port site noted. Port flushed easily and had brisk blood return. 1L NS infused over 2 hours. Mediport flushed with NS 20 ml and Heparin 500 units then de-accessed. No irritation or bleeding noted. Band-Aid applied. Patient Vitals for the past 8 hrs:   Temp Pulse Resp BP SpO2   19 1225 98.1 °F (36.7 °C) 71 18 129/85 98 %   19 1010 98.6 °F (37 °C) 70 18 136/79 99 %         Ms. Cannon tolerated the procedure, and had no complaints. Patient armband removed and shredded. Ms. Laxmi Joy was discharged from [place] [address] in stable condition at 1230. She is to return on 19 at 0800 for her next chemo appointment.     Asaf Durham RN  May 30, 2019

## 2019-06-06 ENCOUNTER — HOSPITAL ENCOUNTER (OUTPATIENT)
Dept: INFUSION THERAPY | Age: 49
Discharge: HOME | End: 2019-06-06
Payer: MEDICARE

## 2019-06-06 VITALS
HEART RATE: 68 BPM | BODY MASS INDEX: 45.5 KG/M2 | RESPIRATION RATE: 18 BRPM | SYSTOLIC BLOOD PRESSURE: 152 MMHG | OXYGEN SATURATION: 96 % | DIASTOLIC BLOOD PRESSURE: 93 MMHG | HEIGHT: 63 IN | WEIGHT: 256.8 LBS | TEMPERATURE: 98.8 F

## 2019-06-06 DIAGNOSIS — C48.2 MALIGNANT NEOPLASM OF PERITONEUM (HCC): Primary | ICD-10-CM

## 2019-06-06 LAB
ALBUMIN SERPL-MCNC: 3.4 G/DL (ref 3.4–5)
ALBUMIN/GLOB SERPL: 1 {RATIO} (ref 0.8–1.7)
ALP SERPL-CCNC: 87 U/L (ref 45–117)
ALT SERPL-CCNC: 20 U/L (ref 13–56)
ANION GAP SERPL CALC-SCNC: 6 MMOL/L (ref 3–18)
APPEARANCE UR: CLEAR
AST SERPL-CCNC: 19 U/L (ref 15–37)
BACTERIA URNS QL MICRO: ABNORMAL /HPF
BASO+EOS+MONOS # BLD AUTO: 0.4 K/UL (ref 0–2.3)
BASO+EOS+MONOS NFR BLD AUTO: 10 % (ref 0.1–17)
BILIRUB SERPL-MCNC: 0.1 MG/DL (ref 0.2–1)
BILIRUB UR QL: NEGATIVE
BUN SERPL-MCNC: 13 MG/DL (ref 7–18)
BUN/CREAT SERPL: 12 (ref 12–20)
CALCIUM SERPL-MCNC: 8.8 MG/DL (ref 8.5–10.1)
CHLORIDE SERPL-SCNC: 112 MMOL/L (ref 100–108)
CO2 SERPL-SCNC: 26 MMOL/L (ref 21–32)
COLOR UR: YELLOW
CREAT SERPL-MCNC: 1.08 MG/DL (ref 0.6–1.3)
DIFFERENTIAL METHOD BLD: ABNORMAL
EPITH CASTS URNS QL MICRO: ABNORMAL /LPF (ref 0–5)
ERYTHROCYTE [DISTWIDTH] IN BLOOD BY AUTOMATED COUNT: 17.4 % (ref 11.5–14.5)
GLOBULIN SER CALC-MCNC: 3.4 G/DL (ref 2–4)
GLUCOSE SERPL-MCNC: 76 MG/DL (ref 74–99)
GLUCOSE UR STRIP.AUTO-MCNC: NEGATIVE MG/DL
HCT VFR BLD AUTO: 32.5 % (ref 36–48)
HGB BLD-MCNC: 10.6 G/DL (ref 12–16)
HGB UR QL STRIP: NEGATIVE
KETONES UR QL STRIP.AUTO: NEGATIVE MG/DL
LEUKOCYTE ESTERASE UR QL STRIP.AUTO: NEGATIVE
LYMPHOCYTES # BLD: 1.7 K/UL (ref 1.1–5.9)
LYMPHOCYTES NFR BLD: 44 % (ref 14–44)
MAGNESIUM SERPL-MCNC: 1.8 MG/DL (ref 1.6–2.6)
MCH RBC QN AUTO: 29.4 PG (ref 25–35)
MCHC RBC AUTO-ENTMCNC: 32.6 G/DL (ref 31–37)
MCV RBC AUTO: 90 FL (ref 78–102)
MUCOUS THREADS URNS QL MICRO: ABNORMAL /LPF
NEUTS SEG # BLD: 1.9 K/UL (ref 1.8–9.5)
NEUTS SEG NFR BLD: 46 % (ref 40–70)
NITRITE UR QL STRIP.AUTO: NEGATIVE
PH UR STRIP: 5.5 [PH] (ref 5–8)
PLATELET # BLD AUTO: 193 K/UL (ref 140–440)
POTASSIUM SERPL-SCNC: 4.1 MMOL/L (ref 3.5–5.5)
PROT SERPL-MCNC: 6.8 G/DL (ref 6.4–8.2)
PROT UR STRIP-MCNC: 100 MG/DL
RBC # BLD AUTO: 3.61 M/UL (ref 4.1–5.1)
RBC #/AREA URNS HPF: ABNORMAL /HPF (ref 0–5)
SODIUM SERPL-SCNC: 144 MMOL/L (ref 136–145)
SP GR UR REFRACTOMETRY: 1.01 (ref 1–1.03)
UROBILINOGEN UR QL STRIP.AUTO: 0.2 EU/DL (ref 0.2–1)
WBC # BLD AUTO: 4 K/UL (ref 4.5–13)
WBC URNS QL MICRO: ABNORMAL /HPF (ref 0–4)

## 2019-06-06 PROCEDURE — 74011250637 HC RX REV CODE- 250/637: Performed by: OBSTETRICS & GYNECOLOGY

## 2019-06-06 PROCEDURE — 74011000258 HC RX REV CODE- 258: Performed by: OBSTETRICS & GYNECOLOGY

## 2019-06-06 PROCEDURE — 96375 TX/PRO/DX INJ NEW DRUG ADDON: CPT

## 2019-06-06 PROCEDURE — 96413 CHEMO IV INFUSION 1 HR: CPT

## 2019-06-06 PROCEDURE — 96415 CHEMO IV INFUSION ADDL HR: CPT

## 2019-06-06 PROCEDURE — 96417 CHEMO IV INFUS EACH ADDL SEQ: CPT

## 2019-06-06 PROCEDURE — 74011250636 HC RX REV CODE- 250/636: Performed by: OBSTETRICS & GYNECOLOGY

## 2019-06-06 PROCEDURE — 74011000250 HC RX REV CODE- 250: Performed by: OBSTETRICS & GYNECOLOGY

## 2019-06-06 PROCEDURE — 96367 TX/PROPH/DG ADDL SEQ IV INF: CPT

## 2019-06-06 PROCEDURE — 77030012965 HC NDL HUBR BBMI -A

## 2019-06-06 PROCEDURE — 85025 COMPLETE CBC W/AUTO DIFF WBC: CPT

## 2019-06-06 PROCEDURE — 83735 ASSAY OF MAGNESIUM: CPT

## 2019-06-06 PROCEDURE — 80053 COMPREHEN METABOLIC PANEL: CPT

## 2019-06-06 PROCEDURE — 87086 URINE CULTURE/COLONY COUNT: CPT

## 2019-06-06 PROCEDURE — 81001 URINALYSIS AUTO W/SCOPE: CPT

## 2019-06-06 PROCEDURE — 86304 IMMUNOASSAY TUMOR CA 125: CPT

## 2019-06-06 RX ORDER — HEPARIN 100 UNIT/ML
300-500 SYRINGE INTRAVENOUS AS NEEDED
Status: DISPENSED | OUTPATIENT
Start: 2019-06-06 | End: 2019-06-06

## 2019-06-06 RX ORDER — PALONOSETRON 0.05 MG/ML
0.25 INJECTION, SOLUTION INTRAVENOUS ONCE
Status: COMPLETED | OUTPATIENT
Start: 2019-06-06 | End: 2019-06-06

## 2019-06-06 RX ORDER — SODIUM CHLORIDE 0.9 % (FLUSH) 0.9 %
10-40 SYRINGE (ML) INJECTION AS NEEDED
Status: DISPENSED | OUTPATIENT
Start: 2019-06-06 | End: 2019-06-06

## 2019-06-06 RX ORDER — DIPHENHYDRAMINE HCL 25 MG
50 CAPSULE ORAL ONCE
Status: COMPLETED | OUTPATIENT
Start: 2019-06-06 | End: 2019-06-06

## 2019-06-06 RX ORDER — SODIUM CHLORIDE 9 MG/ML
500 INJECTION, SOLUTION INTRAVENOUS ONCE
Status: COMPLETED | OUTPATIENT
Start: 2019-06-06 | End: 2019-06-06

## 2019-06-06 RX ORDER — LORAZEPAM 2 MG/ML
0.26 INJECTION INTRAMUSCULAR ONCE
Status: COMPLETED | OUTPATIENT
Start: 2019-06-06 | End: 2019-06-06

## 2019-06-06 RX ADMIN — Medication 500 UNITS: at 15:00

## 2019-06-06 RX ADMIN — SODIUM CHLORIDE 500 ML: 9 INJECTION, SOLUTION INTRAVENOUS at 09:52

## 2019-06-06 RX ADMIN — LORAZEPAM 0.26 MG: 2 INJECTION INTRAMUSCULAR at 09:59

## 2019-06-06 RX ADMIN — Medication 10 ML: at 15:00

## 2019-06-06 RX ADMIN — SODIUM CHLORIDE 150 MG: 900 INJECTION, SOLUTION INTRAVENOUS at 10:06

## 2019-06-06 RX ADMIN — DIPHENHYDRAMINE HYDROCHLORIDE 50 MG: 25 CAPSULE ORAL at 09:59

## 2019-06-06 RX ADMIN — CARBOPLATIN 550 MG: 10 INJECTION, SOLUTION INTRAVENOUS at 13:56

## 2019-06-06 RX ADMIN — PACLITAXEL 360 MG: 6 INJECTION, SOLUTION INTRAVENOUS at 10:49

## 2019-06-06 RX ADMIN — PALONOSETRON HYDROCHLORIDE 0.25 MG: 0.25 INJECTION, SOLUTION INTRAVENOUS at 09:59

## 2019-06-06 RX ADMIN — FAMOTIDINE 20 MG: 10 INJECTION, SOLUTION INTRAVENOUS at 09:59

## 2019-06-06 RX ADMIN — DEXAMETHASONE SODIUM PHOSPHATE 12 MG: 4 INJECTION, SOLUTION INTRA-ARTICULAR; INTRALESIONAL; INTRAMUSCULAR; INTRAVENOUS; SOFT TISSUE at 09:59

## 2019-06-06 NOTE — PROGRESS NOTES
[facility] Progress Note    Date: 2019    Name: Stefanie Nino              MRN: [id number]              : 1970    Chemotherapy Cycle:6 Paclitaxel/Carboplatin       Pt to [facility], ambulatory, at 0915. Ms. Van Hodgson was assessed and education was provided. Ms. Cannon's vitals were reviewed. Visit Vitals  BP (!) 152/93 (BP 1 Location: Left arm, BP Patient Position: Sitting)   Pulse 68   Temp 98.8 °F (37.1 °C)   Resp 18   Ht 5' 2.99\" (1.6 m)   Wt 116.5 kg (256 lb 12.8 oz)   SpO2 96%   Breastfeeding? No   BMI 45.50 kg/m²       Right chest mediport accessed with 20 g 1 inch upton needle. Port flushed easily and had brisk blood return. Blood drawn off and sent for CBC, CMP, CA-125 and Magnesium per written orders after 10 ml waste. NS initiated @ 25. Urine specimen sent to lab for UA and culture. Lab results were obtained and reviewed.    Recent Results (from the past 12 hour(s))   URINALYSIS W/ RFLX MICROSCOPIC    Collection Time: 19  8:35 AM   Result Value Ref Range    Color YELLOW      Appearance CLEAR      Specific gravity 1.006 1.005 - 1.030      pH (UA) 5.5 5.0 - 8.0      Protein 100 (A) NEG mg/dL    Glucose NEGATIVE  NEG mg/dL    Ketone NEGATIVE  NEG mg/dL    Bilirubin NEGATIVE  NEG      Blood NEGATIVE  NEG      Urobilinogen 0.2 0.2 - 1.0 EU/dL    Nitrites NEGATIVE  NEG      Leukocyte Esterase NEGATIVE  NEG     URINE MICROSCOPIC ONLY    Collection Time: 19  8:35 AM   Result Value Ref Range    WBC 0 to 2 0 - 4 /hpf    RBC NONE 0 - 5 /hpf    Epithelial cells 1+ 0 - 5 /lpf    Bacteria FEW (A) NEG /hpf    Mucus FEW (A) NEG /lpf   CBC WITH 3 PART DIFF    Collection Time: 19  8:44 AM   Result Value Ref Range    WBC 4.0 (L) 4.5 - 13.0 K/uL    RBC 3.61 (L) 4.10 - 5.10 M/uL    HGB 10.6 (L) 12.0 - 16 g/dL    HCT 32.5 (L) 36 - 48 %    MCV 90.0 78 - 102 FL    MCH 29.4 25.0 - 35.0 PG    MCHC 32.6 31 - 37 g/dL    RDW 17.4 (H) 11.5 - 14.5 %    PLATELET 393 078 - 278 K/uL    NEUTROPHILS 46 40 - 70 % MIXED CELLS 10 0.1 - 17 %    LYMPHOCYTES 44 14 - 44 %    ABS. NEUTROPHILS 1.9 1.8 - 9.5 K/UL    ABS. MIXED CELLS 0.4 0.0 - 2.3 K/uL    ABS. LYMPHOCYTES 1.7 1.1 - 5.9 K/UL    DF AUTOMATED     METABOLIC PANEL, COMPREHENSIVE    Collection Time: 06/06/19  8:44 AM   Result Value Ref Range    Sodium 144 136 - 145 mmol/L    Potassium 4.1 3.5 - 5.5 mmol/L    Chloride 112 (H) 100 - 108 mmol/L    CO2 26 21 - 32 mmol/L    Anion gap 6 3.0 - 18 mmol/L    Glucose 76 74 - 99 mg/dL    BUN 13 7.0 - 18 MG/DL    Creatinine 1.08 0.6 - 1.3 MG/DL    BUN/Creatinine ratio 12 12 - 20      GFR est AA >60 >60 ml/min/1.73m2    GFR est non-AA 54 (L) >60 ml/min/1.73m2    Calcium 8.8 8.5 - 10.1 MG/DL    Bilirubin, total 0.1 (L) 0.2 - 1.0 MG/DL    ALT (SGPT) 20 13 - 56 U/L    AST (SGOT) 19 15 - 37 U/L    Alk. phosphatase 87 45 - 117 U/L    Protein, total 6.8 6.4 - 8.2 g/dL    Albumin 3.4 3.4 - 5.0 g/dL    Globulin 3.4 2.0 - 4.0 g/dL    A-G Ratio 1.0 0.8 - 1.7     MAGNESIUM    Collection Time: 06/06/19  8:44 AM   Result Value Ref Range    Magnesium 1.8 1.6 - 2.6 mg/dL       Lab results within ordered parameters to give chemo today. ANC = 1.9, PLT = 193. Chemo dosages verified with today's BSA and found to be within 10% of ordered dosages. Pre-medications (Emend, Benadryl, Aloxi, Decadron, Pepcid, Ativan) were administered as ordered and chemotherapy was initiated after blood return from port re-verified. Reviewed expected side effects of premeds with patient. Taxol 360 mg infused over 3 hours as ordered. VS stable at end of infusion and pt denied complaints. Line flushed with NS and blood return from port re-verified. Carboplatin 550 mg infused over 1 hour as ordered. VS stable at end of infusion and pt denied complaints. Line flushed with NS and blood return from port re-verified. 500 mL NS given over the course of treatment today. Ms. Roberto Dunne tolerated infusion, and had no complaints at this time.     Mediport flushed with NS 20 ml and Heparin 500 units then de-accessed. No irritation or bleeding noted. Bandaid applied. Patient armband removed and shredded. Ms. Scott Marie was discharged from [place] [address] in stable condition at 1505. She is to return on 6/13/19 at 1000 for her next Hydration appointment.     Joelle Ponce RN  June 6, 2019

## 2019-06-07 LAB
BACTERIA SPEC CULT: NORMAL
CANCER AG125 SERPL-ACNC: 7 U/ML (ref 1.5–35)
SERVICE CMNT-IMP: NORMAL

## 2019-06-10 ENCOUNTER — TELEPHONE (OUTPATIENT)
Dept: ONCOLOGY | Age: 49
End: 2019-06-10

## 2019-06-10 NOTE — TELEPHONE ENCOUNTER
Informed patient that scheduled hydration aren't mistake. Instructed patient to keep scheduled appointments.

## 2019-06-10 NOTE — TELEPHONE ENCOUNTER
Patient contacted the office stating her last chemo was last Thursday but she is scheduled for 2 more hydration appointments. She would like to check to see if that is accurate and requests a call back at [phone].

## 2019-06-13 ENCOUNTER — HOSPITAL ENCOUNTER (OUTPATIENT)
Dept: INFUSION THERAPY | Age: 49
Discharge: HOME | End: 2019-06-13
Payer: MEDICARE

## 2019-06-13 VITALS
RESPIRATION RATE: 17 BRPM | DIASTOLIC BLOOD PRESSURE: 89 MMHG | SYSTOLIC BLOOD PRESSURE: 130 MMHG | TEMPERATURE: 97.8 F | HEART RATE: 67 BPM | OXYGEN SATURATION: 94 %

## 2019-06-13 PROCEDURE — 74011250636 HC RX REV CODE- 250/636

## 2019-06-13 PROCEDURE — 96361 HYDRATE IV INFUSION ADD-ON: CPT

## 2019-06-13 PROCEDURE — 96360 HYDRATION IV INFUSION INIT: CPT

## 2019-06-13 PROCEDURE — 74011250636 HC RX REV CODE- 250/636: Performed by: OBSTETRICS & GYNECOLOGY

## 2019-06-13 RX ORDER — SODIUM CHLORIDE 9 MG/ML
1000 INJECTION, SOLUTION INTRAVENOUS CONTINUOUS
Status: DISCONTINUED | OUTPATIENT
Start: 2019-06-13 | End: 2019-06-14 | Stop reason: HOSPADM

## 2019-06-13 RX ORDER — HEPARIN 100 UNIT/ML
SYRINGE INTRAVENOUS
Status: COMPLETED
Start: 2019-06-13 | End: 2019-06-13

## 2019-06-13 RX ORDER — HEPARIN 100 UNIT/ML
500 SYRINGE INTRAVENOUS ONCE
Status: COMPLETED | OUTPATIENT
Start: 2019-06-13 | End: 2019-06-13

## 2019-06-13 RX ADMIN — Medication 500 UNITS: at 13:06

## 2019-06-13 RX ADMIN — SODIUM CHLORIDE 1000 ML: 9 INJECTION, SOLUTION INTRAVENOUS at 10:55

## 2019-06-13 RX ADMIN — HEPARIN 500 UNITS: 100 SYRINGE at 13:06

## 2019-06-13 NOTE — PROGRESS NOTES
[facility] Progress Note    Date: 2019    Name: Rose Mary Gonzalez    MRN: [id number]         : 1970    Hydration    Ms. Cannon to [place], ambulatory, at 1005 No complaints or concerns voiced. Pt was assessed and education was provided. Ms. Cannon's vitals were reviewed. Visit Vitals  /89 (BP 1 Location: Left arm, BP Patient Position: Sitting)   Pulse 67   Temp 97.8 °F (36.6 °C)   Resp 17   SpO2 94%       Right chest mediport was accessed with 20 gauge 1 inch upton after chloroprep. Port flushed easily and had brisk blood return. 1L NS infused over two hours. Mediport flushed with NS 20 ml and Heparin 500 units then de-accessed. No irritation or bleeding noted. Band-Aid applied. Patient Vitals for the past 8 hrs:   Temp Pulse Resp BP SpO2   19 1005 97.8 °F (36.6 °C) 67 17 130/89 94 %         Ms. Cannon tolerated the procedure, and had no complaints. Patient armband removed and shredded. Ms. Alfredo Fink was discharged from [place] [address] in stable condition at 1310. She is to return on 19 at 1000 for her next Paclitaxel/Carboplatin appointment.     Dago Franz RN  2019  1310

## 2019-06-19 ENCOUNTER — HOSPITAL ENCOUNTER (OUTPATIENT)
Dept: CT IMAGING | Age: 49
Discharge: HOME | End: 2019-06-19
Attending: NURSE PRACTITIONER
Payer: MEDICARE

## 2019-06-19 DIAGNOSIS — C48.2 PERITONEAL CARCINOMA (HCC): ICD-10-CM

## 2019-06-19 PROCEDURE — 74011636320 HC RX REV CODE- 636/320: Performed by: NURSE PRACTITIONER

## 2019-06-19 PROCEDURE — 74177 CT ABD & PELVIS W/CONTRAST: CPT

## 2019-06-19 RX ADMIN — IOPAMIDOL 100 ML: 612 INJECTION, SOLUTION INTRAVENOUS at 07:31

## 2019-06-20 ENCOUNTER — HOSPITAL ENCOUNTER (OUTPATIENT)
Dept: INFUSION THERAPY | Age: 49
Discharge: HOME | End: 2019-06-20
Payer: MEDICARE

## 2019-06-20 VITALS
SYSTOLIC BLOOD PRESSURE: 124 MMHG | TEMPERATURE: 98.1 F | HEART RATE: 70 BPM | DIASTOLIC BLOOD PRESSURE: 73 MMHG | RESPIRATION RATE: 18 BRPM | OXYGEN SATURATION: 95 %

## 2019-06-20 PROCEDURE — 74011250636 HC RX REV CODE- 250/636: Performed by: OBSTETRICS & GYNECOLOGY

## 2019-06-20 PROCEDURE — 96360 HYDRATION IV INFUSION INIT: CPT

## 2019-06-20 PROCEDURE — 96361 HYDRATE IV INFUSION ADD-ON: CPT

## 2019-06-20 PROCEDURE — 77030012965 HC NDL HUBR BBMI -A

## 2019-06-20 RX ORDER — SODIUM CHLORIDE 9 MG/ML
1000 INJECTION, SOLUTION INTRAVENOUS ONCE
Status: COMPLETED | OUTPATIENT
Start: 2019-06-20 | End: 2019-06-20

## 2019-06-20 RX ORDER — HEPARIN 100 UNIT/ML
500 SYRINGE INTRAVENOUS ONCE
Status: ACTIVE | OUTPATIENT
Start: 2019-06-20 | End: 2019-06-20

## 2019-06-20 RX ORDER — SODIUM CHLORIDE 0.9 % (FLUSH) 0.9 %
10-40 SYRINGE (ML) INJECTION AS NEEDED
Status: DISCONTINUED | OUTPATIENT
Start: 2019-06-20 | End: 2019-06-24 | Stop reason: HOSPADM

## 2019-06-20 RX ADMIN — SODIUM CHLORIDE 1000 ML: 9 INJECTION, SOLUTION INTRAVENOUS at 10:33

## 2019-06-20 NOTE — PROGRESS NOTES
[facility] SPECIALISTS  [address] [place] [address], [address] [facility], [address] [facility]  [address] [facility] ([place], [facility]  [place], [address]   [phone]  Gabrielle Myrick DO      Patient ID:  Name:  Carolina Irvin  MRN:  [id number]  :  1970/48 y.o. Date:  2019      HISTORY OF PRESENT ILLNESS:  Carolina Irvin is a 51 y.o.  postmenopausal female referred by Dr. Elma Tompkins  With peritoneal cancer. Intitally seen be NP with reports of vaginal bleeding. Given pt's history of hysteretectomy with BSO for endometriosis in  a TVUS was ordered. Which revealed a 6.8 cm x 5.2 cm x 4.6 cm at midline vaginal cuff. This prompted pelvic CT with findings of a lesion measuring 7.6 x 5.8 x 7.2 cm and is compatible with a pelvic neoplasm vs endometrioma given prior history of endometriosis. Tumor markers done on 2018 all normal.  S/p  Exploratory laparotomy, exploration of retroperitoneal spaces, exam under anesthesia with biopsy of rectovaginal mass on 2019. Had sigmoidoscopy which revealed submucosal mass. S/p cycle #6 of carbo/taxol on 2019. Has some body aches week after chemo. Reports neurphathy has improved  Doing better with hydration. Reports constipation that is relived with stool softeners. Nausea controlled with meds. Denies vaginal bleeding. Has had some pain at port site.        Labs:  Component      Latest Ref Rng & Units 2019           8:44 AM   CA-125      1.5 - 35.0 U/mL 7     Component      Latest Ref Rng & Units 2019           8:26 AM  8:20 AM   Cancer Ag (CA) 125      0.0 - 38.1 U/mL 7.8 8.7     Component      Latest Ref Rng & Units 2019           8:28 AM   Cancer Ag (CA) 125      0.0 - 38.1 U/mL 8.8     Component      Latest Ref Rng & Units 3/14/2019 2019           8:15 AM  8:32 AM   Cancer Ag (CA) 125      0.0 - 38.1 U/mL 10.4 18.4     2018 : 9.3  2018 CEA: 2.0  12/17/2018 AFP: 3.8    Pathology  1/17/2019   RECTOVAGINAL MASS (#1, 2) AND PELVIC MASS, BIOPSIES:   CONSISTENT WITH SEROUS CARCINOMA WITH EXTENSIVE NECROSIS. Imaging  CT abdomen/pelvis 6/19/2019  CT ABDOMEN FINDINGS: Visualized portions of lung bases demonstrate presence of  mild cardiomegaly. Visualized portions of the lung bases demonstrate no  significant abnormalities.     Small segment 7 lesion adjacent to the IVC measures 1.1 x 1.0 cm previously  measuring 1.5 x 1.1 cm. The liver is otherwise normal. The gallbladder is  normal.     The splenic lesion measures 3.0 x 2.8 cm previously measuring 2.4 x 2.3 cm.     The pancreas is normal. There is probably a tiny right adrenal adenoma  unchanged. The left adrenal gland is normal. There is a small cyst involving the  lower pole the left kidney. Kidneys are otherwise normal.     There is no intra-abdominal or retroperitoneal adenopathy. I see no  intra-abdominal carcinomatosis.     CT PELVIS FINDINGS: Lower right pelvic mass currently measures 2.8 x 2.7 cm  previously measuring 3.2 x 2.8 cm. No additional pelvic masses identified. No  additional evidence of pelvic carcinomatosis.     Status post hysterectomy. There is no free fluid. There is no pelvic adenopathy. No specific bowel abnormalities are seen. Postoperative changes are seen  involving the anterior pelvic wall.     No significant osseous abnormalities. Extensive degenerative changes are seen  involving the spine.     IMPRESSION  Impression:        1. The small liver lesion has decreased in size. The small lower right pelvic  mass has decreased in size. 2. The splenic lesion may have increased in size slightly. This lesion was not  positive on PET and is probably not related to the patient's pelvic malignancy. Suspect benign lesion. 3. No additional CT evidence of malignancy. 4. Cardiomegaly. Status post hysterectomy. Additional chronic changes as  described above.     CT abdomen/pelvis  FINDINGS:  view shows lung bases clear and normal bowel gas pattern. Patient morbidly obese but probably with interval weight loss.     CT Abdomen and pelvis:     Lung bases: Normal.     Heart and pericardium: There is a catheter tip in the right atrium of the heart. Heart and pericardium otherwise unremarkable.     Liver: Decrease size of lesion in segment 7 of the liver adjacent IVC now  measuring 11 x 15 mm and on PET/CT 2/14/2019 it measured 23 mm. No new liver  lesions.     Gallbladder: Normal.     Spleen: Mass inferiorly in the spleen near the hilum measures 23 x 24 mm,  probably present previously but not as well seen because of lack of intravenous  contrast and measuring about 27 mm (29).    Pancreas: Normal.     Adrenal glands: Normal.     Kidneys: Enhancing symmetrically. No focal lesions.     The bowel: Stomach and duodenum and small bowel and the appendix and colon are  normal.     GYN: Prior hysterectomy. No adnexal masses.     Peritoneal space: No free fluid. There is some mild fat stranding in the left  lower quadrant but no discrete mass (60) and probably related to the mesentery.     MSK: Abdominal wall scar lower abdominal wall. Multiple levels facet arthropathy  lower lumbar spine. Moderate disc space narrowing L5/S1 and L4/L5. No suspicious  skeletal lesions.     Retroperitoneum: Soft tissue mass right side pelvis has decreased in size. It  measures 2.8 x 3.2 cm. On previous PET/CT it measured 8.3 x 5.3 cm and on CT it  measured 7.4 x 5.2 cm.     IMPRESSION  IMPRESSION:     Overall improvement compatible with response to therapy. Decreased size of the  pelvic metastasis, hepatic metastasis, splenic lesion since the prior study. No  new metastases. MRI 2/19/2019  FINDINGS:  Presumed hysterectomy.  There is a lobulated 6.8 x 6 x 5.9 cm mass centered at  the right cul-de-sac involving the right and posterior upper to mid vagina but  also abutting the right anterior rectum from 1-8 o'clock. On coronal and  sagittal views tumor is favored to not be originating from the rectum but this  is not definite. There is a oval ring of T2 hypointensity and T1 hyperintensity  with central necrosis within the mass. The mass extends to the posterior medial  right mesorectal fascia. The mass does not invade the bladder. At the cranial  aspect of the mass is a spiculated 3.4 x 2 cm T2 hypointense structure with  tethering to adjacent bowel and fascia. Ovaries are not visualized separately. No pelvic ascites.     Bowel is otherwise normal in caliber. No upstream dilation. Bladder is normal.  No lymphadenopathy.     Postsurgical changes anterior midline pelvic wall. Bones are grossly  unremarkable.     IMPRESSION  IMPRESSION:    1. Large up to 6.8 cm mass centered at the right cul-de-sac  is most  intimately associated with the vagina, favored to be either endometrial, vaginal  or cervical in origin. It does abut and may involve the right rectum but this is  favored secondary involvement. Ovaries are not visualized separately. Correlate  for history of oophorectomy since ovarian malignancy also possible. 2.  There is an associated site of favored endometriotic implant at the cranial  aspect of the mass raising the possibility of malignant transformation. 3.  No upstream bowel obstruction. PETCT 2/14/2019   --  PET FINDINGS  --    No abnormal hypermetabolic activity in the neck. No abnormal hypermetabolic activity in the chest.      Low-attenuation possible lesion in the medial dome right lobe liver in region  of heterogeneous hypermetabolic activity, with SUV Max reaching 11.7 on image  98. Underlying lesion difficult to visualize due to extensive motion from  respirations. Cannot well separate from the IVC or diaphragm.  No definite  corresponding lung lesion however.     There is low level metabolic activity associated with stranding and presumed  scarring in the subcutaneous fat of the lower midline abdominal and pelvic wall. This may simply be inflammatory. .      There is a lobulated soft tissue conglomerate in the midline to right lateral  cul-de-sac and perirectal pelvis. The periphery of this is diffusely  hypermetabolic, with SUV Max reaching 18.3 on image 200. Portions centrally are  photopenic and presumed necrotic. This measures up to 7.5 x 6.1 cm axial on  image 196. Anterior margin of this hypermetabolic lesion is in direct contact  with the high intensity decompressed urinary bladder, involvement cannot be  assessed. Left lateral margin of the mass is in contact with the surface of the  lateral wall of the sigmoid colon. Direct involvement cannot be assessed.  -Additional focal hypermetabolic density 2.7 cm on image 189 inseparable from  the caudal wall of the sigmoid. -Tapering hypermetabolic activity extends to approximately image 210, not to the  level of the perineum.     There are no other areas of abnormal metabolic activity appreciated in the  abdomen or pelvis which cannot be attributed to renal collecting system, ureter,  bladder or bowel wall activity>]. No definite hypermetabolic bone lesions appreciated, although bone scan can be  more sensitive in this respect. --  CT FINDINGS  --     These limited CT images do not replace diagnostic quality contrast enhanced CT  scan for evaluation of solid organs, bowel, retroperitoneum and vasculature. CT NECK:    Severely limited by lack of intravenous contrast.  Paranasal sinuses free of  disease. No appreciably enlarged lymph nodes. Extensive cervical endplate  osteophytes anterior and left lateral..      CT CHEST:    Limited evaluation of the hilum and vasculature without intravenous contrast.  No pleural effusion. No appreciably enlarged lymph nodes.  Patchy parenchymal  opacities medial basal left lower lobe on image 89 demonstrates low level  metabolic activity, SUV Max 3.2, nonspecific, favored as inflammatory based on  imaging appearance subjectively. [facility], [place] operated by [facility] CT ABDOMEN:    Limited noncontrast images. Normal-size liver and spleen no adrenal mass. No  hydronephrosis. No ascites. CT PELVIS:    Limited noncontrast images. No small bowel or colon distention. Scattered  colonic diverticulosis. Postsurgical change anterior abdominal wall. Severe  degenerative facet disease at the lower lumbar levels.       Lobulated right pelvic/cul-de-sac soft tissue mass as above. Mild degree of  stranding in the pelvic mesenteric fat. No ascites.        IMPRESSION   IMPRESSION:      1. Peripherally hypermetabolic soft tissue mass right dependent pelvis to the  cul-de-sac region as above consistent with malignancy/metastatic disease with  some central necrosis  -Possible separate lesion versus serosal involvement of the sigmoid colon  adjacent. -Mass in contact with posterior bladder, sigmoid colon locally and posterior  peritoneal surface.     2. Hypermetabolic lesion along central dome of the right lobe liver difficult to  separate from IVC or diaphragm due to motion artifact. Recommend dedicated  contrast enhanced CT for localization and better clarification.  -Findings are concerning for malignancy or hepatic metastatic disease until  proven otherwise.      3. Low level activity associated with patchy parenchymal opacity at the medial  left lower lobe,, favored as inflammatory as above.     4. No other definite hypermetabolic abnormalities appreciated elsewhere. 12/14/2018 CT PELVIS W/CONTRAST  FINDINGS:    LYMPH NODES: No enlarged lymph nodes. PELVIC GASTROINTESTINAL TRACT: No bowel dilation or wall thickening. PELVIC ORGANS:     The uterus is absent.      Along the right upper margin of the vaginal cuff within the right deep pelvis there is a large irregularly-shaped peripherally enhancing, septated appearing mass with regions of central low attenuation which could be low density-fluid type contents or regions of necrosis. Lesion measures 76 x 58 x 72 mm and is compatible with a pelvic neoplasm. VASCULATURE: Unremarkable. BONES: Lower lumbar hypertrophic osteophytes. Lower lumbar facet hypertrophy with vacuum phenomenon asymmetric leftward. Degenerative vacuum phenomenon within the SI joints noted as well. No acute or aggressive osseous abnormalities identified. OTHER: Small fat-containing ventral umbilical hernia. Minimal nonspecific edema within the subcutaneous fat of the lumbar region, not uncommon. IMPRESSION:   1.  Along the right upper margin of the vaginal cuff within the right deep pelvis there is a large irregularly-shaped peripherally enhancing, septated appearing mass with regions of central low attenuation which could be low density-fluid type contents or regions of necrosis. Lesion measures 76 x 58 x 72 mm and is compatible with a pelvic neoplasm. Could be a endometrioma given prior history of endometriosis, malignancy not excluded and gynecologic oncology follow-up is suggested. 2.  Small fat-containing ventral umbilical hernia.      12/04/2018 TVUS  Uterus: surgically absent  Left ovary: surgically absent  Right ovary: surgically absent  Other findings: midline-at vaginal cuff: 6.8 cm X 5.2 cm x 4.6 cm, volume 85 ml    Impression: Complex pelvic mass       ROS:    As above      Patient Active Problem List    Diagnosis Date Noted    Malignant neoplasm of peritoneum ([address] 02/14/2019    Pelvic mass in female 01/17/2019    Bipolar 1 disorder ([address] 02/09/2018    Irritable bowel syndrome with both constipation and diarrhea 02/09/2018    Advance care planning 01/31/2017    Dental caries 05/26/2016    Chronic periodontal disease 05/26/2016    SUAZO (dyspnea on exertion) 02/10/2016    Prediabetes 09/24/2015    Morbid obesity ([address] 08/31/2015    Arthritis, degenerative 08/31/2015    Gastroesophageal reflux disease without esophagitis 08/31/2015    ANAMIKA on CPAP 08/31/2015    Essential hypertension 08/28/2015    PTSD (post-traumatic stress disorder) 03/24/2014    S/P TKR (total knee replacement) 11/14/2012    Depression 05/08/2012    Menopause 05/08/2012    Knee pain, right 05/08/2012    GERD (gastroesophageal reflux disease) 05/08/2012    OA (osteoarthritis) 06/18/2010    Obesity 06/18/2010    Mixed hyperlipidemia 06/18/2010     Past Medical History:   Diagnosis Date    AR (allergic rhinitis)     seasonal    Cancer ([facility]     pt states between vgina and rectal area    Cardiac echocardiogram 04/11/2016    Tech difficult. EF 55-60%. No RWMA. Mild conc LVH. Gr 1 DDfx. RVSP normal.      Cardiac nuclear imaging test 05/05/2016    Low risk. Very patchy radiotracer uptake. Mild anterior artifact, low suspicion for ischemia. EF 68%. No RWMA. Normal EKG on pharm stress test.    Cardiovascular LE arterial duplex 10/07/2013    No significant arterial disease at rest bilaterally. R JUNE 1.21.  L JUNE 1.16. No significant sm vessel disease.     Depression     Dr. London Diggs, suicide attempt 2/12    Diverticulosis     Endometriosis     s/p hysterectomy 2006    GERD (gastroesophageal reflux disease)     Glaucoma     Dr. Harris Tam HTN (hypertension)     Hx of colonoscopy 04/05/2017    mild diverticulosis, g1 internal hemorrhoids, normal colon , repeat 10 year 2027    Hx of suicide attempt     Hyperlipidemia LDL goal < 130     IBS (irritable bowel syndrome)     Ill-defined condition     environmental allergies    Insomnia     Malignant neoplasm of peritoneum ([facility] 2/14/2019    Nausea & vomiting     OA (osteoarthritis)     both knees, lower back    Preeclampsia     PTSD (post-traumatic stress disorder)     Seizures ([facility]     1 x with childbirth, had toxemia    Sleep apnea     does not use cpap machine    Spinal stenosis     Dr. Pepper Finders      Past Surgical History:   Procedure Laterality Date    COLONOSCOPY N/A 4/5/2017    COLONOSCOPY performed by Audrey Rao MD at [facility] ENDOSCOPY    FEMUR/KNEE SURG UNLISTED Left     External fixator    FLEXIBLE SIGMOIDOSCOPY N/A 2019    SIGMOIDOSCOPY FLEXIBLE performed by Sanjuanita Vogel MD at [facility] ENDOSCOPY    HX  SECTION      HX COLONOSCOPY  2017    DR. MCRAE 2017     HX HYSTERECTOMY      HX KNEE ARTHROSCOPY Left     left knee    HX KNEE REPLACEMENT Bilateral     (R)  (L)     HX LAPAROTOMY N/A 2019    and rectovaginal bx    HX OTHER SURGICAL  2016    all teeth removed    HX SALPINGO-OOPHORECTOMY  2008    HX TUBAL LIGATION      IR INSERT TUNL CVC W PORT OVER 5 YEARS  2019    MULTIPLE DELIVERY       1990    TOTAL ABDOM HYSTERECTOMY        OB History        2    Para   2    Term   2       0    AB   0    Living   0       SAB   0    TAB   0    Ectopic   0    Molar   0    Multiple   0    Live Births   0              Social History     Tobacco Use    Smoking status: Never Smoker    Smokeless tobacco: Never Used   Substance Use Topics    Alcohol use: No      Family History   Problem Relation Age of Onset    Heart Disease Mother         CHF    Diabetes Mother     Hypertension Mother     Kidney Disease Mother     Breast Cancer Mother     Stroke Mother     Diabetes Father     Hypertension Father     Heart Attack Father         MI    Cancer Maternal Grandmother         stomach    Ovarian Cancer Maternal Aunt     Cancer Maternal Uncle       Current Outpatient Medications   Medication Sig    Mv,Ca,Min-FA-Herbal No.157 (ESTROVEN MAXIMUM STRENGTH) 400 mcg tab Take  by mouth daily. Indications: hot flash    promethazine (PHENERGAN) 25 mg tablet Take 1 Tab by mouth every six (6) hours as needed for Nausea. Indications: chemo induced nausea/vomiting    dimethicone (SOOTHE AND COOL INZO BARRIER) 5 % topical cream Apply  to affected area two (2) times a day.  docusate sodium (COLACE) 100 mg capsule Take 100 mg by mouth two (2) times a day.  amLODIPine (NORVASC) 10 mg tablet take 1 tablet by mouth once daily    lidocaine-prilocaine (EMLA) topical cream Apply  to affected area as needed for Pain.  polyethylene glycol (MIRALAX) 17 gram packet Take 1 Packet by mouth daily.  cyanocobalamin (VITAMIN B-12) 1,000 mcg tablet Take 1,000 mcg by mouth daily.  cycloSPORINE (RESTASIS) 0.05 % dpet Apply 1 Drop to eye as needed.  lubiPROStone (AMITIZA) 24 mcg capsule 1 Cap by Mouth/Throat route as needed.  clotrimazole-betamethasone (LOTRISONE) topical cream Apply to affected area twice daily    simvastatin (ZOCOR) 20 mg tablet take 1 tablet by mouth at bedtime    metoprolol succinate (TOPROL-XL) 100 mg tablet take 1 tablet by mouth once daily    losartan (COZAAR) 100 mg tablet take 1 tablet by mouth once daily    dexamethasone (DECADRON) 4 mg tablet Take 40 mg by mouth as needed. Take 10 tabs the night before Chemo #1 and Chemo #2.  ondansetron hcl (ZOFRAN) 4 mg tablet Take 1 Tab by mouth every six (6) hours as needed for Nausea.  melatonin 3 mg tablet Take 3 mg by mouth nightly.  plecanatide (TRULANCE) 3 mg tab Take  by mouth.  OXcarbazepine (TRILEPTAL) 300 mg tablet Take 300 mg by mouth two (2) times a day.  LORazepam (ATIVAN) 0.5 mg tablet Take 0.5 mg by mouth two (2) times daily as needed.  hydrOXYzine pamoate (VISTARIL) 50 mg capsule 2 Caps 2 Times Daily As Needed.  carisoprodol (SOMA) 350 mg tablet take 1 tablet by mouth three times a day and 1 tablet at bedtime (currently out of Rx)    levocetirizine (XYZAL) 5 mg tablet daily as needed.  ziprasidone (GEODON) 40 mg capsule Take 40 mg by mouth nightly.  montelukast (SINGULAIR) 10 mg tablet Take 10 mg by mouth nightly.  FIBER, PSYLLIUM HUSK, PO Take  by mouth daily as needed.  latanoprost (XALATAN) 0.005 % ophthalmic solution Administer 1 Drop to both eyes nightly. No current facility-administered medications for this visit. Facility-Administered Medications Ordered in Other Visits   Medication Dose Route Frequency    sodium chloride (NS) flush 10-40 mL  10-40 mL IntraVENous PRN    heparin (porcine) pf 500 Units  500 Units InterCATHeter ONCE     Allergies   Allergen Reactions    Other Medication Hives     seafood    Shellfish Derived Hives          OBJECTIVE:    Physical Exam  VITAL SIGNS: Visit Vitals  LMP 01/31/2008      GENERAL EMILY: in no apparent distress and well developed and well nourished   MUSCULOSKEL: no joint tenderness, deformity or swelling   INTEGUMENT:  warm and dry, no rashes or lesions   ABDOMEN . soft,obese, NT, ND, No masses appreciated, INC: C/D/I    EXTREMITIES: extremities normal, atraumatic, no cyanosis or edema   PELVIC: deferred   RECTAL: deferred   BRY SURVEY: Cervical, supraclavicular, axillary and inguinal nodes normal.   NEURO: Grossly normal         IMPRESSION/PLAN:  1.stage IV Primary peritoneal cancer   -reviewed her pathology   -discussed recommendation for chemotherapy   -reviewed CT and explained now with IV contrast able to see lesion in spleen and liver and appears to be responding to chemo as well as good shrinkage in pelvic mass   -tolerating chemo well. No G3 or G4 toxicity   -s/p 6 cycles   -plan for carbo (auc=6), taxol (175 mg/m2) IV every 3 wks for 6 cycles   -reviewed CT scan with persistent disease in liver and pelvis. Will refer to dr. Janee Dobbs (surg onc) at [facility]. D/w dr. Janee Dobbs for possible liver resection at time of debulking of pelvic disease.     -if not amenable to surgery will plan to continue more chemo but explained to patient our best chance at cure would involve surgery  2. Chemo induced pain-Percocet 5/325 PRN as needed for pain.      The total time spent was 40 minutes regarding this patients diagnosis of primary peritoneal cancer and >50% of this time was spent counseling and coordinating care    Remy Berg MD  Gynecologic Oncology  6/[id number]/[id number] AM

## 2019-06-20 NOTE — PROGRESS NOTES
[facility] Progress Note    Date: 2019    Name: Sherley Michael    MRN: [id number]         : 1970      Ms. Toni Stuart arrived to [facility] at [phone]. Ms. Toni Stuart was assessed and education was provided. Ms. Cannon's vitals were reviewed. Visit Vitals  /73 (BP 1 Location: Left arm, BP Patient Position: Sitting)   Pulse 70   Temp 98.1 °F (36.7 °C)   Resp 18   SpO2 95%     Mediport flushed easily and had brisk blood return. NS initiated at 500 ml/hr. Patient reported discomfort and requested that she receive hydration through a PIV. Mediport was not heparinized. 22g IV inserted in patient's Right antecubital  X one  attempt. Positive for blood return/ flushes without difficulty. 1000 ml NS administered as ordered followed by NS flush. Ms. Toni Stuart tolerated well without complaints. IV removed/ intact. Gauze/ coban to site. Ms. Toni Stuart was discharged from [place] [id number] in stable condition at 1210.        Navdeep Esqeuda RN  2019

## 2019-06-21 ENCOUNTER — OFFICE VISIT (OUTPATIENT)
Dept: ONCOLOGY | Age: 49
End: 2019-06-21

## 2019-06-21 ENCOUNTER — TELEPHONE (OUTPATIENT)
Dept: ONCOLOGY | Age: 49
End: 2019-06-21

## 2019-06-21 VITALS
TEMPERATURE: 98.7 F | DIASTOLIC BLOOD PRESSURE: 86 MMHG | RESPIRATION RATE: 18 BRPM | WEIGHT: 256.3 LBS | HEART RATE: 81 BPM | OXYGEN SATURATION: 95 % | SYSTOLIC BLOOD PRESSURE: 129 MMHG | HEIGHT: 63 IN | BODY MASS INDEX: 45.41 KG/M2

## 2019-06-21 DIAGNOSIS — C48.2 PERITONEAL CARCINOMA (HCC): Primary | ICD-10-CM

## 2019-06-21 NOTE — TELEPHONE ENCOUNTER
Patient contacted the office after her appointment today with more questions. I relayed that per Dr. Gianni Paez note he wanted her to see Dr. Stephon Ascencio for a joint surgery and the office number was provided. She also had questions regarding hydration and follow up and requests a call back at [phone].

## 2019-06-21 NOTE — PATIENT INSTRUCTIONS
Learning About Chemotherapy Side Effects and Safety  What is chemotherapy? Chemotherapy uses medicines to kill cancer cells. It's often called \"chemo. \" Chemo may slow cancer growth, stop cancer from spreading, or help get rid of the cancer. Chemo can be given at different locations, such as a hospital, a doctor's office, or a clinic. Sometimes chemo treatments may be done at home. You may get chemo in \"cycles. \" This means that you get a number of treatments over a set period of time. Then you take a break before you start again. Chemo helps to treat many kinds of cancer. But it can also affect healthy cells along with the cancer cells. This is why some types of chemo cause side effects, like nausea, losing your hair, or feeling tired. What are the possible side effects of chemotherapy? Side effects depend on which medicines you take, how much you take, and how the medicines affect you. Your doctor can tell you what to expect when you take these medicines. Some of them may cause symptoms such as:  · Fatigue. · Nausea. · Vomiting. · A rash. · Hair loss. · Pain or tingling in your hands or the soles of your feet. Chemo treatment can be hard. It can keep you from doing the things you were doing every day, like going to work or school. But keep in mind that most side effects don't last. They will go away after you finish the treatment. And many side effects can be managed with medicine. Your doctor will tell you what to do if you have side effects. [facility] also learn which ones you need to tell your doctor about right away. How can you keep yourself and your family safe? If you take chemo at home, take steps to protect your family. Keep your medicine in a locked cabinet if you can. Or keep it on a high shelf out of the reach of children. Make sure the containers have childproof lids.   IV chemo treatments  Chemo medicines that you get in your vein through an IV can stay in your body fluids (vomit, urine, or stool) for several days. Some medicines are radioactive, so they can be harmful if someone touches waste from your body. If your medicine is radioactive, any of your clothes or bed linens or cloth diapers that have medicine or body fluids on them need to be handled separately from other household laundry. Have caregivers use gloves when they wash your bed linens and clothes. Bed linens and cloth diapers should be machine washed twice in hot water, using regular detergent. For the first 48 hours after each treatment:  · Sit on the toilet seat to prevent splashing. · Put the toilet lid down before you flush. · Always flush twice after you use the toilet. Couples should use a condom during sex while a partner is getting chemo treatments and for several days after treatment ends. Follow-up care is a key part of your treatment and safety. Be sure to make and go to all appointments, and call your doctor if you are having problems. It's also a good idea to know your test results and keep a list of the medicines you take. Where can you learn more? Go to http://raul-vee.info/. Enter 91 21 06 in the search box to learn more about \"Learning About Chemotherapy Side Effects and Safety. \"  Current as of: March 27, 2018  Content Version: 11.9  © 4674-2690 "[employer]. Care instructions adapted under license by [employer] (which disclaims liability or warranty for this information). If you have questions about a medical condition or this instruction, always ask your healthcare professional. [place] [address] any warranty or liability for your use of this information.

## 2019-06-21 NOTE — TELEPHONE ENCOUNTER
Pt called to inquire if she needed to continue weekly hydration since she is finished with chemotherapy. Explained to pt she doesn't. Patient verbalized understanding. Patient agreed to plan. Patient instructed to contact office for any question or concerns.        Eduin Douglass NP

## 2019-06-21 NOTE — LETTER
6/21/19 Patient: Hang Pittman YOB: 1970 Date of Visit: 6/21/2019 Dann Lawton, [address] [id number] [place] [address] VIA In Basket Dear Dann Lawton MD, Thank you for [facility] Dalton [facility]. My notes for this consultation are attached. If you have questions, please do not hesitate to call me. I look forward to following your patient along with you. Sincerely, Doc Cruz MD

## 2019-06-21 NOTE — PROGRESS NOTES
Amparo Brown, a 51 y.o. female,  is here for   Chief Complaint   Patient presents with    Chemotherapy     follow up       Visit Vitals  /86 (BP 1 Location: Left arm, BP Patient Position: Sitting)   Pulse 81   Temp 98.7 °F (37.1 °C) (Oral)   Resp 18   Ht 5' 2.99\" (1.6 m)   Wt 116.3 kg (256 lb 4.8 oz)   SpO2 95%   BMI 45.42 kg/m²       Patient reports that when she received her CT scan she experienced nausea, dizziness, chills, and an elevation in her BP. She states that this was the first time that she had this experience. Denies eating anything prior to besides the oral contrast.   Patient also reports experiencing pain when trying to access her port during her Hydration. She states that they had to insert an IV in her right arm instead. 1. Have you been to the ER, urgent care clinic since your last visit? Hospitalized since your last visit? No    2. Have you seen or consulted any other health care providers outside of the [address] [place] [address] since your last visit? Include any pap smears or colon screening.  No

## 2019-06-24 ENCOUNTER — TELEPHONE (OUTPATIENT)
Dept: ONCOLOGY | Age: 49
End: 2019-06-24

## 2019-06-24 NOTE — TELEPHONE ENCOUNTER
Informed pt to keep all scheduled appointments with Dr. Chance Mancera at this time until she is able to see Dr. Alpesh Nguyen. Patient verbalized understanding and agreed to plan. Patient instructed to contact office for any question or concerns.

## 2019-06-24 NOTE — TELEPHONE ENCOUNTER
Patient called the office with a few questions and concerns regarding care. Patient wanted to know if she should continue to seek treatment at [facility]. Patient wants to be contacted regarding this matter.

## 2019-06-25 ENCOUNTER — TELEPHONE (OUTPATIENT)
Dept: ONCOLOGY | Age: 49
End: 2019-06-25

## 2019-06-25 NOTE — TELEPHONE ENCOUNTER
Patient called the office stating she is a little confused about her treatment plan and would like to be contacted with farther information.

## 2019-07-02 ENCOUNTER — TELEPHONE (OUTPATIENT)
Dept: ONCOLOGY | Age: 49
End: 2019-07-02

## 2019-07-02 RX ORDER — AMLODIPINE BESYLATE 10 MG/1
TABLET ORAL
Qty: 90 TAB | Refills: 0 | Status: SHIPPED | OUTPATIENT
Start: 2019-07-02 | End: 2019-09-30 | Stop reason: SDUPTHER

## 2019-07-02 NOTE — TELEPHONE ENCOUNTER
Patient contacted the office to see if she will be needing a follow up appointment with Cade RG after seeing [facility]? Patient would like a lynn back if appointment is needed so she can set up her transportation.

## 2019-07-02 NOTE — TELEPHONE ENCOUNTER
Pt called stating she has seen Dr. Carlos Wade and is scheduled for MRI 07/09/2019, PET CT 07/18/2019 and f/u with him on 07/19/2019. Informed pt that here is no need to f/u with our office until Dr. Carols Wade has determined his plan. Patient verbalized understanding and agreed to plan. Patient instructed to contact office for any question or concerns.      Carmen Booker NP

## 2019-07-08 ENCOUNTER — TELEPHONE (OUTPATIENT)
Dept: ONCOLOGY | Age: 49
End: 2019-07-08

## 2019-07-08 NOTE — TELEPHONE ENCOUNTER
Patient would like to speak with Stephan RG's nurse regarding her care. Patient stated she was told the wrong information regarding her staging.

## 2019-07-22 ENCOUNTER — OFFICE VISIT (OUTPATIENT)
Dept: FAMILY MEDICINE CLINIC | Age: 49
End: 2019-07-22

## 2019-07-22 VITALS
OXYGEN SATURATION: 99 % | DIASTOLIC BLOOD PRESSURE: 82 MMHG | HEART RATE: 62 BPM | HEIGHT: 62 IN | SYSTOLIC BLOOD PRESSURE: 128 MMHG | TEMPERATURE: 98.2 F | BODY MASS INDEX: 47.11 KG/M2 | WEIGHT: 256 LBS | RESPIRATION RATE: 16 BRPM

## 2019-07-22 DIAGNOSIS — Z23 ENCOUNTER FOR IMMUNIZATION: ICD-10-CM

## 2019-07-22 DIAGNOSIS — R73.03 PREDIABETES: ICD-10-CM

## 2019-07-22 DIAGNOSIS — I10 ESSENTIAL HYPERTENSION: ICD-10-CM

## 2019-07-22 DIAGNOSIS — C48.2 MALIGNANT NEOPLASM OF PERITONEUM (HCC): Primary | ICD-10-CM

## 2019-07-22 DIAGNOSIS — F31.9 BIPOLAR 1 DISORDER (HCC): ICD-10-CM

## 2019-07-22 DIAGNOSIS — E78.2 MIXED HYPERLIPIDEMIA: ICD-10-CM

## 2019-07-22 LAB — HBA1C MFR BLD HPLC: 5.4 %

## 2019-07-22 NOTE — PATIENT INSTRUCTIONS
DASH Diet: Care Instructions  Your Care Instructions    The DASH diet is an eating plan that can help lower your blood pressure. DASH stands for Dietary Approaches to Stop Hypertension. Hypertension is high blood pressure. The DASH diet focuses on eating foods that are high in calcium, potassium, and magnesium. These nutrients can lower blood pressure. The foods that are highest in these nutrients are fruits, vegetables, low-fat dairy products, nuts, seeds, and legumes. But taking calcium, potassium, and magnesium supplements instead of eating foods that are high in those nutrients does not have the same effect. The DASH diet also includes whole grains, fish, and poultry. The DASH diet is one of several lifestyle changes your doctor may recommend to lower your high blood pressure. Your doctor may also want you to decrease the amount of sodium in your diet. Lowering sodium while following the DASH diet can lower blood pressure even further than just the DASH diet alone. Follow-up care is a key part of your treatment and safety. Be sure to make and go to all appointments, and call your doctor if you are having problems. It's also a good idea to know your test results and keep a list of the medicines you take. How can you care for yourself at home? Following the DASH diet  · Eat 4 to 5 servings of fruit each day. A serving is 1 medium-sized piece of fruit, ½ cup chopped or canned fruit, 1/4 cup dried fruit, or 4 ounces (½ cup) of fruit juice. Choose fruit more often than fruit juice. · Eat 4 to 5 servings of vegetables each day. A serving is 1 cup of lettuce or raw leafy vegetables, ½ cup of chopped or cooked vegetables, or 4 ounces (½ cup) of vegetable juice. Choose vegetables more often than vegetable juice. · Get 2 to 3 servings of low-fat and fat-free dairy each day. A serving is 8 ounces of milk, 1 cup of yogurt, or 1 ½ ounces of cheese. · Eat 6 to 8 servings of grains each day.  A serving is 1 slice of bread, 1 ounce of dry cereal, or ½ cup of cooked rice, pasta, or cooked cereal. Try to choose whole-grain products as much as possible. · Limit lean meat, poultry, and fish to 2 servings each day. A serving is 3 ounces, about the size of a deck of cards. · Eat 4 to 5 servings of nuts, seeds, and legumes (cooked dried beans, lentils, and split peas) each week. A serving is 1/3 cup of nuts, 2 tablespoons of seeds, or ½ cup of cooked beans or peas. · Limit fats and oils to 2 to 3 servings each day. A serving is 1 teaspoon of vegetable oil or 2 tablespoons of salad dressing. · Limit sweets and added sugars to 5 servings or less a week. A serving is 1 tablespoon jelly or jam, ½ cup sorbet, or 1 cup of lemonade. · Eat less than 2,300 milligrams (mg) of sodium a day. If you limit your sodium to 1,500 mg a day, you can lower your blood pressure even more. Tips for success  · Start small. Do not try to make dramatic changes to your diet all at once. You might feel that you are missing out on your favorite foods and then be more likely to not follow the plan. Make small changes, and stick with them. Once those changes become habit, add a few more changes. · Try some of the following:  ? Make it a goal to eat a fruit or vegetable at every meal and at snacks. This will make it easy to get the recommended amount of fruits and vegetables each day. ? Try yogurt topped with fruit and nuts for a snack or healthy dessert. ? Add lettuce, tomato, cucumber, and onion to sandwiches. ? Combine a ready-made pizza crust with low-fat mozzarella cheese and lots of vegetable toppings. Try using tomatoes, squash, spinach, broccoli, carrots, cauliflower, and onions. ? Have a variety of cut-up vegetables with a low-fat dip as an appetizer instead of chips and dip. ? Sprinkle sunflower seeds or chopped almonds over salads. Or try adding chopped walnuts or almonds to cooked vegetables.   ? Try some vegetarian meals using beans and peas. Add garbanzo or kidney beans to salads. Make burritos and tacos with mashed lee beans or black beans. Where can you learn more? Go to http://raul-vee.info/. Enter P739 in the search box to learn more about \"DASH Diet: Care Instructions. \"  Current as of: July 22, 2018  Content Version: 12.1  © 2070-1420 [employer]. Care instructions adapted under license by [employer] (which disclaims liability or warranty for this information). If you have questions about a medical condition or this instruction, always ask your healthcare professional. [address] any warranty or liability for your use of this information.

## 2019-07-22 NOTE — PROGRESS NOTES
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 Chema Castrejon finished 6/6/2019. Plan for cytoreductive surgery plus hyperthermic intraperitoneal chemotherapy. Recommending vaccinations prior to procedure    HTN- taking norvasc and metoprolol medications without problems. HL- on zocor    Bipolar d/o- per pt doing well, transitioning psychiatrists with insurance changes. ROS:  See HPI, all others negative        Patient Active Problem List   Diagnosis Code    OA (osteoarthritis) M19.90    Obesity E66.9    Mixed hyperlipidemia E78.2    Depression F32.9    Menopause Z78.0    Knee pain, right M25.561    GERD (gastroesophageal reflux disease) K21.9    S/P TKR (total knee replacement) Z96.659    PTSD (post-traumatic stress disorder) F43.10    Essential hypertension I10    Morbid obesity (HCC) E66.01    Arthritis, degenerative M19.90    Gastroesophageal reflux disease without esophagitis K21.9    ANAMIKA on CPAP G47.33, Z99.89    Prediabetes R73.03    SUAZO (dyspnea on exertion) R06.09    Dental caries K02.9    Chronic periodontal disease K05.6    Advance care planning Z71.89    Bipolar 1 disorder (HCC) F31.9    Irritable bowel syndrome with both constipation and diarrhea K58.2       Current Outpatient Prescriptions   Medication Sig Dispense Refill    plecanatide (TRULANCE) 3 mg tab Take  by mouth.  losartan (COZAAR) 100 mg tablet Take 1 Tab by mouth daily. 90 Tab 0    metoprolol succinate (TOPROL-XL) 100 mg tablet Take 1 Tab by mouth daily. 90 Tab 0    amLODIPine (NORVASC) 10 mg tablet take 1 tablet by mouth daily 90 Tab 0    simvastatin (ZOCOR) 20 mg tablet take 1 tablet by mouth at bedtime 90 Tab 3    OXcarbazepine (TRILEPTAL) 300 mg tablet Take 300 mg by mouth two (2) times a day.  0    LORazepam (ATIVAN) 0.5 mg tablet Take 0.5 mg by mouth two (2) times a day.  hydrOXYzine pamoate (VISTARIL) 50 mg capsule 2 Caps 2 Times Daily As Needed.  carisoprodol (SOMA) 350 mg tablet take 1 tablet by mouth three times a day and 1 tablet at bedtime  0    BLACK COHOSH PO Take  by mouth.  levocetirizine (XYZAL) 5 mg tablet       ziprasidone (GEODON) 40 mg capsule Take 40 mg by mouth daily.  melatonin 3 mg tablet Take 3 mg by mouth nightly.  montelukast (SINGULAIR) 10 mg tablet Take 10 mg by mouth nightly. 1    FIBER, PSYLLIUM HUSK, PO Take  by mouth.  latanoprost (XALATAN) 0.005 % ophthalmic solution Administer 1 Drop to both eyes nightly.  LACTOBACILLUS ACIDOPHILUS (PROBIOTIC PO) Take  by mouth.  mometasone-formoterol (DULERA) 100-5 mcg/actuation HFA inhaler Take 2 Puffs by inhalation two (2) times a day. 1 Inhaler 5       Allergies   Allergen Reactions    Other Medication Hives     seafood    Shellfish Derived Hives       Past Medical History:   Diagnosis Date    AR (allergic rhinitis)     seasonal    Asthma     Cardiac echocardiogram 04/11/2016    Tech difficult. EF 55-60%. No RWMA. Mild conc LVH. Gr 1 DDfx. RVSP normal.      Cardiac nuclear imaging test 05/05/2016    Low risk. Very patchy radiotracer uptake. Mild anterior artifact, low suspicion for ischemia. EF 68%. No RWMA. Normal EKG on pharm stress test.    Cardiovascular LE arterial duplex 10/07/2013    No significant arterial disease at rest bilaterally. R JUNE 1.21.  L JUNE 1.16. No significant sm vessel disease.     Depression     Dr. Jorje Lema, suicide attempt 2/12    Endometriosis     s/p hysterectomy 2006    GERD (gastroesophageal reflux disease)     Glaucoma     Dr. Jimmy Gao HTN (hypertension)     Hx of colonoscopy 04/05/2017    mild diverticulosis, g1 internal hemorrhoids, normal colon , repeat 10 year 2027    Hx of suicide attempt     Hyperlipidemia LDL goal < 130     IBS (irritable bowel syndrome)     Insomnia     Nausea & vomiting     OA (osteoarthritis)     both knees, lower back    Preeclampsia     PTSD (post-traumatic stress disorder)     Sleep apnea     does not use cpap machine    Spinal stenosis     Dr. Nahomi Lawrence       Social History     Social History    Marital status: SINGLE     Spouse name: N/A    Number of children: N/A    Years of education: N/A     Occupational History    disabled     student      Social History Main Topics    Smoking status: Never Smoker    Smokeless tobacco: Never Used    Alcohol use No    Drug use: No    Sexual activity: Not on file     Other Topics Concern    Not on file     Social History Narrative       Family History   Problem Relation Age of Onset    Heart Disease Mother      CHF    Diabetes Mother     Hypertension Mother     Kidney Disease Mother     Breast Cancer Mother     Stroke Mother     Diabetes Father     Hypertension Father     Heart Attack Father      MI    Cancer Maternal Grandmother      stomach    Cancer Other      breast    Heart Attack Other      MI    Ovarian Cancer Maternal Aunt          OBJECTIVE    Physical Exam:     Visit Vitals  /82 (BP 1 Location: Left arm, BP Patient Position: Sitting)   Pulse 62   Temp 98.2 °F (36.8 °C) (Oral)   Resp 16   Ht 5' 2\" (1.575 m)   Wt 256 lb (116.1 kg)   LMP 01/31/2008   SpO2 99%   BMI 46.82 kg/m²       General: alert,obese, AA, in no apparent distress or pain  CVS: normal rate, regular rhythm, distinct S1 and S2  Lungs:clear to ausculation bilaterally, no crackles, wheezing or rhonchi noted  Abdomen: soft, NT,ND  Extremities: no edema  Psych:  mood and affect normal    Results for orders placed or performed in visit on 07/22/19   AMB POC HEMOGLOBIN A1C   Result Value Ref Range    Hemoglobin A1c (POC) 5.4 %       ASSESSMENT/PLAN  Diagnoses and all orders for this visit:    Malignant neoplasm of peritoneum ([facility] [address]  Ongoing chemotherapy  Following dr. Gerald Benavides to give vaccines for prevention of infection with impaired splenic function     Essential hypertension  Controlled  Cont metoprolol and norvasc    Bipolar 1 disorder (HCC)  Stable, following psychiatrist     Mixed hyperlipidemia  On zocor    Prediabetes  H/o  a1c wnl range today  -     AMB POC HEMOGLOBIN A1C     Encounter for immunization  -     PNEUMOCOCCAL CONJ VACCINE 13 VALENT IM  -     HEMOPHILUS INFLUENZA B VACCINE (HIB), PRP-OMP CONJUGATE (3 DOSE SCHED.), IM  -     MENINGOCOCCAL (MENVEO) CONJUGATE VACCINE, SEROGROUPS A, C, Y AND W-135 (TETRAVALENT), IM  Plan for PCV 23 in 8 weeks as nurse visit  Men B vaccine not available, my nurse called [facility] and ordered for administration tomorrow. -     meningococcal B,4-CMP PF vaccine (BEXSERO) 50-50-50-25 mcg/0.5 mL injection; 0.5 mL by IntraMUSCular route once for 1 dose. Follow-up Disposition:  Return in about 6 months or sooner prn  Patient understands plan of care. Patient has provided input and agrees with goals.

## 2019-07-22 NOTE — PROGRESS NOTES
1. Have you been to the ER, urgent care clinic since your last visit? Hospitalized since your last visit? No    2. Have you seen or consulted any other health care providers outside of the [address] [place] [address] since your last visit? Include any pap smears or colon screening. Prevnar 13, 0.5 ml given IM in left deltoid. Lot # [id number], exp date 02/01/2021. Patient tolerated injection well. No adverse reaction noted. Menveo 0.5 ml given IM in left deltoid. Lot # [id number], exp date 03/01/2020. Patient tolerated injection well. No adverse reaction noted. Hib 0.5 ml given IM in right deltoid. Lot # [id number], exp date 10/25/2019. Patient tolerated injection well. No adverse reaction noted.

## 2019-07-29 DIAGNOSIS — C48.2 PERITONEAL CARCINOMA (HCC): ICD-10-CM

## 2019-07-29 RX ORDER — LIDOCAINE AND PRILOCAINE 25; 25 MG/G; MG/G
CREAM TOPICAL
Qty: 30 G | Refills: 0 | Status: SHIPPED | OUTPATIENT
Start: 2019-07-29 | End: 2019-10-28 | Stop reason: SDUPTHER

## 2019-09-13 RX ORDER — ONDANSETRON 4 MG/1
TABLET, FILM COATED ORAL
Qty: 30 TAB | Refills: 3 | Status: SHIPPED | OUTPATIENT
Start: 2019-09-13 | End: 2020-02-19 | Stop reason: ALTCHOICE

## 2019-09-30 RX ORDER — AMLODIPINE BESYLATE 10 MG/1
TABLET ORAL
Qty: 90 TAB | Refills: 0 | Status: SHIPPED | OUTPATIENT
Start: 2019-09-30 | End: 2019-12-23

## 2019-10-08 ENCOUNTER — CLINICAL SUPPORT (OUTPATIENT)
Dept: FAMILY MEDICINE CLINIC | Age: 49
End: 2019-10-08

## 2019-10-08 DIAGNOSIS — Z23 ENCOUNTER FOR IMMUNIZATION: Primary | ICD-10-CM

## 2019-10-08 NOTE — PATIENT INSTRUCTIONS
Vaccine Information Statement    Pneumococcal Polysaccharide Vaccine: What You Need to Know    Many Vaccine Information Statements are available in Swedish and other languages. See www.immunize.org/vis. Hojas de información Sobre Vacunas están disponibles en español y en muchos otros idiomas. Visite [employer]vin.si. 1. Why get vaccinated? Vaccination can protect older adults (and some children and younger adults) from pneumococcal disease. Pneumococcal disease is caused by bacteria that can spread from person to person through close contact. It can cause ear infections, and it can also lead to more serious infections of the:   Lungs (pneumonia),   Blood (bacteremia), and   Covering of the brain and spinal cord (meningitis). Meningitis can cause deafness and brain damage, and it can be fatal.      Anyone can get pneumococcal disease, but children under 3years of age, people with certain medical conditions, adults over 72years of age, and cigarette smokers are at the highest risk. About 18,000 older adults die each year from pneumococcal disease in the [place]. Treatment of pneumococcal infections with penicillin and other drugs used to be more effective. But some strains of the disease have become resistant to these drugs. This makes prevention of the disease, through vaccination, even more important. 2. Pneumococcal polysaccharide vaccine (PPSV23)    Pneumococcal polysaccharide vaccine (PPSV23) protects against 23 types of pneumococcal bacteria. It will not prevent all pneumococcal disease. PPSV23 is recommended for:   All adults 72years of age and older,   Anyone 2 through 59years of age with certain long-term health problems,   Anyone 2 through 59years of age with a weakened immune system,   Adults 23 through 59years of age who smoke cigarettes or have asthma. Most people need only one dose of PPSV.   A second dose is recommended for certain high-risk groups. People 72 and older should get a dose even if they have gotten one or more doses of the vaccine before they turned 65. Your healthcare provider can give you more information about these recommendations. Most healthy adults develop protection within 2 to 3 weeks of getting the shot. 3. Some people should not get this vaccine     Anyone who has had a life-threatening allergic reaction to PPSV should not get another dose.  Anyone who has a severe allergy to any component of PPSV should not receive it. Tell your provider if you have any severe allergies.  Anyone who is moderately or severely ill when the shot is scheduled may be asked to wait until they recover before getting the vaccine. Someone with a mild illness can usually be vaccinated.  Children less than 3years of age should not receive this vaccine.  There is no evidence that PPSV is harmful to either a pregnant woman or to her fetus. However, as a precaution, women who need the vaccine should be vaccinated before becoming pregnant, if possible. 4. Risks of a vaccine reaction    With any medicine, including vaccines, there is a chance of side effects. These are usually mild and go away on their own, but serious reactions are also possible. About half of people who get PPSV have mild side effects, such as redness or pain where the shot is given, which go away within about two days. Less than 1 out of 100 people develop a fever, muscle aches, or more severe local reactions. Problems that could happen after any vaccine:     People sometimes faint after a medical procedure, including vaccination. Sitting or lying down for about 15 minutes can help prevent fainting, and injuries caused by a fall. Tell your doctor if you feel dizzy, or have vision changes or ringing in the ears.  Some people get severe pain in the shoulder and have difficulty moving the arm where a shot was given.  This happens very rarely.  Any medication can cause a severe allergic reaction. Such reactions from a vaccine are very rare, estimated at about 1 in a million doses, and would happen within a few minutes to a few hours after the vaccination. As with any medicine, there is a very remote chance of a vaccine causing a serious injury or death. The safety of vaccines is always being monitored. For more information, visit: www.cdc.gov/vaccinesafety/     5. What if there is a serious reaction? What should I look for? Look for anything that concerns you, such as signs of a severe allergic reaction, very high fever, or unusual behavior. Signs of a severe allergic reaction can include hives, swelling of the face and throat, difficulty breathing, a fast heartbeat, dizziness, and weakness. These would usually start a few minutes to a few hours after the vaccination. What should I do? If you think it is a severe allergic reaction or other emergency that cant wait, call 9-1-1 or get to the nearest hospital. Otherwise, call your doctor. Afterward, the reaction should be reported to the Vaccine Adverse Event Reporting System (VAERS). Your doctor might file this report, or you can do it yourself through the VAERS web site at www.vaers. [facility].gov, or by calling 5-456.194.8210. VAERS does not give medical advice. 6. How can I learn more?  Ask your doctor. He or she can give you the vaccine package insert or suggest other sources of information.  Call your local or state health department.    Contact the Centers for Disease Control and Prevention (CDC):  - Call 3-293.310.2768 (1-800-CDC-INFO) or  - Visit CDCs website at [employer] 18 04/24/2015    [facility] and [facility] for Disease Control and Prevention    Office Use Only

## 2019-10-08 NOTE — PROGRESS NOTES
Pneumovax 23, 0.5 ml given IM in left deltoid. Lot # [id number], exp date 03/25/2021. Patient tolerated injection well. No adverse reaction noted.

## 2019-10-09 ENCOUNTER — TELEPHONE (OUTPATIENT)
Dept: ONCOLOGY | Age: 49
End: 2019-10-09

## 2019-10-09 DIAGNOSIS — T45.1X5A CINV (CHEMOTHERAPY-INDUCED NAUSEA AND VOMITING): ICD-10-CM

## 2019-10-09 DIAGNOSIS — R11.2 CINV (CHEMOTHERAPY-INDUCED NAUSEA AND VOMITING): ICD-10-CM

## 2019-10-09 RX ORDER — PROMETHAZINE HYDROCHLORIDE 25 MG/1
TABLET ORAL
Qty: 30 TAB | Refills: 3 | Status: SHIPPED | OUTPATIENT
Start: 2019-10-09 | End: 2020-01-30

## 2019-10-09 NOTE — TELEPHONE ENCOUNTER
Patient contacted the office stating that she was feeling sick and nauseous still from radiation treatment and she would not be able to come to her appointment this afternoon. She would like to call the office to reschedule once she is feeling better.

## 2019-10-22 ENCOUNTER — TELEPHONE (OUTPATIENT)
Dept: ONCOLOGY | Age: 49
End: 2019-10-22

## 2019-10-22 NOTE — TELEPHONE ENCOUNTER
Patient called the office wanting to know when will her port be removed. Patient would like a call back regarding this matter.

## 2019-10-22 NOTE — TELEPHONE ENCOUNTER
Spoke with patient and explained since she is still currently undergoing radiation therapy her port will not be removed until 1 year after all treatment is finished. Also explained that pt needs a f/u appointment with our practice. Transferred pt to [place] for appointment.        Johana Franco NP

## 2019-10-28 DIAGNOSIS — C48.2 PERITONEAL CARCINOMA (HCC): ICD-10-CM

## 2019-10-28 RX ORDER — LIDOCAINE AND PRILOCAINE 25; 25 MG/G; MG/G
CREAM TOPICAL
Qty: 30 G | Refills: 0 | Status: SHIPPED | OUTPATIENT
Start: 2019-10-28 | End: 2020-12-17

## 2019-11-11 RX ORDER — METOPROLOL SUCCINATE 100 MG/1
TABLET, EXTENDED RELEASE ORAL
Qty: 90 TAB | Refills: 1 | Status: SHIPPED | OUTPATIENT
Start: 2019-11-11 | End: 2020-02-11

## 2019-11-11 RX ORDER — LOSARTAN POTASSIUM 100 MG/1
TABLET ORAL
Qty: 90 TAB | Refills: 1 | Status: SHIPPED | OUTPATIENT
Start: 2019-11-11 | End: 2020-03-04

## 2019-11-13 NOTE — PROGRESS NOTES
[facility]  [address] [place] [address], [address], [address] [facility]  [address] [facility], [address] [place] [address]  [place], [address]   [phone]  Ellyn Landrumr       Patient ID:  Name:  Sahil Barba  MRN:  [id number]  :  1970/49 y.o. Date:  11/15/2019      HISTORY OF PRESENT ILLNESS:  Sahil Barba is a 47 y.o.  postmenopausal female referred by Dr. Jules Diego  With peritoneal cancer. Intitally seen be NP with reports of vaginal bleeding. Given pt's history of hysteretectomy with BSO for endometriosis in  a TVUS was ordered. Which revealed a 6.8 cm x 5.2 cm x 4.6 cm at midline vaginal cuff. This prompted pelvic CT with findings of a lesion measuring 7.6 x 5.8 x 7.2 cm and is compatible with a pelvic neoplasm vs endometrioma given prior history of endometriosis. Tumor markers done on 2018 all normal.  S/p  Exploratory laparotomy, exploration of retroperitoneal spaces, exam under anesthesia with biopsy of rectovaginal mass on 2019. Had sigmoidoscopy which revealed submucosal mass. S/p cycle #6 of carbo/taxol on 2019. S/p cytoreductive surgery with [facility] on 2019. Currently undergoing pelvic radiation which she finishes next Thursday. Has occasional diarrhea. No vaginal bleeding.          Labs:  Component      Latest Ref Rng & Units 2019           8:44 AM   CA-125      1.5 - 35.0 U/mL 7     Component      Latest Ref Rng & Units 2019           8:26 AM  8:20 AM   Cancer Ag (CA) 125      0.0 - 38.1 U/mL 7.8 8.7     Component      Latest Ref Rng & Units 2019           8:28 AM   Cancer Ag (CA) 125      0.0 - 38.1 U/mL 8.8     Component      Latest Ref Rng & Units 3/14/2019 2019           8:15 AM  8:32 AM   Cancer Ag (CA) 125      0.0 - 38.1 U/mL 10.4 18.4     2018 : 9.3  2018 CEA: 2.0  2018 AFP: 3.8    Pathology  2019  A) COLON, PERICOLIC NODULE, EXCISION:      - ACELLULAR MUCIN WITH MULTIPLE CALCIFICATIONS, AND ASSOCIATED FIBROUS WALL WITH        HYALINIZATION, AND CHRONIC INFLAMMATION.     - ADJACENT BENIGN FIBROADIPOSE TISSUE, CONSISTENT WITH MESENTERY.     - NO EVIDENCE OF MALIGNANCY.      - SEE COMMENT. B) LIVER, RIGHT LOBE, BIOPSY:      - NODULAR FAT NECROSIS AND FIBROSIS OF THE LIVER CAPSULE.      - MILD STEATOSIS.     - NO EVIDENCE OF MALIGNANCY. C) COLON, SIGMOID, SEROSAL IMPLANT, EXCISION:      - NODULAR FAT NECROSIS WITH FIBROSIS, CHRONIC INFLAMMATION, CALCIFICATIONS, AND        CYSTIC DEGENERATION WITHOUT MUCIN.     - NO EVIDENCE OF MALIGNANCY. D) OMENTUM, OMENTECTOMY (RESECTION):      - CONGESTED FIBROADIPOSE TISSUE WITH FOCAL FIBROSIS AND CHRONIC INFLAMMATION, AND        CALCIFIED NODULAR FIBROSIS.     - NO EVIDENCE OF MALIGNANCY. E) PERITONEUM, LEFT ANTERIOR ABDOMINAL, RESECTION:      - CONGESTED PERITONEAL TISSUE, NO EVIDENCE OF MALIGNANCY. F) PERITONEUM, RIGHT ANTERIOR ABDOMINAL, RESECTION:      - CONGESTED PERITONEAL TISSUE, NO EVIDENCE OF MALIGNANCY. G) COLON, SIGMOID, SEROSAL IMPLANT, EXCISION:      - NODULAR FIBROSIS WITH HISTIOCYTES, SUGGESTIVE OF FAT NECROSIS.     - CYSTIC DEGENERATION, WITHOUT MUCIN.     - NO EVIDENCE OF MALIGNANCY. H) SOFT TISSUE, FALCIFORM, EXCISION:      - CONSISTENT WITH FALCIFORM LIGAMENT.     - NO EVIDENCE OF MALIGNANCY. PATHOLOGIC STAGE:  ypTx, pNx    1/17/2019   RECTOVAGINAL MASS (#1, 2) AND PELVIC MASS, BIOPSIES:   CONSISTENT WITH SEROUS CARCINOMA WITH EXTENSIVE NECROSIS. Imaging  CT abdomen/pelvis 6/19/2019  CT ABDOMEN FINDINGS: Visualized portions of lung bases demonstrate presence of  mild cardiomegaly. Visualized portions of the lung bases demonstrate no  significant abnormalities.     Small segment 7 lesion adjacent to the IVC measures 1.1 x 1.0 cm previously  measuring 1.5 x 1.1 cm.  The liver is otherwise normal. The gallbladder is  normal.     The splenic lesion measures 3.0 x 2.8 cm previously measuring 2.4 x 2.3 cm.     The pancreas is normal. There is probably a tiny right adrenal adenoma  unchanged. The left adrenal gland is normal. There is a small cyst involving the  lower pole the left kidney. Kidneys are otherwise normal.     There is no intra-abdominal or retroperitoneal adenopathy. I see no  intra-abdominal carcinomatosis.     CT PELVIS FINDINGS: Lower right pelvic mass currently measures 2.8 x 2.7 cm  previously measuring 3.2 x 2.8 cm. No additional pelvic masses identified. No  additional evidence of pelvic carcinomatosis.     Status post hysterectomy. There is no free fluid. There is no pelvic adenopathy. No specific bowel abnormalities are seen. Postoperative changes are seen  involving the anterior pelvic wall.     No significant osseous abnormalities. Extensive degenerative changes are seen  involving the spine.     IMPRESSION  Impression:        1. The small liver lesion has decreased in size. The small lower right pelvic  mass has decreased in size. 2. The splenic lesion may have increased in size slightly. This lesion was not  positive on PET and is probably not related to the patient's pelvic malignancy. Suspect benign lesion. 3. No additional CT evidence of malignancy. 4. Cardiomegaly. Status post hysterectomy. Additional chronic changes as  described above. CT abdomen/pelvis  FINDINGS:  view shows lung bases clear and normal bowel gas pattern. Patient morbidly obese but probably with interval weight loss.     CT Abdomen and pelvis:     Lung bases: Normal.     Heart and pericardium: There is a catheter tip in the right atrium of the heart. Heart and pericardium otherwise unremarkable.     Liver: Decrease size of lesion in segment 7 of the liver adjacent IVC now  measuring 11 x 15 mm and on PET/CT 2/14/2019 it measured 23 mm.  No new liver  lesions.     Gallbladder: Normal.     Spleen: Mass inferiorly in the spleen near the hilum measures 23 x 24 mm,  probably present previously but not as well seen because of lack of intravenous  contrast and measuring about 27 mm (29).    Pancreas: Normal.     Adrenal glands: Normal.     Kidneys: Enhancing symmetrically. No focal lesions.     The bowel: Stomach and duodenum and small bowel and the appendix and colon are  normal.     GYN: Prior hysterectomy. No adnexal masses.     Peritoneal space: No free fluid. There is some mild fat stranding in the left  lower quadrant but no discrete mass (60) and probably related to the mesentery.     MSK: Abdominal wall scar lower abdominal wall. Multiple levels facet arthropathy  lower lumbar spine. Moderate disc space narrowing L5/S1 and L4/L5. No suspicious  skeletal lesions.     Retroperitoneum: Soft tissue mass right side pelvis has decreased in size. It  measures 2.8 x 3.2 cm. On previous PET/CT it measured 8.3 x 5.3 cm and on CT it  measured 7.4 x 5.2 cm.     IMPRESSION  IMPRESSION:     Overall improvement compatible with response to therapy. Decreased size of the  pelvic metastasis, hepatic metastasis, splenic lesion since the prior study. No  new metastases. MRI 2/19/2019  FINDINGS:  Presumed hysterectomy. There is a lobulated 6.8 x 6 x 5.9 cm mass centered at  the right cul-de-sac involving the right and posterior upper to mid vagina but  also abutting the right anterior rectum from 1-8 o'clock. On coronal and  sagittal views tumor is favored to not be originating from the rectum but this  is not definite. There is a oval ring of T2 hypointensity and T1 hyperintensity  with central necrosis within the mass. The mass extends to the posterior medial  right mesorectal fascia. The mass does not invade the bladder. At the cranial  aspect of the mass is a spiculated 3.4 x 2 cm T2 hypointense structure with  tethering to adjacent bowel and fascia. Ovaries are not visualized separately. No pelvic ascites.     Bowel is otherwise normal in caliber.  No upstream dilation. Bladder is normal.  No lymphadenopathy.     Postsurgical changes anterior midline pelvic wall. Bones are grossly  unremarkable.     IMPRESSION  IMPRESSION:    1. Large up to 6.8 cm mass centered at the right cul-de-sac  is most  intimately associated with the vagina, favored to be either endometrial, vaginal  or cervical in origin. It does abut and may involve the right rectum but this is  favored secondary involvement. Ovaries are not visualized separately. Correlate  for history of oophorectomy since ovarian malignancy also possible. 2.  There is an associated site of favored endometriotic implant at the cranial  aspect of the mass raising the possibility of malignant transformation. 3.  No upstream bowel obstruction. PETCT 2/14/2019   --  PET FINDINGS  --    No abnormal hypermetabolic activity in the neck. No abnormal hypermetabolic activity in the chest.      Low-attenuation possible lesion in the medial dome right lobe liver in region  of heterogeneous hypermetabolic activity, with SUV Max reaching 11.7 on image  98. Underlying lesion difficult to visualize due to extensive motion from  respirations. Cannot well separate from the IVC or diaphragm. No definite  corresponding lung lesion however.     There is low level metabolic activity associated with stranding and presumed  scarring in the subcutaneous fat of the lower midline abdominal and pelvic wall. This may simply be inflammatory. .      There is a lobulated soft tissue conglomerate in the midline to right lateral  cul-de-sac and perirectal pelvis. The periphery of this is diffusely  hypermetabolic, with SUV Max reaching 18.3 on image 200. Portions centrally are  photopenic and presumed necrotic. This measures up to 7.5 x 6.1 cm axial on  image 196. Anterior margin of this hypermetabolic lesion is in direct contact  with the high intensity decompressed urinary bladder, involvement cannot be  assessed.  Left lateral margin of the mass is in contact with the surface of the  lateral wall of the sigmoid colon. Direct involvement cannot be assessed.  -Additional focal hypermetabolic density 2.7 cm on image 189 inseparable from  the caudal wall of the sigmoid. -Tapering hypermetabolic activity extends to approximately image 210, not to the  level of the perineum.     There are no other areas of abnormal metabolic activity appreciated in the  abdomen or pelvis which cannot be attributed to renal collecting system, ureter,  bladder or bowel wall activity>]. No definite hypermetabolic bone lesions appreciated, although bone scan can be  more sensitive in this respect. --  CT FINDINGS  --     These limited CT images do not replace diagnostic quality contrast enhanced CT  scan for evaluation of solid organs, bowel, retroperitoneum and vasculature. CT NECK:    Severely limited by lack of intravenous contrast.  Paranasal sinuses free of  disease. No appreciably enlarged lymph nodes. Extensive cervical endplate  osteophytes anterior and left lateral..      CT CHEST:    Limited evaluation of the hilum and vasculature without intravenous contrast.  No pleural effusion. No appreciably enlarged lymph nodes. Patchy parenchymal  opacities medial basal left lower lobe on image 89 demonstrates low level  metabolic activity, SUV Max 3.2, nonspecific, favored as inflammatory based on  imaging appearance subjectively. Jody Frizzle CT ABDOMEN:    Limited noncontrast images. Normal-size liver and spleen no adrenal mass. No  hydronephrosis. No ascites. CT PELVIS:    Limited noncontrast images. No small bowel or colon distention. Scattered  colonic diverticulosis. Postsurgical change anterior abdominal wall. Severe  degenerative facet disease at the lower lumbar levels.       Lobulated right pelvic/cul-de-sac soft tissue mass as above. Mild degree of  stranding in the pelvic mesenteric fat. No ascites.        IMPRESSION   IMPRESSION:      1.   Peripherally hypermetabolic soft tissue mass right dependent pelvis to the  cul-de-sac region as above consistent with malignancy/metastatic disease with  some central necrosis  -Possible separate lesion versus serosal involvement of the sigmoid colon  adjacent. -Mass in contact with posterior bladder, sigmoid colon locally and posterior  peritoneal surface.     2. Hypermetabolic lesion along central dome of the right lobe liver difficult to  separate from IVC or diaphragm due to motion artifact. Recommend dedicated  contrast enhanced CT for localization and better clarification.  -Findings are concerning for malignancy or hepatic metastatic disease until  proven otherwise.      3. Low level activity associated with patchy parenchymal opacity at the medial  left lower lobe,, favored as inflammatory as above.     4. No other definite hypermetabolic abnormalities appreciated elsewhere. 12/14/2018 CT PELVIS W/CONTRAST  FINDINGS:    LYMPH NODES: No enlarged lymph nodes. PELVIC GASTROINTESTINAL TRACT: No bowel dilation or wall thickening. PELVIC ORGANS:     The uterus is absent. Along the right upper margin of the vaginal cuff within the right deep pelvis there is a large irregularly-shaped peripherally enhancing, septated appearing mass with regions of central low attenuation which could be low density-fluid type contents or regions of necrosis. Lesion measures 76 x 58 x 72 mm and is compatible with a pelvic neoplasm. VASCULATURE: Unremarkable. BONES: Lower lumbar hypertrophic osteophytes. Lower lumbar facet hypertrophy with vacuum phenomenon asymmetric leftward. Degenerative vacuum phenomenon within the SI joints noted as well. No acute or aggressive osseous abnormalities identified. OTHER: Small fat-containing ventral umbilical hernia. Minimal nonspecific edema within the subcutaneous fat of the lumbar region, not uncommon.     IMPRESSION:   1.  Along the right upper margin of the vaginal cuff within the right deep pelvis there is a large irregularly-shaped peripherally enhancing, septated appearing mass with regions of central low attenuation which could be low density-fluid type contents or regions of necrosis. Lesion measures 76 x 58 x 72 mm and is compatible with a pelvic neoplasm. Could be a endometrioma given prior history of endometriosis, malignancy not excluded and gynecologic oncology follow-up is suggested. 2.  Small fat-containing ventral umbilical hernia. 12/04/2018 TVUS  Uterus: surgically absent  Left ovary: surgically absent  Right ovary: surgically absent  Other findings: midline-at vaginal cuff: 6.8 cm X 5.2 cm x 4.6 cm, volume 85 ml    Impression: Complex pelvic mass       ROS:    As above      Patient Active Problem List    Diagnosis Date Noted    Malignant neoplasm of peritoneum ([address] 02/14/2019    Pelvic mass in female 01/17/2019    Bipolar 1 disorder ([address] 02/09/2018    Irritable bowel syndrome with both constipation and diarrhea 02/09/2018    Advance care planning 01/31/2017    Dental caries 05/26/2016    Chronic periodontal disease 05/26/2016    SUAZO (dyspnea on exertion) 02/10/2016    Prediabetes 09/24/2015    Morbid obesity ([address] 08/31/2015    Arthritis, degenerative 08/31/2015    Gastroesophageal reflux disease without esophagitis 08/31/2015    AANMIKA on CPAP 08/31/2015    Essential hypertension 08/28/2015    PTSD (post-traumatic stress disorder) 03/24/2014    S/P TKR (total knee replacement) 11/14/2012    Depression 05/08/2012    Menopause 05/08/2012    Knee pain, right 05/08/2012    GERD (gastroesophageal reflux disease) 05/08/2012    OA (osteoarthritis) 06/18/2010    Obesity 06/18/2010    Mixed hyperlipidemia 06/18/2010     Past Medical History:   Diagnosis Date    AR (allergic rhinitis)     seasonal    Cancer ([address]     pt states between vgina and rectal area    Cardiac echocardiogram 04/11/2016    Tech difficult. EF 55-60%. No RWMA. Mild conc LVH. Gr 1 DDfx. RVSP normal.      Cardiac nuclear imaging test 2016    Low risk. Very patchy radiotracer uptake. Mild anterior artifact, low suspicion for ischemia. EF 68%. No RWMA. Normal EKG on pharm stress test.    Cardiovascular LE arterial duplex 10/07/2013    No significant arterial disease at rest bilaterally. R JUNE 1.21.  L JUNE 1.16. No significant sm vessel disease.  Depression     Dr. Liang Lu, suicide attempt     Diverticulosis     Endometriosis     s/p hysterectomy     GERD (gastroesophageal reflux disease)     Glaucoma     Dr. Rosie Holloway HTN (hypertension)     Hx of colonoscopy 2017    mild diverticulosis, g1 internal hemorrhoids, normal colon , repeat 10 year     Hx of suicide attempt     Hyperlipidemia LDL goal < 130     IBS (irritable bowel syndrome)     Ill-defined condition     environmental allergies    Insomnia     Mal[facility]um ([facility] 2019    Nausea & vomiting     OA (osteoarthritis)     both knees, lower back    Preeclampsia     PTSD (post-traumatic stress disorder)     Seizures (HCC)     1 x with childbirth, had toxemia    Sleep apnea     does not use cpap machine    Spinal stenosis     Dr. Julieta Coburn      Past Surgical History:   Procedure Laterality Date    COLONOSCOPY N/A 2017    COLONOS[facility] at [facility] Left     External fixator    FLEXIBLE SIGMOIDOSCOPY N/A 2019    SIGMOIDOSCOPY FLEXIBLE performed by Elio Servin MD at [facility] ENDOSCOPY    HX  SECTION      HX COLONOSCOPY  2017    DR. MCRAE 2017     HX HYSTERECTOMY      HX KNEE ARTHROSCOPY Left     left knee    HX KNEE REPLACEMENT Bilateral     (R)  (L)     HX LAPAROTOMY N/A 2019    and rectovaginal bx    HX OTHER SURGICAL  2016    all teeth removed    HX SALPINGO-OOPHORECTOMY  2008    HX TUBAL LIGATION      IR INSERT TUNL CVC W PORT OVER 5 YEARS  2019    MULTIPLE DELIVERY       1990    TOTAL ABDOM HYSTERECTOMY        OB History        2    Para   2    Term   2       0    AB   0    Living   0       SAB   0    TAB   0    Ectopic   0    Molar   0    Multiple   0    Live Births   0              Social History     Tobacco Use    Smoking status: Never Smoker    Smokeless tobacco: Never Used   Substance Use Topics    Alcohol use: No      Family History   Problem Relation Age of Onset    Heart Disease Mother         CHF    Diabetes Mother     Hypertension Mother     Kidney Disease Mother    Nanci Breast Cancer Mother     Stroke Mother     Diabetes Father     Hypertension Father     Heart Attack Father         MI    Cancer Maternal Grandmother         stomach    Ovarian Cancer Maternal Aunt     Cancer Maternal Uncle       Current Outpatient Medications   Medication Sig    ibuprofen (MOTRIN) 600 mg tablet take 1 tablet by mouth three times a day ONLY AS NEEDED    losartan (COZAAR) 100 mg tablet take 1 tablet by mouth once daily    metoprolol succinate (TOPROL-XL) 100 mg tablet take 1 tablet by mouth once daily    promethazine (PHENERGAN) 25 mg tablet take 1 tablet by mouth every 6 hours if needed for nausea    amLODIPine (NORVASC) 10 mg tablet take 1 tablet by mouth once daily    simvastatin (ZOCOR) 20 mg tablet take 1 tablet by mouth at bedtime    melatonin 3 mg tablet Take 3 mg by mouth nightly.  levocetirizine (XYZAL) 5 mg tablet daily as needed.  montelukast (SINGULAIR) 10 mg tablet Take 10 mg by mouth nightly.  latanoprost (XALATAN) 0.005 % ophthalmic solution Administer 1 Drop to both eyes nightly.  lidocaine-prilocaine (EMLA) topical cream apply to affected area once daily if needed for pain    ondansetron hcl (ZOFRAN) 4 mg tablet take 1 tablet by mouth every 6 hours if needed for nausea    Mv,Ca,Min-FA-Herbal No.157 (ESTROVEN MAXIMUM STRENGTH) 400 mcg tab Take  by mouth daily. Indications: hot flash    dimethicone (SOOTHE AND COOL INZO BARRIER) 5 % topical cream Apply  to affected area two (2) times a day.  polyethylene glycol (MIRALAX) 17 gram packet Take 1 Packet by mouth daily.  cycloSPORINE (RESTASIS) 0.05 % dpet Apply 1 Drop to eye as needed.  dexamethasone (DECADRON) 4 mg tablet Take 40 mg by mouth as needed. Take 10 tabs the night before Chemo #1 and Chemo #2.  plecanatide (TRULANCE) 3 mg tab Take  by mouth.  OXcarbazepine (TRILEPTAL) 300 mg tablet Take 300 mg by mouth two (2) times a day.  LORazepam (ATIVAN) 0.5 mg tablet Take 0.5 mg by mouth two (2) times daily as needed.  ziprasidone (GEODON) 40 mg capsule Take 40 mg by mouth nightly. No current facility-administered medications for this visit. Allergies   Allergen Reactions    Other Medication Hives     seafood    Shellfish Derived Hives          OBJECTIVE:    Physical Exam  VITAL SIGNS: Visit Vitals  Pulse 63   Temp 98.4 °F (36.9 °C) (Oral)   Resp 12   Ht 5' 2\" (1.575 m)   Wt 115.3 kg (254 lb 1.6 oz)   LMP 01/31/2008   SpO2 98%   BMI 46.48 kg/m²      GENERAL EMIYL: in no apparent distress and well developed and well nourished   MUSCULOSKEL: no joint tenderness, deformity or swelling   INTEGUMENT:  warm and dry, no rashes or lesions   ABDOMEN . soft,obese, NT, ND, No masses appreciated, INC: C/D/I    EXTREMITIES: extremities normal, atraumatic, no cyanosis or edema   PELVIC: NEFG, spec exam revealed atrophic mucosa. BME revealed small nodularity at right vaginal apex likely scar tissue.     RECTAL: deferred   BRY SURVEY: Cervical, supraclavicular, axillary and inguinal nodes normal.   NEURO: Grossly normal         IMPRESSION/PLAN:  1.stage IV Primary peritoneal cancer fátima   -reviewed her pathology    -s/p carbo (auc=6), taxol (175 mg/m2) IV every 3 wks s/p 6 cycles completed 6/6/2019   -s/p cytoreductive surgery with [facility] with dr. Consuelo Holland   -currently undergoing pelvic radiaiton   -reviewed surveillance strategy and signs/symptoms of recurrence   -will need port flush every 6 wks with  every 3 months   -f/u in 3 months    The total time spent was 40 minutes regarding this patients diagnosis of primary peritoneal cancer and >50% of this time was spent counseling and coordinating care    Timbo Ibrahim MD  Gynecologic Oncology  27/12/362652:37 AM

## 2019-11-15 ENCOUNTER — OFFICE VISIT (OUTPATIENT)
Dept: ONCOLOGY | Age: 49
End: 2019-11-15

## 2019-11-15 ENCOUNTER — HOSPITAL ENCOUNTER (OUTPATIENT)
Dept: INFUSION THERAPY | Age: 49
Discharge: HOME | End: 2019-11-15
Payer: MEDICARE

## 2019-11-15 VITALS
HEART RATE: 63 BPM | TEMPERATURE: 98.4 F | RESPIRATION RATE: 12 BRPM | OXYGEN SATURATION: 98 % | WEIGHT: 254.1 LBS | HEIGHT: 62 IN | BODY MASS INDEX: 46.76 KG/M2

## 2019-11-15 VITALS
SYSTOLIC BLOOD PRESSURE: 118 MMHG | TEMPERATURE: 98.8 F | HEART RATE: 61 BPM | OXYGEN SATURATION: 97 % | RESPIRATION RATE: 18 BRPM | DIASTOLIC BLOOD PRESSURE: 74 MMHG

## 2019-11-15 DIAGNOSIS — C48.2 PERITONEAL CARCINOMA (HCC): Primary | ICD-10-CM

## 2019-11-15 PROCEDURE — 86304 IMMUNOASSAY TUMOR CA 125: CPT

## 2019-11-15 PROCEDURE — 36591 DRAW BLOOD OFF VENOUS DEVICE: CPT

## 2019-11-15 PROCEDURE — 77030012965 HC NDL HUBR BBMI -A

## 2019-11-15 PROCEDURE — 74011250636 HC RX REV CODE- 250/636: Performed by: INTERNAL MEDICINE

## 2019-11-15 RX ORDER — IBUPROFEN 600 MG/1
TABLET ORAL
Refills: 0 | COMMUNITY
Start: 2019-11-04 | End: 2020-12-17

## 2019-11-15 RX ORDER — HEPARIN 100 UNIT/ML
500 SYRINGE INTRAVENOUS ONCE
Status: COMPLETED | OUTPATIENT
Start: 2019-11-15 | End: 2019-11-15

## 2019-11-15 RX ORDER — HEPARIN 100 UNIT/ML
SYRINGE INTRAVENOUS
Status: DISPENSED
Start: 2019-11-15 | End: 2019-11-15

## 2019-11-15 RX ORDER — SODIUM CHLORIDE 0.9 % (FLUSH) 0.9 %
10-40 SYRINGE (ML) INJECTION AS NEEDED
Status: DISCONTINUED | OUTPATIENT
Start: 2019-11-15 | End: 2019-11-19 | Stop reason: HOSPADM

## 2019-11-15 RX ADMIN — Medication 20 ML: at 09:54

## 2019-11-15 RX ADMIN — HEPARIN 500 UNITS: 100 SYRINGE at 09:54

## 2019-11-15 NOTE — LETTER
11/15/19 Patient: Jose Flowers YOB: 1970 Date of Visit: 11/15/2019 Lakia Guy, [address] [address] [place] [address] VIA In Basket Dear Lakia Guy MD, Thank you for referring Ms. Alberto Saxena to [facility] for evaluation. My notes for this consultation are attached. If you have questions, please do not hesitate to call me. I look forward to following your patient along with you. Sincerely, Javi Noble MD

## 2019-11-15 NOTE — PROGRESS NOTES
Ayesha Dunn is a 47 y.o. female presents in office for follow up peritoneal cancer. Chief Complaint   Patient presents with    Follow-up     Peritoneal cancer        Do you have any unusual vaginal bleeding, discharge or irritation? No  Do you have any changes in your bowel movements? Pt c/o occassional constipation and diarrhea. Have you been experiencing nausea or vomiting? Pt c/o occasional nausea  Have you been experiencing any continuous or worsening abdominal pain? Pt c/o occasional abdominal discomfort of 7/10 not helped by tylenol or ibuprofen. Any urinary burning? No    Visit Vitals  Pulse 63   Temp 98.4 °F (36.9 °C) (Oral)   Resp 12   Ht 5' 2\" (1.575 m)   Wt 115.3 kg (254 lb 1.6 oz)   SpO2 98%   BMI 46.48 kg/m²         Health Maintenance Due   Topic Date Due    Shingrix Vaccine Age 49> (1 of 2) 07/22/2019    Influenza Age 5 to Adult  08/01/2019    MEDICARE YEARLY EXAM  08/22/2019         1. Have you been to the ER, urgent care clinic since your last visit? Hospitalized since your last visit? No    2. Have you seen or consulted any other health care providers outside of the [address] [place] [address] since your last visit? Include any pap smears or colon screening. No    3 most recent PHQ Screens 7/22/2019   Little interest or pleasure in doing things Several days   Feeling down, depressed, irritable, or hopeless Several days   Total Score PHQ 2 2       Abuse Screening Questionnaire 7/22/2019   Do you ever feel afraid of your partner? N   Are you in a relationship with someone who physically or mentally threatens you? N   Is it safe for you to go home? Y       Fall Risk Assessment, last 12 mths 2/9/2018   Able to walk? Yes   Fall in past 12 months?  No       Learning Assessment 1/4/2019   PRIMARY LEARNER Patient   HIGHEST LEVEL OF EDUCATION - PRIMARY LEARNER  GRADUATED HIGH SCHOOL OR GED   BARRIERS PRIMARY LEARNER NONE   CO-LEARNER CAREGIVER No   PRIMARY LANGUAGE ENGLISH   LEARNER PREFERENCE PRIMARY READING     -   ANSWERED BY patient   RELATIONSHIP SELF

## 2019-11-15 NOTE — PATIENT INSTRUCTIONS
Managing Side Effects of Chemotherapy: Care Instructions  Your Care Instructions    Cancer is often treated with medicines that destroy the cancer cells (chemotherapy). These medicines may slow cancer growth and prevent or stop the spread of cancer. Chemotherapy also can affect healthy cells and cause side effects. Most people can work and do their normal activities after and even during chemotherapy, but they may need to limit their schedules. Side effects of chemotherapy may include nausea and vomiting, loss of appetite, pain, and being tired. Some medicines can cause diarrhea or mouth sores. Your doctor may prescribe medicines to treat the side effects. Your doctor will advise you to take extra care to prevent illnesses and infections, because chemotherapy weakens your natural defenses. Follow-up care is a key part of your treatment and safety. Be sure to make and go to all appointments, and call your doctor if you are having problems. It's also a good idea to know your test results and keep a list of the medicines you take. How can you care for yourself at home? Medicines    · Take your medicines exactly as prescribed. Call your doctor if you think you are having a problem with your medicine. You may get medicine for nausea and vomiting if you have these side effects.    Nausea and vomiting    · A light meal or snack before chemotherapy may help prevent nausea. If you do have nausea during your treatment, try eating earlier--at least an hour or two before your next treatment. After your treatment, you may want to wait one or more hours before you eat again.     · Drink fluids with your meals and an hour before or after meals.     · After vomiting has stopped for 1 hour, sip a rehydration drink, such as Powerade or Gatorade.     · Drink plenty of fluids to prevent dehydration. Choose water and other caffeine-free clear liquids until you feel better.  Try clear fluids, such as apple or grape juice mixed to half strength with water, rehydration drinks, weak tea with sugar, clear broth, and gelatin dessert. Do not drink citrus juices. If you have kidney, heart, or liver disease and have to limit fluids, talk with your doctor before you increase the amount of fluids you drink.     · When you are feeling better, begin eating clear soups and mild foods until all symptoms are gone for 12 to 48 hours. Other good choices include dry toast, crackers, cooked cereal, and gelatin dessert, such as Jell-O.     · If your vomiting is not getting better or is getting worse, call your doctor right away.    Loss of appetite    · It's important to eat healthy food. If you do not feel like eating, try to eat food that has protein and extra calories to keep up your strength and prevent weight loss. You can drink liquid meal replacements for extra calories and protein.     · Try eating several smaller meals throughout the day. Set a schedule for meals and snacks, and plan for times when it feels best to eat. Try to eat your main meal early.     · After treatment, you may want to wait for a while to eat. You can also try eating earlier before treatment.     · Try to eat more of the foods you like during the days and times when your appetite is good.     · When you don't feel like eating your normal foods, try clear broths/soups and mild foods like toast, crackers, cooked cereal like oatmeal, and gelatin dessert. Eating soft, bland foods may help.    Pain control    · If your doctor prescribes medicines to control pain, take them as directed. Often your doctor will have you take these medicines regularly to keep your pain under control. Medicine for pain may cause side effects. Let your doctor know if you feel constipated, have trouble urinating, or have nausea.     · Try using relaxation exercises to lower your anxiety and stress, which can increase pain.     · Keep track of your pain so you can tell your doctor what your pain is like.  Write down where you feel pain, how long it lasts, what seems to bring it on, and how it feels. Also note what makes the pain feel better or worse.     · If you have mouth pain, your doctor may prescribe a special mouth rinse that can help relieve the pain.    Weakness and feeling tired    · Get extra rest. Plan ahead so you can take breaks or naps.     · Save your energy for the most important things you want to do.     · Try to get some exercise, such as walking, but stop if you are too tired.     · Eat a balanced diet. Do not skip meals, especially breakfast.     · Do something you enjoy. Do you like to listen to music? Spend some time listening to your favorite music. Or find another way to relax by reading, watching a movie, or playing games.     · Ask family and friends to help with home chores and other tasks.    To prevent infections    · Wash your hands often during the day, especially before you eat and after you use the bathroom.     · Stay away from people who have illnesses that you might catch, such as the flu or a cold.     · Try to stay out of crowds.     · Clean cuts and scrapes right away with warm water and soap. Clean them daily until they are healed.     · Keep track of your temperature, if your doctor recommends it. You can do this by taking your temperature at regular times and writing it down.    Hair loss    · Use a mild shampoo and a soft hair brush.     · Use a low setting on your hair dryer. Do not color or perm your hair.     · Have your hair cut short. It will look thicker and lou, and it will not be such a shock if you lose hair.     · Use sunscreen and a hat, scarf, or turban to protect your scalp from the sun.     · Ask your doctor about other treatments that you may try to prevent or minimize hair loss. These may include the use of a cooling cap. When should you call for help? Call 911 anytime you think you may need emergency care.  For example, call if:    · You passed out (lost consciousness).    Call your doctor now or seek immediate medical care if:    · You have a fever.     · You have abnormal bleeding.     · You have new or worse pain.     · You think you have an infection.     · You have new symptoms, such as a cough, belly pain, vomiting, diarrhea, or a rash.    Watch closely for changes in your health, and be sure to contact your doctor if:    · You are much more tired than usual.     · You have swollen glands in your armpits, groin, or neck.     · You do not get better as expected. Where can you learn more? Go to http://raul-vee.info/. Enter ([phone] 3952-425 in the search box to learn more about \"Managing Side Effects of Chemotherapy: Care Instructions. \"  Current as of: December 19, 2018  Content Version: 12.2  © 5033-6146 [employer]. Care instructions adapted under license by [employer] (which disclaims liability or warranty for this information). If you have questions about a medical condition or this instruction, always ask your healthcare professional. [place] [address] any warranty or liability for your use of this information.

## 2019-11-15 NOTE — PROGRESS NOTES
[facility] Progress Note    Date: November 15, 2019    Name: Edwin President    MRN: [id number]         : 1970    Q6 wks Port flush & Lab Draw    Ms. Elma Bucio to [facility], ambulatory, at 3329. Pt was assessed and education was provided. Ms. Cannon's vitals were reviewed. Visit Vitals  /74 (BP 1 Location: Left arm, BP Patient Position: Sitting)   Pulse 61   Temp 98.8 °F (37.1 °C)   Resp 18   SpO2 97%       Right chest mediport was accessed with 20 gauge 1 upton(s) after chloroprep. Port flushed easily and had brisk blood return. Blood drawn off and sent to lab for CA-125 after 10 ml waste per written orders. Mediport flushed with NS 20 ml and Heparin 500 units then de-accessed. No irritation or bleeding noted. Band-Aid applied. Ms. Elma Bucio tolerated the procedure, and had no complaints. Patient armband removed and shredded. Ms. Elma Bucio was discharged from [place] [address] in stable condition at 1000. She is to return on 19 at 0900 for her next appointment.     Sharon Carver RN  November 15, 2019

## 2019-11-16 LAB — CANCER AG125 SERPL-ACNC: 6 U/ML (ref 1.5–35)

## 2019-11-19 ENCOUNTER — TELEPHONE (OUTPATIENT)
Dept: ONCOLOGY | Age: 49
End: 2019-11-19

## 2019-11-19 NOTE — TELEPHONE ENCOUNTER
Informed pt our office doesn't handle the schedule for [facility] and for any scheduling needs she will need to contact them. Gave pt [phone] to contact C[facility] Shelton. Patient verbalized understanding and agreed to plan. Patient instructed to contact office for any question or concerns.      Leticia Jimenez, NP

## 2019-11-19 NOTE — TELEPHONE ENCOUNTER
Patient called the office wanting to see if there was another date availble she could get her port flush due to a possibility of going out of town with her daughter that week. Patient would like a call back regarding this matter.

## 2019-11-21 ENCOUNTER — TELEPHONE (OUTPATIENT)
Dept: ONCOLOGY | Age: 49
End: 2019-11-21

## 2019-11-21 NOTE — TELEPHONE ENCOUNTER
Patient called into the office stating she is considering Bariactric surgery and would like input/suggestions from the providers. Patient stated she recently had a big surgery and just wanted to make sure she's considering the proper care regarding her health. Patient would like a call back today regarding this matter.

## 2019-11-22 RX ORDER — PAROXETINE 10 MG/1
10 TABLET, FILM COATED ORAL DAILY
Qty: 30 TAB | Refills: 1 | Status: SHIPPED | OUTPATIENT
Start: 2019-11-22 | End: 2020-01-21

## 2019-11-22 NOTE — TELEPHONE ENCOUNTER
Returned call to patient and advised her to d/w Dr. Gillian Salcedo and bariatric surgeon. Has done nonsurgical weight loss previously and is planning on pursuing this again rather than surgical intervention. Reports hot flashes and night sweats. Reviewed RBA to nonhormonal treatment options, will prescribe Paxil, reviewed dosing and s/e. Will evaluate effectiveness at next appointment and advised patient to call sooner PRN. Patient agreeable, demonstrates understanding.

## 2019-12-05 ENCOUNTER — HOSPITAL ENCOUNTER (OUTPATIENT)
Dept: MAMMOGRAPHY | Age: 49
Discharge: HOME | End: 2019-12-05
Attending: FAMILY MEDICINE
Payer: MEDICAID

## 2019-12-05 DIAGNOSIS — Z12.31 VISIT FOR SCREENING MAMMOGRAM: ICD-10-CM

## 2019-12-05 PROCEDURE — 77067 SCR MAMMO BI INCL CAD: CPT

## 2019-12-23 RX ORDER — AMLODIPINE BESYLATE 10 MG/1
TABLET ORAL
Qty: 90 TAB | Refills: 0 | Status: SHIPPED | OUTPATIENT
Start: 2019-12-23 | End: 2020-03-04 | Stop reason: SDUPTHER

## 2019-12-27 ENCOUNTER — APPOINTMENT (OUTPATIENT)
Dept: INFUSION THERAPY | Age: 49
End: 2019-12-27

## 2020-01-02 ENCOUNTER — APPOINTMENT (OUTPATIENT)
Dept: INFUSION THERAPY | Age: 50
End: 2020-01-02
Payer: MEDICARE

## 2020-01-06 ENCOUNTER — TELEPHONE (OUTPATIENT)
Dept: FAMILY MEDICINE CLINIC | Age: 50
End: 2020-01-06

## 2020-01-06 NOTE — TELEPHONE ENCOUNTER
Nisreen Archibald from [address] [place] [address] ENT requesting if patients metoprolol can be held for allergy testing.  Please advise

## 2020-01-09 ENCOUNTER — HOSPITAL ENCOUNTER (OUTPATIENT)
Dept: INFUSION THERAPY | Age: 50
Discharge: HOME | End: 2020-01-09
Payer: MEDICARE

## 2020-01-09 VITALS
DIASTOLIC BLOOD PRESSURE: 72 MMHG | RESPIRATION RATE: 18 BRPM | HEART RATE: 70 BPM | OXYGEN SATURATION: 94 % | TEMPERATURE: 98.4 F | SYSTOLIC BLOOD PRESSURE: 165 MMHG

## 2020-01-09 PROCEDURE — 74011250636 HC RX REV CODE- 250/636: Performed by: INTERNAL MEDICINE

## 2020-01-09 PROCEDURE — 77030012965 HC NDL HUBR BBMI -A

## 2020-01-09 PROCEDURE — 96523 IRRIG DRUG DELIVERY DEVICE: CPT

## 2020-01-09 RX ORDER — HEPARIN 100 UNIT/ML
500 SYRINGE INTRAVENOUS ONCE
Status: COMPLETED | OUTPATIENT
Start: 2020-01-09 | End: 2020-01-09

## 2020-01-09 RX ORDER — SODIUM CHLORIDE 0.9 % (FLUSH) 0.9 %
10-40 SYRINGE (ML) INJECTION AS NEEDED
Status: DISCONTINUED | OUTPATIENT
Start: 2020-01-09 | End: 2020-01-13 | Stop reason: HOSPADM

## 2020-01-09 RX ADMIN — HEPARIN 500 UNITS: 100 SYRINGE at 09:24

## 2020-01-09 RX ADMIN — Medication 20 ML: at 09:23

## 2020-01-10 ENCOUNTER — TELEPHONE (OUTPATIENT)
Dept: ONCOLOGY | Age: 50
End: 2020-01-10

## 2020-01-13 RX ORDER — SIMVASTATIN 20 MG/1
TABLET, FILM COATED ORAL
Qty: 90 TAB | Refills: 3 | Status: SHIPPED | OUTPATIENT
Start: 2020-01-13 | End: 2020-12-27

## 2020-01-21 RX ORDER — PAROXETINE 10 MG/1
TABLET, FILM COATED ORAL
Qty: 90 TAB | Refills: 3 | Status: SHIPPED | OUTPATIENT
Start: 2020-01-21 | End: 2020-12-28

## 2020-01-27 NOTE — PROGRESS NOTES
Pt up to bathroom. No problems ambulating. Requesting not to be connected back to monitor. Will take last set of vitals prior to discharge. negative...

## 2020-01-30 DIAGNOSIS — R11.2 CINV (CHEMOTHERAPY-INDUCED NAUSEA AND VOMITING): ICD-10-CM

## 2020-01-30 DIAGNOSIS — T45.1X5A CINV (CHEMOTHERAPY-INDUCED NAUSEA AND VOMITING): ICD-10-CM

## 2020-01-30 RX ORDER — PROMETHAZINE HYDROCHLORIDE 25 MG/1
TABLET ORAL
Qty: 30 TAB | Refills: 3 | Status: SHIPPED | OUTPATIENT
Start: 2020-01-30 | End: 2020-02-19 | Stop reason: ALTCHOICE

## 2020-02-04 ENCOUNTER — TELEPHONE (OUTPATIENT)
Dept: ONCOLOGY | Age: 50
End: 2020-02-04

## 2020-02-04 NOTE — TELEPHONE ENCOUNTER
Informed pt her  will be drawn with her port flush scheduled on 02/20/2020. Patient verbalized understanding and agreed to plan. Patient instructed to contact office for any question or concerns.      Kimberley Daniel NP

## 2020-02-04 NOTE — TELEPHONE ENCOUNTER
Pt reports generalized aches and pain that feels like her arthritis. Explained to patient that she should speak with her PCP in regards to arthritis pain. Also transferred pt to [address] [place] [address] to schedule f/u appointment late 02/2020.       Priya Zhou NP

## 2020-02-04 NOTE — TELEPHONE ENCOUNTER
Patient called about getting  levels checked. Patient believes she is due for lab work. Patient would like clarification and a call back regarding this matter.

## 2020-02-04 NOTE — TELEPHONE ENCOUNTER
Patient contacted the office stating she is in pain and wants to know if providers could prescribe pain medication. Patient would also like to speak with a provide as well.

## 2020-02-11 RX ORDER — METOPROLOL SUCCINATE 100 MG/1
TABLET, EXTENDED RELEASE ORAL
Qty: 90 TAB | Refills: 1 | Status: SHIPPED | OUTPATIENT
Start: 2020-02-11 | End: 2020-03-04 | Stop reason: SDUPTHER

## 2020-02-13 ENCOUNTER — APPOINTMENT (OUTPATIENT)
Dept: INFUSION THERAPY | Age: 50
End: 2020-02-13

## 2020-02-17 ENCOUNTER — TELEPHONE (OUTPATIENT)
Dept: ONCOLOGY | Age: 50
End: 2020-02-17

## 2020-02-17 NOTE — TELEPHONE ENCOUNTER
Patient contacted the office stating that she is still experiencing dizziness from her treatment last August.  She requests a call back at [phone].

## 2020-02-18 ENCOUNTER — OFFICE VISIT (OUTPATIENT)
Dept: ONCOLOGY | Age: 50
End: 2020-02-18

## 2020-02-18 VITALS
DIASTOLIC BLOOD PRESSURE: 81 MMHG | HEIGHT: 62 IN | SYSTOLIC BLOOD PRESSURE: 132 MMHG | BODY MASS INDEX: 47.22 KG/M2 | WEIGHT: 256.6 LBS | TEMPERATURE: 98 F | HEART RATE: 66 BPM | RESPIRATION RATE: 14 BRPM | OXYGEN SATURATION: 97 %

## 2020-02-18 DIAGNOSIS — C48.2 PRIMARY PERITONEAL CARCINOMATOSIS (HCC): Primary | ICD-10-CM

## 2020-02-18 RX ORDER — GLUCOSAMINE/CHONDR SU A SOD 750-600 MG
TABLET ORAL
COMMUNITY
End: 2021-01-11

## 2020-02-18 RX ORDER — BISMUTH SUBSALICYLATE 262 MG
1 TABLET,CHEWABLE ORAL DAILY
COMMUNITY
End: 2021-01-11

## 2020-02-18 RX ORDER — UREA 10 %
100 LOTION (ML) TOPICAL DAILY
COMMUNITY
End: 2021-01-11

## 2020-02-18 RX ORDER — ADHESIVE BANDAGE
30 BANDAGE TOPICAL DAILY PRN
COMMUNITY
End: 2020-12-17

## 2020-02-18 NOTE — PROGRESS NOTES
Amparo Brown is a 47 y.o. female presents in office for follow up for peritoneal cancer. Chief Complaint   Patient presents with    Cancer     Peritoneal cancer follow up      Pt c/o periodic dizziness since December. Pt c/o intense sweating. Do you have any unusual vaginal bleeding, discharge or irritation? No  Do you have any changes in your bowel movements? Pt c/o diarrhea. Have you been experiencing nausea or vomiting? Pt c/o nausea treated with phenergan. Have you been experiencing any continuous or worsening abdominal pain? Pt c/o occasional pain in abdomin near incision sites. Any urinary burning? No    Visit Vitals  /81 (BP 1 Location: Left arm, BP Patient Position: Sitting)   Pulse 66   Temp 98 °F (36.7 °C) (Oral)   Resp 14   Ht 5' 2\" (1.575 m)   Wt 116.4 kg (256 lb 9.6 oz)   SpO2 97%   BMI 46.93 kg/m²         Health Maintenance Due   Topic Date Due    Lipid Screen  02/28/2019    Shingrix Vaccine Age 50> (1 of 2) 07/22/2019    Influenza Age 5 to Adult  08/01/2019    Medicare Yearly Exam  08/22/2019         1. Have you been to the ER, urgent care clinic since your last visit? Hospitalized since your last visit? No    2. Have you seen or consulted any other health care providers outside of the [address] [place] [address] since your last visit? Include any pap smears or colon screening. No    3 most recent PHQ Screens 7/22/2019   Little interest or pleasure in doing things Several days   Feeling down, depressed, irritable, or hopeless Several days   Total Score PHQ 2 2       Abuse Screening Questionnaire 7/22/2019   Do you ever feel afraid of your partner? N   Are you in a relationship with someone who physically or mentally threatens you? N   Is it safe for you to go home? Y       Fall Risk Assessment, last 12 mths 2/9/2018   Able to walk? Yes   Fall in past 12 months?  No       Learning Assessment 1/4/2019   PRIMARY LEARNER Patient   HIGHEST LEVEL OF EDUCATION - PRIMARY LEARNER GRADUATED HIGH SCHOOL OR GED   BARRIERS PRIMARY LEARNER NONE   CO-LEARNER CAREGIVER No   PRIMARY LANGUAGE ENGLISH   LEARNER PREFERENCE PRIMARY READING     -   ANSWERED BY patient   RELATIONSHIP SELF

## 2020-02-18 NOTE — PROGRESS NOTES
[facility]  [address] [place] [address], [address], [address] [facility]  [address] [facility], [address] [facility]  [place], [address]  [phone]  Haresh Reed DO      Patient ID:  Name:  Alvaro Espinosa  MRN:  [id number]  :  1970/49 y.o. Date:  2020      HISTORY OF PRESENT ILLNESS:  Alvaro Espinosa is a 47 y.o.  postmenopausal female referred by Dr. Lauri Hahn  With peritoneal cancer. Intitally seen be NP with reports of vaginal bleeding. Given pt's history of hysteretectomy with BSO for endometriosis in  a TVUS was ordered. Which revealed a 6.8 cm x 5.2 cm x 4.6 cm at midline vaginal cuff. This prompted pelvic CT with findings of a lesion measuring 7.6 x 5.8 x 7.2 cm and is compatible with a pelvic neoplasm vs endometrioma given prior history of endometriosis. Tumor markers done on 2018 all normal.  S/p  Exploratory laparotomy, exploration of retroperitoneal spaces, exam under anesthesia with biopsy of rectovaginal mass on 2019. Had sigmoidoscopy which revealed submucosal mass. S/p cycle #6 of carbo/taxol on 2019. S/p cytoreductive surgery with [facility] on 2019. S/p radiation treatment completed in 2019. Has been having some diarrhea which is manageable. No vaginal bleeding. Biggest concern is dizziness since December.          Labs:  Component      Latest Ref Rng & Units 11/15/2019           9:56 AM   CA-125      1.5 - 35.0 U/mL 6     Component      Latest Ref Rng & Units 2019           8:44 AM   CA-125      1.5 - 35.0 U/mL 7     Component      Latest Ref Rng & Units 2019           8:26 AM  8:20 AM   Cancer Ag (CA) 125      0.0 - 38.1 U/mL 7.8 8.7     Component      Latest Ref Rng & Units 2019           8:28 AM   Cancer Ag (CA) 125      0.0 - 38.1 U/mL 8.8     Component      Latest Ref Rng & Units 3/14/2019 2019           8:15 AM  8:32 AM   Cancer Ag (CA) 125      0.0 - 38.1 U/mL 10.4 18.4     12/17/2018 : 9.3  12/17/2018 CEA: 2.0  12/17/2018 AFP: 3.8    Pathology  8/8/2019  A) COLON, PERICOLIC NODULE, EXCISION:      - ACELLULAR MUCIN WITH MULTIPLE CALCIFICATIONS, AND ASSOCIATED FIBROUS WALL WITH        HYALINIZATION, AND CHRONIC INFLAMMATION.     - ADJACENT BENIGN FIBROADIPOSE TISSUE, CONSISTENT WITH MESENTERY.     - NO EVIDENCE OF MALIGNANCY.      - SEE COMMENT. B) LIVER, RIGHT LOBE, BIOPSY:      - NODULAR FAT NECROSIS AND FIBROSIS OF THE LIVER CAPSULE.      - MILD STEATOSIS.     - NO EVIDENCE OF MALIGNANCY. C) COLON, SIGMOID, SEROSAL IMPLANT, EXCISION:      - NODULAR FAT NECROSIS WITH FIBROSIS, CHRONIC INFLAMMATION, CALCIFICATIONS, AND        CYSTIC DEGENERATION WITHOUT MUCIN.     - NO EVIDENCE OF MALIGNANCY. D) OMENTUM, OMENTECTOMY (RESECTION):      - CONGESTED FIBROADIPOSE TISSUE WITH FOCAL FIBROSIS AND CHRONIC INFLAMMATION, AND        CALCIFIED NODULAR FIBROSIS.     - NO EVIDENCE OF MALIGNANCY. E) PERITONEUM, LEFT ANTERIOR ABDOMINAL, RESECTION:      - CONGESTED PERITONEAL TISSUE, NO EVIDENCE OF MALIGNANCY. F) PERITONEUM, RIGHT ANTERIOR ABDOMINAL, RESECTION:      - CONGESTED PERITONEAL TISSUE, NO EVIDENCE OF MALIGNANCY. G) COLON, SIGMOID, SEROSAL IMPLANT, EXCISION:      - NODULAR FIBROSIS WITH HISTIOCYTES, SUGGESTIVE OF FAT NECROSIS.     - CYSTIC DEGENERATION, WITHOUT MUCIN.     - NO EVIDENCE OF MALIGNANCY. H) SOFT TISSUE, FALCIFORM, EXCISION:      - CONSISTENT WITH FALCIFORM LIGAMENT.     - NO EVIDENCE OF MALIGNANCY. PATHOLOGIC STAGE:  ypTx, pNx    1/17/2019   RECTOVAGINAL MASS (#1, 2) AND PELVIC MASS, BIOPSIES:   CONSISTENT WITH SEROUS CARCINOMA WITH EXTENSIVE NECROSIS. Imaging  PETCT 12/2019  1. Residual small soft tissue attenuation mass lateral right perirectal pelvis similar volume as above. No associated hypermetabolic activity.     2. Previous 2 small foci of increased metabolic activity residual at the vaginal cuff have resolved. 3. Small focus of asymmetric metabolic activity localizes to stool-filled sigmoid colon midline abdomen as above, favored as physiologic. 4. Small focus of possible mildly increased metabolic activity along the caudal margin left lobe liver as above without discernible underlying lesion or nodule. Possible misregistration. Attention on any subsequent imaging. See above. 5. No hypermetabolic abnormalities appreciated to diagnose new metastatic disease otherwise. CT abdomen/pelvis 6/19/2019  CT ABDOMEN FINDINGS: Visualized portions of lung bases demonstrate presence of  mild cardiomegaly. Visualized portions of the lung bases demonstrate no  significant abnormalities.     Small segment 7 lesion adjacent to the IVC measures 1.1 x 1.0 cm previously  measuring 1.5 x 1.1 cm. The liver is otherwise normal. The gallbladder is  normal.     The splenic lesion measures 3.0 x 2.8 cm previously measuring 2.4 x 2.3 cm.     The pancreas is normal. There is probably a tiny right adrenal adenoma  unchanged. The left adrenal gland is normal. There is a small cyst involving the  lower pole the left kidney. Kidneys are otherwise normal.     There is no intra-abdominal or retroperitoneal adenopathy. I see no  intra-abdominal carcinomatosis.     CT PELVIS FINDINGS: Lower right pelvic mass currently measures 2.8 x 2.7 cm  previously measuring 3.2 x 2.8 cm. No additional pelvic masses identified. No  additional evidence of pelvic carcinomatosis.     Status post hysterectomy. There is no free fluid. There is no pelvic adenopathy. No specific bowel abnormalities are seen. Postoperative changes are seen  involving the anterior pelvic wall.     No significant osseous abnormalities. Extensive degenerative changes are seen  involving the spine.     IMPRESSION  Impression:        1. The small liver lesion has decreased in size. The small lower right pelvic  mass has decreased in size.   2. The splenic lesion may have increased in size slightly. This lesion was not  positive on PET and is probably not related to the patient's pelvic malignancy. Suspect benign lesion. 3. No additional CT evidence of malignancy. 4. Cardiomegaly. Status post hysterectomy. Additional chronic changes as  described above. CT abdomen/pelvis  FINDINGS:  view shows lung bases clear and normal bowel gas pattern. Patient morbidly obese but probably with interval weight loss.     CT Abdomen and pelvis:     Lung bases: Normal.     Heart and pericardium: There is a catheter tip in the right atrium of the heart. Heart and pericardium otherwise unremarkable.     Liver: Decrease size of lesion in segment 7 of the liver adjacent IVC now  measuring 11 x 15 mm and on PET/CT 2/14/2019 it measured 23 mm. No new liver  lesions.     Gallbladder: Normal.     Spleen: Mass inferiorly in the spleen near the hilum measures 23 x 24 mm,  probably present previously but not as well seen because of lack of intravenous  contrast and measuring about 27 mm (29).    Pancreas: Normal.     Adrenal glands: Normal.     Kidneys: Enhancing symmetrically. No focal lesions.     The bowel: Stomach and duodenum and small bowel and the appendix and colon are  normal.     GYN: Prior hysterectomy. No adnexal masses.     Peritoneal space: No free fluid. There is some mild fat stranding in the left  lower quadrant but no discrete mass (60) and probably related to the mesentery.     MSK: Abdominal wall scar lower abdominal wall. Multiple levels facet arthropathy  lower lumbar spine. Moderate disc space narrowing L5/S1 and L4/L5. No suspicious  skeletal lesions.     Retroperitoneum: Soft tissue mass right side pelvis has decreased in size. It  measures 2.8 x 3.2 cm. On previous PET/CT it measured 8.3 x 5.3 cm and on CT it  measured 7.4 x 5.2 cm.     IMPRESSION  IMPRESSION:     Overall improvement compatible with response to therapy.  Decreased size of the  pelvic metastasis, hepatic metastasis, splenic lesion since the prior study. No  new metastases. MRI 2/19/2019  FINDINGS:  Presumed hysterectomy. There is a lobulated 6.8 x 6 x 5.9 cm mass centered at  the right cul-de-sac involving the right and posterior upper to mid vagina but  also abutting the right anterior rectum from 1-8 o'clock. On coronal and  sagittal views tumor is favored to not be originating from the rectum but this  is not definite. There is a oval ring of T2 hypointensity and T1 hyperintensity  with central necrosis within the mass. The mass extends to the posterior medial  right mesorectal fascia. The mass does not invade the bladder. At the cranial  aspect of the mass is a spiculated 3.4 x 2 cm T2 hypointense structure with  tethering to adjacent bowel and fascia. Ovaries are not visualized separately. No pelvic ascites.     Bowel is otherwise normal in caliber. No upstream dilation. Bladder is normal.  No lymphadenopathy.     Postsurgical changes anterior midline pelvic wall. Bones are grossly  unremarkable.     IMPRESSION  IMPRESSION:    1. Large up to 6.8 cm mass centered at the right cul-de-sac  is most  intimately associated with the vagina, favored to be either endometrial, vaginal  or cervical in origin. It does abut and may involve the right rectum but this is  favored secondary involvement. Ovaries are not visualized separately. Correlate  for history of oophorectomy since ovarian malignancy also possible. 2.  There is an associated site of favored endometriotic implant at the cranial  aspect of the mass raising the possibility of malignant transformation. 3.  No upstream bowel obstruction. PETCT 2/14/2019   --  PET FINDINGS  --    No abnormal hypermetabolic activity in the neck.       No abnormal hypermetabolic activity in the chest.      Low-attenuation possible lesion in the medial dome right lobe liver in region  of heterogeneous hypermetabolic activity, with SUV Max reaching 11.7 on image  98. Underlying lesion difficult to visualize due to extensive motion from  respirations. Cannot well separate from the IVC or diaphragm. No definite  corresponding lung lesion however.     There is low level metabolic activity associated with stranding and presumed  scarring in the subcutaneous fat of the lower midline abdominal and pelvic wall. This may simply be inflammatory. .      There is a lobulated soft tissue conglomerate in the midline to right lateral  cul-de-sac and perirectal pelvis. The periphery of this is diffusely  hypermetabolic, with SUV Max reaching 18.3 on image 200. Portions centrally are  photopenic and presumed necrotic. This measures up to 7.5 x 6.1 cm axial on  image 196. Anterior margin of this hypermetabolic lesion is in direct contact  with the high intensity decompressed urinary bladder, involvement cannot be  assessed. Left lateral margin of the mass is in contact with the surface of the  lateral wall of the sigmoid colon. Direct involvement cannot be assessed.  -Additional focal hypermetabolic density 2.7 cm on image 189 inseparable from  the caudal wall of the sigmoid. -Tapering hypermetabolic activity extends to approximately image 210, not to the  level of the perineum.     There are no other areas of abnormal metabolic activity appreciated in the  abdomen or pelvis which cannot be attributed to renal collecting system, ureter,  bladder or bowel wall activity>]. No definite hypermetabolic bone lesions appreciated, although bone scan can be  more sensitive in this respect. --  CT FINDINGS  --     These limited CT images do not replace diagnostic quality contrast enhanced CT  scan for evaluation of solid organs, bowel, retroperitoneum and vasculature. CT NECK:    Severely limited by lack of intravenous contrast.  Paranasal sinuses free of  disease. No appreciably enlarged lymph nodes.  Extensive cervical endplate  osteophytes anterior and left lateral..      CT CHEST:    Limited evaluation of the hilum and vasculature without intravenous contrast.  No pleural effusion. No appreciably enlarged lymph nodes. Patchy parenchymal  opacities medial basal left lower lobe on image 89 demonstrates low level  metabolic activity, SUV Max 3.2, nonspecific, favored as inflammatory based on  imaging appearance subjectively. [facility] CT ABDOMEN:    Limited noncontrast images. Normal-size liver and spleen no adrenal mass. No  hydronephrosis. No ascites. CT PELVIS:    Limited noncontrast images. No small bowel or colon distention. Scattered  colonic diverticulosis. Postsurgical change anterior abdominal wall. Severe  degenerative facet disease at the lower lumbar levels.       Lobulated right pelvic/cul-de-sac soft tissue mass as above. Mild degree of  stranding in the pelvic mesenteric fat. No ascites.        IMPRESSION   IMPRESSION:      1. Peripherally hypermetabolic soft tissue mass right dependent pelvis to the  cul-de-sac region as above consistent with malignancy/metastatic disease with  some central necrosis  -Possible separate lesion versus serosal involvement of the sigmoid colon  adjacent. -Mass in contact with posterior bladder, sigmoid colon locally and posterior  peritoneal surface.     2. Hypermetabolic lesion along central dome of the right lobe liver difficult to  separate from IVC or diaphragm due to motion artifact. Recommend dedicated  contrast enhanced CT for localization and better clarification.  -Findings are concerning for malignancy or hepatic metastatic disease until  proven otherwise.      3. Low level activity associated with patchy parenchymal opacity at the medial  left lower lobe,, favored as inflammatory as above.     4. No other definite hypermetabolic abnormalities appreciated elsewhere. 12/14/2018 CT PELVIS W/CONTRAST  FINDINGS:    LYMPH NODES: No enlarged lymph nodes.     PELVIC GASTROINTESTINAL TRACT: No bowel dilation or wall thickening. PELVIC ORGANS:     The uterus is absent. Along the right upper margin of the vaginal cuff within the right deep pelvis there is a large irregularly-shaped peripherally enhancing, septated appearing mass with regions of central low attenuation which could be low density-fluid type contents or regions of necrosis. Lesion measures 76 x 58 x 72 mm and is compatible with a pelvic neoplasm. VASCULATURE: Unremarkable. BONES: Lower lumbar hypertrophic osteophytes. Lower lumbar facet hypertrophy with vacuum phenomenon asymmetric leftward. Degenerative vacuum phenomenon within the SI joints noted as well. No acute or aggressive osseous abnormalities identified. OTHER: Small fat-containing ventral umbilical hernia. Minimal nonspecific edema within the subcutaneous fat of the lumbar region, not uncommon. IMPRESSION:   1.  Along the right upper margin of the vaginal cuff within the right deep pelvis there is a large irregularly-shaped peripherally enhancing, septated appearing mass with regions of central low attenuation which could be low density-fluid type contents or regions of necrosis. Lesion measures 76 x 58 x 72 mm and is compatible with a pelvic neoplasm. Could be a endometrioma given prior history of endometriosis, malignancy not excluded and gynecologic oncology follow-up is suggested. 2.  Small fat-containing ventral umbilical hernia.      12/04/2018 TVUS  Uterus: surgically absent  Left ovary: surgically absent  Right ovary: surgically absent  Other findings: midline-at vaginal cuff: 6.8 cm X 5.2 cm x 4.6 cm, volume 85 ml    Impression: Complex pelvic mass       ROS:    As above      Patient Active Problem List    Diagnosis Date Noted    Malignant neoplasm of peritoneum ([facility] [address] 02/14/2019    Pelvic mass in female 01/17/2019    Bipolar 1 disorder ([facility] [address] 02/09/2018    Irritable bowel syndrome with both constipation and diarrhea 02/09/2018    Advance care planning 01/31/2017    Dental caries 05/26/2016    Chronic periodontal disease 05/26/2016    SUAZO (dyspnea on exertion) 02/10/2016    Prediabetes 09/24/2015    Morbid obesity ([facility] [address] 08/31/2015    Arthritis, degenerative 08/31/2015    Gastroesophageal reflux disease without esophagitis 08/31/2015    ANAMIKA on CPAP 08/31/2015    Essential hypertension 08/28/2015    PTSD (post-traumatic stress disorder) 03/24/2014    S/P TKR (total knee replacement) 11/14/2012    Depression 05/08/2012    Menopause 05/08/2012    Knee pain, right 05/08/2012    GERD (gastroesophageal reflux disease) 05/08/2012    OA (osteoarthritis) 06/18/2010    Obesity 06/18/2010    Mixed hyperlipidemia 06/18/2010     Past Medical History:   Diagnosis Date    AR (allergic rhinitis)     seasonal    Cancer ([facility] [address]     pt states between vgina and rectal area    Cardiac echocardiogram 04/11/2016    Tech difficult. EF 55-60%. No RWMA. Mild conc LVH. Gr 1 DDfx. RVSP normal.      Cardiac nuclear imaging test 05/05/2016    Low risk. Very patchy radiotracer uptake. Mild anterior artifact, low suspicion for ischemia. EF 68%. No RWMA. Normal EKG on pharm stress test.    Cardiovascular LE arterial duplex 10/07/2013    No significant arterial disease at rest bilaterally. R JUNE 1.21.  L JUNE 1.16. No significant sm vessel disease.     Depression     Dr. Sarah Hill, suicide attempt 2/12    Diverticulosis     Endometriosis     s/p hysterectomy 2006    GERD (gastroesophageal reflux disease)     Glaucoma     Dr. Alexa Lee HTN (hypertension)     Hx of colonoscopy 04/05/2017    mild diverticulosis, g1 internal hemorrhoids, normal colon , repeat 10 year 2027    Hx of suicide attempt     Hyperlipidemia LDL goal < 130     IBS (irritable bowel syndrome)     Ill-defined condition     environmental allergies    Insomnia     Malignant neoplasm of peritoneum ([facility] [address] 2/14/2019    Nausea & vomiting     OA (osteoarthritis)     both knees, lower back    Preeclampsia  PTSD (post-traumatic stress disorder)     Seizures (HCC)     1 x with childbirth, had toxemia    Sleep apnea     does not use cpap machine    Spinal stenosis      [facility]      Past Surgical History:   Procedure Laterality Date    COLONOSCOPY N/A 2017    COLONOSCOPY performed by Mariela Gonzales MD at [facility] N/A 2019    SIGMOIDOSCOPY FLEXIBLE performed by Carmen Potts MD at [facility] ENDOSCOPY    HX  SECTION      HX COLONOSCOPY  2017    DR. MCRAE 2017     HX HYSTERECTOMY      HX KNEE ARTHROSCOPY Left     left knee    HX KNEE REPLACEMENT Bilateral     (R)  (L)     HX LAPAROTOMY N/A 2019    and rectovaginal bx    HX OTHER SURGICAL  2016    all teeth removed    HX SALPINGO-OOPHORECTOMY  2008    HX TUBAL LIGATION      IR INSERT TUNL CVC W PORT OVER 5 YEARS  2019    MULTIPLE DELIVERY       1990    NV FEMUR/KNEE SURG UNLISTED Left 2009    External fixator    TOTAL ABDOM HYSTERECTOMY        OB History        2    Para   2    Term   2       0    AB   0    Living   0       SAB   0    TAB   0    Ectopic   0    Molar   0    Multiple   0    Live Births   0              Social History     Tobacco Use    Smoking status: Never Smoker    Smokeless tobacco: Never Used   Substance Use Topics    Alcohol use: No      Family History   Problem Relation Age of Onset    Heart Disease Mother         CHF    Diabetes Mother    Bhavana Reynolds Hypertension Mother     Kidney Disease Mother     Breast Cancer Mother     Stroke Mother     Diabetes Father     Hypertension Father     Heart Attack Father         MI    Cancer Maternal Grandmother         stomach    Ovarian Cancer Maternal Aunt     Cancer Maternal Uncle       Current Outpatient Medications   Medication Sig    magnesium hydroxide (PATHAK MILK OF MAGNESIA) 400 mg/5 mL suspension Take 30 mL by mouth daily as needed for Constipation.     Biotin 2,500 mcg cap Take  by mouth.  multivitamin (ONE A DAY) tablet Take 1 Tab by mouth daily.  cyanocobalamin (VITAMIN B-12) 100 mcg tablet Take 100 mcg by mouth daily.  metoprolol succinate (TOPROL-XL) 100 mg tablet take 1 tablet by mouth once daily    promethazine (PHENERGAN) 25 mg tablet take 1 tablet by mouth every 6 hours if needed for nausea    PARoxetine (PAXIL) 10 mg tablet take 1 tablet by mouth once daily    simvastatin (ZOCOR) 20 mg tablet take 1 tablet by mouth at bedtime    amLODIPine (NORVASC) 10 mg tablet take 1 tablet by mouth once daily    losartan (COZAAR) 100 mg tablet take 1 tablet by mouth once daily    melatonin 3 mg tablet Take 3 mg by mouth nightly.  levocetirizine (XYZAL) 5 mg tablet daily as needed.  montelukast (SINGULAIR) 10 mg tablet Take 10 mg by mouth nightly.  latanoprost (XALATAN) 0.005 % ophthalmic solution Administer 1 Drop to both eyes nightly.  ibuprofen (MOTRIN) 600 mg tablet take 1 tablet by mouth three times a day ONLY AS NEEDED    lidocaine-prilocaine (EMLA) topical cream apply to affected area once daily if needed for pain    ondansetron hcl (ZOFRAN) 4 mg tablet take 1 tablet by mouth every 6 hours if needed for nausea    Mv,Ca,Min-FA-Herbal No.157 (ESTROVEN MAXIMUM STRENGTH) 400 mcg tab Take  by mouth daily. Indications: hot flash    dimethicone (SOOTHE AND COOL INZO BARRIER) 5 % topical cream Apply  to affected area two (2) times a day.  polyethylene glycol (MIRALAX) 17 gram packet Take 1 Packet by mouth daily.  cycloSPORINE (RESTASIS) 0.05 % dpet Apply 1 Drop to eye as needed.  dexamethasone (DECADRON) 4 mg tablet Take 40 mg by mouth as needed. Take 10 tabs the night before Chemo #1 and Chemo #2.  plecanatide (TRULANCE) 3 mg tab Take  by mouth.  OXcarbazepine (TRILEPTAL) 300 mg tablet Take 300 mg by mouth two (2) times a day.  LORazepam (ATIVAN) 0.5 mg tablet Take 0.5 mg by mouth two (2) times daily as needed.     ziprasidone (GEODON) 40 mg capsule Take 40 mg by mouth nightly. No current facility-administered medications for this visit. Allergies   Allergen Reactions    Other Medication Hives     seafood    Shellfish Derived Hives          OBJECTIVE:    Physical Exam  VITAL SIGNS: Visit Vitals  /81 (BP 1 Location: Left arm, BP Patient Position: Sitting)   Pulse 66   Temp 98 °F (36.7 °C) (Oral)   Resp 14   Ht 5' 2\" (1.575 m)   Wt 116.4 kg (256 lb 9.6 oz)   LMP 01/31/2008   SpO2 97%   BMI 46.93 kg/m²      GENERAL EMILY: in no apparent distress and well developed and well nourished   MUSCULOSKEL: no joint tenderness, deformity or swelling   INTEGUMENT:  warm and dry, no rashes or lesions   ABDOMEN . soft,obese, NT, ND, No masses appreciated, INC: C/D/I    EXTREMITIES: extremities normal, atraumatic, no cyanosis or edema   PELVIC: NEFG, spec exam revealed atrophic mucosa. BME revealed small nodularity at right vaginal apex likely scar tissue.     RECTAL: deferred   BRY SURVEY: Cervical, supraclavicular, axillary and inguinal nodes normal.   NEURO: Grossly normal         IMPRESSION/PLAN:  1.stage IV Primary peritoneal cancer    -previoulsy reviewed her pathology    -s/p carbo (auc=6), taxol (175 mg/m2) IV every 3 wks s/p 6 cycles completed 6/6/2019   -s/p cytoreductive surgery with [facility] with dr. Ana Montalvo   -s/p pelvic radiation   -reviewed surveillance strategy and signs/symptoms of recurrence   -will need port flush every 6 wks with  every 3 months   -will call with  results   -plan for repeat PET in June per dr. Ana Montalvo   -dizziness-encouraged f/u with pcp   -f/u in 3 months    The total time spent was 40 minutes regarding this patients diagnosis of primary peritoneal cancer and >50% of this time was spent counseling and coordinating care    Radha Barron MD  Gynecologic Oncology  8/[id number] AM

## 2020-02-18 NOTE — PATIENT INSTRUCTIONS
When You Are Overweight: Care Instructions  Your Care Instructions    If you're overweight, your doctor may recommend that you make changes in your eating and exercise habits. Being overweight can lead to serious health problems, such as high blood pressure, heart disease, type 2 diabetes, and arthritis, or it can make these problems worse. Eating a healthy diet and being more active can help you reach and stay at a healthy weight. You don't have to make huge changes all at once. Start by making small changes in your eating and exercise habits. To lose weight, you need to burn more calories than you take in. You can do this by eating healthy foods in reasonable amounts and becoming more active every day. Follow-up care is a key part of your treatment and safety. Be sure to make and go to all appointments, and call your doctor if you are having problems. It's also a good idea to know your test results and keep a list of the medicines you take. How can you care for yourself at home? · Improve your eating habits. You'll be more successful if you work on changing one eating habit at a time. All foods, if eaten in moderation, can be part of healthy eating. Remember to:  ? Eat a variety of foods from each food group. Include grains, vegetables, fruits, dairy, and protein foods. ? Limit foods high in fat, sugar, and calories. ? Eat slowly. And don't do anything else, such as watch TV, while you are eating. ? Pay attention to portion sizes. Put your food on a smaller plate. ? Plan your meals ahead of time. You'll be less likely to grab something that's not as healthy. · Get active. Regular activity can help you feel better, have more energy, and burn more calories. If you haven't been active, start slowly. Start with at least 30 minutes of moderate activity on most days of the week. Then gradually increase the amount of activity. Try for 60 or 90 minutes a day, at least 5 days a week.  There are a lot of ways to fit activity into your life. You can:  ? Walk or bike to the store. Or walk with a friend, or walk the dog.  ? Mow the lawn, rake leaves, shovel snow, or do some gardening. ? Use the stairs instead of the elevator, at least for a few floors. · Change your thinking. Your thoughts have a lot to do with how you feel and what you do. When you're trying to reach a healthy weight, changing how you think about certain things may help. Here are some ideas:  ? Don't compare yourself to others. Healthy bodies come in all shapes and sizes. ? Pay attention to how hungry or full you feel. When you eat, be aware of why you're eating and how much you're eating. ? Focus on improving your health instead of dieting. Dieting almost never works over the long term. · Ask your doctor about other health professionals who can help you reach a healthy weight. ? A dietitian can help you make healthy changes in your diet. ? An exercise specialist or  can help you develop a safe and effective exercise program.  ? A counselor or psychiatrist can help you cope with issues such as depression, anxiety, or family problems that can make it hard to focus on reaching a healthy weight. · Get support from your family, your doctor, your friends, a support group--and support yourself. Where can you learn more? Go to http://raul-vee.info/. Enter A802 in the search box to learn more about \"When You Are Overweight: Care Instructions. \"  Current as of: March 28, 2019  Content Version: 12.2  © 8158-2801 [employer], [employer]. Care instructions adapted under license by [employer] (which disclaims liability or warranty for this information). If you have questions about a medical condition or this instruction, always ask your healthcare professional. [address] any warranty or liability for your use of this information.

## 2020-02-19 ENCOUNTER — OFFICE VISIT (OUTPATIENT)
Dept: FAMILY MEDICINE CLINIC | Age: 50
End: 2020-02-19

## 2020-02-19 ENCOUNTER — HOSPITAL ENCOUNTER (OUTPATIENT)
Dept: LAB | Age: 50
Discharge: HOME | End: 2020-02-19
Payer: MEDICAID

## 2020-02-19 ENCOUNTER — HOSPITAL ENCOUNTER (OUTPATIENT)
Dept: LAB | Age: 50
Discharge: HOME | End: 2020-02-19

## 2020-02-19 VITALS
DIASTOLIC BLOOD PRESSURE: 94 MMHG | TEMPERATURE: 98.4 F | OXYGEN SATURATION: 98 % | RESPIRATION RATE: 16 BRPM | SYSTOLIC BLOOD PRESSURE: 140 MMHG | HEART RATE: 62 BPM | HEIGHT: 62 IN | WEIGHT: 261 LBS | BODY MASS INDEX: 48.03 KG/M2

## 2020-02-19 DIAGNOSIS — R42 DIZZINESS: ICD-10-CM

## 2020-02-19 DIAGNOSIS — E78.2 MIXED HYPERLIPIDEMIA: ICD-10-CM

## 2020-02-19 DIAGNOSIS — C48.2 PRIMARY PERITONEAL CARCINOMATOSIS (HCC): ICD-10-CM

## 2020-02-19 DIAGNOSIS — Z00.00 INITIAL MEDICARE ANNUAL WELLNESS VISIT: Primary | ICD-10-CM

## 2020-02-19 DIAGNOSIS — F31.9 BIPOLAR 1 DISORDER (HCC): ICD-10-CM

## 2020-02-19 DIAGNOSIS — I10 ESSENTIAL HYPERTENSION: ICD-10-CM

## 2020-02-19 DIAGNOSIS — R73.03 PREDIABETES: ICD-10-CM

## 2020-02-19 DIAGNOSIS — Z71.89 ADVANCE CARE PLANNING: ICD-10-CM

## 2020-02-19 LAB
ATRIAL RATE: 66 BPM
CALCULATED P AXIS, ECG09: 58 DEGREES
CALCULATED R AXIS, ECG10: 6 DEGREES
CALCULATED T AXIS, ECG11: 27 DEGREES
DIAGNOSIS, 93000: NORMAL
P-R INTERVAL, ECG05: 174 MS
Q-T INTERVAL, ECG07: 442 MS
QRS DURATION, ECG06: 98 MS
QTC CALCULATION (BEZET), ECG08: 463 MS
VENTRICULAR RATE, ECG03: 66 BPM
XX-LABCORP SPECIMEN COL,LCBCF: NORMAL

## 2020-02-19 PROCEDURE — 93005 ELECTROCARDIOGRAM TRACING: CPT

## 2020-02-19 PROCEDURE — 99001 SPECIMEN HANDLING PT-LAB: CPT

## 2020-02-19 RX ORDER — MECLIZINE HYDROCHLORIDE 25 MG/1
25 TABLET ORAL
Qty: 30 TAB | Refills: 0 | Status: SHIPPED | OUTPATIENT
Start: 2020-02-19 | End: 2020-02-24

## 2020-02-19 NOTE — PROGRESS NOTES
This is an Initial Medicare Annual Wellness Exam (AWV) (Performed 12 months after IPPE or effective date of Medicare Part B enrollment, Once in a lifetime)    I have reviewed the patient's medical history in detail and updated the computerized patient record. History     Patient Active Problem List   Diagnosis Code    OA (osteoarthritis) M19.90    Obesity E66.9    Mixed hyperlipidemia E78.2    Depression F32.9    Menopause Z78.0    Knee pain, right M25.561    GERD (gastroesophageal reflux disease) K21.9    S/P TKR (total knee replacement) Z96.659    PTSD (post-traumatic stress disorder) F43.10    Essential hypertension I10    Morbid obesity ([facility] [address] E66.01    Arthritis, degenerative M19.90    Gastroesophageal reflux disease without esophagitis K21.9    ANAMIKA on CPAP G47.33, Z99.89    Prediabetes R73.03    SUAZO (dyspnea on exertion) R06.09    Dental caries K02.9    Chronic periodontal disease K05.6    Advance care planning Z71.89    Bipolar 1 disorder ([facility] [address] F31.9    Irritable bowel syndrome with both constipation and diarrhea K58.2    Pelvic mass in female R19.00    Malignant neoplasm of peritoneum (HCC) C48.2     Past Medical History:   Diagnosis Date    AR (allergic rhinitis)     seasonal    Cancer ([facility] [address]     pt states between vgina and rectal area    Cardiac echocardiogram 04/11/2016    Tech difficult. EF 55-60%. No RWMA. Mild conc LVH. Gr 1 DDfx. RVSP normal.      Cardiac nuclear imaging test 05/05/2016    Low risk. Very patchy radiotracer uptake. Mild anterior artifact, low suspicion for ischemia. EF 68%. No RWMA. Normal EKG on pharm stress test.    Cardiovascular LE arterial duplex 10/07/2013    No significant arterial disease at rest bilaterally. R JUNE 1.21.  L JUNE 1.16. No significant sm vessel disease.     Depression     Dr. Kj Garcia, suicide attempt 2/12    Diverticulosis     Endometriosis     s/p hysterectomy 2006    GERD (gastroesophageal reflux disease)     Glaucoma     Dr. Adams Brain HTN (hypertension)     Hx of colonoscopy 2017    mild diverticulosis, g1 internal hemorrhoids, normal colon , repeat 10 year     Hx of suicide attempt     Hyperlipidemia LDL goal < 130     IBS (irritable bowel syndrome)     Ill-defined condition     environmental allergies    Insomnia     Malignant neoplasm of peritoneum ([facility] [address] 2019    Nausea & vomiting     OA (osteoarthritis)     both knees, lower back    Preeclampsia     PTSD (post-traumatic stress disorder)     Seizures (HCC)     1 x with childbirth, had toxemia    Sleep apnea     does not use cpap machine    Spinal stenosis     Dr. Ashley Madrid      Past Surgical History:   Procedure Laterality Date    COLONOSCOPY N/A 2017    COLONOSCOPY performed by Conchis Granado MD at [address] N/A 2019    SIGMOIDOSCOPY FLEXIBLE performed by Mark Paulino MD at [facility] ENDOSCOPY    HX  SECTION      HX COLONOSCOPY  2017    DR. MCRAE 2017     HX HYSTERECTOMY      HX KNEE ARTHROSCOPY Left     left knee    HX KNEE REPLACEMENT Bilateral     (R)  (L)     HX LAPAROTOMY N/A 2019    and rectovaginal bx    HX OTHER SURGICAL  2016    all teeth removed    HX SALPINGO-OOPHORECTOMY  2008    HX TUBAL LIGATION      IR INSERT TUNL CVC W PORT OVER 5 YEARS  2019    MULTIPLE DELIVERY       1990    NH FEMUR/KNEE SURG UNLISTED Left 2009    External fixator    TOTAL ABDOM HYSTERECTOMY       Current Outpatient Medications   Medication Sig Dispense Refill    amLODIPine (NORVASC) 10 mg tablet take 1 tablet by mouth once daily 90 Tab 0    magnesium hydroxide (PATHAK MILK OF MAGNESIA) 400 mg/5 mL suspension Take 30 mL by mouth daily as needed for Constipation.  Biotin 2,500 mcg cap Take  by mouth.  multivitamin (ONE A DAY) tablet Take 1 Tab by mouth daily.       cyanocobalamin (VITAMIN B-12) 100 mcg tablet Take 100 mcg by mouth daily.  metoprolol succinate (TOPROL-XL) 100 mg tablet take 1 tablet by mouth once daily 90 Tab 1    promethazine (PHENERGAN) 25 mg tablet take 1 tablet by mouth every 6 hours if needed for nausea 30 Tab 3    PARoxetine (PAXIL) 10 mg tablet take 1 tablet by mouth once daily 90 Tab 3    simvastatin (ZOCOR) 20 mg tablet take 1 tablet by mouth at bedtime 90 Tab 3    ibuprofen (MOTRIN) 600 mg tablet take 1 tablet by mouth three times a day ONLY AS NEEDED  0    losartan (COZAAR) 100 mg tablet take 1 tablet by mouth once daily 90 Tab 1    lidocaine-prilocaine (EMLA) topical cream apply to affected area once daily if needed for pain 30 g 0    ondansetron hcl (ZOFRAN) 4 mg tablet take 1 tablet by mouth every 6 hours if needed for nausea 30 Tab 3    Mv,Ca,Min-FA-Herbal No.157 (ESTROVEN MAXIMUM STRENGTH) 400 mcg tab Take  by mouth daily. Indications: hot flash      dimethicone (SOOTHE AND COOL INZO BARRIER) 5 % topical cream Apply  to affected area two (2) times a day. 114 g 1    polyethylene glycol (MIRALAX) 17 gram packet Take 1 Packet by mouth daily. 528 g 3    cycloSPORINE (RESTASIS) 0.05 % dpet Apply 1 Drop to eye as needed.  dexamethasone (DECADRON) 4 mg tablet Take 40 mg by mouth as needed. Take 10 tabs the night before Chemo #1 and Chemo #2. 20 Tab 0    melatonin 3 mg tablet Take 3 mg by mouth nightly.  plecanatide (TRULANCE) 3 mg tab Take  by mouth.  OXcarbazepine (TRILEPTAL) 300 mg tablet Take 300 mg by mouth two (2) times a day.  0    LORazepam (ATIVAN) 0.5 mg tablet Take 0.5 mg by mouth two (2) times daily as needed.  levocetirizine (XYZAL) 5 mg tablet daily as needed.  ziprasidone (GEODON) 40 mg capsule Take 40 mg by mouth nightly.  montelukast (SINGULAIR) 10 mg tablet Take 10 mg by mouth nightly. 1    latanoprost (XALATAN) 0.005 % ophthalmic solution Administer 1 Drop to both eyes nightly.          Allergies   Allergen Reactions    Other Medication Hives     seafood    Shellfish Derived Hives       Family History   Problem Relation Age of Onset    Heart Disease Mother         CHF    Diabetes Mother     Hypertension Mother     Kidney Disease Mother     Breast Cancer Mother     Stroke Mother     Diabetes Father     Hypertension Father     Heart Attack Father         MI    Cancer Maternal Grandmother         stomach    Ovarian Cancer Maternal Aunt     Cancer Maternal Uncle      Social History     Tobacco Use    Smoking status: Never Smoker    Smokeless tobacco: Never Used   Substance Use Topics    Alcohol use: No       Depression Risk Factor Screening:     3 most recent PHQ Screens 7/22/2019   Little interest or pleasure in doing things Several days   Feeling down, depressed, irritable, or hopeless Several days   Total Score PHQ 2 2       Alcohol Risk Factor Screening:   Do you average 1 drink per night or more than 7 drinks a week:  No    On any one occasion in the past three months have you have had more than 3 drinks containing alcohol:  No      Functional Ability and Level of Safety:   Hearing: Hearing is good. Activities of Daily Living: The home contains: No safety equipment  Patient does total self care     Ambulation: with no difficulty    Fall Risk:  Fall Risk Assessment, last 12 mths 2/9/2018   Able to walk? Yes   Fall in past 12 months?  No       Abuse Screen:  Patient is not abused    Cognitive Screening   Has your family/caregiver stated any concerns about your memory: no    Patient Care Team   Patient Care Team:  Bg Macdonald MD as PCP - General (Family Practice)  Heather Olmedo NP as PCP - PSYCHIATRY (Nurse Practitioner)  Susan Cruz MD as PCP - CARDIOLOGY (Cardiology)  Bg Macdonald MD as PCP - [facility]  Rina Keith MD (Orthopedic Surgery)  Alex Reynolds MD (General Surgery)  kendra Chapa (Psychology)  Charlean Harada, MD (Pulmonary Disease)  Brittany Ortega MD (Cardiology)  Wolfgang Giang PA-C as Physician Assistant (Otolaryngology)  Shahab Bernstein MD (Gastroenterology)  Maryam Aldridge MD (Gastroenterology)  Katalina Jack (Physician Assistant)  Kindra Patel MD (Colon and Rectal Surgery)  Ayaka Spring MD (Obstetrics & Gynecology)  Nolberto Weir MD (Surgery)    Assessment/Plan   Education and counseling provided:  Are appropriate based on today's review and evaluation  End-of-Life planning (with patient's consent)- discussed, provided form  Pneumococcal Vaccine 23 completed  Screening Mammography- 12/2019  Colorectal cancer screening tests- 2/2019 not typical adenocarcinoma of rectum    Diagnoses and all orders for this visit:    1. Initial Medicare annual wellness visit         Health Maintenance Due   Topic Date Due    Lipid Screen  02/28/2019    Shingrix Vaccine Age 50> (1 of 2) 07/22/2019    Influenza Age 5 to Adult  08/01/2019    Medicare Yearly Exam  08/22/2019       SUBJECTIVE  Ff-up and acute concern    Primary peritoneal carcinomatosis- s/p chemo s/p surgery s/p radiation, surveillance process with PET/ ongoing. Dr. Aureliano Durán    C/o dizziness, described as both lightheadedness since 11/2019. This was after her radiation and chemotherapy. she has noticed pattern shortly after taking BP medication, and vertigo with spinning sensation of surroundings and dysequilibrium. Denies syncope, palpitations, tinnitus, hearing loss. No pattern with position change or head movement. Says would occur random times of the day, even when sitting still. Lasts for an hour. She has tried otc motion sickness without relief. She has HTN and has been taking norvasc and metoprolol, and reports normotensive BP Readings 130/80's at home and with doctor visits.      HL- on zocor    Bipolar d/o- per pt doing well, following psychiatry    ROS:  See HPI, all others negative        Patient Active Problem List   Diagnosis Code    OA (osteoarthritis) M19.90    Obesity E66.9    Mixed hyperlipidemia E78.2    Depression F32.9    Menopause Z78.0    Knee pain, right M25.561    GERD (gastroesophageal reflux disease) K21.9    S/P TKR (total knee replacement) Z96.659    PTSD (post-traumatic stress disorder) F43.10    Essential hypertension I10    Morbid obesity (HCC) E66.01    Arthritis, degenerative M19.90    Gastroesophageal reflux disease without esophagitis K21.9    ANAMIKA on CPAP G47.33, Z99.89    Prediabetes R73.03    SUAZO (dyspnea on exertion) R06.09    Dental caries K02.9    Chronic periodontal disease K05.6    Advance care planning Z71.89    Bipolar 1 disorder (HCC) F31.9    Irritable bowel syndrome with both constipation and diarrhea K58.2       Current Outpatient Prescriptions   Medication Sig Dispense Refill    plecanatide (TRULANCE) 3 mg tab Take  by mouth.  losartan (COZAAR) 100 mg tablet Take 1 Tab by mouth daily. 90 Tab 0    metoprolol succinate (TOPROL-XL) 100 mg tablet Take 1 Tab by mouth daily. 90 Tab 0    amLODIPine (NORVASC) 10 mg tablet take 1 tablet by mouth daily 90 Tab 0    simvastatin (ZOCOR) 20 mg tablet take 1 tablet by mouth at bedtime 90 Tab 3    OXcarbazepine (TRILEPTAL) 300 mg tablet Take 300 mg by mouth two (2) times a day.  0    LORazepam (ATIVAN) 0.5 mg tablet Take 0.5 mg by mouth two (2) times a day.  hydrOXYzine pamoate (VISTARIL) 50 mg capsule 2 Caps 2 Times Daily As Needed.  carisoprodol (SOMA) 350 mg tablet take 1 tablet by mouth three times a day and 1 tablet at bedtime  0    BLACK COHOSH PO Take  by mouth.  levocetirizine (XYZAL) 5 mg tablet       ziprasidone (GEODON) 40 mg capsule Take 40 mg by mouth daily.  melatonin 3 mg tablet Take 3 mg by mouth nightly.  montelukast (SINGULAIR) 10 mg tablet Take 10 mg by mouth nightly. 1    FIBER, PSYLLIUM HUSK, PO Take  by mouth.       latanoprost (XALATAN) 0.005 % ophthalmic solution Administer 1 Drop to both eyes nightly.  LACTOBACILLUS ACIDOPHILUS (PROBIOTIC PO) Take  by mouth.  mometasone-formoterol (DULERA) 100-5 mcg/actuation HFA inhaler Take 2 Puffs by inhalation two (2) times a day. 1 Inhaler 5       Allergies   Allergen Reactions    Other Medication Hives     seafood    Shellfish Derived Hives       Past Medical History:   Diagnosis Date    AR (allergic rhinitis)     seasonal    Asthma     Cardiac echocardiogram 04/11/2016    Tech difficult. EF 55-60%. No RWMA. Mild conc LVH. Gr 1 DDfx. RVSP normal.      Cardiac nuclear imaging test 05/05/2016    Low risk. Very patchy radiotracer uptake. Mild anterior artifact, low suspicion for ischemia. EF 68%. No RWMA. Normal EKG on pharm stress test.    Cardiovascular LE arterial duplex 10/07/2013    No significant arterial disease at rest bilaterally. R JUNE 1.21.  L JUNE 1.16. No significant sm vessel disease.     Depression     Dr. Joeseph Litten, suicide attempt 2/12    Endometriosis     s/p hysterectomy 2006    GERD (gastroesophageal reflux disease)     Glaucoma     Dr. Martha Johnson HTN (hypertension)     Hx of colonoscopy 04/05/2017    mild diverticulosis, g1 internal hemorrhoids, normal colon , repeat 10 year 2027    Hx of suicide attempt     Hyperlipidemia LDL goal < 130     IBS (irritable bowel syndrome)     Insomnia     Nausea & vomiting     OA (osteoarthritis)     both knees, lower back    Preeclampsia     PTSD (post-traumatic stress disorder)     Sleep apnea     does not use cpap machine    Spinal stenosis     Dr. Kareem Hall       Social History     Social History    Marital status: SINGLE     Spouse name: N/A    Number of children: N/A    Years of education: N/A     Occupational History    disabled     student      Social History Main Topics    Smoking status: Never Smoker    Smokeless tobacco: Never Used    Alcohol use No    Drug use: No    Sexual activity: Not on file     Other Topics Concern    Not on file     Social History Narrative       Family History   Problem Relation Age of Onset    Heart Disease Mother      CHF    Diabetes Mother     Hypertension Mother     Kidney Disease Mother     Breast Cancer Mother     Stroke Mother     Diabetes Father     Hypertension Father     Heart Attack Father      MI    Cancer Maternal Grandmother      stomach    Cancer Other      breast    Heart Attack Other      MI    Ovarian Cancer Maternal Aunt          OBJECTIVE    Physical Exam:     Visit Vitals  BP (!) 140/94 (BP 1 Location: Left arm, BP Patient Position: Sitting)   Pulse 62   Temp 98.4 °F (36.9 °C) (Oral)   Resp 16   Ht 5' 2\" (1.575 m)   Wt 261 lb (118.4 kg)   LMP 01/31/2008   SpO2 98%   BMI 47.74 kg/m²       General: alert,obese, AA, in no apparent distress or pain  CVS: normal rate, regular rhythm, distinct S1 and S2  Lungs:clear to ausculation bilaterally, no crackles, wheezing or rhonchi noted  Abdomen: soft, NT,ND  Extremities: no edema  Psych:  mood and affect normal    Results for orders placed or performed during the hospital encounter of 11/15/19   CANCER ANTIGEN 125   Result Value Ref Range    CA-125 6 1.5 - 35.0 U/mL       ASSESSMENT/PLAN  Diagnoses and all orders for this visit:    Dizziness  Quite difficult to sort out with vertiginous and orthostatic hypotension features  Advised to hold BP medication, resume norvasc only if BP >150/90 persistently  BP Log daily  Will do MRI brain to r/o intracranial process  Trial meclizine  Check ekg, cbc, cmp     Essential hypertension  Elevated today, off  Cont metoprolol and norvasc    Malignant neoplasm of peritoneum (HCC)  S/p chemo, s/p surgery, s/p radiation  Ongoing surveillance PET/  Following dr. Liliya Haney    Bipolar 1 disorder ([facility] [address]  Stable, following psychiatrist     Mixed hyperlipidemia  On zocor  Plan to update lipid panel soon    Prediabetes  H./o monitoring    Follow-up Disposition:  Return in about 1 months or sooner prn, BP Log  Patient understands plan of care. Patient has provided input and agrees with goals.

## 2020-02-19 NOTE — PATIENT INSTRUCTIONS
HOLD BP medication. RESUME IF BP >150/90 most of the days. Medicare Wellness Visit, Female The best way to live healthy is to have a lifestyle where you eat a well-balanced diet, exercise regularly, limit alcohol use, and quit all forms of tobacco/nicotine, if applicable. Regular preventive services are another way to keep healthy. Preventive services (vaccines, screening tests, monitoring & exams) can help personalize your care plan, which helps you manage your own care. Screening tests can find health problems at the earliest stages, when they are easiest to treat. [facility] follows the current, evidence-based guidelines published by the [facility]ani ([facility]) when recommending preventive services for our patients. Because we follow these guidelines, sometimes recommendations change over time as research supports it. (For example, mammograms used to be recommended annually. Even though Medicare will still pay for an annual mammogram, the newer guidelines recommend a mammogram every two years for women of average risk). Of course, you and your doctor may decide to screen more often for some diseases, based on your risk and your co-morbidities (chronic disease you are already diagnosed with). Preventive services for you include: - Medicare offers their members a free annual wellness visit, which is time for you and your primary care provider to discuss and plan for your preventive service needs. Take advantage of this benefit every year! 
-All adults over the age of 72 should receive the recommended pneumonia vaccines. Current USPSTF guidelines recommend a series of two vaccines for the best pneumonia protection.  
-All adults should have a flu vaccine yearly and a tetanus vaccine every 10 years.  
-All adults age 48 and older should receive the shingles vaccines (series of two vaccines). -All adults age 38-68 who are overweight should have a diabetes screening test once every three years.  
-All adults born between [birth year] and 1966 should be screened once for Hepatitis C. 
-Other screening tests and preventive services for persons with diabetes include: an eye exam to screen for diabetic retinopathy, a kidney function test, a foot exam, and stricter control over your cholesterol.  
-Cardiovascular screening for adults with routine risk involves an electrocardiogram (ECG) at intervals determined by your doctor.  
-Colorectal cancer screenings should be done for adults age 54-65 with no increased risk factors for colorectal cancer. There are a number of acceptable methods of screening for this type of cancer. Each test has its own benefits and drawbacks. Discuss with your doctor what is most appropriate for you during your annual wellness visit. The different tests include: colonoscopy (considered the best screening method), a fecal occult blood test, a fecal DNA test, and sigmoidoscopy. 
 
-A bone mass density test is recommended when a woman turns 65 to screen for osteoporosis. This test is only recommended one time, as a screening. Some providers will use this same test as a disease monitoring tool if you already have osteoporosis. -Breast cancer screenings are recommended every other year for women of normal risk, age 54-69. 
-Cervical cancer screenings for women over age 72 are only recommended with certain risk factors. Here is a list of your current Health Maintenance items (your personalized list of preventive services) with a due date: 
Health Maintenance Due Topic Date Due  Cholesterol Test   02/28/2019  Shingles Vaccine (1 of 2) 07/22/2019  Flu Vaccine  08/01/2019 Colt Mena Annual Well Visit  08/22/2019

## 2020-02-20 ENCOUNTER — APPOINTMENT (OUTPATIENT)
Dept: INFUSION THERAPY | Age: 50
End: 2020-02-20

## 2020-02-20 LAB
ALBUMIN SERPL-MCNC: 4.2 G/DL (ref 3.8–4.8)
ALBUMIN/GLOB SERPL: 1.6 {RATIO} (ref 1.2–2.2)
ALP SERPL-CCNC: 83 IU/L (ref 39–117)
ALT SERPL-CCNC: 15 IU/L (ref 0–32)
AST SERPL-CCNC: 18 IU/L (ref 0–40)
BASOPHILS # BLD AUTO: 0 X10E3/UL (ref 0–0.2)
BASOPHILS NFR BLD AUTO: 0 %
BILIRUB SERPL-MCNC: 0.4 MG/DL (ref 0–1.2)
BUN SERPL-MCNC: 18 MG/DL (ref 6–24)
BUN/CREAT SERPL: 16 (ref 9–23)
CALCIUM SERPL-MCNC: 9.3 MG/DL (ref 8.7–10.2)
CHLORIDE SERPL-SCNC: 105 MMOL/L (ref 96–106)
CO2 SERPL-SCNC: 19 MMOL/L (ref 20–29)
CREAT SERPL-MCNC: 1.16 MG/DL (ref 0.57–1)
EOSINOPHIL # BLD AUTO: 0.1 X10E3/UL (ref 0–0.4)
EOSINOPHIL NFR BLD AUTO: 1 %
ERYTHROCYTE [DISTWIDTH] IN BLOOD BY AUTOMATED COUNT: 16.1 % (ref 11.7–15.4)
GLOBULIN SER CALC-MCNC: 2.6 G/DL (ref 1.5–4.5)
GLUCOSE SERPL-MCNC: 93 MG/DL (ref 65–99)
HCT VFR BLD AUTO: 31.9 % (ref 34–46.6)
HGB BLD-MCNC: 10.6 G/DL (ref 11.1–15.9)
IMM GRANULOCYTES # BLD AUTO: 0 X10E3/UL (ref 0–0.1)
IMM GRANULOCYTES NFR BLD AUTO: 0 %
INTERPRETATION: NORMAL
LYMPHOCYTES # BLD AUTO: 0.9 X10E3/UL (ref 0.7–3.1)
LYMPHOCYTES NFR BLD AUTO: 17 %
MCH RBC QN AUTO: 29.4 PG (ref 26.6–33)
MCHC RBC AUTO-ENTMCNC: 33.2 G/DL (ref 31.5–35.7)
MCV RBC AUTO: 88 FL (ref 79–97)
MONOCYTES # BLD AUTO: 0.3 X10E3/UL (ref 0.1–0.9)
MONOCYTES NFR BLD AUTO: 6 %
NEUTROPHILS # BLD AUTO: 4 X10E3/UL (ref 1.4–7)
NEUTROPHILS NFR BLD AUTO: 76 %
PLATELET # BLD AUTO: 210 X10E3/UL (ref 150–450)
POTASSIUM SERPL-SCNC: 4.1 MMOL/L (ref 3.5–5.2)
PROT SERPL-MCNC: 6.8 G/DL (ref 6–8.5)
RBC # BLD AUTO: 3.61 X10E6/UL (ref 3.77–5.28)
SODIUM SERPL-SCNC: 142 MMOL/L (ref 134–144)
SPECIMEN STATUS REPORT, ROLRST: NORMAL
WBC # BLD AUTO: 5.3 X10E3/UL (ref 3.4–10.8)

## 2020-02-20 NOTE — ACP (ADVANCE CARE PLANNING)
Advance Care Planning (ACP) Provider Note - Comprehensive     Date of ACP Conversation: 02/20/20  Persons included in Conversation:  patient  Length of ACP Conversation in minutes:  16 minutes    Authorized Decision Maker (if patient is incapable of making informed decisions): This person is:  Has yet to delegate, thinking her daughters        General ACP for ALL Patients with Decision Making Capacity:   Importance of advance care planning, including choosing a healthcare agent to communicate patient's healthcare decisions if patient lost the ability to make decisions, such as after a sudden illness or accident  Understanding of the healthcare agent role was assessed and information provided  Exploration of values, goals, and preferences if recovery is not expected, even with continued medical treatment in the event of: Imminent death  Severe, permanent brain injury  \"In these circumstances, what matters most to you? \"  Other: still considering        Interventions Provided:  Recommended completion of Advance Directive form after review of ACP materials and conversation with prospective healthcare agent   Recommended communicating the plan and making copies for the healthcare agent, personal physician, and others as appropriate (e.g., health system)  Recommended review of completed ACP document annually or upon change in health status

## 2020-02-24 RX ORDER — MECLIZINE HYDROCHLORIDE 25 MG/1
TABLET ORAL
Qty: 30 TAB | Refills: 0 | Status: SHIPPED | OUTPATIENT
Start: 2020-02-24 | End: 2020-03-04 | Stop reason: SDUPTHER

## 2020-02-25 ENCOUNTER — APPOINTMENT (OUTPATIENT)
Dept: INFUSION THERAPY | Age: 50
End: 2020-02-25

## 2020-02-26 ENCOUNTER — TELEPHONE (OUTPATIENT)
Dept: FAMILY MEDICINE CLINIC | Age: 50
End: 2020-02-26

## 2020-02-26 NOTE — TELEPHONE ENCOUNTER
Pt called and would like to discuss her blood pressure medication. Pt states that due to her oncology appt she was taken off her blood pressure medication. And now her blood pressure is elevated. Please call pt at your earliest convenience.

## 2020-02-26 NOTE — TELEPHONE ENCOUNTER
Patient called in requesting to speak with a nurse. She states she has some concerns about her blood pressure. Pt can be reached at [phone] to discuss this further.

## 2020-02-26 NOTE — TELEPHONE ENCOUNTER
Patient identified with 2 identifiers (name and ). Patient states that her BP has been running in the 140's to 150's and 90's to 100's. Patient states she has been resuming her Norvasc daily and will continue to take her BP daily. Patient is having her MRI tomorrow. Patient request ing lab results.  Please advise

## 2020-02-27 ENCOUNTER — HOSPITAL ENCOUNTER (OUTPATIENT)
Age: 50
Discharge: HOME | End: 2020-02-27
Attending: FAMILY MEDICINE
Payer: MEDICARE

## 2020-02-27 DIAGNOSIS — R42 DIZZINESS: ICD-10-CM

## 2020-02-27 PROCEDURE — 70553 MRI BRAIN STEM W/O & W/DYE: CPT

## 2020-02-27 PROCEDURE — A9575 INJ GADOTERATE MEGLUMI 0.1ML: HCPCS | Performed by: FAMILY MEDICINE

## 2020-02-27 PROCEDURE — 74011636320 HC RX REV CODE- 636/320: Performed by: FAMILY MEDICINE

## 2020-02-27 RX ADMIN — GADOTERATE MEGLUMINE 20 ML: 376.9 INJECTION INTRAVENOUS at 08:00

## 2020-02-27 NOTE — TELEPHONE ENCOUNTER
Agree to resume norvasc and keep monitoring BP  Await MRI results, labs without concerning findings. Only mildly elevated creatinine (kidney function test), which will plan to monitor  pls notify pt.

## 2020-02-27 NOTE — TELEPHONE ENCOUNTER
Patient identified with 2 identifiers (name and ). Patient aware hat she should continue Norvasc and to monitor BP . Patient aware of lab results.

## 2020-03-02 ENCOUNTER — TELEPHONE (OUTPATIENT)
Dept: FAMILY MEDICINE CLINIC | Age: 50
End: 2020-03-02

## 2020-03-02 NOTE — TELEPHONE ENCOUNTER
Resume metoprolol then (in addition to norvasc), does she have enough pills?  Keep appt with me this week and will reevaluate

## 2020-03-02 NOTE — TELEPHONE ENCOUNTER
Patient identified with 2 identifiers (name and ). Patient called and stated her BP has been as followed  Sat. 2020: BP  147/96, HR 79                              BP  156/99,  HR 91  Sun. 2020:   BP  141/97, HR 79  Mon. 2020   /100, HR 85  Patient has been taking Norvasc 10 mg daily. Patient has appt 2020.  Please advise

## 2020-03-03 NOTE — TELEPHONE ENCOUNTER
Patient identified with 2 identifiers (name and ). Patient aware to take metoprolol with norvasc and to contineu to monitor BP. Patient aware no acute findings on MRI.

## 2020-03-04 ENCOUNTER — OFFICE VISIT (OUTPATIENT)
Dept: FAMILY MEDICINE CLINIC | Age: 50
End: 2020-03-04

## 2020-03-04 ENCOUNTER — HOSPITAL ENCOUNTER (OUTPATIENT)
Dept: INFUSION THERAPY | Age: 50
Discharge: HOME | End: 2020-03-04
Payer: MEDICARE

## 2020-03-04 VITALS
BODY MASS INDEX: 47.11 KG/M2 | RESPIRATION RATE: 16 BRPM | OXYGEN SATURATION: 98 % | DIASTOLIC BLOOD PRESSURE: 78 MMHG | HEART RATE: 64 BPM | TEMPERATURE: 98.9 F | HEIGHT: 62 IN | SYSTOLIC BLOOD PRESSURE: 124 MMHG | WEIGHT: 256 LBS

## 2020-03-04 VITALS
RESPIRATION RATE: 20 BRPM | HEART RATE: 61 BPM | DIASTOLIC BLOOD PRESSURE: 84 MMHG | SYSTOLIC BLOOD PRESSURE: 125 MMHG | TEMPERATURE: 97.8 F | OXYGEN SATURATION: 99 %

## 2020-03-04 DIAGNOSIS — R73.03 PREDIABETES: ICD-10-CM

## 2020-03-04 DIAGNOSIS — I10 ESSENTIAL HYPERTENSION: ICD-10-CM

## 2020-03-04 DIAGNOSIS — C48.2 MALIGNANT NEOPLASM OF PERITONEUM (HCC): ICD-10-CM

## 2020-03-04 DIAGNOSIS — E78.2 MIXED HYPERLIPIDEMIA: ICD-10-CM

## 2020-03-04 DIAGNOSIS — F31.9 BIPOLAR 1 DISORDER (HCC): ICD-10-CM

## 2020-03-04 DIAGNOSIS — R42 DIZZINESS: Primary | ICD-10-CM

## 2020-03-04 LAB — CANCER AG125 SERPL-ACNC: 7 U/ML (ref 1.5–35)

## 2020-03-04 PROCEDURE — 74011250636 HC RX REV CODE- 250/636: Performed by: OBSTETRICS & GYNECOLOGY

## 2020-03-04 PROCEDURE — 86304 IMMUNOASSAY TUMOR CA 125: CPT

## 2020-03-04 PROCEDURE — 77030012965 HC NDL HUBR BBMI -A

## 2020-03-04 PROCEDURE — 36591 DRAW BLOOD OFF VENOUS DEVICE: CPT

## 2020-03-04 RX ORDER — MECLIZINE HYDROCHLORIDE 25 MG/1
TABLET ORAL
Qty: 30 TAB | Refills: 0 | Status: SHIPPED | OUTPATIENT
Start: 2020-03-04 | End: 2020-03-19

## 2020-03-04 RX ORDER — METOPROLOL SUCCINATE 100 MG/1
100 TABLET, EXTENDED RELEASE ORAL DAILY
Qty: 90 TAB | Refills: 1 | Status: SHIPPED | OUTPATIENT
Start: 2020-03-04 | End: 2021-04-27

## 2020-03-04 RX ORDER — AMLODIPINE BESYLATE 10 MG/1
TABLET ORAL
Qty: 90 TAB | Refills: 1 | Status: SHIPPED | OUTPATIENT
Start: 2020-03-04 | End: 2020-08-31

## 2020-03-04 RX ORDER — HEPARIN 100 UNIT/ML
500 SYRINGE INTRAVENOUS ONCE
Status: COMPLETED | OUTPATIENT
Start: 2020-03-04 | End: 2020-03-04

## 2020-03-04 RX ORDER — SODIUM CHLORIDE 0.9 % (FLUSH) 0.9 %
10-40 SYRINGE (ML) INJECTION AS NEEDED
Status: DISCONTINUED | OUTPATIENT
Start: 2020-03-04 | End: 2020-03-08 | Stop reason: HOSPADM

## 2020-03-04 RX ADMIN — HEPARIN 500 UNITS: 100 SYRINGE at 08:36

## 2020-03-04 RX ADMIN — Medication 30 ML: at 08:35

## 2020-03-04 NOTE — PATIENT INSTRUCTIONS
Vertigo: Exercises Introduction Here are some examples of exercises for you to try. The exercises may be suggested for a condition or for rehabilitation. Start each exercise slowly. Ease off the exercises if you start to have pain. You will be told when to start these exercises and which ones will work best for you. How to do the exercises Exercise 1 1. Stand with a chair in front of you and a wall behind you. If you begin to fall, you may use them for support. 2. Stand with your feet together and your arms at your sides. 3. Move your head up and down 10 times. Exercise 2 1. Move your head side to side 10 times. Exercise 3 1. Move your head diagonally up and down 10 times. Exercise 4 1. Move your head diagonally up and down 10 times on the other side. Follow-up care is a key part of your treatment and safety. Be sure to make and go to all appointments, and call your doctor if you are having problems. It's also a good idea to know your test results and keep a list of the medicines you take. Where can you learn more? Go to http://raul-vee.info/. Enter F349 in the search box to learn more about \"Vertigo: Exercises. \" Current as of: October 21, 2018 Content Version: 12.2 © 9440-8095 [employer]. Care instructions adapted under license by [employer] (which disclaims liability or warranty for this information). If you have questions about a medical condition or this instruction, always ask your healthcare professional. [place] [address] any warranty or liability for your use of this information.

## 2020-03-04 NOTE — PROGRESS NOTES
[facility] Progress Note    Date: 2020    Name: Loco Yeager    MRN: [id number]         : 1970    Q6 wks Port flush w/ ca-125    Ms. Cannon to [facility], ambulatory, at 64. Pt was assessed and education was provided. Ms. Cannon's vitals were reviewed. Visit Vitals  /84 (BP 1 Location: Left arm, BP Patient Position: Sitting)   Pulse 61   Temp 97.8 °F (36.6 °C)   Resp 20   SpO2 99%   Breastfeeding No   No results found for this or any previous visit (from the past 12 hour(s)). Right chest mediport was accessed with 20 gauge 1 upton(s) after chloroprep. Port flushed easily and had brisk blood return. Lab drawn after wasting 10mls whole blood. Mediport flushed with NS 20 ml and Heparin 500 units then de-accessed. No irritation or bleeding noted. Band-Aid applied. Ms. Jorge Arzate tolerated the procedure, and had no complaints. Patient armband removed and shredded. Ms. Jorge Arzate was discharged from [place] [address] in stable condition at 55. She is to return on 20 at 0900 for her next appointment.     Brian Nava RN  2020

## 2020-03-04 NOTE — PROGRESS NOTES
SUBJECTIVE  Ff-up dizziness    Dizziness- reports improvement with reduction of BP medication and prn meclizine. Says 2 episodes since last visit with response to meclizine. We have stopped and slowly reintroduced BP meds, currently on norvasc and metoprolol only, no longer on losartan. MRI brain reviewed wnl     Primary peritoneal carcinomatosis- s/p chemo s/p surgery s/p radiation, surveillance process with PET/ ongoing. Dr. Katharina Verdin    HL- on zocor      ROS:  See HPI, all others negative        Patient Active Problem List   Diagnosis Code    OA (osteoarthritis) M19.90    Obesity E66.9    Mixed hyperlipidemia E78.2    Depression F32.9    Menopause Z78.0    Knee pain, right M25.561    GERD (gastroesophageal reflux disease) K21.9    S/P TKR (total knee replacement) Z96.659    PTSD (post-traumatic stress disorder) F43.10    Essential hypertension I10    Morbid obesity ([facility] [address] E66.01    Arthritis, degenerative M19.90    Gastroesophageal reflux disease without esophagitis K21.9    ANAMIKA on CPAP G47.33, Z99.89    Prediabetes R73.03    SUAZO (dyspnea on exertion) R06.09    Dental caries K02.9    Chronic periodontal disease K05.6    Advance care planning Z71.89    Bipolar 1 disorder (HCC) F31.9    Irritable bowel syndrome with both constipation and diarrhea K58.2       Current Outpatient Prescriptions   Medication Sig Dispense Refill    plecanatide (TRULANCE) 3 mg tab Take  by mouth.  losartan (COZAAR) 100 mg tablet Take 1 Tab by mouth daily. 90 Tab 0    metoprolol succinate (TOPROL-XL) 100 mg tablet Take 1 Tab by mouth daily. 90 Tab 0    amLODIPine (NORVASC) 10 mg tablet take 1 tablet by mouth daily 90 Tab 0    simvastatin (ZOCOR) 20 mg tablet take 1 tablet by mouth at bedtime 90 Tab 3    OXcarbazepine (TRILEPTAL) 300 mg tablet Take 300 mg by mouth two (2) times a day.  0    LORazepam (ATIVAN) 0.5 mg tablet Take 0.5 mg by mouth two (2) times a day.       hydrOXYzine pamoate (VISTARIL) 50 mg capsule 2 Caps 2 Times Daily As Needed.  carisoprodol (SOMA) 350 mg tablet take 1 tablet by mouth three times a day and 1 tablet at bedtime  0    BLACK COHOSH PO Take  by mouth.  levocetirizine (XYZAL) 5 mg tablet       ziprasidone (GEODON) 40 mg capsule Take 40 mg by mouth daily.  melatonin 3 mg tablet Take 3 mg by mouth nightly.  montelukast (SINGULAIR) 10 mg tablet Take 10 mg by mouth nightly. 1    FIBER, PSYLLIUM HUSK, PO Take  by mouth.  latanoprost (XALATAN) 0.005 % ophthalmic solution Administer 1 Drop to both eyes nightly.  LACTOBACILLUS ACIDOPHILUS (PROBIOTIC PO) Take  by mouth.  mometasone-formoterol (DULERA) 100-5 mcg/actuation HFA inhaler Take 2 Puffs by inhalation two (2) times a day. 1 Inhaler 5       Allergies   Allergen Reactions    Other Medication Hives     seafood    Shellfish Derived Hives       Past Medical History:   Diagnosis Date    AR (allergic rhinitis)     seasonal    Asthma     Cardiac echocardiogram 04/11/2016    Tech difficult. EF 55-60%. No RWMA. Mild conc LVH. Gr 1 DDfx. RVSP normal.      Cardiac nuclear imaging test 05/05/2016    Low risk. Very patchy radiotracer uptake. Mild anterior artifact, low suspicion for ischemia. EF 68%. No RWMA. Normal EKG on pharm stress test.    Cardiovascular LE arterial duplex 10/07/2013    No significant arterial disease at rest bilaterally. R JUNE 1.21.  L JUNE 1.16. No significant sm vessel disease.     Depression     Dr. Silvino Coburn, suicide attempt 2/12    Endometriosis     s/p hysterectomy 2006    GERD (gastroesophageal reflux disease)     Glaucoma     Dr. Graham Prior HTN (hypertension)     Hx of colonoscopy 04/05/2017    mild diverticulosis, g1 internal hemorrhoids, normal colon , repeat 10 year 2027    Hx of suicide attempt     Hyperlipidemia LDL goal < 130     IBS (irritable bowel syndrome)     Insomnia     Nausea & vomiting     OA (osteoarthritis)     both knees, lower back    Preeclampsia     PTSD (post-traumatic stress disorder)     Sleep apnea     does not use cpap machine    Spinal stenosis     Dr. Stephanie Marshall       Social History     Social History    Marital status: SINGLE     Spouse name: N/A    Number of children: N/A    Years of education: N/A     Occupational History    disabled     student      Social History Main Topics    Smoking status: Never Smoker    Smokeless tobacco: Never Used    Alcohol use No    Drug use: No    Sexual activity: Not on file     Other Topics Concern    Not on file     Social History Narrative       Family History   Problem Relation Age of Onset    Heart Disease Mother      CHF    Diabetes Mother     Hypertension Mother     Kidney Disease Mother     Breast Cancer Mother     Stroke Mother     Diabetes Father     Hypertension Father     Heart Attack Father      MI    Cancer Maternal Grandmother      stomach    Cancer Other      breast    Heart Attack Other      MI    Ovarian Cancer Maternal Aunt          OBJECTIVE    Physical Exam:     Visit Vitals  /78 (BP 1 Location: Left arm, BP Patient Position: Sitting)   Pulse 64   Temp 98.9 °F (37.2 °C) (Oral)   Resp 16   Ht 5' 2\" (1.575 m)   Wt 256 lb (116.1 kg)   LMP 01/31/2008   SpO2 98%   BMI 46.82 kg/m²       General: alert,obese, AA, in no apparent distress or pain  CVS: normal rate, regular rhythm, distinct S1 and S2  Lungs:clear to ausculation bilaterally, no crackles, wheezing or rhonchi noted  Abdomen: soft, NT,ND  Extremities: no edema  Psych:  mood and affect normal    Results for orders placed or performed during the hospital encounter of 02/19/20   EKG, 12 LEAD, INITIAL   Result Value Ref Range    Ventricular Rate 66 BPM    Atrial Rate 66 BPM    P-R Interval 174 ms    QRS Duration 98 ms    Q-T Interval 442 ms    QTC Calculation (Bezet) 463 ms    Calculated P Axis 58 degrees    Calculated R Axis 6 degrees    Calculated T Axis 27 degrees    Diagnosis Normal sinus rhythm  Normal ECG  When compared with ECG of 07-JAN-2019 11:42,  No significant change was found  Confirmed by Jimi Segal ([id number]) on 2/19/2020 5:47:38 PM         ASSESSMENT/PLAN  Diagnoses and all orders for this visit:    Dizziness  Improved  Cont prn meclizine  Cont metoprolol and norvasc only, will cont to hold losartan  MRI brain without concerning findings  Advised to hold BP medication, resume norvasc only if BP >150/90 persistently  Cont prn meclizine  BP monitoring     Essential hypertension  Cont metoprolol and norvasc    Malignant neoplasm of peritoneum (HCC)  S/p chemo, s/p surgery, s/p radiation  Ongoing surveillance PET/  Following dr. Cheryl Scott    Bipolar 1 disorder ([facility] [address]  Stable, following psychiatrist     Mixed hyperlipidemia  On zocor  Check cmp, lipid panel prior to next visit    Prediabetes  H./o monitoring    Follow-up Disposition:  Return in about 3 months or sooner prn, BP Log  Patient understands plan of care. Patient has provided input and agrees with goals.

## 2020-03-04 NOTE — PROGRESS NOTES
1. Have you been to the ER, urgent care clinic since your last visit? Hospitalized since your last visit? No    2. Have you seen or consulted any other health care providers outside of the [address] [place] [address] since your last visit? Include any pap smears or colon screening.  No

## 2020-03-19 RX ORDER — MECLIZINE HYDROCHLORIDE 25 MG/1
TABLET ORAL
Qty: 30 TAB | Refills: 0 | Status: SHIPPED | OUTPATIENT
Start: 2020-03-19 | End: 2020-03-27

## 2020-03-23 ENCOUNTER — TELEPHONE (OUTPATIENT)
Dept: FAMILY MEDICINE CLINIC | Age: 50
End: 2020-03-23

## 2020-03-23 DIAGNOSIS — R42 DIZZINESS: Primary | ICD-10-CM

## 2020-03-23 NOTE — TELEPHONE ENCOUNTER
Patient identified with 2 identifiers (name and ). Patient aware neurology referral has been placed.

## 2020-03-23 NOTE — TELEPHONE ENCOUNTER
Patient is still having dizziness. She is taking the medication three times a day and it's helping a little. Is thinking maybe she should go to the neurologist as Dr. Wade Florez had suggested when she saw her. Please review and advise.

## 2020-03-24 ENCOUNTER — TELEPHONE (OUTPATIENT)
Dept: ONCOLOGY | Age: 50
End: 2020-03-24

## 2020-03-24 NOTE — TELEPHONE ENCOUNTER
Patient states that she has had a clear discharge for 'a couple of weeks' and has just begun spotting today which is bright red without odor and causing use of a pantiliner. Frequent urination is report. Pt has not been walking due to COVID 19 and denies sexual activity. Patient reports dizziness was from vertigo, she currently has prescription medications to treat it. She can be reached at [phone].

## 2020-03-25 ENCOUNTER — TELEPHONE (OUTPATIENT)
Dept: FAMILY MEDICINE CLINIC | Age: 50
End: 2020-03-25

## 2020-03-25 NOTE — TELEPHONE ENCOUNTER
Pt would like to know if a script for gloves and mask to be called into the [facility].  [phone]

## 2020-03-26 RX ORDER — CAMPHOR/EUCALYPTUS/MENTHOL
LIQUID (ML) MISCELLANEOUS
Qty: 50 EACH | Refills: 0 | Status: SHIPPED | OUTPATIENT
Start: 2020-03-26 | End: 2021-04-09

## 2020-03-26 NOTE — TELEPHONE ENCOUNTER
Patient identified with 2 identifiers (name and ). Patient aware that Dr. Alejandro MARINELLI scribed mask and glove prescription to her pharmacy.

## 2020-03-27 RX ORDER — MECLIZINE HYDROCHLORIDE 25 MG/1
TABLET ORAL
Qty: 30 TAB | Refills: 0 | Status: SHIPPED | OUTPATIENT
Start: 2020-03-27 | End: 2020-04-09

## 2020-04-02 ENCOUNTER — APPOINTMENT (OUTPATIENT)
Dept: INFUSION THERAPY | Age: 50
End: 2020-04-02
Payer: MEDICARE

## 2020-04-07 ENCOUNTER — APPOINTMENT (OUTPATIENT)
Dept: INFUSION THERAPY | Age: 50
End: 2020-04-07
Payer: MEDICARE

## 2020-04-09 RX ORDER — MECLIZINE HYDROCHLORIDE 25 MG/1
TABLET ORAL
Qty: 30 TAB | Refills: 0 | Status: SHIPPED | OUTPATIENT
Start: 2020-04-09 | End: 2021-01-11 | Stop reason: SDUPTHER

## 2020-04-10 ENCOUNTER — VIRTUAL VISIT (OUTPATIENT)
Dept: NEUROLOGY | Age: 50
End: 2020-04-10

## 2020-04-10 ENCOUNTER — TELEPHONE (OUTPATIENT)
Dept: ONCOLOGY | Age: 50
End: 2020-04-10

## 2020-04-10 VITALS — BODY MASS INDEX: 44.3 KG/M2 | HEIGHT: 63 IN | WEIGHT: 250 LBS

## 2020-04-10 DIAGNOSIS — R42 DIZZINESS, NONSPECIFIC: Primary | ICD-10-CM

## 2020-04-10 RX ORDER — PROMETHAZINE HYDROCHLORIDE 25 MG/1
TABLET ORAL
COMMUNITY
Start: 2020-03-23 | End: 2020-07-14 | Stop reason: SDUPTHER

## 2020-04-10 RX ORDER — PANTOPRAZOLE SODIUM 40 MG/1
TABLET, DELAYED RELEASE ORAL
COMMUNITY
Start: 2020-04-04 | End: 2021-02-11 | Stop reason: SDUPTHER

## 2020-04-10 NOTE — PROGRESS NOTES
Consent: Catrina Andrews, who was seen by synchronous (real-time) audio-video technology, and/or her healthcare decision maker, is aware that this patient-initiated, Telehealth encounter on 4/10/2020 is a billable service, with coverage as determined by her insurance carrier. She is aware that she may receive a bill and has provided verbal consent to proceed: Yes. Assessment & Plan:   Diagnoses and all orders for this visit:    1. Dizziness, nonspecific    A 47years old female patient with medical problems described below came for evaluation of 'dizziness' of about 6 months duration. Description is mainly a feeling of lightheadedness in sitting or standing position with occasional feeling as if  the wall is moving from side to side. No vomiting. No changes in her vision. No obvious focal neuro deficits on the limited physical examination. After adjustment of her blood pressure medications, she is feeling much better. Still has 2 episodes per week. MRI of the brain showed nonspecific white matter changes with no signs of spread of tumor or stroke. The dizziness in this patient could be from possible postural hypotension; cannot rule out the possibility of paroxysmal vertigo. Need to check her blood pressure in lying, sitting, and standing position. Advised her to hydrate herself well when moving around. She will continue with meclizine 25 mg p.o. PRN. We will see her in the clinic in about 3 months time. .  Follow-up and Dispositions    · Return in about 3 months (around 7/10/2020). 712  Subjective:   Catrina Andrews is a 47 y.o. female who was seen for Dizziness and New Patient  Ms. Jaylan Melo is a 47years old female patient with medical history of hypertension, depression, perineal cancer status post chemotherapy and radiotherapy, endometriosis status post hysterectomy here for evaluation of dizziness since November 2019. This is a virtual/video encounter.   She described it as a feeling of lightheadedness and the wall moving from side to side. No spinning sensation. She has nausea associated with it but no vomiting. Mostly in sitting or standing position. No marked changes when she is moving her head from side to side or turning the bed. Lasts for about 30 minutes to 1 hour. No associated blurring of vision or diplopia. No palpitation, dyspnea, or chest pain associated with it. Might feel off balance during the episode. No changes in her hearing. No tinnitus. She was on 3 blood pressure medications when her dizziness started. Her blood pressure medications were stopped for some time and she felt better. Her blood pressure medications were adjusted subsequently. Currently she has about 2 episodes per week usually in early morning. She takes meclizine with some improvement. She has no weakness of her extremities. No sensory changes. She occasionally gets headaches which lasted for about 30 minutes, bilateral, 6 out of 10, with no vomiting. She had MRI of her brain on February 19, 2020 which only showed mild nonspecific white matter changes with no evidence of tumor spread or stroke. Prior to Admission medications    Medication Sig Start Date End Date Taking? Authorizing Provider   pantoprazole (PROTONIX) 40 mg tablet take 1 tablet by mouth once daily 4/4/20  Yes Provider, Historical   promethazine (PHENERGAN) 25 mg tablet take 1 tablet by mouth every 6 hours if needed for nausea 3/23/20  Yes Provider, Historical   meclizine (ANTIVERT) 25 mg tablet take 1 tablet by mouth three times a day if needed for dizziness 4/9/20  Yes Tobias Robin MD   Facial Mask misc Use daily 3/26/20  Yes Tobias Robin MD   Disposable Gloves misc Use daily 3/26/20  Yes Tobias Robin MD   amLODIPine (NORVASC) 10 mg tablet take 1 tablet by mouth once daily 3/4/20  Yes Tobias Robin MD   metoprolol succinate (TOPROL-XL) 100 mg tablet Take 1 Tab by mouth daily.  3/4/20  Yes Manuela Mcgill MD   magnesium hydroxide (PATHAK MILK OF MAGNESIA) 400 mg/5 mL suspension Take 30 mL by mouth daily as needed for Constipation. Yes Provider, Historical   cyanocobalamin (VITAMIN B-12) 100 mcg tablet Take 100 mcg by mouth daily. Yes Provider, Historical   PARoxetine (PAXIL) 10 mg tablet take 1 tablet by mouth once daily 1/21/20  Yes Earnest Maldonado NP   simvastatin (ZOCOR) 20 mg tablet take 1 tablet by mouth at bedtime 1/13/20  Yes Manuela Mcgill MD   lidocaine-prilocaine (EMLA) topical cream apply to affected area once daily if needed for pain 10/28/19  Yes Hannah Cook NP   polyethylene glycol (MIRALAX) 17 gram packet Take 1 Packet by mouth daily. 3/21/19  Yes Sylvester Linn MD   melatonin 3 mg tablet Take 3 mg by mouth nightly. Yes Provider, Historical   montelukast (SINGULAIR) 10 mg tablet Take 10 mg by mouth nightly. 2/2/16  Yes Provider, Historical   latanoprost (XALATAN) 0.005 % ophthalmic solution Administer 1 Drop to both eyes nightly. Yes Provider, Historical   Biotin 2,500 mcg cap Take  by mouth. Provider, Historical   multivitamin (ONE A DAY) tablet Take 1 Tab by mouth daily. Provider, Historical   ibuprofen (MOTRIN) 600 mg tablet take 1 tablet by mouth three times a day ONLY AS NEEDED 11/4/19   Provider, Historical   Mv,Ca,Min-FA-Herbal No.157 (ESTROVEN MAXIMUM STRENGTH) 400 mcg tab Take  by mouth daily. Indications: hot flash    Provider, Historical   dimethicone (SOOTHE AND COOL INZO BARRIER) 5 % topical cream Apply  to affected area two (2) times a day. 4/11/19   Hannah Cook NP   cycloSPORINE (RESTASIS) 0.05 % dpet Apply 1 Drop to eye as needed. 11/10/17   Provider, Historical   dexamethasone (DECADRON) 4 mg tablet Take 40 mg by mouth as needed. Take 10 tabs the night before Chemo #1 and Chemo #2. 2/14/19   Lily Gavin MD   plecanatide (TRULANCE) 3 mg tab Take  by mouth.     Provider, Historical   OXcarbazepine (TRILEPTAL) 300 mg tablet Take 300 mg by mouth two (2) times a day. 2/7/18   Provider, Historical   ziprasidone (GEODON) 40 mg capsule Take 40 mg by mouth nightly. Provider, Historical     Allergies   Allergen Reactions    Other Medication Hives     seafood    Shellfish Derived Hives           Review of Systems   Constitutional: Positive for chills (sometimes), diaphoresis and malaise/fatigue. Negative for fever and weight loss. HENT: Negative for hearing loss and tinnitus. Eyes: Negative for blurred vision and double vision. Respiratory: Negative for cough, shortness of breath and wheezing. Cardiovascular: Negative for chest pain, palpitations and leg swelling. Gastrointestinal: Positive for heartburn and nausea. Negative for vomiting. Genitourinary: Positive for frequency. Negative for dysuria, hematuria and urgency. Musculoskeletal: Positive for back pain and joint pain. Negative for falls and neck pain. Skin: Negative for itching and rash. Neurological: Positive for dizziness and headaches (sometimes: if sleep too much, unilateral, ill defined, 6/10, duration about 30 minutes. ). Negative for tingling, tremors, focal weakness and seizures. Endo/Heme/Allergies: Does not bruise/bleed easily. Psychiatric/Behavioral: Positive for depression (in the past). Negative for memory loss. The patient has insomnia.             Objective:   Vital Signs: (As obtained by patient/caregiver at home)  Visit Vitals  Ht 5' 3\" (1.6 m)   Wt 113.4 kg (250 lb)   LMP 01/31/2008   BMI 44.29 kg/m²        [INSTRUCTIONS:  \"[x]\" Indicates a positive item  \"[]\" Indicates a negative item  -- DELETE ALL ITEMS NOT EXAMINED]    Constitutional: [] Appears well-developed and well-nourished [x] No apparent distress      [] Abnormal -     Mental status: [x] Alert and awake  [x] Oriented to person/place/time [x] Able to follow commands    [] Abnormal -     Eyes:   EOM    [x]  Normal    [] Abnormal -   Sclera  []  Normal    [] Abnormal - Discharge []  None visible   [] Abnormal -      HENT: [x] Normocephalic, atraumatic  [] Abnormal -   [] Mouth/Throat: Mucous membranes are moist    External Ears [] Normal  [] Abnormal -    Neck: [] No visualized mass [] Abnormal -     Pulmonary/Chest: [] Respiratory effort normal   [x] No visualized signs of difficulty breathing or respiratory distress        [] Abnormal -      Musculoskeletal:   [x] Normal gait with no signs of ataxia         [x] Normal range of motion of neck        [] Abnormal -     Neurological:        [x] No Facial Asymmetry (Cranial nerve 7 motor function) (limited exam due to video visit)          [x] No gaze palsy        [] Abnormal -          Mental status: Awake, alert, oriented x3, follows simple and complex commands. Speech and languge: fluent, coherent,  and comprehension intact  CN: EOMI with no visible nystagmus; no facial asymmetry noted, moves her head from side to side with no difficulty, intact shoulder shrug,  bilat, tongue midline  Motor: no pronator drift, symmetric movement of the upper and lower extremities.   Coordination: Able to touch her nose and stretch out her upper extremities without any ataxia or tremor; gait is normal.    Gait: Normal.    Skin:        [x] No significant exanthematous lesions or discoloration noted on facial skin         [] Abnormal -            Psychiatric:       [x] Normal Affect [] Abnormal -        [x] No Hallucinations      Result Information     Status: Final result (Exam End: 2/27/2020 07:40) Provider Status: Reviewed   Result Notes for MRI BRAIN W WO CONT     Notes recorded by Kathleen Briscoe MD on 2/28/2020 at 12:27 PM EST  To be discussed on visit 3/4          Study Result     MR brain with and without contrast     HISTORY: Dizziness     COMPARISON: None     TECHNIQUE: Brain scanned with axial and sagittal T1W scans, axial T2W scans,  axial FLAIR, axial diffusion weighted images, coronal GRE and post gadolinium  axial and coronal T1W scans.     CONTRAST: 20 cc Dotarem administered intravenously.     FINDINGS:      Cerebral parenchyma: Multifocal punctate T2/FLAIR hyperintense subcortical white  matter lesions, mild amount for age. No acute infarct. No enhancing brain  lesions or mass. No hemorrhage.     Brain volumes and ventricular system: Normal for the patient's age.     Midline structures: Normal.     Cerebellum: Normal.     Brainstem: Normal.     Vascular system: Expected arterial flow voids are present at the base of brain.     Calvarium and skull base: Normal.     Paranasal sinuses and mastoid air cells: Clear.     Visualized orbits: Unremarkable for a nondedicated exam.     Visualized upper cervical spine: Unremarkable for a nondedicated exam.     IMPRESSION  IMPRESSION:     1. No acute brain abnormalities. No acute stroke, hemorrhage or mass lesion. 2.  Mild burden of nonspecific but chronic white matter lesions. We discussed the expected course, resolution and complications of the diagnosis(es) in detail. Medication risks, benefits, costs, interactions, and alternatives were discussed as indicated. I advised her to contact the office if her condition worsens, changes or fails to improve as anticipated. She expressed understanding with the diagnosis(es) and plan. Rosario Zapien is a 47 y.o. female being evaluated by a video visit encounter for concerns as above. A caregiver was present when appropriate. Due to this being a TeleHealth encounter (During [facility]-16 public health emergency), evaluation of the following organ systems was limited: Vitals/Constitutional/EENT/Resp/CV/GI//MS/Neuro/Skin/Heme-Lymph-Imm.   Pursuant to the emergency declaration under the [facility], [facility] waiver authority and the [employer] Act, this Virtual  Visit was conducted, with patient's (and/or legal guardian's) consent, to reduce the patient's risk of exposure to COVID-19 and provide necessary medical care. Services were provided through a video synchronous discussion virtually to substitute for in-person clinic visit. Patient and provider were located at their individual homes.         Claire Pal MD

## 2020-04-10 NOTE — TELEPHONE ENCOUNTER
Pt reports intermittent spotting every other day x3 weeks. Reports bleeding is associated with excessive urination. Pt reports spotting has now stopped. Instructed pt if spotting starts back to assure its coming from vaginal by inserting finger in vagina and wiping on white tissue. Scheduled pt for 04/17/2020 0900. Informed pt if spotting is from another source she should call and cancel appointment with our practice. Patient verbalized understanding and agreed to plan. Patient instructed to contact office for any question or concerns.      Marcelina Willard NP

## 2020-04-10 NOTE — PROGRESS NOTES
.Karla General is a 47 y.o. female  . Chief Complaint   Patient presents with    Dizziness    New Patient     Is a cancer survivor and has finished all chemo.

## 2020-04-14 ENCOUNTER — APPOINTMENT (OUTPATIENT)
Dept: INFUSION THERAPY | Age: 50
End: 2020-04-14
Payer: MEDICARE

## 2020-04-16 ENCOUNTER — TELEPHONE (OUTPATIENT)
Dept: ONCOLOGY | Age: 50
End: 2020-04-16

## 2020-04-16 NOTE — TELEPHONE ENCOUNTER
Returned call to patient per patient request to review  results from 3/4/2020. Reassured patient that her results were normal. Patient stated she had been taking aspirin occasionally for pain and was worried that may be contributing to her current spotting, but she has stopped the aspirin and is still experiencing spotting. Relayed that she should keep her appointment tomorrow so that a provider can examine her regarding abnormal spotting. Patient was agreeable with this plan.

## 2020-04-17 ENCOUNTER — OFFICE VISIT (OUTPATIENT)
Dept: ONCOLOGY | Age: 50
End: 2020-04-17

## 2020-04-17 VITALS
DIASTOLIC BLOOD PRESSURE: 91 MMHG | WEIGHT: 271.2 LBS | HEART RATE: 61 BPM | HEIGHT: 63 IN | RESPIRATION RATE: 16 BRPM | TEMPERATURE: 98.3 F | BODY MASS INDEX: 48.05 KG/M2 | OXYGEN SATURATION: 99 % | SYSTOLIC BLOOD PRESSURE: 148 MMHG

## 2020-04-17 DIAGNOSIS — R10.2 PELVIC PAIN: ICD-10-CM

## 2020-04-17 DIAGNOSIS — C48.2 PRIMARY PERITONEAL CARCINOMATOSIS (HCC): ICD-10-CM

## 2020-04-17 DIAGNOSIS — N89.8 VAGINAL MASS: ICD-10-CM

## 2020-04-17 DIAGNOSIS — C57.8 MALIGNANT NEOPLASM OF OVERLAPPING SITES OF FEMALE GENITAL ORGANS (HCC): ICD-10-CM

## 2020-04-17 DIAGNOSIS — R39.15 URGENCY OF URINATION: Primary | ICD-10-CM

## 2020-04-17 RX ORDER — OXYCODONE AND ACETAMINOPHEN 5; 325 MG/1; MG/1
1 TABLET ORAL
Qty: 30 TAB | Refills: 0 | Status: SHIPPED | OUTPATIENT
Start: 2020-04-17 | End: 2020-05-01

## 2020-04-17 NOTE — LETTER
4/17/20 Patient: Joleen Ortiz YOB: 1970 Date of Visit: 4/17/2020 Ziggy Abreu, [address] [address] [place] [address] VIA In Basket Dear Ziggy Abreu MD, [facility]. My notes for this consultation are attached. If you have questions, please do not hesitate to call me. I look forward to following your patient along with you. Sincerely, Delisa Aquino MD

## 2020-04-17 NOTE — PROGRESS NOTES
[address] [facility] SPECIAL[facility]  [address] [place] [address], [address], [address] [facility]  [address] [facility], [address] [facility]  [place], [address]   [phone]  Kieran Ochoa       Patient ID:  Name:  John Xiong  MRN:  [id number]  :  1970/49 y.o. Date:  2020      HISTORY OF PRESENT ILLNESS:  John Xiong is a 47 y.o.  postmenopausal female referred by Dr. Frances Sol  With peritoneal cancer. Intitally seen be NP with reports of vaginal bleeding. Given pt's history of hysteretectomy with BSO for endometriosis in  a TVUS was ordered. Which revealed a 6.8 cm x 5.2 cm x 4.6 cm at midline vaginal cuff. This prompted pelvic CT with findings of a lesion measuring 7.6 x 5.8 x 7.2 cm and is compatible with a pelvic neoplasm vs endometrioma given prior history of endometriosis. Tumor markers done on 2018 all normal.  S/p  Exploratory laparotomy, exploration of retroperitoneal spaces, exam under anesthesia with biopsy of rectovaginal mass on 2019. Had sigmoidoscopy which revealed submucosal mass. S/p cycle #6 of carbo/taxol on 2019. S/p cytoreductive surgery with HIP[facility] on 2019. S/p radiation treatment completed in 2019. Has been having increased bleeding and bladder urgency over last months.        Labs:  Component      Latest Ref Rng & Units 3/4/2020           8:15 AM   CA-125      1.5 - 35.0 U/mL 7     Component      Latest Ref Rng & Units 11/15/2019           9:56 AM   CA-125      1.5 - 35.0 U/mL 6     Component      Latest Ref Rng & Units 2019           8:44 AM   CA-125      1.5 - 35.0 U/mL 7     Component      Latest Ref Rng & Units 2019           8:26 AM  8:20 AM   Cancer Ag (CA) 125      0.0 - 38.1 U/mL 7.8 8.7     Component      Latest Ref Rng & Units 2019           8:28 AM   Cancer Ag (CA) 125      0.0 - 38.1 U/mL 8.8     Component      Latest Ref Rng & Units 3/14/2019 2/21/2019           8:15 AM  8:32 AM   Cancer Ag (CA) 125      0.0 - 38.1 U/mL 10.4 18.4     12/17/2018 : 9.3  12/17/2018 CEA: 2.0  12/17/2018 AFP: 3.8    Pathology  8/8/2019  A) COLON, PERICOLIC NODULE, EXCISION:      - ACELLULAR MUCIN WITH MULTIPLE CALCIFICATIONS, AND ASSOCIATED FIBROUS WALL WITH        HYALINIZATION, AND CHRONIC INFLAMMATION.     - ADJACENT BENIGN FIBROADIPOSE TISSUE, CONSISTENT WITH MESENTERY.     - NO EVIDENCE OF MALIGNANCY.      - SEE COMMENT. B) LIVER, RIGHT LOBE, BIOPSY:      - NODULAR FAT NECROSIS AND FIBROSIS OF THE LIVER CAPSULE.      - MILD STEATOSIS.     - NO EVIDENCE OF MALIGNANCY. C) COLON, SIGMOID, SEROSAL IMPLANT, EXCISION:      - NODULAR FAT NECROSIS WITH FIBROSIS, CHRONIC INFLAMMATION, CALCIFICATIONS, AND        CYSTIC DEGENERATION WITHOUT MUCIN.     - NO EVIDENCE OF MALIGNANCY. D) OMENTUM, OMENTECTOMY (RESECTION):      - CONGESTED FIBROADIPOSE TISSUE WITH FOCAL FIBROSIS AND CHRONIC INFLAMMATION, AND        CALCIFIED NODULAR FIBROSIS.     - NO EVIDENCE OF MALIGNANCY. E) PERITONEUM, LEFT ANTERIOR ABDOMINAL, RESECTION:      - CONGESTED PERITONEAL TISSUE, NO EVIDENCE OF MALIGNANCY. F) PERITONEUM, RIGHT ANTERIOR ABDOMINAL, RESECTION:      - CONGESTED PERITONEAL TISSUE, NO EVIDENCE OF MALIGNANCY. G) COLON, SIGMOID, SEROSAL IMPLANT, EXCISION:      - NODULAR FIBROSIS WITH HISTIOCYTES, SUGGESTIVE OF FAT NECROSIS.     - CYSTIC DEGENERATION, WITHOUT MUCIN.     - NO EVIDENCE OF MALIGNANCY. H) SOFT TISSUE, FALCIFORM, EXCISION:      - CONSISTENT WITH FALCIFORM LIGAMENT.     - NO EVIDENCE OF MALIGNANCY. PATHOLOGIC STAGE:  ypTx, pNx    1/17/2019   RECTOVAGINAL MASS (#1, 2) AND PELVIC MASS, BIOPSIES:   CONSISTENT WITH SEROUS CARCINOMA WITH EXTENSIVE NECROSIS. Imaging  PETCT 12/2019  1. Residual small soft tissue attenuation mass lateral right perirectal pelvis similar volume as above. No associated hypermetabolic activity.     2. Previous 2 small foci of increased metabolic activity residual at the vaginal cuff have resolved. 3. Small focus of asymmetric metabolic activity localizes to stool-filled sigmoid colon midline abdomen as above, favored as physiologic. 4. Small focus of possible mildly increased metabolic activity along the caudal margin left lobe liver as above without discernible underlying lesion or nodule. Possible misregistration. Attention on any subsequent imaging. See above. 5. No hypermetabolic abnormalities appreciated to diagnose new metastatic disease otherwise. CT abdomen/pelvis 6/19/2019  CT ABDOMEN FINDINGS: Visualized portions of lung bases demonstrate presence of  mild cardiomegaly. Visualized portions of the lung bases demonstrate no  significant abnormalities.     Small segment 7 lesion adjacent to the IVC measures 1.1 x 1.0 cm previously  measuring 1.5 x 1.1 cm. The liver is otherwise normal. The gallbladder is  normal.     The splenic lesion measures 3.0 x 2.8 cm previously measuring 2.4 x 2.3 cm.     The pancreas is normal. There is probably a tiny right adrenal adenoma  unchanged. The left adrenal gland is normal. There is a small cyst involving the  lower pole the left kidney. Kidneys are otherwise normal.     There is no intra-abdominal or retroperitoneal adenopathy. I see no  intra-abdominal carcinomatosis.     CT PELVIS FINDINGS: Lower right pelvic mass currently measures 2.8 x 2.7 cm  previously measuring 3.2 x 2.8 cm. No additional pelvic masses identified. No  additional evidence of pelvic carcinomatosis.     Status post hysterectomy. There is no free fluid. There is no pelvic adenopathy. No specific bowel abnormalities are seen. Postoperative changes are seen  involving the anterior pelvic wall.     No significant osseous abnormalities. Extensive degenerative changes are seen  involving the spine.     IMPRESSION  Impression:        1. The small liver lesion has decreased in size.  The small lower right pelvic  mass has decreased in size. 2. The splenic lesion may have increased in size slightly. This lesion was not  positive on PET and is probably not related to the patient's pelvic malignancy. Suspect benign lesion. 3. No additional CT evidence of malignancy. 4. Cardiomegaly. Status post hysterectomy. Additional chronic changes as  described above. CT abdomen/pelvis  FINDINGS:  view shows lung bases clear and normal bowel gas pattern. Patient morbidly obese but probably with interval weight loss.     CT Abdomen and pelvis:     Lung bases: Normal.     Heart and pericardium: There is a catheter tip in the right atrium of the heart. Heart and pericardium otherwise unremarkable.     Liver: Decrease size of lesion in segment 7 of the liver adjacent IVC now  measuring 11 x 15 mm and on PET/CT 2/14/2019 it measured 23 mm. No new liver  lesions.     Gallbladder: Normal.     Spleen: Mass inferiorly in the spleen near the hilum measures 23 x 24 mm,  probably present previously but not as well seen because of lack of intravenous  contrast and measuring about 27 mm (29).    Pancreas: Normal.     Adrenal glands: Normal.     Kidneys: Enhancing symmetrically. No focal lesions.     The bowel: Stomach and duodenum and small bowel and the appendix and colon are  normal.     GYN: Prior hysterectomy. No adnexal masses.     Peritoneal space: No free fluid. There is some mild fat stranding in the left  lower quadrant but no discrete mass (60) and probably related to the mesentery.     MSK: Abdominal wall scar lower abdominal wall. Multiple levels facet arthropathy  lower lumbar spine. Moderate disc space narrowing L5/S1 and L4/L5. No suspicious  skeletal lesions.     Retroperitoneum: Soft tissue mass right side pelvis has decreased in size. It  measures 2.8 x 3.2 cm.  On previous PET/CT it measured 8.3 x 5.3 cm and on CT it  measured 7.4 x 5.2 cm.     IMPRESSION  IMPRESSION:     Overall improvement compatible with response to therapy. Decreased size of the  pelvic metastasis, hepatic metastasis, splenic lesion since the prior study. No  new metastases. MRI 2/19/2019  FINDINGS:  Presumed hysterectomy. There is a lobulated 6.8 x 6 x 5.9 cm mass centered at  the right cul-de-sac involving the right and posterior upper to mid vagina but  also abutting the right anterior rectum from 1-8 o'clock. On coronal and  sagittal views tumor is favored to not be originating from the rectum but this  is not definite. There is a oval ring of T2 hypointensity and T1 hyperintensity  with central necrosis within the mass. The mass extends to the posterior medial  right mesorectal fascia. The mass does not invade the bladder. At the cranial  aspect of the mass is a spiculated 3.4 x 2 cm T2 hypointense structure with  tethering to adjacent bowel and fascia. Ovaries are not visualized separately. No pelvic ascites.     Bowel is otherwise normal in caliber. No upstream dilation. Bladder is normal.  No lymphadenopathy.     Postsurgical changes anterior midline pelvic wall. Bones are grossly  unremarkable.     IMPRESSION  IMPRESSION:    1. Large up to 6.8 cm mass centered at the right cul-de-sac  is most  intimately associated with the vagina, favored to be either endometrial, vaginal  or cervical in origin. It does abut and may involve the right rectum but this is  favored secondary involvement. Ovaries are not visualized separately. Correlate  for history of oophorectomy since ovarian malignancy also possible. 2.  There is an associated site of favored endometriotic implant at the cranial  aspect of the mass raising the possibility of malignant transformation. 3.  No upstream bowel obstruction. PETCT 2/14/2019   --  PET FINDINGS  --    No abnormal hypermetabolic activity in the neck.       No abnormal hypermetabolic activity in the chest.      Low-attenuation possible lesion in the medial dome right lobe liver in region  of heterogeneous hypermetabolic activity, with SUV Max reaching 11.7 on image  98. Underlying lesion difficult to visualize due to extensive motion from  respirations. Cannot well separate from the IVC or diaphragm. No definite  corresponding lung lesion however.     There is low level metabolic activity associated with stranding and presumed  scarring in the subcutaneous fat of the lower midline abdominal and pelvic wall. This may simply be inflammatory. .      There is a lobulated soft tissue conglomerate in the midline to right lateral  cul-de-sac and perirectal pelvis. The periphery of this is diffusely  hypermetabolic, with SUV Max reaching 18.3 on image 200. Portions centrally are  photopenic and presumed necrotic. This measures up to 7.5 x 6.1 cm axial on  image 196. Anterior margin of this hypermetabolic lesion is in direct contact  with the high intensity decompressed urinary bladder, involvement cannot be  assessed. Left lateral margin of the mass is in contact with the surface of the  lateral wall of the sigmoid colon. Direct involvement cannot be assessed.  -Additional focal hypermetabolic density 2.7 cm on image 189 inseparable from  the caudal wall of the sigmoid. -Tapering hypermetabolic activity extends to approximately image 210, not to the  level of the perineum.     There are no other areas of abnormal metabolic activity appreciated in the  abdomen or pelvis which cannot be attributed to renal collecting system, ureter,  bladder or bowel wall activity>]. No definite hypermetabolic bone lesions appreciated, although bone scan can be  more sensitive in this respect. --  CT FINDINGS  --     These limited CT images do not replace diagnostic quality contrast enhanced CT  scan for evaluation of solid organs, bowel, retroperitoneum and vasculature. CT NECK:    Severely limited by lack of intravenous contrast.  Paranasal sinuses free of  disease. No appreciably enlarged lymph nodes.  Extensive cervical endplate  osteophytes anterior and left lateral..      CT CHEST:    Limited evaluation of the hilum and vasculature without intravenous contrast.  No pleural effusion. No appreciably enlarged lymph nodes. Patchy parenchymal  opacities medial basal left lower lobe on image 89 demonstrates low level  metabolic activity, SUV Max 3.2, nonspecific, favored as inflammatory based on  imaging appearance subjectively. Darlynn Magic CT ABDOMEN:    Limited noncontrast images. Normal-size liver and spleen no adrenal mass. No  hydronephrosis. No ascites. CT PELVIS:    Limited noncontrast images. No small bowel or colon distention. Scattered  colonic diverticulosis. Postsurgical change anterior abdominal wall. Severe  degenerative facet disease at the lower lumbar levels.       Lobulated right pelvic/cul-de-sac soft tissue mass as above. Mild degree of  stranding in the pelvic mesenteric fat. No ascites.        IMPRESSION   IMPRESSION:      1. Peripherally hypermetabolic soft tissue mass right dependent pelvis to the  cul-de-sac region as above consistent with malignancy/metastatic disease with  some central necrosis  -Possible separate lesion versus serosal involvement of the sigmoid colon  adjacent. -Mass in contact with posterior bladder, sigmoid colon locally and posterior  peritoneal surface.     2. Hypermetabolic lesion along central dome of the right lobe liver difficult to  separate from IVC or diaphragm due to motion artifact. Recommend dedicated  contrast enhanced CT for localization and better clarification.  -Findings are concerning for malignancy or hepatic metastatic disease until  proven otherwise.      3. Low level activity associated with patchy parenchymal opacity at the medial  left lower lobe,, favored as inflammatory as above.     4. No other definite hypermetabolic abnormalities appreciated elsewhere. 12/14/2018 CT PELVIS W/CONTRAST  FINDINGS:    LYMPH NODES: No enlarged lymph nodes.     PELVIC GASTROINTESTINAL TRACT: No bowel dilation or wall thickening. PELVIC ORGANS:     The uterus is absent. Along the right upper margin of the vaginal cuff within the right deep pelvis there is a large irregularly-shaped peripherally enhancing, septated appearing mass with regions of central low attenuation which could be low density-fluid type contents or regions of necrosis. Lesion measures 76 x 58 x 72 mm and is compatible with a pelvic neoplasm. VASCULATURE: Unremarkable. BONES: Lower lumbar hypertrophic osteophytes. Lower lumbar facet hypertrophy with vacuum phenomenon asymmetric leftward. Degenerative vacuum phenomenon within the SI joints noted as well. No acute or aggressive osseous abnormalities identified. OTHER: Small fat-containing ventral umbilical hernia. Minimal nonspecific edema within the subcutaneous fat of the lumbar region, not uncommon. IMPRESSION:   1.  Along the right upper margin of the vaginal cuff within the right deep pelvis there is a large irregularly-shaped peripherally enhancing, septated appearing mass with regions of central low attenuation which could be low density-fluid type contents or regions of necrosis. Lesion measures 76 x 58 x 72 mm and is compatible with a pelvic neoplasm. Could be a endometrioma given prior history of endometriosis, malignancy not excluded and gynecologic oncology follow-up is suggested. 2.  Small fat-containing ventral umbilical hernia.      12/04/2018 TVUS  Uterus: surgically absent  Left ovary: surgically absent  Right ovary: surgically absent  Other findings: midline-at vaginal cuff: 6.8 cm X 5.2 cm x 4.6 cm, volume 85 ml    Impression: Complex pelvic mass       ROS:    As above      Patient Active Problem List    Diagnosis Date Noted    Malignant neoplasm of peritoneum ([facility] [address] 02/14/2019    Pelvic mass in female 01/17/2019    Bipolar 1 disorder ([address] 02/09/2018    Irritable bowel syndrome with both constipation and diarrhea 02/09/2018    Advance care planning 01/31/2017    Dental caries 05/26/2016    Chronic periodontal disease 05/26/2016    SUAZO (dyspnea on exertion) 02/10/2016    Prediabetes 09/24/2015    Morbid obesity ([facility] [address] 08/31/2015    Arthritis, degenerative 08/31/2015    Gastroesophageal reflux disease without esophagitis 08/31/2015    ANAMIKA on CPAP 08/31/2015    Essential hypertension 08/28/2015    PTSD (post-traumatic stress disorder) 03/24/2014    S/P TKR (total knee replacement) 11/14/2012    Depression 05/08/2012    Menopause 05/08/2012    Knee pain, right 05/08/2012    GERD (gastroesophageal reflux disease) 05/08/2012    OA (osteoarthritis) 06/18/2010    Obesity 06/18/2010    Mixed hyperlipidemia 06/18/2010     Past Medical History:   Diagnosis Date    AR (allergic rhinitis)     seasonal    Cancer ([facility] [address]     pt states between vgina and rectal area    Cardiac echocardiogram 04/11/2016    Tech difficult. EF 55-60%. No RWMA. Mild conc LVH. Gr 1 DDfx. RVSP normal.      Cardiac nuclear imaging test 05/05/2016    Low risk. Very patchy radiotracer uptake. Mild anterior artifact, low suspicion for ischemia. EF 68%. No RWMA. Normal EKG on pharm stress test.    Cardiovascular LE arterial duplex 10/07/2013    No significant arterial disease at rest bilaterally. R JUNE 1.21.  L JUNE 1.16. No significant sm vessel disease.     Depression     Dr. Juan Antonio Cheung, suicide attempt 2/12    Diverticulosis     Endometriosis     s/p hysterectomy 2006    GERD (gastroesophageal reflux disease)     Glaucoma     Dr. Marisol Camejo HTN (hypertension)     Hx of colonoscopy 04/05/2017    mild diverticulosis, g1 internal hemorrhoids, normal colon , repeat 10 year 2027    Hx of suicide attempt     Hyperlipidemia LDL goal < 130     IBS (irritable bowel syndrome)     Ill-defined condition     environmental allergies    Insomnia     Malignant neoplasm of peritoneum ([facility] [address] 2/14/2019    Nausea & vomiting     OA (osteoarthritis)     both knees, lower back    Preeclampsia     PTSD (post-traumatic stress disorder)     Seizures (HCC)     1 x with childbirth, had toxemia    Sleep apnea     does not use cpap machine    Spinal stenosis     Dr. Aric Mandujano Vertigo       Past Surgical History:   Procedure Laterality Date    COLONOSCOPY N/A 2017    COLONOSCOPY performed by Patricia Jin MD [facility] N/A 2019    SIGMOIDOSCOPY FLEXIBLE performed by Reuben Pan [facility] at [facility] ENDOSCOPY    HX  SECTION      HX COLONOSCOPY  2017    DR. MCRAE 2017     HX HYSTERECTOMY      HX KNEE ARTHROSCOPY Left     left knee    HX KNEE REPLACEMENT Bilateral     (R)  (L)     HX LAPAROTOMY N/A 2019    and rectovaginal bx    HX OTHER SURGICAL  2016    all teeth removed    HX SALPINGO-OOPHORECTOMY  2008    HX TUBAL LIGATION      IR INSERT TUNL CVC W PORT OVER 5 YEARS  2019    MULTIPLE DELIVERY       1990    NM FEMUR/KNEE SURG UNLISTED Left 2009    External fixator    TOTAL ABDOM HYSTERECTOMY        OB History        2    Para   2    Term   2       0    AB   0    Living   0       SAB   0    TAB   0    Ectopic   0    Molar   0    Multiple   0    Live Births   0              Social History     Tobacco Use    Smoking status: Never Smoker    Smokeless tobacco: Never Used   Substance Use Topics    Alcohol use: No      Family History   Problem Relation Age of Onset    Heart Disease Mother         CHF    Diabetes Mother     Hypertension Mother     Kidney Disease Mother    Venkatesh Malone Breast Cancer Mother     Stroke Mother     Diabetes Father     Hypertension Father     Heart Attack Father         MI    Cancer Maternal Grandmother         stomach    Ovarian Cancer Maternal Aunt     Cancer Maternal Uncle       Current Outpatient Medications   Medication Sig    pantoprazole (PROTONIX) 40 mg tablet take 1 tablet by mouth once daily    promethazine (PHENERGAN) 25 mg tablet take 1 tablet by mouth every 6 hours if needed for nausea    meclizine (ANTIVERT) 25 mg tablet take 1 tablet by mouth three times a day if needed for dizziness    Facial Mask misc Use daily    Disposable Gloves misc Use daily    amLODIPine (NORVASC) 10 mg tablet take 1 tablet by mouth once daily    metoprolol succinate (TOPROL-XL) 100 mg tablet Take 1 Tab by mouth daily.  magnesium hydroxide (PATHAK MILK OF MAGNESIA) 400 mg/5 mL suspension Take 30 mL by mouth daily as needed for Constipation.  lidocaine-prilocaine (EMLA) topical cream apply to affected area once daily if needed for pain    latanoprost (XALATAN) 0.005 % ophthalmic solution Administer 1 Drop to both eyes nightly.  Biotin 2,500 mcg cap Take  by mouth.  multivitamin (ONE A DAY) tablet Take 1 Tab by mouth daily.  cyanocobalamin (VITAMIN B-12) 100 mcg tablet Take 100 mcg by mouth daily.  PARoxetine (PAXIL) 10 mg tablet take 1 tablet by mouth once daily    simvastatin (ZOCOR) 20 mg tablet take 1 tablet by mouth at bedtime    ibuprofen (MOTRIN) 600 mg tablet take 1 tablet by mouth three times a day ONLY AS NEEDED    Mv,Ca,Min-FA-Herbal No.157 (ESTROVEN MAXIMUM STRENGTH) 400 mcg tab Take  by mouth daily. Indications: hot flash    dimethicone (SOOTHE AND COOL INZO BARRIER) 5 % topical cream Apply  to affected area two (2) times a day.  polyethylene glycol (MIRALAX) 17 gram packet Take 1 Packet by mouth daily.  cycloSPORINE (RESTASIS) 0.05 % dpet Apply 1 Drop to eye as needed.  dexamethasone (DECADRON) 4 mg tablet Take 40 mg by mouth as needed. Take 10 tabs the night before Chemo #1 and Chemo #2.  melatonin 3 mg tablet Take 3 mg by mouth nightly.  plecanatide (TRULANCE) 3 mg tab Take  by mouth.  OXcarbazepine (TRILEPTAL) 300 mg tablet Take 300 mg by mouth two (2) times a day.  ziprasidone (GEODON) 40 mg capsule Take 40 mg by mouth nightly.     montelukast (SINGULAIR) 10 mg tablet Take 10 mg by mouth nightly. No current facility-administered medications for this visit. Allergies   Allergen Reactions    Other Medication Hives     seafood    Shellfish Derived Hives          OBJECTIVE:    Physical Exam  VITAL SIGNS: Visit Vitals  BP (!) 148/91 (BP 1 Location: Left arm, BP Patient Position: Sitting)   Pulse 61   Temp 98.3 °F (36.8 °C) (Oral)   Resp 16   Ht 5' 3\" (1.6 m)   Wt 123 kg (271 lb 3.2 oz)   LMP 01/31/2008   SpO2 99%   BMI 48.04 kg/m²      GENERAL EMILY: in no apparent distress and well developed and well nourished   MUSCULOSKEL: no joint tenderness, deformity or swelling   INTEGUMENT:  warm and dry, no rashes or lesions   ABDOMEN . soft,obese, NT, ND, No masses appreciated, INC: C/D/I    EXTREMITIES: extremities normal, atraumatic, no cyanosis or edema   PELVIC: NEFG, spec exam revealed friable mass at right vaginal fornix.  BME    RECTAL: deferred   BRY SURVEY: Cervical, supraclavicular, axillary and inguinal nodes normal.   NEURO: Grossly normal     [facility]  OFFICE PROCEDURE PROGRESS NOTE        Chart reviewed for the following:   Caterina ROWELL MD, have reviewed the History, Physical and updated the Allergic reactions for [place] [address] performed immediately prior to start of procedure:   Caterina ROWELL MD, have performed the following reviews on Leslie Joel prior to the start of the procedure:            * Patient was identified by name and date of birth   * Agreement on procedure being performed was verified  * Risks and Benefits explained to the patient  * Procedure site verified and marked as necessary  * Patient was positioned for comfort  * Consent was signed and verified     Time: 193 am      Date of procedure: 4/17/2020    Procedure performed by:  Caterina Anderson MD    Provider assisted by: Chadwick Gottlieb RN    Patient assisted by: self    How tolerated by patient: tolerated the procedure well with no complications    Post Procedural Pain Scale: 0 - No Hurt    Comments: biopsy of vaginal mass taken.  monsels applied for hemostasis              IMPRESSION/PLAN:  1.stage IV Primary peritoneal cancer with likely recurrence   -previoulsy reviewed her pathology    -s/p carbo (auc=6), taxol (175 mg/m2) IV every 3 wks s/p 6 cycles completed 6/6/2019   -s/p cytoreductive surgery with [facility] with dr. Tess Haskins   -s/p pelvic radiation   -biopsy done today   -will arrange repeat PETCT   -urinary urgency/frequency- will send for UA/Urine culture   -f/u after PETCT    The total time spent was 40 minutes regarding this patients diagnosis of primary peritoneal cancer and >50% of this time was spent counseling and coordinating care    Zachery Reich MD  Gynecologic Oncology  4/17/202010:05 AM

## 2020-04-17 NOTE — PROGRESS NOTES
Citlalli Cho is a 47 y.o. female presents in office for peritoneal cancer follow up, spotting. Chief Complaint   Patient presents with    Cancer     Peritoneal cancer        Do you have any unusual vaginal bleeding, discharge or irritation? Pt c/o spotting when wiping, states it is still occurring after she discontinued aspirin but occurs more often after frequent urination. She says the bleeding starts pink in color then darkens, she then changes to a clear discharge. Do you have any changes in your bowel movements? Pt c/o occasional diarrhea since Monday this week. Have you been experiencing nausea or vomiting? Pt c/o of nausea since radiation treatment in November. Have you been experiencing any continuous or worsening abdominal pain? Pt c/o of 7/10 cramping abdominal pain that comes and goes and varies in location from left sided to center of abdomen. Any urinary burning? Pt c/o urinary frequency. Visit Vitals  BP (!) 148/91 (BP 1 Location: Left arm, BP Patient Position: Sitting)   Pulse 61   Temp 98.3 °F (36.8 °C) (Oral)   Resp 16   Ht 5' 3\" (1.6 m)   Wt 123 kg (271 lb 3.2 oz)   SpO2 99%   BMI 48.04 kg/m²         Health Maintenance Due   Topic Date Due    Lipid Screen  02/28/2019    Shingrix Vaccine Age 50> (1 of 2) 07/22/2019         1. Have you been to the ER, urgent care clinic since your last visit? Hospitalized since your last visit? No    2. Have you seen or consulted any other health care providers outside of the [address] [place] [address] since your last visit? Include any pap smears or colon screening. No    3 most recent PHQ Screens 7/22/2019   Little interest or pleasure in doing things Several days   Feeling down, depressed, irritable, or hopeless Several days   Total Score PHQ 2 2       Abuse Screening Questionnaire 7/22/2019   Do you ever feel afraid of your partner? N   Are you in a relationship with someone who physically or mentally threatens you?  N   Is it safe for you to go home? Y       Fall Risk Assessment, last 12 mths 2/9/2018   Able to walk? Yes   Fall in past 12 months?  No       Learning Assessment 1/4/2019   PRIMARY LEARNER Patient   HIGHEST LEVEL OF EDUCATION - PRIMARY LEARNER  GRADUATED HIGH SCHOOL OR GED   BARRIERS PRIMARY LEARNER NONE   CO-LEARNER CAREGIVER No   PRIMARY LANGUAGE ENGLISH   LEARNER PREFERENCE PRIMARY READING     -   ANSWERED BY patient   RELATIONSHIP SELF

## 2020-04-20 ENCOUNTER — TELEPHONE (OUTPATIENT)
Dept: ONCOLOGY | Age: 50
End: 2020-04-20

## 2020-04-23 ENCOUNTER — TELEPHONE (OUTPATIENT)
Dept: ONCOLOGY | Age: 50
End: 2020-04-23

## 2020-04-23 NOTE — TELEPHONE ENCOUNTER
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 Corie Mena 05/01/2020 still stands. Patient verbalized understanding and agreed to plan. Patient instructed to contact office for any question or concerns.      Emmy Schwartz NP

## 2020-04-24 ENCOUNTER — TELEPHONE (OUTPATIENT)
Dept: FAMILY MEDICINE CLINIC | Age: 50
End: 2020-04-24

## 2020-04-24 NOTE — TELEPHONE ENCOUNTER
Pt called requesting a call from [place] because she has been diagnosed with cancer and she would like to talk to her about it. Please advise.

## 2020-04-30 ENCOUNTER — HOSPITAL ENCOUNTER (OUTPATIENT)
Dept: PET IMAGING | Age: 50
Discharge: HOME | End: 2020-04-30
Attending: NURSE PRACTITIONER
Payer: MEDICARE

## 2020-04-30 DIAGNOSIS — C48.2 PRIMARY PERITONEAL CARCINOMATOSIS (HCC): ICD-10-CM

## 2020-04-30 DIAGNOSIS — N89.8 VAGINAL MASS: ICD-10-CM

## 2020-04-30 DIAGNOSIS — C57.8 MALIGNANT NEOPLASM OF OVERLAPPING SITES OF FEMALE GENITAL ORGANS (HCC): ICD-10-CM

## 2020-04-30 PROCEDURE — A9552 F18 FDG: HCPCS

## 2020-05-04 ENCOUNTER — OFFICE VISIT (OUTPATIENT)
Dept: ONCOLOGY | Age: 50
End: 2020-05-04

## 2020-05-04 ENCOUNTER — TELEPHONE (OUTPATIENT)
Dept: ONCOLOGY | Age: 50
End: 2020-05-04

## 2020-05-04 VITALS
HEIGHT: 63 IN | DIASTOLIC BLOOD PRESSURE: 83 MMHG | RESPIRATION RATE: 14 BRPM | HEART RATE: 69 BPM | OXYGEN SATURATION: 93 % | SYSTOLIC BLOOD PRESSURE: 174 MMHG | TEMPERATURE: 98.1 F | WEIGHT: 273.4 LBS | BODY MASS INDEX: 48.44 KG/M2

## 2020-05-04 DIAGNOSIS — C48.2 MALIGNANT NEOPLASM OF PERITONEUM (HCC): ICD-10-CM

## 2020-05-04 DIAGNOSIS — G89.3 CANCER ASSOCIATED PAIN: ICD-10-CM

## 2020-05-04 DIAGNOSIS — Z51.81 ENCOUNTER FOR MONITORING CARDIOTOXIC DRUG THERAPY: Primary | ICD-10-CM

## 2020-05-04 DIAGNOSIS — Z79.899 ENCOUNTER FOR MONITORING CARDIOTOXIC DRUG THERAPY: Primary | ICD-10-CM

## 2020-05-04 RX ORDER — PROMETHAZINE HYDROCHLORIDE 25 MG/1
25 TABLET ORAL
Qty: 30 TAB | Refills: 3 | Status: SHIPPED | OUTPATIENT
Start: 2020-05-04 | End: 2020-05-11

## 2020-05-04 RX ORDER — OXYCODONE AND ACETAMINOPHEN 5; 325 MG/1; MG/1
1 TABLET ORAL
Qty: 40 TAB | Refills: 0 | Status: SHIPPED | OUTPATIENT
Start: 2020-05-04 | End: 2020-05-07

## 2020-05-04 NOTE — LETTER
5/4/20 Patient: Kris Taveras YOB: 1970 Date of Visit: 5/4/2020 Beny Perez, [address] [id number] [place] [address] VIA In Basket Dear Beny Perez MD, Thank you for referring Ms. Nick Ennis to [facility] for evaluation. My notes for this consultation are attached. If you have questions, please do not hesitate to call me. I look forward to following your patient along with you. Sincerely, Deloris De La Cruz MD

## 2020-05-04 NOTE — TELEPHONE ENCOUNTER
Informed pt of scheduled ECHO 05/08/2020 0800 with an arrival of 0745. Patient verbalized understanding and agreed to plan. Patient instructed to contact office for any question or concerns.      Yamilka Herndon, NP

## 2020-05-04 NOTE — PROGRESS NOTES
.  Princess Diaz is a 47 y.o. female presents in office for vaginal bleeding and review results. Patient states nausea and feels out of it with dizziness. Patient stated bleeding and cramping. Patient states vaginal bleeding is light and heavy at times. Patient states cramping is moderate and constant. Patient states that when laying at rest she bleeds more. Patient states no clotting with bleeding. .  Visit Vitals  /83 (BP 1 Location: Right arm, BP Patient Position: Sitting)   Pulse 69   Temp 98.1 °F (36.7 °C) (Oral)   Resp 14   Ht 5' 3\" (1.6 m)   Wt 273 lb 6.4 oz (124 kg)   SpO2 93%   BMI 48.43 kg/m²       Do you have any unusual vaginal bleeding, discharge or irritation? Yes      Do you have any changes in your bowel movements? No     Have you been experiencing nausea or vomiting? Nausea     Have you been experiencing any continuous or worsening abdominal pain? Cramping     Any urinary burning? No       Health Maintenance Due   Topic Date Due    Lipid Screen  02/28/2019    Shingrix Vaccine Age 50> (1 of 2) 07/22/2019         1. Have you been to the ER, urgent care clinic since your last visit? Hospitalized since your last visit? No     2. Have you seen or consulted any other health care providers outside of the [address] [place] [address] since your last visit? Include any pap smears or colon screening. No    3 most recent PHQ Screens 7/22/2019   Little interest or pleasure in doing things Several days   Feeling down, depressed, irritable, or hopeless Several days   Total Score PHQ 2 2       Abuse Screening Questionnaire 7/22/2019   Do you ever feel afraid of your partner? N   Are you in a relationship with someone who physically or mentally threatens you? N   Is it safe for you to go home? Y       Fall Risk Assessment, last 12 mths 2/9/2018   Able to walk? Yes   Fall in past 12 months?  No       Learning Assessment 1/4/2019   PRIMARY LEARNER Patient   HIGHEST LEVEL OF EDUCATION - PRIMARY LEARNER  GRADUATED HIGH SCHOOL OR GED   BARRIERS PRIMARY LEARNER NONE   CO-LEARNER CAREGIVER No   PRIMARY LANGUAGE ENGLISH   LEARNER PREFERENCE PRIMARY READING     -   ANSWERED BY patient   RELATIONSHIP SELF

## 2020-05-04 NOTE — PROGRESS NOTES
[address] [facility]  [address] [place] [address], [address], [address] [facility]  [address] [facility], [address] [place] [address]  [place], [address]   [phone]  Willette Bosworth DO      Patient ID:  Name:  Nancy Hahn  MRN:  [id number]  :  1970/49 y.o. Date:  2020      HISTORY OF PRESENT ILLNESS:  Nancy Hahn is a 47 y.o.  postmenopausal female referred by Dr. Mary Tang  With peritoneal cancer. Intitally seen be NP with reports of vaginal bleeding. Given pt's history of hysteretectomy with BSO for endometriosis in  a TVUS was ordered. Which revealed a 6.8 cm x 5.2 cm x 4.6 cm at midline vaginal cuff. This prompted pelvic CT with findings of a lesion measuring 7.6 x 5.8 x 7.2 cm and is compatible with a pelvic neoplasm vs endometrioma given prior history of endometriosis. Tumor markers done on 2018 all normal.  S/p  Exploratory laparotomy, exploration of retroperitoneal spaces, exam under anesthesia with biopsy of rectovaginal mass on 2019. Had sigmoidoscopy which revealed submucosal mass. S/p cycle #6 of carbo/taxol on 2019. S/p cytoreductive surgery with HIP[facility] on 2019. S/p radiation treatment completed in 2019. Was seen in office and had a biopsy c/w recurrence. Here to review PETCT. Still having on and off bleeding. Also some nausea. No vomiting. Also c/o pain and pressure with urination.       Labs:  Component      Latest Ref Rng & Units 3/4/2020           8:15 AM   CA-125      1.5 - 35.0 U/mL 7     Component      Latest Ref Rng & Units 11/15/2019           9:56 AM   CA-125      1.5 - 35.0 U/mL 6     Component      Latest Ref Rng & Units 2019           8:44 AM   CA-125      1.5 - 35.0 U/mL 7     Component      Latest Ref Rng & Units 2019           8:26 AM  8:20 AM   Cancer Ag (CA) 125      0.0 - 38.1 U/mL 7.8 8.7     Component      Latest Ref Rng & Units 2019 8:28 AM   Cancer Ag (CA) 125      0.0 - 38.1 U/mL 8.8     Component      Latest Ref Rng & Units 3/14/2019 2/21/2019           8:15 AM  8:32 AM   Cancer Ag (CA) 125      0.0 - 38.1 U/mL 10.4 18.4     12/17/2018 : 9.3  12/17/2018 CEA: 2.0  12/17/2018 AFP: 3.8    Pathology  4/20/2020  Vaginal biopsy  Papillary serous adenocarcinoma    8/8/2019  A) COLON, PERICOLIC NODULE, EXCISION:      - ACELLULAR MUCIN WITH MULTIPLE CALCIFICATIONS, AND ASSOCIATED FIBROUS WALL WITH        HYALINIZATION, AND CHRONIC INFLAMMATION.     - ADJACENT BENIGN FIBROADIPOSE TISSUE, CONSISTENT WITH MESENTERY.     - NO EVIDENCE OF MALIGNANCY.      - SEE COMMENT. B) LIVER, RIGHT LOBE, BIOPSY:      - NODULAR FAT NECROSIS AND FIBROSIS OF THE LIVER CAPSULE.      - MILD STEATOSIS.     - NO EVIDENCE OF MALIGNANCY. C) COLON, SIGMOID, SEROSAL IMPLANT, EXCISION:      - NODULAR FAT NECROSIS WITH FIBROSIS, CHRONIC INFLAMMATION, CALCIFICATIONS, AND        CYSTIC DEGENERATION WITHOUT MUCIN.     - NO EVIDENCE OF MALIGNANCY. D) OMENTUM, OMENTECTOMY (RESECTION):      - CONGESTED FIBROADIPOSE TISSUE WITH FOCAL FIBROSIS AND CHRONIC INFLAMMATION, AND        CALCIFIED NODULAR FIBROSIS.     - NO EVIDENCE OF MALIGNANCY. E) PERITONEUM, LEFT ANTERIOR ABDOMINAL, RESECTION:      - CONGESTED PERITONEAL TISSUE, NO EVIDENCE OF MALIGNANCY. F) PERITONEUM, RIGHT ANTERIOR ABDOMINAL, RESECTION:      - CONGESTED PERITONEAL TISSUE, NO EVIDENCE OF MALIGNANCY. G) COLON, SIGMOID, SEROSAL IMPLANT, EXCISION:      - NODULAR FIBROSIS WITH HISTIOCYTES, SUGGESTIVE OF FAT NECROSIS.     - CYSTIC DEGENERATION, WITHOUT MUCIN.     - NO EVIDENCE OF MALIGNANCY. H) SOFT TISSUE, FALCIFORM, EXCISION:      - CONSISTENT WITH FALCIFORM LIGAMENT.     - NO EVIDENCE OF MALIGNANCY. PATHOLOGIC STAGE:  ypTx, pNx    1/17/2019   RECTOVAGINAL MASS (#1, 2) AND PELVIC MASS, BIOPSIES:   CONSISTENT WITH SEROUS CARCINOMA WITH EXTENSIVE NECROSIS. Imaging  PETCT 12/2019  1. Residual small soft tissue attenuation mass lateral right perirectal pelvis similar volume as above. No associated hypermetabolic activity. 2. Previous 2 small foci of increased metabolic activity residual at the vaginal cuff have resolved. 3. Small focus of asymmetric metabolic activity localizes to stool-filled sigmoid colon midline abdomen as above, favored as physiologic. 4. Small focus of possible mildly increased metabolic activity along the caudal margin left lobe liver as above without discernible underlying lesion or nodule. Possible misregistration. Attention on any subsequent imaging. See above. 5. No hypermetabolic abnormalities appreciated to diagnose new metastatic disease otherwise. CT abdomen/pelvis 6/19/2019  CT ABDOMEN FINDINGS: Visualized portions of lung bases demonstrate presence of  mild cardiomegaly. Visualized portions of the lung bases demonstrate no  significant abnormalities.     Small segment 7 lesion adjacent to the IVC measures 1.1 x 1.0 cm previously  measuring 1.5 x 1.1 cm. The liver is otherwise normal. The gallbladder is  normal.     The splenic lesion measures 3.0 x 2.8 cm previously measuring 2.4 x 2.3 cm.     The pancreas is normal. There is probably a tiny right adrenal adenoma  unchanged. The left adrenal gland is normal. There is a small cyst involving the  lower pole the left kidney. Kidneys are otherwise normal.     There is no intra-abdominal or retroperitoneal adenopathy. I see no  intra-abdominal carcinomatosis.     CT PELVIS FINDINGS: Lower right pelvic mass currently measures 2.8 x 2.7 cm  previously measuring 3.2 x 2.8 cm. No additional pelvic masses identified. No  additional evidence of pelvic carcinomatosis.     Status post hysterectomy. There is no free fluid. There is no pelvic adenopathy. No specific bowel abnormalities are seen. Postoperative changes are seen  involving the anterior pelvic wall.     No significant osseous abnormalities. Extensive degenerative changes are seen  involving the spine.     IMPRESSION  Impression:        1. The small liver lesion has decreased in size. The small lower right pelvic  mass has decreased in size. 2. The splenic lesion may have increased in size slightly. This lesion was not  positive on PET and is probably not related to the patient's pelvic malignancy. Suspect benign lesion. 3. No additional CT evidence of malignancy. 4. Cardiomegaly. Status post hysterectomy. Additional chronic changes as  described above. CT abdomen/pelvis  FINDINGS:  view shows lung bases clear and normal bowel gas pattern. Patient morbidly obese but probably with interval weight loss.     CT Abdomen and pelvis:     Lung bases: Normal.     Heart and pericardium: There is a catheter tip in the right atrium of the heart. Heart and pericardium otherwise unremarkable.     Liver: Decrease size of lesion in segment 7 of the liver adjacent IVC now  measuring 11 x 15 mm and on PET/CT 2/14/2019 it measured 23 mm. No new liver  lesions.     Gallbladder: Normal.     Spleen: Mass inferiorly in the spleen near the hilum measures 23 x 24 mm,  probably present previously but not as well seen because of lack of intravenous  contrast and measuring about 27 mm (29).    Pancreas: Normal.     Adrenal glands: Normal.     Kidneys: Enhancing symmetrically. No focal lesions.     The bowel: Stomach and duodenum and small bowel and the appendix and colon are  normal.     GYN: Prior hysterectomy. No adnexal masses.     Peritoneal space: No free fluid. There is some mild fat stranding in the left  lower quadrant but no discrete mass (60) and probably related to the mesentery.     MSK: Abdominal wall scar lower abdominal wall. Multiple levels facet arthropathy  lower lumbar spine. Moderate disc space narrowing L5/S1 and L4/L5. No suspicious  skeletal lesions.     Retroperitoneum: Soft tissue mass right side pelvis has decreased in size.  It  measures 2.8 x 3.2 cm. On previous PET/CT it measured 8.3 x 5.3 cm and on CT it  measured 7.4 x 5.2 cm.     IMPRESSION  IMPRESSION:     Overall improvement compatible with response to therapy. Decreased size of the  pelvic metastasis, hepatic metastasis, splenic lesion since the prior study. No  new metastases. MRI 2/19/2019  FINDINGS:  Presumed hysterectomy. There is a lobulated 6.8 x 6 x 5.9 cm mass centered at  the right cul-de-sac involving the right and posterior upper to mid vagina but  also abutting the right anterior rectum from 1-8 o'clock. On coronal and  sagittal views tumor is favored to not be originating from the rectum but this  is not definite. There is a oval ring of T2 hypointensity and T1 hyperintensity  with central necrosis within the mass. The mass extends to the posterior medial  right mesorectal fascia. The mass does not invade the bladder. At the cranial  aspect of the mass is a spiculated 3.4 x 2 cm T2 hypointense structure with  tethering to adjacent bowel and fascia. Ovaries are not visualized separately. No pelvic ascites.     Bowel is otherwise normal in caliber. No upstream dilation. Bladder is normal.  No lymphadenopathy.     Postsurgical changes anterior midline pelvic wall. Bones are grossly  unremarkable.     IMPRESSION  IMPRESSION:    1. Large up to 6.8 cm mass centered at the right cul-de-sac  is most  intimately associated with the vagina, favored to be either endometrial, vaginal  or cervical in origin. It does abut and may involve the right rectum but this is  favored secondary involvement. Ovaries are not visualized separately. Correlate  for history of oophorectomy since ovarian malignancy also possible. 2.  There is an associated site of favored endometriotic implant at the cranial  aspect of the mass raising the possibility of malignant transformation. 3.  No upstream bowel obstruction.   PETCT 2/14/2019   --  PET FINDINGS  --    No abnormal hypermetabolic activity in the neck.      No abnormal hypermetabolic activity in the chest.      Low-attenuation possible lesion in the medial dome right lobe liver in region  of heterogeneous hypermetabolic activity, with SUV Max reaching 11.7 on image  98. Underlying lesion difficult to visualize due to extensive motion from  respirations. Cannot well separate from the IVC or diaphragm. No definite  corresponding lung lesion however.     There is low level metabolic activity associated with stranding and presumed  scarring in the subcutaneous fat of the lower midline abdominal and pelvic wall. This may simply be inflammatory. .      There is a lobulated soft tissue conglomerate in the midline to right lateral  cul-de-sac and perirectal pelvis. The periphery of this is diffusely  hypermetabolic, with SUV Max reaching 18.3 on image 200. Portions centrally are  photopenic and presumed necrotic. This measures up to 7.5 x 6.1 cm axial on  image 196. Anterior margin of this hypermetabolic lesion is in direct contact  with the high intensity decompressed urinary bladder, involvement cannot be  assessed. Left lateral margin of the mass is in contact with the surface of the  lateral wall of the sigmoid colon. Direct involvement cannot be assessed.  -Additional focal hypermetabolic density 2.7 cm on image 189 inseparable from  the caudal wall of the sigmoid. -Tapering hypermetabolic activity extends to approximately image 210, not to the  level of the perineum.     There are no other areas of abnormal metabolic activity appreciated in the  abdomen or pelvis which cannot be attributed to renal collecting system, ureter,  bladder or bowel wall activity>]. No definite hypermetabolic bone lesions appreciated, although bone scan can be  more sensitive in this respect. --  CT FINDINGS  --     These limited CT images do not replace diagnostic quality contrast enhanced CT  scan for evaluation of solid organs, bowel, retroperitoneum and vasculature. CT NECK:    Severely limited by lack of intravenous contrast.  Paranasal sinuses free of  disease. No appreciably enlarged lymph nodes. Extensive cervical endplate  osteophytes anterior and left lateral..      CT CHEST:    Limited evaluation of the hilum and vasculature without intravenous contrast.  No pleural effusion. No appreciably enlarged lymph nodes. Patchy parenchymal  opacities medial basal left lower lobe on image 89 demonstrates low level  metabolic activity, SUV Max 3.2, nonspecific, favored as inflammatory based on  imaging appearance subjectively. [place] CT ABDOMEN:    Limited noncontrast images. Normal-size liver and spleen no adrenal mass. No  hydronephrosis. No ascites. CT PELVIS:    Limited noncontrast images. No small bowel or colon distention. Scattered  colonic diverticulosis. Postsurgical change anterior abdominal wall. Severe  degenerative facet disease at the lower lumbar levels.       Lobulated right pelvic/cul-de-sac soft tissue mass as above. Mild degree of  stranding in the pelvic mesenteric fat. No ascites.        IMPRESSION   IMPRESSION:      1. Peripherally hypermetabolic soft tissue mass right dependent pelvis to the  cul-de-sac region as above consistent with malignancy/metastatic disease with  some central necrosis  -Possible separate lesion versus serosal involvement of the sigmoid colon  adjacent. -Mass in contact with posterior bladder, sigmoid colon locally and posterior  peritoneal surface.     2. Hypermetabolic lesion along central dome of the right lobe liver difficult to  separate from IVC or diaphragm due to motion artifact. Recommend dedicated  contrast enhanced CT for localization and better clarification.  -Findings are concerning for malignancy or hepatic metastatic disease until  proven otherwise.      3. Low level activity associated with patchy parenchymal opacity at the medial  left lower lobe,, favored as inflammatory as above.     4.  No other definite hypermetabolic abnormalities appreciated elsewhere. 12/14/2018 CT PELVIS W/CONTRAST  FINDINGS:    LYMPH NODES: No enlarged lymph nodes. PELVIC GASTROINTESTINAL TRACT: No bowel dilation or wall thickening. PELVIC ORGANS:     The uterus is absent. Along the right upper margin of the vaginal cuff within the right deep pelvis there is a large irregularly-shaped peripherally enhancing, septated appearing mass with regions of central low attenuation which could be low density-fluid type contents or regions of necrosis. Lesion measures 76 x 58 x 72 mm and is compatible with a pelvic neoplasm. VASCULATURE: Unremarkable. BONES: Lower lumbar hypertrophic osteophytes. Lower lumbar facet hypertrophy with vacuum phenomenon asymmetric leftward. Degenerative vacuum phenomenon within the SI joints noted as well. No acute or aggressive osseous abnormalities identified. OTHER: Small fat-containing ventral umbilical hernia. Minimal nonspecific edema within the subcutaneous fat of the lumbar region, not uncommon. IMPRESSION:   1.  Along the right upper margin of the vaginal cuff within the right deep pelvis there is a large irregularly-shaped peripherally enhancing, septated appearing mass with regions of central low attenuation which could be low density-fluid type contents or regions of necrosis. Lesion measures 76 x 58 x 72 mm and is compatible with a pelvic neoplasm. Could be a endometrioma given prior history of endometriosis, malignancy not excluded and gynecologic oncology follow-up is suggested. 2.  Small fat-containing ventral umbilical hernia.      12/04/2018 TVUS  Uterus: surgically absent  Left ovary: surgically absent  Right ovary: surgically absent  Other findings: midline-at vaginal cuff: 6.8 cm X 5.2 cm x 4.6 cm, volume 85 ml    Impression: Complex pelvic mass       ROS:    As above      Patient Active Problem List    Diagnosis Date Noted    Malignant neoplasm of peritoneum ([address] 02/14/2019    Pelvic mass in female 01/17/2019    Bipolar 1 disorder ([facility] 02/09/2018    Irritable bowel syndrome with both constipation and diarrhea 02/09/2018    Advance care planning 01/31/2017    Dental caries 05/26/2016    Chronic periodontal disease 05/26/2016    SUAZO (dyspnea on exertion) 02/10/2016    Prediabetes 09/24/2015    Morbid obesity ([facility] 08/31/2015    Arthritis, degenerative 08/31/2015    Gastroesophageal reflux disease without esophagitis 08/31/2015    ANAMIKA on CPAP 08/31/2015    Essential hypertension 08/28/2015    PTSD (post-traumatic stress disorder) 03/24/2014    S/P TKR (total knee replacement) 11/14/2012    Depression 05/08/2012    Menopause 05/08/2012    Knee pain, right 05/08/2012    GERD (gastroesophageal reflux disease) 05/08/2012    OA (osteoarthritis) 06/18/2010    Obesity 06/18/2010    Mixed hyperlipidemia 06/18/2010     Past Medical History:   Diagnosis Date    AR (allergic rhinitis)     seasonal    Cancer ([facility]     pt states between vgina and rectal area    Cardiac echocardiogram 04/11/2016    Tech difficult. EF 55-60%. No RWMA. Mild conc LVH. Gr 1 DDfx. RVSP normal.      Cardiac nuclear imaging test 05/05/2016    Low risk. Very patchy radiotracer uptake. Mild anterior artifact, low suspicion for ischemia. EF 68%. No RWMA. Normal EKG on pharm stress test.    Cardiovascular LE arterial duplex 10/07/2013    No significant arterial disease at rest bilaterally. R JUNE 1.21.  L JUNE 1.16. No significant sm vessel disease.     Depression     Dr. Cash Pollard, suicide attempt 2/12    Diverticulosis     Endometriosis     s/p hysterectomy 2006    GERD (gastroesophageal reflux disease)     Glaucoma     Dr. Vanessa Candelario HTN (hypertension)     Hx of colonoscopy 04/05/2017    mild diverticulosis, g1 internal hemorrhoids, normal colon , repeat 10 year 2027    Hx of suicide attempt     Hyperlipidemia LDL goal < 130     IBS (irritable bowel syndrome)     Ill-defined condition     environmental allergies    Insomnia     Malignant neoplasm [facility] per[facility]um ([facility] 2019    Nausea & vomiting     OA (osteoarthritis)     both knees, lower back    Preeclampsia     PTSD (post-traumatic stress disorder)     Seizures (HCC)     1 x with childbirth, had toxemia    Sleep apnea     does not use cpap machine    Spinal stenosis     Dr. Adrienne Lopez Vertigo       Past Surgical History:   Procedure Laterality Date    COLONOSCOPY N/A 2017    COLONOSCOPY performed by Shadi Zamoarno MD at [address] N/A 2019    SIGMOIDOSCOPY FLEXIBLE performed by Emma Chao MD at [facility] ENDOSCOPY    HX  SECTION      HX COLONOSCOPY  2017    DR. MCRAE 2017     HX HYSTERECTOMY      HX KNEE ARTHROSCOPY Left     left knee    HX KNEE REPLACEMENT Bilateral     (R)  (L)     HX LAPAROTOMY N/A 2019    and rectovaginal bx    HX OTHER SURGICAL  2016    all teeth removed    HX SALPINGO-OOPHORECTOMY  2008    HX TUBAL LIGATION      IR INSERT TUNL CVC W PORT OVER 5 YEARS  2019    MULTIPLE DELIVERY       1990    NY FEMUR/KNEE SURG UNLISTED Left 2009    External fixator    TOTAL ABDOM HYSTERECTOMY        OB History        2    Para   2    Term   2       0    AB   0    Living   0       SAB   0    TAB   0    Ectopic   0    Molar   0    Multiple   0    Live Births   0              Social History     Tobacco Use    Smoking status: Never Smoker    Smokeless tobacco: Never Used   Substance Use Topics    Alcohol use: No      Family History   Problem Relation Age of Onset    Heart Disease Mother         CHF    Diabetes Mother     Hypertension Mother     Kidney Disease Mother     Breast Cancer Mother     Stroke Mother     Diabetes Father     Hypertension Father     Heart Attack Father         MI    Cancer Maternal Grandmother         stomach    Ovarian Cancer Maternal Aunt     Cancer Maternal Uncle       Current Outpatient Medications   Medication Sig    pantoprazole (PROTONIX) 40 mg tablet take 1 tablet by mouth once daily    promethazine (PHENERGAN) 25 mg tablet take 1 tablet by mouth every 6 hours if needed for nausea    meclizine (ANTIVERT) 25 mg tablet take 1 tablet by mouth three times a day if needed for dizziness    Facial Mask misc Use daily    Disposable Gloves misc Use daily    amLODIPine (NORVASC) 10 mg tablet take 1 tablet by mouth once daily    metoprolol succinate (TOPROL-XL) 100 mg tablet Take 1 Tab by mouth daily.  magnesium hydroxide ([employer] MILK OF MAGNESIA) 400 mg/5 mL suspension Take 30 mL by mouth daily as needed for Constipation.  Biotin 2,500 mcg cap Take  by mouth.  multivitamin (ONE A DAY) tablet Take 1 Tab by mouth daily.  cyanocobalamin (VITAMIN B-12) 100 mcg tablet Take 100 mcg by mouth daily.  PARoxetine (PAXIL) 10 mg tablet take 1 tablet by mouth once daily    simvastatin (ZOCOR) 20 mg tablet take 1 tablet by mouth at bedtime    ibuprofen (MOTRIN) 600 mg tablet take 1 tablet by mouth three times a day ONLY AS NEEDED    lidocaine-prilocaine (EMLA) topical cream apply to affected area once daily if needed for pain    Mv,Ca,Min-FA-Herbal No.157 (ESTROVEN MAXIMUM STRENGTH) 400 mcg tab Take  by mouth daily. Indications: hot flash    dimethicone (SOOTHE AND COOL INZO BARRIER) 5 % topical cream Apply  to affected area two (2) times a day.  polyethylene glycol (MIRALAX) 17 gram packet Take 1 Packet by mouth daily.  cycloSPORINE (RESTASIS) 0.05 % dpet Apply 1 Drop to eye as needed.  dexamethasone (DECADRON) 4 mg tablet Take 40 mg by mouth as needed. Take 10 tabs the night before Chemo #1 and Chemo #2.  melatonin 3 mg tablet Take 3 mg by mouth nightly.  plecanatide (TRULANCE) 3 mg tab Take  by mouth.  OXcarbazepine (TRILEPTAL) 300 mg tablet Take 300 mg by mouth two (2) times a day.     ziprasidone (GEODON) 40 mg capsule Take 40 mg by mouth nightly.  montelukast (SINGULAIR) 10 mg tablet Take 10 mg by mouth nightly.  latanoprost (XALATAN) 0.005 % ophthalmic solution Administer 1 Drop to both eyes nightly. No current facility-administered medications for this visit. Allergies   Allergen Reactions    Other Medication Hives     seafood    Shellfish Derived Hives          OBJECTIVE:    Physical Exam  VITAL SIGNS: Visit Vitals  /83 (BP 1 Location: Right arm, BP Patient Position: Sitting)   Pulse 69   Temp 98.1 °F (36.7 °C) (Oral)   Resp 14   Ht 5' 3\" (1.6 m)   Wt 124 kg (273 lb 6.4 oz)   LMP 01/31/2008   SpO2 93%   BMI 48.43 kg/m²      GENERAL EMILY: in no apparent distress and well developed and well nourished   MUSCULOSKEL: no joint tenderness, deformity or swelling   INTEGUMENT:  warm and dry, no rashes or lesions   ABDOMEN . soft,obese, NT, ND, No masses appreciated, INC: C/D/I    EXTREMITIES: extremities normal, atraumatic, no cyanosis or edema   PELVIC: deferred   RECTAL: deferred   BRY SURVEY: Cervical, supraclavicular, axillary and inguinal nodes normal.   NEURO: Grossly normal               IMPRESSION/PLAN:  1.stage IV Primary peritoneal cancer with likely recurrence   -previoulsy reviewed her pathology    -s/p carbo (auc=6), taxol (175 mg/m2) IV every 3 wks s/p 6 cycles completed 6/6/2019   -s/p cytoreductive surgery with [facility] with dr. Surendra Rolon   -s/p pelvic radiation   -biopsy c/w recurrence   -PETCT reviewed with progression of pelvic mass. Radiology to compare to prior PET and let us know   -urinary urgency/frequency- likely due to tumor   -nausea- script for phenergan sent   Pain-script for percocet    -given platinum sensitive disease will plan to treat with carbo auc=5, Doxil 30 mg/m2 IV every 4 wks until CCR, toxicity or progression. We briefly discussed if no metastatic disease develops and she has some response to chemo would have her meet with dr. Surendra Rolon to discuss possible exenteration.  We also discussed use of parp maintenance as well    The total time spent was 40 minutes regarding this patients diagnosis of primary peritoneal cancer and >50% of this time was spent counseling and coordinating care    Wetzel Koyanagi, MD  Gynecologic Oncology  5/4/202010:05 AM

## 2020-05-06 ENCOUNTER — TELEPHONE (OUTPATIENT)
Dept: FAMILY MEDICINE CLINIC | Age: 50
End: 2020-05-06

## 2020-05-06 NOTE — TELEPHONE ENCOUNTER
Patient identified with 2 identifiers (name and ). Spoke with patient and she states she is still feeling dizzy at times. Has not been taking her BP. Was taken at Dr. Sullivan [facility] office 2020 174/83  Patient states she is taking Norvasc 10 mg daily. She is off the metoprolol. Patient states the meclizine is giving some relief. Patient states dizziness does not happen everyday. pateint has been scheduled a VV for friday at 2:30. Please advise.

## 2020-05-06 NOTE — TELEPHONE ENCOUNTER
Pt states that still the dizziness from the BP med and that her cancer has returned and she will have her heart test on Friday and chemo on the 5/11/2020 and then she will know when chemo will start again. She would like [place] to call when she has a chance.  Please advise

## 2020-05-07 RX ORDER — PROCHLORPERAZINE EDISYLATE 5 MG/ML
10 INJECTION INTRAMUSCULAR; INTRAVENOUS
Status: CANCELLED
Start: 2020-05-13

## 2020-05-07 RX ORDER — DIPHENHYDRAMINE HYDROCHLORIDE 50 MG/ML
50 INJECTION, SOLUTION INTRAMUSCULAR; INTRAVENOUS AS NEEDED
Status: CANCELLED
Start: 2020-05-13

## 2020-05-07 RX ORDER — HEPARIN 100 UNIT/ML
300-500 SYRINGE INTRAVENOUS AS NEEDED
Status: CANCELLED
Start: 2020-05-13

## 2020-05-07 RX ORDER — EPINEPHRINE 1 MG/ML
0.3 INJECTION, SOLUTION, CONCENTRATE INTRAVENOUS AS NEEDED
Status: CANCELLED | OUTPATIENT
Start: 2020-05-13

## 2020-05-07 RX ORDER — ACETAMINOPHEN 325 MG/1
650 TABLET ORAL AS NEEDED
Status: CANCELLED
Start: 2020-05-13

## 2020-05-07 RX ORDER — POTASSIUM CHLORIDE 7.45 MG/ML
10 INJECTION INTRAVENOUS
Status: CANCELLED
Start: 2020-05-13

## 2020-05-07 RX ORDER — DIPHENHYDRAMINE HYDROCHLORIDE 50 MG/ML
25 INJECTION, SOLUTION INTRAMUSCULAR; INTRAVENOUS AS NEEDED
Status: CANCELLED
Start: 2020-05-13

## 2020-05-07 RX ORDER — HYDROCORTISONE SODIUM SUCCINATE 100 MG/2ML
100 INJECTION, POWDER, FOR SOLUTION INTRAMUSCULAR; INTRAVENOUS AS NEEDED
Status: CANCELLED | OUTPATIENT
Start: 2020-05-13

## 2020-05-07 RX ORDER — LORAZEPAM 2 MG/ML
0.26 INJECTION INTRAMUSCULAR
Status: CANCELLED
Start: 2020-05-13

## 2020-05-07 RX ORDER — ALBUTEROL SULFATE 0.83 MG/ML
2.5 SOLUTION RESPIRATORY (INHALATION) AS NEEDED
Status: CANCELLED
Start: 2020-05-13

## 2020-05-07 RX ORDER — SODIUM CHLORIDE 0.9 % (FLUSH) 0.9 %
10-40 SYRINGE (ML) INJECTION AS NEEDED
Status: CANCELLED
Start: 2020-05-13

## 2020-05-07 RX ORDER — ONDANSETRON 2 MG/ML
8 INJECTION INTRAMUSCULAR; INTRAVENOUS AS NEEDED
Status: CANCELLED | OUTPATIENT
Start: 2020-05-13

## 2020-05-07 RX ORDER — DEXTROSE MONOHYDRATE 50 MG/ML
25 INJECTION, SOLUTION INTRAVENOUS CONTINUOUS
Status: CANCELLED | OUTPATIENT
Start: 2020-05-13

## 2020-05-08 ENCOUNTER — HOSPITAL ENCOUNTER (OUTPATIENT)
Dept: NON INVASIVE DIAGNOSTICS | Age: 50
Discharge: HOME | End: 2020-05-08
Attending: NURSE PRACTITIONER
Payer: MEDICARE

## 2020-05-08 VITALS
BODY MASS INDEX: 48.37 KG/M2 | DIASTOLIC BLOOD PRESSURE: 83 MMHG | WEIGHT: 273 LBS | SYSTOLIC BLOOD PRESSURE: 173 MMHG | HEIGHT: 63 IN

## 2020-05-08 DIAGNOSIS — C48.2 MALIGNANT NEOPLASM OF PERITONEUM (HCC): ICD-10-CM

## 2020-05-08 DIAGNOSIS — Z51.81 ENCOUNTER FOR MONITORING CARDIOTOXIC DRUG THERAPY: ICD-10-CM

## 2020-05-08 DIAGNOSIS — Z79.899 ENCOUNTER FOR MONITORING CARDIOTOXIC DRUG THERAPY: ICD-10-CM

## 2020-05-08 LAB — ECHO AO ROOT DIAM: 3.02 CM

## 2020-05-08 PROCEDURE — 93325 DOPPLER ECHO COLOR FLOW MAPG: CPT

## 2020-05-08 PROCEDURE — 74011250636 HC RX REV CODE- 250/636: Performed by: NURSE PRACTITIONER

## 2020-05-08 RX ADMIN — PERFLUTREN 2 ML: 6.52 INJECTION, SUSPENSION INTRAVENOUS at 09:51

## 2020-05-11 ENCOUNTER — TELEPHONE (OUTPATIENT)
Dept: ONCOLOGY | Age: 50
End: 2020-05-11

## 2020-05-11 ENCOUNTER — CLINICAL SUPPORT (OUTPATIENT)
Dept: ONCOLOGY | Age: 50
End: 2020-05-11

## 2020-05-11 ENCOUNTER — HOSPITAL ENCOUNTER (OUTPATIENT)
Dept: INFUSION THERAPY | Age: 50
Discharge: HOME | End: 2020-05-11
Payer: MEDICARE

## 2020-05-11 VITALS
DIASTOLIC BLOOD PRESSURE: 80 MMHG | BODY MASS INDEX: 43.62 KG/M2 | SYSTOLIC BLOOD PRESSURE: 131 MMHG | OXYGEN SATURATION: 94 % | RESPIRATION RATE: 14 BRPM | HEART RATE: 60 BPM | WEIGHT: 246.2 LBS | HEIGHT: 63 IN | TEMPERATURE: 97.6 F

## 2020-05-11 VITALS
HEART RATE: 60 BPM | HEIGHT: 63 IN | RESPIRATION RATE: 14 BRPM | OXYGEN SATURATION: 94 % | SYSTOLIC BLOOD PRESSURE: 131 MMHG | TEMPERATURE: 97.6 F | WEIGHT: 246.2 LBS | DIASTOLIC BLOOD PRESSURE: 80 MMHG | BODY MASS INDEX: 43.62 KG/M2

## 2020-05-11 DIAGNOSIS — Z01.818 EXAMINATION PRIOR TO CHEMOTHERAPY: Primary | ICD-10-CM

## 2020-05-11 DIAGNOSIS — Z13.79 GENETIC TESTING OF FEMALE: ICD-10-CM

## 2020-05-11 DIAGNOSIS — C48.2 PRIMARY PERITONEAL CARCINOMATOSIS (HCC): ICD-10-CM

## 2020-05-11 DIAGNOSIS — R11.2 CINV (CHEMOTHERAPY-INDUCED NAUSEA AND VOMITING): ICD-10-CM

## 2020-05-11 DIAGNOSIS — T45.1X5A CINV (CHEMOTHERAPY-INDUCED NAUSEA AND VOMITING): ICD-10-CM

## 2020-05-11 LAB
ALBUMIN SERPL-MCNC: 3.7 G/DL (ref 3.4–5)
ALBUMIN/GLOB SERPL: 0.9 {RATIO} (ref 0.8–1.7)
ALP SERPL-CCNC: 88 U/L (ref 45–117)
ALT SERPL-CCNC: 20 U/L (ref 13–56)
ANION GAP SERPL CALC-SCNC: 2 MMOL/L (ref 3–18)
APPEARANCE UR: CLEAR
AST SERPL-CCNC: 18 U/L (ref 10–38)
BACTERIA URNS QL MICRO: NEGATIVE /HPF
BASOPHILS # BLD: 0 K/UL (ref 0–0.1)
BASOPHILS NFR BLD: 0 % (ref 0–2)
BILIRUB SERPL-MCNC: 0.3 MG/DL (ref 0.2–1)
BILIRUB UR QL: NEGATIVE
BUN SERPL-MCNC: 38 MG/DL (ref 7–18)
BUN/CREAT SERPL: 15 (ref 12–20)
CALCIUM SERPL-MCNC: 8.8 MG/DL (ref 8.5–10.1)
CANCER AG125 SERPL-ACNC: 7 U/ML (ref 1.5–35)
CHLORIDE SERPL-SCNC: 111 MMOL/L (ref 100–111)
CO2 SERPL-SCNC: 27 MMOL/L (ref 21–32)
COLOR UR: YELLOW
CREAT SERPL-MCNC: 2.49 MG/DL (ref 0.6–1.3)
DIFFERENTIAL METHOD BLD: ABNORMAL
EOSINOPHIL # BLD: 0.1 K/UL (ref 0–0.4)
EOSINOPHIL NFR BLD: 2 % (ref 0–5)
EPITH CASTS URNS QL MICRO: ABNORMAL /LPF (ref 0–5)
ERYTHROCYTE [DISTWIDTH] IN BLOOD BY AUTOMATED COUNT: 15 % (ref 11.6–14.5)
GLOBULIN SER CALC-MCNC: 4.1 G/DL (ref 2–4)
GLUCOSE SERPL-MCNC: 74 MG/DL (ref 74–99)
GLUCOSE UR STRIP.AUTO-MCNC: NEGATIVE MG/DL
HCT VFR BLD AUTO: 33.3 % (ref 35–45)
HGB BLD-MCNC: 10.5 G/DL (ref 12–16)
HGB UR QL STRIP: ABNORMAL
KETONES UR QL STRIP.AUTO: NEGATIVE MG/DL
LEUKOCYTE ESTERASE UR QL STRIP.AUTO: ABNORMAL
LYMPHOCYTES # BLD: 1.2 K/UL (ref 0.9–3.6)
LYMPHOCYTES NFR BLD: 21 % (ref 21–52)
MAGNESIUM SERPL-MCNC: 2.1 MG/DL (ref 1.6–2.6)
MCH RBC QN AUTO: 28.6 PG (ref 24–34)
MCHC RBC AUTO-ENTMCNC: 31.5 G/DL (ref 31–37)
MCV RBC AUTO: 90.7 FL (ref 74–97)
MONOCYTES # BLD: 0.2 K/UL (ref 0.05–1.2)
MONOCYTES NFR BLD: 4 % (ref 3–10)
NEUTS SEG # BLD: 4.1 K/UL (ref 1.8–8)
NEUTS SEG NFR BLD: 73 % (ref 40–73)
NITRITE UR QL STRIP.AUTO: NEGATIVE
PH UR STRIP: 5.5 [PH] (ref 5–8)
PLATELET # BLD AUTO: 249 K/UL (ref 135–420)
PMV BLD AUTO: 9.1 FL (ref 9.2–11.8)
POTASSIUM SERPL-SCNC: 4.9 MMOL/L (ref 3.5–5.5)
PROT SERPL-MCNC: 7.8 G/DL (ref 6.4–8.2)
PROT UR STRIP-MCNC: 30 MG/DL
RBC # BLD AUTO: 3.67 M/UL (ref 4.2–5.3)
RBC #/AREA URNS HPF: ABNORMAL /HPF (ref 0–5)
SODIUM SERPL-SCNC: 140 MMOL/L (ref 136–145)
SP GR UR REFRACTOMETRY: 1.01 (ref 1–1.03)
UROBILINOGEN UR QL STRIP.AUTO: 0.2 EU/DL (ref 0.2–1)
WBC # BLD AUTO: 5.6 K/UL (ref 4.6–13.2)
WBC URNS QL MICRO: ABNORMAL /HPF (ref 0–4)

## 2020-05-11 PROCEDURE — 80053 COMPREHEN METABOLIC PANEL: CPT

## 2020-05-11 PROCEDURE — 83735 ASSAY OF MAGNESIUM: CPT

## 2020-05-11 PROCEDURE — 81001 URINALYSIS AUTO W/SCOPE: CPT

## 2020-05-11 PROCEDURE — 36415 COLL VENOUS BLD VENIPUNCTURE: CPT

## 2020-05-11 PROCEDURE — 87086 URINE CULTURE/COLONY COUNT: CPT

## 2020-05-11 PROCEDURE — 85025 COMPLETE CBC W/AUTO DIFF WBC: CPT

## 2020-05-11 PROCEDURE — 87077 CULTURE AEROBIC IDENTIFY: CPT

## 2020-05-11 PROCEDURE — 86304 IMMUNOASSAY TUMOR CA 125: CPT

## 2020-05-11 PROCEDURE — 87186 SC STD MICRODIL/AGAR DIL: CPT

## 2020-05-11 RX ORDER — LIDOCAINE AND PRILOCAINE 25; 25 MG/G; MG/G
CREAM TOPICAL AS NEEDED
Qty: 30 G | Refills: 0 | Status: SHIPPED | OUTPATIENT
Start: 2020-05-11 | End: 2020-12-17

## 2020-05-11 RX ORDER — PROMETHAZINE HYDROCHLORIDE 25 MG/1
25 TABLET ORAL
Qty: 30 TAB | Refills: 3 | Status: SHIPPED | OUTPATIENT
Start: 2020-05-11 | End: 2020-07-09 | Stop reason: SDUPTHER

## 2020-05-11 RX ORDER — ONDANSETRON 4 MG/1
4 TABLET, FILM COATED ORAL
Qty: 30 TAB | Refills: 3 | Status: CANCELLED | OUTPATIENT
Start: 2020-05-11

## 2020-05-11 NOTE — PROGRESS NOTES
Fernando Mukherjee, a 47 y.o. female,  is here for   Chief Complaint   Patient presents with    Advice Only     chemo        Visit Vitals  /80 (BP 1 Location: Left arm, BP Patient Position: Sitting)   Pulse 60   Temp 97.6 °F (36.4 °C) (Oral)   Resp 14   Ht 5' 3\" (1.6 m)   Wt 111.7 kg (246 lb 3.2 oz)   SpO2 94%   BMI 43.61 kg/m²         1. Have you been to the ER, urgent care clinic since your last visit? Hospitalized since your last visit? No    2. Have you seen or consulted any other health care providers outside of the [address] [place] [address] since your last visit? Include any pap smears or colon screening. No       Patient here ambulatory  for chemo teaching (Doxil/Carboplatin) Informed Consent signed. Patient given chemo folder with written information and it was also reviewed verbally with all questions answered. Information reviewed regarding lab work prior to chemo. (WBC'c, RBC's, Platelets and their basic functions) Patient aware of premedications both orally and intravenously given prior to chemo. Possible side effects of chemo discussed, which include:    Nausea/vomiting: Scripts called in for Zofran (every 6-8 hrs)and Phenergan(every 4-6 hrs). Patient knows to take the Zofran at the first sign of nausea and if after 2-3 hours they still have nausea to take the Phenergan. They have been made aware the phenergan may cause drowsiness. Medications may be alternated and patient knows to call if vomiting. CASANDRA is also suggested for nausea. Given samples of casandra chews, may also try casandra tea, gum, snaps. Constipation:  May take Senokot-S, miralax, colace, prunes, activia, power pudding, Milk of Magnesia. Patient knows that constipation may occur up to five days after chemo due to the premeds. They are to introduce the suggestions individually. Power pudding recipe given. Pt knows to call if has not had a bowel movement for 2 days. Diarrhea: Educated on the [place].  Genevieve French, Toast, Yogurt. Patient knows they may take [id number] [employer] [place]. They are to start with 1 tablet after each episode. NO MORE than 8 tablets a day, and to call if more than 2 watery stools. Fever: Report all fevers greater than 100.5. Skin: Report all rashes. Use sunscreen, as the chemo will make you more sensitive and you run the risk of getting severe sunburn. Fatigue: Stay well hydrated, pace yourself. Supplement diet with Ensure or Boost or [place] Instant Breakfast. Samples given of Ensure and Ensure clear and coupons. Add fruit or yogurt to make them more of a smoothie to help with taste. Eat small frequent meals that include protein. Scrambled eggs, cheese, peanut butter are good sources. Continue to try to maintain normal activities of daily living. If unable to get out of bed, Call. Dehydration: You will note to have extreme fatigue, increase in nausea. You should be drinking 1000-1500cc a day. Try to drink a cup of something every hour you are awake. May substitute with ice pops, soup. Gatorade, low caffeine drinks. Dry Mouth: Biotene mouthwash or gum. Trident gum. Mouth sores: Use salt and baking soda in a quart of water recipe. Call if has 2 mouth sores, as may need to change to Magic Mouthwash. Taste changes: Lemon drops, cold drinks, hard candies, ice pops. DO NOT use metal silverware or drink from cans if you have a metallic taste. .   Neuropathy: Numbness/tingling/burning sensation, diff buttoning clothes. Advised to take B-6 and B-12 or  B-Complex twice a day to help with this. Hair loss: MAY occur 2-3 weeks after starting treatment. Use baby shampoo for tender scalp. Given information on the [employer]. Tour of [facility] given to pt, along with a tote with information and samples. Medications e-scribed to pharmacy with confirmation. (hermann mujica). Patient given a calendar for (May ) with all appointments scheduled.   Phone numbers given on how to reach the office for any questions or concerns. Genetic Testing Performed    -reviewed risk vs benefits of gentric testing with patient today              -venipuncture proformed to obtain blood sample for testing.               -will call with results        Namrata Ma NP    The total time spent was 60 minutes regarding this patients diagnosis of Peritoneal cancer, examination prior to chemotherapy and >50% of this time was spent counseling and coordinating care.

## 2020-05-11 NOTE — PROGRESS NOTES
Nannette Basurto is a 47 y.o. female presents in office for chemo teaching. Chief Complaint   Patient presents with    Advice Only     chemo       Patient states has a desire to walk but constantly bleeding and short of breath. Patient states having problems with vertigo. Patient stated constant nausea with pain. Patient pain level is a 7 out of 10. Patient stated felt like vomiting last night. Patient stated had no bleeding yesterday 5/10 but bleeding returned last night. Patient stated bleeding is constantly changing. Patient has hot flashes and cold sweats without fever     Do you have any unusual vaginal bleeding, discharge or irritation? Yes     Do you have any changes in your bowel movements? No     Have you been experiencing nausea or vomiting? Yes    Have you been experiencing any continuous or worsening abdominal pain? Yes     Any urinary burning? No     .  Visit Vitals  /80 (BP 1 Location: Left arm, BP Patient Position: Sitting)   Pulse 60   Temp 97.6 °F (36.4 °C) (Oral)   Resp 14   Ht 5' 3\" (1.6 m)   Wt 246 lb 3.2 oz (111.7 kg)   SpO2 94%   BMI 43.61 kg/m²       Health Maintenance Due   Topic Date Due    Lipid Screen  02/28/2019    Shingrix Vaccine Age 50> (1 of 2) 07/22/2019     1. Have you been to the ER, urgent care clinic since your last visit? Hospitalized since your last visit? No     2. Have you seen or consulted any other health care providers outside of the [address] [place] [address] since your last visit? Include any pap smears or colon screening. No     3 most recent PHQ Screens 7/22/2019   Little interest or pleasure in doing things Several days   Feeling down, depressed, irritable, or hopeless Several days   Total Score PHQ 2 2       Abuse Screening Questionnaire 7/22/2019   Do you ever feel afraid of your partner? N   Are you in a relationship with someone who physically or mentally threatens you? N   Is it safe for you to go home?  Y       Fall Risk Assessment, last 12 mths 2/9/2018   Able to walk? Yes   Fall in past 12 months?  No       Learning Assessment 1/4/2019   PRIMARY LEARNER Patient   HIGHEST LEVEL OF EDUCATION - PRIMARY LEARNER  GRADUATED HIGH SCHOOL OR GED   BARRIERS PRIMARY LEARNER NONE   CO-LEARNER CAREGIVER No   PRIMARY LANGUAGE ENGLISH   LEARNER PREFERENCE PRIMARY READING     -   ANSWERED BY patient   RELATIONSHIP SELF

## 2020-05-11 NOTE — TELEPHONE ENCOUNTER
Pt called to inquire when to expect her hair to start coming out. Explained to patient that she can expect thinning to start week 2 and totally out by week 3-4. Patient verbalized understanding and agreed to plan. Patient instructed to contact office for any question or concerns.      Samia Salmon NP

## 2020-05-11 NOTE — TELEPHONE ENCOUNTER
Patient contacted the office requesting to speak with OJHN Sheets stating she had a question following her chemotherapy teaching this morning. She can be reached at [phone].

## 2020-05-12 ENCOUNTER — APPOINTMENT (OUTPATIENT)
Dept: INFUSION THERAPY | Age: 50
End: 2020-05-12
Payer: MEDICARE

## 2020-05-13 ENCOUNTER — HOSPITAL ENCOUNTER (OUTPATIENT)
Dept: INFUSION THERAPY | Age: 50
Discharge: HOME | End: 2020-05-13
Payer: MEDICARE

## 2020-05-13 VITALS
SYSTOLIC BLOOD PRESSURE: 138 MMHG | OXYGEN SATURATION: 95 % | WEIGHT: 272.4 LBS | DIASTOLIC BLOOD PRESSURE: 82 MMHG | BODY MASS INDEX: 48.27 KG/M2 | RESPIRATION RATE: 16 BRPM | TEMPERATURE: 98.6 F | HEART RATE: 66 BPM | HEIGHT: 63 IN

## 2020-05-13 DIAGNOSIS — N30.00 ACUTE CYSTITIS WITHOUT HEMATURIA: Primary | ICD-10-CM

## 2020-05-13 DIAGNOSIS — C48.2 MALIGNANT NEOPLASM OF PERITONEUM (HCC): Primary | ICD-10-CM

## 2020-05-13 LAB
ANION GAP SERPL CALC-SCNC: 8 MMOL/L (ref 3–18)
BUN SERPL-MCNC: 31 MG/DL (ref 7–18)
BUN/CREAT SERPL: 14 (ref 12–20)
CALCIUM SERPL-MCNC: 9.1 MG/DL (ref 8.5–10.1)
CHLORIDE SERPL-SCNC: 105 MMOL/L (ref 100–111)
CO2 SERPL-SCNC: 25 MMOL/L (ref 21–32)
CREAT SERPL-MCNC: 2.15 MG/DL (ref 0.6–1.3)
GLUCOSE SERPL-MCNC: 118 MG/DL (ref 74–99)
POTASSIUM SERPL-SCNC: 4.3 MMOL/L (ref 3.5–5.5)
SODIUM SERPL-SCNC: 138 MMOL/L (ref 136–145)

## 2020-05-13 PROCEDURE — 99212 OFFICE O/P EST SF 10 MIN: CPT

## 2020-05-13 PROCEDURE — 77030012965 HC NDL HUBR BBMI -A

## 2020-05-13 PROCEDURE — 96417 CHEMO IV INFUS EACH ADDL SEQ: CPT

## 2020-05-13 PROCEDURE — 74011250636 HC RX REV CODE- 250/636: Performed by: OBSTETRICS & GYNECOLOGY

## 2020-05-13 PROCEDURE — 80048 BASIC METABOLIC PNL TOTAL CA: CPT

## 2020-05-13 PROCEDURE — 96367 TX/PROPH/DG ADDL SEQ IV INF: CPT

## 2020-05-13 PROCEDURE — 96413 CHEMO IV INFUSION 1 HR: CPT

## 2020-05-13 PROCEDURE — 74011000250 HC RX REV CODE- 250: Performed by: OBSTETRICS & GYNECOLOGY

## 2020-05-13 PROCEDURE — 74011000258 HC RX REV CODE- 258: Performed by: OBSTETRICS & GYNECOLOGY

## 2020-05-13 PROCEDURE — 96375 TX/PRO/DX INJ NEW DRUG ADDON: CPT

## 2020-05-13 RX ORDER — DEXTROSE MONOHYDRATE 50 MG/ML
25 INJECTION, SOLUTION INTRAVENOUS CONTINUOUS
Status: DISPENSED | OUTPATIENT
Start: 2020-05-13 | End: 2020-05-13

## 2020-05-13 RX ORDER — SULFAMETHOXAZOLE AND TRIMETHOPRIM 400; 80 MG/1; MG/1
1 TABLET ORAL 2 TIMES DAILY
Qty: 14 TAB | Refills: 0 | Status: SHIPPED | OUTPATIENT
Start: 2020-05-13 | End: 2020-05-20

## 2020-05-13 RX ORDER — SODIUM CHLORIDE 0.9 % (FLUSH) 0.9 %
10-40 SYRINGE (ML) INJECTION AS NEEDED
Status: DISPENSED | OUTPATIENT
Start: 2020-05-13 | End: 2020-05-13

## 2020-05-13 RX ORDER — HEPARIN 100 UNIT/ML
300-500 SYRINGE INTRAVENOUS AS NEEDED
Status: DISPENSED | OUTPATIENT
Start: 2020-05-13 | End: 2020-05-13

## 2020-05-13 RX ADMIN — Medication 20 ML: at 09:33

## 2020-05-13 RX ADMIN — DEXTROSE MONOHYDRATE 25 ML/HR: 50 INJECTION, SOLUTION INTRAVENOUS at 10:50

## 2020-05-13 RX ADMIN — SODIUM CHLORIDE 150 MG: 900 INJECTION, SOLUTION INTRAVENOUS at 11:35

## 2020-05-13 RX ADMIN — DOXORUBICIN HYDROCHLORIDE 66 MG: 2 INJECTION, SUSPENSION, LIPOSOMAL INTRAVENOUS at 13:00

## 2020-05-13 RX ADMIN — FAMOTIDINE 20 MG: 10 INJECTION, SOLUTION INTRAVENOUS at 11:14

## 2020-05-13 RX ADMIN — HEPARIN 500 UNITS: 100 SYRINGE at 15:06

## 2020-05-13 RX ADMIN — Medication 20 ML: at 15:05

## 2020-05-13 RX ADMIN — ONDANSETRON: 2 INJECTION INTRAMUSCULAR; INTRAVENOUS at 11:10

## 2020-05-13 RX ADMIN — CARBOPLATIN 330 MG: 10 INJECTION, SOLUTION INTRAVENOUS at 14:25

## 2020-05-13 NOTE — PROGRESS NOTES
[facility] Progress Note    Date: May 13, 2020    Name: Lawyer Arzate              MRN: [id number]              : 1970    Chemotherapy Cycle: 1 Day 1 Doxil/Lipodox and Carboplatin       Pt to [facility], ambulatory, at 0910. Ms. Van Hodgson was assessed and education was provided. Care notes given for Doxorubin, Emend, and Carboplatin( had in the past). Pt voices understanding. Ms. Jefferson's vitals were reviewed. Visit Vitals  /82 (BP 1 Location: Right arm, BP Patient Position: Sitting)   Pulse 66   Temp 98.6 °F (37 °C)   Resp 16   Ht 5' 3\" (1.6 m)   Wt 123.6 kg (272 lb 6.4 oz)   SpO2 95%   Breastfeeding No   BMI 48.25 kg/m²       Right chest mediport accessed with 20 g 1 inch upton needle. Port flushed easily and had brisk blood return. Blood drawn off and sent for a CMP  per verbal orders after 10 ml waste from Benito Roth NP. Repeat BMP due to high creatinine. Per Benito Roth NP we are still initiating chemo, it will be just  a dose adjustment. D5 initiated @ 25. Magy Huerta here to speak to patient about lab results and the need to see a urologist.  Pt voices understanding and will f/u tomorrow with that appointment. Lab results were obtained and reviewed. Recent Results (from the past 12 hour(s))   METABOLIC PANEL, BASIC    Collection Time: 20  9:30 AM   Result Value Ref Range    Sodium 138 136 - 145 mmol/L    Potassium 4.3 3.5 - 5.5 mmol/L    Chloride 105 100 - 111 mmol/L    CO2 25 21 - 32 mmol/L    Anion gap 8 3.0 - 18 mmol/L    Glucose 118 (H) 74 - 99 mg/dL    BUN 31 (H) 7.0 - 18 MG/DL    Creatinine 2.15 (H) 0.6 - 1.3 MG/DL    BUN/Creatinine ratio 14 12 - 20      GFR est AA 29 (L) >60 ml/min/1.73m2    GFR est non-AA 24 (L) >60 ml/min/1.73m2    Calcium 9.1 8.5 - 10.1 MG/DL   Results for SHUBHAM JEFFERSON    Ref.  Range 2020 11:30   WBC Latest Ref Range: 4.6 - 13.2 K/uL 5.6   RBC Latest Ref Range: 4.20 - 5.30 M/uL 3.67 (L)   HGB Latest Ref Range: 12.0 - 16.0 g/dL 10.5 (L)   HCT Latest Ref Range: 35.0 - 45.0 % 33.3 (L)   MCV Latest Ref Range: 74.0 - 97.0 FL 90.7   MCH Latest Ref Range: 24.0 - 34.0 PG 28.6   MCHC Latest Ref Range: 31.0 - 37.0 g/dL 31.5   RDW Latest Ref Range: 11.6 - 14.5 % 15.0 (H)   PLATELET Latest Ref Range: 135 - 420 K/uL 249   MPV Latest Ref Range: 9.2 - 11.8 FL 9.1 (L)   NEUTROPHILS Latest Ref Range: 40 - 73 % 73   LYMPHOCYTES Latest Ref Range: 21 - 52 % 21   MONOCYTES Latest Ref Range: 3 - 10 % 4   EOSINOPHILS Latest Ref Range: 0 - 5 % 2   BASOPHILS Latest Ref Range: 0 - 2 % 0   DF Latest Units:   AUTOMATED   ABS. NEUTROPHILS Latest Ref Range: 1.8 - 8.0 K/UL 4.1   ABS. LYMPHOCYTES Latest Ref Range: 0.9 - 3.6 K/UL 1.2   ABS. MONOCYTES Latest Ref Range: 0.05 - 1.2 K/UL 0.2   ABS. EOSINOPHILS Latest Ref Range: 0.0 - 0.4 K/UL 0.1   ABS. BASOPHILS Latest Ref Range: 0.0 - 0.1 K/UL 0.0   Sodium Latest Ref Range: 136 - 145 mmol/L 140   Potassium Latest Ref Range: 3.5 - 5.5 mmol/L 4.9   Chloride Latest Ref Range: 100 - 111 mmol/L 111   CO2 Latest Ref Range: 21 - 32 mmol/L 27   Anion gap Latest Ref Range: 3.0 - 18 mmol/L 2 (L)   Glucose Latest Ref Range: 74 - 99 mg/dL 74   BUN Latest Ref Range: 7.0 - 18 MG/DL 38 (H)   Creatinine Latest Ref Range: 0.6 - 1.3 MG/DL 2.49 (H)   BUN/Creatinine ratio Latest Ref Range: 12 - 20   15   Calcium Latest Ref Range: 8.5 - 10.1 MG/DL 8.8   Magnesium Latest Ref Range: 1.6 - 2.6 mg/dL 2.1   GFR est non-AA Latest Ref Range: >60 ml/min/1.73m2 20 (L)   GFR est AA Latest Ref Range: >60 ml/min/1.73m2 25 (L)   Bilirubin, total Latest Ref Range: 0.2 - 1.0 MG/DL 0.3   Protein, total Latest Ref Range: 6.4 - 8.2 g/dL 7.8   Albumin Latest Ref Range: 3.4 - 5.0 g/dL 3.7   Globulin Latest Ref Range: 2.0 - 4.0 g/dL 4.1 (H)   A-G Ratio Latest Ref Range: 0.8 - 1.7   0.9   ALT (SGPT) Latest Ref Range: 13 - 56 U/L 20   AST Latest Ref Range: 10 - 38 U/L 18   Alk.  phosphatase Latest Ref Range: 45 - 117 U/L 88   CA-125 Latest Ref Range: 1.5 - 35.0 U/mL 7   CANCER ANTIGEN 125 Unknown Rpt   Results for SHUBHAM JEFFERSON    Ref. Range 5/11/2020 11:40   Color Latest Units:   YELLOW   Appearance Latest Units:   CLEAR   Specific gravity Latest Ref Range: 1.005 - 1.030   1.012   pH (UA) Latest Ref Range: 5.0 - 8.0   5.5   Protein Latest Ref Range: NEG mg/dL 30 (A)   Glucose Latest Ref Range: NEG mg/dL Negative   Ketone Latest Ref Range: NEG mg/dL Negative   Blood Latest Ref Range: NEG   TRACE (A)   Bilirubin Latest Ref Range: NEG   Negative   Urobilinogen Latest Ref Range: 0.2 - 1.0 EU/dL 0.2   Nitrites Latest Ref Range: NEG   Negative   Leukocyte Esterase Latest Ref Range: NEG   TRACE (A)   Epithelial cells Latest Ref Range: 0 - 5 /lpf FEW   WBC Latest Ref Range: 0 - 4 /hpf 3 to 6   RBC Latest Ref Range: 0 - 5 /hpf 10 to 15   Bacteria Latest Ref Range: NEG /hpf Negative       Lab results within ordered parameters to give chemo today. ANC = 4.1, PLT = 249. Pharmacy contacted Corina Mendoza NP with BMP results which decreased Carboplatin dose due to creatinine high,  Chemo dosages verified with today's BSA and found to be within 10% of ordered dosages. Pre-medications (Emend, Decadron, Zofran, Pepcid) were administered as ordered and chemotherapy was initiated after blood return from port re-verified. Reviewed expected side effects of premeds with patient. Pt given care note on new medication: Emend. Doxil/Lipodox  66 mg infused over 70 mins as ordered [facility]  276 ml/hr for 15 mins. Pt tolerated well and then increased to 303 ml/hr. VS stable at end of infusion and pt denied complaints. Line flushed with D5 and blood return from port re-verified. Carboplatin 330 mg infused over 30 mins as ordered. VS stable at end of infusion and pt denied complaints. Line flushed with D5.   Patient Vitals for the past 8 hrs:   Temp Pulse Resp BP SpO2   05/13/20 1502 98.6 °F (37 °C) 66 16 138/82 95 %   05/13/20 1425 97.8 °F (36.6 °C) 68 16 128/78 97 %   05/13/20 1300 97.4 °F (36.3 °C) 68 16 120/75 98 %   05/13/20 0913 97.9 °F (36.6 °C) 73 16 167/90 95 %          Ms. Cannon tolerated infusion, has chronic abdominal pain is seeing Urologist tomorrow for UTI and mass. Bert Vega NP advised the pt earlier and set up the appt. Mediport flushed with NS 20 ml and Heparin 500 units then de-accessed. No irritation or bleeding noted. Bandaid applied. Patient armband removed and shredded. Ms. Dale Cuenca was discharged from [place] [address] in stable condition at 1515. She is to return on 5/20/120 at 1000 for her next Hydration appointment.     Rodrigue Chung RN  May 13, 2020

## 2020-05-14 LAB
BACTERIA SPEC CULT: ABNORMAL
CC UR VC: ABNORMAL
SERVICE CMNT-IMP: ABNORMAL

## 2020-05-18 RX ORDER — HEPARIN 100 UNIT/ML
300-500 SYRINGE INTRAVENOUS AS NEEDED
Status: CANCELLED
Start: 2020-06-09

## 2020-05-18 RX ORDER — LORAZEPAM 2 MG/ML
0.26 INJECTION INTRAMUSCULAR
Status: CANCELLED
Start: 2020-06-09

## 2020-05-18 RX ORDER — ONDANSETRON 2 MG/ML
8 INJECTION INTRAMUSCULAR; INTRAVENOUS AS NEEDED
Status: CANCELLED | OUTPATIENT
Start: 2020-06-09

## 2020-05-18 RX ORDER — ACETAMINOPHEN 325 MG/1
650 TABLET ORAL AS NEEDED
Status: CANCELLED
Start: 2020-06-09

## 2020-05-18 RX ORDER — ALBUTEROL SULFATE 0.83 MG/ML
2.5 SOLUTION RESPIRATORY (INHALATION) AS NEEDED
Status: CANCELLED
Start: 2020-06-09

## 2020-05-18 RX ORDER — EPINEPHRINE 1 MG/ML
0.3 INJECTION, SOLUTION, CONCENTRATE INTRAVENOUS AS NEEDED
Status: CANCELLED | OUTPATIENT
Start: 2020-06-09

## 2020-05-18 RX ORDER — DIPHENHYDRAMINE HYDROCHLORIDE 50 MG/ML
25 INJECTION, SOLUTION INTRAMUSCULAR; INTRAVENOUS AS NEEDED
Status: CANCELLED
Start: 2020-06-09

## 2020-05-18 RX ORDER — DEXTROSE MONOHYDRATE 50 MG/ML
25 INJECTION, SOLUTION INTRAVENOUS CONTINUOUS
Status: CANCELLED | OUTPATIENT
Start: 2020-06-09

## 2020-05-18 RX ORDER — POTASSIUM CHLORIDE 7.45 MG/ML
10 INJECTION INTRAVENOUS
Status: CANCELLED
Start: 2020-06-09

## 2020-05-18 RX ORDER — HYDROCORTISONE SODIUM SUCCINATE 100 MG/2ML
100 INJECTION, POWDER, FOR SOLUTION INTRAMUSCULAR; INTRAVENOUS AS NEEDED
Status: CANCELLED | OUTPATIENT
Start: 2020-06-09

## 2020-05-18 RX ORDER — PROCHLORPERAZINE EDISYLATE 5 MG/ML
10 INJECTION INTRAMUSCULAR; INTRAVENOUS
Status: CANCELLED
Start: 2020-06-09

## 2020-05-18 RX ORDER — DIPHENHYDRAMINE HYDROCHLORIDE 50 MG/ML
50 INJECTION, SOLUTION INTRAMUSCULAR; INTRAVENOUS AS NEEDED
Status: CANCELLED
Start: 2020-06-09

## 2020-05-18 RX ORDER — SODIUM CHLORIDE 0.9 % (FLUSH) 0.9 %
10-40 SYRINGE (ML) INJECTION AS NEEDED
Status: CANCELLED
Start: 2020-06-09

## 2020-05-19 ENCOUNTER — VIRTUAL VISIT (OUTPATIENT)
Dept: FAMILY MEDICINE CLINIC | Age: 50
End: 2020-05-19

## 2020-05-20 ENCOUNTER — TELEPHONE (OUTPATIENT)
Dept: ONCOLOGY | Age: 50
End: 2020-05-20

## 2020-05-20 ENCOUNTER — APPOINTMENT (OUTPATIENT)
Dept: INFUSION THERAPY | Age: 50
End: 2020-05-20
Payer: MEDICARE

## 2020-05-20 NOTE — TELEPHONE ENCOUNTER
Informed patient of negative [facility] cancer panel. Patient verbalized understanding and agreed to plan. Patient instructed to contact office for any question or concerns.      Manual JOHN Valenzuela

## 2020-05-21 ENCOUNTER — TELEPHONE (OUTPATIENT)
Dept: FAMILY MEDICINE CLINIC | Age: 50
End: 2020-05-21

## 2020-05-21 NOTE — TELEPHONE ENCOUNTER
This was sent to us through the [facility]. \"Pt would like  nurse to call her about medications and vertigo. \" please advise.

## 2020-05-26 ENCOUNTER — APPOINTMENT (OUTPATIENT)
Dept: INFUSION THERAPY | Age: 50
End: 2020-05-26
Payer: MEDICARE

## 2020-05-27 ENCOUNTER — HOSPITAL ENCOUNTER (OUTPATIENT)
Dept: INFUSION THERAPY | Age: 50
Discharge: HOME | End: 2020-05-27
Payer: MEDICARE

## 2020-05-27 VITALS
DIASTOLIC BLOOD PRESSURE: 73 MMHG | RESPIRATION RATE: 16 BRPM | SYSTOLIC BLOOD PRESSURE: 114 MMHG | OXYGEN SATURATION: 100 % | HEART RATE: 62 BPM | TEMPERATURE: 98.2 F

## 2020-05-27 PROCEDURE — 74011250636 HC RX REV CODE- 250/636: Performed by: OBSTETRICS & GYNECOLOGY

## 2020-05-27 PROCEDURE — 96366 THER/PROPH/DIAG IV INF ADDON: CPT

## 2020-05-27 PROCEDURE — 74011250636 HC RX REV CODE- 250/636: Performed by: NURSE PRACTITIONER

## 2020-05-27 PROCEDURE — 96365 THER/PROPH/DIAG IV INF INIT: CPT

## 2020-05-27 RX ORDER — SODIUM CHLORIDE 9 MG/ML
150 INJECTION, SOLUTION INTRAVENOUS CONTINUOUS
Status: DISCONTINUED | OUTPATIENT
Start: 2020-05-27 | End: 2020-05-28 | Stop reason: HOSPADM

## 2020-05-27 RX ORDER — HEPARIN 100 UNIT/ML
500 SYRINGE INTRAVENOUS ONCE
Status: DISCONTINUED | OUTPATIENT
Start: 2020-05-27 | End: 2020-05-27

## 2020-05-27 RX ORDER — SODIUM CHLORIDE 0.9 % (FLUSH) 0.9 %
10-40 SYRINGE (ML) INJECTION EVERY 8 HOURS
Status: DISCONTINUED | OUTPATIENT
Start: 2020-05-27 | End: 2020-05-31 | Stop reason: HOSPADM

## 2020-05-27 RX ADMIN — Medication 10 ML: at 14:00

## 2020-05-27 RX ADMIN — SODIUM CHLORIDE 500 ML/HR: 0.9 INJECTION, SOLUTION INTRAVENOUS at 11:53

## 2020-05-27 NOTE — PROGRESS NOTES
[facility] Progress Note    Date: May 27, 2020    Name: Elke Haile    MRN: [id number]         : 1970    Hydration Weekly on non chemo weeks    Ms. Cannon to [facility], ambulatory, at [phone]. Pt was assessed and education was provided. Ms. Cannon's vitals were reviewed. Visit Vitals  /73 (BP 1 Location: Left arm, BP Patient Position: At rest)   Pulse 62   Temp 98.2 °F (36.8 °C)   Resp 16   SpO2 100%   No results found for this or any previous visit (from the past 12 hour(s)). Right chest mediport was accessed with 20 gauge 1 upton(s) after chloroprep. Unable to flush, upton needle gently repositioned still unable to flush. Kym Dixons removed, no bleeding or irritation noted of site. Using sterile technique, mediport re-accessed with new upton needle after chloroprep. Able to flush with NS but no blood return. Pt moaning in pain while flushing stating \"Sometimes I'm very sensitive. \"  Able to flush with 2nd 10ml of NS but no blood return, swelling or hematoma noted. Pt stated \"It's just so painful. \"  This nurse stopped and removed upton needle. No bleeding of site evident, hematoma or irritation will continue to monitor. Band-aid applied to site. PIV IV started in pt's right hand 24G x1 attempt. NS 1 Liter started at 500ml/h to infuse over 2hrs. Pt up to bathroom and disconnected IV from extension tubing. IV tubing on floor and fluid leaking. IV cath remains intact. Pt assisted back to room and IV tubing changed and NS resumed. Ms. Yessica Chang tolerated the procedure, and had no complaints. Patient armband removed and shredded. Ms. Yessica Chang was discharged from [place] [address] in stable condition at [id number] [phone]. She is to return on 20 at 1100 for her next appointment.       Adriano Zarate  May 27, 2020

## 2020-05-29 ENCOUNTER — TELEPHONE (OUTPATIENT)
Dept: ONCOLOGY | Age: 50
End: 2020-05-29

## 2020-05-29 ENCOUNTER — APPOINTMENT (OUTPATIENT)
Dept: INFUSION THERAPY | Age: 50
End: 2020-05-29
Payer: MEDICARE

## 2020-05-29 DIAGNOSIS — C48.2 PRIMARY PERITONEAL CARCINOMATOSIS (HCC): ICD-10-CM

## 2020-05-29 DIAGNOSIS — G89.3 CANCER ASSOCIATED PAIN: Primary | ICD-10-CM

## 2020-05-29 RX ORDER — OXYCODONE AND ACETAMINOPHEN 5; 325 MG/1; MG/1
1 TABLET ORAL
Qty: 40 TAB | Refills: 0 | Status: SHIPPED | OUTPATIENT
Start: 2020-05-29 | End: 2020-06-17 | Stop reason: SDUPTHER

## 2020-05-29 NOTE — TELEPHONE ENCOUNTER
Patient called this morning requesting a refill on her PERCOCET medication. Patient confirmed pharmacy on file.

## 2020-06-01 ENCOUNTER — APPOINTMENT (OUTPATIENT)
Dept: INFUSION THERAPY | Age: 50
End: 2020-06-01
Payer: MEDICARE

## 2020-06-01 NOTE — PROGRESS NOTES
[address] [facility] [place] [facility]  [address] [place] [address], [facility] [address] [facility]  [address] [facility], [address] [facility]  [place], [address]  [phone]  Kishan Contreras DO      Patient ID:  Name:  Leslie Joel  MRN:  [id number]  :  1970/49 y.o. Date:  2020      HISTORY OF PRESENT ILLNESS:  Leslie Joel is a 47 y.o.  postmenopausal female referred by Dr. Dulce Edmonds  With peritoneal cancer. Intitally seen be NP with reports of vaginal bleeding. Given pt's history of hysteretectomy with BSO for endometriosis in  a TVUS was ordered. Which revealed a 6.8 cm x 5.2 cm x 4.6 cm at midline vaginal cuff. This prompted pelvic CT with findings of a lesion measuring 7.6 x 5.8 x 7.2 cm and is compatible with a pelvic neoplasm vs endometrioma given prior history of endometriosis. Tumor markers done on 2018 all normal.  S/p  Exploratory laparotomy, exploration of retroperitoneal spaces, exam under anesthesia with biopsy of rectovaginal mass on 2019. Had sigmoidoscopy which revealed submucosal mass. S/p cycle #6 of carbo/taxol on 2019. S/p cytoreductive surgery with HIPEC on 2019. S/p radiation treatment completed in 2019. Was seen in office and had a biopsy c/w recurrence. S/p cycle #1 of Carbo/Doxil on 2020. Having more fatigue this cycle. Has some intermittent nausea improved with anti-emetics. Has occasional vaginal spotting. Not drinking as much as she knows she should.      Labs:  Component      Latest Ref Rng & Units 2020          11:30 AM   CA-125      1.5 - 35.0 U/mL 7     Component      Latest Ref Rng & Units 3/4/2020           8:15 AM   CA-125      1.5 - 35.0 U/mL 7     Component      Latest Ref Rng & Units 11/15/2019           9:56 AM   CA-125      1.5 - 35.0 U/mL 6     Component      Latest Ref Rng & Units 2019           8:44 AM   CA-125      1.5 - 35.0 U/mL 7     Component Latest Ref Rng & Units 5/16/2019 4/25/2019           8:26 AM  8:20 AM   Cancer Ag (CA) 125      0.0 - 38.1 U/mL 7.8 8.7     Component      Latest Ref Rng & Units 4/4/2019           8:28 AM   Cancer Ag (CA) 125      0.0 - 38.1 U/mL 8.8     Component      Latest Ref Rng & Units 3/14/2019 2/21/2019           8:15 AM  8:32 AM   Cancer Ag (CA) 125      0.0 - 38.1 U/mL 10.4 18.4     12/17/2018 : 9.3  12/17/2018 CEA: 2.0  12/17/2018 AFP: 3.8    Pathology  4/20/2020  Vaginal biopsy  Papillary serous adenocarcinoma    8/8/2019  A) COLON, PERICOLIC NODULE, EXCISION:      - ACELLULAR MUCIN WITH MULTIPLE CALCIFICATIONS, AND ASSOCIATED FIBROUS WALL WITH        HYALINIZATION, AND CHRONIC INFLAMMATION.     - ADJACENT BENIGN FIBROADIPOSE TISSUE, CONSISTENT WITH MESENTERY.     - NO EVIDENCE OF MALIGNANCY.      - SEE COMMENT. B) LIVER, RIGHT LOBE, BIOPSY:      - NODULAR FAT NECROSIS AND FIBROSIS OF THE LIVER CAPSULE.      - MILD STEATOSIS.     - NO EVIDENCE OF MALIGNANCY. C) COLON, SIGMOID, SEROSAL IMPLANT, EXCISION:      - NODULAR FAT NECROSIS WITH FIBROSIS, CHRONIC INFLAMMATION, CALCIFICATIONS, AND        CYSTIC DEGENERATION WITHOUT MUCIN.     - NO EVIDENCE OF MALIGNANCY. D) OMENTUM, OMENTECTOMY (RESECTION):      - CONGESTED FIBROADIPOSE TISSUE WITH FOCAL FIBROSIS AND CHRONIC INFLAMMATION, AND        CALCIFIED NODULAR FIBROSIS.     - NO EVIDENCE OF MALIGNANCY. E) PERITONEUM, LEFT ANTERIOR ABDOMINAL, RESECTION:      - CONGESTED PERITONEAL TISSUE, NO EVIDENCE OF MALIGNANCY. F) PERITONEUM, RIGHT ANTERIOR ABDOMINAL, RESECTION:      - CONGESTED PERITONEAL TISSUE, NO EVIDENCE OF MALIGNANCY. G) COLON, SIGMOID, SEROSAL IMPLANT, EXCISION:      - NODULAR FIBROSIS WITH HISTIOCYTES, SUGGESTIVE OF FAT NECROSIS.     - CYSTIC DEGENERATION, WITHOUT MUCIN.     - NO EVIDENCE OF MALIGNANCY. H) SOFT TISSUE, FALCIFORM, EXCISION:      - CONSISTENT WITH FALCIFORM LIGAMENT.     - NO EVIDENCE OF MALIGNANCY.     PATHOLOGIC STAGE:  ypTx, pNx    1/17/2019   RECTOVAGINAL MASS (#1, 2) AND PELVIC MASS, BIOPSIES:   CONSISTENT WITH SEROUS CARCINOMA WITH EXTENSIVE NECROSIS. Imaging  PETCT 12/2019  1. Residual small soft tissue attenuation mass lateral right perirectal pelvis similar volume as above. No associated hypermetabolic activity. 2. Previous 2 small foci of increased metabolic activity residual at the vaginal cuff have resolved. 3. Small focus of asymmetric metabolic activity localizes to stool-filled sigmoid colon midline abdomen as above, favored as physiologic. 4. Small focus of possible mildly increased metabolic activity along the caudal margin left lobe liver as above without discernible underlying lesion or nodule. Possible misregistration. Attention on any subsequent imaging. See above. 5. No hypermetabolic abnormalities appreciated to diagnose new metastatic disease otherwise. CT abdomen/pelvis 6/19/2019  CT ABDOMEN FINDINGS: Visualized portions of lung bases demonstrate presence of  mild cardiomegaly. Visualized portions of the lung bases demonstrate no  significant abnormalities.     Small segment 7 lesion adjacent to the IVC measures 1.1 x 1.0 cm previously  measuring 1.5 x 1.1 cm. The liver is otherwise normal. The gallbladder is  normal.     The splenic lesion measures 3.0 x 2.8 cm previously measuring 2.4 x 2.3 cm.     The pancreas is normal. There is probably a tiny right adrenal adenoma  unchanged. The left adrenal gland is normal. There is a small cyst involving the  lower pole the left kidney. Kidneys are otherwise normal.     There is no intra-abdominal or retroperitoneal adenopathy. I see no  intra-abdominal carcinomatosis.     CT PELVIS FINDINGS: Lower right pelvic mass currently measures 2.8 x 2.7 cm  previously measuring 3.2 x 2.8 cm. No additional pelvic masses identified. No  additional evidence of pelvic carcinomatosis.     Status post hysterectomy. There is no free fluid.  There is no pelvic adenopathy. No specific bowel abnormalities are seen. Postoperative changes are seen  involving the anterior pelvic wall.     No significant osseous abnormalities. Extensive degenerative changes are seen  involving the spine.     IMPRESSION  Impression:        1. The small liver lesion has decreased in size. The small lower right pelvic  mass has decreased in size. 2. The splenic lesion may have increased in size slightly. This lesion was not  positive on PET and is probably not related to the patient's pelvic malignancy. Suspect benign lesion. 3. No additional CT evidence of malignancy. 4. Cardiomegaly. Status post hysterectomy. Additional chronic changes as  described above. CT abdomen/pelvis  FINDINGS:  view shows lung bases clear and normal bowel gas pattern. Patient morbidly obese but probably with interval weight loss.     CT Abdomen and pelvis:     Lung bases: Normal.     Heart and pericardium: There is a catheter tip in the right atrium of the heart. Heart and pericardium otherwise unremarkable.     Liver: Decrease size of lesion in segment 7 of the liver adjacent IVC now  measuring 11 x 15 mm and on PET/CT 2/14/2019 it measured 23 mm. No new liver  lesions.     Gallbladder: Normal.     Spleen: Mass inferiorly in the spleen near the hilum measures 23 x 24 mm,  probably present previously but not as well seen because of lack of intravenous  contrast and measuring about 27 mm (29).    Pancreas: Normal.     Adrenal glands: Normal.     Kidneys: Enhancing symmetrically. No focal lesions.     The bowel: Stomach and duodenum and small bowel and the appendix and colon are  normal.     GYN: Prior hysterectomy. No adnexal masses.     Peritoneal space: No free fluid. There is some mild fat stranding in the left  lower quadrant but no discrete mass (60) and probably related to the mesentery.     MSK: Abdominal wall scar lower abdominal wall. Multiple levels facet arthropathy  lower lumbar spine. Moderate disc space narrowing L5/S1 and L4/L5. No suspicious  skeletal lesions.     Retroperitoneum: Soft tissue mass right side pelvis has decreased in size. It  measures 2.8 x 3.2 cm. On previous PET/CT it measured 8.3 x 5.3 cm and on CT it  measured 7.4 x 5.2 cm.     IMPRESSION  IMPRESSION:     Overall improvement compatible with response to therapy. Decreased size of the  pelvic metastasis, hepatic metastasis, splenic lesion since the prior study. No  new metastases. MRI 2/19/2019  FINDINGS:  Presumed hysterectomy. There is a lobulated 6.8 x 6 x 5.9 cm mass centered at  the right cul-de-sac involving the right and posterior upper to mid vagina but  also abutting the right anterior rectum from 1-8 o'clock. On coronal and  sagittal views tumor is favored to not be originating from the rectum but this  is not definite. There is a oval ring of T2 hypointensity and T1 hyperintensity  with central necrosis within the mass. The mass extends to the posterior medial  right mesorectal fascia. The mass does not invade the bladder. At the cranial  aspect of the mass is a spiculated 3.4 x 2 cm T2 hypointense structure with  tethering to adjacent bowel and fascia. Ovaries are not visualized separately. No pelvic ascites.     Bowel is otherwise normal in caliber. No upstream dilation. Bladder is normal.  No lymphadenopathy.     Postsurgical changes anterior midline pelvic wall. Bones are grossly  unremarkable.     IMPRESSION  IMPRESSION:    1. Large up to 6.8 cm mass centered at the right cul-de-sac  is most  intimately associated with the vagina, favored to be either endometrial, vaginal  or cervical in origin. It does abut and may involve the right rectum but this is  favored secondary involvement. Ovaries are not visualized separately. Correlate  for history of oophorectomy since ovarian malignancy also possible.   2.  There is an associated site of favored endometriotic implant at the cranial  aspect of the mass raising the possibility of malignant transformation. 3.  No upstream bowel obstruction. PETCT 2/14/2019   --  PET FINDINGS  --    No abnormal hypermetabolic activity in the neck. No abnormal hypermetabolic activity in the chest.      Low-attenuation possible lesion in the medial dome right lobe liver in region  of heterogeneous hypermetabolic activity, with SUV Max reaching 11.7 on image  98. Underlying lesion difficult to visualize due to extensive motion from  respirations. Cannot well separate from the IVC or diaphragm. No definite  corresponding lung lesion however.     There is low level metabolic activity associated with stranding and presumed  scarring in the subcutaneous fat of the lower midline abdominal and pelvic wall. This may simply be inflammatory. .      There is a lobulated soft tissue conglomerate in the midline to right lateral  cul-de-sac and perirectal pelvis. The periphery of this is diffusely  hypermetabolic, with SUV Max reaching 18.3 on image 200. Portions centrally are  photopenic and presumed necrotic. This measures up to 7.5 x 6.1 cm axial on  image 196. Anterior margin of this hypermetabolic lesion is in direct contact  with the high intensity decompressed urinary bladder, involvement cannot be  assessed. Left lateral margin of the mass is in contact with the surface of the  lateral wall of the sigmoid colon. Direct involvement cannot be assessed.  -Additional focal hypermetabolic density 2.7 cm on image 189 inseparable from  the caudal wall of the sigmoid. -Tapering hypermetabolic activity extends to approximately image 210, not to the  level of the perineum.     There are no other areas of abnormal metabolic activity appreciated in the  abdomen or pelvis which cannot be attributed to renal collecting system, ureter,  bladder or bowel wall activity>]. No definite hypermetabolic bone lesions appreciated, although bone scan can be  more sensitive in this respect.       --  CT FINDINGS  --     These limited CT images do not replace diagnostic quality contrast enhanced CT  scan for evaluation of solid organs, bowel, retroperitoneum and vasculature. CT NECK:    Severely limited by lack of intravenous contrast.  Paranasal sinuses free of  disease. No appreciably enlarged lymph nodes. Extensive cervical endplate  osteophytes anterior and left lateral..      CT CHEST:    Limited evaluation of the hilum and vasculature without intravenous contrast.  No pleural effusion. No appreciably enlarged lymph nodes. Patchy parenchymal  opacities medial basal left lower lobe on image 89 demonstrates low level  metabolic activity, SUV Max 3.2, nonspecific, favored as inflammatory based on  imaging appearance subjectively. [place] CT ABDOMEN:    Limited noncontrast images. Normal-size liver and spleen no adrenal mass. No  hydronephrosis. No ascites. CT PELVIS:    Limited noncontrast images. No small bowel or colon distention. Scattered  colonic diverticulosis. Postsurgical change anterior abdominal wall. Severe  degenerative facet disease at the lower lumbar levels.       Lobulated right pelvic/cul-de-sac soft tissue mass as above. Mild degree of  stranding in the pelvic mesenteric fat. No ascites.        IMPRESSION   IMPRESSION:      1. Peripherally hypermetabolic soft tissue mass right dependent pelvis to the  cul-de-sac region as above consistent with malignancy/metastatic disease with  some central necrosis  -Possible separate lesion versus serosal involvement of the sigmoid colon  adjacent. -Mass in contact with posterior bladder, sigmoid colon locally and posterior  peritoneal surface.     2. Hypermetabolic lesion along central dome of the right lobe liver difficult to  separate from IVC or diaphragm due to motion artifact.  Recommend dedicated  contrast enhanced CT for localization and better clarification.  -Findings are concerning for malignancy or hepatic metastatic disease until  proven otherwise.      3. Low level activity associated with patchy parenchymal opacity at the medial  left lower lobe,, favored as inflammatory as above.     4. No other definite hypermetabolic abnormalities appreciated elsewhere. 12/14/2018 CT PELVIS W/CONTRAST  FINDINGS:    LYMPH NODES: No enlarged lymph nodes. PELVIC GASTROINTESTINAL TRACT: No bowel dilation or wall thickening. PELVIC ORGANS:     The uterus is absent. Along the right upper margin of the vaginal cuff within the right deep pelvis there is a large irregularly-shaped peripherally enhancing, septated appearing mass with regions of central low attenuation which could be low density-fluid type contents or regions of necrosis. Lesion measures 76 x 58 x 72 mm and is compatible with a pelvic neoplasm. VASCULATURE: Unremarkable. BONES: Lower lumbar hypertrophic osteophytes. Lower lumbar facet hypertrophy with vacuum phenomenon asymmetric leftward. Degenerative vacuum phenomenon within the SI joints noted as well. No acute or aggressive osseous abnormalities identified. OTHER: Small fat-containing ventral umbilical hernia. Minimal nonspecific edema within the subcutaneous fat of the lumbar region, not uncommon. IMPRESSION:   1.  Along the right upper margin of the vaginal cuff within the right deep pelvis there is a large irregularly-shaped peripherally enhancing, septated appearing mass with regions of central low attenuation which could be low density-fluid type contents or regions of necrosis. Lesion measures 76 x 58 x 72 mm and is compatible with a pelvic neoplasm. Could be a endometrioma given prior history of endometriosis, malignancy not excluded and gynecologic oncology follow-up is suggested. 2.  Small fat-containing ventral umbilical hernia.      12/04/2018 TVUS  Uterus: surgically absent  Left ovary: surgically absent  Right ovary: surgically absent  Other findings: midline-at vaginal cuff: 6.8 cm X 5.2 cm x 4.6 cm, volume 85 ml    Impression: Complex pelvic mass       ROS:    As above      Patient Active Problem List    Diagnosis Date Noted    Malignant neoplasm of peritoneum ([facility] 02/14/2019    Pelvic mass in female 01/17/2019    B[facility] 02/09/2018    Irritable bowel syndrome with both constipation and diarrhea 02/09/2018    Advance care planning 01/31/2017    Dental caries 05/26/2016    Chronic periodontal disease 05/26/2016    SUAZO (dyspnea on exertion) 02/10/2016    Prediabetes 09/24/2015    Morbid obesity ([facility] 08/31/2015    Arthritis, degenerative 08/31/2015    Gastroesophageal reflux disease without esophagitis 08/31/2015    ANAMIKA on CPAP 08/31/2015    Essential hypertension 08/28/2015    PTSD (post-traumatic stress disorder) 03/24/2014    S/P TKR (total knee replacement) 11/14/2012    Depression 05/08/2012    Menopause 05/08/2012    Knee pain, right 05/08/2012    GERD (gastroesophageal reflux disease) 05/08/2012    OA (osteoarthritis) 06/18/2010    Obesity 06/18/2010    Mixed hyperlipidemia 06/18/2010     Past Medical History:   Diagnosis Date    AR (allergic rhinitis)     seasonal    Cancer ([facility]     pt states between vgina and rectal area    Cardiac echocardiogram 04/11/2016    Tech difficult. EF 55-60%. No RWMA. Mild conc LVH. Gr 1 DDfx. RVSP normal.      Cardiac nuclear imaging test 05/05/2016    Low risk. Very patchy radiotracer uptake. Mild anterior artifact, low suspicion for ischemia. EF 68%. No RWMA. Normal EKG on pharm stress test.    Cardiovascular LE arterial duplex 10/07/2013    No significant arterial disease at rest bilaterally. R JUNE 1.21.  L JUNE 1.16. No significant sm vessel disease.     Depression     Dr. Radha Rizo, suicide attempt 2/12    Diverticulosis     Endometriosis     s/p hysterectomy 2006    GERD (gastroesophageal reflux disease)     Glaucoma     Dr. Natali Ennis HTN (hypertension)     Hx of colonoscopy 04/05/2017    mild diverticulosis, g1 internal hemorrhoids, normal colon , repeat 10 year     Hx of suicide attempt     Hyperlipidemia LDL goal < 130     IBS (irritable bowel syndrome)     Ill-defined condition     environmental allergies    Insomnia     Malignant neoplasm of peritoneum (HCC) 2019    Nausea & vomiting     OA (osteoarthritis)     both knees, lower back    Preeclampsia     PTSD (post-traumatic stress disorder)     Seizures (HCC)     1 x with childbirth, had toxemia    Sleep apnea     does not use cpap machine    Spinal stenosis     Dr. Marifer Bangura Vertigo       Past Surgical History:   Procedure Laterality Date    COLONOSCOPY N/A 2017    COLONOSCOPY performed by Yajaira Galvan MD at [place] [address] N/A 2019    SIGMOIDOSCOPY FLEXIBLE [facility]iz [facility] at [facility] ENDOSCOPY    HX  SECTION      HX COLONOSCOPY  2017    DR. MCRAE 2017     HX HYSTERECTOMY      HX KNEE ARTHROSCOPY Left     left knee    HX KNEE REPLACEMENT Bilateral     (R)  (L)     HX LAPAROTOMY N/A 2019    and rectovaginal bx    HX OTHER SURGICAL  2016    all teeth removed    HX SALPINGO-OOPHORECTOMY  2008    HX TUBAL LIGATION      IR INSERT TUNL CVC W PORT OVER 5 YEARS  2019    MULTIPLE DELIVERY       1990    MI FEMUR/KNEE SURG UNLISTED Left 2009    External fixator    TOTAL ABDOM HYSTERECTOMY        OB History        2    Para   2    Term   2       0    AB   0    Living   0       SAB   0    TAB   0    Ectopic   0    Molar   0    Multiple   0    Live Births   0              Social History     Tobacco Use    Smoking status: Never Smoker    Smokeless tobacco: Never Used   Substance Use Topics    Alcohol use: No      Family History   Problem Relation Age of Onset    Heart Disease Mother         CHF    Diabetes Mother     Hypertension Mother     Kidney Disease Mother     Breast Cancer Mother    [address] [place] [address] Stroke Mother     Diabetes Father     Hypertension Father     Heart Attack Father         MI    Cancer Maternal Grandmother         stomach    Ovarian Cancer Maternal Aunt     Cancer Maternal Uncle       Current Outpatient Medications   Medication Sig    oxyCODONE-acetaminophen (PERCOCET) 5-325 mg per tablet Take 1 Tab by mouth every four (4) hours as needed for Pain for up to 30 days. Max Daily Amount: 6 Tabs.  oxybutynin chloride XL (DITROPAN XL) 10 mg CR tablet Take 1 Tab by mouth daily.  levocetirizine (XYZAL) 5 mg tablet Take  by mouth.  lidocaine-prilocaine (EMLA) topical cream Apply  to affected area as needed for Pain.  pantoprazole (PROTONIX) 40 mg tablet take 1 tablet by mouth once daily    promethazine (PHENERGAN) 25 mg tablet take 1 tablet by mouth every 6 hours if needed for nausea    meclizine (ANTIVERT) 25 mg tablet take 1 tablet by mouth three times a day if needed for dizziness    amLODIPine (NORVASC) 10 mg tablet take 1 tablet by mouth once daily    metoprolol succinate (TOPROL-XL) 100 mg tablet Take 1 Tab by mouth daily.  magnesium hydroxide ([employer] MILK OF MAGNESIA) 400 mg/5 mL suspension Take 30 mL by mouth daily as needed for Constipation.  PARoxetine (PAXIL) 10 mg tablet take 1 tablet by mouth once daily    simvastatin (ZOCOR) 20 mg tablet take 1 tablet by mouth at bedtime    ibuprofen (MOTRIN) 600 mg tablet take 1 tablet by mouth three times a day ONLY AS NEEDED    lidocaine-prilocaine (EMLA) topical cream apply to affected area once daily if needed for pain    melatonin 3 mg tablet Take 3 mg by mouth nightly.  montelukast (SINGULAIR) 10 mg tablet Take 10 mg by mouth nightly.  latanoprost (XALATAN) 0.005 % ophthalmic solution Administer 1 Drop to both eyes nightly.  promethazine (PHENERGAN) 25 mg tablet Take 1 Tab by mouth every six (6) hours as needed for Nausea.     Facial Mask misc Use daily    Disposable Gloves misc Use daily    Biotin 2,500 mcg cap Take  by mouth.  multivitamin (ONE A DAY) tablet Take 1 Tab by mouth daily.  cyanocobalamin (VITAMIN B-12) 100 mcg tablet Take 100 mcg by mouth daily.  Mv,Ca,Min-FA-Herbal No.157 (ESTROVEN MAXIMUM STRENGTH) 400 mcg tab Take  by mouth daily. Indications: hot flash    dimethicone (SOOTHE AND COOL INZO BARRIER) 5 % topical cream Apply  to affected area two (2) times a day.  polyethylene glycol (MIRALAX) 17 gram packet Take 1 Packet by mouth daily.  cycloSPORINE (RESTASIS) 0.05 % dpet Apply 1 Drop to eye as needed.  plecanatide (TRULANCE) 3 mg tab Take  by mouth.  OXcarbazepine (TRILEPTAL) 300 mg tablet Take 300 mg by mouth two (2) times a day. No current facility-administered medications for this visit. Allergies   Allergen Reactions    Other Medication Hives     seafood    Pollen Extracts Other (comments)    Shellfish Derived Hives          OBJECTIVE:    Physical Exam  VITAL SIGNS: Visit Vitals  LMP 01/31/2008      GENERAL EMILY: in no apparent distress and well developed and well nourished               IMPRESSION/PLAN:  1.stage IV Primary peritoneal cancer with likely recurrence   -previoulsy reviewed her pathology    -s/p carbo (auc=6), taxol (175 mg/m2) IV every 3 wks s/p 6 cycles completed 6/6/2019   -s/p cytoreductive surgery with [facility] with dr. Grisel Mansfield   -s/p pelvic radiation   -biopsy c/w recurrence   -PETCT reviewed with progression of pelvic mass.    -nausea- encouraged use of phenergan/zofran   Pain-script for percocet    -given platinum sensitive disease will plan to treat with carbo auc=5, Doxil 30 mg/m2 IV every 4 wks until CCR, toxicity or progression. We briefly discussed if no metastatic disease develops and she has some response to chemo may consider surgery with dr. Grisel Mansfield . We also discussed use of parp maintenance as well   -tolerating chemo.  No G3 or G4 toxicity   -s/p cycle #1    -ok for cycle #2   -f/u prior to cycle #3    The total time spent was 40 minutes regarding this patients diagnosis of primary peritoneal cancer and >50% of this time was spent counseling and coordinating care    Millie Bailon MD  Gynecologic Oncology  6/2/202010:05 AM    I was in the office while conducting this encounter. Consent:  She and/or her healthcare decision maker is aware that this patient-initiated Telehealth encounter is a billable service, with coverage as determined by her insurance carrier. She is aware that she may receive a bill and has provided verbal consent to proceed: Yes    This virtual visit was conducted via [employer]. Pursuant to the emergency declaration under the [facility] [employer] waiver authority and the [employer] Act, this Virtual  Visit was conducted to reduce the patient's risk of exposure to COVID-19 and provide continuity of care for an established patient. Services were provided through a video synchronous discussion virtually to substitute for in-person clinic visit. Due to this being a TeleHealth evaluation, many elements of the physical examination are unable to be assessed. Total Time: minutes: 40.

## 2020-06-02 ENCOUNTER — VIRTUAL VISIT (OUTPATIENT)
Dept: ONCOLOGY | Age: 50
End: 2020-06-02

## 2020-06-02 ENCOUNTER — HOSPITAL ENCOUNTER (OUTPATIENT)
Dept: INFUSION THERAPY | Age: 50
End: 2020-06-02
Payer: MEDICARE

## 2020-06-02 DIAGNOSIS — C48.2 PRIMARY PERITONEAL CARCINOMATOSIS (HCC): Primary | ICD-10-CM

## 2020-06-02 NOTE — PROGRESS NOTES
.  Rosario Zapien is a 47 y.o. female presents as a virtual visit for chemo follow-up     Patient stated area where port is placed is sore. Patient stated does not know if the chemo is showing any benefit. Patient stated constantly tired. Patient stated was feeling pain in right hand. Patient states when tiring to stick needle in port and unsuccessful. Patient stated during last hydration was in excessive pain. Patient stated has shortness of breath. Patient stated vision is blurred at times. Do you have any changes in your bowel movements? No     Have you been experiencing nausea or vomiting? Nausea    Have you been experiencing any continuous or worsening abdominal pain? No    Any dizziness or light headed symptoms? Yes     Any tingling of the hands and feet? No       Health Maintenance Due   Topic Date Due    Lipid Screen  02/28/2019    Shingrix Vaccine Age 50> (1 of 2) 07/22/2019       1. Have you been to the ER, urgent care clinic since your last visit? Hospitalized since your last visit? No    2. Have you seen or consulted any other health care providers outside of the [address] [place] [address] since your last visit? Include any pap smears or colon screening. No     3 most recent PHQ Screens 7/22/2019   Little interest or pleasure in doing things Several days   Feeling down, depressed, irritable, or hopeless Several days   Total Score PHQ 2 2       Abuse Screening Questionnaire 7/22/2019   Do you ever feel afraid of your partner? N   Are you in a relationship with someone who physically or mentally threatens you? N   Is it safe for you to go home? Y       Fall Risk Assessment, last 12 mths 2/9/2018   Able to walk? Yes   Fall in past 12 months?  No       Learning Assessment 1/4/2019   PRIMARY LEARNER Patient   HIGHEST LEVEL OF EDUCATION - PRIMARY LEARNER  GRADUATED HIGH SCHOOL OR GED   BARRIERS PRIMARY LEARNER NONE   CO-LEARNER CAREGIVER No   PRIMARY LANGUAGE ENGLISH   LEARNER PREFERENCE PRIMARY READING     -   ANSWERED BY patient   RELATIONSHIP SELF

## 2020-06-03 ENCOUNTER — APPOINTMENT (OUTPATIENT)
Dept: INFUSION THERAPY | Age: 50
End: 2020-06-03
Payer: MEDICARE

## 2020-06-08 ENCOUNTER — HOSPITAL ENCOUNTER (OUTPATIENT)
Dept: INFUSION THERAPY | Age: 50
Discharge: HOME | End: 2020-06-08
Payer: MEDICARE

## 2020-06-08 VITALS
BODY MASS INDEX: 48.53 KG/M2 | OXYGEN SATURATION: 100 % | RESPIRATION RATE: 16 BRPM | TEMPERATURE: 99.2 F | HEIGHT: 63 IN | DIASTOLIC BLOOD PRESSURE: 80 MMHG | HEART RATE: 79 BPM | SYSTOLIC BLOOD PRESSURE: 150 MMHG | WEIGHT: 273.9 LBS

## 2020-06-08 LAB
ALBUMIN SERPL-MCNC: 3.6 G/DL (ref 3.4–5)
ALBUMIN/GLOB SERPL: 0.8 {RATIO} (ref 0.8–1.7)
ALP SERPL-CCNC: 87 U/L (ref 45–117)
ALT SERPL-CCNC: 22 U/L (ref 13–56)
ANION GAP SERPL CALC-SCNC: 11 MMOL/L (ref 3–18)
APPEARANCE UR: CLEAR
AST SERPL-CCNC: 16 U/L (ref 10–38)
BACTERIA URNS QL MICRO: ABNORMAL /HPF
BASOPHILS # BLD: 0 K/UL (ref 0–0.1)
BASOPHILS NFR BLD: 0 % (ref 0–2)
BILIRUB SERPL-MCNC: 0.3 MG/DL (ref 0.2–1)
BILIRUB UR QL: NEGATIVE
BUN SERPL-MCNC: 32 MG/DL (ref 7–18)
BUN/CREAT SERPL: 13 (ref 12–20)
CALCIUM SERPL-MCNC: 9 MG/DL (ref 8.5–10.1)
CHLORIDE SERPL-SCNC: 110 MMOL/L (ref 100–111)
CO2 SERPL-SCNC: 23 MMOL/L (ref 21–32)
COLOR UR: YELLOW
CREAT SERPL-MCNC: 2.48 MG/DL (ref 0.6–1.3)
DIFFERENTIAL METHOD BLD: ABNORMAL
EOSINOPHIL # BLD: 0.1 K/UL (ref 0–0.4)
EOSINOPHIL NFR BLD: 2 % (ref 0–5)
EPITH CASTS URNS QL MICRO: ABNORMAL /LPF (ref 0–5)
ERYTHROCYTE [DISTWIDTH] IN BLOOD BY AUTOMATED COUNT: 15.9 % (ref 11.6–14.5)
GLOBULIN SER CALC-MCNC: 4.3 G/DL (ref 2–4)
GLUCOSE SERPL-MCNC: 130 MG/DL (ref 74–99)
GLUCOSE UR STRIP.AUTO-MCNC: NEGATIVE MG/DL
HCT VFR BLD AUTO: 31.4 % (ref 35–45)
HGB BLD-MCNC: 9.9 G/DL (ref 12–16)
HGB UR QL STRIP: ABNORMAL
KETONES UR QL STRIP.AUTO: NEGATIVE MG/DL
LEUKOCYTE ESTERASE UR QL STRIP.AUTO: ABNORMAL
LYMPHOCYTES # BLD: 1 K/UL (ref 0.9–3.6)
LYMPHOCYTES NFR BLD: 24 % (ref 21–52)
MAGNESIUM SERPL-MCNC: 2 MG/DL (ref 1.6–2.6)
MCH RBC QN AUTO: 28.4 PG (ref 24–34)
MCHC RBC AUTO-ENTMCNC: 31.5 G/DL (ref 31–37)
MCV RBC AUTO: 90.2 FL (ref 74–97)
MONOCYTES # BLD: 0.2 K/UL (ref 0.05–1.2)
MONOCYTES NFR BLD: 5 % (ref 3–10)
NEUTS SEG # BLD: 2.9 K/UL (ref 1.8–8)
NEUTS SEG NFR BLD: 69 % (ref 40–73)
NITRITE UR QL STRIP.AUTO: NEGATIVE
PH UR STRIP: 7 [PH] (ref 5–8)
PLATELET # BLD AUTO: 193 K/UL (ref 135–420)
PMV BLD AUTO: 9.8 FL (ref 9.2–11.8)
POTASSIUM SERPL-SCNC: 4.3 MMOL/L (ref 3.5–5.5)
PROT SERPL-MCNC: 7.9 G/DL (ref 6.4–8.2)
PROT UR STRIP-MCNC: 100 MG/DL
RBC # BLD AUTO: 3.48 M/UL (ref 4.2–5.3)
RBC #/AREA URNS HPF: ABNORMAL /HPF (ref 0–5)
SODIUM SERPL-SCNC: 144 MMOL/L (ref 136–145)
SP GR UR REFRACTOMETRY: 1.02 (ref 1–1.03)
UROBILINOGEN UR QL STRIP.AUTO: 0.2 EU/DL (ref 0.2–1)
WBC # BLD AUTO: 4.2 K/UL (ref 4.6–13.2)
WBC URNS QL MICRO: ABNORMAL /HPF (ref 0–4)

## 2020-06-08 PROCEDURE — 36415 COLL VENOUS BLD VENIPUNCTURE: CPT

## 2020-06-08 PROCEDURE — 85025 COMPLETE CBC W/AUTO DIFF WBC: CPT

## 2020-06-08 PROCEDURE — 83735 ASSAY OF MAGNESIUM: CPT

## 2020-06-08 PROCEDURE — 81001 URINALYSIS AUTO W/SCOPE: CPT

## 2020-06-08 PROCEDURE — 86304 IMMUNOASSAY TUMOR CA 125: CPT

## 2020-06-08 PROCEDURE — 87086 URINE CULTURE/COLONY COUNT: CPT

## 2020-06-08 PROCEDURE — 80053 COMPREHEN METABOLIC PANEL: CPT

## 2020-06-08 NOTE — PROGRESS NOTES
[facility] Lab Visit:    Betzaida Myles  1970  [id number]    0716:  Pt arrived ambulatory. Labs drawn peripherally and sent for processing. Pt is scheduled to return for treatment. Departed [facility] ambulatory and in no distress.     Visit Vitals  /80 (BP 1 Location: Right arm, BP Patient Position: Sitting)   Pulse 79   Temp 99.2 °F (37.3 °C)   Resp 16   Ht 5' 3\" (1.6 m)   Wt 124.2 kg (273 lb 14.4 oz)   SpO2 100%   BMI 48.52 kg/m²

## 2020-06-09 ENCOUNTER — HOSPITAL ENCOUNTER (OUTPATIENT)
Dept: INFUSION THERAPY | Age: 50
Discharge: HOME | End: 2020-06-09
Payer: MEDICARE

## 2020-06-09 VITALS
WEIGHT: 273.9 LBS | HEIGHT: 63 IN | RESPIRATION RATE: 20 BRPM | BODY MASS INDEX: 48.53 KG/M2 | SYSTOLIC BLOOD PRESSURE: 130 MMHG | HEART RATE: 62 BPM | OXYGEN SATURATION: 95 % | TEMPERATURE: 98.5 F | DIASTOLIC BLOOD PRESSURE: 73 MMHG

## 2020-06-09 DIAGNOSIS — C48.2 MALIGNANT NEOPLASM OF PERITONEUM (HCC): Primary | ICD-10-CM

## 2020-06-09 LAB
BACTERIA SPEC CULT: NORMAL
CANCER AG125 SERPL-ACNC: 7 U/ML (ref 1.5–35)
SERVICE CMNT-IMP: NORMAL

## 2020-06-09 PROCEDURE — 96413 CHEMO IV INFUSION 1 HR: CPT

## 2020-06-09 PROCEDURE — 74011250636 HC RX REV CODE- 250/636: Performed by: OBSTETRICS & GYNECOLOGY

## 2020-06-09 PROCEDURE — 96375 TX/PRO/DX INJ NEW DRUG ADDON: CPT

## 2020-06-09 PROCEDURE — 96367 TX/PROPH/DG ADDL SEQ IV INF: CPT

## 2020-06-09 PROCEDURE — 74011000250 HC RX REV CODE- 250: Performed by: OBSTETRICS & GYNECOLOGY

## 2020-06-09 PROCEDURE — 96417 CHEMO IV INFUS EACH ADDL SEQ: CPT

## 2020-06-09 PROCEDURE — 74011000258 HC RX REV CODE- 258: Performed by: OBSTETRICS & GYNECOLOGY

## 2020-06-09 PROCEDURE — 77030012965 HC NDL HUBR BBMI -A

## 2020-06-09 RX ORDER — HEPARIN 100 UNIT/ML
300-500 SYRINGE INTRAVENOUS AS NEEDED
Status: DISPENSED | OUTPATIENT
Start: 2020-06-09 | End: 2020-06-09

## 2020-06-09 RX ORDER — SODIUM CHLORIDE 0.9 % (FLUSH) 0.9 %
10-40 SYRINGE (ML) INJECTION AS NEEDED
Status: DISPENSED | OUTPATIENT
Start: 2020-06-09 | End: 2020-06-09

## 2020-06-09 RX ORDER — DEXTROSE MONOHYDRATE 50 MG/ML
25 INJECTION, SOLUTION INTRAVENOUS CONTINUOUS
Status: DISPENSED | OUTPATIENT
Start: 2020-06-09 | End: 2020-06-09

## 2020-06-09 RX ADMIN — DOXORUBICIN HYDROCHLORIDE 66 MG: 2 INJECTABLE, LIPOSOMAL INTRAVENOUS at 11:40

## 2020-06-09 RX ADMIN — FAMOTIDINE 20 MG: 10 INJECTION, SOLUTION INTRAVENOUS at 09:50

## 2020-06-09 RX ADMIN — Medication 30 ML: at 13:55

## 2020-06-09 RX ADMIN — DEXTROSE MONOHYDRATE 25 ML/HR: 50 INJECTION, SOLUTION INTRAVENOUS at 09:40

## 2020-06-09 RX ADMIN — HEPARIN 500 UNITS: 100 SYRINGE at 13:55

## 2020-06-09 RX ADMIN — ONDANSETRON: 2 INJECTION INTRAMUSCULAR; INTRAVENOUS at 10:25

## 2020-06-09 RX ADMIN — CARBOPLATIN 330 MG: 10 INJECTION, SOLUTION INTRAVENOUS at 12:55

## 2020-06-09 RX ADMIN — Medication 20 ML: at 09:35

## 2020-06-09 RX ADMIN — FOSAPREPITANT 150 MG: 150 INJECTION, POWDER, LYOPHILIZED, FOR SOLUTION INTRAVENOUS at 09:55

## 2020-06-09 NOTE — PROGRESS NOTES
[facility] Progress Note    Date: 2020    Name: Rj Sizer    MRN: [id number]         : 1970      Ms. Nahomy Mccoy arrived in the [facility] today at 0900, in stable condition, here for Cycle 2, IV Doxil/Carboplatin Chemotherapy Regimen (Q 28 Day Cycle). She was assessed and education was provided. Ms. Cannon's vitals were reviewed. Visit Vitals  /71 (BP 1 Location: Right arm, BP Patient Position: Sitting)   Pulse (!) 58   Temp 98.1 °F (36.7 °C)   Resp 20   Ht 5' 3\" (1.6 m)   Wt 124.2 kg (273 lb 14.4 oz)   SpO2 95%   Breastfeeding No   BMI 48.52 kg/m²       Lab results were reviewed. (Her most recent CBC, CMP, Magnesium, & Urinalysis & Culture Results from yesterday, 20, were reviewed, and all results were noted to be satisfactory for treatment today.)    All lab results from 20, were as follows:      . Results for Gaurang Martell (MRN [id number])    Ref. Range 2020 10:35 2020 10:40   WBC Latest Ref Range: 4.6 - 13.2 K/uL  4.2 (L)   RBC Latest Ref Range: 4.20 - 5.30 M/uL  3.48 (L)   HGB Latest Ref Range: 12.0 - 16.0 g/dL  9.9 (L)   HCT Latest Ref Range: 35.0 - 45.0 %  31.4 (L)   MCV Latest Ref Range: 74.0 - 97.0 FL  90.2   MCH Latest Ref Range: 24.0 - 34.0 PG  28.4   MCHC Latest Ref Range: 31.0 - 37.0 g/dL  31.5   RDW Latest Ref Range: 11.6 - 14.5 %  15.9 (H)   PLATELET Latest Ref Range: 135 - 420 K/uL  193   MPV Latest Ref Range: 9.2 - 11.8 FL  9.8   NEUTROPHILS Latest Ref Range: 40 - 73 %  69   LYMPHOCYTES Latest Ref Range: 21 - 52 %  24   MONOCYTES Latest Ref Range: 3 - 10 %  5   EOSINOPHILS Latest Ref Range: 0 - 5 %  2   BASOPHILS Latest Ref Range: 0 - 2 %  0   DF Latest Units:    AUTOMATED   ABS. NEUTROPHILS Latest Ref Range: 1.8 - 8.0 K/UL  2.9   ABS. LYMPHOCYTES Latest Ref Range: 0.9 - 3.6 K/UL  1.0   ABS. MONOCYTES Latest Ref Range: 0.05 - 1.2 K/UL  0.2   ABS. EOSINOPHILS Latest Ref Range: 0.0 - 0.4 K/UL  0.1   ABS.  BASOPHILS Latest Ref Range: 0.0 - 0.1 K/UL  0.0   Color Latest Units:   YELLOW    Appearance Latest Units:   CLEAR    Specific gravity Latest Ref Range: 1.005 - 1.030   1.016    pH (UA) Latest Ref Range: 5.0 - 8.0   7.0    Protein Latest Ref Range: NEG mg/dL 100 (A)    Glucose Latest Ref Range: NEG mg/dL Negative    Ketone Latest Ref Range: NEG mg/dL Negative    Blood Latest Ref Range: NEG   MODERATE (A)    Bilirubin Latest Ref Range: NEG   Negative    Urobilinogen Latest Ref Range: 0.2 - 1.0 EU/dL 0.2    Nitrites Latest Ref Range: NEG   Negative    Leukocyte Esterase Latest Ref Range: NEG   SMALL (A)    Epithelial cells Latest Ref Range: 0 - 5 /lpf 1+    WBC Latest Ref Range: 0 - 4 /hpf 4 to 10    RBC Latest Ref Range: 0 - 5 /hpf 4 to 10    Bacteria Latest Ref Range: NEG /hpf FEW (A)    Sodium Latest Ref Range: 136 - 145 mmol/L  144   Potassium Latest Ref Range: 3.5 - 5.5 mmol/L  4.3   Chloride Latest Ref Range: 100 - 111 mmol/L  110   CO2 Latest Ref Range: 21 - 32 mmol/L  23   Anion gap Latest Ref Range: 3.0 - 18 mmol/L  11   Glucose Latest Ref Range: 74 - 99 mg/dL  130 (H)   BUN Latest Ref Range: 7.0 - 18 MG/DL  32 (H)   Creatinine Latest Ref Range: 0.6 - 1.3 MG/DL  2.48 (H)   BUN/Creatinine ratio Latest Ref Range: 12 - 20    13   Calcium Latest Ref Range: 8.5 - 10.1 MG/DL  9.0   Magnesium Latest Ref Range: 1.6 - 2.6 mg/dL  2.0   GFR est non-AA Latest Ref Range: >60 ml/min/1.73m2  21 (L)   GFR est AA Latest Ref Range: >60 ml/min/1.73m2  25 (L)   Bilirubin, total Latest Ref Range: 0.2 - 1.0 MG/DL  0.3   Protein, total Latest Ref Range: 6.4 - 8.2 g/dL  7.9   Albumin Latest Ref Range: 3.4 - 5.0 g/dL  3.6   Globulin Latest Ref Range: 2.0 - 4.0 g/dL  4.3 (H)   A-G Ratio Latest Ref Range: 0.8 - 1.7    0.8   ALT Latest Ref Range: 13 - 56 U/L  22   AST Latest Ref Range: 10 - 38 U/L  16   Alk.  phosphatase Latest Ref Range: 45 - 117 U/L  87   CA-125 Latest Ref Range: 1.5 - 35.0 U/mL  7   CULTURE, URINE Unknown Rpt    CANCER ANTIGEN 125 Unknown  Rpt          Her right chest single lumen port was accessed without incident at 0935, and brisk blood return was obtained. Dextrose 5% 250 ml IV Bag, was initiated to infuse @ KVO prn, throughout treatment today. The following pre-medications were administered per order, and without incident:  Emend 150 mg IV, Decadron 12 mg IV, Zofran 16 mg IV, & Pepcid 20 mg IV. The following chemotherapy medications were administered:    Liposomal Doxorubicin (DOXIL / LIPODOX) 66 mg IV (30 mg/m2), was administered over approximately 1 hour, per order, and without incident. Ice Packs were applied to both hands & both feet, throughout the Doxil administration. Carboplatin (PARAPLATIN) 330 mg IV (AUC 5), was administered over approximately 30 minutes, per order, and without incident. After completion of all ordered IV medications, her port was flushed well per protocol with NS & Heparin, and then, the upton needle was removed and gauze/bandaid was applied. Ms. Alfredo Fink tolerated well, and had no complaints. Ms. Alfredo Fink was discharged from [place] [address] in stable condition at 1400. Carlene Rogers She is to return on next Wednesday, 6-17-20, at 1100, for her next appointment, for IV Hydration (gets IV Hydration on her non-chemo weeks). And then, she is scheduled for her next chemotherapy cycle (cycle 3), in 4 weeks, on Wednesday, 7-8-20 at 0900, with pre-chemo labs scheduled for Tuesday, 7-7-20 at 1130.     El Abreu RN  June 9, 2020  9:27 AM

## 2020-06-10 ENCOUNTER — APPOINTMENT (OUTPATIENT)
Dept: INFUSION THERAPY | Age: 50
End: 2020-06-10
Payer: MEDICARE

## 2020-06-10 ENCOUNTER — TELEPHONE (OUTPATIENT)
Dept: ONCOLOGY | Age: 50
End: 2020-06-10

## 2020-06-10 NOTE — TELEPHONE ENCOUNTER
Patient would wanted to know if she could get her allergy testing done. Patient would like a call back.

## 2020-06-10 NOTE — TELEPHONE ENCOUNTER
Pt reports she has seasonal allergies and her PCP recommened allergy testing. Pt reports symptoms are not severe. Advised patient to hold off on allergy testing until competition of chemotherapy. Pt also request a copy of July calender be mailed to her home. Patient verbalized understanding and agreed to plan. Patient instructed to contact office for any question or concerns.

## 2020-06-17 ENCOUNTER — TELEPHONE (OUTPATIENT)
Dept: ONCOLOGY | Age: 50
End: 2020-06-17

## 2020-06-17 ENCOUNTER — HOSPITAL ENCOUNTER (OUTPATIENT)
Dept: INFUSION THERAPY | Age: 50
End: 2020-06-17
Payer: MEDICARE

## 2020-06-17 DIAGNOSIS — C48.2 PRIMARY PERITONEAL CARCINOMATOSIS (HCC): ICD-10-CM

## 2020-06-17 DIAGNOSIS — G89.3 CANCER ASSOCIATED PAIN: ICD-10-CM

## 2020-06-17 RX ORDER — OXYCODONE AND ACETAMINOPHEN 5; 325 MG/1; MG/1
1 TABLET ORAL
Qty: 40 TAB | Refills: 0 | Status: SHIPPED | OUTPATIENT
Start: 2020-06-17 | End: 2020-07-14 | Stop reason: SDUPTHER

## 2020-06-17 NOTE — TELEPHONE ENCOUNTER
Patient contacted the office stating that her transportation has not arrived for her hydration today. [facility] notified. Patient was not sure if the office was aware of alternate transportation she could use as she has multiple problems with transportation through her insurance.

## 2020-06-24 ENCOUNTER — APPOINTMENT (OUTPATIENT)
Dept: INFUSION THERAPY | Age: 50
End: 2020-06-24
Payer: MEDICARE

## 2020-06-25 ENCOUNTER — TELEPHONE (OUTPATIENT)
Dept: ONCOLOGY | Age: 50
End: 2020-06-25

## 2020-06-25 DIAGNOSIS — C48.2 PRIMARY PERITONEAL CARCINOMATOSIS (HCC): Primary | ICD-10-CM

## 2020-06-25 NOTE — TELEPHONE ENCOUNTER
Patient contacted [facility] office in reference to upcoming PET scan with another provider as a follow-up appointment. Patient concerned with health risk of getting a PET scan during chemo/radiation treatment. Please contact patient to discuss matter.  Contact patient at [phone]

## 2020-06-25 NOTE — TELEPHONE ENCOUNTER
Pt called regarding requesting clification on if she should have her PET CT done with Dr. Verenice Rich scheduled for 07/21/2020. Explained to patient we are planning to have a PET done around the same time to assess response to treatment. Explained that we would perfer she has her PET 07/2020 with the same system that her baseline PET 04/30/2020 was complited for a better comparision. Instructed pt to cancel PET in the "[employer] system and we will schedule her for 07/17/2020 0930 at [facility] location. Verified with patient her follow up appointment with Dr. Elias Cordero 07/22/2020 3550. Patient verbalized understanding and agreed to plan. Patient instructed to contact office for any question or concerns.      Trung Cardona NP

## 2020-06-30 RX ORDER — MAGNESIUM SULFATE HEPTAHYDRATE 40 MG/ML
2 INJECTION, SOLUTION INTRAVENOUS
Status: CANCELLED
Start: 2020-07-08

## 2020-06-30 RX ORDER — ACETAMINOPHEN 325 MG/1
650 TABLET ORAL AS NEEDED
Status: CANCELLED
Start: 2020-07-08

## 2020-06-30 RX ORDER — LORAZEPAM 2 MG/ML
0.26 INJECTION INTRAMUSCULAR
Status: CANCELLED
Start: 2020-07-08

## 2020-06-30 RX ORDER — POTASSIUM CHLORIDE 7.45 MG/ML
10 INJECTION INTRAVENOUS
Status: CANCELLED
Start: 2020-07-08

## 2020-06-30 RX ORDER — HYDROCORTISONE SODIUM SUCCINATE 100 MG/2ML
100 INJECTION, POWDER, FOR SOLUTION INTRAMUSCULAR; INTRAVENOUS AS NEEDED
Status: CANCELLED | OUTPATIENT
Start: 2020-07-08

## 2020-06-30 RX ORDER — DIPHENHYDRAMINE HYDROCHLORIDE 50 MG/ML
25 INJECTION, SOLUTION INTRAMUSCULAR; INTRAVENOUS AS NEEDED
Status: CANCELLED
Start: 2020-07-08

## 2020-06-30 RX ORDER — EPINEPHRINE 1 MG/ML
0.3 INJECTION, SOLUTION, CONCENTRATE INTRAVENOUS AS NEEDED
Status: CANCELLED | OUTPATIENT
Start: 2020-07-08

## 2020-06-30 RX ORDER — ALBUTEROL SULFATE 0.83 MG/ML
2.5 SOLUTION RESPIRATORY (INHALATION) AS NEEDED
Status: CANCELLED
Start: 2020-07-08

## 2020-06-30 RX ORDER — PROCHLORPERAZINE EDISYLATE 5 MG/ML
10 INJECTION INTRAMUSCULAR; INTRAVENOUS
Status: CANCELLED
Start: 2020-07-08

## 2020-06-30 RX ORDER — DIPHENHYDRAMINE HYDROCHLORIDE 50 MG/ML
50 INJECTION, SOLUTION INTRAMUSCULAR; INTRAVENOUS AS NEEDED
Status: CANCELLED
Start: 2020-07-08

## 2020-06-30 RX ORDER — ONDANSETRON 2 MG/ML
8 INJECTION INTRAMUSCULAR; INTRAVENOUS AS NEEDED
Status: CANCELLED | OUTPATIENT
Start: 2020-07-08

## 2020-07-01 ENCOUNTER — HOSPITAL ENCOUNTER (OUTPATIENT)
Dept: INFUSION THERAPY | Age: 50
Discharge: HOME | End: 2020-07-01
Payer: MEDICARE

## 2020-07-01 RX ORDER — HEPARIN 100 UNIT/ML
500 SYRINGE INTRAVENOUS AS NEEDED
Status: DISCONTINUED | OUTPATIENT
Start: 2020-07-01 | End: 2020-08-01 | Stop reason: HOSPADM

## 2020-07-01 RX ORDER — SODIUM CHLORIDE 0.9 % (FLUSH) 0.9 %
10-40 SYRINGE (ML) INJECTION AS NEEDED
Status: DISCONTINUED | OUTPATIENT
Start: 2020-07-01 | End: 2020-08-01 | Stop reason: HOSPADM

## 2020-07-06 ENCOUNTER — APPOINTMENT (OUTPATIENT)
Dept: INFUSION THERAPY | Age: 50
End: 2020-07-06
Payer: MEDICARE

## 2020-07-07 ENCOUNTER — VIRTUAL VISIT (OUTPATIENT)
Dept: ONCOLOGY | Age: 50
End: 2020-07-07

## 2020-07-07 ENCOUNTER — HOSPITAL ENCOUNTER (OUTPATIENT)
Dept: INFUSION THERAPY | Age: 50
Discharge: HOME | End: 2020-07-07
Payer: MEDICARE

## 2020-07-07 ENCOUNTER — TELEPHONE (OUTPATIENT)
Dept: ONCOLOGY | Age: 50
End: 2020-07-07

## 2020-07-07 VITALS
OXYGEN SATURATION: 95 % | RESPIRATION RATE: 18 BRPM | BODY MASS INDEX: 46.03 KG/M2 | HEIGHT: 63 IN | TEMPERATURE: 98.3 F | SYSTOLIC BLOOD PRESSURE: 141 MMHG | DIASTOLIC BLOOD PRESSURE: 84 MMHG | WEIGHT: 259.8 LBS | HEART RATE: 70 BPM

## 2020-07-07 DIAGNOSIS — C48.2 PERITONEAL CARCINOMA (HCC): Primary | ICD-10-CM

## 2020-07-07 DIAGNOSIS — C48.2 PRIMARY PERITONEAL CARCINOMATOSIS (HCC): ICD-10-CM

## 2020-07-07 DIAGNOSIS — Z79.899 ENCOUNTER FOR MONITORING CARDIOTOXIC DRUG THERAPY: ICD-10-CM

## 2020-07-07 DIAGNOSIS — Z51.81 ENCOUNTER FOR MONITORING CARDIOTOXIC DRUG THERAPY: ICD-10-CM

## 2020-07-07 LAB
ALBUMIN SERPL-MCNC: 3.6 G/DL (ref 3.4–5)
ALBUMIN/GLOB SERPL: 0.9 {RATIO} (ref 0.8–1.7)
ALP SERPL-CCNC: 82 U/L (ref 45–117)
ALT SERPL-CCNC: 23 U/L (ref 13–56)
ANION GAP SERPL CALC-SCNC: 4 MMOL/L (ref 3–18)
APPEARANCE UR: CLEAR
AST SERPL-CCNC: 15 U/L (ref 10–38)
BACTERIA URNS QL MICRO: ABNORMAL /HPF
BASOPHILS # BLD: 0 K/UL (ref 0–0.1)
BASOPHILS NFR BLD: 0 % (ref 0–2)
BILIRUB SERPL-MCNC: 0.6 MG/DL (ref 0.2–1)
BILIRUB UR QL: NEGATIVE
BUN SERPL-MCNC: 32 MG/DL (ref 7–18)
BUN/CREAT SERPL: 15 (ref 12–20)
CALCIUM SERPL-MCNC: 8.9 MG/DL (ref 8.5–10.1)
CANCER AG125 SERPL-ACNC: 5 U/ML (ref 1.5–35)
CHLORIDE SERPL-SCNC: 112 MMOL/L (ref 100–111)
CO2 SERPL-SCNC: 26 MMOL/L (ref 21–32)
COLOR UR: YELLOW
CREAT SERPL-MCNC: 2.13 MG/DL (ref 0.6–1.3)
DIFFERENTIAL METHOD BLD: ABNORMAL
EOSINOPHIL # BLD: 0.1 K/UL (ref 0–0.4)
EOSINOPHIL NFR BLD: 1 % (ref 0–5)
EPITH CASTS URNS QL MICRO: ABNORMAL /LPF (ref 0–5)
ERYTHROCYTE [DISTWIDTH] IN BLOOD BY AUTOMATED COUNT: 17.8 % (ref 11.6–14.5)
GLOBULIN SER CALC-MCNC: 3.8 G/DL (ref 2–4)
GLUCOSE SERPL-MCNC: 130 MG/DL (ref 74–99)
GLUCOSE UR STRIP.AUTO-MCNC: NEGATIVE MG/DL
HCT VFR BLD AUTO: 29.8 % (ref 35–45)
HGB BLD-MCNC: 9.8 G/DL (ref 12–16)
HGB UR QL STRIP: ABNORMAL
KETONES UR QL STRIP.AUTO: NEGATIVE MG/DL
LEUKOCYTE ESTERASE UR QL STRIP.AUTO: ABNORMAL
LYMPHOCYTES # BLD: 1 K/UL (ref 0.9–3.6)
LYMPHOCYTES NFR BLD: 20 % (ref 21–52)
MAGNESIUM SERPL-MCNC: 2 MG/DL (ref 1.6–2.6)
MCH RBC QN AUTO: 29.8 PG (ref 24–34)
MCHC RBC AUTO-ENTMCNC: 32.9 G/DL (ref 31–37)
MCV RBC AUTO: 90.6 FL (ref 74–97)
MONOCYTES # BLD: 0.3 K/UL (ref 0.05–1.2)
MONOCYTES NFR BLD: 6 % (ref 3–10)
NEUTS SEG # BLD: 3.8 K/UL (ref 1.8–8)
NEUTS SEG NFR BLD: 73 % (ref 40–73)
NITRITE UR QL STRIP.AUTO: NEGATIVE
PH UR STRIP: 6.5 [PH] (ref 5–8)
PLATELET # BLD AUTO: 172 K/UL (ref 135–420)
PMV BLD AUTO: 10.1 FL (ref 9.2–11.8)
POTASSIUM SERPL-SCNC: 4.1 MMOL/L (ref 3.5–5.5)
PROT SERPL-MCNC: 7.4 G/DL (ref 6.4–8.2)
PROT UR STRIP-MCNC: 100 MG/DL
RBC # BLD AUTO: 3.29 M/UL (ref 4.2–5.3)
RBC #/AREA URNS HPF: ABNORMAL /HPF (ref 0–5)
SODIUM SERPL-SCNC: 142 MMOL/L (ref 136–145)
SP GR UR REFRACTOMETRY: 1.01 (ref 1–1.03)
UROBILINOGEN UR QL STRIP.AUTO: 0.2 EU/DL (ref 0.2–1)
WBC # BLD AUTO: 5.2 K/UL (ref 4.6–13.2)
WBC URNS QL MICRO: ABNORMAL /HPF (ref 0–5)

## 2020-07-07 PROCEDURE — 85025 COMPLETE CBC W/AUTO DIFF WBC: CPT

## 2020-07-07 PROCEDURE — 80053 COMPREHEN METABOLIC PANEL: CPT

## 2020-07-07 PROCEDURE — 87086 URINE CULTURE/COLONY COUNT: CPT

## 2020-07-07 PROCEDURE — 36415 COLL VENOUS BLD VENIPUNCTURE: CPT

## 2020-07-07 PROCEDURE — 86304 IMMUNOASSAY TUMOR CA 125: CPT

## 2020-07-07 PROCEDURE — 81001 URINALYSIS AUTO W/SCOPE: CPT

## 2020-07-07 PROCEDURE — 83735 ASSAY OF MAGNESIUM: CPT

## 2020-07-07 NOTE — PROGRESS NOTES
[facility]  [address] [place] [address], [address], [address] [facility]  [address] [facility], [place], operated by [facility], [address] [facility]  [place], [address]   [phone]  Pipo June DO      Patient ID:  Name:  Alfonso Collado  MRN:  [id number]  :  1970/49 y.o. Date:  2020      HISTORY OF PRESENT ILLNESS:  Alfonso Collado is a 47 y.o.  postmenopausal female referred by Dr. Corina Wolf  With peritoneal cancer. Intitally seen be NP with reports of vaginal bleeding. Given pt's history of hysteretectomy with BSO for endometriosis in  a TVUS was ordered. Which revealed a 6.8 cm x 5.2 cm x 4.6 cm at midline vaginal cuff. This prompted pelvic CT with findings of a lesion measuring 7.6 x 5.8 x 7.2 cm and is compatible with a pelvic neoplasm vs endometrioma given prior history of endometriosis. Tumor markers done on 2018 all normal.  S/p  Exploratory laparotomy, exploration of retroperitoneal spaces, exam under anesthesia with biopsy of rectovaginal mass on 2019. Had sigmoidoscopy which revealed submucosal mass. S/p cycle #6 of carbo/taxol on 2019. S/p cytoreductive surgery with [facility] on 2019. S/p radiation treatment completed in 2019. Was seen in office and had a biopsy c/w recurrence. S/p cycle #2 of Carbo/Doxil on 2020. Having more fatigue this cycle. Has some intermittent nausea improved with anti-emetics. having vertigo and had stopped her meds.    Labs:  Component      Latest Ref Rng & Units 2020          10:40 AM   CA-125      1.5 - 35.0 U/mL 7     Component      Latest Ref Rng & Units 2020          11:30 AM   CA-125      1.5 - 35.0 U/mL 7     Component      Latest Ref Rng & Units 3/4/2020           8:15 AM   CA-125      1.5 - 35.0 U/mL 7     Component      Latest Ref Rng & Units 11/15/2019           9:56 AM   CA-125      1.5 - 35.0 U/mL 6     Component      Latest Ref Rng & Units 2019           8:44 AM CA-125      1.5 - 35.0 U/mL 7     Component      Latest Ref Rng & Units 5/16/2019 4/25/2019           8:26 AM  8:20 AM   Cancer Ag (CA) 125      0.0 - 38.1 U/mL 7.8 8.7     Component      Latest Ref Rng & Units 4/4/2019           8:28 AM   Cancer Ag (CA) 125      0.0 - 38.1 U/mL 8.8     Component      Latest Ref Rng & Units 3/14/2019 2/21/2019           8:15 AM  8:32 AM   Cancer Ag (CA) 125      0.0 - 38.1 U/mL 10.4 18.4     12/17/2018 : 9.3  12/17/2018 CEA: 2.0  12/17/2018 AFP: 3.8    Pathology  4/20/2020  Vaginal biopsy  Papillary serous adenocarcinoma    8/8/2019  A) COLON, PERICOLIC NODULE, EXCISION:      - ACELLULAR MUCIN WITH MULTIPLE CALCIFICATIONS, AND ASSOCIATED FIBROUS WALL WITH        HYALINIZATION, AND CHRONIC INFLAMMATION.     - ADJACENT BENIGN FIBROADIPOSE TISSUE, CONSISTENT WITH MESENTERY.     - NO EVIDENCE OF MALIGNANCY.      - SEE COMMENT. B) LIVER, RIGHT LOBE, BIOPSY:      - NODULAR FAT NECROSIS AND FIBROSIS OF THE LIVER CAPSULE.      - MILD STEATOSIS.     - NO EVIDENCE OF MALIGNANCY. C) COLON, SIGMOID, SEROSAL IMPLANT, EXCISION:      - NODULAR FAT NECROSIS WITH FIBROSIS, CHRONIC INFLAMMATION, CALCIFICATIONS, AND        CYSTIC DEGENERATION WITHOUT MUCIN.     - NO EVIDENCE OF MALIGNANCY. D) OMENTUM, OMENTECTOMY (RESECTION):      - CONGESTED FIBROADIPOSE TISSUE WITH FOCAL FIBROSIS AND CHRONIC INFLAMMATION, AND        CALCIFIED NODULAR FIBROSIS.     - NO EVIDENCE OF MALIGNANCY. E) PERITONEUM, LEFT ANTERIOR ABDOMINAL, RESECTION:      - CONGESTED PERITONEAL TISSUE, NO EVIDENCE OF MALIGNANCY. F) PERITONEUM, RIGHT ANTERIOR ABDOMINAL, RESECTION:      - CONGESTED PERITONEAL TISSUE, NO EVIDENCE OF MALIGNANCY. G) COLON, SIGMOID, SEROSAL IMPLANT, EXCISION:      - NODULAR FIBROSIS WITH HISTIOCYTES, SUGGESTIVE OF FAT NECROSIS.     - CYSTIC DEGENERATION, WITHOUT MUCIN.     - NO EVIDENCE OF MALIGNANCY.     H) SOFT TISSUE, FALCIFORM, EXCISION:      - CONSISTENT WITH FALCIFORM LIGAMENT.     - NO EVIDENCE OF MALIGNANCY. PATHOLOGIC STAGE:  ypTx, pNx    1/17/2019   RECTOVAGINAL MASS (#1, 2) AND PELVIC MASS, BIOPSIES:   CONSISTENT WITH SEROUS CARCINOMA WITH EXTENSIVE NECROSIS. Imaging  PETCT 12/2019  1. Residual small soft tissue attenuation mass lateral right perirectal pelvis similar volume as above. No associated hypermetabolic activity. 2. Previous 2 small foci of increased metabolic activity residual at the vaginal cuff have resolved. 3. Small focus of asymmetric metabolic activity localizes to stool-filled sigmoid colon midline abdomen as above, favored as physiologic. 4. Small focus of possible mildly increased metabolic activity along the caudal margin left lobe liver as above without discernible underlying lesion or nodule. Possible misregistration. Attention on any subsequent imaging. See above. 5. No hypermetabolic abnormalities appreciated to diagnose new metastatic disease otherwise. CT abdomen/pelvis 6/19/2019  CT ABDOMEN FINDINGS: Visualized portions of lung bases demonstrate presence of  mild cardiomegaly. Visualized portions of the lung bases demonstrate no  significant abnormalities.     Small segment 7 lesion adjacent to the IVC measures 1.1 x 1.0 cm previously  measuring 1.5 x 1.1 cm. The liver is otherwise normal. The gallbladder is  normal.     The splenic lesion measures 3.0 x 2.8 cm previously measuring 2.4 x 2.3 cm.     The pancreas is normal. There is probably a tiny right adrenal adenoma  unchanged. The left adrenal gland is normal. There is a small cyst involving the  lower pole the left kidney. Kidneys are otherwise normal.     There is no intra-abdominal or retroperitoneal adenopathy. I see no  intra-abdominal carcinomatosis.     CT PELVIS FINDINGS: Lower right pelvic mass currently measures 2.8 x 2.7 cm  previously measuring 3.2 x 2.8 cm. No additional pelvic masses identified.  No  additional evidence of pelvic carcinomatosis.     Status post hysterectomy. There is no free fluid. There is no pelvic adenopathy. No specific bowel abnormalities are seen. Postoperative changes are seen  involving the anterior pelvic wall.     No significant osseous abnormalities. Extensive degenerative changes are seen  involving the spine.     IMPRESSION  Impression:        1. The small liver lesion has decreased in size. The small lower right pelvic  mass has decreased in size. 2. The splenic lesion may have increased in size slightly. This lesion was not  positive on PET and is probably not related to the patient's pelvic malignancy. Suspect benign lesion. 3. No additional CT evidence of malignancy. 4. Cardiomegaly. Status post hysterectomy. Additional chronic changes as  described above. CT abdomen/pelvis  FINDINGS:  view shows lung bases clear and normal bowel gas pattern. Patient morbidly obese but probably with interval weight loss.     CT Abdomen and pelvis:     Lung bases: Normal.     Heart and pericardium: There is a catheter tip in the right atrium of the heart. Heart and pericardium otherwise unremarkable.     Liver: Decrease size of lesion in segment 7 of the liver adjacent IVC now  measuring 11 x 15 mm and on PET/CT 2/14/2019 it measured 23 mm. No new liver  lesions.     Gallbladder: Normal.     Spleen: Mass inferiorly in the spleen near the hilum measures 23 x 24 mm,  probably present previously but not as well seen because of lack of intravenous  contrast and measuring about 27 mm (29).    Pancreas: Normal.     Adrenal glands: Normal.     Kidneys: Enhancing symmetrically. No focal lesions.     The bowel: Stomach and duodenum and small bowel and the appendix and colon are  normal.     GYN: Prior hysterectomy. No adnexal masses.     Peritoneal space: No free fluid. There is some mild fat stranding in the left  lower quadrant but no discrete mass (60) and probably related to the mesentery.     MSK: Abdominal wall scar lower abdominal wall. Multiple levels facet arthropathy  lower lumbar spine. Moderate disc space narrowing L5/S1 and L4/L5. No suspicious  skeletal lesions.     Retroperitoneum: Soft tissue mass right side pelvis has decreased in size. It  measures 2.8 x 3.2 cm. On previous PET/CT it measured 8.3 x 5.3 cm and on CT it  measured 7.4 x 5.2 cm.     IMPRESSION  IMPRESSION:     Overall improvement compatible with response to therapy. Decreased size of the  pelvic metastasis, hepatic metastasis, splenic lesion since the prior study. No  new metastases. MRI 2/19/2019  FINDINGS:  Presumed hysterectomy. There is a lobulated 6.8 x 6 x 5.9 cm mass centered at  the right cul-de-sac involving the right and posterior upper to mid vagina but  also abutting the right anterior rectum from 1-8 o'clock. On coronal and  sagittal views tumor is favored to not be originating from the rectum but this  is not definite. There is a oval ring of T2 hypointensity and T1 hyperintensity  with central necrosis within the mass. The mass extends to the posterior medial  right mesorectal fascia. The mass does not invade the bladder. At the cranial  aspect of the mass is a spiculated 3.4 x 2 cm T2 hypointense structure with  tethering to adjacent bowel and fascia. Ovaries are not visualized separately. No pelvic ascites.     Bowel is otherwise normal in caliber. No upstream dilation. Bladder is normal.  No lymphadenopathy.     Postsurgical changes anterior midline pelvic wall. Bones are grossly  unremarkable.     IMPRESSION  IMPRESSION:    1. Large up to 6.8 cm mass centered at the right cul-de-sac  is most  intimately associated with the vagina, favored to be either endometrial, vaginal  or cervical in origin. It does abut and may involve the right rectum but this is  favored secondary involvement. Ovaries are not visualized separately. Correlate  for history of oophorectomy since ovarian malignancy also possible.   2.  There is an associated site of favored endometriotic implant at the cranial  aspect of the mass raising the possibility of malignant transformation. 3.  No upstream bowel obstruction. PETCT 2/14/2019   --  PET FINDINGS  --    No abnormal hypermetabolic activity in the neck. No abnormal hypermetabolic activity in the chest.      Low-attenuation possible lesion in the medial dome right lobe liver in region  of heterogeneous hypermetabolic activity, with SUV Max reaching 11.7 on image  98. Underlying lesion difficult to visualize due to extensive motion from  respirations. Cannot well separate from the IVC or diaphragm. No definite  corresponding lung lesion however.     There is low level metabolic activity associated with stranding and presumed  scarring in the subcutaneous fat of the lower midline abdominal and pelvic wall. This may simply be inflammatory. .      There is a lobulated soft tissue conglomerate in the midline to right lateral  cul-de-sac and perirectal pelvis. The periphery of this is diffusely  hypermetabolic, with SUV Max reaching 18.3 on image 200. Portions centrally are  photopenic and presumed necrotic. This measures up to 7.5 x 6.1 cm axial on  image 196. Anterior margin of this hypermetabolic lesion is in direct contact  with the high intensity decompressed urinary bladder, involvement cannot be  assessed. Left lateral margin of the mass is in contact with the surface of the  lateral wall of the sigmoid colon. Direct involvement cannot be assessed.  -Additional focal hypermetabolic density 2.7 cm on image 189 inseparable from  the caudal wall of the sigmoid. -Tapering hypermetabolic activity extends to approximately image 210, not to the  level of the perineum.     There are no other areas of abnormal metabolic activity appreciated in the  abdomen or pelvis which cannot be attributed to renal collecting system, ureter,  bladder or bowel wall activity>].       No definite hypermetabolic bone lesions appreciated, although bone scan can be  more sensitive in this respect. --  CT FINDINGS  --     These limited CT images do not replace diagnostic quality contrast enhanced CT  scan for evaluation of solid organs, bowel, retroperitoneum and vasculature. CT NECK:    Severely limited by lack of intravenous contrast.  Paranasal sinuses free of  disease. No appreciably enlarged lymph nodes. Extensive cervical endplate  osteophytes anterior and left lateral..      CT CHEST:    Limited evaluation of the hilum and vasculature without intravenous contrast.  No pleural effusion. No appreciably enlarged lymph nodes. Patchy parenchymal  opacities medial basal left lower lobe on image 89 demonstrates low level  metabolic activity, SUV Max 3.2, nonspecific, favored as inflammatory based on  imaging appearance subjectively. Naz Koenig CT ABDOMEN:    Limited noncontrast images. Normal-size liver and spleen no adrenal mass. No  hydronephrosis. No ascites. CT PELVIS:    Limited noncontrast images. No small bowel or colon distention. Scattered  colonic diverticulosis. Postsurgical change anterior abdominal wall. Severe  degenerative facet disease at the lower lumbar levels.       Lobulated right pelvic/cul-de-sac soft tissue mass as above. Mild degree of  stranding in the pelvic mesenteric fat. No ascites.        IMPRESSION   IMPRESSION:      1. Peripherally hypermetabolic soft tissue mass right dependent pelvis to the  cul-de-sac region as above consistent with malignancy/metastatic disease with  some central necrosis  -Possible separate lesion versus serosal involvement of the sigmoid colon  adjacent. -Mass in contact with posterior bladder, sigmoid colon locally and posterior  peritoneal surface.     2. Hypermetabolic lesion along central dome of the right lobe liver difficult to  separate from IVC or diaphragm due to motion artifact.  Recommend dedicated  contrast enhanced CT for localization and better clarification.  -Findings are concerning for malignancy or hepatic metastatic disease until  proven otherwise.      3. Low level activity associated with patchy parenchymal opacity at the medial  left lower lobe,, favored as inflammatory as above.     4. No other definite hypermetabolic abnormalities appreciated elsewhere. 12/14/2018 CT PELVIS W/CONTRAST  FINDINGS:    LYMPH NODES: No enlarged lymph nodes. PELVIC GASTROINTESTINAL TRACT: No bowel dilation or wall thickening. PELVIC ORGANS:     The uterus is absent. Along the right upper margin of the vaginal cuff within the right deep pelvis there is a large irregularly-shaped peripherally enhancing, septated appearing mass with regions of central low attenuation which could be low density-fluid type contents or regions of necrosis. Lesion measures 76 x 58 x 72 mm and is compatible with a pelvic neoplasm. VASCULATURE: Unremarkable. BONES: Lower lumbar hypertrophic osteophytes. Lower lumbar facet hypertrophy with vacuum phenomenon asymmetric leftward. Degenerative vacuum phenomenon within the SI joints noted as well. No acute or aggressive osseous abnormalities identified. OTHER: Small fat-containing ventral umbilical hernia. Minimal nonspecific edema within the subcutaneous fat of the lumbar region, not uncommon. IMPRESSION:   1.  Along the right upper margin of the vaginal cuff within the right deep pelvis there is a large irregularly-shaped peripherally enhancing, septated appearing mass with regions of central low attenuation which could be low density-fluid type contents or regions of necrosis. Lesion measures 76 x 58 x 72 mm and is compatible with a pelvic neoplasm. Could be a endometrioma given prior history of endometriosis, malignancy not excluded and gynecologic oncology follow-up is suggested. 2.  Small fat-containing ventral umbilical hernia.      12/04/2018 TVUS  Uterus: surgically absent  Left ovary: surgically absent  Right ovary: surgically absent  Other findings: midline-at vaginal cuff: 6.8 cm X 5.2 cm x 4.6 cm, volume 85 ml    Impression: Complex pelvic mass       ROS:    As above      Patient Active Problem List    Diagnosis Date Noted    [facility] 02/14/2019    Pelvic mass in female 01/17/2019    Bipolar 1 disorder ([facility] 02/09/2018    Irritable bowel syndrome with both constipation and diarrhea 02/09/2018    Advance care planning 01/31/2017    Dental caries 05/26/2016    Chronic periodontal disease 05/26/2016    SUAZO (dyspnea on exertion) 02/10/2016    Prediabetes 09/24/2015    Morbid obesity ([facility] 08/31/2015    Arthritis, degenerative 08/31/2015    Gastroesophageal reflux disease without esophagitis 08/31/2015    ANAMIKA on CPAP 08/31/2015    Essential hypertension 08/28/2015    PTSD (post-traumatic stress disorder) 03/24/2014    S/P TKR (total knee replacement) 11/14/2012    Depression 05/08/2012    Menopause 05/08/2012    Knee pain, right 05/08/2012    GERD (gastroesophageal reflux disease) 05/08/2012    OA (osteoarthritis) 06/18/2010    Obesity 06/18/2010    Mixed hyperlipidemia 06/18/2010     Past Medical History:   Diagnosis Date    AR (allergic rhinitis)     seasonal    Cancer ([facility]     pt states between vgina and rectal area    Cardiac echocardiogram 04/11/2016    Tech difficult. EF 55-60%. No RWMA. Mild conc LVH. Gr 1 DDfx. RVSP normal.      Cardiac nuclear imaging test 05/05/2016    Low risk. Very patchy radiotracer uptake. Mild anterior artifact, low suspicion for ischemia. EF 68%. No RWMA. Normal EKG on pharm stress test.    Cardiovascular LE arterial duplex 10/07/2013    No significant arterial disease at rest bilaterally. R JUNE 1.21.  L JUNE 1.16. No significant sm vessel disease.     Depression     Dr. Foreign Randall, suicide attempt 2/12    Diverticulosis     Endometriosis     s/p hysterectomy 2006    GERD (gastroesophageal reflux disease)     Glaucoma     Dr. Sarika Armenta HTN (hypertension)  Hx of colonoscopy 2017    mild diverticulosis, g1 internal hemorrhoids, normal colon , repeat 10 year     Hx of suicide attempt     Hyperlipidemia LDL goal < 130     IBS (irritable bowel syndrome)     Ill-defined condition     environmental allergies    Insomnia     Malignant neoplasm of peritoneum (HCC) 2019    Nausea & vomiting     OA (osteoarthritis)     both knees, lower back    Preeclampsia     PTSD (post-traumatic stress disorder)     Seizures (HCC)     1 x with childbirth, had toxemia    Sleep apnea     does not use cpap machine    Spinal stenosis     Dr. Rock Kent Vertigo       Past Surgical History:   Procedure Laterality Date    COLONOSCOPY N/A 2017    COLONOSCOPY performed by Renae Allen MD at [address] N/A 2019    SIGMOIDOSCOPY FLEXIBLE performed by Barbara De [facility] at [facility] ENDOSCOPY    HX  SECTION      HX COLONOSCOPY  2017    DR. MCRAE 2017     HX HYSTERECTOMY      HX KNEE ARTHROSCOPY Left     left knee    HX KNEE REPLACEMENT Bilateral     (R)  (L)     HX LAPAROTOMY N/A 2019    and rectovaginal bx    HX OTHER SURGICAL  2016    all teeth removed    HX SALPINGO-OOPHORECTOMY  2008    HX TUBAL LIGATION      IR INSERT TUNL CVC W PORT OVER 5 YEARS  2019    MULTIPLE DELIVERY       1990    AR FEMUR/KNEE SURG UNLISTED Left 2009    External fixator    TOTAL ABDOM HYSTERECTOMY        OB History        2    Para   2    Term   2       0    AB   0    Living   0       SAB   0    TAB   0    Ectopic   0    Molar   0    Multiple   0    Live Births   0              Social History     Tobacco Use    Smoking status: Never Smoker    Smokeless tobacco: Never Used   Substance Use Topics    Alcohol use: No      Family History   Problem Relation Age of Onset    Heart Disease Mother         CHF    Diabetes Mother     Hypertension Mother    Rock Kidney Disease Mother     Breast Cancer Mother     Stroke Mother     Diabetes Father     Hypertension Father     Heart Attack Father         MI    Cancer Maternal Grandmother         stomach    Ovarian Cancer Maternal Aunt     Cancer Maternal Uncle       Current Outpatient Medications   Medication Sig    oxyCODONE-acetaminophen (PERCOCET) 5-325 mg per tablet Take 1 Tab by mouth every four (4) hours as needed for Pain for up to 30 days. Max Daily Amount: 6 Tabs.  oxybutynin chloride XL (DITROPAN XL) 10 mg CR tablet Take 1 Tab by mouth daily.  levocetirizine (XYZAL) 5 mg tablet Take  by mouth.  lidocaine-prilocaine (EMLA) topical cream Apply  to affected area as needed for Pain.  promethazine (PHENERGAN) 25 mg tablet Take 1 Tab by mouth every six (6) hours as needed for Nausea.  pantoprazole (PROTONIX) 40 mg tablet take 1 tablet by mouth once daily    promethazine (PHENERGAN) 25 mg tablet take 1 tablet by mouth every 6 hours if needed for nausea    meclizine (ANTIVERT) 25 mg tablet take 1 tablet by mouth three times a day if needed for dizziness    Facial Mask misc Use daily    Disposable Gloves misc Use daily    amLODIPine (NORVASC) 10 mg tablet take 1 tablet by mouth once daily    metoprolol succinate (TOPROL-XL) 100 mg tablet Take 1 Tab by mouth daily.  magnesium hydroxide (PATHAK MILK OF MAGNESIA) 400 mg/5 mL suspension Take 30 mL by mouth daily as needed for Constipation.  Biotin 2,500 mcg cap Take  by mouth.  multivitamin (ONE A DAY) tablet Take 1 Tab by mouth daily.  cyanocobalamin (VITAMIN B-12) 100 mcg tablet Take 100 mcg by mouth daily.     PARoxetine (PAXIL) 10 mg tablet take 1 tablet by mouth once daily    simvastatin (ZOCOR) 20 mg tablet take 1 tablet by mouth at bedtime    ibuprofen (MOTRIN) 600 mg tablet take 1 tablet by mouth three times a day ONLY AS NEEDED    lidocaine-prilocaine (EMLA) topical cream apply to affected area once daily if needed for pain  Mv,Ca,Min-FA-Herbal No.157 (ESTROVEN MAXIMUM STRENGTH) 400 mcg tab Take  by mouth daily. Indications: hot flash    dimethicone (SOOTHE AND COOL INZO BARRIER) 5 % topical cream Apply  to affected area two (2) times a day.  polyethylene glycol (MIRALAX) 17 gram packet Take 1 Packet by mouth daily.  cycloSPORINE (RESTASIS) 0.05 % dpet Apply 1 Drop to eye as needed.  melatonin 3 mg tablet Take 3 mg by mouth nightly.  plecanatide (TRULANCE) 3 mg tab Take  by mouth.  OXcarbazepine (TRILEPTAL) 300 mg tablet Take 300 mg by mouth two (2) times a day.  montelukast (SINGULAIR) 10 mg tablet Take 10 mg by mouth nightly.  latanoprost (XALATAN) 0.005 % ophthalmic solution Administer 1 Drop to both eyes nightly. No current facility-administered medications for this visit. Facility-Administered Medications Ordered in Other Visits   Medication Dose Route Frequency    sodium chloride (NS) flush 10-40 mL  10-40 mL IntraVENous PRN    heparin (porcine) pf 500 Units  500 Units InterCATHeter PRN     Allergies   Allergen Reactions    Other Medication Hives     seafood    Pollen Extracts Other (comments)    Shellfish Derived Hives          OBJECTIVE:    Physical Exam  VITAL SIGNS: Visit Vitals  LMP 01/31/2008      GENERAL EMILY: in no apparent distress and well developed and well nourished               IMPRESSION/PLAN:  1.stage IV Primary peritoneal cancer with likely recurrence   -previoulsy reviewed her pathology    -s/p carbo (auc=6), taxol (175 mg/m2) IV every 3 wks s/p 6 cycles completed 6/6/2019   -s/p cytoreductive surgery with [facility] with dr. Subha Mcmahon   -s/p pelvic radiation   -biopsy c/w recurrence   -PETCT reviewed with progression of pelvic mass. Repeat PETCT after cycle #3   -nausea- encouraged use of phenergan/zofran   Pain-script for percocet    -given platinum sensitive disease will plan to treat with carbo auc=5, Doxil 30 mg/m2 IV every 4 wks until CCR, toxicity or progression.  We briefly discussed if no metastatic disease develops and she has some response to chemo may consider surgery with dr. Rizwana Ellsworth . We also discussed use of parp maintenance as well   -tolerating chemo. No G3 or G4 toxicity   -hydronephrosis- s/p stent placement   -s/p cycle #2   -ok for cycle #3   -f/u prior to cycle #4    The total time spent was 40 minutes regarding this patients diagnosis of primary peritoneal cancer and >50% of this time was spent counseling and coordinating care    Paulino Gay MD  Gynecologic Oncology  7/7/202010:05 AM    I was in the office while conducting this encounter. Consent:  She and/or her healthcare decision maker is aware that this patient-initiated Telehealth encounter is a billable service, with coverage as determined by her insurance carrier. She is aware that she may receive a bill and has provided verbal consent to proceed: Yes    This virtual visit was conducted via [employer]. Pursuant to the emergency declaration under the [facility], 1135 waiver authority and the [employer] and [employer] Act, this Virtual  Visit was conducted to reduce the patient's risk of exposure to COVID-19 and provide continuity of care for an established patient. Services were provided through a video synchronous discussion virtually to substitute for in-person clinic visit. Due to this being a TeleHealth evaluation, many elements of the physical examination are unable to be assessed. Total Time: minutes: 40.

## 2020-07-07 NOTE — PROGRESS NOTES
[facility] Lab Visit:    Lizzy Glover  1970  [id number]    3104:  Pt arrived ambulatory. Labs drawn peripherally in ac and sent for processing. Pt scheduled to return for treatment. Departed [facility] ambulatory and in no distress.     Visit Vitals  /84 (BP 1 Location: Left arm, BP Patient Position: Sitting)   Pulse 70   Temp 98.3 °F (36.8 °C)   Resp 18   Ht 5' 3\" (1.6 m)   Wt 117.8 kg (259 lb 12.8 oz)   SpO2 95%   BMI 46.02 kg/m²

## 2020-07-07 NOTE — TELEPHONE ENCOUNTER
Called and expailed that we need to have a visit with her prior to chemotherapy tomorrow. Explained if she isn't feeling well enough for a visit we may consider cancelling chemotherapy tomorrow. Pt reports she was just felling bad earlier and she is now well enough for virtual visit. Rescheduled patient for  today.

## 2020-07-07 NOTE — PROGRESS NOTES
Rj Vasquez is a 47 y.o. female presents for virtual visit, peritoneal cancer. Chief Complaint   Patient presents with    Cancer      Pt denies taste changes, hoarseness, sore throat. Pt c/o decreased appetite but is able to eat. Pt c/o headaches and dizziness, and 'feeling out of it'. Pt c/o discomfort in her right hand, discoloration at her fingernails, discoloration on the hands, and 'severe muscle pain'. Do you have any unusual vaginal bleeding, discharge or irritation? Pt c/o 2 days of heavy bleeding with a clot last week. Do you have any changes in your bowel movements? Pt c/o occasional diarrhea. Have you been experiencing nausea or vomiting? Pt c/o nausea. Have you been experiencing any continuous or worsening abdominal pain? Pt c/o abdominal pain described as a 'menstrual cycle type of pain'. Any urinary burning? Pt c/o urinary frequency. There were no vitals taken for this visit. Health Maintenance Due   Topic Date Due    Lipid Screen  02/28/2019    Shingrix Vaccine Age 50> (1 of 2) 07/22/2019         1. Have you been to the ER, urgent care clinic since your last visit? Hospitalized since your last visit? No    2. Have you seen or consulted any other health care providers outside of the [address] [place] [address] since your last visit? Include any pap smears or colon screening. No    3 most recent PHQ Screens 7/22/2019   Little interest or pleasure in doing things Several days   Feeling down, depressed, irritable, or hopeless Several days   Total Score PHQ 2 2       Abuse Screening Questionnaire 7/22/2019   Do you ever feel afraid of your partner? N   Are you in a relationship with someone who physically or mentally threatens you? N   Is it safe for you to go home? Y       Fall Risk Assessment, last 12 mths 2/9/2018   Able to walk? Yes   Fall in past 12 months?  No       Learning Assessment 1/4/2019   PRIMARY LEARNER Patient   HIGHEST LEVEL OF EDUCATION - PRIMARY LEARNER GRADUATED HIGH SCHOOL OR GED   BARRIERS PRIMARY LEARNER NONE   CO-LEARNER CAREGIVER No   PRIMARY LANGUAGE ENGLISH   LEARNER PREFERENCE PRIMARY READING     -   ANSWERED BY patient   RELATIONSHIP SELF

## 2020-07-07 NOTE — TELEPHONE ENCOUNTER
Patient contacted THE [facility] office in reference to canceling virtual visit appointment today 7/7. Patient stated she will contact office when she would like to reschedule.  Patient stated has chemo treatment tomorrow 7/8

## 2020-07-08 ENCOUNTER — HOSPITAL ENCOUNTER (OUTPATIENT)
Dept: INFUSION THERAPY | Age: 50
Discharge: HOME | End: 2020-07-08
Payer: MEDICARE

## 2020-07-08 VITALS
HEART RATE: 68 BPM | OXYGEN SATURATION: 94 % | SYSTOLIC BLOOD PRESSURE: 128 MMHG | RESPIRATION RATE: 18 BRPM | DIASTOLIC BLOOD PRESSURE: 82 MMHG | TEMPERATURE: 97.4 F

## 2020-07-08 DIAGNOSIS — C48.2 MALIGNANT NEOPLASM OF PERITONEUM (HCC): Primary | ICD-10-CM

## 2020-07-08 LAB
BACTERIA SPEC CULT: NORMAL
SERVICE CMNT-IMP: NORMAL

## 2020-07-08 PROCEDURE — 96415 CHEMO IV INFUSION ADDL HR: CPT

## 2020-07-08 PROCEDURE — 74011000258 HC RX REV CODE- 258: Performed by: OBSTETRICS & GYNECOLOGY

## 2020-07-08 PROCEDURE — 77030012965 HC NDL HUBR BBMI -A

## 2020-07-08 PROCEDURE — 96375 TX/PRO/DX INJ NEW DRUG ADDON: CPT

## 2020-07-08 PROCEDURE — 96413 CHEMO IV INFUSION 1 HR: CPT

## 2020-07-08 PROCEDURE — 74011250636 HC RX REV CODE- 250/636: Performed by: OBSTETRICS & GYNECOLOGY

## 2020-07-08 PROCEDURE — 96367 TX/PROPH/DG ADDL SEQ IV INF: CPT

## 2020-07-08 PROCEDURE — 74011000250 HC RX REV CODE- 250: Performed by: OBSTETRICS & GYNECOLOGY

## 2020-07-08 RX ORDER — HEPARIN 100 UNIT/ML
300-500 SYRINGE INTRAVENOUS AS NEEDED
Status: DISPENSED | OUTPATIENT
Start: 2020-07-08 | End: 2020-07-08

## 2020-07-08 RX ORDER — DEXTROSE MONOHYDRATE 50 MG/ML
25 INJECTION, SOLUTION INTRAVENOUS CONTINUOUS
Status: DISPENSED | OUTPATIENT
Start: 2020-07-08 | End: 2020-07-08

## 2020-07-08 RX ORDER — SODIUM CHLORIDE 0.9 % (FLUSH) 0.9 %
10-40 SYRINGE (ML) INJECTION AS NEEDED
Status: DISPENSED | OUTPATIENT
Start: 2020-07-08 | End: 2020-07-08

## 2020-07-08 RX ADMIN — DEXTROSE MONOHYDRATE 25 ML/HR: 50 INJECTION, SOLUTION INTRAVENOUS at 09:34

## 2020-07-08 RX ADMIN — HEPARIN 500 UNITS: 100 SYRINGE at 12:58

## 2020-07-08 RX ADMIN — SODIUM CHLORIDE 150 MG: 900 INJECTION, SOLUTION INTRAVENOUS at 09:57

## 2020-07-08 RX ADMIN — CARBOPLATIN 330 MG: 10 INJECTION, SOLUTION INTRAVENOUS at 12:19

## 2020-07-08 RX ADMIN — SODIUM CHLORIDE 20 MG: 9 INJECTION INTRAMUSCULAR; INTRAVENOUS; SUBCUTANEOUS at 10:22

## 2020-07-08 RX ADMIN — DOXORUBICIN HYDROCHLORIDE 66 MG: 2 INJECTION, SUSPENSION, LIPOSOMAL INTRAVENOUS at 10:59

## 2020-07-08 RX ADMIN — ONDANSETRON: 2 INJECTION INTRAMUSCULAR; INTRAVENOUS at 09:37

## 2020-07-08 RX ADMIN — Medication 20 ML: at 12:58

## 2020-07-08 NOTE — PROGRESS NOTES
[facility] Progress Note    Date: 2020    Name: Rj Sizer    MRN: [id number]         : 1970      Ms. Nahomy Mccoy arrived in the [facility] today at [phone], in stable condition, here for Cycle 3, IV Doxil/Carboplatin Chemotherapy Regimen (Q 28 Day Cycle). She was assessed and education was provided. Ms. Cannon's vitals were reviewed. Visit Vitals  /82 (BP 1 Location: Right arm, BP Patient Position: Sitting)   Pulse 68   Temp 97.4 °F (36.3 °C)   Resp 18   SpO2 94%   Breastfeeding No       Lab results were reviewed. (Her most recent CBC, CMP, Magnesium, & Urinalysis & Culture Results from yesterday, 20, were reviewed, and all results were noted to be satisfactory for treatment today.)    All lab results from 20, were as follows:      . Results for Gaurang Martell (MRN [id number])    Ref. Range 2020 10:35 2020 10:40   WBC Latest Ref Range: 4.6 - 13.2 K/uL  4.2 (L)   RBC Latest Ref Range: 4.20 - 5.30 M/uL  3.48 (L)   HGB Latest Ref Range: 12.0 - 16.0 g/dL  9.9 (L)   HCT Latest Ref Range: 35.0 - 45.0 %  31.4 (L)   MCV Latest Ref Range: 74.0 - 97.0 FL  90.2   MCH Latest Ref Range: 24.0 - 34.0 PG  28.4   MCHC Latest Ref Range: 31.0 - 37.0 g/dL  31.5   RDW Latest Ref Range: 11.6 - 14.5 %  15.9 (H)   PLATELET Latest Ref Range: 135 - 420 K/uL  193   MPV Latest Ref Range: 9.2 - 11.8 FL  9.8   NEUTROPHILS Latest Ref Range: 40 - 73 %  69   LYMPHOCYTES Latest Ref Range: 21 - 52 %  24   MONOCYTES Latest Ref Range: 3 - 10 %  5   EOSINOPHILS Latest Ref Range: 0 - 5 %  2   BASOPHILS Latest Ref Range: 0 - 2 %  0   DF Latest Units:    AUTOMATED   ABS. NEUTROPHILS Latest Ref Range: 1.8 - 8.0 K/UL  2.9   ABS. LYMPHOCYTES Latest Ref Range: 0.9 - 3.6 K/UL  1.0   ABS. MONOCYTES Latest Ref Range: 0.05 - 1.2 K/UL  0.2   ABS. EOSINOPHILS Latest Ref Range: 0.0 - 0.4 K/UL  0.1   ABS.  BASOPHILS Latest Ref Range: 0.0 - 0.1 K/UL  0.0   Color Latest Units:   YELLOW    Appearance Latest Units:   CLEAR    Specific gravity Latest Ref Range: 1.005 - 1.030   1.016    pH (UA) Latest Ref Range: 5.0 - 8.0   7.0    Protein Latest Ref Range: NEG mg/dL 100 (A)    Glucose Latest Ref Range: NEG mg/dL Negative    Ketone Latest Ref Range: NEG mg/dL Negative    Blood Latest Ref Range: NEG   MODERATE (A)    Bilirubin Latest Ref Range: NEG   Negative    Urobilinogen Latest Ref Range: 0.2 - 1.0 EU/dL 0.2    Nitrites Latest Ref Range: NEG   Negative    Leukocyte Esterase Latest Ref Range: NEG   SMALL (A)    Epithelial cells Latest Ref Range: 0 - 5 /lpf 1+    WBC Latest Ref Range: 0 - 4 /hpf 4 to 10    RBC Latest Ref Range: 0 - 5 /hpf 4 to 10    Bacteria Latest Ref Range: NEG /hpf FEW (A)    Sodium Latest Ref Range: 136 - 145 mmol/L  144   Potassium Latest Ref Range: 3.5 - 5.5 mmol/L  4.3   Chloride Latest Ref Range: 100 - 111 mmol/L  110   CO2 Latest Ref Range: 21 - 32 mmol/L  23   Anion gap Latest Ref Range: 3.0 - 18 mmol/L  11   Glucose Latest Ref Range: 74 - 99 mg/dL  130 (H)   BUN Latest Ref Range: 7.0 - 18 MG/DL  32 (H)   Creatinine Latest Ref Range: 0.6 - 1.3 MG/DL  2.48 (H)   BUN/Creatinine ratio Latest Ref Range: 12 - 20    13   Calcium Latest Ref Range: 8.5 - 10.1 MG/DL  9.0   Magnesium Latest Ref Range: 1.6 - 2.6 mg/dL  2.0   GFR est non-AA Latest Ref Range: >60 ml/min/1.73m2  21 (L)   GFR est AA Latest Ref Range: >60 ml/min/1.73m2  25 (L)   Bilirubin, total Latest Ref Range: 0.2 - 1.0 MG/DL  0.3   Protein, total Latest Ref Range: 6.4 - 8.2 g/dL  7.9   Albumin Latest Ref Range: 3.4 - 5.0 g/dL  3.6   Globulin Latest Ref Range: 2.0 - 4.0 g/dL  4.3 (H)   A-G Ratio Latest Ref Range: 0.8 - 1.7    0.8   ALT Latest Ref Range: 13 - 56 U/L  22   AST Latest Ref Range: 10 - 38 U/L  16   Alk.  phosphatase Latest Ref Range: 45 - 117 U/L  87   CA-125 Latest Ref Range: 1.5 - 35.0 U/mL  7   CULTURE, URINE Unknown Rpt    CANCER ANTIGEN 125 Unknown  Rpt          Her right chest single lumen port was accessed after 3 attempts, and brisk blood return was obtained. Dextrose 5% 250 ml IV Bag, was initiated to infuse @ KVO prn, throughout treatment today. The following pre-medications were administered per order, and without incident:  Emend 150 mg IV, Decadron 12 mg IV, Zofran 16 mg IV, & Pepcid 20 mg IV. The following chemotherapy medications were administered:    Liposomal Doxorubicin (DOXIL / LIPODOX) 66 mg IV (30 mg/m2), was administered over approximately 1 hour, per order, and without incident. Ice Packs were applied to both hands & both feet, throughout the Doxil administration. Carboplatin (PARAPLATIN) 330 mg IV (AUC 5), was administered over approximately 30 minutes, per order, and without incident. After completion of all ordered IV medications, her port was flushed well per protocol with NS & Heparin, and then, the upton needle was removed and gauze/bandaid was applied. Ms. Scott Marie tolerated well, and had no complaints. Ms. Scott Marie was discharged from [place] [address] in stable condition at 1300. Conrado Tobar She is to return on 7/15/20 at 11:00, for IV Hydration (gets IV Hydration on her non-chemo weeks).     Chana Mack RN  July 8, 2020

## 2020-07-09 DIAGNOSIS — T45.1X5A CINV (CHEMOTHERAPY-INDUCED NAUSEA AND VOMITING): ICD-10-CM

## 2020-07-09 DIAGNOSIS — R11.2 CINV (CHEMOTHERAPY-INDUCED NAUSEA AND VOMITING): ICD-10-CM

## 2020-07-09 RX ORDER — PROMETHAZINE HYDROCHLORIDE 25 MG/1
TABLET ORAL
Qty: 30 TAB | Refills: 3 | Status: SHIPPED | OUTPATIENT
Start: 2020-07-09 | End: 2020-07-14 | Stop reason: SDUPTHER

## 2020-07-14 ENCOUNTER — VIRTUAL VISIT (OUTPATIENT)
Dept: ONCOLOGY | Age: 50
End: 2020-07-14

## 2020-07-14 ENCOUNTER — TELEPHONE (OUTPATIENT)
Dept: ONCOLOGY | Age: 50
End: 2020-07-14

## 2020-07-14 DIAGNOSIS — C48.2 PRIMARY PERITONEAL CARCINOMATOSIS (HCC): ICD-10-CM

## 2020-07-14 DIAGNOSIS — T45.1X5A CINV (CHEMOTHERAPY-INDUCED NAUSEA AND VOMITING): ICD-10-CM

## 2020-07-14 DIAGNOSIS — R11.2 CINV (CHEMOTHERAPY-INDUCED NAUSEA AND VOMITING): ICD-10-CM

## 2020-07-14 DIAGNOSIS — G89.3 CANCER ASSOCIATED PAIN: ICD-10-CM

## 2020-07-14 RX ORDER — ONDANSETRON 4 MG/1
4 TABLET, FILM COATED ORAL
Qty: 30 TAB | Refills: 3 | Status: SHIPPED | OUTPATIENT
Start: 2020-07-14 | End: 2020-08-24 | Stop reason: SDUPTHER

## 2020-07-14 RX ORDER — OXYCODONE AND ACETAMINOPHEN 5; 325 MG/1; MG/1
1 TABLET ORAL
Qty: 60 TAB | Refills: 0 | Status: SHIPPED | OUTPATIENT
Start: 2020-07-14 | End: 2020-08-06 | Stop reason: SDUPTHER

## 2020-07-14 RX ORDER — PROMETHAZINE HYDROCHLORIDE 25 MG/1
TABLET ORAL
Qty: 30 TAB | Refills: 3 | Status: SHIPPED | OUTPATIENT
Start: 2020-07-14 | End: 2020-08-24 | Stop reason: SDUPTHER

## 2020-07-14 NOTE — PROGRESS NOTES
[facility]  [address] [place] [address], [address], [address]  [place], [address]   [phone]  [facility]        Patient ID:  Name:  Ricky Bruno  MRN:  [id number]  :  1970/49 y.o. Date:  2020      HISTORY OF PRESENT ILLNESS:  Ricky Bruno is a 47 y.o.  postmenopausal female referred by Dr. Zeke Newton  With peritoneal cancer. Intitally seen be NP with reports of vaginal bleeding. Given pt's history of hysteretectomy with BSO for endometriosis in  a TVUS was ordered. Which revealed a 6.8 cm x 5.2 cm x 4.6 cm at midline vaginal cuff. This prompted pelvic CT with findings of a lesion measuring 7.6 x 5.8 x 7.2 cm and is compatible with a pelvic neoplasm vs endometrioma given prior history of endometriosis. Tumor markers done on 2018 all normal.  S/p  Exploratory laparotomy, exploration of retroperitoneal spaces, exam under anesthesia with biopsy of rectovaginal mass on 2019. Had sigmoidoscopy which revealed submucosal mass. S/p cycle #6 of carbo/taxol on 2019. S/p cytoreductive surgery with [facility] on 2019. S/p radiation treatment completed in 2019. Was seen in office and had a biopsy c/w recurrence. S/p cycle #3 of Carbo/Doxil on 2020. Today patient c/o intermittent bleeding, presumed vaginal but also reports noticing bleeding with urination. States that the bleeding is not heavy, thinks it may be r/t her stent. Patient states that she thinks this is a \"different tumor than last year\", with abdominal spasms and cramping recently. She is taking Tylenol without relief and has used Percocet in the past with relief. She requests refill today. She reports dizziness, has a h/o vertigo,  had stopped her meds, she has not yet reached out to PCP for further evaluation/management.  She is drinking about 2-3 bottles of water daily, scheduled for weekly IV hydration, next appointment tomorrow. She is scheduled to see Dr. Jhon Monet next week. Has some intermittent nausea improved with anti-emetics, requests refills. BM more regular lately. Labs:  Component      Latest Ref Rng & Units 6/8/2020          10:40 AM   CA-125      1.5 - 35.0 U/mL 7     Component      Latest Ref Rng & Units 5/11/2020          11:30 AM   CA-125      1.5 - 35.0 U/mL 7     Component      Latest Ref Rng & Units 3/4/2020           8:15 AM   CA-125      1.5 - 35.0 U/mL 7     Component      Latest Ref Rng & Units 11/15/2019           9:56 AM   CA-125      1.5 - 35.0 U/mL 6     Component      Latest Ref Rng & Units 6/6/2019           8:44 AM   CA-125      1.5 - 35.0 U/mL 7     Component      Latest Ref Rng & Units 5/16/2019 4/25/2019           8:26 AM  8:20 AM   Cancer Ag (CA) 125      0.0 - 38.1 U/mL 7.8 8.7     Component      Latest Ref Rng & Units 4/4/2019           8:28 AM   Cancer Ag (CA) 125      0.0 - 38.1 U/mL 8.8     Component      Latest Ref Rng & Units 3/14/2019 2/21/2019           8:15 AM  8:32 AM   Cancer Ag (CA) 125      0.0 - 38.1 U/mL 10.4 18.4     12/17/2018 : 9.3  12/17/2018 CEA: 2.0  12/17/2018 AFP: 3.8    Pathology  4/20/2020  Vaginal biopsy  Papillary serous adenocarcinoma    8/8/2019  A) COLON, PERICOLIC NODULE, EXCISION:      - ACELLULAR MUCIN WITH MULTIPLE CALCIFICATIONS, AND ASSOCIATED FIBROUS WALL WITH        HYALINIZATION, AND CHRONIC INFLAMMATION.     - ADJACENT BENIGN FIBROADIPOSE TISSUE, CONSISTENT WITH MESENTERY.     - NO EVIDENCE OF MALIGNANCY.      - SEE COMMENT. B) LIVER, RIGHT LOBE, BIOPSY:      - NODULAR FAT NECROSIS AND FIBROSIS OF THE LIVER CAPSULE.      - MILD STEATOSIS.     - NO EVIDENCE OF MALIGNANCY. C) COLON, SIGMOID, SEROSAL IMPLANT, EXCISION:      - NODULAR FAT NECROSIS WITH FIBROSIS, CHRONIC INFLAMMATION, CALCIFICATIONS, AND        CYSTIC DEGENERATION WITHOUT MUCIN.     - NO EVIDENCE OF MALIGNANCY.     D) OMENTUM, OMENTECTOMY (RESECTION):      - CONGESTED FIBROADIPOSE TISSUE WITH FOCAL FIBROSIS AND CHRONIC INFLAMMATION, AND        CALCIFIED NODULAR FIBROSIS.     - NO EVIDENCE OF MALIGNANCY. E) PERITONEUM, LEFT ANTERIOR ABDOMINAL, RESECTION:      - CONGESTED PERITONEAL TISSUE, NO EVIDENCE OF MALIGNANCY. F) PERITONEUM, RIGHT ANTERIOR ABDOMINAL, RESECTION:      - CONGESTED PERITONEAL TISSUE, NO EVIDENCE OF MALIGNANCY. G) COLON, SIGMOID, SEROSAL IMPLANT, EXCISION:      - NODULAR FIBROSIS WITH HISTIOCYTES, SUGGESTIVE OF FAT NECROSIS.     - CYSTIC DEGENERATION, WITHOUT MUCIN.     - NO EVIDENCE OF MALIGNANCY. H) SOFT TISSUE, FALCIFORM, EXCISION:      - CONSISTENT WITH FALCIFORM LIGAMENT.     - NO EVIDENCE OF MALIGNANCY. PATHOLOGIC STAGE:  ypTx, pNx    1/17/2019   RECTOVAGINAL MASS (#1, 2) AND PELVIC MASS, BIOPSIES:   CONSISTENT WITH SEROUS CARCINOMA WITH EXTENSIVE NECROSIS. Imaging  PETCT 12/2019  1. Residual small soft tissue attenuation mass lateral right perirectal pelvis similar volume as above. No associated hypermetabolic activity. 2. Previous 2 small foci of increased metabolic activity residual at the vaginal cuff have resolved. 3. Small focus of asymmetric metabolic activity localizes to stool-filled sigmoid colon midline abdomen as above, favored as physiologic. 4. Small focus of possible mildly increased metabolic activity along the caudal margin left lobe liver as above without discernible underlying lesion or nodule. Possible misregistration. Attention on any subsequent imaging. See above. 5. No hypermetabolic abnormalities appreciated to diagnose new metastatic disease otherwise. CT abdomen/pelvis 6/19/2019  CT ABDOMEN FINDINGS: Visualized portions of lung bases demonstrate presence of  mild cardiomegaly.  Visualized portions of the lung bases demonstrate no  significant abnormalities.     Small segment 7 lesion adjacent to the IVC measures 1.1 x 1.0 cm previously  measuring 1.5 x 1. 1 cm. The liver is otherwise normal. The gallbladder is  normal.     The splenic lesion measures 3.0 x 2.8 cm previously measuring 2.4 x 2.3 cm.     The pancreas is normal. There is probably a tiny right adrenal adenoma  unchanged. The left adrenal gland is normal. There is a small cyst involving the  lower pole the left kidney. Kidneys are otherwise normal.     There is no intra-abdominal or retroperitoneal adenopathy. I see no  intra-abdominal carcinomatosis.     CT PELVIS FINDINGS: Lower right pelvic mass currently measures 2.8 x 2.7 cm  previously measuring 3.2 x 2.8 cm. No additional pelvic masses identified. No  additional evidence of pelvic carcinomatosis.     Status post hysterectomy. There is no free fluid. There is no pelvic adenopathy. No specific bowel abnormalities are seen. Postoperative changes are seen  involving the anterior pelvic wall.     No significant osseous abnormalities. Extensive degenerative changes are seen  involving the spine.     IMPRESSION  Impression:        1. The small liver lesion has decreased in size. The small lower right pelvic  mass has decreased in size. 2. The splenic lesion may have increased in size slightly. This lesion was not  positive on PET and is probably not related to the patient's pelvic malignancy. Suspect benign lesion. 3. No additional CT evidence of malignancy. 4. Cardiomegaly. Status post hysterectomy. Additional chronic changes as  described above. CT abdomen/pelvis  FINDINGS:  view shows lung bases clear and normal bowel gas pattern. Patient morbidly obese but probably with interval weight loss.     CT Abdomen and pelvis:     Lung bases: Normal.     Heart and pericardium: There is a catheter tip in the right atrium of the heart. Heart and pericardium otherwise unremarkable.     Liver: Decrease size of lesion in segment 7 of the liver adjacent IVC now  measuring 11 x 15 mm and on PET/CT 2/14/2019 it measured 23 mm.  No new liver  lesions.     Gallbladder: Normal.     Spleen: Mass inferiorly in the spleen near the hilum measures 23 x 24 mm,  probably present previously but not as well seen because of lack of intravenous  contrast and measuring about 27 mm (29).    Pancreas: Normal.     Adrenal glands: Normal.     Kidneys: Enhancing symmetrically. No focal lesions.     The bowel: Stomach and duodenum and small bowel and the appendix and colon are  normal.     GYN: Prior hysterectomy. No adnexal masses.     Peritoneal space: No free fluid. There is some mild fat stranding in the left  lower quadrant but no discrete mass (60) and probably related to the mesentery.     MSK: Abdominal wall scar lower abdominal wall. Multiple levels facet arthropathy  lower lumbar spine. Moderate disc space narrowing L5/S1 and L4/L5. No suspicious  skeletal lesions.     Retroperitoneum: Soft tissue mass right side pelvis has decreased in size. It  measures 2.8 x 3.2 cm. On previous PET/CT it measured 8.3 x 5.3 cm and on CT it  measured 7.4 x 5.2 cm.     IMPRESSION  IMPRESSION:     Overall improvement compatible with response to therapy. Decreased size of the  pelvic metastasis, hepatic metastasis, splenic lesion since the prior study. No  new metastases. MRI 2/19/2019  FINDINGS:  Presumed hysterectomy. There is a lobulated 6.8 x 6 x 5.9 cm mass centered at  the right cul-de-sac involving the right and posterior upper to mid vagina but  also abutting the right anterior rectum from 1-8 o'clock. On coronal and  sagittal views tumor is favored to not be originating from the rectum but this  is not definite. There is a oval ring of T2 hypointensity and T1 hyperintensity  with central necrosis within the mass. The mass extends to the posterior medial  right mesorectal fascia. The mass does not invade the bladder. At the cranial  aspect of the mass is a spiculated 3.4 x 2 cm T2 hypointense structure with  tethering to adjacent bowel and fascia.  Ovaries are not visualized separately. No pelvic ascites.     Bowel is otherwise normal in caliber. No upstream dilation. Bladder is normal.  No lymphadenopathy.     Postsurgical changes anterior midline pelvic wall. Bones are grossly  unremarkable.     IMPRESSION  IMPRESSION:    1. Large up to 6.8 cm mass centered at the right cul-de-sac  is most  intimately associated with the vagina, favored to be either endometrial, vaginal  or cervical in origin. It does abut and may involve the right rectum but this is  favored secondary involvement. Ovaries are not visualized separately. Correlate  for history of oophorectomy since ovarian malignancy also possible. 2.  There is an associated site of favored endometriotic implant at the cranial  aspect of the mass raising the possibility of malignant transformation. 3.  No upstream bowel obstruction. PETCT 2/14/2019   --  PET FINDINGS  --    No abnormal hypermetabolic activity in the neck. No abnormal hypermetabolic activity in the chest.      Low-attenuation possible lesion in the medial dome right lobe liver in region  of heterogeneous hypermetabolic activity, with SUV Max reaching 11.7 on image  98. Underlying lesion difficult to visualize due to extensive motion from  respirations. Cannot well separate from the IVC or diaphragm. No definite  corresponding lung lesion however.     There is low level metabolic activity associated with stranding and presumed  scarring in the subcutaneous fat of the lower midline abdominal and pelvic wall. This may simply be inflammatory. .      There is a lobulated soft tissue conglomerate in the midline to right lateral  cul-de-sac and perirectal pelvis. The periphery of this is diffusely  hypermetabolic, with SUV Max reaching 18.3 on image 200. Portions centrally are  photopenic and presumed necrotic. This measures up to 7.5 x 6.1 cm axial on  image 196.  Anterior margin of this hypermetabolic lesion is in direct contact  with the high intensity decompressed urinary bladder, involvement cannot be  assessed. Left lateral margin of the mass is in contact with the surface of the  lateral wall of the sigmoid colon. Direct involvement cannot be assessed.  -Additional focal hypermetabolic density 2.7 cm on image 189 inseparable from  the caudal wall of the sigmoid. -Tapering hypermetabolic activity extends to approximately image 210, not to the  level of the perineum.     There are no other areas of abnormal metabolic activity appreciated in the  abdomen or pelvis which cannot be attributed to renal collecting system, ureter,  bladder or bowel wall activity>]. No definite hypermetabolic bone lesions appreciated, although bone scan can be  more sensitive in this respect. --  CT FINDINGS  --     These limited CT images do not replace diagnostic quality contrast enhanced CT  scan for evaluation of solid organs, bowel, retroperitoneum and vasculature. CT NECK:    Severely limited by lack of intravenous contrast.  Paranasal sinuses free of  disease. No appreciably enlarged lymph nodes. Extensive cervical endplate  osteophytes anterior and left lateral..      CT CHEST:    Limited evaluation of the hilum and vasculature without intravenous contrast.  No pleural effusion. No appreciably enlarged lymph nodes. Patchy parenchymal  opacities medial basal left lower lobe on image 89 demonstrates low level  metabolic activity, SUV Max 3.2, nonspecific, favored as inflammatory based on  imaging appearance subjectively. [facility]:    Limited noncontrast images. Normal-size liver and spleen no adrenal mass. No  hydronephrosis. No ascites. CT PELVIS:    Limited noncontrast images. No small bowel or colon distention. Scattered  colonic diverticulosis. Postsurgical change anterior abdominal wall. Severe  degenerative facet disease at the lower lumbar levels.       Lobulated right pelvic/cul-de-sac soft tissue mass as above.  Mild degree of  stranding in the pelvic mesenteric fat. No ascites.        IMPRESSION   IMPRESSION:      1. Peripherally hypermetabolic soft tissue mass right dependent pelvis to the  cul-de-sac region as above consistent with malignancy/metastatic disease with  some central necrosis  -Possible separate lesion versus serosal involvement of the sigmoid colon  adjacent. -Mass in contact with posterior bladder, sigmoid colon locally and posterior  peritoneal surface.     2. Hypermetabolic lesion along central dome of the right lobe liver difficult to  separate from IVC or diaphragm due to motion artifact. Recommend dedicated  contrast enhanced CT for localization and better clarification.  -Findings are concerning for malignancy or hepatic metastatic disease until  proven otherwise.      3. Low level activity associated with patchy parenchymal opacity at the medial  left lower lobe,, favored as inflammatory as above.     4. No other definite hypermetabolic abnormalities appreciated elsewhere. 12/14/2018 CT PELVIS W/CONTRAST  FINDINGS:    LYMPH NODES: No enlarged lymph nodes. PELVIC GASTROINTESTINAL TRACT: No bowel dilation or wall thickening. PELVIC ORGANS:     The uterus is absent. Along the right upper margin of the vaginal cuff within the right deep pelvis there is a large irregularly-shaped peripherally enhancing, septated appearing mass with regions of central low attenuation which could be low density-fluid type contents or regions of necrosis. Lesion measures 76 x 58 x 72 mm and is compatible with a pelvic neoplasm. VASCULATURE: Unremarkable. BONES: Lower lumbar hypertrophic osteophytes. Lower lumbar facet hypertrophy with vacuum phenomenon asymmetric leftward. Degenerative vacuum phenomenon within the SI joints noted as well. No acute or aggressive osseous abnormalities identified. OTHER: Small fat-containing ventral umbilical hernia.  Minimal nonspecific edema within the subcutaneous fat of the lumbar region, not uncommon. IMPRESSION:   1.  Along the right upper margin of the vaginal cuff within the right deep pelvis there is a large irregularly-shaped peripherally enhancing, septated appearing mass with regions of central low attenuation which could be low density-fluid type contents or regions of necrosis. Lesion measures 76 x 58 x 72 mm and is compatible with a pelvic neoplasm. Could be a endometrioma given prior history of endometriosis, malignancy not excluded and gynecologic oncology follow-up is suggested. 2.  Small fat-containing ventral umbilical hernia.      12/04/2018 TVUS  Uterus: surgically absent  Left ovary: surgically absent  Right ovary: surgically absent  Other findings: midline-at vaginal cuff: 6.8 cm X 5.2 cm x 4.6 cm, volume 85 ml    Impression: Complex pelvic mass       ROS:    As above      Patient Active Problem List    Diagnosis Date Noted    Malignant neoplasm of peritoneum ([address] 02/14/2019    Pelvic mass in female 01/17/2019    Bipolar 1 disorder ([address] 02/09/2018    Irritable bowel syndrome with both constipation and diarrhea 02/09/2018    Advance care planning 01/31/2017    Dental caries 05/26/2016    Chronic periodontal disease 05/26/2016    SUAZO (dyspnea on exertion) 02/10/2016    Prediabetes 09/24/2015    Morbid obesity ([address] 08/31/2015    Arthritis, degenerative 08/31/2015    Gastroesophageal reflux disease without esophagitis 08/31/2015    ANAMIKA on CPAP 08/31/2015    Essential hypertension 08/28/2015    PTSD (post-traumatic stress disorder) 03/24/2014    S/P TKR (total knee replacement) 11/14/2012    Depression 05/08/2012    Menopause 05/08/2012    Knee pain, right 05/08/2012    GERD (gastroesophageal reflux disease) 05/08/2012    OA (osteoarthritis) 06/18/2010    Obesity 06/18/2010    Mixed hyperlipidemia 06/18/2010     Past Medical History:   Diagnosis Date    AR (allergic rhinitis)     seasonal    Cancer ([address]     pt states between vgina and rectal area    Cardiac echocardiogram 2016    Tech difficult. EF 55-60%. No RWMA. Mild conc LVH. Gr 1 DDfx. RVSP normal.      Cardiac nuclear imaging test 2016    Low risk. Very patchy radiotracer uptake. Mild anterior artifact, low suspicion for ischemia. EF 68%. No RWMA. Normal EKG on pharm stress test.    Cardiovascular LE arterial duplex 10/07/2013    No significant arterial disease at rest bilaterally. R JUNE 1.21.  L JUNE 1.16. No significant sm vessel disease.  Depression     Dr. Priyanka Lujan, suicide attempt     Diverticulosis     Endometriosis     s/p hysterectomy     GERD (gastroesophageal reflux disease)     Glaucoma     Dr. Enid Bui HTN (hypertension)     Hx of colonoscopy 2017    mild diverticulosis, g1 internal hemorrhoids, normal colon , repeat 10 year     Hx of suicide attempt     Hyperlipidemia LDL goal < 130     IBS (irritable bowel syndrome)     Ill-defined condition     environmental allergies    Insomnia     [facility] 2019    Nausea & vomiting     OA (osteoarthritis)     both knees, lower back    Preeclampsia     PTSD (post-traumatic stress disorder)     Seizures (HCC)     1 x with childbirth, had toxemia    Sleep apnea     does not use cpap machine    Spinal stenosis     Dr. Jag Richardson Vertigo       Past Surgical History:   Procedure Laterality Date    COLONOSCOPY N/A 2017    COLONOSCOPY performed by Tk Luu MD at [facility] B N/A 2019    SIGMOIDOSCOPY FLEXIBLE performed by Jonathan Chavez [facility] at [facility] ENDOSCOPY    HX  SECTION      HX COLONOSCOPY  2017    DR. MCRAE 2017     HX HYSTERECTOMY      HX KNEE ARTHROSCOPY Left     left knee    HX KNEE REPLACEMENT Bilateral     (R)  (L)     HX LAPAROTOMY N/A 2019    and rectovaginal bx    HX OTHER SURGICAL  2016    all teeth removed    HX SALPINGO-OOPHORECTOMY 2008    HX TUBAL LIGATION      IR INSERT TUNL CVC W PORT OVER 5 YEARS  2019    MULTIPLE DELIVERY       1990    NC FEMUR/KNEE SURG UNLISTED Left 2009    External fixator    TOTAL ABDOM HYSTERECTOMY        OB History        2    Para   2    Term   2       0    AB   0    Living   0       SAB   0    TAB   0    Ectopic   0    Molar   0    Multiple   0    Live Births   0              Social History     Tobacco Use    Smoking status: Never Smoker    Smokeless tobacco: Never Used   Substance Use Topics    Alcohol use: No      Family History   Problem Relation Age of Onset    Heart Disease Mother         CHF    Diabetes Mother     Hypertension Mother     Kidney Disease Mother    uZlma Rojo Breast Cancer Mother     Stroke Mother     Diabetes Father     Hypertension Father     Heart Attack Father         MI    Cancer Maternal Grandmother         stomach    Ovarian Cancer Maternal Aunt     Cancer Maternal Uncle       Current Outpatient Medications   Medication Sig    promethazine (PHENERGAN) 25 mg tablet take 1 tablet by mouth every 6 hours if needed for nausea    oxyCODONE-acetaminophen (PERCOCET) 5-325 mg per tablet Take 1 Tab by mouth every four (4) hours as needed for Pain for up to 30 days. Max Daily Amount: 6 Tabs.  oxybutynin chloride XL (DITROPAN XL) 10 mg CR tablet Take 1 Tab by mouth daily.  levocetirizine (XYZAL) 5 mg tablet Take  by mouth.  lidocaine-prilocaine (EMLA) topical cream Apply  to affected area as needed for Pain.     pantoprazole (PROTONIX) 40 mg tablet take 1 tablet by mouth once daily    promethazine (PHENERGAN) 25 mg tablet take 1 tablet by mouth every 6 hours if needed for nausea    meclizine (ANTIVERT) 25 mg tablet take 1 tablet by mouth three times a day if needed for dizziness    Facial Mask misc Use daily    Disposable Gloves misc Use daily    amLODIPine (NORVASC) 10 mg tablet take 1 tablet by mouth once daily    metoprolol succinate (TOPROL-XL) 100 mg tablet Take 1 Tab by mouth daily.  magnesium hydroxide (PATHAK MILK OF MAGNESIA) 400 mg/5 mL suspension Take 30 mL by mouth daily as needed for Constipation.  Biotin 2,500 mcg cap Take  by mouth.  multivitamin (ONE A DAY) tablet Take 1 Tab by mouth daily.  cyanocobalamin (VITAMIN B-12) 100 mcg tablet Take 100 mcg by mouth daily.  PARoxetine (PAXIL) 10 mg tablet take 1 tablet by mouth once daily    simvastatin (ZOCOR) 20 mg tablet take 1 tablet by mouth at bedtime    ibuprofen (MOTRIN) 600 mg tablet take 1 tablet by mouth three times a day ONLY AS NEEDED    lidocaine-prilocaine (EMLA) topical cream apply to affected area once daily if needed for pain    Mv,Ca,Min-FA-Herbal No.157 (ESTROVEN MAXIMUM STRENGTH) 400 mcg tab Take  by mouth daily. Indications: hot flash    dimethicone (SOOTHE AND COOL INZO BARRIER) 5 % topical cream Apply  to affected area two (2) times a day.  polyethylene glycol (MIRALAX) 17 gram packet Take 1 Packet by mouth daily.  cycloSPORINE (RESTASIS) 0.05 % dpet Apply 1 Drop to eye as needed.  melatonin 3 mg tablet Take 3 mg by mouth nightly.  plecanatide (TRULANCE) 3 mg tab Take  by mouth.  OXcarbazepine (TRILEPTAL) 300 mg tablet Take 300 mg by mouth two (2) times a day.  montelukast (SINGULAIR) 10 mg tablet Take 10 mg by mouth nightly.  latanoprost (XALATAN) 0.005 % ophthalmic solution Administer 1 Drop to both eyes nightly. No current facility-administered medications for this visit.       Facility-Administered Medications Ordered in Other Visits   Medication Dose Route Frequency    sodium chloride (NS) flush 10-40 mL  10-40 mL IntraVENous PRN    heparin (porcine) pf 500 Units  500 Units InterCATHeter PRN     Allergies   Allergen Reactions    Other Medication Hives     seafood    Pollen Extracts Other (comments)    Shellfish Derived Hives          OBJECTIVE:    Physical Exam  VITAL SIGNS: Visit Vitals  LMP 01/31/2008      GENERAL EMILY: in no apparent distress and well developed and well nourished               IMPRESSION/PLAN:  1.stage IV Primary peritoneal cancer with likely recurrence   -previoulsy reviewed her pathology    -s/p carbo (auc=6), taxol (175 mg/m2) IV every 3 wks s/p 6 cycles completed 6/6/2019   -s/p cytoreductive surgery with [facility] with dr. Demarco Daniels   -s/p pelvic radiation   -biopsy c/w recurrence   -PETCT reviewed with progression of pelvic mass. Repeat PETCT after cycle #3   -nausea- encouraged use of phenergan/zofran   Pain-script for percocet    -given platinum sensitive disease will plan to treat with carbo auc=5, Doxil 30 mg/m2 IV every 4 wks until CCR, toxicity or progression. We briefly discussed if no metastatic disease develops and she has some response to chemo may consider surgery with dr. Demarco Daniels . We also discussed use of parp maintenance as well   -tolerating chemo. No G3 or G4 toxicity   -hydronephrosis- s/p stent placement   -s/p cycle #3   -f/u prior to cycle #4 and to review PETCT results    Vaginal bleeding vs hematuria, intermittent. Will evaluate at office f/u appt 7/22. Bleeding precautions reviewed. Abdominal cramping. Will refill Percocet for use PRN. Dizziness. Recommend increased PO hydration and continuing IV hydration in [facility]. Patient advised to call PCP for further management of previously diagnosed vertigo  Nausea. Reviewed recommendations, will r/f Zofran and Phenergan today for use PRN. All upcoming appointments reviewed and confirmed with patient. The total time spent was 40 minutes regarding this patients diagnosis of primary peritoneal cancer and >50% of this time was spent counseling and coordinating care    Theresa Hobbs NP  Gynecologic Oncology  1/33/863451:43 AM    I was in the office while conducting this encounter.     Consent:  She and/or her healthcare decision maker is aware that this patient-initiated Telehealth encounter is a billable service, with coverage as determined by her insurance carrier. She is aware that she may receive a bill and has provided verbal consent to proceed: Yes    Ricky Bruno is a 47 y.o. female being evaluated by a telephone encounter to address concerns as mentioned above. A caregiver was present when appropriate. Due to this being a TeleHealth encounter (During INLSO-05 public health emergency), evaluation of the following organ systems was limited: Vitals/Constitutional/EENT/Resp/CV/GI//MS/Neuro/Skin/Heme-Lymph-Imm. Pursuant to the emergency declaration under the [address] [place] [address], [address] [place] [address] authority and the [employer] Act, this Virtual Visit was conducted with patient's (and/or legal guardian's) consent, to reduce the risk of exposure to COVID-19 and provide necessary medical care. Services were provided through a video synchronous discussion virtually to substitute for in-person encounter. --Dustin Henriquez NP on 7/14/2020 at 11:49 AM    An electronic signature was used to authenticate this note.     Call length >13 minutes

## 2020-07-14 NOTE — TELEPHONE ENCOUNTER
Patient called the office requesting a refill on her pain medication stating she is having menstrual like cramps. Patient confirmed pharmacy on file.

## 2020-07-14 NOTE — PROGRESS NOTES
See Mobley is a 47 y.o. female presents for virtual visit, cancer follow up. Chief Complaint   Patient presents with    Cancer        There were no vitals taken for this visit. Health Maintenance Due   Topic Date Due    Lipid Screen  02/28/2019    Shingrix Vaccine Age 50> (1 of 2) 07/22/2019    A1C test (Diabetic or Prediabetic)  07/22/2020       3 most recent PHQ Screens 7/22/2019   Little interest or pleasure in doing things Several days   Feeling down, depressed, irritable, or hopeless Several days   Total Score PHQ 2 2       Abuse Screening Questionnaire 7/22/2019   Do you ever feel afraid of your partner? N   Are you in a relationship with someone who physically or mentally threatens you? N   Is it safe for you to go home? Y       Fall Risk Assessment, last 12 mths 2/9/2018   Able to walk? Yes   Fall in past 12 months?  No       Learning Assessment 1/4/2019   PRIMARY LEARNER Patient   HIGHEST LEVEL OF EDUCATION - PRIMARY LEARNER  GRADUATED HIGH SCHOOL OR GED   BARRIERS PRIMARY LEARNER NONE   CO-LEARNER CAREGIVER No   PRIMARY LANGUAGE ENGLISH   LEARNER PREFERENCE PRIMARY READING     -   ANSWERED BY patient   RELATIONSHIP SELF

## 2020-07-17 ENCOUNTER — HOSPITAL ENCOUNTER (OUTPATIENT)
Dept: PET IMAGING | Age: 50
Discharge: HOME | End: 2020-07-17
Attending: NURSE PRACTITIONER
Payer: MEDICARE

## 2020-07-17 DIAGNOSIS — C48.2 PRIMARY PERITONEAL CARCINOMATOSIS (HCC): ICD-10-CM

## 2020-07-17 PROCEDURE — 78815 PET IMAGE W/CT SKULL-THIGH: CPT

## 2020-07-20 ENCOUNTER — TELEPHONE (OUTPATIENT)
Dept: ONCOLOGY | Age: 50
End: 2020-07-20

## 2020-07-20 ENCOUNTER — TELEPHONE (OUTPATIENT)
Dept: FAMILY MEDICINE CLINIC | Age: 50
End: 2020-07-20

## 2020-07-20 NOTE — TELEPHONE ENCOUNTER
Pt called to see if she needed to go to her ECHO tomorrow. Explained her ECHO is vital to continuing her chemotherapy and must be done. Patient verbalized understanding and agreed to plan. Patient instructed to contact office for any question or concerns.      Pili Garcia NP

## 2020-07-20 NOTE — TELEPHONE ENCOUNTER
Let patient know Ava stated it would be ok for patient to have virtual appointment with Robin Pacheco. Patient would like to speak with a provider regarding upcoming testing for cardiology. Patient stated she would like clarification.

## 2020-07-20 NOTE — TELEPHONE ENCOUNTER
Patient would like to know if she could have a virtual appointment stating the hot is too much for her and she hasn't been feeling feel well lately.

## 2020-07-21 ENCOUNTER — HOSPITAL ENCOUNTER (OUTPATIENT)
Dept: NON INVASIVE DIAGNOSTICS | Age: 50
Discharge: HOME | End: 2020-07-21
Attending: NURSE PRACTITIONER
Payer: MEDICARE

## 2020-07-21 VITALS
BODY MASS INDEX: 45.89 KG/M2 | DIASTOLIC BLOOD PRESSURE: 55 MMHG | SYSTOLIC BLOOD PRESSURE: 100 MMHG | WEIGHT: 259 LBS | HEIGHT: 63 IN

## 2020-07-21 DIAGNOSIS — Z79.899 ENCOUNTER FOR MONITORING CARDIOTOXIC DRUG THERAPY: ICD-10-CM

## 2020-07-21 DIAGNOSIS — Z51.81 ENCOUNTER FOR MONITORING CARDIOTOXIC DRUG THERAPY: ICD-10-CM

## 2020-07-21 PROCEDURE — 93325 DOPPLER ECHO COLOR FLOW MAPG: CPT

## 2020-07-21 PROCEDURE — 74011250636 HC RX REV CODE- 250/636: Performed by: NURSE PRACTITIONER

## 2020-07-21 RX ADMIN — PERFLUTREN 2 ML: 6.52 INJECTION, SUSPENSION INTRAVENOUS at 10:56

## 2020-07-22 ENCOUNTER — VIRTUAL VISIT (OUTPATIENT)
Dept: ONCOLOGY | Age: 50
End: 2020-07-22

## 2020-07-22 DIAGNOSIS — C48.2 PERITONEAL CARCINOMA (HCC): Primary | ICD-10-CM

## 2020-07-22 LAB
ECHO LV INTERNAL DIMENSION DIASTOLIC: 5.55 CM (ref 3.9–5.3)
ECHO LV INTERNAL DIMENSION SYSTOLIC: 3.59 CM
ECHO LV IVSD: 0.81 CM (ref 0.6–0.9)
ECHO LV MASS 2D: 183.4 G (ref 67–162)
ECHO LV MASS INDEX 2D: 85 G/M2 (ref 43–95)
ECHO LV POSTERIOR WALL DIASTOLIC: 0.95 CM (ref 0.6–0.9)
ECHO TV REGURGITANT MAX VELOCITY: 290.7 CM/S
ECHO TV REGURGITANT PEAK GRADIENT: 33.8 MMHG

## 2020-07-22 NOTE — PROGRESS NOTES
[facility]  [address] [place] [address], [address], [address]  [place], [address]   [phone]          Patient ID:  Name:  Phyllis Dc  MRN:  [id number]  :  1970/50 y.o. Date:  2020      HISTORY OF PRESENT ILLNESS:  Phyllis Dc is a 45 y.o.  postmenopausal female referred by Dr. Jero Begum  With peritoneal cancer. Intitally seen be JOHN talaverawith reports of vaginal bleeding. Given pt's history of hysteretectomy with BSO for endometriosis in  a TVUS was ordered. Which revealed a 6.8 cm x 5.2 cm x 4.6 cm at midline vaginal cuff. This prompted pelvic CT with findings of a lesion measuring 7.6 x 5.8 x 7.2 cm and is compatible with a pelvic neoplasm vs endometrioma given prior history of endometriosis. Tumor markers done on 2018 all normal.  S/p  Exploratory laparotomy, exploration of retroperitoneal spaces, exam under anesthesia with biopsy of rectovaginal mass on 2019. Had sigmoidoscopy which revealed submucosal mass. S/p cycle #6 of carbo/taxol on 2019. S/p cytoreductive surgery with [facility] on 2019. S/p radiation treatment completed in 2019. Was seen in office and had a biopsy c/w recurrence. S/p cycle #3 of Carbo/Doxil on 2020. Having some discoloration in fingers. Bleeding daily but changes pad 3-4 times/day. Also having some fatigue  Urinary symptoms improved.      Labs:  Component      Latest Ref Rng & Units 2020          11:30 AM   CA-125      1.5 - 35.0 U/mL 5     Component      Latest Ref Rng & Units 2020          10:40 AM   CA-125      1.5 - 35.0 U/mL 7     Component      Latest Ref Rng & Units 2020          11:30 AM   CA-125      1.5 - 35.0 U/mL 7     Component      Latest Ref Rng & Units 3/4/2020           8:15 AM   CA-125      1.5 - 35.0 U/mL 7     Component      Latest Ref Rng & Units 11/15/2019           9:56 AM CA-125      1.5 - 35.0 U/mL 6     Component      Latest Ref Rng & Units 6/6/2019           8:44 AM   CA-125      1.5 - 35.0 U/mL 7     Component      Latest Ref Rng & Units 5/16/2019 4/25/2019           8:26 AM  8:20 AM   Cancer Ag (CA) 125      0.0 - 38.1 U/mL 7.8 8.7     Component      Latest Ref Rng & Units 4/4/2019           8:28 AM   Cancer Ag (CA) 125      0.0 - 38.1 U/mL 8.8     Component      Latest Ref Rng & Units 3/14/2019 2/21/2019           8:15 AM  8:32 AM   Cancer Ag (CA) 125      0.0 - 38.1 U/mL 10.4 18.4     12/17/2018 : 9.3  12/17/2018 CEA: 2.0  12/17/2018 AFP: 3.8    Pathology  4/20/2020  Vaginal biopsy  Papillary serous adenocarcinoma    8/8/2019  A) COLON, PERICOLIC NODULE, EXCISION:      - ACELLULAR MUCIN WITH MULTIPLE CALCIFICATIONS, AND ASSOCIATED FIBROUS WALL WITH        HYALINIZATION, AND CHRONIC INFLAMMATION.     - ADJACENT BENIGN FIBROADIPOSE TISSUE, CONSISTENT WITH MESENTERY.     - NO EVIDENCE OF MALIGNANCY.      - SEE COMMENT. B) LIVER, RIGHT LOBE, BIOPSY:      - NODULAR FAT NECROSIS AND FIBROSIS OF THE LIVER CAPSULE.      - MILD STEATOSIS.     - NO EVIDENCE OF MALIGNANCY. C) COLON, SIGMOID, SEROSAL IMPLANT, EXCISION:      - NODULAR FAT NECROSIS WITH FIBROSIS, CHRONIC INFLAMMATION, CALCIFICATIONS, AND        CYSTIC DEGENERATION WITHOUT MUCIN.     - NO EVIDENCE OF MALIGNANCY. D) OMENTUM, OMENTECTOMY (RESECTION):      - CONGESTED FIBROADIPOSE TISSUE WITH FOCAL FIBROSIS AND CHRONIC INFLAMMATION, AND        CALCIFIED NODULAR FIBROSIS.     - NO EVIDENCE OF MALIGNANCY. E) PERITONEUM, LEFT ANTERIOR ABDOMINAL, RESECTION:      - CONGESTED PERITONEAL TISSUE, NO EVIDENCE OF MALIGNANCY. F) PERITONEUM, RIGHT ANTERIOR ABDOMINAL, RESECTION:      - CONGESTED PERITONEAL TISSUE, NO EVIDENCE OF MALIGNANCY. G) COLON, SIGMOID, SEROSAL IMPLANT, EXCISION:      - NODULAR FIBROSIS WITH HISTIOCYTES, SUGGESTIVE OF FAT NECROSIS.     - CYSTIC DEGENERATION, WITHOUT MUCIN.       - NO EVIDENCE OF MALIGNANCY. H) SOFT TISSUE, FALCIFORM, EXCISION:      - CONSISTENT WITH FALCIFORM LIGAMENT.     - NO EVIDENCE OF MALIGNANCY. PATHOLOGIC STAGE:  ypTx, pNx    1/17/2019   RECTOVAGINAL MASS (#1, 2) AND PELVIC MASS, BIOPSIES:   CONSISTENT WITH SEROUS CARCINOMA WITH EXTENSIVE NECROSIS. Imaging  PETCT 7/17/2020   FINDINGS:        PET images: Study limited because of patient motion.     Mediastinal blood pool reference value = SUV Max. Mediastinal blood pool reference value = SUV Mean. Liver parenchyma reference value =  4.7   SUV Max. Liver parenchyma reference value =  2.3    SUV Mean.           NECK: There is no suspicious hypermetabolic activity at the neck. CHEST: There is no suspicious hypermetabolic activity at the chest.     ABDOMEN: Typical heterogeneity at the liver. No convincing malignant foci  hypermetabolic activity or underlying CT abnormality. PELVIS: There is soft tissue fullness in the right retrovesicular pelvis just  above the vaginal cuff and posterior to right ureter measuring about 3.8 cm with  Max SUV 13.5 (206), extending to the rectum posteriorly, levators inferiorly on  the right. Previously 4 cm and Max SUV 16.5.     Thickening anterior abdominal wall compatible with scar demonstrating diffuse  modest activity and Max SUV 2.7.      Additional CT findings: Mediport catheter has tip in the superior vena cava. Air  trapping in the superior segments lower lobes. Right-sided nephroureteral stent  without hydronephrosis. No ascites. IMPRESSION  Impression:       Treatment response. Decreased metabolic activity at the right retrovesicular  pelvic mass. No new evidence of metastatic disease.     Interval placement right-sided nephroureteral stent and resolved  hydroureteronephrosis. .     ROS:    As above      Patient Active Problem List    Diagnosis Date Noted    Malignant neoplasm of peritoneum ([address] 02/14/2019    Pelvic mass in female 01/17/2019    Bipolar 1 disorder ([facility] 02/09/2018    Irritable bowel syndrome with both constipation and diarrhea 02/09/2018    Advance care planning 01/31/2017    Dental caries 05/26/2016    Chronic periodontal disease 05/26/2016    SUAZO (dyspnea on exertion) 02/10/2016    Prediabetes 09/24/2015    Morbid obesity ([facility] 08/31/2015    Arthritis, degenerative 08/31/2015    Gastroesophageal reflux disease without esophagitis 08/31/2015    ANAMIKA on CPAP 08/31/2015    Essential hypertension 08/28/2015    PTSD (post-traumatic stress disorder) 03/24/2014    S/P TKR (total knee replacement) 11/14/2012    Depression 05/08/2012    Menopause 05/08/2012    Knee pain, right 05/08/2012    GERD (gastroesophageal reflux disease) 05/08/2012    OA (osteoarthritis) 06/18/2010    Obesity 06/18/2010    Mixed hyperlipidemia 06/18/2010     Past Medical History:   Diagnosis Date    AR (allergic rhinitis)     seasonal    Cancer ([facility]     pt states between vgina and rectal area    Cardiac echocardiogram 04/11/2016    Tech difficult. EF 55-60%. No RWMA. Mild conc LVH. Gr 1 DDfx. RVSP normal.      Cardiac nuclear imaging test 05/05/2016    Low risk. Very patchy radiotracer uptake. Mild anterior artifact, low suspicion for ischemia. EF 68%. No RWMA. Normal EKG on pharm stress test.    Cardiovascular LE arterial duplex 10/07/2013    No significant arterial disease at rest bilaterally. R JUNE 1.21.  L JUNE 1.16. No significant sm vessel disease.     Depression     Dr. Rosalinda Witt, suicide attempt 2/12    Diverticulosis     Endometriosis     s/p hysterectomy 2006    GERD (gastroesophageal reflux disease)     Glaucoma     Dr. Zohra Jc HTN (hypertension)     Hx of colonoscopy 04/05/2017    mild diverticulosis, g1 internal hemorrhoids, normal colon , repeat 10 year 2027    Hx of suicide attempt     Hyperlipidemia LDL goal < 130     IBS (irritable bowel syndrome)     Ill-defined condition     environmental allergies    Insomnia  Malignant neoplasm of peritoneum (HCC) 2019    Nausea & vomiting     OA (osteoarthritis)     both knees, lower back    Preeclampsia     PTSD (post-traumatic stress disorder)     Seizures (HCC)     1 x with childbirth, had toxemia    Sleep apnea     does not use cpap machine    Spinal stenosis     Dr. Rochele Rinne Vertigo       Past Surgical History:   Procedure Laterality Date    COLONOSCOPY N/A 2017    COLONOSCOPY performed by Rivas Armas MD at [place] [address] N/A 2019    SIGMOIDOSCOPY FLEXIBLE performed [facility] Agustin Sosa MD at [facility] ENDOSCOPY    HX  SECTION      HX COLONOSCOPY  2017    DR. MCRAE 2017     HX HYSTERECTOMY      HX KNEE ARTHROSCOPY Left     left knee    HX KNEE REPLACEMENT Bilateral     (R)  (L)     HX LAPAROTOMY N/A 2019    and rectovaginal bx    HX OTHER SURGICAL  2016    all teeth removed    HX SALPINGO-OOPHORECTOMY  2008    HX TUBAL LIGATION      IR INSERT TUNL CVC W PORT OVER 5 YEARS  2019    MULTIPLE DELIVERY       1990    SC FEMUR/KNEE SURG UNLISTED Left 2009    External fixator    TOTAL ABDOM HYSTERECTOMY        OB History        2    Para   2    Term   2       0    AB   0    Living   0       SAB   0    TAB   0    Ectopic   0    Molar   0    Multiple   0    Live Births   0              Social History     Tobacco Use    Smoking status: Never Smoker    Smokeless tobacco: Never Used   Substance Use Topics    Alcohol use: No      Family History   Problem Relation Age of Onset    Heart Disease Mother         CHF    Diabetes Mother     Hypertension Mother     Kidney Disease Mother     Breast Cancer Mother     Stroke Mother     Diabetes Father     Hypertension Father     Heart Attack Father         MI    Cancer Maternal Grandmother         stomach    Ovarian Cancer Maternal Aunt     Cancer Maternal Uncle       Current Outpatient Medications   Medication Sig    promethazine (PHENERGAN) 25 mg tablet take 1 tablet by mouth every 6 hours if needed for nausea    oxyCODONE-acetaminophen (PERCOCET) 5-325 mg per tablet Take 1 Tab by mouth every four (4) hours as needed for Pain for up to 30 days. Max Daily Amount: 6 Tabs.  ondansetron hcl (ZOFRAN) 4 mg tablet Take 1 Tab by mouth every eight (8) hours as needed for Nausea. Indications: prevent nausea and vomiting from cancer chemotherapy    oxybutynin chloride XL (DITROPAN XL) 10 mg CR tablet Take 1 Tab by mouth daily.  levocetirizine (XYZAL) 5 mg tablet Take  by mouth.  lidocaine-prilocaine (EMLA) topical cream Apply  to affected area as needed for Pain.  pantoprazole (PROTONIX) 40 mg tablet take 1 tablet by mouth once daily    meclizine (ANTIVERT) 25 mg tablet take 1 tablet by mouth three times a day if needed for dizziness    Facial Mask misc Use daily    Disposable Gloves misc Use daily    amLODIPine (NORVASC) 10 mg tablet take 1 tablet by mouth once daily    metoprolol succinate (TOPROL-XL) 100 mg tablet Take 1 Tab by mouth daily.  magnesium hydroxide (PATHAK MILK OF MAGNESIA) 400 mg/5 mL suspension Take 30 mL by mouth daily as needed for Constipation.  Biotin 2,500 mcg cap Take  by mouth.  multivitamin (ONE A DAY) tablet Take 1 Tab by mouth daily.  cyanocobalamin (VITAMIN B-12) 100 mcg tablet Take 100 mcg by mouth daily.  PARoxetine (PAXIL) 10 mg tablet take 1 tablet by mouth once daily    simvastatin (ZOCOR) 20 mg tablet take 1 tablet by mouth at bedtime    ibuprofen (MOTRIN) 600 mg tablet take 1 tablet by mouth three times a day ONLY AS NEEDED    lidocaine-prilocaine (EMLA) topical cream apply to affected area once daily if needed for pain    Mv,Ca,Min-FA-Herbal No.157 (ESTROVEN MAXIMUM STRENGTH) 400 mcg tab Take  by mouth daily.  Indications: hot flash    dimethicone (SOOTHE AND COOL INZO BARRIER) 5 % topical cream Apply  to affected area two (2) times a day.  polyethylene glycol (MIRALAX) 17 gram packet Take 1 Packet by mouth daily.  cycloSPORINE (RESTASIS) 0.05 % dpet Apply 1 Drop to eye as needed.  melatonin 3 mg tablet Take 3 mg by mouth nightly.  plecanatide (TRULANCE) 3 mg tab Take  by mouth.  OXcarbazepine (TRILEPTAL) 300 mg tablet Take 300 mg by mouth two (2) times a day.  montelukast (SINGULAIR) 10 mg tablet Take 10 mg by mouth nightly.  latanoprost (XALATAN) 0.005 % ophthalmic solution Administer 1 Drop to both eyes nightly. No current facility-administered medications for this visit. Facility-Administered Medications Ordered in Other Visits   Medication Dose Route Frequency    sodium chloride (NS) flush 10-40 mL  10-40 mL IntraVENous PRN    heparin (porcine) pf 500 Units  500 Units InterCATHeter PRN     Allergies   Allergen Reactions    Other Medication Hives     seafood    Pollen Extracts Other (comments)    Shellfish Derived Hives          OBJECTIVE:    Physical Exam  VITAL SIGNS: Visit Vitals  LMP 01/31/2008      GENERAL EMILY: in no apparent distress and well developed and well nourished               IMPRESSION/PLAN:  1.stage IV Primary peritoneal cancer with likely recurrence   -previoulsy reviewed her pathology    -s/p carbo (auc=6), taxol (175 mg/m2) IV every 3 wks s/p 6 cycles completed 6/6/2019   -s/p cytoreductive surgery with [facility] with dr. Bradley Carter   -s/p pelvic radiation   -biopsy c/w recurrence   -repeat PETCT reviewed today with treatment response   -nausea- encouraged use of phenergan/zofran   Pain-script for percocet    -given platinum sensitive disease will plan to treat with carbo auc=5, Doxil 30 mg/m2 IV every 4 wks until CCR, toxicity or progression. We briefly discussed if no metastatic disease develops and she has some response to chemo may consider surgery with dr. Bradley Carter . We also discussed use of parp maintenance as well   -tolerating chemo.  No G3 or G4 toxicity   -hydronephrosis- s/p stent placement   -Echo- normal   -vaginal bleeding.- instructed her to call so we can set up an in office appt.     -s/p cycle #3   -ok for cycle #4   -f/u prior to cycle #5        The total time spent was 40 minutes regarding this patients diagnosis of primary peritoneal cancer and >50% of this time was spent counseling and coordinating care    Samaria Oquendo MD  Gynecologic Oncology  9/62/[id number]:62 AM

## 2020-07-22 NOTE — TELEPHONE ENCOUNTER
Contacted patient and verified identity using name and date of birth (2- identifiers)  Spoke with patient this morning and she indicated that she stopped using the meclizine several months ago and reports that it was causing weight gain. I advised patient that this medication should only be used on an as needed basis and not everyday/RTC. Patient was advised to resume meclizine PRN, continue with increase in fluids, IV fluid replacement (Per Oncology), start to keep track of blood pressures and have those available for 8/6/20 office visit.  Patient verbalized understanding

## 2020-07-22 NOTE — PROGRESS NOTES
Xavi Bosch is a 45 y.o. female presents for virtual visit, peritoneal cancer. Chief Complaint   Patient presents with    Cancer      Pt c/o dizziness. Pt c/o of fatigue and occasional difficulty sleeping. Pt c/o left leg spasms. Pt c/o tingling in her right hand. Pt c/o discoloration on her fingers and feet. Do you have any unusual vaginal bleeding, discharge or irritation? Pt c/o bleeding, uses 3-4 pads a day. Do you have any changes in your bowel movements? Pt c/o occasional constipation. Have you been experiencing nausea or vomiting? Pt c/o nausea. Have you been experiencing any continuous or worsening abdominal pain? Pt c/o abdominal cramping similar to menstrual cramps. Any urinary burning? No    There were no vitals taken for this visit. Health Maintenance Due   Topic Date Due    Lipid Screen  02/28/2019    Shingrix Vaccine Age 50> (1 of 2) 07/22/2019    A1C test (Diabetic or Prediabetic)  07/22/2020         1. Have you been to the ER, urgent care clinic since your last visit? Hospitalized since your last visit? No    2. Have you seen or consulted any other health care providers outside of the [address] [place] [address] since your last visit? Include any pap smears or colon screening. No    3 most recent PHQ Screens 7/22/2019   Little interest or pleasure in doing things Several days   Feeling down, depressed, irritable, or hopeless Several days   Total Score PHQ 2 2       Abuse Screening Questionnaire 7/22/2019   Do you ever feel afraid of your partner? N   Are you in a relationship with someone who physically or mentally threatens you? N   Is it safe for you to go home? Y       Fall Risk Assessment, last 12 mths 2/9/2018   Able to walk? Yes   Fall in past 12 months?  No       Learning Assessment 1/4/2019   PRIMARY LEARNER Patient   HIGHEST LEVEL OF EDUCATION - PRIMARY LEARNER  GRADUATED HIGH SCHOOL OR GED   BARRIERS PRIMARY LEARNER NONE   CO-LEARNER CAREGIVER No   PRIMARY LANGUAGE ENGLISH   LEARNER PREFERENCE PRIMARY READING     -   ANSWERED BY patient   RELATIONSHIP SELF

## 2020-07-29 RX ORDER — EPINEPHRINE 1 MG/ML
0.3 INJECTION, SOLUTION, CONCENTRATE INTRAVENOUS AS NEEDED
Status: CANCELLED | OUTPATIENT
Start: 2020-08-07

## 2020-07-29 RX ORDER — DEXTROSE MONOHYDRATE 50 MG/ML
25 INJECTION, SOLUTION INTRAVENOUS CONTINUOUS
Status: CANCELLED | OUTPATIENT
Start: 2020-08-07

## 2020-07-29 RX ORDER — ONDANSETRON 2 MG/ML
8 INJECTION INTRAMUSCULAR; INTRAVENOUS AS NEEDED
Status: CANCELLED | OUTPATIENT
Start: 2020-08-07

## 2020-07-29 RX ORDER — ACETAMINOPHEN 325 MG/1
650 TABLET ORAL AS NEEDED
Status: CANCELLED
Start: 2020-08-07

## 2020-07-29 RX ORDER — DIPHENHYDRAMINE HYDROCHLORIDE 50 MG/ML
25 INJECTION, SOLUTION INTRAMUSCULAR; INTRAVENOUS AS NEEDED
Status: CANCELLED
Start: 2020-08-07

## 2020-07-29 RX ORDER — SODIUM CHLORIDE 0.9 % (FLUSH) 0.9 %
10-40 SYRINGE (ML) INJECTION AS NEEDED
Status: CANCELLED
Start: 2020-08-07

## 2020-07-29 RX ORDER — HYDROCORTISONE SODIUM SUCCINATE 100 MG/2ML
100 INJECTION, POWDER, FOR SOLUTION INTRAMUSCULAR; INTRAVENOUS AS NEEDED
Status: CANCELLED | OUTPATIENT
Start: 2020-08-07

## 2020-07-29 RX ORDER — HEPARIN 100 UNIT/ML
300-500 SYRINGE INTRAVENOUS AS NEEDED
Status: CANCELLED
Start: 2020-08-07

## 2020-07-29 RX ORDER — DIPHENHYDRAMINE HYDROCHLORIDE 50 MG/ML
50 INJECTION, SOLUTION INTRAMUSCULAR; INTRAVENOUS AS NEEDED
Status: CANCELLED
Start: 2020-08-07

## 2020-07-29 RX ORDER — MAGNESIUM SULFATE HEPTAHYDRATE 40 MG/ML
2 INJECTION, SOLUTION INTRAVENOUS
Status: CANCELLED
Start: 2020-08-07

## 2020-07-29 RX ORDER — ALBUTEROL SULFATE 0.83 MG/ML
2.5 SOLUTION RESPIRATORY (INHALATION) AS NEEDED
Status: CANCELLED
Start: 2020-08-07

## 2020-07-29 RX ORDER — POTASSIUM CHLORIDE 7.45 MG/ML
10 INJECTION INTRAVENOUS
Status: CANCELLED
Start: 2020-08-07

## 2020-07-29 RX ORDER — PROCHLORPERAZINE EDISYLATE 5 MG/ML
10 INJECTION INTRAMUSCULAR; INTRAVENOUS
Status: CANCELLED
Start: 2020-08-07

## 2020-07-29 RX ORDER — LORAZEPAM 2 MG/ML
0.26 INJECTION INTRAMUSCULAR
Status: CANCELLED
Start: 2020-08-07

## 2020-07-30 ENCOUNTER — TELEPHONE (OUTPATIENT)
Dept: ONCOLOGY | Age: 50
End: 2020-07-30

## 2020-07-30 NOTE — TELEPHONE ENCOUNTER
Patient would like to know if provider recommends her traveling with her family the second week of August. Patient was considering her healthy and the possibility of catching Covid.

## 2020-08-05 ENCOUNTER — HOSPITAL ENCOUNTER (OUTPATIENT)
Dept: INFUSION THERAPY | Age: 50
Discharge: HOME | End: 2020-08-05
Payer: MEDICARE

## 2020-08-05 NOTE — PROGRESS NOTES
Pt was scheduled today, 8/5/20 at 0900 for C4 D1 Doxil/Carboplatin but did not show. Did try calling pt but had to leave a message to call the infusion center back. Will contact Dr. Gee Dominique office also.

## 2020-08-06 ENCOUNTER — TELEPHONE (OUTPATIENT)
Dept: ONCOLOGY | Age: 50
End: 2020-08-06

## 2020-08-06 DIAGNOSIS — C48.2 PRIMARY PERITONEAL CARCINOMATOSIS (HCC): ICD-10-CM

## 2020-08-06 DIAGNOSIS — G89.3 CANCER ASSOCIATED PAIN: ICD-10-CM

## 2020-08-06 RX ORDER — OXYCODONE AND ACETAMINOPHEN 5; 325 MG/1; MG/1
1 TABLET ORAL
Qty: 60 TAB | Refills: 0 | Status: SHIPPED | OUTPATIENT
Start: 2020-08-06 | End: 2020-08-24 | Stop reason: SDUPTHER

## 2020-08-06 NOTE — TELEPHONE ENCOUNTER
Returned call to patient. Reports left leg pain has improved to 5/10 pain after 1 Percocet this morning at 0700. She has been taking Percocet PRN q4-6h for pain, but states that sometimes it doesn't help. She states that she has arthritis and nerve pain. Admits to some swelling in left ankle. No warmth or redness. Reports \"spasms real bad\". Swelling improved with elevating legs. She states that she can't walk at times because of the pain, but it is better currently. She also states that she took her last pill this morning and needs a refill or something different. Has also been taking aspirin and Tylenol. Reinforced that she should not take aspirin or ibuprofen for pain during chemotherapy. Reviewed tylenol dosing and explained that she should keep in mind that Percocet has Tylenol as well. Advised that she can take 2 Percocet at a time PRN. Patient also reports that her bleeding is worse, but she can't tell whether it's VB or hematuria. Patient also asked about an order for a hospital bed as her \"mattress is not comfortable\". She also is planning to go out of town next Wednesday-Sunday for a [employer] trip. I explained that we would not advised based on the timing of her chemo. Chemo was r/s from 8/5 to 8/7 as patient did not have power. Will see patient in office/[facility] tomorrow. Advised patient that she needs to be evaluate in ED with worsening pain, swelling, warmth, redness. Patient demonstrates understanding.

## 2020-08-06 NOTE — TELEPHONE ENCOUNTER
Patient contacted [facility] office in reference to pain. Patient states is currently on pain medication. Patient states taking oxycodone-acetaminophen (percocet) and the medication is not helping with pain. Patient states would like to be prescribed another medication. Patient states pain is having pain in left thigh,ankle and swelling. Patient states pain is unbearable. Pain is a  Level 10 out of 10. Patient states unable to walk due to constant pain. Patient wanted a referral for a wheelchair and a medical bed. Patient states contact number [phone].

## 2020-08-07 ENCOUNTER — HOSPITAL ENCOUNTER (OUTPATIENT)
Dept: INFUSION THERAPY | Age: 50
Discharge: HOME | End: 2020-08-07
Payer: MEDICARE

## 2020-08-07 ENCOUNTER — OFFICE VISIT (OUTPATIENT)
Dept: ONCOLOGY | Age: 50
End: 2020-08-07

## 2020-08-07 VITALS
DIASTOLIC BLOOD PRESSURE: 75 MMHG | SYSTOLIC BLOOD PRESSURE: 123 MMHG | RESPIRATION RATE: 18 BRPM | WEIGHT: 265.5 LBS | OXYGEN SATURATION: 99 % | TEMPERATURE: 98.5 F | BODY MASS INDEX: 47.04 KG/M2 | HEART RATE: 71 BPM | HEIGHT: 63 IN

## 2020-08-07 DIAGNOSIS — C48.2 PRIMARY PERITONEAL CARCINOMATOSIS (HCC): Primary | ICD-10-CM

## 2020-08-07 DIAGNOSIS — C48.2 MALIGNANT NEOPLASM OF PERITONEUM (HCC): Primary | ICD-10-CM

## 2020-08-07 LAB
ALBUMIN SERPL-MCNC: 3.5 G/DL (ref 3.4–5)
ALBUMIN/GLOB SERPL: 1 {RATIO} (ref 0.8–1.7)
ALP SERPL-CCNC: 76 U/L (ref 45–117)
ALT SERPL-CCNC: 22 U/L (ref 13–56)
ANION GAP SERPL CALC-SCNC: 4 MMOL/L (ref 3–18)
APPEARANCE UR: CLEAR
AST SERPL-CCNC: 16 U/L (ref 10–38)
BACTERIA URNS QL MICRO: ABNORMAL /HPF
BASO+EOS+MONOS # BLD AUTO: 0.2 K/UL (ref 0–2.3)
BASO+EOS+MONOS NFR BLD AUTO: 3 % (ref 0.1–17)
BILIRUB SERPL-MCNC: 0.3 MG/DL (ref 0.2–1)
BILIRUB UR QL: NEGATIVE
BUN SERPL-MCNC: 32 MG/DL (ref 7–18)
BUN/CREAT SERPL: 17 (ref 12–20)
CALCIUM SERPL-MCNC: 8.9 MG/DL (ref 8.5–10.1)
CHLORIDE SERPL-SCNC: 108 MMOL/L (ref 100–111)
CO2 SERPL-SCNC: 25 MMOL/L (ref 21–32)
COLOR UR: YELLOW
CREAT SERPL-MCNC: 1.88 MG/DL (ref 0.6–1.3)
DIFFERENTIAL METHOD BLD: ABNORMAL
EPITH CASTS URNS QL MICRO: ABNORMAL /LPF (ref 0–5)
ERYTHROCYTE [DISTWIDTH] IN BLOOD BY AUTOMATED COUNT: 18.6 % (ref 11.5–14.5)
GLOBULIN SER CALC-MCNC: 3.5 G/DL (ref 2–4)
GLUCOSE SERPL-MCNC: 118 MG/DL (ref 74–99)
GLUCOSE UR STRIP.AUTO-MCNC: NEGATIVE MG/DL
HCT VFR BLD AUTO: 28.3 % (ref 36–48)
HGB BLD-MCNC: 9.3 G/DL (ref 12–16)
HGB UR QL STRIP: ABNORMAL
KETONES UR QL STRIP.AUTO: NEGATIVE MG/DL
LEUKOCYTE ESTERASE UR QL STRIP.AUTO: ABNORMAL
LYMPHOCYTES # BLD: 1.5 K/UL (ref 1.1–5.9)
LYMPHOCYTES NFR BLD: 28 % (ref 14–44)
MAGNESIUM SERPL-MCNC: 1.9 MG/DL (ref 1.6–2.6)
MCH RBC QN AUTO: 31.7 PG (ref 25–35)
MCHC RBC AUTO-ENTMCNC: 32.9 G/DL (ref 31–37)
MCV RBC AUTO: 96.6 FL (ref 78–102)
NEUTS SEG # BLD: 3.5 K/UL (ref 1.8–9.5)
NEUTS SEG NFR BLD: 69 % (ref 40–70)
NITRITE UR QL STRIP.AUTO: NEGATIVE
PH UR STRIP: 6.5 [PH] (ref 5–8)
PLATELET # BLD AUTO: 155 K/UL (ref 140–440)
POTASSIUM SERPL-SCNC: 4.5 MMOL/L (ref 3.5–5.5)
PROT SERPL-MCNC: 7 G/DL (ref 6.4–8.2)
PROT UR STRIP-MCNC: 30 MG/DL
RBC # BLD AUTO: 2.93 M/UL (ref 4.1–5.1)
RBC #/AREA URNS HPF: ABNORMAL /HPF (ref 0–5)
SODIUM SERPL-SCNC: 137 MMOL/L (ref 136–145)
SP GR UR REFRACTOMETRY: 1.01 (ref 1–1.03)
UROBILINOGEN UR QL STRIP.AUTO: 0.2 EU/DL (ref 0.2–1)
WBC # BLD AUTO: 5.2 K/UL (ref 4.5–13)
WBC URNS QL MICRO: ABNORMAL /HPF (ref 0–4)

## 2020-08-07 PROCEDURE — 36415 COLL VENOUS BLD VENIPUNCTURE: CPT

## 2020-08-07 PROCEDURE — 96417 CHEMO IV INFUS EACH ADDL SEQ: CPT

## 2020-08-07 PROCEDURE — 80053 COMPREHEN METABOLIC PANEL: CPT

## 2020-08-07 PROCEDURE — 96375 TX/PRO/DX INJ NEW DRUG ADDON: CPT

## 2020-08-07 PROCEDURE — 74011000258 HC RX REV CODE- 258: Performed by: OBSTETRICS & GYNECOLOGY

## 2020-08-07 PROCEDURE — 74011000250 HC RX REV CODE- 250: Performed by: OBSTETRICS & GYNECOLOGY

## 2020-08-07 PROCEDURE — 83735 ASSAY OF MAGNESIUM: CPT

## 2020-08-07 PROCEDURE — 81001 URINALYSIS AUTO W/SCOPE: CPT

## 2020-08-07 PROCEDURE — 77030012965 HC NDL HUBR BBMI -A

## 2020-08-07 PROCEDURE — 85025 COMPLETE CBC W/AUTO DIFF WBC: CPT

## 2020-08-07 PROCEDURE — 87086 URINE CULTURE/COLONY COUNT: CPT

## 2020-08-07 PROCEDURE — 74011250636 HC RX REV CODE- 250/636: Performed by: OBSTETRICS & GYNECOLOGY

## 2020-08-07 PROCEDURE — 96413 CHEMO IV INFUSION 1 HR: CPT

## 2020-08-07 PROCEDURE — 86304 IMMUNOASSAY TUMOR CA 125: CPT

## 2020-08-07 RX ORDER — SODIUM CHLORIDE 0.9 % (FLUSH) 0.9 %
10-40 SYRINGE (ML) INJECTION AS NEEDED
Status: DISPENSED | OUTPATIENT
Start: 2020-08-07 | End: 2020-08-07

## 2020-08-07 RX ORDER — DEXTROSE MONOHYDRATE 50 MG/ML
25 INJECTION, SOLUTION INTRAVENOUS CONTINUOUS
Status: DISPENSED | OUTPATIENT
Start: 2020-08-07 | End: 2020-08-07

## 2020-08-07 RX ORDER — HEPARIN 100 UNIT/ML
300-500 SYRINGE INTRAVENOUS AS NEEDED
Status: DISPENSED | OUTPATIENT
Start: 2020-08-07 | End: 2020-08-07

## 2020-08-07 RX ADMIN — DEXTROSE MONOHYDRATE 25 ML/HR: 50 INJECTION, SOLUTION INTRAVENOUS at 09:54

## 2020-08-07 RX ADMIN — SODIUM CHLORIDE 150 MG: 900 INJECTION, SOLUTION INTRAVENOUS at 11:52

## 2020-08-07 RX ADMIN — Medication 20 ML: at 15:13

## 2020-08-07 RX ADMIN — SODIUM CHLORIDE 20 MG: 9 INJECTION INTRAMUSCULAR; INTRAVENOUS; SUBCUTANEOUS at 10:01

## 2020-08-07 RX ADMIN — CARBOPLATIN 330 MG: 10 INJECTION, SOLUTION INTRAVENOUS at 14:30

## 2020-08-07 RX ADMIN — HEPARIN 500 UNITS: 100 SYRINGE at 15:13

## 2020-08-07 RX ADMIN — DOXORUBICIN HYDROCHLORIDE 66 MG: 2 INJECTION, SUSPENSION, LIPOSOMAL INTRAVENOUS at 13:08

## 2020-08-07 RX ADMIN — Medication 20 ML: at 09:53

## 2020-08-07 RX ADMIN — ONDANSETRON: 2 INJECTION INTRAMUSCULAR; INTRAVENOUS at 10:07

## 2020-08-07 NOTE — PROGRESS NOTES
[facility] Progress Note    Date: 2020    Name: Daniel Beth    MRN: [id number]         : 1970      Ms. Scott Marie arrived in the [facility] today at 0915, in stable condition, here for Cycle 4, IV Doxil/Carboplatin Chemotherapy Regimen (Q 28 Day Cycle). She was assessed and education was provided. Ms. Cannon's vitals were reviewed. Visit Vitals  /75 (BP 1 Location: Left arm, BP Patient Position: Sitting)   Pulse 71   Temp 98.5 °F (36.9 °C)   Resp 18   Ht 5' 3\" (1.6 m)   Wt 120.4 kg (265 lb 8 oz)   SpO2 99%   Breastfeeding No   BMI 47.03 kg/m²       Lab results were reviewed. (Her most recent CBC, CMP, Magnesium, & Urinalysis & Culture Results from today, 20, were reviewed, and all results were noted to be satisfactory for treatment today.)    All lab results from 20, were as follows:    Results for Shira Lala (MRN [id number]) as of 2020 10:40   Ref. Range 2020 09:35 2020 10:40   WBC Latest Ref Range: 4.5 - 13.0 K/uL  5.2   RBC Latest Ref Range: 4.10 - 5.10 M/uL  2.93 (L)   HGB Latest Ref Range: 12.0 - 16 g/dL  9.3 (L)   HCT Latest Ref Range: 36 - 48 %  28.3 (L)   MCV Latest Ref Range: 78 - 102 FL  96.6   MCH Latest Ref Range: 25.0 - 35.0 PG  31.7   MCHC Latest Ref Range: 31 - 37 g/dL  32.9   RDW Latest Ref Range: 11.5 - 14.5 %  18.6 (H)   PLATELET Latest Ref Range: 140 - 440 K/uL  155   NEUTROPHILS Latest Ref Range: 40 - 70 %  69   MIXED CELLS Latest Ref Range: 0.1 - 17 %  3   LYMPHOCYTES Latest Ref Range: 14 - 44 %  28   DF Latest Units:    AUTOMATED   ABS. NEUTROPHILS Latest Ref Range: 1.8 - 9.5 K/UL  3.5   ABS. LYMPHOCYTES Latest Ref Range: 1.1 - 5.9 K/UL  1.5   ABS.  MIXED CELLS Latest Ref Range: 0.0 - 2.3 K/uL  0.2   Color Latest Units:   YELLOW    Appearance Latest Units:   CLEAR    Specific gravity Latest Ref Range: 1.005 - 1.030   1.014    pH (UA) Latest Ref Range: 5.0 - 8.0   6.5    Protein Latest Ref Range: NEG mg/dL 30 (A)    Glucose Latest Ref Range: NEG mg/dL Negative    Ketone Latest Ref Range: NEG mg/dL Negative    Blood Latest Ref Range: NEG   LARGE (A)    Bilirubin Latest Ref Range: NEG   Negative    Urobilinogen Latest Ref Range: 0.2 - 1.0 EU/dL 0.2    Nitrites Latest Ref Range: NEG   Negative    Leukocyte Esterase Latest Ref Range: NEG   SMALL (A)    Epithelial cells Latest Ref Range: 0 - 5 /lpf FEW    WBC Latest Ref Range: 0 - 4 /hpf 5 to 7    RBC Latest Ref Range: 0 - 5 /hpf TOO NUMEROUS TO COUNT    Bacteria Latest Ref Range: NEG /hpf FEW (A)    Sodium Latest Ref Range: 136 - 145 mmol/L  137   Potassium Latest Ref Range: 3.5 - 5.5 mmol/L  4.5   Chloride Latest Ref Range: 100 - 111 mmol/L  108   CO2 Latest Ref Range: 21 - 32 mmol/L  25   Anion gap Latest Ref Range: 3.0 - 18 mmol/L  4   Glucose Latest Ref Range: 74 - 99 mg/dL  118 (H)   BUN Latest Ref Range: 7.0 - 18 MG/DL  32 (H)   Creatinine Latest Ref Range: 0.6 - 1.3 MG/DL  1.88 (H)   BUN/Creatinine ratio Latest Ref Range: 12 - 20    17   Calcium Latest Ref Range: 8.5 - 10.1 MG/DL  8.9   Magnesium Latest Ref Range: 1.6 - 2.6 mg/dL  1.9   GFR est non-AA Latest Ref Range: >60 ml/min/1.73m2  28 (L)   GFR est AA Latest Ref Range: >60 ml/min/1.73m2  34 (L)   Bilirubin, total Latest Ref Range: 0.2 - 1.0 MG/DL  0.3   Protein, total Latest Ref Range: 6.4 - 8.2 g/dL  7.0   Albumin Latest Ref Range: 3.4 - 5.0 g/dL  3.5   Globulin Latest Ref Range: 2.0 - 4.0 g/dL  3.5   A-G Ratio Latest Ref Range: 0.8 - 1.7    1.0   ALT Latest Ref Range: 13 - 56 U/L  22   AST Latest Ref Range: 10 - 38 U/L  16   Alk. phosphatase Latest Ref Range: 45 - 117 U/L  76      results still pending. Her right chest single lumen port was accessed x 1 attempt, and brisk blood return was obtained. Dextrose 5% 250 ml IV Bag, was initiated to infuse @ KVO prn, throughout treatment today. The following pre-medications were administered per order, and without incident:  Emend 150 mg IV, Decadron 12 mg IV & Pepcid 20 mg IV.       The following chemotherapy medications were administered:    Liposomal Doxorubicin (DOXIL / LIPODOX) 66 mg IV (30 mg/m2), was administered over approximately 1 hour, per order, and without incident. Ice Packs were applied to both hands & both feet, throughout the Doxil administration. Carboplatin (PARAPLATIN) 330 mg IV (AUC 5), was administered over approximately 30 minutes, per order, and without incident. After completion of all ordered IV medications, her port was flushed well per protocol with NS & Heparin, and then, the upton needle was removed and gauze/bandaid was applied. Ms. Charanjit Armstrong tolerated well, and had no complaints. Ms. Charanjit Armstrong was discharged from [place] [address] in stable condition at 1520. She is to return on 8/12/20 at 11:00, for IV Hydration (gets IV Hydration on her non-chemo weeks).       Nithin Pearson  August 7, 2020

## 2020-08-07 NOTE — LETTER
8/7/20 Patient: Jc Valadez YOB: 1970 Date of Visit: 8/7/2020 Misty Najera, [address] [address] [place] [address] VIA In Basket Dear Misty Najera MD, Thank you [facility]. My notes for this consultation are attached. If you have questions, please do not hesitate to call me. I look forward to following your patient along with you. Sincerely, Brielle Lee MD

## 2020-08-07 NOTE — PROGRESS NOTES
[address], [address], [address] [facility]  54[address] [facility] [employer] [facility], [address] [place], [place] [address], [facility] [address] [place], [place] [id number] [phone]          Patient ID:  Name:  Jose Flowers  MRN:  [id number]  :  1970/50 y.o. Date:  2020      HISTORY OF PRESENT ILLNESS:  Jose Flowers is a 45 y.o.  postmenopausal female referred by Dr. Gabrielle Gomez  With peritoneal cancer. Intitally seen be NP with reports of vaginal bleeding. Given pt's history of hysteretectomy with BSO for endometriosis in  a TVUS was ordered. Which revealed a 6.8 cm x 5.2 cm x 4.6 cm at midline vaginal cuff. This prompted pelvic CT with findings of a lesion measuring 7.6 x 5.8 x 7.2 cm and is compatible with a pelvic neoplasm vs endometrioma given prior history of endometriosis. Tumor markers done on 2018 all normal.  S/p  Exploratory laparotomy, exploration of retroperitoneal spaces, exam under anesthesia with biopsy of rectovaginal mass on 2019. Had sigmoidoscopy which revealed submucosal mass. S/p cycle #6 of carbo/taxol on 2019. S/p cytoreductive surgery with [facility] on 2019. S/p radiation treatment completed in 2019. Was seen in office and had a biopsy c/w recurrence. S/p cycle #3 of Carbo/Doxil on 2020. Having some discoloration in fingers. Bleeding improved and thinks it is coming from her bladder. Here for cycle #4. Pain she believes is related to arthritis and improved with meds.        Labs:  Component      Latest Ref Rng & Units 2020          11:30 AM   CA-125      1.5 - 35.0 U/mL 5     Component      Latest Ref Rng & Units 2020          10:40 AM   CA-125      1.5 - 35.0 U/mL 7     Component      Latest Ref Rng & Units 2020          11:30 AM   CA-125      1.5 - 35.0 U/mL 7     Component      Latest Ref Rng & Units 3/4/2020           8:15 AM   CA-125      1.5 - 35.0 U/mL 7     Component Latest Ref Rng & Units 11/15/2019           9:56 AM   CA-125      1.5 - 35.0 U/mL 6     Component      Latest Ref Rng & Units 6/6/2019           8:44 AM   CA-125      1.5 - 35.0 U/mL 7     Component      Latest Ref Rng & Units 5/16/2019 4/25/2019           8:26 AM  8:20 AM   Cancer Ag (CA) 125      0.0 - 38.1 U/mL 7.8 8.7     Component      Latest Ref Rng & Units 4/4/2019           8:28 AM   Cancer Ag (CA) 125      0.0 - 38.1 U/mL 8.8     Component      Latest Ref Rng & Units 3/14/2019 2/21/2019           8:15 AM  8:32 AM   Cancer Ag (CA) 125      0.0 - 38.1 U/mL 10.4 18.4     12/17/2018 : 9.3  12/17/2018 CEA: 2.0  12/17/2018 AFP: 3.8    Pathology  4/20/2020  Vaginal biopsy  Papillary serous adenocarcinoma    8/8/2019  A) COLON, PERICOLIC NODULE, EXCISION:      - ACELLULAR MUCIN WITH MULTIPLE CALCIFICATIONS, AND ASSOCIATED FIBROUS WALL WITH        HYALINIZATION, AND CHRONIC INFLAMMATION.     - ADJACENT BENIGN FIBROADIPOSE TISSUE, CONSISTENT WITH MESENTERY.     - NO EVIDENCE OF MALIGNANCY.      - SEE COMMENT. B) LIVER, RIGHT LOBE, BIOPSY:      - NODULAR FAT NECROSIS AND FIBROSIS OF THE LIVER CAPSULE.      - MILD STEATOSIS.     - NO EVIDENCE OF MALIGNANCY. C) COLON, SIGMOID, SEROSAL IMPLANT, EXCISION:      - NODULAR FAT NECROSIS WITH FIBROSIS, CHRONIC INFLAMMATION, CALCIFICATIONS, AND        CYSTIC DEGENERATION WITHOUT MUCIN.     - NO EVIDENCE OF MALIGNANCY. D) OMENTUM, OMENTECTOMY (RESECTION):      - CONGESTED FIBROADIPOSE TISSUE WITH FOCAL FIBROSIS AND CHRONIC INFLAMMATION, AND        CALCIFIED NODULAR FIBROSIS.     - NO EVIDENCE OF MALIGNANCY. E) PERITONEUM, LEFT ANTERIOR ABDOMINAL, RESECTION:      - CONGESTED PERITONEAL TISSUE, NO EVIDENCE OF MALIGNANCY. F) PERITONEUM, RIGHT ANTERIOR ABDOMINAL, RESECTION:      - CONGESTED PERITONEAL TISSUE, NO EVIDENCE OF MALIGNANCY.     G) COLON, SIGMOID, SEROSAL IMPLANT, EXCISION:      - NODULAR FIBROSIS WITH HISTIOCYTES, SUGGESTIVE OF FAT NECROSIS.     - CYSTIC DEGENERATION, WITHOUT MUCIN.     - NO EVIDENCE OF MALIGNANCY. H) SOFT TISSUE, FALCIFORM, EXCISION:      - CONSISTENT WITH FALCIFORM LIGAMENT.     - NO EVIDENCE OF MALIGNANCY. PATHOLOGIC STAGE:  ypTx, pNx    1/17/2019   RECTOVAGINAL MASS (#1, 2) AND PELVIC MASS, BIOPSIES:   CONSISTENT WITH SEROUS CARCINOMA WITH EXTENSIVE NECROSIS. Imaging  PETCT 7/17/2020   FINDINGS:        PET images: Study limited because of patient motion.     Mediastinal blood pool reference value = SUV Max. Mediastinal blood pool reference value = SUV Mean. Liver parenchyma reference value =  4.7   SUV Max. Liver parenchyma reference value =  2.3    SUV Mean.           NECK: There is no suspicious hypermetabolic activity at the neck. CHEST: There is no suspicious hypermetabolic activity at the chest.     ABDOMEN: Typical heterogeneity at the liver. No convincing malignant foci  hypermetabolic activity or underlying CT abnormality. PELVIS: There is soft tissue fullness in the right retrovesicular pelvis just  above the vaginal cuff and posterior to right ureter measuring about 3.8 cm with  Max SUV 13.5 (206), extending to the rectum posteriorly, levators inferiorly on  the right. Previously 4 cm and Max SUV 16.5.     Thickening anterior abdominal wall compatible with scar demonstrating diffuse  modest activity and Max SUV 2.7.      Additional CT findings: Mediport catheter has tip in the superior vena cava. Air  trapping in the superior segments lower lobes. Right-sided nephroureteral stent  without hydronephrosis. No ascites. IMPRESSION  Impression:       Treatment response. Decreased metabolic activity at the right retrovesicular  pelvic mass. No new evidence of metastatic disease.     Interval placement right-sided nephroureteral stent and resolved  hydroureteronephrosis. .     ROS:    As above      Patient Active Problem List    Diagnosis Date Noted    Malignant neoplasm of peritoneum ([address] 02/14/2019    Pelvic mass in female 01/17/2019    Bipolar 1 disorder ([facility] 02/09/2018    Irritable bowel syndrome with both constipation and diarrhea 02/09/2018    Advance care planning 01/31/2017    Dental caries 05/26/2016    Chronic periodontal disease 05/26/2016    SUAZO (dyspnea on exertion) 02/10/2016    Prediabetes 09/24/2015    [facility] 08/31/2015    Arthritis, degenerative 08/31/2015    Gastroesophageal reflux disease without esophagitis 08/31/2015    ANAMIKA on CPAP 08/31/2015    Essential hypertension 08/28/2015    PTSD (post-traumatic stress disorder) 03/24/2014    S/P TKR (total knee replacement) 11/14/2012    Depression 05/08/2012    Menopause 05/08/2012    Knee pain, right 05/08/2012    GERD (gastroesophageal reflux disease) 05/08/2012    OA (osteoarthritis) 06/18/2010    Obesity 06/18/2010    Mixed hyperlipidemia 06/18/2010     Past Medical History:   Diagnosis Date    AR (allergic rhinitis)     seasonal    Cancer ([facility]     pt states between vgina and rectal area    Cardiac echocardiogram 04/11/2016    Tech difficult. EF 55-60%. No RWMA. Mild conc LVH. Gr 1 DDfx. RVSP normal.      Cardiac nuclear imaging test 05/05/2016    Low risk. Very patchy radiotracer uptake. Mild anterior artifact, low suspicion for ischemia. EF 68%. No RWMA. Normal EKG on pharm stress test.    Cardiovascular LE arterial duplex 10/07/2013    No significant arterial disease at rest bilaterally. R JUNE 1.21.  L UJNE 1.16. No significant sm vessel disease.     Depression     Dr. Atif Devlin, suicide attempt 2/12    Diverticulosis     Endometriosis     s/p hysterectomy 2006    GERD (gastroesophageal reflux disease)     Glaucoma     Dr. Piero Dumont HTN (hypertension)     Hx of colonoscopy 04/05/2017    mild diverticulosis, g1 internal hemorrhoids, normal colon , repeat 10 year 2027    Hx of suicide attempt     Hyperlipidemia LDL goal < 130     IBS (irritable bowel syndrome)     Ill-defined condition     environmental allergies    Insomnia     Malignant neoplasm of peritoneum ([facility] 2019    Nausea & vomiting     OA (osteoarthritis)     both knees, lower back    Preeclampsia     PTSD (post-traumatic stress disorder)     Seizures (HCC)     1 x with childbirth, had toxemia    Sleep apnea     does not use cpap machine    Spinal stenosis     Dr. Isaías Kelly Vertigo       Past Surgical History:   Procedure Laterality Date    COLONOSCOPY N/A 2017    COLONOSCOPY performed by Judy Cruz MD at [address] N/A 2019    SIGMOIDOSCOPY FLEXIBLE performed by Donavan Amaral MD at [facility] ENDOSCOPY    HX  SECTION      HX COLONOSCOPY  2017    DR. MCRAE 2017     HX HYSTERECTOMY      HX KNEE ARTHROSCOPY Left     left knee    HX KNEE REPLACEMENT Bilateral     (R)  (L)     HX LAPAROTOMY N/A 2019    and rectovaginal bx    HX OTHER SURGICAL  2016    all teeth removed    HX SALPINGO-OOPHORECTOMY  2008    HX TUBAL LIGATION      IR INSERT TUNL CVC W PORT OVER 5 YEARS  2019    MULTIPLE DELIVERY       1990    TN FEMUR/KNEE SURG UNLISTED Left 2009    External fixator    TOTAL ABDOM HYSTERECTOMY        OB History        2    Para   2    Term   2       0    AB   0    Living   0       SAB   0    TAB   0    Ectopic   0    Molar   0    Multiple   0    Live Births   0              Social History     Tobacco Use    Smoking status: Never Smoker    Smokeless tobacco: Never Used   Substance Use Topics    Alcohol use: No      Family History   Problem Relation Age of Onset    Heart Disease Mother         CHF    Diabetes Mother     Hypertension Mother     Kidney Disease Mother     Breast Cancer Mother     Stroke Mother     Diabetes Father     Hypertension Father     Heart Attack Father         MI    Cancer Maternal Grandmother         stomach    Ovarian Cancer Maternal Aunt     Cancer Maternal Uncle       Current Outpatient Medications   Medication Sig    oxyCODONE-acetaminophen (PERCOCET) 5-325 mg per tablet Take 1 Tab by mouth every four (4) hours as needed for Pain for up to 30 days. Max Daily Amount: 6 Tabs.  promethazine (PHENERGAN) 25 mg tablet take 1 tablet by mouth every 6 hours if needed for nausea    ondansetron hcl (ZOFRAN) 4 mg tablet Take 1 Tab by mouth every eight (8) hours as needed for Nausea. Indications: prevent nausea and vomiting from cancer chemotherapy    oxybutynin chloride XL (DITROPAN XL) 10 mg CR tablet Take 1 Tab by mouth daily.  levocetirizine (XYZAL) 5 mg tablet Take  by mouth.  lidocaine-prilocaine (EMLA) topical cream Apply  to affected area as needed for Pain.  pantoprazole (PROTONIX) 40 mg tablet take 1 tablet by mouth once daily    meclizine (ANTIVERT) 25 mg tablet take 1 tablet by mouth three times a day if needed for dizziness    Facial Mask misc Use daily    Disposable Gloves misc Use daily    amLODIPine (NORVASC) 10 mg tablet take 1 tablet by mouth once daily    metoprolol succinate (TOPROL-XL) 100 mg tablet Take 1 Tab by mouth daily.  magnesium hydroxide (PATHAK MILK OF MAGNESIA) 400 mg/5 mL suspension Take 30 mL by mouth daily as needed for Constipation.  Biotin 2,500 mcg cap Take  by mouth.  multivitamin (ONE A DAY) tablet Take 1 Tab by mouth daily.  cyanocobalamin (VITAMIN B-12) 100 mcg tablet Take 100 mcg by mouth daily.  PARoxetine (PAXIL) 10 mg tablet take 1 tablet by mouth once daily    simvastatin (ZOCOR) 20 mg tablet take 1 tablet by mouth at bedtime    ibuprofen (MOTRIN) 600 mg tablet take 1 tablet by mouth three times a day ONLY AS NEEDED    lidocaine-prilocaine (EMLA) topical cream apply to affected area once daily if needed for pain    Mv,Ca,Min-FA-Herbal No.157 (ESTROVEN MAXIMUM STRENGTH) 400 mcg tab Take  by mouth daily.  Indications: hot flash    dimethicone (SOOTHE AND COOL INZO BARRIER) 5 % topical cream Apply  to affected area two (2) times a day.  polyethylene glycol (MIRALAX) 17 gram packet Take 1 Packet by mouth daily.  cycloSPORINE (RESTASIS) 0.05 % dpet Apply 1 Drop to eye as needed.  melatonin 3 mg tablet Take 3 mg by mouth nightly.  plecanatide (TRULANCE) 3 mg tab Take  by mouth.  OXcarbazepine (TRILEPTAL) 300 mg tablet Take 300 mg by mouth two (2) times a day.  montelukast (SINGULAIR) 10 mg tablet Take 10 mg by mouth nightly.  latanoprost (XALATAN) 0.005 % ophthalmic solution Administer 1 Drop to both eyes nightly. No current facility-administered medications for this visit.       Facility-Administered Medications Ordered in Other Visits   Medication Dose Route Frequency    dextrose 5% infusion  25 mL/hr IntraVENous CONTINUOUS    fosaprepitant (EMEND) 150 mg in 0.9% sodium chloride 150 mL IVPB  150 mg IntraVENous ONCE    dexamethasone (DECADRON) 12 mg, ondansetron (ZOFRAN) 16 mg in 0.9% sodium chloride 50 mL IVPB   IntraVENous ONCE    famotidine (PF) (PEPCID) 20 mg in 0.9% sodium chloride 10 mL injection  20 mg IntraVENous ONCE    liposomal DOXOrubicin (DOXIL/LIPODOX) 66 mg in dextrose 5% 250 mL chemo infusion  66 mg IntraVENous ONCE    CARBOplatin (PARAPLATIN) 330 mg in dextrose 5% 250 mL chemo infusion (GOG)  330 mg IntraVENous ONCE    saline peripheral flush soln 10-40 mL  10-40 mL InterCATHeter PRN    heparin (porcine) pf 300-500 Units  300-500 Units InterCATHeter PRN    sodium chloride (NS) flush 10-40 mL  10-40 mL IntraVENous PRN    heparin (porcine) pf 500 Units  500 Units InterCATHeter PRN     Allergies   Allergen Reactions    Other Medication Hives     seafood    Pollen Extracts Other (comments)    Shellfish Derived Hives          OBJECTIVE:    Physical Exam  VITAL SIGNS: Visit Vitals  LMP 01/31/2008      GENERAL EMILY: in no apparent distress and well developed and well nourished               IMPRESSION/PLAN:  1.stage IV Primary peritoneal cancer with likely recurrence   -previoulsy reviewed her pathology    -s/p carbo (auc=6), taxol (175 mg/m2) IV every 3 wks s/p 6 cycles completed 6/6/2019   -s/p cytoreductive surgery with [facility] with dr. Johanne Alexis   -s/p pelvic radiation   -biopsy c/w recurrence   -repeat PETCTpreviously  reviewed today with treatment response   -nausea- encouraged use of phenergan/zofran   Pain-script for percocet    -given platinum sensitive disease will plan to treat with carbo auc=5, Doxil 30 mg/m2 IV every 4 wks until CCR, toxicity or progression. We briefly discussed if no metastatic disease develops and she has some response to chemo may consider surgery with dr. Johanne Alexis . We also discussed use of parp maintenance as well   -tolerating chemo. No G3 or G4 toxicity   -hydronephrosis- s/p stent placement   -Echo- normal   -vaginal bleeding.- improved.  If starts to increase I need to see her in our office for pelvic exam   -s/p cycle #3   -ok for cycle #4   -f/u prior to cycle #5        The total time spent was 40 minutes regarding this patients diagnosis of primary peritoneal cancer and >50% of this time was spent counseling and coordinating care    Ceci Huizar MD  Gynecologic Oncology  1/8/362399:04 AM

## 2020-08-07 NOTE — PROGRESS NOTES
[facility] Progress Note    Date: 2020    Name: Mabel Aragon    MRN: [id number]         : 1970      Ms. Amada Hickey arrived in the [facility] today at 0915, in stable condition, here for Cycle 4, IV Doxil/Carboplatin Chemotherapy Regimen (Q 28 Day Cycle). She was assessed and education was provided. Ms. Cannon's vitals were reviewed. Visit Vitals  /75 (BP 1 Location: Left arm, BP Patient Position: Sitting)   Pulse 71   Temp 98.5 °F (36.9 °C)   Resp 18   Ht 5' 3\" (1.6 m)   Wt 120.4 kg (265 lb 8 oz)   SpO2 99%   Breastfeeding No   BMI 47.03 kg/m²       Lab results were reviewed. (Her most recent CBC, CMP, Magnesium, & Urinalysis & Culture Results from today, 20, were reviewed, and all results were noted to be satisfactory for treatment today.)    All lab results from 20, were as follows:    Results for Tk Wang (MRN [id number]) as of 2020 10:40   Ref. Range 2020 09:35 2020 10:40   WBC Latest Ref Range: 4.5 - 13.0 K/uL  5.2   RBC Latest Ref Range: 4.10 - 5.10 M/uL  2.93 (L)   HGB Latest Ref Range: 12.0 - 16 g/dL  9.3 (L)   HCT Latest Ref Range: 36 - 48 %  28.3 (L)   MCV Latest Ref Range: 78 - 102 FL  96.6   MCH Latest Ref Range: 25.0 - 35.0 PG  31.7   MCHC Latest Ref Range: 31 - 37 g/dL  32.9   RDW Latest Ref Range: 11.5 - 14.5 %  18.6 (H)   PLATELET Latest Ref Range: 140 - 440 K/uL  155   NEUTROPHILS Latest Ref Range: 40 - 70 %  69   MIXED CELLS Latest Ref Range: 0.1 - 17 %  3   LYMPHOCYTES Latest Ref Range: 14 - 44 %  28   DF Latest Units:    AUTOMATED   ABS. NEUTROPHILS Latest Ref Range: 1.8 - 9.5 K/UL  3.5   ABS. LYMPHOCYTES Latest Ref Range: 1.1 - 5.9 K/UL  1.5   ABS.  MIXED CELLS Latest Ref Range: 0.0 - 2.3 K/uL  0.2   Color Latest Units:   YELLOW    Appearance Latest Units:   CLEAR    Specific gravity Latest Ref Range: 1.005 - 1.030   1.014    pH (UA) Latest Ref Range: 5.0 - 8.0   6.5    Protein Latest Ref Range: NEG mg/dL 30 (A)    Glucose Latest Ref Range: NEG mg/dL Negative    Ketone Latest Ref Range: NEG mg/dL Negative    Blood Latest Ref Range: NEG   LARGE (A)    Bilirubin Latest Ref Range: NEG   Negative    Urobilinogen Latest Ref Range: 0.2 - 1.0 EU/dL 0.2    Nitrites Latest Ref Range: NEG   Negative    Leukocyte Esterase Latest Ref Range: NEG   SMALL (A)    Epithelial cells Latest Ref Range: 0 - 5 /lpf FEW    WBC Latest Ref Range: 0 - 4 /hpf 5 to 7    RBC Latest Ref Range: 0 - 5 /hpf TOO NUMEROUS TO COUNT    Bacteria Latest Ref Range: NEG /hpf FEW (A)    Sodium Latest Ref Range: 136 - 145 mmol/L  137   Potassium Latest Ref Range: 3.5 - 5.5 mmol/L  4.5   Chloride Latest Ref Range: 100 - 111 mmol/L  108   CO2 Latest Ref Range: 21 - 32 mmol/L  25   Anion gap Latest Ref Range: 3.0 - 18 mmol/L  4   Glucose Latest Ref Range: 74 - 99 mg/dL  118 (H)   BUN Latest Ref Range: 7.0 - 18 MG/DL  32 (H)   Creatinine Latest Ref Range: 0.6 - 1.3 MG/DL  1.88 (H)   BUN/Creatinine ratio Latest Ref Range: 12 - 20    17   Calcium Latest Ref Range: 8.5 - 10.1 MG/DL  8.9   Magnesium Latest Ref Range: 1.6 - 2.6 mg/dL  1.9   GFR est non-AA Latest Ref Range: >60 ml/min/1.73m2  28 (L)   GFR est AA Latest Ref Range: >60 ml/min/1.73m2  34 (L)   Bilirubin, total Latest Ref Range: 0.2 - 1.0 MG/DL  0.3   Protein, total Latest Ref Range: 6.4 - 8.2 g/dL  7.0   Albumin Latest Ref Range: 3.4 - 5.0 g/dL  3.5   Globulin Latest Ref Range: 2.0 - 4.0 g/dL  3.5   A-G Ratio Latest Ref Range: 0.8 - 1.7    1.0   ALT Latest Ref Range: 13 - 56 U/L  22   AST Latest Ref Range: 10 - 38 U/L  16   Alk. phosphatase Latest Ref Range: 45 - 117 U/L  76     . Her right chest single lumen port was accessed x 1 attempt, and brisk blood return was obtained. Dextrose 5% 250 ml IV Bag, was initiated to infuse @ KVO prn, throughout treatment today. The following pre-medications were administered per order, and without incident:  Emend 150 mg IV, Decadron 12 mg IV & Pepcid 20 mg IV.       The following chemotherapy medications were administered:    Liposomal Doxorubicin (DOXIL / LIPODOX) 66 mg IV (30 mg/m2), was administered over approximately 1 hour, per order, and without incident. Ice Packs were applied to both hands & both feet, throughout the Doxil administration. Carboplatin (PARAPLATIN) 330 mg IV (AUC 5), was administered over approximately 30 minutes, per order, and without incident. After completion of all ordered IV medications, her port was flushed well per protocol with NS & Heparin, and then, the upton needle was removed and gauze/bandaid was applied. Ms. Marquis Noriega tolerated well, and had no complaints. Ms. Marquis Noriega was discharged from [place] [address] in stable condition at 1520. She is to return on 8/12/20 at 11:00, for IV Hydration (gets IV Hydration on her non-chemo weeks).       [facility]  August 7, 2020

## 2020-08-08 LAB — CANCER AG125 SERPL-ACNC: 8 U/ML (ref 1.5–35)

## 2020-08-09 LAB
BACTERIA SPEC CULT: NORMAL
SERVICE CMNT-IMP: NORMAL

## 2020-08-18 ENCOUNTER — TELEPHONE (OUTPATIENT)
Dept: ONCOLOGY | Age: 50
End: 2020-08-18

## 2020-08-19 ENCOUNTER — HOSPITAL ENCOUNTER (OUTPATIENT)
Dept: INFUSION THERAPY | Age: 50
Discharge: HOME | End: 2020-08-19

## 2020-08-19 ENCOUNTER — APPOINTMENT (OUTPATIENT)
Dept: INFUSION THERAPY | Age: 50
End: 2020-08-19
Payer: MEDICARE

## 2020-08-19 NOTE — PROGRESS NOTES
[facility] Progress Note    Date: 2020    Name: Amanda Amador    MRN: [id number]         : 1970      Ms. Erich Madsen did NOT show up today, Wednesday, 20, for her scheduled [address] [place] [address] appointment, for weekly IV Hydration. So, I called her regarding the missed appointment, but received no answer. I did however, leave her a message on her voicemail regarding the missed appointment, and asking that she call the [facility] ASAP to reschedule. For now though, her next [facility] appointment is scheduled on next Wednesday, 20 at 1100, for IV Hydration. Also, she is scheduled to follow up with Dr. Leah Davidson on Friday, 20 at 1100.       Jennifer Newman RN  2020  11:27 AM

## 2020-08-24 ENCOUNTER — TELEPHONE (OUTPATIENT)
Dept: ONCOLOGY | Age: 50
End: 2020-08-24

## 2020-08-24 DIAGNOSIS — C48.2 PRIMARY PERITONEAL CARCINOMATOSIS (HCC): ICD-10-CM

## 2020-08-24 DIAGNOSIS — T45.1X5A CINV (CHEMOTHERAPY-INDUCED NAUSEA AND VOMITING): Primary | ICD-10-CM

## 2020-08-24 DIAGNOSIS — G89.3 CANCER ASSOCIATED PAIN: ICD-10-CM

## 2020-08-24 DIAGNOSIS — R11.2 CINV (CHEMOTHERAPY-INDUCED NAUSEA AND VOMITING): Primary | ICD-10-CM

## 2020-08-24 RX ORDER — ONDANSETRON 4 MG/1
4 TABLET, FILM COATED ORAL
Qty: 30 TAB | Refills: 3 | Status: SHIPPED | OUTPATIENT
Start: 2020-08-24 | End: 2021-01-11

## 2020-08-24 RX ORDER — PROMETHAZINE HYDROCHLORIDE 25 MG/1
TABLET ORAL
Qty: 30 TAB | Refills: 3 | Status: SHIPPED | OUTPATIENT
Start: 2020-08-24 | End: 2021-03-30

## 2020-08-24 RX ORDER — OXYCODONE AND ACETAMINOPHEN 5; 325 MG/1; MG/1
1 TABLET ORAL
Qty: 60 TAB | Refills: 0 | Status: SHIPPED | OUTPATIENT
Start: 2020-08-24 | End: 2020-09-02 | Stop reason: SDUPTHER

## 2020-08-24 NOTE — TELEPHONE ENCOUNTER
Advised pt given swelling and pain in leg I am concerned for DVT and encouraged pt to go to ED for evaluation. Pt reports she is out of Percocet and need refills of nausea medication. Refills sent to pharmacy. Patient verbalized understanding and agreed to plan. Patient instructed to contact office for any question or concerns.      Tito Fabian NP

## 2020-08-24 NOTE — TELEPHONE ENCOUNTER
Patient contacted the office stating that she is experiencing swelling in her left leg and shooting pain from her thigh to her ankle. She is concerned it may be related to her chemotherapy. She states she has two treatments left. Pt reports she has been soaking her leg without relief of symptoms. She can be reached at [phone].

## 2020-08-25 ENCOUNTER — TELEPHONE (OUTPATIENT)
Dept: ONCOLOGY | Age: 50
End: 2020-08-25

## 2020-08-25 NOTE — TELEPHONE ENCOUNTER
Spoke with patient and relayed that the office is aware of the visit with Dr. Kristyn Pan and the ED visit. Information and care plan will be discussed at next visit. Patient agreeable with this plan.

## 2020-08-25 NOTE — TELEPHONE ENCOUNTER
Patient contacted THE [facility] office and would like a call back to discuss patient attended ER on yesterday 8/24 and to discuss conversation patient had with Dr. Amy Palma in reference to upcoming surgery. Please contact patient at [phone].

## 2020-08-26 ENCOUNTER — APPOINTMENT (OUTPATIENT)
Dept: INFUSION THERAPY | Age: 50
End: 2020-08-26
Payer: MEDICARE

## 2020-08-26 ENCOUNTER — APPOINTMENT (OUTPATIENT)
Dept: INFUSION THERAPY | Age: 50
End: 2020-08-26

## 2020-08-26 RX ORDER — ONDANSETRON 2 MG/ML
8 INJECTION INTRAMUSCULAR; INTRAVENOUS AS NEEDED
Status: CANCELLED | OUTPATIENT
Start: 2020-09-02

## 2020-08-26 RX ORDER — POTASSIUM CHLORIDE 7.45 MG/ML
10 INJECTION INTRAVENOUS
Status: CANCELLED
Start: 2020-09-02

## 2020-08-26 RX ORDER — DIPHENHYDRAMINE HYDROCHLORIDE 50 MG/ML
25 INJECTION, SOLUTION INTRAMUSCULAR; INTRAVENOUS AS NEEDED
Status: CANCELLED
Start: 2020-09-02

## 2020-08-26 RX ORDER — EPINEPHRINE 1 MG/ML
0.3 INJECTION, SOLUTION, CONCENTRATE INTRAVENOUS AS NEEDED
Status: CANCELLED | OUTPATIENT
Start: 2020-09-02

## 2020-08-26 RX ORDER — MAGNESIUM SULFATE HEPTAHYDRATE 40 MG/ML
2 INJECTION, SOLUTION INTRAVENOUS
Status: CANCELLED
Start: 2020-09-02

## 2020-08-26 RX ORDER — LORAZEPAM 2 MG/ML
0.26 INJECTION INTRAMUSCULAR
Status: CANCELLED
Start: 2020-09-02

## 2020-08-26 RX ORDER — ACETAMINOPHEN 325 MG/1
650 TABLET ORAL AS NEEDED
Status: CANCELLED
Start: 2020-09-02

## 2020-08-26 RX ORDER — HYDROCORTISONE SODIUM SUCCINATE 100 MG/2ML
100 INJECTION, POWDER, FOR SOLUTION INTRAMUSCULAR; INTRAVENOUS AS NEEDED
Status: CANCELLED | OUTPATIENT
Start: 2020-09-02

## 2020-08-26 RX ORDER — ALBUTEROL SULFATE 0.83 MG/ML
2.5 SOLUTION RESPIRATORY (INHALATION) AS NEEDED
Status: CANCELLED
Start: 2020-09-02

## 2020-08-26 RX ORDER — PROCHLORPERAZINE EDISYLATE 5 MG/ML
10 INJECTION INTRAMUSCULAR; INTRAVENOUS
Status: CANCELLED
Start: 2020-09-02

## 2020-08-26 RX ORDER — DIPHENHYDRAMINE HYDROCHLORIDE 50 MG/ML
50 INJECTION, SOLUTION INTRAMUSCULAR; INTRAVENOUS AS NEEDED
Status: CANCELLED
Start: 2020-09-02

## 2020-08-28 ENCOUNTER — TELEPHONE (OUTPATIENT)
Dept: ONCOLOGY | Age: 50
End: 2020-08-28

## 2020-08-28 NOTE — TELEPHONE ENCOUNTER
Patient contacted the office regarding her pharmacy, updated pharmacy list.    Patient wanted to know about if she will need any clearance appointments for her upcoming surgery in November.

## 2020-08-31 ENCOUNTER — HOSPITAL ENCOUNTER (OUTPATIENT)
Dept: INFUSION THERAPY | Age: 50
Discharge: HOME | End: 2020-08-31
Payer: MEDICARE

## 2020-08-31 VITALS
HEART RATE: 68 BPM | WEIGHT: 275.8 LBS | BODY MASS INDEX: 48.87 KG/M2 | OXYGEN SATURATION: 95 % | SYSTOLIC BLOOD PRESSURE: 158 MMHG | HEIGHT: 63 IN | DIASTOLIC BLOOD PRESSURE: 79 MMHG | TEMPERATURE: 97 F | RESPIRATION RATE: 18 BRPM

## 2020-08-31 LAB
ALBUMIN SERPL-MCNC: 3.6 G/DL (ref 3.4–5)
ALBUMIN/GLOB SERPL: 1.1 {RATIO} (ref 0.8–1.7)
ALP SERPL-CCNC: 77 U/L (ref 45–117)
ALT SERPL-CCNC: 18 U/L (ref 13–56)
ANION GAP SERPL CALC-SCNC: 5 MMOL/L (ref 3–18)
APPEARANCE UR: CLEAR
AST SERPL-CCNC: 17 U/L (ref 10–38)
BACTERIA URNS QL MICRO: ABNORMAL /HPF
BASOPHILS # BLD: 0 K/UL (ref 0–0.1)
BASOPHILS NFR BLD: 0 % (ref 0–2)
BILIRUB SERPL-MCNC: 0.3 MG/DL (ref 0.2–1)
BILIRUB UR QL: NEGATIVE
BUN SERPL-MCNC: 23 MG/DL (ref 7–18)
BUN/CREAT SERPL: 12 (ref 12–20)
CALCIUM SERPL-MCNC: 8.4 MG/DL (ref 8.5–10.1)
CHLORIDE SERPL-SCNC: 110 MMOL/L (ref 100–111)
CO2 SERPL-SCNC: 26 MMOL/L (ref 21–32)
COLOR UR: YELLOW
CREAT SERPL-MCNC: 1.87 MG/DL (ref 0.6–1.3)
DIFFERENTIAL METHOD BLD: ABNORMAL
EOSINOPHIL # BLD: 0 K/UL (ref 0–0.4)
EOSINOPHIL NFR BLD: 1 % (ref 0–5)
EPITH CASTS URNS QL MICRO: ABNORMAL /LPF (ref 0–5)
ERYTHROCYTE [DISTWIDTH] IN BLOOD BY AUTOMATED COUNT: 17.4 % (ref 11.6–14.5)
GLOBULIN SER CALC-MCNC: 3.3 G/DL (ref 2–4)
GLUCOSE SERPL-MCNC: 90 MG/DL (ref 74–99)
GLUCOSE UR STRIP.AUTO-MCNC: NEGATIVE MG/DL
HCT VFR BLD AUTO: 28.1 % (ref 35–45)
HGB BLD-MCNC: 9.1 G/DL (ref 12–16)
HGB UR QL STRIP: ABNORMAL
KETONES UR QL STRIP.AUTO: NEGATIVE MG/DL
LEUKOCYTE ESTERASE UR QL STRIP.AUTO: ABNORMAL
LYMPHOCYTES # BLD: 0.9 K/UL (ref 0.9–3.6)
LYMPHOCYTES NFR BLD: 21 % (ref 21–52)
MAGNESIUM SERPL-MCNC: 2 MG/DL (ref 1.6–2.6)
MCH RBC QN AUTO: 31.8 PG (ref 24–34)
MCHC RBC AUTO-ENTMCNC: 32.4 G/DL (ref 31–37)
MCV RBC AUTO: 98.3 FL (ref 74–97)
MONOCYTES # BLD: 0.7 K/UL (ref 0.05–1.2)
MONOCYTES NFR BLD: 17 % (ref 3–10)
NEUTS SEG # BLD: 2.7 K/UL (ref 1.8–8)
NEUTS SEG NFR BLD: 61 % (ref 40–73)
NITRITE UR QL STRIP.AUTO: NEGATIVE
PH UR STRIP: 6.5 [PH] (ref 5–8)
PLATELET # BLD AUTO: 116 K/UL (ref 135–420)
PMV BLD AUTO: 10.3 FL (ref 9.2–11.8)
POTASSIUM SERPL-SCNC: 4.4 MMOL/L (ref 3.5–5.5)
PROT SERPL-MCNC: 6.9 G/DL (ref 6.4–8.2)
PROT UR STRIP-MCNC: 100 MG/DL
RBC # BLD AUTO: 2.86 M/UL (ref 4.2–5.3)
RBC #/AREA URNS HPF: ABNORMAL /HPF (ref 0–5)
SODIUM SERPL-SCNC: 141 MMOL/L (ref 136–145)
SP GR UR REFRACTOMETRY: 1.02 (ref 1–1.03)
UROBILINOGEN UR QL STRIP.AUTO: 0.2 EU/DL (ref 0.2–1)
WBC # BLD AUTO: 4.4 K/UL (ref 4.6–13.2)
WBC URNS QL MICRO: ABNORMAL /HPF (ref 0–4)

## 2020-08-31 PROCEDURE — 87086 URINE CULTURE/COLONY COUNT: CPT

## 2020-08-31 PROCEDURE — 80053 COMPREHEN METABOLIC PANEL: CPT

## 2020-08-31 PROCEDURE — 36415 COLL VENOUS BLD VENIPUNCTURE: CPT

## 2020-08-31 PROCEDURE — 86304 IMMUNOASSAY TUMOR CA 125: CPT

## 2020-08-31 PROCEDURE — 81001 URINALYSIS AUTO W/SCOPE: CPT

## 2020-08-31 PROCEDURE — 83735 ASSAY OF MAGNESIUM: CPT

## 2020-08-31 PROCEDURE — 85025 COMPLETE CBC W/AUTO DIFF WBC: CPT

## 2020-08-31 RX ORDER — AMLODIPINE BESYLATE 10 MG/1
TABLET ORAL
Qty: 90 TAB | Refills: 1 | Status: SHIPPED | OUTPATIENT
Start: 2020-08-31 | End: 2021-05-03

## 2020-08-31 NOTE — PROGRESS NOTES
[facility] Lab Visit:     Deirdre Schafer  1970  [id number]          Pt arrived ambulatory. Labs drawn peripherally in rt hand and sent for processing. Pt scheduled to return for treatment.  Departed [facility] ambulatory and in no distress.     Visit Vitals  /84 (BP 1 Location: Left arm, BP Patient Position: Sitting)   Pulse 70   Temp 98.3 °F (36.8 °C)   Resp 18   Ht 5' 3\" (1.6 m)   Wt 117.8 kg (259 lb 12.8 oz)   SpO2 95%   BMI 46.02 kg/m²

## 2020-09-01 LAB
BACTERIA SPEC CULT: ABNORMAL
BACTERIA SPEC CULT: ABNORMAL
CANCER AG125 SERPL-ACNC: 8 U/ML (ref 1.5–35)
CC UR VC: ABNORMAL
SERVICE CMNT-IMP: ABNORMAL

## 2020-09-02 ENCOUNTER — HOSPITAL ENCOUNTER (OUTPATIENT)
Dept: INFUSION THERAPY | Age: 50
Discharge: HOME | End: 2020-09-02
Payer: MEDICARE

## 2020-09-02 ENCOUNTER — TELEPHONE (OUTPATIENT)
Dept: ONCOLOGY | Age: 50
End: 2020-09-02

## 2020-09-02 ENCOUNTER — APPOINTMENT (OUTPATIENT)
Dept: INFUSION THERAPY | Age: 50
End: 2020-09-02

## 2020-09-02 VITALS
SYSTOLIC BLOOD PRESSURE: 118 MMHG | RESPIRATION RATE: 20 BRPM | TEMPERATURE: 98.6 F | OXYGEN SATURATION: 98 % | DIASTOLIC BLOOD PRESSURE: 79 MMHG | HEART RATE: 67 BPM

## 2020-09-02 DIAGNOSIS — C48.2 PRIMARY PERITONEAL CARCINOMATOSIS (HCC): ICD-10-CM

## 2020-09-02 DIAGNOSIS — C48.2 MALIGNANT NEOPLASM OF PERITONEUM (HCC): Primary | ICD-10-CM

## 2020-09-02 DIAGNOSIS — G89.3 CANCER ASSOCIATED PAIN: ICD-10-CM

## 2020-09-02 PROCEDURE — 96367 TX/PROPH/DG ADDL SEQ IV INF: CPT

## 2020-09-02 PROCEDURE — 74011250636 HC RX REV CODE- 250/636: Performed by: OBSTETRICS & GYNECOLOGY

## 2020-09-02 PROCEDURE — 96375 TX/PRO/DX INJ NEW DRUG ADDON: CPT

## 2020-09-02 PROCEDURE — 96417 CHEMO IV INFUS EACH ADDL SEQ: CPT

## 2020-09-02 PROCEDURE — 77030012965 HC NDL HUBR BBMI -A

## 2020-09-02 PROCEDURE — 96413 CHEMO IV INFUSION 1 HR: CPT

## 2020-09-02 PROCEDURE — 74011000258 HC RX REV CODE- 258: Performed by: OBSTETRICS & GYNECOLOGY

## 2020-09-02 PROCEDURE — 74011000250 HC RX REV CODE- 250: Performed by: OBSTETRICS & GYNECOLOGY

## 2020-09-02 RX ORDER — HEPARIN 100 UNIT/ML
300-500 SYRINGE INTRAVENOUS AS NEEDED
Status: ACTIVE | OUTPATIENT
Start: 2020-09-02 | End: 2020-09-02

## 2020-09-02 RX ORDER — OXYCODONE AND ACETAMINOPHEN 5; 325 MG/1; MG/1
1 TABLET ORAL
Qty: 60 TAB | Refills: 0 | Status: SHIPPED | OUTPATIENT
Start: 2020-09-02 | End: 2020-09-21 | Stop reason: SDUPTHER

## 2020-09-02 RX ORDER — DEXTROSE MONOHYDRATE 50 MG/ML
25 INJECTION, SOLUTION INTRAVENOUS CONTINUOUS
Status: DISPENSED | OUTPATIENT
Start: 2020-09-02 | End: 2020-09-02

## 2020-09-02 RX ORDER — SODIUM CHLORIDE 0.9 % (FLUSH) 0.9 %
10-40 SYRINGE (ML) INJECTION AS NEEDED
Status: DISPENSED | OUTPATIENT
Start: 2020-09-02 | End: 2020-09-02

## 2020-09-02 RX ORDER — LEVOFLOXACIN 250 MG/1
250 TABLET ORAL DAILY
Qty: 3 TAB | Refills: 0 | Status: SHIPPED | OUTPATIENT
Start: 2020-09-02 | End: 2020-09-05

## 2020-09-02 RX ADMIN — Medication 10 ML: at 10:20

## 2020-09-02 RX ADMIN — ONDANSETRON: 2 INJECTION INTRAMUSCULAR; INTRAVENOUS at 11:13

## 2020-09-02 RX ADMIN — DOXORUBICIN HYDROCHLORIDE 66 MG: 2 INJECTION, SUSPENSION, LIPOSOMAL INTRAVENOUS at 12:16

## 2020-09-02 RX ADMIN — SODIUM CHLORIDE 20 MG: 9 INJECTION INTRAMUSCULAR; INTRAVENOUS; SUBCUTANEOUS at 11:00

## 2020-09-02 RX ADMIN — CARBOPLATIN 330 MG: 10 INJECTION, SOLUTION INTRAVENOUS at 13:25

## 2020-09-02 RX ADMIN — SODIUM CHLORIDE 150 MG: 900 INJECTION, SOLUTION INTRAVENOUS at 11:12

## 2020-09-02 RX ADMIN — Medication 10 ML: at 14:18

## 2020-09-02 RX ADMIN — Medication 10 ML: at 10:21

## 2020-09-02 RX ADMIN — DEXTROSE MONOHYDRATE 25 ML/HR: 50 INJECTION, SOLUTION INTRAVENOUS at 11:08

## 2020-09-02 RX ADMIN — Medication 10 ML: at 14:17

## 2020-09-02 RX ADMIN — HEPARIN 500 UNITS: 100 SYRINGE at 14:18

## 2020-09-02 NOTE — PROGRESS NOTES
[facility] Progress Note    Date: 2020    Name: Brandy Coleman              MRN: [id number]              : 1970    Chemotherapy Cycle:5; Day 1    Doxil/Lipodox + Carboplatin Infusion      Pt to [facility], ambulatory, at 0915 and in no acute distress. Ms. Jania Becerra was assessed and education was provided. Patient complained of intermittent abdominal cramps which she described as 7/10 on numeric pain scale. Patient also reports spotting blood from both her vagina and her urethra. U/A and urine C&S results from 2020 were reviewed prior to patient's arrival, today, and appears to be indicative of a UTI. Alex Sanon NP was contacted and made aware and she advised that covering NP today would come to [facility] to consult with patient. Patient made aware. Ms. Cannon's vitals were reviewed. Visit Vitals  /79 (BP 1 Location: Right arm, BP Patient Position: At rest;Sitting)   Pulse 67   Temp 98.6 °F (37 °C)   Resp 20   SpO2 98%   Breastfeeding No        Right upper chest mediport accessed with 20 g 1 inch non-coring access needle x 1 attempt. Port flushed easily and had brisk blood return. Patient denied complaints. NP on site to see patient and advised her of urine test results. Patient to begin oral antibiotic therapy x 3 days, per NP order. 250 mL bag of D5W initiated via port-a-cath at 25 mL/hr. Lab results from 2020 were also reviewed prior to patient's arrival today and are within ordered parameters to administer chemo. Lab results within ordered parameters to give chemo today. ANC =2.7, PLT =116. Chemo dosages verified with today's BSA (2.36) and found to be within 10% of ordered dosages. Pre-medications (EMEND 150 mg IVPB; DECADRON 12 mg IVPB; PEPCID 20 mg IVP) were administered as ordered and chemotherapy was initiated after blood return from port re-verified. Reviewed expected side effects of premeds with patient.      Ice packs were applied to patients hands and feet for administration of Doxil. Doxil 66 mg IV was infused over approximately 1 hour, as ordered. VS stable at end of infusion and pt denied complaints. Line flushed with NS and blood return from port re-verified. Carboplatin 330 mg IV was infused over approximately 30 minutes. VS stable at end of infusion and pt denied complaints. Line flushed with NS and blood return from port re-verified. Ms. Amada Hickey tolerated infusion, and had no complaints at this time. Mediport flushed with NS 20 mL followed by instillation of Heparin 500 units/5mL and then de-accessed. No irritation,  bleeding or other evidence of complication noted. Bandaid applied. Patient armband removed and shredded. Ms. Amada Hickey was discharged from [place] [address] in stable condition at 46. She is to return on 09/28/2020  at [phone] for her next pre-chemo lab appointment.     Juan Lam, LUIS E  September 2, 2020  5:38 PM

## 2020-09-09 ENCOUNTER — APPOINTMENT (OUTPATIENT)
Dept: INFUSION THERAPY | Age: 50
End: 2020-09-09

## 2020-09-09 ENCOUNTER — OFFICE VISIT (OUTPATIENT)
Dept: ONCOLOGY | Age: 50
End: 2020-09-09

## 2020-09-09 VITALS
WEIGHT: 272.5 LBS | RESPIRATION RATE: 14 BRPM | SYSTOLIC BLOOD PRESSURE: 130 MMHG | HEIGHT: 63 IN | OXYGEN SATURATION: 99 % | HEART RATE: 67 BPM | BODY MASS INDEX: 48.28 KG/M2 | TEMPERATURE: 98 F | DIASTOLIC BLOOD PRESSURE: 84 MMHG

## 2020-09-09 DIAGNOSIS — C48.2 PERITONEAL CARCINOMA (HCC): Primary | ICD-10-CM

## 2020-09-09 NOTE — PROGRESS NOTES
[facility]63 [facility]  [address] [place] [address], [address], 2648 [facility]  [address] [place] [address], 5 [facility]  [place],  [place]   [phone]          Patient ID:  Name:  Stefanie Nino  MRN:  [id number]  :  1970/50 y.o. Date:  2020      HISTORY OF PRESENT ILLNESS:  Stefanie Nino is a 45 y.o.  postmenopausal female referred by Dr. Dante Feliz  With peritoneal cancer. Intitally seen be NP with reports of vaginal bleeding. Given pt's history of hysteretectomy with BSO for endometriosis in  a TVUS was ordered. Which revealed a 6.8 cm x 5.2 cm x 4.6 cm at midline vaginal cuff. This prompted pelvic CT with findings of a lesion measuring 7.6 x 5.8 x 7.2 cm and is compatible with a pelvic neoplasm vs endometrioma given prior history of endometriosis. Tumor markers done on 2018 all normal.  S/p  Exploratory laparotomy, exploration of retroperitoneal spaces, exam under anesthesia with biopsy of rectovaginal mass on 2019. Had sigmoidoscopy which revealed submucosal mass. S/p cycle #6 of carbo/taxol on 2019. S/p cytoreductive surgery with [facility] on 2019. S/p radiation treatment completed in 2019. Was seen in office and had a biopsy c/w recurrence. S/p cycle #4 of Carbo/Doxil on 2020. Having some discoloration in fingers. Still having bleeding. Having increased neuropathy. Nausea controlled with meds.        Labs:  Component      Latest Ref Rng & Units 2020          10:01 AM   CA-125      1.5 - 35.0 U/mL 8     Component      Latest Ref Rng & Units 2020          11:30 AM   CA-125      1.5 - 35.0 U/mL 5     Component      Latest Ref Rng & Units 2020          10:40 AM   CA-125      1.5 - 35.0 U/mL 7     Component      Latest Ref Rng & Units 2020          11:30 AM   CA-125      1.5 - 35.0 U/mL 7     Component      Latest Ref Rng & Units 3/4/2020           8:15 AM   CA-125      1.5 - 35.0 U/mL 7     Component      Latest Ref Rng & Units 11/15/2019           9:56 AM   CA-125      1.5 - 35.0 U/mL 6     Component      Latest Ref Rng & Units 6/6/2019           8:44 AM   CA-125      1.5 - 35.0 U/mL 7     Component      Latest Ref Rng & Units 5/16/2019 4/25/2019           8:26 AM  8:20 AM   Cancer Ag (CA) 125      0.0 - 38.1 U/mL 7.8 8.7     Component      Latest Ref Rng & Units 4/4/2019           8:28 AM   Cancer Ag (CA) 125      0.0 - 38.1 U/mL 8.8     Component      Latest Ref Rng & Units 3/14/2019 2/21/2019           8:15 AM  8:32 AM   Cancer Ag (CA) 125      0.0 - 38.1 U/mL 10.4 18.4     12/17/2018 : 9.3  12/17/2018 CEA: 2.0  12/17/2018 AFP: 3.8    Pathology  4/20/2020  Vaginal biopsy  Papillary serous adenocarcinoma    8/8/2019  A) COLON, PERICOLIC NODULE, EXCISION:      - ACELLULAR MUCIN WITH MULTIPLE CALCIFICATIONS, AND ASSOCIATED FIBROUS WALL WITH        HYALINIZATION, AND CHRONIC INFLAMMATION.     - ADJACENT BENIGN FIBROADIPOSE TISSUE, CONSISTENT WITH MESENTERY.     - NO EVIDENCE OF MALIGNANCY.      - SEE COMMENT. B) LIVER, RIGHT LOBE, BIOPSY:      - NODULAR FAT NECROSIS AND FIBROSIS OF THE LIVER CAPSULE.      - MILD STEATOSIS.     - NO EVIDENCE OF MALIGNANCY. C) COLON, SIGMOID, SEROSAL IMPLANT, EXCISION:      - NODULAR FAT NECROSIS WITH FIBROSIS, CHRONIC INFLAMMATION, CALCIFICATIONS, AND        CYSTIC DEGENERATION WITHOUT MUCIN.     - NO EVIDENCE OF MALIGNANCY. D) OMENTUM, OMENTECTOMY (RESECTION):      - CONGESTED FIBROADIPOSE TISSUE WITH FOCAL FIBROSIS AND CHRONIC INFLAMMATION, AND        CALCIFIED NODULAR FIBROSIS.     - NO EVIDENCE OF MALIGNANCY. E) PERITONEUM, LEFT ANTERIOR ABDOMINAL, RESECTION:      - CONGESTED PERITONEAL TISSUE, NO EVIDENCE OF MALIGNANCY. F) PERITONEUM, RIGHT ANTERIOR ABDOMINAL, RESECTION:      - CONGESTED PERITONEAL TISSUE, NO EVIDENCE OF MALIGNANCY.     G) COLON, SIGMOID, SEROSAL IMPLANT, EXCISION:      - NODULAR FIBROSIS WITH HISTIOCYTES, SUGGESTIVE OF FAT NECROSIS.     - CYSTIC DEGENERATION, WITHOUT MUCIN.     - NO EVIDENCE OF MALIGNANCY. H) SOFT TISSUE, FALCIFORM, EXCISION:      - CONSISTENT WITH FALCIFORM LIGAMENT.     - NO EVIDENCE OF MALIGNANCY. PATHOLOGIC STAGE:  ypTx, pNx    1/17/2019   RECTOVAGINAL MASS (#1, 2) AND PELVIC MASS, BIOPSIES:   CONSISTENT WITH SEROUS CARCINOMA WITH EXTENSIVE NECROSIS. Imaging  PETCT 7/17/2020   FINDINGS:        PET images: Study limited because of patient motion.     Mediastinal blood pool reference value = SUV Max. Mediastinal blood pool reference value = SUV Mean. Liver parenchyma reference value =  4.7   SUV Max. Liver parenchyma reference value =  2.3    SUV Mean.           NECK: There is no suspicious hypermetabolic activity at the neck. CHEST: There is no suspicious hypermetabolic activity at the chest.     ABDOMEN: Typical heterogeneity at the liver. No convincing malignant foci  hypermetabolic activity or underlying CT abnormality. PELVIS: There is soft tissue fullness in the right retrovesicular pelvis just  above the vaginal cuff and posterior to right ureter measuring about 3.8 cm with  Max SUV 13.5 (206), extending to the rectum posteriorly, levators inferiorly on  the right. Previously 4 cm and Max SUV 16.5.     Thickening anterior abdominal wall compatible with scar demonstrating diffuse  modest activity and Max SUV 2.7.      Additional CT findings: Mediport catheter has tip in the superior vena cava. Air  trapping in the superior segments lower lobes. Right-sided nephroureteral stent  without hydronephrosis. No ascites. IMPRESSION  Impression:       Treatment response. Decreased metabolic activity at the right retrovesicular  pelvic mass. No new evidence of metastatic disease.     Interval placement right-sided nephroureteral stent and resolved  hydroureteronephrosis. .     ROS:    As above      Patient Active Problem List    Diagnosis Date Noted    Malignant neoplasm of peritoneum ([facility] 02/14/2019    Pelvic mass in female 01/17/2019    Bipolar 1 disorder ([facility] 02/09/2018    Irritable bowel syndrome with both constipation and diarrhea 02/09/2018    Advance care planning 01/31/2017    Dental caries 05/26/2016    Chronic periodontal disease 05/26/2016    SUAZO (dyspnea on exertion) 02/10/2016    Prediabetes 09/24/2015    Morbid obesity ([facility] 08/31/2015    Arthritis, degenerative 08/31/2015    Gastroesophageal reflux disease without esophagitis 08/31/2015    ANAMIKA on CPAP 08/31/2015    Essential hypertension 08/28/2015    PTSD (post-traumatic stress disorder) 03/24/2014    S/P TKR (total knee replacement) 11/14/2012    Depression 05/08/2012    Menopause 05/08/2012    Knee pain, right 05/08/2012    GERD (gastroesophageal reflux disease) 05/08/2012    OA (osteoarthritis) 06/18/2010    Obesity 06/18/2010    Mixed hyperlipidemia 06/18/2010     Past Medical History:   Diagnosis Date    AR (allergic rhinitis)     seasonal    Cancer ([facility]     pt states between vgina and rectal area    Cardiac echocardiogram 04/11/2016    Tech difficult. EF 55-60%. No RWMA. Mild conc LVH. Gr 1 DDfx. RVSP normal.      Cardiac nuclear imaging test 05/05/2016    Low risk. Very patchy radiotracer uptake. Mild anterior artifact, low suspicion for ischemia. EF 68%. No RWMA. Normal EKG on pharm stress test.    Cardiovascular LE arterial duplex 10/07/2013    No significant arterial disease at rest bilaterally. R JUNE 1.21.  L JUNE 1.16. No significant sm vessel disease.     Depression     Dr. Holley Abrams, suicide attempt 2/12    Diverticulosis     Endometriosis     s/p hysterectomy 2006    GERD (gastroesophageal reflux disease)     Glaucoma     Dr. Elgin Bumpers HTN (hypertension)     Hx of colonoscopy 04/05/2017    mild diverticulosis, g1 internal hemorrhoids, normal colon , repeat 10 year 2027    Hx of suicide attempt     Hyperlipidemia LDL goal < 130     IBS (irritable bowel syndrome)     Ill-defined condition     environmental allergies    Insomnia     Malignant neoplasm of peritoneum (HCC) 2019    Nausea & vomiting     OA (osteoarthritis)     both knees, lower back    Preeclampsia     PTSD (post-traumatic stress disorder)     Seizures (HCC)     1 x with childbirth, had toxemia    Sleep apnea     does not use cpap machine    Spinal stenosis     Dr. Willow Luke Vertigo       Past Surgical History:   Procedure Laterality Date    COLONOSCOPY N/A 2017    COLONOSCOPY performed by Maxine Gardner MD at [address] N/A 2019    SIGMOIDOSCOPY FLEXIBLE performed by Lamar Howe MD at [facility] ENDOSCOPY    HX  SECTION      HX COLONOSCOPY  2017    DR. MCRAE 2017     HX HYSTERECTOMY      HX KNEE ARTHROSCOPY Left     left knee    HX KNEE REPLACEMENT Bilateral     (R)  (L)     HX LAPAROTOMY N/A 2019    and rectovaginal bx    HX OTHER SURGICAL  2016    all teeth removed    HX SALPINGO-OOPHORECTOMY  2008    HX TUBAL LIGATION      IR INSERT TUNL CVC W PORT OVER 5 YEARS  2019    MULTIPLE DELIVERY       1990    WA FEMUR/KNEE SURG UNLISTED Left 2009    External fixator    TOTAL ABDOM HYSTERECTOMY        OB History        2    Para   2    Term   2       0    AB   0    Living   0       SAB   0    TAB   0    Ectopic   0    Molar   0    Multiple   0    Live Births   0              Social History     Tobacco Use    Smoking status: Never Smoker    Smokeless tobacco: Never Used   Substance Use Topics    Alcohol use: No      Family History   Problem Relation Age of Onset    Heart Disease Mother         CHF    Diabetes Mother     Hypertension Mother     Kidney Disease Mother     Breast Cancer Mother     Stroke Mother     Diabetes Father     Hypertension Father     Heart Attack Father         MI    Cancer Maternal Grandmother stomach    Ovarian Cancer Maternal Aunt     Cancer Maternal Uncle       Current Outpatient Medications   Medication Sig    oxyCODONE-acetaminophen (PERCOCET) 5-325 mg per tablet Take 1 Tab by mouth every four (4) hours as needed for Pain for up to 30 days. Max Daily Amount: 6 Tabs.  amLODIPine (NORVASC) 10 mg tablet take 1 tablet by mouth once daily    promethazine (PHENERGAN) 25 mg tablet take 1 tablet by mouth every 6 hours if needed for nausea    ondansetron hcl (ZOFRAN) 4 mg tablet Take 1 Tab by mouth every eight (8) hours as needed for Nausea. Indications: prevent nausea and vomiting from cancer chemotherapy    oxybutynin chloride XL (DITROPAN XL) 10 mg CR tablet Take 1 Tab by mouth daily.  levocetirizine (XYZAL) 5 mg tablet Take  by mouth.  lidocaine-prilocaine (EMLA) topical cream Apply  to affected area as needed for Pain.  pantoprazole (PROTONIX) 40 mg tablet take 1 tablet by mouth once daily    meclizine (ANTIVERT) 25 mg tablet take 1 tablet by mouth three times a day if needed for dizziness    Facial Mask misc Use daily    Disposable Gloves misc Use daily    metoprolol succinate (TOPROL-XL) 100 mg tablet Take 1 Tab by mouth daily.  magnesium hydroxide (PATHAK MILK OF MAGNESIA) 400 mg/5 mL suspension Take 30 mL by mouth daily as needed for Constipation.  Biotin 2,500 mcg cap Take  by mouth.  multivitamin (ONE A DAY) tablet Take 1 Tab by mouth daily.  cyanocobalamin (VITAMIN B-12) 100 mcg tablet Take 100 mcg by mouth daily.  PARoxetine (PAXIL) 10 mg tablet take 1 tablet by mouth once daily    simvastatin (ZOCOR) 20 mg tablet take 1 tablet by mouth at bedtime    ibuprofen (MOTRIN) 600 mg tablet take 1 tablet by mouth three times a day ONLY AS NEEDED    lidocaine-prilocaine (EMLA) topical cream apply to affected area once daily if needed for pain    Mv,Ca,Min-FA-Herbal No.157 (ESTROVEN MAXIMUM STRENGTH) 400 mcg tab Take  by mouth daily.  Indications: hot flash    dimethicone (SOOTHE AND COOL INZO BARRIER) 5 % topical cream Apply  to affected area two (2) times a day.  polyethylene glycol (MIRALAX) 17 gram packet Take 1 Packet by mouth daily.  melatonin 3 mg tablet Take 3 mg by mouth nightly.  montelukast (SINGULAIR) 10 mg tablet Take 10 mg by mouth nightly.  latanoprost (XALATAN) 0.005 % ophthalmic solution Administer 1 Drop to both eyes nightly. No current facility-administered medications for this visit. Allergies   Allergen Reactions    Other Medication Hives     seafood    Pollen Extracts Other (comments)    Shellfish Derived Hives          OBJECTIVE:    Physical Exam  VITAL SIGNS: Visit Vitals  LMP 01/31/2008      GENERAL EMILY: in no apparent distress and well developed and well nourished   PELVIC: 2 cm friable mass at vaginal apex. IMPRESSION/PLAN:  1.stage IV Primary peritoneal cancer with likely recurrence   -previoulsy reviewed her pathology    -s/p carbo (auc=6), taxol (175 mg/m2) IV every 3 wks s/p 6 cycles completed 6/6/2019   -s/p cytoreductive surgery with [facility] with dr. Parvez Helm   -s/p pelvic radiation   -biopsy c/w recurrence   -repeat PETCTpreviously  reviewed today with treatment response   -nausea- encouraged use of phenergan/zofran   Pain-script for percocet    -given platinum sensitive disease will plan to treat with carbo auc=5, Doxil 30 mg/m2 IV every 4 wks until CCR, toxicity or progression. We briefly discussed if no metastatic disease develops and she has some response to chemo may consider surgery with dr. Krissy Riggins. Will reassess after PETCT after cycle #6. . We also discussed use of parp maintenance as well. Pt leaning towards surgery will reach out to surg onc at [place] [address]. -tolerating chemo. No G3 or G4 toxicity   -hydronephrosis- s/p stent placement   -Echo- normal   -vaginal bleeding.- discussed related to tumor at vaginal apex. Will monitor.     -s/p cycle #4   -ok for cycle #5   -f/u prior to cycle #6        The total time spent was 40 minutes regarding this patients diagnosis of primary peritoneal cancer and >50% of this time was spent counseling and coordinating care    Anastasiya Edgar MD  Gynecologic Oncology  6/4/222869:82 AM

## 2020-09-09 NOTE — PROGRESS NOTES
.  Janeen Alvarenga is a 45 y.o. female presents in office for chemo follow-up    Patient states was in ER 3 mondays ago for leg pain. Patient states ER had no findings and all test where cleared. Patient states having dizziness. Patient states having chest and shooting neck pain. Patient states unable to move on left side. Patient states having darkness and swelling of hands and feet. Patient states having more side affects due to treatment. Patient states has heavy to light flow of irregular bleeding. Patient states has pelvic and vaginal cramping during bleeding. Do you have any unusual vaginal bleeding, discharge or irritation? Yes     Do you have any changes in your urination or bowel movements? NO     Have you been experiencing nausea or vomiting? No     .  Visit Vitals  /84 (BP 1 Location: Left arm, BP Patient Position: Sitting)   Pulse 67   Temp 98 °F (36.7 °C) (Oral)   Resp 14   Ht 5' 3\" (1.6 m)   Wt 272 lb 8 oz (123.6 kg)   SpO2 99%   BMI 48.27 kg/m²       Health Maintenance Due   Topic Date Due    Lipid Screen  02/28/2019    Shingrix Vaccine Age 50> (1 of 2) 07/22/2019    A1C test (Diabetic or Prediabetic)  07/22/2020    Flu Vaccine (1) 09/01/2020         3 most recent PHQ Screens 7/22/2019   Little interest or pleasure in doing things Several days   Feeling down, depressed, irritable, or hopeless Several days   Total Score PHQ 2 2       Abuse Screening Questionnaire 7/22/2019   Do you ever feel afraid of your partner? N   Are you in a relationship with someone who physically or mentally threatens you? N   Is it safe for you to go home? Y       Fall Risk Assessment, last 12 mths 2/9/2018   Able to walk? Yes   Fall in past 12 months?  No       Learning Assessment 1/4/2019   PRIMARY LEARNER Patient   HIGHEST LEVEL OF EDUCATION - PRIMARY LEARNER  GRADUATED HIGH SCHOOL OR GED   BARRIERS PRIMARY LEARNER NONE   CO-LEARNER CAREGIVER No   PRIMARY LANGUAGE ENGLISH   LEARNER PREFERENCE PRIMARY READING -   ANSWERED BY patient   RELATIONSHIP SELF

## 2020-09-09 NOTE — LETTER
9/9/20 Patient: Leonel Duarte YOB: 1970 Date of Visit: 9/9/2020 Michelle Rordiguez, [address] [address] [place] [address] VIA In Basket Dear Michelle Rodriguez MD, Thank you for referring Ms. Kristin Knight to [facility] for evaluation. My notes for this consultation are attached. If you have questions, please do not hesitate to call me. I look forward to following your patient along with you. Sincerely, Claudette Nieto MD

## 2020-09-10 ENCOUNTER — DOCUMENTATION ONLY (OUTPATIENT)
Dept: ONCOLOGY | Age: 50
End: 2020-09-10

## 2020-09-10 ENCOUNTER — TELEPHONE (OUTPATIENT)
Dept: ONCOLOGY | Age: 50
End: 2020-09-10

## 2020-09-10 DIAGNOSIS — C48.2 PERITONEAL CARCINOMA (HCC): Primary | ICD-10-CM

## 2020-09-10 NOTE — TELEPHONE ENCOUNTER
Per Dr. Adeel Sanders during office visit yesterday, patient is to have a PET/CT two weeks after cycle 6. Order placed.

## 2020-09-10 NOTE — TELEPHONE ENCOUNTER
Spoke with patient to relay that PET scan is schedule don 10/15 at 9:30 am with a 9 am arrival time. Scan instructions reviewed. Patient stated she had a PET scheduled for the following week with Dr. Doris Ferrell office. She will reach out to them to cancel it and keep the sooner appointment. Patient will clarify if any special imaging is required so scan can be co-ordinate for both offices.

## 2020-09-16 ENCOUNTER — APPOINTMENT (OUTPATIENT)
Dept: INFUSION THERAPY | Age: 50
End: 2020-09-16

## 2020-09-16 ENCOUNTER — TELEPHONE (OUTPATIENT)
Dept: ONCOLOGY | Age: 50
End: 2020-09-16

## 2020-09-16 NOTE — TELEPHONE ENCOUNTER
Patient contacted office in reference to neuropathy pain. Patient states having nerve pain and muscle spasms due to chemo treatment. Patient would like to be prescribed pain medication.

## 2020-09-17 DIAGNOSIS — T45.1X5A NEUROPATHY DUE TO CHEMOTHERAPEUTIC DRUG (HCC): Primary | ICD-10-CM

## 2020-09-17 DIAGNOSIS — G62.0 NEUROPATHY DUE TO CHEMOTHERAPEUTIC DRUG (HCC): Primary | ICD-10-CM

## 2020-09-17 RX ORDER — GABAPENTIN 100 MG/1
100 CAPSULE ORAL 3 TIMES DAILY
Qty: 90 CAP | Refills: 3 | Status: SHIPPED | OUTPATIENT
Start: 2020-09-17 | End: 2021-03-02

## 2020-09-21 ENCOUNTER — DOCUMENTATION ONLY (OUTPATIENT)
Dept: ONCOLOGY | Age: 50
End: 2020-09-21

## 2020-09-21 ENCOUNTER — TELEPHONE (OUTPATIENT)
Dept: ONCOLOGY | Age: 50
End: 2020-09-21

## 2020-09-21 DIAGNOSIS — C48.2 PRIMARY PERITONEAL CARCINOMATOSIS (HCC): ICD-10-CM

## 2020-09-21 DIAGNOSIS — G89.3 CANCER ASSOCIATED PAIN: ICD-10-CM

## 2020-09-21 RX ORDER — OXYCODONE AND ACETAMINOPHEN 5; 325 MG/1; MG/1
1 TABLET ORAL
Qty: 60 TAB | Refills: 0 | Status: SHIPPED | OUTPATIENT
Start: 2020-09-21 | End: 2020-10-05 | Stop reason: SDUPTHER

## 2020-09-22 RX ORDER — LORAZEPAM 2 MG/ML
0.26 INJECTION INTRAMUSCULAR
Status: CANCELLED
Start: 2020-09-30

## 2020-09-22 RX ORDER — EPINEPHRINE 1 MG/ML
0.3 INJECTION, SOLUTION, CONCENTRATE INTRAVENOUS AS NEEDED
Status: CANCELLED | OUTPATIENT
Start: 2020-09-30

## 2020-09-22 RX ORDER — ONDANSETRON 2 MG/ML
8 INJECTION INTRAMUSCULAR; INTRAVENOUS AS NEEDED
Status: CANCELLED | OUTPATIENT
Start: 2020-09-30

## 2020-09-22 RX ORDER — DIPHENHYDRAMINE HYDROCHLORIDE 50 MG/ML
25 INJECTION, SOLUTION INTRAMUSCULAR; INTRAVENOUS AS NEEDED
Status: CANCELLED
Start: 2020-09-30

## 2020-09-22 RX ORDER — ALBUTEROL SULFATE 0.83 MG/ML
2.5 SOLUTION RESPIRATORY (INHALATION) AS NEEDED
Status: CANCELLED
Start: 2020-09-30

## 2020-09-22 RX ORDER — POTASSIUM CHLORIDE 7.45 MG/ML
10 INJECTION INTRAVENOUS
Status: CANCELLED
Start: 2020-09-30

## 2020-09-22 RX ORDER — ACETAMINOPHEN 325 MG/1
650 TABLET ORAL AS NEEDED
Status: CANCELLED
Start: 2020-09-30

## 2020-09-22 RX ORDER — MAGNESIUM SULFATE HEPTAHYDRATE 40 MG/ML
2 INJECTION, SOLUTION INTRAVENOUS
Status: CANCELLED
Start: 2020-09-30

## 2020-09-22 RX ORDER — DIPHENHYDRAMINE HYDROCHLORIDE 50 MG/ML
50 INJECTION, SOLUTION INTRAMUSCULAR; INTRAVENOUS AS NEEDED
Status: CANCELLED
Start: 2020-09-30

## 2020-09-22 RX ORDER — PROCHLORPERAZINE EDISYLATE 5 MG/ML
10 INJECTION INTRAMUSCULAR; INTRAVENOUS
Status: CANCELLED
Start: 2020-09-30

## 2020-09-22 RX ORDER — HYDROCORTISONE SODIUM SUCCINATE 100 MG/2ML
100 INJECTION, POWDER, FOR SOLUTION INTRAMUSCULAR; INTRAVENOUS AS NEEDED
Status: CANCELLED | OUTPATIENT
Start: 2020-09-30

## 2020-09-23 ENCOUNTER — APPOINTMENT (OUTPATIENT)
Dept: INFUSION THERAPY | Age: 50
End: 2020-09-23

## 2020-09-24 ENCOUNTER — TELEPHONE (OUTPATIENT)
Dept: ONCOLOGY | Age: 50
End: 2020-09-24

## 2020-09-24 NOTE — TELEPHONE ENCOUNTER
Patient contacted office stating that patient has had her consult visit and is scheduled for an MRI on 10/6/2020. She wanted to know if she should still have her PET scheduled on 10/15/2020. She can be reached at [phone].

## 2020-09-24 NOTE — TELEPHONE ENCOUNTER
Returned call to patient, recommended PET as scheduled. Ok to cancel f/u appt with Dr. Ruperto Ellsworth as she as established care with surgical oncologist Dr. Manisha Birmingham. Advised patient to call with any questions or concerns prior to next appointment. All questions answered. Patient agrees with plan of care.

## 2020-09-28 ENCOUNTER — HOSPITAL ENCOUNTER (OUTPATIENT)
Dept: INFUSION THERAPY | Age: 50
Discharge: HOME | End: 2020-09-28
Payer: MEDICARE

## 2020-09-28 VITALS
HEART RATE: 77 BPM | SYSTOLIC BLOOD PRESSURE: 115 MMHG | RESPIRATION RATE: 20 BRPM | TEMPERATURE: 98.4 F | OXYGEN SATURATION: 93 % | DIASTOLIC BLOOD PRESSURE: 59 MMHG

## 2020-09-28 LAB
ALBUMIN SERPL-MCNC: 3.8 G/DL (ref 3.4–5)
ALBUMIN/GLOB SERPL: 1.2 {RATIO} (ref 0.8–1.7)
ALP SERPL-CCNC: 87 U/L (ref 45–117)
ALT SERPL-CCNC: 24 U/L (ref 13–56)
ANION GAP SERPL CALC-SCNC: 8 MMOL/L (ref 3–18)
APPEARANCE UR: CLEAR
AST SERPL-CCNC: 21 U/L (ref 10–38)
BACTERIA URNS QL MICRO: ABNORMAL /HPF
BASOPHILS # BLD: 0 K/UL (ref 0–0.1)
BASOPHILS NFR BLD: 0 % (ref 0–2)
BILIRUB SERPL-MCNC: 0.3 MG/DL (ref 0.2–1)
BILIRUB UR QL: NEGATIVE
BUN SERPL-MCNC: 32 MG/DL (ref 7–18)
BUN/CREAT SERPL: 17 (ref 12–20)
CALCIUM SERPL-MCNC: 8.7 MG/DL (ref 8.5–10.1)
CHLORIDE SERPL-SCNC: 111 MMOL/L (ref 100–111)
CO2 SERPL-SCNC: 23 MMOL/L (ref 21–32)
COLOR UR: YELLOW
CREAT SERPL-MCNC: 1.93 MG/DL (ref 0.6–1.3)
DIFFERENTIAL METHOD BLD: ABNORMAL
EOSINOPHIL # BLD: 0 K/UL (ref 0–0.4)
EOSINOPHIL NFR BLD: 1 % (ref 0–5)
EPITH CASTS URNS QL MICRO: ABNORMAL /LPF (ref 0–5)
ERYTHROCYTE [DISTWIDTH] IN BLOOD BY AUTOMATED COUNT: 17.1 % (ref 11.6–14.5)
GLOBULIN SER CALC-MCNC: 3.3 G/DL (ref 2–4)
GLUCOSE SERPL-MCNC: 130 MG/DL (ref 74–99)
GLUCOSE UR STRIP.AUTO-MCNC: NEGATIVE MG/DL
HCT VFR BLD AUTO: 28 % (ref 35–45)
HGB BLD-MCNC: 9.2 G/DL (ref 12–16)
HGB UR QL STRIP: ABNORMAL
KETONES UR QL STRIP.AUTO: NEGATIVE MG/DL
LEUKOCYTE ESTERASE UR QL STRIP.AUTO: ABNORMAL
LYMPHOCYTES # BLD: 1.2 K/UL (ref 0.9–3.6)
LYMPHOCYTES NFR BLD: 25 % (ref 21–52)
MAGNESIUM SERPL-MCNC: 2.2 MG/DL (ref 1.6–2.6)
MCH RBC QN AUTO: 32.9 PG (ref 24–34)
MCHC RBC AUTO-ENTMCNC: 32.9 G/DL (ref 31–37)
MCV RBC AUTO: 100 FL (ref 74–97)
MONOCYTES # BLD: 0.5 K/UL (ref 0.05–1.2)
MONOCYTES NFR BLD: 11 % (ref 3–10)
NEUTS SEG # BLD: 3 K/UL (ref 1.8–8)
NEUTS SEG NFR BLD: 63 % (ref 40–73)
NITRITE UR QL STRIP.AUTO: NEGATIVE
PH UR STRIP: 6.5 [PH] (ref 5–8)
PLATELET # BLD AUTO: 140 K/UL (ref 135–420)
PMV BLD AUTO: 11.5 FL (ref 9.2–11.8)
POTASSIUM SERPL-SCNC: 4.6 MMOL/L (ref 3.5–5.5)
PROT SERPL-MCNC: 7.1 G/DL (ref 6.4–8.2)
PROT UR STRIP-MCNC: 100 MG/DL
RBC # BLD AUTO: 2.8 M/UL (ref 4.2–5.3)
RBC #/AREA URNS HPF: ABNORMAL /HPF (ref 0–5)
SODIUM SERPL-SCNC: 142 MMOL/L (ref 136–145)
SP GR UR REFRACTOMETRY: 1.02 (ref 1–1.03)
UROBILINOGEN UR QL STRIP.AUTO: 1 EU/DL (ref 0.2–1)
WBC # BLD AUTO: 4.7 K/UL (ref 4.6–13.2)
WBC URNS QL MICRO: ABNORMAL /HPF (ref 0–5)

## 2020-09-28 PROCEDURE — 86304 IMMUNOASSAY TUMOR CA 125: CPT

## 2020-09-28 PROCEDURE — 81001 URINALYSIS AUTO W/SCOPE: CPT

## 2020-09-28 PROCEDURE — 36415 COLL VENOUS BLD VENIPUNCTURE: CPT

## 2020-09-28 PROCEDURE — 85025 COMPLETE CBC W/AUTO DIFF WBC: CPT

## 2020-09-28 PROCEDURE — 83735 ASSAY OF MAGNESIUM: CPT

## 2020-09-28 PROCEDURE — 80053 COMPREHEN METABOLIC PANEL: CPT

## 2020-09-28 PROCEDURE — 87086 URINE CULTURE/COLONY COUNT: CPT

## 2020-09-28 NOTE — PROGRESS NOTES
[facility] Lab Visit:     Bella Collins  1970  [id number]          Pt arrived ambulatory. Labs drawn peripherally in rt hand and sent for processing. Pt scheduled to return for treatment.  Departed hospitals ambulatory and in no distress.     Visit Vitals  BP (!) 115/59 (BP 1 Location: Left arm, BP Patient Position: Sitting)   Pulse 77   Temp 98.4 °F (36.9 °C)   Resp 20   LMP 01/31/2008   SpO2 93%

## 2020-09-29 ENCOUNTER — TELEPHONE (OUTPATIENT)
Dept: ONCOLOGY | Age: 50
End: 2020-09-29

## 2020-09-29 LAB
BACTERIA SPEC CULT: NORMAL
CANCER AG125 SERPL-ACNC: 6 U/ML (ref 1.5–35)
SERVICE CMNT-IMP: NORMAL

## 2020-09-30 ENCOUNTER — HOSPITAL ENCOUNTER (OUTPATIENT)
Dept: INFUSION THERAPY | Age: 50
Discharge: HOME | End: 2020-09-30
Payer: MEDICARE

## 2020-09-30 ENCOUNTER — TELEPHONE (OUTPATIENT)
Dept: ONCOLOGY | Age: 50
End: 2020-09-30

## 2020-09-30 VITALS
HEART RATE: 74 BPM | SYSTOLIC BLOOD PRESSURE: 136 MMHG | RESPIRATION RATE: 18 BRPM | WEIGHT: 279.2 LBS | DIASTOLIC BLOOD PRESSURE: 73 MMHG | BODY MASS INDEX: 49.47 KG/M2 | OXYGEN SATURATION: 97 % | TEMPERATURE: 98.2 F | HEIGHT: 63 IN

## 2020-09-30 DIAGNOSIS — C48.2 MALIGNANT NEOPLASM OF PERITONEUM (HCC): Primary | ICD-10-CM

## 2020-09-30 PROCEDURE — 96375 TX/PRO/DX INJ NEW DRUG ADDON: CPT

## 2020-09-30 PROCEDURE — 74011000250 HC RX REV CODE- 250: Performed by: OBSTETRICS & GYNECOLOGY

## 2020-09-30 PROCEDURE — 96415 CHEMO IV INFUSION ADDL HR: CPT

## 2020-09-30 PROCEDURE — 77030012965 HC NDL HUBR BBMI -A

## 2020-09-30 PROCEDURE — 96367 TX/PROPH/DG ADDL SEQ IV INF: CPT

## 2020-09-30 PROCEDURE — 96413 CHEMO IV INFUSION 1 HR: CPT

## 2020-09-30 PROCEDURE — 74011000258 HC RX REV CODE- 258: Performed by: OBSTETRICS & GYNECOLOGY

## 2020-09-30 PROCEDURE — 74011250636 HC RX REV CODE- 250/636: Performed by: OBSTETRICS & GYNECOLOGY

## 2020-09-30 RX ORDER — HEPARIN 100 UNIT/ML
300-500 SYRINGE INTRAVENOUS AS NEEDED
Status: ACTIVE | OUTPATIENT
Start: 2020-09-30 | End: 2020-09-30

## 2020-09-30 RX ORDER — SODIUM CHLORIDE 0.9 % (FLUSH) 0.9 %
10-40 SYRINGE (ML) INJECTION AS NEEDED
Status: DISPENSED | OUTPATIENT
Start: 2020-09-30 | End: 2020-09-30

## 2020-09-30 RX ORDER — DEXTROSE MONOHYDRATE 50 MG/ML
25 INJECTION, SOLUTION INTRAVENOUS CONTINUOUS
Status: DISPENSED | OUTPATIENT
Start: 2020-09-30 | End: 2020-09-30

## 2020-09-30 RX ORDER — HEPARIN 100 UNIT/ML
SYRINGE INTRAVENOUS
Status: DISCONTINUED
Start: 2020-09-30 | End: 2020-09-30 | Stop reason: HOSPADM

## 2020-09-30 RX ADMIN — HEPARIN 500 UNITS: 100 SYRINGE at 12:16

## 2020-09-30 RX ADMIN — DEXTROSE MONOHYDRATE 25 ML/HR: 5 INJECTION, SOLUTION INTRAVENOUS at 10:33

## 2020-09-30 RX ADMIN — CARBOPLATIN 330 MG: 10 INJECTION, SOLUTION INTRAVENOUS at 11:43

## 2020-09-30 RX ADMIN — FAMOTIDINE 20 MG: 10 INJECTION INTRAVENOUS at 09:16

## 2020-09-30 RX ADMIN — ONDANSETRON: 2 INJECTION INTRAMUSCULAR; INTRAVENOUS at 09:43

## 2020-09-30 RX ADMIN — DOXORUBICIN HYDROCHLORIDE 66 MG: 2 INJECTION, SUSPENSION, LIPOSOMAL INTRAVENOUS at 10:40

## 2020-09-30 RX ADMIN — Medication 20 ML: at 12:16

## 2020-09-30 RX ADMIN — SODIUM CHLORIDE 150 MG: 900 INJECTION, SOLUTION INTRAVENOUS at 09:19

## 2020-09-30 NOTE — PROGRESS NOTES
[facility] Progress Note    Date: 2020    Name: Zofia De La Cruz    MRN: [id number]          : 1970      Ms. Sabas Landa arrived in the [facility] today at 0900, in stable condition, here for Cycle 6, IV Doxil/Carboplatin Chemotherapy Regimen (Q 28 Day Cycle). She was assessed and education was provided. Ms. Cannon's vitals were reviewed. Visit Vitals  /73 (BP 1 Location: Left arm, BP Patient Position: Sitting)   Pulse 74   Temp 98.2 °F (36.8 °C)   Resp 18   Ht 5' 3\" (1.6 m)   Wt 126.6 kg (279 lb 3.2 oz)   SpO2 97%   BMI 49.46 kg/m²       Lab results were reviewed. (Her most recent CBC, CMP, Magnesium, & Urinalysis & Culture Results from today,20, were reviewed, and all results were noted to be satisfactory for treatment today.)    All lab results from 20, were as follows:  Results for Brayden Mccracken (MRN [id number]) as of 2020 13:46   Ref. Range 2020 11:05 2020 11:11   WBC Latest Ref Range: 4.6 - 13.2 K/uL  4.7   RBC Latest Ref Range: 4.20 - 5.30 M/uL  2.80 (L)   HGB Latest Ref Range: 12.0 - 16.0 g/dL  9.2 (L)   HCT Latest Ref Range: 35.0 - 45.0 %  28.0 (L)   MCV Latest Ref Range: 74.0 - 97.0 FL  100.0 (H)   MCH Latest Ref Range: 24.0 - 34.0 PG  32.9   MCHC Latest Ref Range: 31.0 - 37.0 g/dL  32.9   RDW Latest Ref Range: 11.6 - 14.5 %  17.1 (H)   PLATELET Latest Ref Range: 135 - 420 K/uL  140   MPV Latest Ref Range: 9.2 - 11.8 FL  11.5   NEUTROPHILS Latest Ref Range: 40 - 73 %  63   LYMPHOCYTES Latest Ref Range: 21 - 52 %  25   MONOCYTES Latest Ref Range: 3 - 10 %  11 (H)   EOSINOPHILS Latest Ref Range: 0 - 5 %  1   BASOPHILS Latest Ref Range: 0 - 2 %  0   DF Latest Units:    AUTOMATED   ABS. NEUTROPHILS Latest Ref Range: 1.8 - 8.0 K/UL  3.0   ABS. LYMPHOCYTES Latest Ref Range: 0.9 - 3.6 K/UL  1.2   ABS. MONOCYTES Latest Ref Range: 0.05 - 1.2 K/UL  0.5   ABS. EOSINOPHILS Latest Ref Range: 0.0 - 0.4 K/UL  0.0   ABS.  BASOPHILS Latest Ref Range: 0.0 - 0.1 K/UL  0.0   Color Latest Units:   YELLOW    Appearance Latest Units:   CLEAR    Specific gravity Latest Ref Range: 1.005 - 1.030   1.017    pH (UA) Latest Ref Range: 5.0 - 8.0   6.5    Protein Latest Ref Range: NEG mg/dL 100 (A)    Glucose Latest Ref Range: NEG mg/dL Negative    Ketone Latest Ref Range: NEG mg/dL Negative    Blood Latest Ref Range: NEG   MODERATE (A)    Bilirubin Latest Ref Range: NEG   Negative    Urobilinogen Latest Ref Range: 0.2 - 1.0 EU/dL 1.0    Nitrites Latest Ref Range: NEG   Negative    Leukocyte Esterase Latest Ref Range: NEG   MODERATE (A)    Epithelial cells Latest Ref Range: 0 - 5 /lpf FEW    WBC Latest Ref Range: 0 - 5 /hpf 12 to 15    RBC Latest Ref Range: 0 - 5 /hpf 12 to 15    Bacteria Latest Ref Range: NEG /hpf 1+ (A)    Sodium Latest Ref Range: 136 - 145 mmol/L  142   Potassium Latest Ref Range: 3.5 - 5.5 mmol/L  4.6   Chloride Latest Ref Range: 100 - 111 mmol/L  111   CO2 Latest Ref Range: 21 - 32 mmol/L  23   Anion gap Latest Ref Range: 3.0 - 18 mmol/L  8   Glucose Latest Ref Range: 74 - 99 mg/dL  130 (H)   BUN Latest Ref Range: 7.0 - 18 MG/DL  32 (H)   Creatinine Latest Ref Range: 0.6 - 1.3 MG/DL  1.93 (H)   BUN/Creatinine ratio Latest Ref Range: 12 - 20    17   Calcium Latest Ref Range: 8.5 - 10.1 MG/DL  8.7   Magnesium Latest Ref Range: 1.6 - 2.6 mg/dL  2.2   GFR est non-AA Latest Ref Range: >60 ml/min/1.73m2  27 (L)   GFR est AA Latest Ref Range: >60 ml/min/1.73m2  33 (L)   Bilirubin, total Latest Ref Range: 0.2 - 1.0 MG/DL  0.3   Protein, total Latest Ref Range: 6.4 - 8.2 g/dL  7.1   Albumin Latest Ref Range: 3.4 - 5.0 g/dL  3.8   Globulin Latest Ref Range: 2.0 - 4.0 g/dL  3.3   A-G Ratio Latest Ref Range: 0.8 - 1.7    1.2   ALT Latest Ref Range: 13 - 56 U/L  24   AST Latest Ref Range: 10 - 38 U/L  21   Alk.  phosphatase Latest Ref Range: 45 - 117 U/L  87   CA-125 Latest Ref Range: 1.5 - 35.0 U/mL  6       Her right chest single lumen port was accessed x 1 attempt, and brisk blood return was obtained. Dextrose 5% 250 ml IV Bag, was initiated to infuse @ KVO prn, throughout treatment today. The following pre-medications were administered per order, and without incident:  Emend 150 mg IV, Decadron 12 mg IV & Pepcid 20 mg IV. The following chemotherapy medications were administered:    Liposomal Doxorubicin (DOXIL / LIPODOX) 66 mg IV (30 mg/m2), was administered over approximately 1 hour, per order, and without incident. Ice Packs were applied to both hands & both feet, throughout the Doxil administration. Carboplatin (PARAPLATIN) 330 mg IV (AUC 5), was administered over approximately 30 minutes, per order, and without incident. After completion of all ordered IV medications, her port was flushed well per protocol with NS & Heparin, and then, the upton needle was removed and gauze/bandaid was applied. Ms. Andre Eduardo tolerated well, and had no complaints. Ms. Andre Eduardo was discharged from [place] [address] in stable condition at 1520. She has no further appointments scheduled with [facility] at this time.        Poly Mora RN  September 30, 2020

## 2020-10-05 DIAGNOSIS — C48.2 PRIMARY PERITONEAL CARCINOMATOSIS (HCC): ICD-10-CM

## 2020-10-05 DIAGNOSIS — G89.3 CANCER ASSOCIATED PAIN: ICD-10-CM

## 2020-10-05 RX ORDER — OXYCODONE AND ACETAMINOPHEN 5; 325 MG/1; MG/1
1 TABLET ORAL
Qty: 60 TAB | Refills: 0 | Status: SHIPPED | OUTPATIENT
Start: 2020-10-05 | End: 2020-10-19 | Stop reason: SDUPTHER

## 2020-10-12 ENCOUNTER — TELEPHONE (OUTPATIENT)
Dept: ONCOLOGY | Age: 50
End: 2020-10-12

## 2020-10-12 NOTE — TELEPHONE ENCOUNTER
Pt called stating she is having constipation occasionally, nausea if she doesn't eat, nerve pain, extreme fatigue, frequent dizziness, swelling, discoloration of skin on fingers, peeling of skin on feet, occasional shortness of breath, and occasional chest and upper back pain which 'shoots down'. Pt states side effects are preventing her from completing activities of daily living. Pt wanted to know how long symptoms would last, advised that some symptoms may never completely resolve but improvement could be seen. She may see improvement in symptoms for up to a year before no new improvement is seen. Patient expressed understanding. Advised to contact office if any symptoms worsen or with any further questions. Pt stated surgery may be 11/9/2020.

## 2020-10-14 PROBLEM — R11.0 POSTOPERATIVE NAUSEA: Status: ACTIVE | Noted: 2019-08-27

## 2020-10-14 PROBLEM — G89.29 CHRONIC ABDOMINAL PAIN: Status: ACTIVE | Noted: 2018-01-22

## 2020-10-14 PROBLEM — N13.39 OTHER HYDRONEPHROSIS: Status: ACTIVE | Noted: 2020-06-24

## 2020-10-14 PROBLEM — E03.8 SUBCLINICAL HYPOTHYROIDISM: Status: ACTIVE | Noted: 2018-01-23

## 2020-10-14 PROBLEM — C56.9 OVARIAN CANCER (HCC): Status: ACTIVE | Noted: 2019-08-07

## 2020-10-14 PROBLEM — R63.0 LOSS OF APPETITE: Status: ACTIVE | Noted: 2020-10-14

## 2020-10-14 PROBLEM — E87.1 HYPONATREMIA: Status: ACTIVE | Noted: 2018-01-22

## 2020-10-14 PROBLEM — Z98.890 POSTOPERATIVE NAUSEA: Status: ACTIVE | Noted: 2019-08-27

## 2020-10-14 PROBLEM — Z90.710 HX OF TOTAL HYSTERECTOMY: Status: ACTIVE | Noted: 2018-01-22

## 2020-10-14 PROBLEM — R10.9 CHRONIC ABDOMINAL PAIN: Status: ACTIVE | Noted: 2018-01-22

## 2020-10-14 PROBLEM — Z14.8 GENETIC CARRIER STATUS: Status: ACTIVE | Noted: 2019-09-13

## 2020-10-14 PROBLEM — K57.90 DIVERTICULOSIS: Status: ACTIVE | Noted: 2018-01-22

## 2020-10-14 PROBLEM — F31.70 BIPOLAR DISORDER IN FULL REMISSION (HCC): Status: ACTIVE | Noted: 2018-01-22

## 2020-10-19 DIAGNOSIS — C48.2 PRIMARY PERITONEAL CARCINOMATOSIS (HCC): ICD-10-CM

## 2020-10-19 DIAGNOSIS — G89.3 CANCER ASSOCIATED PAIN: ICD-10-CM

## 2020-10-19 RX ORDER — OXYCODONE AND ACETAMINOPHEN 5; 325 MG/1; MG/1
1 TABLET ORAL
Qty: 60 TAB | Refills: 0 | Status: SHIPPED | OUTPATIENT
Start: 2020-10-19 | End: 2020-11-03 | Stop reason: SDUPTHER

## 2020-10-19 NOTE — TELEPHONE ENCOUNTER
Patient contacted the office to request a refill of her Percocet stating she is still having chemotherapy related pain. She requests it be sent to the [facility] in [place].

## 2020-11-03 ENCOUNTER — TELEPHONE (OUTPATIENT)
Dept: ONCOLOGY | Age: 50
End: 2020-11-03

## 2020-11-03 DIAGNOSIS — C48.2 PRIMARY PERITONEAL CARCINOMATOSIS (HCC): ICD-10-CM

## 2020-11-03 DIAGNOSIS — G89.3 CANCER ASSOCIATED PAIN: ICD-10-CM

## 2020-11-03 RX ORDER — OXYCODONE AND ACETAMINOPHEN 5; 325 MG/1; MG/1
1 TABLET ORAL
Qty: 60 TAB | Refills: 0 | Status: SHIPPED | OUTPATIENT
Start: 2020-11-03 | End: 2020-12-03

## 2020-12-08 PROBLEM — C18.1 CANCER OF APPENDIX (HCC): Status: ACTIVE | Noted: 2020-11-17

## 2020-12-11 ENCOUNTER — TELEPHONE (OUTPATIENT)
Dept: ONCOLOGY | Age: 50
End: 2020-12-11

## 2020-12-11 NOTE — TELEPHONE ENCOUNTER
Returned call to patient, explained that she should call Dr. Yovana Gomez office with refill request for postop pain as she is under his care postoperatively. Patient agreeable.

## 2020-12-27 RX ORDER — SIMVASTATIN 20 MG/1
TABLET, FILM COATED ORAL
Qty: 90 TAB | Refills: 3 | Status: SHIPPED | OUTPATIENT
Start: 2020-12-27 | End: 2021-12-20

## 2020-12-28 RX ORDER — PAROXETINE 10 MG/1
TABLET, FILM COATED ORAL
Qty: 90 TAB | Refills: 3 | Status: SHIPPED | OUTPATIENT
Start: 2020-12-28 | End: 2021-05-06 | Stop reason: DRUGHIGH

## 2021-01-03 PROBLEM — Z98.890 HISTORY OF ABDOMINAL SURGERY: Status: ACTIVE | Noted: 2020-12-31

## 2021-01-03 PROBLEM — K92.1 MELENA: Status: ACTIVE | Noted: 2020-12-30

## 2021-01-03 PROBLEM — U07.1 COVID-19: Status: ACTIVE | Noted: 2020-12-31

## 2021-01-11 ENCOUNTER — VIRTUAL VISIT (OUTPATIENT)
Dept: FAMILY MEDICINE CLINIC | Age: 51
End: 2021-01-11
Payer: MEDICARE

## 2021-01-11 DIAGNOSIS — R42 DIZZINESS: ICD-10-CM

## 2021-01-11 DIAGNOSIS — U07.1 COVID-19: ICD-10-CM

## 2021-01-11 DIAGNOSIS — C56.9 MALIGNANT NEOPLASM OF OVARY, UNSPECIFIED LATERALITY (HCC): ICD-10-CM

## 2021-01-11 DIAGNOSIS — F31.9 BIPOLAR 1 DISORDER (HCC): ICD-10-CM

## 2021-01-11 DIAGNOSIS — I10 ESSENTIAL HYPERTENSION: Primary | ICD-10-CM

## 2021-01-11 DIAGNOSIS — C48.2 MALIGNANT NEOPLASM OF PERITONEUM (HCC): ICD-10-CM

## 2021-01-11 PROCEDURE — G9711 PT HX TOT COL OR COLON CA: HCPCS | Performed by: FAMILY MEDICINE

## 2021-01-11 PROCEDURE — G9899 SCRN MAM PERF RSLTS DOC: HCPCS | Performed by: FAMILY MEDICINE

## 2021-01-11 PROCEDURE — G8427 DOCREV CUR MEDS BY ELIG CLIN: HCPCS | Performed by: FAMILY MEDICINE

## 2021-01-11 PROCEDURE — G8756 NO BP MEASURE DOC: HCPCS | Performed by: FAMILY MEDICINE

## 2021-01-11 PROCEDURE — 99214 OFFICE O/P EST MOD 30 MIN: CPT | Performed by: FAMILY MEDICINE

## 2021-01-11 PROCEDURE — G9717 DOC PT DX DEP/BP F/U NT REQ: HCPCS | Performed by: FAMILY MEDICINE

## 2021-01-11 RX ORDER — MECLIZINE HYDROCHLORIDE 25 MG/1
25 TABLET ORAL
Qty: 30 TAB | Refills: 0 | Status: SHIPPED | OUTPATIENT
Start: 2021-01-11 | End: 2021-02-11 | Stop reason: SDUPTHER

## 2021-01-11 NOTE — PROGRESS NOTES
1. Have you been to the ER, urgent care clinic since your last visit? Hospitalized since your last visit? Yes When: 12-  Where: [facility]  Reason for visit: UTI and on 12- [facility] ED for Melena     2. Have you seen or consulted any other health care providers outside of the [address] [place] [address] since your last visit? Include any pap smears or colon screening.  No

## 2021-01-13 NOTE — PROGRESS NOTES
William Gamez is a 45 y.o. female who was seen by synchronous (real-time) audio-video technology on 1/11/2021. Consent: William Gamez, who was seen by synchronous (real-time) audio-video technology, and/or her healthcare decision maker, is aware that this patient-initiated, Telehealth encounter on 1/11/2021 is a billable service, with coverage as determined by her insurance carrier. She is aware that she may receive a bill and has provided verbal consent to proceed: Yes. 712  Subjective:   William Gamez is a 45 y.o. female who was seen for Other (Positive COVID-19), Hypertension, and Dizziness (vertigo )    Ff-up    Pt w/ ovarian/peritoneal  cancer s/p HIPEC,  with recurrence, bladder cancer s/p colectomy s/p cystecomty 10/2020 , recent admission 12/31 from bleeding colostomy thought yuliet due to mucosal erosion. She reports no colostomy bag bleeding however with vaginal bleeding. hgb level during admission 8. Has appt with dr. Nolberto Camairllo oncology surgeon next week week. Found to be covid positive during previous ED eval 12/29 with fever, presentation. HTN- normal BP readings on norvasc  Vertigo- requesting meclizine, MRI brain 2019 neg. Bipolar d/o- reports to be doing well, following psychiatry    Prior to Admission medications    Medication Sig Start Date End Date Taking? Authorizing Provider   meclizine (ANTIVERT) 25 mg tablet Take 1 Tab by mouth three (3) times daily as needed for Dizziness. 1/11/21  Yes Mary Lindsey MD   PARoxetine (PAXIL) 10 mg tablet take 1 tablet by mouth once daily  Patient taking differently: 10 mg. 12/28/20  Yes Tracey Cardenas NP   simvastatin (ZOCOR) 20 mg tablet take 1 tablet by mouth at bedtime 12/27/20  Yes Mary Lindsey MD   acetaminophen (TYLENOL) 325 mg tablet Take 325 mg by mouth every four (4) hours as needed. Yes Provider, Historical   gabapentin (NEURONTIN) 100 mg capsule Take 1 Cap by mouth three (3) times daily. Max Daily Amount: 300 mg. 9/17/20  Yes Aletha Pacheco MD   amLODIPine (NORVASC) 10 mg tablet take 1 tablet by mouth once daily 8/31/20  Yes Sherice Florence MD   promethazine (PHENERGAN) 25 mg tablet take 1 tablet by mouth every 6 hours if needed for nausea 8/24/20  Yes Ron Mcmahon NP   pantoprazole (PROTONIX) 40 mg tablet take 1 tablet by mouth once daily 4/4/20  Yes Provider, Historical   metoprolol succinate (TOPROL-XL) 100 mg tablet Take 1 Tab by mouth daily. 3/4/20  Yes Sherice Florence MD   oxyCODONE IR (ROXICODONE) 5 mg immediate release tablet Take 5 mg by mouth every four (4) hours as needed. 11/25/20   Provider, Historical   oxybutynin chloride XL (DITROPAN XL) 10 mg CR tablet Take 1 Tab by mouth daily.  5/27/20   Saleem Cruz NP   Facial Mask misc Use daily 3/26/20   Sherice Florence MD   Disposable Gloves misc Use daily 3/26/20   Sherice Florence MD     Allergies   Allergen Reactions    Seafood Hives     FISH    Other Medication Hives     seafood    Pollen Extracts Other (comments)    Shellfish Derived Hives       Patient Active Problem List    Diagnosis Date Noted    COVID-19 12/31/2020    History of abdominal surgery 12/31/2020    Melena 12/30/2020    Cancer of appendix ([facility] [address] 11/17/2020    Loss of appetite 10/14/2020    Other hydronephrosis 06/24/2020    Genetic carrier status 09/13/2019    Postoperative nausea 08/27/2019    Ovarian cancer ([address] 08/07/2019    Malignant neoplasm of peritoneum ([address] 02/14/2019    Pelvic mass in female 01/17/2019    Bipolar 1 disorder ([address] 02/09/2018    Irritable bowel syndrome with both constipation and diarrhea 02/09/2018    Subclinical hypothyroidism 01/23/2018    Bipolar disorder in full remission ([address] 01/22/2018    Chronic abdominal pain 01/22/2018    Diverticulosis 01/22/2018    Hx of total hysterectomy 01/22/2018    Hyponatremia 01/22/2018    Advance care planning 01/31/2017    Dental caries 05/26/2016    Chronic periodontal disease 05/26/2016    SUAZO (dyspnea on exertion) 02/10/2016    Prediabetes 09/24/2015    Morbid obesity ([facility]) 08/31/2015    Arthritis, degenerative 08/31/2015    Gastroesophageal reflux disease without esophagitis 08/31/2015    ANAMIKA on CPAP 08/31/2015    Essential hypertension 08/28/2015    PTSD (post-traumatic stress disorder) 03/24/2014    S/P TKR (total knee replacement) 11/14/2012    Adjustment disorder with depressed mood 09/20/2012    Major depressive disorder, recurrent episode, moderate ([facility]) 09/20/2012    Suicidal ideation 09/19/2012    Depression 05/08/2012    Menopause 05/08/2012    Knee pain, right 05/08/2012    GERD (gastroesophageal reflux disease) 05/08/2012    Hypokalemia 02/04/2012    Overdose 02/04/2012    OA (osteoarthritis) 06/18/2010    Obesity 06/18/2010    Mixed hyperlipidemia 06/18/2010     Current Outpatient Medications   Medication Sig Dispense Refill    meclizine (ANTIVERT) 25 mg tablet Take 1 Tab by mouth three (3) times daily as needed for Dizziness. 30 Tab 0    PARoxetine (PAXIL) 10 mg tablet take 1 tablet by mouth once daily (Patient taking differently: 10 mg.) 90 Tab 3    simvastatin (ZOCOR) 20 mg tablet take 1 tablet by mouth at bedtime 90 Tab 3    acetaminophen (TYLENOL) 325 mg tablet Take 325 mg by mouth every four (4) hours as needed.  gabapentin (NEURONTIN) 100 mg capsule Take 1 Cap by mouth three (3) times daily. Max Daily Amount: 300 mg. 90 Cap 3    amLODIPine (NORVASC) 10 mg tablet take 1 tablet by mouth once daily 90 Tab 1    promethazine (PHENERGAN) 25 mg tablet take 1 tablet by mouth every 6 hours if needed for nausea 30 Tab 3    pantoprazole (PROTONIX) 40 mg tablet take 1 tablet by mouth once daily      metoprolol succinate (TOPROL-XL) 100 mg tablet Take 1 Tab by mouth daily. 90 Tab 1    oxyCODONE IR (ROXICODONE) 5 mg immediate release tablet Take 5 mg by mouth every four (4) hours as needed.       oxybutynin chloride XL (DITROPAN XL) 10 mg CR tablet Take 1 Tab by mouth daily. 90 Tab 3    Facial Mask misc Use daily 30 Each 0    Disposable Gloves misc Use daily 50 Each 0     Allergies   Allergen Reactions    Seafood Hives     FISH    Other Medication Hives     seafood    Pollen Extracts Other (comments)    Shellfish Derived Hives     Past Medical History:   Diagnosis Date    AR (allergic rhinitis)     seasonal    [facility] [address]     Cancer ([facility] [address]     pt states between vgina and rectal area    Cardiac echocardiogram 04/11/2016    Tech difficult. EF 55-60%. No RWMA. Mild conc LVH. Gr 1 DDfx. RVSP normal.      Cardiac nuclear imaging test 05/05/2016    Low risk. Very patchy radiotracer uptake. Mild anterior artifact, low suspicion for ischemia. EF 68%. No RWMA. Normal EKG on pharm stress test.    Cardiovascular LE arterial duplex 10/07/2013    No significant arterial disease at rest bilaterally. R JUNE 1.21.  L JUNE 1.16. No significant sm vessel disease.     Depression     Dr. Mary Hernandez, suicide attempt 2/12    Diverticulosis     Endometriosis     s/p hysterectomy 2006    GERD (gastroesophageal reflux disease)     Glaucoma     Dr. Frey Mike Hematuria, gross     HTN (hypertension)     Hx of colonoscopy 04/05/2017    mild diverticulosis, g1 internal hemorrhoids, normal colon , repeat 10 year 2027    Hx of suicide attempt     Hyperlipidemia LDL goal < 130     IBS (irritable bowel syndrome)     Ill-defined condition     environmental allergies    Insomnia     Malignant neoplasm of peritoneum (HCC) 2/14/2019    Nausea & vomiting     OA (osteoarthritis)     both knees, lower back    Preeclampsia     PTSD (post-traumatic stress disorder)     Seizures (HCC)     1 x with childbirth, had toxemia    Sleep apnea     does not use cpap machine    Spinal stenosis     Dr. Leni Oneill Vertigo      Past Surgical History:   Procedure Laterality Date    COLONOSCOPY N/A 4/5/2017    COLONOSCOPY performed by Cynthia Frazier MD at [facility] ENDOSCOPY    CYSTO/URETEROSCOPY,TX URETER STRICT      FLEXIBLE SIGMOIDOSCOPY N/A 2019    SIGMOIDOSCOPY FLEXIBLE performed by Julius Willson MD at [facility] ENDOSCOPY    HX  SECTION      HX COLONOSCOPY  2017    DR. MCRAE 2017     HX CYSTECTOMY  2020    HX HYSTERECTOMY      HX KNEE ARTHROSCOPY Left     left knee    HX KNEE REPLACEMENT Bilateral     (R)  (L)     HX LAPAROTOMY N/A 2019    and rectovaginal bx    HX OTHER SURGICAL  2016    all teeth removed    HX SALPINGO-OOPHORECTOMY  2008    HX TUBAL LIGATION      IR INSERT TUNL CVC W PORT OVER 5 YEARS  2019    MULTIPLE DELIVERY       1990    VT FEMUR/KNEE SURG UNLISTED Left 2009    External fixator    VT PART REMOVAL COLON W ANASTOMOSIS      VT TOTAL ABDOM HYSTERECTOMY  2006    TRANSURETHRAL RESEC BLADDER NECK       Family History   Problem Relation Age of Onset    Heart Disease Mother         CHF    Diabetes Mother     Hypertension Mother     Kidney Disease Mother     Breast Cancer Mother    [facility] Stroke Mother     Diabetes Father     Hypertension Father     Heart Attack Father         MI    Cancer Maternal Grandmother         stomach    Ovarian Cancer Maternal Aunt     Cancer Maternal Uncle        ROS      Objective:     Visit Vitals  LMP 2008      General: alert, cooperative, no distress   Mental  status: normal mood, behavior, speech, dress, motor activity, and thought processes, able to follow commands   HENT: NCAT   Neck: no visualized mass   Resp: no respiratory distress   Neuro: no gross deficits   Skin: no discoloration or lesions of concern on visible areas   Psychiatric: normal affect, consistent with stated mood, no evidence of hallucinations     Additional exam findings: We discussed the expected course, resolution and complications of the diagnosis(es) in detail.   Medication risks, benefits, costs, interactions, and alternatives were discussed as indicated. I advised her to contact the office if her condition worsens, changes or fails to improve as anticipated. She expressed understanding with the diagnosis(es) and plan. Megan Guan is a 45 y.o. female who was evaluated by a video visit encounter for concerns as above. Patient identification was verified prior to start of the visit. A caregiver was present when appropriate. Due to this being a TeleHealth encounter (During [facility] public health emergency), evaluation of the following organ systems was limited: Vitals/Constitutional/EENT/Resp/CV/GI//MS/Neuro/Skin/Heme-Lymph-Imm. Pursuant to the emergency declaration under the [address] [facility] waiver authority and the [employer] Act, this Virtual  Visit was conducted, with patient's (and/or legal guardian's) consent, to reduce the patient's risk of exposure to COVID-19 and provide necessary medical care. Services were provided through a video synchronous discussion virtually to substitute for in-person clinic visit. Patient and provider were located at their individual homes. Assessment & Plan:   Diagnoses and all orders for this visit:    1. Essential hypertension  Controlled  Cont norvasc    2. Malignant neoplasm of ovary, unspecified laterality ([facility] [address]  W/ recent recurrence  Following oncology has appt next week    3. Bipolar 1 disorder ([address]  Per pt stable  Following psychiatry    4. Malignant neoplasm of peritoneum ([address]    5. Dizziness  Given meclizine, advised continues care with oncologist    6. COVID-19  Symptom onset fever 12/29, currently asymptomatic,   Self quarantine x 14 days til 1/12/2021    Other orders  -     meclizine (ANTIVERT) 25 mg tablet; Take 1 Tab by mouth three (3) times daily as needed for Dizziness.       Ff-up 6 months or sooner prn,  continue care with oncologist    Lori Power MD

## 2021-01-27 ENCOUNTER — TELEPHONE (OUTPATIENT)
Dept: ONCOLOGY | Age: 51
End: 2021-01-27

## 2021-01-27 NOTE — TELEPHONE ENCOUNTER
Returned call to patient who stated she has had swelling for the past 4 days. Confirmed appointment next week on 2/4/2021.

## 2021-01-28 ENCOUNTER — PATIENT MESSAGE (OUTPATIENT)
Dept: FAMILY MEDICINE CLINIC | Age: 51
End: 2021-01-28

## 2021-02-04 ENCOUNTER — TRANSCRIBE ORDER (OUTPATIENT)
Dept: SCHEDULING | Age: 51
End: 2021-02-04

## 2021-02-04 DIAGNOSIS — Z12.31 SCREENING MAMMOGRAM, ENCOUNTER FOR: Primary | ICD-10-CM

## 2021-02-05 ENCOUNTER — TELEPHONE (OUTPATIENT)
Dept: ONCOLOGY | Age: 51
End: 2021-02-05

## 2021-02-05 NOTE — TELEPHONE ENCOUNTER
Left voicemail for patient to call to reschedule today's appointment to next week due to provider out of office.

## 2021-02-11 ENCOUNTER — VIRTUAL VISIT (OUTPATIENT)
Dept: FAMILY MEDICINE CLINIC | Age: 51
End: 2021-02-11
Payer: MEDICARE

## 2021-02-11 DIAGNOSIS — R60.9 EDEMA, UNSPECIFIED TYPE: Primary | ICD-10-CM

## 2021-02-11 DIAGNOSIS — C48.2 MALIGNANT NEOPLASM OF PERITONEUM (HCC): ICD-10-CM

## 2021-02-11 DIAGNOSIS — I10 ESSENTIAL HYPERTENSION: ICD-10-CM

## 2021-02-11 DIAGNOSIS — F31.70 BIPOLAR DISORDER IN FULL REMISSION, MOST RECENT EPISODE UNSPECIFIED TYPE (HCC): ICD-10-CM

## 2021-02-11 DIAGNOSIS — K21.9 GASTROESOPHAGEAL REFLUX DISEASE WITHOUT ESOPHAGITIS: ICD-10-CM

## 2021-02-11 DIAGNOSIS — C56.9 MALIGNANT NEOPLASM OF OVARY, UNSPECIFIED LATERALITY (HCC): ICD-10-CM

## 2021-02-11 DIAGNOSIS — N18.4 CKD (CHRONIC KIDNEY DISEASE) STAGE 4, GFR 15-29 ML/MIN (HCC): ICD-10-CM

## 2021-02-11 PROCEDURE — 99215 OFFICE O/P EST HI 40 MIN: CPT | Performed by: FAMILY MEDICINE

## 2021-02-11 PROCEDURE — G8427 DOCREV CUR MEDS BY ELIG CLIN: HCPCS | Performed by: FAMILY MEDICINE

## 2021-02-11 RX ORDER — MECLIZINE HYDROCHLORIDE 25 MG/1
25 TABLET ORAL
Qty: 30 TAB | Refills: 0 | Status: SHIPPED | OUTPATIENT
Start: 2021-02-11 | End: 2021-03-30

## 2021-02-11 RX ORDER — PANTOPRAZOLE SODIUM 40 MG/1
TABLET, DELAYED RELEASE ORAL
Qty: 90 TAB | Refills: 3 | Status: SHIPPED | OUTPATIENT
Start: 2021-02-11 | End: 2021-03-30

## 2021-02-11 NOTE — PROGRESS NOTES
Juanita Begum is a 45 y.o. female who was seen by synchronous (real-time) audio-video technology on 2/11/2021. Consent: Juanita Begum, who was seen by synchronous (real-time) audio-video technology, and/or her healthcare decision maker, is aware that this patient-initiated, Telehealth encounter on 2/11/2021 is a billable service, with coverage as determined by her insurance carrier. She is aware that she may receive a bill and has provided verbal consent to proceed: Yes. 712  Subjective:   Juanita Begum is a 45 y.o. female who was seen for Hospital Follow Up (CHF 02/04/2021-02/05/2021) and Positive For Covid-19 (12/2020)    Ff-up admission 2/4-2/5       Pt w/ known h/o ovarian/peritoneal  cancer s/p HIPEC,  with recurrence, bladder cancer s/p colectomy s/p cystecomty 10/2020. Follows dr. Petra Esteban oncology surgeon/ dr Mango Bowman gyne onc    Developed increasing sob/ leg edema and oncology office recommended ED evaluation. Reviewed discharge summary, responded to IV lasix. CXR without acute processd, B/l PVLs neg for DVT, echo EF 55-60% pro BNP 1443. She has known chronic anemia- stable hgb levels 7-8's during admission  CKD 4- worsening, creatinine 2.3 on discharge. She has upcoming appt with nephrology dr. Flavio Alexander  HTN- normotensive on amlodipine and metoprolol, h/o hyponatremia on hctz    HLD on stating   GERD   On PPI. Depression/bipolar- reports  Doing well on paxil, following psychiatry    Vertigo-chronic,  responding to prn meclizine, MRI brain 2019 neg. She feels overall better, on RA, ambulating. no longer with bleeding on ostomy or vaginal area. She reports improved leg edema. Home health services in place. Prior to Admission medications    Medication Sig Start Date End Date Taking? Authorizing Provider   meclizine (ANTIVERT) 25 mg tablet Take 1 Tab by mouth three (3) times daily as needed for Dizziness.  2/11/21  Yes Steve Vega MD   pantoprazole (PROTONIX) 40 mg tablet take 1 tablet by mouth once daily 2/11/21  Yes Patti Gomez MD   PARoxetine (PAXIL) 10 mg tablet take 1 tablet by mouth once daily  Patient taking differently: 10 mg. 12/28/20  Yes Danisha Lamar NP   simvastatin (ZOCOR) 20 mg tablet take 1 tablet by mouth at bedtime 12/27/20  Yes Patti Gomez MD   acetaminophen (TYLENOL) 325 mg tablet Take 325 mg by mouth every four (4) hours as needed.   Yes Provider, Historical   gabapentin (NEURONTIN) 100 mg capsule Take 1 Cap by mouth three (3) times daily. Max Daily Amount: 300 mg. 9/17/20  Yes Leonel Rothman MD   amLODIPine (NORVASC) 10 mg tablet take 1 tablet by mouth once daily 8/31/20  Yes Patti Gomez MD   promethazine (PHENERGAN) 25 mg tablet take 1 tablet by mouth every 6 hours if needed for nausea 8/24/20  Yes Estela Gomez NP   metoprolol succinate (TOPROL-XL) 100 mg tablet Take 1 Tab by mouth daily. 3/4/20  Yes Patti Gomez MD   meclizine (ANTIVERT) 25 mg tablet Take 1 Tab by mouth three (3) times daily as needed for Dizziness. 1/11/21 2/11/21  Patti Gomez MD   oxyCODONE IR (ROXICODONE) 5 mg immediate release tablet Take 5 mg by mouth every four (4) hours as needed. 11/25/20   Provider, Historical   oxybutynin chloride XL (DITROPAN XL) 10 mg CR tablet Take 1 Tab by mouth daily. 5/27/20   Deonna Manzano NP   pantoprazole (PROTONIX) 40 mg tablet take 1 tablet by mouth once daily 4/4/20 2/11/21  Provider, Historical   Facial Mask misc Use daily 3/26/20   Patti Gomez MD   Disposable Gloves misc Use daily 3/26/20   Patti Gomez MD     Allergies   Allergen Reactions   • Seafood Hives     FISH   • Other Medication Hives     seafood   • Pollen Extracts Other (comments)   • Shellfish Derived Hives       Patient Active Problem List    Diagnosis Date Noted   • CKD (chronic kidney disease) stage 4, GFR 15-29 ml/min ([facility]) 02/11/2021   • COVID-19 12/31/2020   • History of abdominal surgery 12/31/2020   • Melena 12/30/2020   [facility] 11/17/2020    Loss of appetite 10/14/2020    Other hydronephrosis 06/24/2020    Genetic carrier status 09/13/2019    Postoperative nausea 08/27/2019    Ovarian cancer ([facility] 08/07/2019    Malignant neoplasm of peritoneum ([facility] 02/14/2019    Pelvic mass in female 01/17/2019    Bipolar 1 disorder ([facility] 02/09/2018    Irritable bowel syndrome with both constipation and diarrhea 02/09/2018    Subclinical hypothyroidism 01/23/2018    Bipolar disorder in full remission ([facility] 01/22/2018    Chronic abdominal pain 01/22/2018    Diverticulosis 01/22/2018    Hx of total hysterectomy 01/22/2018    Hyponatremia 01/22/2018    Advance care planning 01/31/2017    Dental caries 05/26/2016    Chronic periodontal disease 05/26/2016    SUAZO (dyspnea on exertion) 02/10/2016    Prediabetes 09/24/2015    Morbid obesity ([facility] 08/31/2015    Arthritis, degenerative 08/31/2015    Gastroesophageal reflux disease without esophagitis 08/31/2015    ANAMIKA on CPAP 08/31/2015    Essential hypertension 08/28/2015    PTSD (post-traumatic stress disorder) 03/24/2014    S/P TKR (total knee replacement) 11/14/2012    Adjustment disorder with depressed mood 09/20/2012    Major depressive disorder, recurrent episode, moderate ([facility] 09/20/2012    Suicidal ideation 09/19/2012    Depression 05/08/2012    Menopause 05/08/2012    Knee pain, right 05/08/2012    GERD (gastroesophageal reflux disease) 05/08/2012    Hypokalemia 02/04/2012    Overdose 02/04/2012    OA (osteoarthritis) 06/18/2010    Obesity 06/18/2010    Mixed hyperlipidemia 06/18/2010     Current Outpatient Medications   Medication Sig Dispense Refill    meclizine (ANTIVERT) 25 mg tablet Take 1 Tab by mouth three (3) times daily as needed for Dizziness.  30 Tab 0    pantoprazole (PROTONIX) 40 mg tablet take 1 tablet by mouth once daily 90 Tab 3    PARoxetine (PAXIL) 10 mg tablet take 1 tablet by mouth once daily (Patient taking differently: 10 mg.) 90 Tab 3    simvastatin (ZOCOR) 20 mg tablet take 1 tablet by mouth at bedtime 90 Tab 3    acetaminophen (TYLENOL) 325 mg tablet Take 325 mg by mouth every four (4) hours as needed.  gabapentin (NEURONTIN) 100 mg capsule Take 1 Cap by mouth three (3) times daily. Max Daily Amount: 300 mg. 90 Cap 3    amLODIPine (NORVASC) 10 mg tablet take 1 tablet by mouth once daily 90 Tab 1    promethazine (PHENERGAN) 25 mg tablet take 1 tablet by mouth every 6 hours if needed for nausea 30 Tab 3    metoprolol succinate (TOPROL-XL) 100 mg tablet Take 1 Tab by mouth daily. 90 Tab 1    oxyCODONE IR (ROXICODONE) 5 mg immediate release tablet Take 5 mg by mouth every four (4) hours as needed.  oxybutynin chloride XL (DITROPAN XL) 10 mg CR tablet Take 1 Tab by mouth daily. 90 Tab 3    Facial Mask misc Use daily 30 Each 0    Disposable Gloves misc Use daily 50 Each 0     Allergies   Allergen Reactions    Seafood Hives     FISH    Other Medication Hives     seafood    Pollen Extracts Other (comments)    Shellfish Derived Hives     Past Medical History:   Diagnosis Date    AR (allergic rhinitis)     seasonal    Bladder cancer ([facility]     Cancer ([facility]     pt states between vgina and rectal area    Cardiac echocardiogram 04/11/2016    Tech difficult. EF 55-60%. No RWMA. Mild conc LVH. Gr 1 DDfx. RVSP normal.      Cardiac nuclear imaging test 05/05/2016    Low risk. Very patchy radiotracer uptake. Mild anterior artifact, low suspicion for ischemia. EF 68%. No RWMA. Normal EKG on pharm stress test.    Cardiovascular LE arterial duplex 10/07/2013    No significant arterial disease at rest bilaterally. R JUNE 1.21.  L JUNE 1.16. No significant sm vessel disease.     Depression     Dr. Vernell Granado, suicide attempt 2/12    Diverticulosis     Endometriosis     s/p hysterectomy 2006    GERD (gastroesophageal reflux disease)     Glaucoma     Dr. Granger Halo    Hematuria, gross     HTN (hypertension)  Hx of colonoscopy 2017    mild diverticulosis, g1 internal hemorrhoids, normal colon , repeat 10 year     Hx of suicide attempt     Hyperlipidemia LDL goal < 130     IBS (irritable bowel syndrome)     Ill-defined condition     environmental allergies    Insomnia     Malignant neoplasm of peritoneum (HCC) 2019    Nausea & vomiting     OA (osteoarthritis)     both knees, lower back    Preeclampsia     PTSD (post-traumatic stress disorder)     Seizures (HCC)     1 x with childbirth, had toxemia    Sleep apnea     does not use cpap machine    Spinal stenosis     Dr. Maureen Edwards Vertigo      Past Surgical History:   Procedure Laterality Date    COLONOSCOPY N/A 2017    COLONOSCOPY performed by Jeane Acosta MD at [place] [address] N/A 2019    SIGMOIDOSCOPY FLEXIBLE performed by Ashley Alarcon MD at [facility] ENDOSCOPY    HX  SECTION      HX COLONOSCOPY  2017    DR. MCRAE 2017     HX CYSTECTOMY  2020    HX HYSTERECTOMY      HX KNEE ARTHROSCOPY Left     left knee    HX KNEE REPLACEMENT Bilateral     (R)  (L)     HX LAPAROTOMY N/A 2019    and rectovaginal bx    HX OTHER SURGICAL  2016    all teeth removed    HX SALPINGO-OOPHORECTOMY  2008    HX TUBAL LIGATION      IR INSERT TUNL CVC W PORT OVER 5 YEARS  2019    MULTIPLE DELIVERY       1990    GA FEMUR/KNEE SURG UNLISTED Left 2009    External fixator    GA PART REMOVAL COLON W ANASTOMOSIS      GA TOTAL ABDOM HYSTERECTOMY  2006    TRANSURETHRAL RESEC BLADDER NECK       Family History   Problem Relation Age of Onset    Heart Disease Mother         CHF    Diabetes Mother     Hypertension Mother     Kidney Disease Mother     Breast Cancer Mother     Stroke Mother     Diabetes Father     Hypertension Father     Heart Attack Father         MI    Cancer Maternal Grandmother stomach    Ovarian Cancer Maternal Aunt     Cancer Maternal Uncle      Social History     Tobacco Use    Smoking status: Never Smoker    Smokeless tobacco: Never Used   Substance Use Topics    Alcohol use: No       ROS      Objective:     Visit Vitals  LMP 01/31/2008      General: alert, cooperative, no distress   Mental  status: normal mood, behavior, speech, dress, motor activity, and thought processes, able to follow commands   HENT: NCAT   Neck: no visualized mass   Resp: no respiratory distress   Neuro: no gross deficits   Skin: no discoloration or lesions of concern on visible areas   Psychiatric: normal affect, consistent with stated mood, no evidence of hallucinations     Additional exam findings: We discussed the expected course, resolution and complications of the diagnosis(es) in detail. Medication risks, benefits, costs, interactions, and alternatives were discussed as indicated. I advised her to contact the office if her condition worsens, changes or fails to improve as anticipated. She expressed understanding with the diagnosis(es) and plan. Antonia Mallory is a 45 y.o. female who was evaluated by a video visit encounter for concerns as above. Patient identification was verified prior to start of the visit. A caregiver was present when appropriate. Due to this being a TeleHealth encounter (During SAXCJ-48 public health emergency), evaluation of the following organ systems was limited: Vitals/Constitutional/EENT/Resp/CV/GI//MS/Neuro/Skin/Heme-Lymph-Imm. Pursuant to the emergency declaration under the [facility] waiver authority and the [employer] and [employer] Act, this Virtual  Visit was conducted, with patient's (and/or legal guardian's) consent, to reduce the patient's risk of exposure to COVID-19 and provide necessary medical care.      Services were provided through a video synchronous discussion virtually to substitute for in-person clinic visit. Patient and provider were located at their individual homes. Assessment & Plan:   Diagnoses and all orders for this visit:    1. Edema, unspecified type  Likely due to worsening renal function  Possible diastolic dysfunction contributing  Clinically improving  Keep appt with nephrology dr. Jay Ocasio  Will also refer to cardiology  Cont BB  2. CKD (chronic kidney disease) stage 4, GFR 15-29 ml/min [facility])  Monitoring  Lab draw with [facility] [address] ([phone])  -     METABOLIC PANEL, BASIC; Future  -     CBC WITH AUTOMATED DIFF; Future    3. Bipolar disorder in full remission, most recent episode unspecified type ([address]  Stable  On paxil, following psychiatry    4. Malignant neoplasm of peritoneum ([address]    5. Malignant neoplasm of ovary, unspecified laterality ([address]  Cont care with oncology    6. Gastroesophageal reflux disease without esophagitis  Stable  Cont protonix    7. Essential hypertension  Controlled  Cont norvasc, metoprolol    Other orders  -     meclizine (ANTIVERT) 25 mg tablet; Take 1 Tab by mouth three (3) times daily as needed for Dizziness.   -     pantoprazole (PROTONIX) 40 mg tablet; take 1 tablet by mouth once daily      Ff-up in 6 weeks or sooner dequann    Teresa Plaza MD

## 2021-02-11 NOTE — PROGRESS NOTES
1. Have you been to the ER, urgent care clinic since your last visit? Hospitalized since your last visit? Hospital 02/04/2021-02/05/2021 CHF    2. Have you seen or consulted any other health care providers outside of the [address] [place] [address] since your last visit? Include any pap smears or colon screening.  Dr. Christel Block Surgeon

## 2021-02-11 NOTE — PATIENT INSTRUCTIONS
DASH Diet: Care Instructions Your Care Instructions The DASH diet is an eating plan that can help lower your blood pressure. DASH stands for Dietary Approaches to Stop Hypertension. Hypertension is high blood pressure. The DASH diet focuses on eating foods that are high in calcium, potassium, and magnesium. These nutrients can lower blood pressure. The foods that are highest in these nutrients are fruits, vegetables, low-fat dairy products, nuts, seeds, and legumes. But taking calcium, potassium, and magnesium supplements instead of eating foods that are high in those nutrients does not have the same effect. The DASH diet also includes whole grains, fish, and poultry. The DASH diet is one of several lifestyle changes your doctor may recommend to lower your high blood pressure. Your doctor may also want you to decrease the amount of sodium in your diet. Lowering sodium while following the DASH diet can lower blood pressure even further than just the DASH diet alone. Follow-up care is a key part of your treatment and safety. Be sure to make and go to all appointments, and call your doctor if you are having problems. It's also a good idea to know your test results and keep a list of the medicines you take. How can you care for yourself at home? Following the DASH diet · Eat 4 to 5 servings of fruit each day. A serving is 1 medium-sized piece of fruit, ½ cup chopped or canned fruit, 1/4 cup dried fruit, or 4 ounces (½ cup) of fruit juice. Choose fruit more often than fruit juice. · Eat 4 to 5 servings of vegetables each day. A serving is 1 cup of lettuce or raw leafy vegetables, ½ cup of chopped or cooked vegetables, or 4 ounces (½ cup) of vegetable juice. Choose vegetables more often than vegetable juice. · Get 2 to 3 servings of low-fat and fat-free dairy each day. A serving is 8 ounces of milk, 1 cup of yogurt, or 1 ½ ounces of cheese. · Eat 6 to 8 servings of grains each day. A serving is 1 slice of bread, 1 ounce of dry cereal, or ½ cup of cooked rice, pasta, or cooked cereal. Try to choose whole-grain products as much as possible. · Limit lean meat, poultry, and fish to 2 servings each day. A serving is 3 ounces, about the size of a deck of cards. · Eat 4 to 5 servings of nuts, seeds, and legumes (cooked dried beans, lentils, and split peas) each week. A serving is 1/3 cup of nuts, 2 tablespoons of seeds, or ½ cup of cooked beans or peas. · Limit fats and oils to 2 to 3 servings each day. A serving is 1 teaspoon of vegetable oil or 2 tablespoons of salad dressing. · Limit sweets and added sugars to 5 servings or less a week. A serving is 1 tablespoon jelly or jam, ½ cup sorbet, or 1 cup of lemonade. · Eat less than 2,300 milligrams (mg) of sodium a day. If you limit your sodium to 1,500 mg a day, you can lower your blood pressure even more. Tips for success · Start small. Do not try to make dramatic changes to your diet all at once. You might feel that you are missing out on your favorite foods and then be more likely to not follow the plan. Make small changes, and stick with them. Once those changes become habit, add a few more changes. · Try some of the following: ? Make it a goal to eat a fruit or vegetable at every meal and at snacks. This will make it easy to get the recommended amount of fruits and vegetables each day. ? Try yogurt topped with fruit and nuts for a snack or healthy dessert. ? Add lettuce, tomato, cucumber, and onion to sandwiches. ? Combine a ready-made pizza crust with low-fat mozzarella cheese and lots of vegetable toppings. Try using tomatoes, squash, spinach, broccoli, carrots, cauliflower, and onions. ? Have a variety of cut-up vegetables with a low-fat dip as an appetizer instead of chips and dip. ? Sprinkle sunflower seeds or chopped almonds over salads. Or try adding chopped walnuts or almonds to cooked vegetables. ? Try some vegetarian meals using beans and peas. Add garbanzo or kidney beans to salads. Make burritos and tacos with mashed lee beans or black beans. Where can you learn more? Go to http://www.gray.com/ Enter M295 in the search box to learn more about \"DASH Diet: Care Instructions. \" Current as of: December 16, 2019               Content Version: 12.6 © 7878-1971 [employer]. Care instructions adapted under license by [employer] (which disclaims liability or warranty for this information). If you have questions about a medical condition or this instruction, always ask your healthcare professional. [address] any warranty or liability for your use of this information.

## 2021-02-16 ENCOUNTER — TELEPHONE (OUTPATIENT)
Dept: FAMILY MEDICINE CLINIC | Age: 51
End: 2021-02-16

## 2021-02-16 NOTE — TELEPHONE ENCOUNTER
Spoke with Eliza April. She is aware of need for labs to be drawn at patients next home visit.  Orders have been faxed to [phone]

## 2021-02-16 NOTE — TELEPHONE ENCOUNTER
Placed call to rFeya Stratton with [facility] [phone]. Left message for Freya Stratton to return my call regarding patient needing labs drawn. BMP and CBC with diff.

## 2021-02-24 PROBLEM — I50.9 ACUTE CONGESTIVE HEART FAILURE (HCC): Status: ACTIVE | Noted: 2021-02-04

## 2021-03-02 DIAGNOSIS — T45.1X5A NEUROPATHY DUE TO CHEMOTHERAPEUTIC DRUG (HCC): ICD-10-CM

## 2021-03-02 DIAGNOSIS — G62.0 NEUROPATHY DUE TO CHEMOTHERAPEUTIC DRUG (HCC): ICD-10-CM

## 2021-03-02 RX ORDER — GABAPENTIN 100 MG/1
CAPSULE ORAL
Qty: 90 CAP | Refills: 3 | Status: SHIPPED | OUTPATIENT
Start: 2021-03-02 | End: 2021-06-30 | Stop reason: ALTCHOICE

## 2021-03-30 ENCOUNTER — OFFICE VISIT (OUTPATIENT)
Dept: CARDIOLOGY CLINIC | Age: 51
End: 2021-03-30
Payer: MEDICARE

## 2021-03-30 VITALS
WEIGHT: 258 LBS | BODY MASS INDEX: 45.71 KG/M2 | HEART RATE: 70 BPM | DIASTOLIC BLOOD PRESSURE: 68 MMHG | OXYGEN SATURATION: 95 % | HEIGHT: 63 IN | SYSTOLIC BLOOD PRESSURE: 120 MMHG

## 2021-03-30 DIAGNOSIS — E66.01 MORBID OBESITY (HCC): ICD-10-CM

## 2021-03-30 DIAGNOSIS — E78.00 HYPERCHOLESTEREMIA: ICD-10-CM

## 2021-03-30 DIAGNOSIS — R94.31 ABNORMAL EKG: ICD-10-CM

## 2021-03-30 DIAGNOSIS — I10 ESSENTIAL HYPERTENSION: Primary | ICD-10-CM

## 2021-03-30 PROCEDURE — G8752 SYS BP LESS 140: HCPCS | Performed by: INTERNAL MEDICINE

## 2021-03-30 PROCEDURE — G8417 CALC BMI ABV UP PARAM F/U: HCPCS | Performed by: INTERNAL MEDICINE

## 2021-03-30 PROCEDURE — G9899 SCRN MAM PERF RSLTS DOC: HCPCS | Performed by: INTERNAL MEDICINE

## 2021-03-30 PROCEDURE — G9717 DOC PT DX DEP/BP F/U NT REQ: HCPCS | Performed by: INTERNAL MEDICINE

## 2021-03-30 PROCEDURE — 99214 OFFICE O/P EST MOD 30 MIN: CPT | Performed by: INTERNAL MEDICINE

## 2021-03-30 PROCEDURE — G9711 PT HX TOT COL OR COLON CA: HCPCS | Performed by: INTERNAL MEDICINE

## 2021-03-30 PROCEDURE — G8754 DIAS BP LESS 90: HCPCS | Performed by: INTERNAL MEDICINE

## 2021-03-30 PROCEDURE — 93000 ELECTROCARDIOGRAM COMPLETE: CPT | Performed by: INTERNAL MEDICINE

## 2021-03-30 PROCEDURE — G8427 DOCREV CUR MEDS BY ELIG CLIN: HCPCS | Performed by: INTERNAL MEDICINE

## 2021-03-30 RX ORDER — MECLIZINE HYDROCHLORIDE 25 MG/1
TABLET ORAL
Qty: 30 TAB | Refills: 0 | Status: SHIPPED | OUTPATIENT
Start: 2021-03-30 | End: 2021-05-06 | Stop reason: SDUPTHER

## 2021-03-30 NOTE — PROGRESS NOTES
Rosie Resendez presents today for   Chief Complaint   Patient presents with   Ann Carrera Dr. Revision and with Dr. Everett Stiles., last seen in 2019       Rosie Resendez preferred language for health care discussion is english/other. Is someone accompanying this pt? no    Is the patient using any DME equipment during [address]? no    Depression Screening:  3 most recent PHQ Screens 3/30/2021   Little interest or pleasure in doing things Not at all   Feeling down, depressed, irritable, or hopeless Not at all   Total Score PHQ 2 0       Learning Assessment:  Learning Assessment 3/30/2021   PRIMARY LEARNER Patient   HIGHEST LEVEL OF EDUCATION - PRIMARY LEARNER  -   BARRIERS PRIMARY LEARNER -   CO-LEARNER CAREGIVER -   PRIMARY LANGUAGE ENGLISH   LEARNER PREFERENCE PRIMARY DEMONSTRATION     -   ANSWERED BY patient   RELATIONSHIP SELF       Abuse Screening:  Abuse Screening Questionnaire 3/30/2021   Do you ever feel afraid of your partner? N   Are you in a relationship with someone who physically or mentally threatens you? N   Is it safe for you to go home? Y       Fall Risk  Fall Risk Assessment, last 12 mths 1/11/2021   Able to walk? Yes   Fall in past 12 months? 0   Do you feel unsteady? 0   Are you worried about falling 0       Pt currently taking Anticoagulant therapy? no    Coordination of Care:  1. Have you been to the ER, urgent care clinic since your last visit? Hospitalized since your last visit? yes    2. Have you seen or consulted any other health care providers outside of the [address] [place] [address] since your last visit? Include any pap smears or colon screening.  no

## 2021-03-30 NOTE — PROGRESS NOTES
HISTORY OF PRESENT ILLNESS  Antonia Mallory is a 45 y.o. female. HPI    Patient presents for an add-on office visit. She was last seen in the office over 2 years ago. She was previously seen by Dr. Apoorva Tuttle. She does not have a prior cardiac history. She was initially seen here due to an abnormal EKG. She subsequently underwent noninvasive cardiac testing back in 2016 which included an echocardiogram and a pharmacologic nuclear stress test, both of which were normal.  A more recent echocardiogram was done in July 2020 which showed preserved LV function, EF 55 to 60%, mild concentric LVH with upper normal PA pressures. She does have a history of essential hypertension, dyslipidemia, chronic kidney disease and chronic dependent edema. She has since been diagnosed with stage IV recurrent peritoneal cancer which required extensive surgical resection in 2020. Patient was briefly hospitalized at [facility] in February 2021 for shortness of breath and leg swelling. She was felt to be mildly volume overloaded and was treated with a single dose of IV furosemide. She underwent a follow-up limited echocardiogram in February 2021 which is unchanged compared to her previous studies. She returns today for preoperative cardiac evaluation prior to undergoing a urostomy reversal which is scheduled next month with Dr. Shay Collazo. She currently denies any worsening shortness of breath, no increased leg swelling, no orthopnea, no PND. No significant weight gain over the past few months. Past Medical History:   Diagnosis Date    AR (allergic rhinitis)     seasonal    Bladder [facility] ([facility] [address]     Cancer ([facility] [address]     pt states between vgina and rectal area    Cardiac echocardiogram 04/11/2016    Tech difficult. EF 55-60%. No RWMA. Mild conc LVH. Gr 1 DDfx. RVSP normal.      Cardiac nuclear imaging test 05/05/2016    Low risk. Very patchy radiotracer uptake. Mild anterior artifact, low suspicion for ischemia.   EF 68%.  No RWMA. Normal EKG on pharm stress test.    Cardiovascular LE arterial duplex 10/07/2013    No significant arterial disease at rest bilaterally. R JUNE 1.21.  L JUNE 1.16. No significant sm vessel disease.  Depression     Dr. Cathy Pride, suicide attempt 2/12    Diverticulosis     Endometriosis     s/p hysterectomy 2006    GERD (gastroesophageal reflux disease)     Glaucoma     Dr. Scott Tay Hematuria, gross     HTN (hypertension)     Hx of colonoscopy 04/05/2017    mild diverticulosis, g1 internal hemorrhoids, normal colon , repeat 10 year 2027    Hx of suicide attempt     Hyperlipidemia LDL goal < 130     IBS (irritable bowel syndrome)     Ill-defined condition     environmental allergies    Insomnia     Malignant neoplasm of peritoneum (HCC) 2/14/2019    Nausea & vomiting     OA (osteoarthritis)     both knees, lower back    Preeclampsia     PTSD (post-traumatic stress disorder)     Seizures (HCC)     1 x with childbirth, had toxemia    Sleep apnea     does not use cpap machine    Spinal stenosis     Dr. Landers Press Vertigo      Current Outpatient Medications   Medication Sig Dispense Refill    gabapentin (NEURONTIN) 100 mg capsule take 1 capsule by mouth three times a day (MAX DAILY AMOUNT: 300 MG) 90 Cap 3    meclizine (ANTIVERT) 25 mg tablet Take 1 Tab by mouth three (3) times daily as needed for Dizziness. 30 Tab 0    PARoxetine (PAXIL) 10 mg tablet take 1 tablet by mouth once daily 90 Tab 3    simvastatin (ZOCOR) 20 mg tablet take 1 tablet by mouth at bedtime 90 Tab 3    acetaminophen (TYLENOL) 325 mg tablet Take 325 mg by mouth every four (4) hours as needed.  oxyCODONE IR (ROXICODONE) 5 mg immediate release tablet Take 5 mg by mouth every four (4) hours as needed.       amLODIPine (NORVASC) 10 mg tablet take 1 tablet by mouth once daily 90 Tab 1    Facial Mask misc Use daily 30 Each 0    Disposable Gloves misc Use daily 50 Each 0    metoprolol succinate (TOPROL-XL) 100 mg tablet Take 1 Tab by mouth daily. 90 Tab 1     Allergies   Allergen Reactions    Seafood Hives     FISH    Other Medication Hives     seafood    Pollen Extracts Other (comments)    Shellfish Derived Hives      Social History     Tobacco Use    Smoking status: Never Smoker    Smokeless tobacco: Never Used   Substance Use Topics    Alcohol use: No    Drug use: No     Family History   Problem Relation Age of Onset    Heart Disease Mother         CHF    Diabetes Mother     Hypertension Mother     Kidney Disease Mother     Breast Cancer Mother    Marie Bobby Stroke Mother     Diabetes Father     Hypertension Father     Heart Attack Father         MI    Cancer Maternal Grandmother         stomach    Ovarian Cancer Maternal Aunt     Cancer Maternal Uncle          Review of Systems   Constitutional: Negative for chills, fever and weight loss. HENT: Negative for nosebleeds. Eyes: Negative for blurred vision and double vision. Respiratory: Negative for cough, shortness of breath and wheezing. Cardiovascular: Positive for leg swelling. Negative for chest pain, palpitations, orthopnea, claudication and PND. Gastrointestinal: Negative for abdominal pain, heartburn, nausea and vomiting. Genitourinary: Negative for dysuria and hematuria. Musculoskeletal: Negative for falls and myalgias. Skin: Negative for rash. Neurological: Negative for dizziness, focal weakness and headaches. Endo/Heme/Allergies: Does not bruise/bleed easily. Psychiatric/Behavioral: Negative for substance abuse. Visit Vitals  /68 (BP 1 Location: Left upper arm, BP Patient Position: Sitting, BP Cuff Size: Large adult)   Pulse 70   Ht 5' 3\" (1.6 m)   Wt 117 kg (258 lb)   LMP 01/31/2008   SpO2 95%   BMI 45.70 kg/m²       Physical Exam   Constitutional: She is oriented to person, place, and time. She appears well-developed and well-nourished. HENT:   Head: Normocephalic and atraumatic.    Eyes: Conjunctivae are normal.   Neck: Neck supple. No JVD present. Carotid bruit is not present. Cardiovascular: Normal rate, regular rhythm, S1 normal, S2 normal and normal pulses. Exam reveals distant heart sounds. Exam reveals no gallop. No murmur heard. Pulmonary/Chest: Effort normal and breath sounds normal. She has no wheezes. She has no rales. Abdominal: Soft. Bowel sounds are normal. There is no abdominal tenderness. Musculoskeletal:         General: No tenderness, deformity or edema. Neurological: She is alert and oriented to person, place, and time. Skin: Skin is warm and dry. Psychiatric: She has a normal mood and affect. Her behavior is normal. Thought content normal.     EKG: Normal sinus rhythm, normal axis, normal QTc interval, borderline low voltage, poor R wave progression, no ST or T wave changes concerning for ischemia. No change compared to the previous EKGs dating back for several years. ASSESSMENT and PLAN    Low risk from a cardiac standpoint to proceed with urological surgery as scheduled next month. No additional cardiac testing is needed. I would continue her metoprolol outpatient dosage perioperatively. Abnormal EKG. Patient's EKG changes have not changed for over 5 years. This is likely due to her body habitus. She recently underwent a repeat echocardiogram in February 2021 which was unremarkable showing preserved LV function, EF 55 to 60%. No evidence of prior MI. Essential hypertension. Patient blood pressure is currently well controlled on her current regimen of metoprolol XL and amlodipine, both of which I would continue. Dyslipidemia. Patient's been maintained on simvastatin 20 mg daily. Is followed by her PCP. Stage IV recurrent peritoneal cancer. Patient underwent extensive surgeries for this back in 2020. She is now been left with a urostomy which currently has a significant urine leak and a colostomy.   She is scheduled to undergo a urostomy reversal next month.   Patient also continues to follow-up with her oncologist.    Follow-up annually, sooner if needed

## 2021-04-30 ENCOUNTER — TELEPHONE (OUTPATIENT)
Dept: FAMILY MEDICINE CLINIC | Age: 51
End: 2021-04-30

## 2021-04-30 NOTE — TELEPHONE ENCOUNTER
Sorry to hear, cont symptomatic treatment, may take tylenol q4h prn for fever. If > 3 days, will advise evaluation.

## 2021-04-30 NOTE — TELEPHONE ENCOUNTER
Pt states that she received the Covid 19 vaccine Pfezer  last Friday and she thinks that she is having side effects. She  is having a fever 100. With a dry cough, nausea muscle aches and back spasms. Shot area sore with lump the size of a quarter. Pt states that she did have surgery for an ostomy revision on 4/12/2021 through [facility]. .pt states that she is drinking plenty of fluids and resting. Please advise.

## 2021-04-30 NOTE — TELEPHONE ENCOUNTER
Patient identified with 2 identifiers (name and ). Spoke with patient and she is aware of Dr. Brittany Roger recommendations. cont symptomatic treatment, may take tylenol q4h prn for fever. If > 3 days, will advise evaluation.

## 2021-05-03 RX ORDER — AMLODIPINE BESYLATE 10 MG/1
TABLET ORAL
Qty: 90 TAB | Refills: 1 | Status: SHIPPED | OUTPATIENT
Start: 2021-05-03 | End: 2021-11-02

## 2021-05-06 ENCOUNTER — VIRTUAL VISIT (OUTPATIENT)
Dept: FAMILY MEDICINE CLINIC | Age: 51
End: 2021-05-06
Payer: MEDICARE

## 2021-05-06 DIAGNOSIS — C56.9 MALIGNANT NEOPLASM OF OVARY, UNSPECIFIED LATERALITY (HCC): ICD-10-CM

## 2021-05-06 DIAGNOSIS — K21.9 GASTROESOPHAGEAL REFLUX DISEASE WITHOUT ESOPHAGITIS: ICD-10-CM

## 2021-05-06 DIAGNOSIS — N99.534: Primary | ICD-10-CM

## 2021-05-06 DIAGNOSIS — I10 ESSENTIAL HYPERTENSION: ICD-10-CM

## 2021-05-06 DIAGNOSIS — C48.2 MALIGNANT NEOPLASM OF PERITONEUM (HCC): ICD-10-CM

## 2021-05-06 DIAGNOSIS — F31.9 BIPOLAR 1 DISORDER (HCC): ICD-10-CM

## 2021-05-06 DIAGNOSIS — N18.4 CKD (CHRONIC KIDNEY DISEASE) STAGE 4, GFR 15-29 ML/MIN (HCC): ICD-10-CM

## 2021-05-06 PROCEDURE — G8756 NO BP MEASURE DOC: HCPCS | Performed by: FAMILY MEDICINE

## 2021-05-06 PROCEDURE — G9717 DOC PT DX DEP/BP F/U NT REQ: HCPCS | Performed by: FAMILY MEDICINE

## 2021-05-06 PROCEDURE — 99214 OFFICE O/P EST MOD 30 MIN: CPT | Performed by: FAMILY MEDICINE

## 2021-05-06 PROCEDURE — G9711 PT HX TOT COL OR COLON CA: HCPCS | Performed by: FAMILY MEDICINE

## 2021-05-06 PROCEDURE — G8427 DOCREV CUR MEDS BY ELIG CLIN: HCPCS | Performed by: FAMILY MEDICINE

## 2021-05-06 PROCEDURE — G9899 SCRN MAM PERF RSLTS DOC: HCPCS | Performed by: FAMILY MEDICINE

## 2021-05-06 PROCEDURE — G8417 CALC BMI ABV UP PARAM F/U: HCPCS | Performed by: FAMILY MEDICINE

## 2021-05-06 RX ORDER — MECLIZINE HYDROCHLORIDE 25 MG/1
TABLET ORAL
Qty: 30 TAB | Refills: 0 | Status: SHIPPED | OUTPATIENT
Start: 2021-05-06 | End: 2022-06-29

## 2021-05-06 RX ORDER — PAROXETINE HYDROCHLORIDE 20 MG/1
20 TABLET, FILM COATED ORAL DAILY
Qty: 90 TAB | Refills: 1 | Status: SHIPPED | OUTPATIENT
Start: 2021-05-06 | End: 2021-10-25

## 2021-05-06 NOTE — PROGRESS NOTES
Amandeep Ruiz is a 45 y.o. female who was seen by synchronous (real-time) audio-video technology on 5/6/2021. Consent: Amandeep Ruiz, who was seen by synchronous (real-time) audio-video technology, and/or her healthcare decision maker, is aware that this patient-initiated, Telehealth encounter on 5/6/2021 is a billable service, with coverage as determined by her insurance carrier. She is aware that she may receive a bill and has provided verbal consent to proceed: Yes. 712  Subjective:   Amandeep Ruiz is a 45 y.o. female who was seen for Hospital Follow Up ([facility] 4/01/21 and 4/13/21), Heartburn, and Medication Evaluation (wants to discuss increasing dose of Paxil)    Ff-up admission 4/13-4/16 stomal stenosis    Reviewed discharge summary, stomal stenosis s/p urostomy revision and excision of stenotic segment. Has appt with urology dr. Jabier Fletcher 5.12  Says she is doing well, no pain, fever, changes on BM. Pt w/ known h/o ovarian/peritoneal  cancer s/p HIPEC,  with recurrence, bladder cancer s/p colectomy s/p cystecomtyFollows dr. Erica Eckert oncology surgeon/ dr Felicitas Barrow gyne onc - has appt rafiq    chronic anemia- stable hgb levels 7-8's during admission    CKD 4-about the same creatinine 2's . She is following  nephrology dr. Sydni Carballo    HTN- normotensive on amlodipine and metoprolol, h/o hyponatremia on hctz    HLD on statin    GERD  On PPI. Depression/bipolar- reports  Doing well on paxil, no longer following psychiatry. Says managing relatively well. Wondering if she can increase dose mostly for hot flashes. Vertigo-chronic,  responding to prn meclizine, MRI brain 2019 neg. Home health services in place. Prior to Admission medications    Medication Sig Start Date End Date Taking?  Authorizing Provider   meclizine (ANTIVERT) 25 mg tablet take 1 tablet by mouth three times a day if needed FOR DIZZINESS 5/6/21  Yes Sherley Gomez MD   PARoxetine (PAXIL) 20 mg tablet Take 1 Tab by mouth daily. 5/6/21  Yes Vani Scott MD   amLODIPine (NORVASC) 10 mg tablet take 1 tablet by mouth once daily 5/3/21  Yes Vani Scott MD   metoprolol succinate (TOPROL-XL) 100 mg tablet take 1 tablet by mouth once daily 4/27/21  Yes Vani Scott MD   ondansetron hcl (ZOFRAN) 8 mg tablet Take 8 mg by mouth three (3) times daily as needed. 4/1/21  Yes Provider, Historical   latanoprost (XALATAN) 0.005 % ophthalmic solution Administer 1 Drop to both eyes nightly. Yes Provider, Historical   gabapentin (NEURONTIN) 100 mg capsule take 1 capsule by mouth three times a day (MAX DAILY AMOUNT: 300 MG) 3/2/21  Yes Rafi Stacy MD   simvastatin (ZOCOR) 20 mg tablet take 1 tablet by mouth at bedtime 12/27/20  Yes Vani Scott MD   amoxicillin-clavulanate (AUGMENTIN) 500-125 mg per tablet Take 1 Tab by mouth two (2) times a day.  4/12/21 5/6/21  Nazario Altamirano PA-C   meclizine (ANTIVERT) 25 mg tablet take 1 tablet by mouth three times a day if needed FOR DIZZINESS 3/30/21 5/6/21  Vani Scott MD   PARoxetine (PAXIL) 10 mg tablet take 1 tablet by mouth once daily 12/28/20 5/6/21  Nenita He, JOHN     Allergies   Allergen Reactions    Seafood Hives     FISH    Other Medication Hives     seafood    Pollen Extracts Other (comments)    Shellfish Derived Hives    Pantoprazole Other (comments)     Bleeding in stomach    Promethazine Other (comments)     Bleeding in stomach       Patient Active Problem List    Diagnosis Date Noted    CKD (chronic kidney disease) stage 4, GFR 15-29 ml/min ([facility] [address] 02/11/2021    Acute congestive heart failure ([address] 02/04/2021    COVID-19 12/31/2020    History of abdominal surgery 12/31/2020    Melena 12/30/2020    Cancer of appendix ([facility] [address] 11/17/2020    Loss of appetite 10/14/2020    Other hydronephrosis 06/24/2020    Genetic carrier status 09/13/2019    Postoperative nausea 08/27/2019    Ovarian cancer ([address] 08/07/2019    Malignant neoplasm of peritoneum ([facility] [address] 02/14/2019    Pelvic mass in female 01/17/2019    Bipolar 1 disorder ([facility] [address] 02/09/2018    Irritable bowel syndrome with both constipation and diarrhea 02/09/2018    Subclinical hypothyroidism 01/23/2018    Bipolar disorder in full remission ([facility] [address] 01/22/2018    Chronic abdominal pain 01/22/2018    Diverticulosis 01/22/2018    Hx of total hysterectomy 01/22/2018    Hyponatremia 01/22/2018    Advance care planning 01/31/2017    Dental caries 05/26/2016    Chronic periodontal disease 05/26/2016    SUAZO (dyspnea on exertion) 02/10/2016    Prediabetes 09/24/2015    [facility] 08/31/2015    Arthritis, degenerative 08/31/2015    Gastroesophageal reflux disease without esophagitis 08/31/2015    ANAMIKA on CPAP 08/31/2015    Essential hypertension 08/28/2015    PTSD (post-traumatic stress disorder) 03/24/2014    S/P TKR (total knee replacement) 11/14/2012    Adjustment disorder with depressed mood 09/20/2012    Major depressive disorder, recurrent episode, moderate ([facility] [address] 09/20/2012    Suicidal ideation 09/19/2012    Depression 05/08/2012    Menopause 05/08/2012    Knee pain, right 05/08/2012    GERD (gastroesophageal reflux disease) 05/08/2012    Hypokalemia 02/04/2012    Overdose 02/04/2012    OA (osteoarthritis) 06/18/2010    Obesity 06/18/2010    Mixed hyperlipidemia 06/18/2010     Current Outpatient Medications   Medication Sig Dispense Refill    meclizine (ANTIVERT) 25 mg tablet take 1 tablet by mouth three times a day if needed FOR DIZZINESS 30 Tab 0    PARoxetine (PAXIL) 20 mg tablet Take 1 Tab by mouth daily. 90 Tab 1    amLODIPine (NORVASC) 10 mg tablet take 1 tablet by mouth once daily 90 Tab 1    metoprolol succinate (TOPROL-XL) 100 mg tablet take 1 tablet by mouth once daily 90 Tab 0    ondansetron hcl (ZOFRAN) 8 mg tablet Take 8 mg by mouth three (3) times daily as needed.       latanoprost (XALATAN) 0.005 % ophthalmic solution Administer 1 Drop to both eyes nightly.  gabapentin (NEURONTIN) 100 mg capsule take 1 capsule by mouth three times a day (MAX DAILY AMOUNT: 300 MG) 90 Cap 3    simvastatin (ZOCOR) 20 mg tablet take 1 tablet by mouth at bedtime 90 Tab 3     Allergies   Allergen Reactions    Seafood Hives     FISH    Other Medication Hives     seafood    Pollen Extracts Other (comments)    Shellfish Derived Hives    Pantoprazole Other (comments)     Bleeding in stomach    Promethazine Other (comments)     Bleeding in stomach     Past Medical History:   Diagnosis Date    AR (allergic rhinitis)     seasonal    Bladder cancer (HCC)     Cancer ([facility] [address]     pt states between vgina and rectal area    Cardiac echocardiogram 04/11/2016    Tech difficult. EF 55-60%. No RWMA. Mild conc LVH. Gr 1 DDfx. RVSP normal.      Cardiac nuclear imaging test 05/05/2016    Low risk. Very patchy radiotracer uptake. Mild anterior artifact, low suspicion for ischemia. EF 68%. No RWMA. Normal EKG on pharm stress test.    Cardiovascular LE arterial duplex 10/07/2013    No significant arterial disease at rest bilaterally. R JUNE 1.21.  L JUNE 1.16. No significant sm vessel disease.     Depression     Dr. Donalynn Gottron, suicide attempt 2/12    Diverticulosis     Endometriosis     s/p hysterectomy 2006    GERD (gastroesophageal reflux disease)     Glaucoma     Dr. Payne Point Hematuria, gross     HTN (hypertension)     Hx of colonoscopy 04/05/2017    mild diverticulosis, g1 internal hemorrhoids, normal colon , repeat 10 year 2027    Hx of suicide attempt     Hyperlipidemia LDL goal < 130     IBS (irritable bowel syndrome)     Ill-defined condition     environmental allergies    Insomnia     Malignant neoplasm of peritoneum (HCC) 2/14/2019    Nausea & vomiting     OA (osteoarthritis)     both knees, lower back    Preeclampsia     PTSD (post-traumatic stress disorder)     Seizures (HCC)     1 x with childbirth, had toxemia    Sleep apnea     does not use cpap machine    Spinal stenosis     Dr. Araceli Santa Vertigo      Past Surgical History:   Procedure Laterality Date    COLONOSCOPY N/A 2017    COLONOSCOPY performed by Nubia Candelaria MD at [place] [id number] N/A 2019    SIGMOIDOSCOPY FLEXIBLE performed by Christiano Yeager MD at [facility] ENDOSCOPY    HX  SECTION      HX COLONOSCOPY  2017    DR. MCRAE 2017     HX CYSTECTOMY  2020    HX HYSTERECTOMY      HX KNEE ARTHROSCOPY Left     left knee    HX KNEE REPLACEMENT Bilateral     (R)  (L)     HX LAPAROTOMY N/A 2019    and rectovaginal bx    HX OTHER SURGICAL  2016    all teeth removed    HX SALPINGO-OOPHORECTOMY  2008    HX TUBAL LIGATION      IR INSERT TUNL CVC W PORT OVER 5 YEARS  2019    MULTIPLE DELIVERY       1990    MO FEMUR/KNEE SURG UNLISTED Left 2009    External fixator    MO PART REMOVAL COLON W ANASTOMOSIS      MO TOTAL ABDOM HYSTERECTOMY      TRANSURETHRAL RESEC BLADDER NECK       Family History   Problem Relation Age of Onset    Heart Disease Mother         CHF    Diabetes Mother     Hypertension Mother     Kidney Disease Mother     Breast Cancer Mother     Stroke Mother     Diabetes Father     Hypertension Father     Heart Attack Father         MI    Cancer Maternal Grandmother         stomach    Ovarian Cancer Maternal Aunt     Cancer Maternal Uncle      Social History     Tobacco Use    Smoking status: Never Smoker    Smokeless tobacco: Never Used   Substance Use Topics    Alcohol use: No       ROS      Objective:     Visit Vitals  LMP 2008      General: alert, cooperative, no distress   Mental  status: normal mood, behavior, speech, dress, motor activity, and thought processes, able to follow commands   HENT: NCAT   Neck: no visualized mass   Resp: no respiratory distress   Neuro: no gross deficits   Skin: no discoloration or lesions of concern on visible areas   Psychiatric: normal affect, consistent with stated mood, no evidence of hallucinations     Additional exam findings: We discussed the expected course, resolution and complications of the diagnosis(es) in detail. Medication risks, benefits, costs, interactions, and alternatives were discussed as indicated. I advised her to contact the office if her condition worsens, changes or fails to improve as anticipated. She expressed understanding with the diagnosis(es) and plan. Aiyana Clifford is a 45 y.o. female who was evaluated by a video visit encounter for concerns as above. Patient identification was verified prior to start of the visit. A caregiver was present when appropriate. Due to this being a TeleHealth encounter (During SIQRY-09 public health emergency), evaluation of the following organ systems was limited: Vitals/Constitutional/EENT/Resp/CV/GI//MS/Neuro/Skin/Heme-Lymph-Imm. Pursuant to the emergency declaration under the [facility], [facility] waiver authority and the [employer] and [employer] Act, this Virtual  Visit was conducted, with patient's (and/or legal guardian's) consent, to reduce the patient's risk of exposure to COVID-19 and provide necessary medical care. Services were provided through a video synchronous discussion virtually to substitute for in-person clinic visit. Patient and provider were located at their individual homes. Assessment & Plan:   Diagnoses and all orders for this visit:    1. Stromal stenosis  S/p urostomy revision 4/2021  Upcoming appt with urology 5/12  Home health in place    2. CKD (chronic kidney disease) stage 4, GFR 15-29 ml/min (HCC)  Creatinine baselines 2;s  Monitoring  Following nephrology    3.  Bipolar disorder in full remission, most recent episode unspecified type ([facility] [address]  Stable  Agree to increase paxil dose to 20 mg,  No longer following psychiatry- offered counseling, pt declines    4. Malignant neoplasm of peritoneum ([address]    5. Malignant neoplasm of ovary, unspecified laterality ([address]  Cont care with oncology   appt with dr. Maggie conroy    6. Gastroesophageal reflux disease without esophagitis  Stable  Cont protonix    7.  Essential hypertension  Controlled  Cont norvasc, metoprolol    Ff-up in 6 months or sooner prn, plan on Hai Zimmerman MD

## 2021-05-06 NOTE — PROGRESS NOTES
1. Have you been to the ER, urgent care clinic since your last visit? Hospitalized since your last visit? [facility] 4/01/21 and 4/13/21    2. Have you seen or consulted any other health care providers outside of the [address] [place] [address] since your last visit? Include any pap smears or colon screening.  No    Chief Complaint   Patient presents with   [facility] Follow Up     [facility] 4/01/21 and 4/13/21    Heartburn    Medication Evaluation     wants to discuss increasing dose of Paxil

## 2021-05-06 NOTE — PATIENT INSTRUCTIONS
DASH Diet: Care Instructions Your Care Instructions The DASH diet is an eating plan that can help lower your blood pressure. DASH stands for Dietary Approaches to Stop Hypertension. Hypertension is high blood pressure. The DASH diet focuses on eating foods that are high in calcium, potassium, and magnesium. These nutrients can lower blood pressure. The foods that are highest in these nutrients are fruits, vegetables, low-fat dairy products, nuts, seeds, and legumes. But taking calcium, potassium, and magnesium supplements instead of eating foods that are high in those nutrients does not have the same effect. The DASH diet also includes whole grains, fish, and poultry. The DASH diet is one of several lifestyle changes your doctor may recommend to lower your high blood pressure. Your doctor may also want you to decrease the amount of sodium in your diet. Lowering sodium while following the DASH diet can lower blood pressure even further than just the DASH diet alone. Follow-up care is a key part of your treatment and safety. Be sure to make and go to all appointments, and call your doctor if you are having problems. It's also a good idea to know your test results and keep a list of the medicines you take. How can you care for yourself at home? Following the DASH diet · Eat 4 to 5 servings of fruit each day. A serving is 1 medium-sized piece of fruit, ½ cup chopped or canned fruit, 1/4 cup dried fruit, or 4 ounces (½ cup) of fruit juice. Choose fruit more often than fruit juice. · Eat 4 to 5 servings of vegetables each day. A serving is 1 cup of lettuce or raw leafy vegetables, ½ cup of chopped or cooked vegetables, or 4 ounces (½ cup) of vegetable juice. Choose vegetables more often than vegetable juice. · Get 2 to 3 servings of low-fat and fat-free dairy each day. A serving is 8 ounces of milk, 1 cup of yogurt, or 1 ½ ounces of cheese. · Eat 6 to 8 servings of grains each day.  A serving is 1 slice of bread, 1 ounce of dry cereal, or ½ cup of cooked rice, pasta, or cooked cereal. Try to choose whole-grain products as much as possible. · Limit lean meat, poultry, and fish to 2 servings each day. A serving is 3 ounces, about the size of a deck of cards. · Eat 4 to 5 servings of nuts, seeds, and legumes (cooked dried beans, lentils, and split peas) each week. A serving is 1/3 cup of nuts, 2 tablespoons of seeds, or ½ cup of cooked beans or peas. · Limit fats and oils to 2 to 3 servings each day. A serving is 1 teaspoon of vegetable oil or 2 tablespoons of salad dressing. · Limit sweets and added sugars to 5 servings or less a week. A serving is 1 tablespoon jelly or jam, ½ cup sorbet, or 1 cup of lemonade. · Eat less than 2,300 milligrams (mg) of sodium a day. If you limit your sodium to 1,500 mg a day, you can lower your blood pressure even more. · Be aware that all of these are the suggested number of servings for people who eat 1,800 to 2,000 calories a day. Your recommended number of servings may be different if you need more or fewer calories. Tips for success · Start small. Do not try to make dramatic changes to your diet all at once. You might feel that you are missing out on your favorite foods and then be more likely to not follow the plan. Make small changes, and stick with them. Once those changes become habit, add a few more changes. · Try some of the following: ? Make it a goal to eat a fruit or vegetable at every meal and at snacks. This will make it easy to get the recommended amount of fruits and vegetables each day. ? Try yogurt topped with fruit and nuts for a snack or healthy dessert. ? Add lettuce, tomato, cucumber, and onion to sandwiches. ? Combine a ready-made pizza crust with low-fat mozzarella cheese and lots of vegetable toppings. Try using tomatoes, squash, spinach, broccoli, carrots, cauliflower, and onions. ?  Have a variety of cut-up vegetables with a low-fat dip as an appetizer instead of chips and dip. ? Sprinkle sunflower seeds or chopped almonds over salads. Or try adding chopped walnuts or almonds to cooked vegetables. ? Try some vegetarian meals using beans and peas. Add garbanzo or kidney beans to salads. Make burritos and tacos with mashed lee beans or black beans. Where can you learn more? Go to http://raul-vee.info/ Enter U363 in the search box to learn more about \"DASH Diet: Care Instructions. \" Current as of: August 31, 2020               Content Version: 12.8 © 3584-9442 [employer]. Care instructions adapted under license by [employer] (which disclaims liability or warranty for this information). If you have questions about a medical condition or this instruction, always ask your healthcare professional. [address] any warranty or liability for your use of this information.

## 2021-05-07 ENCOUNTER — OFFICE VISIT (OUTPATIENT)
Dept: ONCOLOGY | Age: 51
End: 2021-05-07
Payer: MEDICARE

## 2021-05-07 ENCOUNTER — TELEPHONE (OUTPATIENT)
Dept: ONCOLOGY | Age: 51
End: 2021-05-07

## 2021-05-07 VITALS
TEMPERATURE: 97.1 F | BODY MASS INDEX: 45.84 KG/M2 | WEIGHT: 258.7 LBS | OXYGEN SATURATION: 98 % | HEIGHT: 63 IN | DIASTOLIC BLOOD PRESSURE: 67 MMHG | HEART RATE: 65 BPM | SYSTOLIC BLOOD PRESSURE: 118 MMHG

## 2021-05-07 DIAGNOSIS — C48.2 PERITONEAL CARCINOMA (HCC): ICD-10-CM

## 2021-05-07 DIAGNOSIS — C57.7 MALIGNANT NEOPLASM OF OTHER SPECIFIED FEMALE GENITAL ORGANS (HCC): ICD-10-CM

## 2021-05-07 DIAGNOSIS — G89.3 CANCER RELATED PAIN: Primary | ICD-10-CM

## 2021-05-07 PROCEDURE — G8417 CALC BMI ABV UP PARAM F/U: HCPCS | Performed by: OBSTETRICS & GYNECOLOGY

## 2021-05-07 PROCEDURE — G8754 DIAS BP LESS 90: HCPCS | Performed by: OBSTETRICS & GYNECOLOGY

## 2021-05-07 PROCEDURE — G9711 PT HX TOT COL OR COLON CA: HCPCS | Performed by: OBSTETRICS & GYNECOLOGY

## 2021-05-07 PROCEDURE — 99215 OFFICE O/P EST HI 40 MIN: CPT | Performed by: OBSTETRICS & GYNECOLOGY

## 2021-05-07 PROCEDURE — G9717 DOC PT DX DEP/BP F/U NT REQ: HCPCS | Performed by: OBSTETRICS & GYNECOLOGY

## 2021-05-07 PROCEDURE — G9899 SCRN MAM PERF RSLTS DOC: HCPCS | Performed by: OBSTETRICS & GYNECOLOGY

## 2021-05-07 PROCEDURE — G8752 SYS BP LESS 140: HCPCS | Performed by: OBSTETRICS & GYNECOLOGY

## 2021-05-07 PROCEDURE — G8427 DOCREV CUR MEDS BY ELIG CLIN: HCPCS | Performed by: OBSTETRICS & GYNECOLOGY

## 2021-05-07 RX ORDER — OXYCODONE AND ACETAMINOPHEN 5; 325 MG/1; MG/1
1 TABLET ORAL
Qty: 60 TAB | Refills: 0 | Status: SHIPPED | OUTPATIENT
Start: 2021-05-07 | End: 2021-05-21

## 2021-05-07 RX ORDER — GABAPENTIN 100 MG/1
200 CAPSULE ORAL 3 TIMES DAILY
Qty: 90 CAP | Refills: 3 | Status: SHIPPED | OUTPATIENT
Start: 2021-05-07 | End: 2021-06-30 | Stop reason: ALTCHOICE

## 2021-05-07 NOTE — PROGRESS NOTES
Kacie Barraza is a 45 y.o. female presents in office for surveillance peritoneal cancer. Chief Complaint   Patient presents with    Follow-up        Pt c/o hand nerve pain  Pt c/o whole body pain 9/10  Do you have any unusual vaginal bleeding, discharge or irritation? no  Do you have any changes in your bowel movements? no  Have you been experiencing nausea or vomiting? no  Have you been experiencing any continuous or worsening abdominal pain? no  Any urinary burning? no    Visit Vitals  /67 (BP 1 Location: Left upper arm, BP Patient Position: Sitting, BP Cuff Size: Large adult long)   Pulse 65   Temp 97.1 °F (36.2 °C) (Oral)   Ht 5' 3\" (1.6 m)   Wt 258 lb 11.2 oz (117.3 kg)   SpO2 98%   BMI 45.83 kg/m²         1. Have you been to the ER, urgent care clinic since your last visit? Hospitalized since your last visit? No    2. Have you seen or consulted any other health care providers outside of the [address] [place] [address] since your last visit? Include any pap smears or colon screening. No    3 most recent PHQ Screens 3/30/2021   Little interest or pleasure in doing things Not at all   Feeling down, depressed, irritable, or hopeless Not at all   Total Score PHQ 2 0       Abuse Screening Questionnaire 3/30/2021   Do you ever feel afraid of your partner? N   Are you in a relationship with someone who physically or mentally threatens you? N   Is it safe for you to go home? Y       Fall Risk Assessment, last 12 mths 1/11/2021   Able to walk? Yes   Fall in past 12 months? 0   Do you feel unsteady?  0   Are you worried about falling 0       Learning Assessment 3/30/2021   PRIMARY LEARNER Patient   HIGHEST LEVEL OF EDUCATION - PRIMARY LEARNER  -   BARRIERS PRIMARY LEARNER -   CO-LEARNER CAREGIVER -   PRIMARY LANGUAGE ENGLISH   LEARNER PREFERENCE PRIMARY DEMONSTRATION     -   ANSWERED BY patient   RELATIONSHIP SELF

## 2021-05-07 NOTE — TELEPHONE ENCOUNTER
Spoke with patient to provide PET appointment information and prep. Patient requested morning appointment. Scheduling number provided through [employer].

## 2021-05-07 NOTE — LETTER
5/7/2021    Patient: Kate Martins   YOB: 1970   Date of Visit: 5/7/2021     [address]  [address] [place] [address]    Dear Isha Epperson MD,      Thank you for referring Ms. Kate Martins to [facility] for evaluation. My notes for this consultation are attached. If you have questions, please do not hesitate to call me. I look forward to following your patient along with you.       Sincerely,    Deena Garcia MD

## 2021-05-07 NOTE — PROGRESS NOTES
[address], [address], [address] Zuleika Farias, [address] [place] [address]  [place], [address]   [phone]          Patient ID:  Name:  Anand Lyon  MRN:  [id number]  :  1970/50 y.o. Date:  2021      HISTORY OF PRESENT ILLNESS:  Anand Lyon is a 45 y.o.  postmenopausal female referred by Dr. Lam Nicholas  With peritoneal cancer. Intitally seen be NP with reports of vaginal bleeding. Given pt's history of hysteretectomy with BSO for endometriosis in  a TVUS was ordered. Which revealed a 6.8 cm x 5.2 cm x 4.6 cm at midline vaginal cuff. This prompted pelvic CT with findings of a lesion measuring 7.6 x 5.8 x 7.2 cm and is compatible with a pelvic neoplasm vs endometrioma given prior history of endometriosis. Tumor markers done on 2018 all normal.  S/p  Exploratory laparotomy, exploration of retroperitoneal spaces, exam under anesthesia with biopsy of rectovaginal mass on 2019. Had sigmoidoscopy which revealed submucosal mass. S/p cycle #6 of carbo/taxol on 2019. S/p cytoreductive surgery with [facility] on 2019. S/p radiation treatment completed in 2019. Was seen in office and had a biopsy c/w recurrence. S/p cycle #6 of Carbo/Doxil on 2020. S/p Exploratory laparotomy. 2. Lysis of adhesions. 3. Simple appendectomy. 4. 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 On . She also had a revision of urostomy that is still leaking. She is feeling better but still having pain and increasing neuropathy. Wants port out.        Labs:  Component      Latest Ref Rng & Units 2020          11:11 AM   CA-125      1.5 - 35.0 U/mL 6     Component      Latest Ref Rng & Units 2020          10:01 AM   CA-125      1.5 - 35.0 U/mL 8     Component      Latest Ref Rng & Units 2020          11:30 AM   CA-125      1.5 - 35.0 U/mL 5 Component      Latest Ref Rng & Units 6/8/2020          10:40 AM   CA-125      1.5 - 35.0 U/mL 7     Component      Latest Ref Rng & Units 5/11/2020          11:30 AM   CA-125      1.5 - 35.0 U/mL 7     Component      Latest Ref Rng & Units 3/4/2020           8:15 AM   CA-125      1.5 - 35.0 U/mL 7     Component      Latest Ref Rng & Units 11/15/2019           9:56 AM   CA-125      1.5 - 35.0 U/mL 6     Component      Latest Ref Rng & Units 6/6/2019           8:44 AM   CA-125      1.5 - 35.0 U/mL 7     Component      Latest Ref Rng & Units 5/16/2019 4/25/2019           8:26 AM  8:20 AM   Cancer Ag (CA) 125      0.0 - 38.1 U/mL 7.8 8.7     Component      Latest Ref Rng & Units 4/4/2019           8:28 AM   Cancer Ag (CA) 125      0.0 - 38.1 U/mL 8.8     Component      Latest Ref Rng & Units 3/14/2019 2/21/2019           8:15 AM  8:32 AM   Cancer Ag (CA) 125      0.0 - 38.1 U/mL 10.4 18.4     12/17/2018 : 9.3  12/17/2018 CEA: 2.0  12/17/2018 AFP: 3.8    Pathology  4/20/2020  Vaginal biopsy  Papillary serous adenocarcinoma    8/8/2019  A) COLON, PERICOLIC NODULE, EXCISION:      - ACELLULAR MUCIN WITH MULTIPLE CALCIFICATIONS, AND ASSOCIATED FIBROUS WALL WITH        HYALINIZATION, AND CHRONIC INFLAMMATION.     - ADJACENT BENIGN FIBROADIPOSE TISSUE, CONSISTENT WITH MESENTERY.     - NO EVIDENCE OF MALIGNANCY.      - SEE COMMENT. B) LIVER, RIGHT LOBE, BIOPSY:      - NODULAR FAT NECROSIS AND FIBROSIS OF THE LIVER CAPSULE.      - MILD STEATOSIS.     - NO EVIDENCE OF MALIGNANCY. C) COLON, SIGMOID, SEROSAL IMPLANT, EXCISION:      - NODULAR FAT NECROSIS WITH FIBROSIS, CHRONIC INFLAMMATION, CALCIFICATIONS, AND        CYSTIC DEGENERATION WITHOUT MUCIN.     - NO EVIDENCE OF MALIGNANCY. D) OMENTUM, OMENTECTOMY (RESECTION):      - CONGESTED FIBROADIPOSE TISSUE WITH FOCAL FIBROSIS AND CHRONIC INFLAMMATION, AND        CALCIFIED NODULAR FIBROSIS.     - NO EVIDENCE OF MALIGNANCY.     E) PERITONEUM, LEFT ANTERIOR ABDOMINAL, RESECTION:      - CONGESTED PERITONEAL TISSUE, NO EVIDENCE OF MALIGNANCY. F) PERITONEUM, RIGHT ANTERIOR ABDOMINAL, RESECTION:      - CONGESTED PERITONEAL TISSUE, NO EVIDENCE OF MALIGNANCY. G) COLON, SIGMOID, SEROSAL IMPLANT, EXCISION:      - NODULAR FIBROSIS WITH HISTIOCYTES, SUGGESTIVE OF FAT NECROSIS.     - CYSTIC DEGENERATION, WITHOUT MUCIN.     - NO EVIDENCE OF MALIGNANCY. H) SOFT TISSUE, FALCIFORM, EXCISION:      - CONSISTENT WITH FALCIFORM LIGAMENT.     - NO EVIDENCE OF MALIGNANCY. PATHOLOGIC STAGE:  ypTx, pNx    1/17/2019   RECTOVAGINAL MASS (#1, 2) AND PELVIC MASS, BIOPSIES:   CONSISTENT WITH SEROUS CARCINOMA WITH EXTENSIVE NECROSIS. Imaging  PETCT 7/17/2020   FINDINGS:        PET images: Study limited because of patient motion.     Mediastinal blood pool reference value = SUV Max. Mediastinal blood pool reference value = SUV Mean. Liver parenchyma reference value =  4.7   SUV Max. Liver parenchyma reference value =  2.3    SUV Mean.           NECK: There is no suspicious hypermetabolic activity at the neck. CHEST: There is no suspicious hypermetabolic activity at the chest.     ABDOMEN: Typical heterogeneity at the liver. No convincing malignant foci  hypermetabolic activity or underlying CT abnormality. PELVIS: There is soft tissue fullness in the right retrovesicular pelvis just  above the vaginal cuff and posterior to right ureter measuring about 3.8 cm with  Max SUV 13.5 (206), extending to the rectum posteriorly, levators inferiorly on  the right. Previously 4 cm and Max SUV 16.5.     Thickening anterior abdominal wall compatible with scar demonstrating diffuse  modest activity and Max SUV 2.7.      Additional CT findings: Mediport catheter has tip in the superior vena cava. Air  trapping in the superior segments lower lobes. Right-sided nephroureteral stent  without hydronephrosis. No ascites. IMPRESSION  Impression:       Treatment response.  Decreased metabolic activity at the right retrovesicular  pelvic mass. No new evidence of metastatic disease.     Interval placement right-sided nephroureteral stent and resolved  hydroureteronephrosis. .     ROS:    As above      Patient Active Problem List    Diagnosis Date Noted    CKD (chronic kidney disease) stage 4, GFR 15-29 ml/min ([facility]) 02/11/2021    Acute congestive heart failure ([address] 02/04/2021    COVID-19 12/31/2020    History of abdominal surgery 12/31/2020    Melena 12/30/2020    Cancer of appendix ([address] 11/17/2020    Loss of appetite 10/14/2020    Other hydronephrosis 06/24/2020    Genetic carrier status 09/13/2019    Postoperative nausea 08/27/2019    Ovarian cancer ([address] 08/07/2019    Malignant neoplasm of peritoneum ([address] 02/14/2019    Pelvic mass in female 01/17/2019    Bipolar 1 disorder ([address] 02/09/2018    Irritable bowel syndrome with both constipation and diarrhea 02/09/2018    Subclinical hypothyroidism 01/23/2018    Bipolar disorder in full remission ([address] 01/22/2018    Chronic abdominal pain 01/22/2018    Diverticulosis 01/22/2018    Hx of total hysterectomy 01/22/2018    Hyponatremia 01/22/2018    Advance care planning 01/31/2017    Dental caries 05/26/2016    Chronic periodontal disease 05/26/2016    SUAZO (dyspnea on exertion) 02/10/2016    Prediabetes 09/24/2015    Morbid obesity ([address] 08/31/2015    Arthritis, degenerative 08/31/2015    Gastroesophageal reflux disease without esophagitis 08/31/2015    ANAMIKA on CPAP 08/31/2015    Essential hypertension 08/28/2015    PTSD (post-traumatic stress disorder) 03/24/2014    S/P TKR (total knee replacement) 11/14/2012    Adjustment disorder with depressed mood 09/20/2012    Major depressive disorder, recurrent episode, moderate ([address] 09/20/2012    Suicidal ideation 09/19/2012    Depression 05/08/2012    Menopause 05/08/2012    Knee pain, right 05/08/2012    GERD (gastroesophageal reflux disease) 05/08/2012    Hypokalemia 02/04/2012    Overdose 02/04/2012    OA (osteoarthritis) 06/18/2010    Obesity 06/18/2010    Mixed hyperlipidemia 06/18/2010     Past Medical History:   Diagnosis Date    AR (allergic rhinitis)     seasonal    [facility]     Cancer ([facility]     pt states between vgina and rectal area    Cardiac echocardiogram 04/11/2016    Tech difficult. EF 55-60%. No RWMA. Mild conc LVH. Gr 1 DDfx. RVSP normal.      Cardiac nuclear imaging test 05/05/2016    Low risk. Very patchy radiotracer uptake. Mild anterior artifact, low suspicion for ischemia. EF 68%. No RWMA. Normal EKG on pharm stress test.    Cardiovascular LE arterial duplex 10/07/2013    No significant arterial disease at rest bilaterally. R JUNE 1.21.  L JUNE 1.16. No significant sm vessel disease.     Depression     Dr. Mana Wilburn, suicide attempt 2/12    Diverticulosis     Endometriosis     s/p hysterectomy 2006    GERD (gastroesophageal reflux disease)     Glaucoma     Dr. Leslie Ospina Hematuria, gross     HTN (hypertension)     Hx of colonoscopy 04/05/2017    mild diverticulosis, g1 internal hemorrhoids, normal colon , repeat 10 year 2027    Hx of suicide attempt     Hyperlipidemia LDL goal < 130     IBS (irritable bowel syndrome)     Ill-defined condition     environmental allergies    Insomnia     Malignant neoplasm of peritoneum (HCC) 2/14/2019    Nausea & vomiting     OA (osteoarthritis)     both knees, lower back    Preeclampsia     PTSD (post-traumatic stress disorder)     Seizures (HCC)     1 x with childbirth, had toxemia    Sleep apnea     does not use cpap machine    Spinal stenosis     Dr. Lozano [place] Vertigo       Past Surgical History:   Procedure Laterality Date    COLONOSCOPY N/A 4/5/2017    COLONOSCOPY performed by Sylwia Espino MD at [place] [address] N/A 2/13/2019    SIGMOIDOSCOPY FLEXIBLE performed by [facility] Ramos MD at [facility]    HX  SECTION      HX COLONOSCOPY  2017    DR. MCRAE 2017     HX CYSTECTOMY  2020    HX HYSTERECTOMY      HX KNEE ARTHROSCOPY Left     left knee    HX KNEE REPLACEMENT Bilateral     (R)  (L)     HX LAPAROTOMY N/A 2019    and rectovaginal bx    HX OTHER SURGICAL  2016    all teeth removed    HX SALPINGO-OOPHORECTOMY  2008    HX TUBAL LIGATION      IR INSERT TUNL CVC W PORT OVER 5 YEARS  2019    MULTIPLE DELIVERY       1990    VT FEMUR/KNEE SURG UNLISTED Left 2009    External fixator    VT PART REMOVAL COLON W ANASTOMOSIS      VT TOTAL ABDOM HYSTERECTOMY      TRANSURETHRAL RESEC BLADDER NECK        OB History        2    Para   2    Term   2       0    AB   0    Living   0       SAB   0    TAB   0    Ectopic   0    Molar   0    Multiple   0    Live Births   0              Social History     Tobacco Use    Smoking status: Never Smoker    Smokeless tobacco: Never Used   Substance Use Topics    Alcohol use: No      Family History   Problem Relation Age of Onset    Heart Disease Mother         CHF    Diabetes Mother    Collette Satchel Hypertension Mother     Kidney Disease Mother    Collette Satchel Breast Cancer Mother     Stroke Mother     Diabetes Father     Hypertension Father     Heart Attack Father         MI    Cancer Maternal Grandmother         stomach    Ovarian Cancer Maternal Aunt     Cancer Maternal Uncle       Current Outpatient Medications   Medication Sig    meclizine (ANTIVERT) 25 mg tablet take 1 tablet by mouth three times a day if needed FOR DIZZINESS    PARoxetine (PAXIL) 20 mg tablet Take 1 Tab by mouth daily.  amLODIPine (NORVASC) 10 mg tablet take 1 tablet by mouth once daily    metoprolol succinate (TOPROL-XL) 100 mg tablet take 1 tablet by mouth once daily    ondansetron hcl (ZOFRAN) 8 mg tablet Take 8 mg by mouth three (3) times daily as needed.     latanoprost (XALATAN) 0.005 % ophthalmic solution Administer 1 Drop to both eyes nightly.  gabapentin (NEURONTIN) 100 mg capsule take 1 capsule by mouth three times a day (MAX DAILY AMOUNT: 300 MG)    simvastatin (ZOCOR) 20 mg tablet take 1 tablet by mouth at bedtime     No current facility-administered medications for this visit. Allergies   Allergen Reactions    Seafood Hives     FISH    Other Medication Hives     seafood    Pollen Extracts Other (comments)    Shellfish Derived Hives    Pantoprazole Other (comments)     Bleeding in stomach    Promethazine Other (comments)     Bleeding in stomach          OBJECTIVE:    Physical Exam  VITAL SIGNS: Visit Vitals  Ht 5' 3\" (1.6 m)   Wt 117.3 kg (258 lb 11.2 oz)   LMP 01/31/2008   BMI 45.83 kg/m²      GENERAL EMILY: in no apparent distress and well developed and well nourished   PELVIC: 2 cm friable mass at vaginal apex.             IMPRESSION/PLAN:  1.stage IV Primary peritoneal cancer with likely recurrence   -previoulsy reviewed her pathology    -s/p carbo (auc=6), taxol (175 mg/m2) IV every 3 wks s/p 6 cycles completed 6/6/2019   -s/p cytoreductive surgery with [facility] with dr. Cee Benoit   -s/p pelvic radiation   -biopsy c/w recurrence   Pain-script for percocet    -given platinum sensitive disease s/p  auc=5, Doxil 30 mg/m2 IV x 6 cycles s/p Total pelvic exenteration   -neuropathy- will increase her neurontin   -will send to [facility] for port flush and labs-will call with results   -will get PETCT and call with results and if negative we can get her port out        The total time spent was 40 minutes regarding this patients diagnosis of primary peritoneal cancer and >50% of this time was spent counseling and coordinating care    Ijemoa Diggs MD  Gynecologic Oncology  4/5/010786:44 AM

## 2021-05-14 ENCOUNTER — TELEPHONE (OUTPATIENT)
Dept: ONCOLOGY | Age: 51
End: 2021-05-14

## 2021-05-14 DIAGNOSIS — C48.2 PERITONEAL CARCINOMA (HCC): Primary | ICD-10-CM

## 2021-05-14 DIAGNOSIS — C48.2 PERITONEAL CARCINOMA (HCC): ICD-10-CM

## 2021-05-14 DIAGNOSIS — C57.7 MALIGNANT NEOPLASM OF OTHER SPECIFIED FEMALE GENITAL ORGANS (HCC): ICD-10-CM

## 2021-05-21 ENCOUNTER — HOSPITAL ENCOUNTER (OUTPATIENT)
Dept: INFUSION THERAPY | Age: 51
Discharge: HOME | End: 2021-05-21
Payer: MEDICARE

## 2021-05-21 VITALS
DIASTOLIC BLOOD PRESSURE: 63 MMHG | SYSTOLIC BLOOD PRESSURE: 109 MMHG | OXYGEN SATURATION: 95 % | TEMPERATURE: 98.2 F | HEART RATE: 55 BPM | RESPIRATION RATE: 18 BRPM

## 2021-05-21 DIAGNOSIS — C48.2 MALIGNANT NEOPLASM OF PERITONEUM (HCC): ICD-10-CM

## 2021-05-21 PROCEDURE — 77030012965 HC NDL HUBR BBMI -A

## 2021-05-21 PROCEDURE — 74011250636 HC RX REV CODE- 250/636: Performed by: INTERNAL MEDICINE

## 2021-05-21 PROCEDURE — 96523 IRRIG DRUG DELIVERY DEVICE: CPT

## 2021-05-21 RX ORDER — HEPARIN 100 UNIT/ML
500 SYRINGE INTRAVENOUS ONCE
Status: COMPLETED | OUTPATIENT
Start: 2021-05-21 | End: 2021-05-21

## 2021-05-21 RX ORDER — SODIUM CHLORIDE 0.9 % (FLUSH) 0.9 %
10-40 SYRINGE (ML) INJECTION EVERY 8 HOURS
Status: DISCONTINUED | OUTPATIENT
Start: 2021-05-21 | End: 2021-05-25 | Stop reason: HOSPADM

## 2021-05-21 RX ADMIN — Medication 30 ML: at 10:15

## 2021-05-21 RX ADMIN — HEPARIN 500 UNITS: 100 SYRINGE at 10:16

## 2021-05-24 DIAGNOSIS — C48.2 PERITONEAL CARCINOMA (HCC): Primary | ICD-10-CM

## 2021-05-27 DIAGNOSIS — G89.3 CANCER RELATED PAIN: Primary | ICD-10-CM

## 2021-05-28 DIAGNOSIS — C48.2 PERITONEAL CARCINOMA (HCC): ICD-10-CM

## 2021-05-28 RX ORDER — OXYCODONE AND ACETAMINOPHEN 5; 325 MG/1; MG/1
1 TABLET ORAL
Qty: 60 TABLET | Refills: 0 | Status: SHIPPED | OUTPATIENT
Start: 2021-05-28 | End: 2021-06-11

## 2021-05-31 DIAGNOSIS — C48.2 PERITONEAL CARCINOMA (HCC): ICD-10-CM

## 2021-06-09 ENCOUNTER — TELEPHONE (OUTPATIENT)
Dept: ONCOLOGY | Age: 51
End: 2021-06-09

## 2021-06-09 DIAGNOSIS — C48.2 PERITONEAL CARCINOMA (HCC): Primary | ICD-10-CM

## 2021-06-09 DIAGNOSIS — C57.7 MALIGNANT NEOPLASM OF OTHER SPECIFIED FEMALE GENITAL ORGANS (HCC): Primary | ICD-10-CM

## 2021-06-09 DIAGNOSIS — C48.2 MALIGNANT NEOPLASM OF PERITONEUM, UNSPECIFIED (HCC): ICD-10-CM

## 2021-06-11 ENCOUNTER — TELEPHONE (OUTPATIENT)
Dept: FAMILY MEDICINE CLINIC | Age: 51
End: 2021-06-11

## 2021-06-11 NOTE — TELEPHONE ENCOUNTER
Pt is wanting an order for an adjustable base for her full size bed. She was told that she needed to get the order from her PCP. Please advise.

## 2021-06-17 ENCOUNTER — HOSPITAL ENCOUNTER (OUTPATIENT)
Dept: PET IMAGING | Age: 51
Discharge: HOME | End: 2021-06-17
Attending: OBSTETRICS & GYNECOLOGY
Payer: MEDICARE

## 2021-06-17 ENCOUNTER — TELEPHONE (OUTPATIENT)
Dept: ONCOLOGY | Age: 51
End: 2021-06-17

## 2021-06-17 ENCOUNTER — HOSPITAL ENCOUNTER (OUTPATIENT)
Dept: LAB | Age: 51
Discharge: HOME | End: 2021-06-17

## 2021-06-17 LAB — XX-LABCORP SPECIMEN COL,LCBCF: NORMAL

## 2021-06-17 PROCEDURE — 99001 SPECIMEN HANDLING PT-LAB: CPT

## 2021-06-17 PROCEDURE — A9552 F18 FDG: HCPCS

## 2021-06-17 RX ORDER — FLUDEOXYGLUCOSE F-18 200 MCI/ML
9.42 INJECTION INTRAVENOUS ONCE
Status: COMPLETED | OUTPATIENT
Start: 2021-06-17 | End: 2021-06-17

## 2021-06-17 RX ADMIN — FLUDEOXYGLUCOSE F-18 9.42 MILLICURIE: 200 INJECTION INTRAVENOUS at 12:23

## 2021-06-18 DIAGNOSIS — R94.8 ABNORMAL PET SCAN OF RETROPERITONEUM: ICD-10-CM

## 2021-06-18 DIAGNOSIS — R93.2 ABNORMAL PET SCAN OF LIVER: ICD-10-CM

## 2021-06-18 DIAGNOSIS — C56.3 MALIGNANT NEOPLASM OF BOTH OVARIES (HCC): Primary | ICD-10-CM

## 2021-06-18 LAB — CANCER AG125 SERPL-ACNC: 5.1 U/ML (ref 0–38.1)

## 2021-06-18 NOTE — TELEPHONE ENCOUNTER
Spoke with patient to provide appointment information, pt stated she is unable to make afternoon appointments. Number to scheduling provided.

## 2021-06-18 NOTE — PROGRESS NOTES
Called and reviewed abnormal PET of liver and pelvis  Will get MRI of liver and pelvis and f/u in office to discuss  Con[place] DO

## 2021-06-23 DIAGNOSIS — C57.7 MALIGNANT NEOPLASM OF OTHER SPECIFIED FEMALE GENITAL ORGANS (HCC): ICD-10-CM

## 2021-06-23 DIAGNOSIS — C48.2 MALIGNANT NEOPLASM OF PERITONEUM, UNSPECIFIED (HCC): ICD-10-CM

## 2021-06-23 DIAGNOSIS — C48.2 PERITONEAL CARCINOMA (HCC): ICD-10-CM

## 2021-06-25 DIAGNOSIS — R93.2 ABNORMAL PET SCAN OF LIVER: ICD-10-CM

## 2021-06-25 DIAGNOSIS — R94.8 ABNORMAL PET SCAN OF RETROPERITONEUM: ICD-10-CM

## 2021-06-25 DIAGNOSIS — C56.3 MALIGNANT NEOPLASM OF BOTH OVARIES (HCC): ICD-10-CM

## 2021-06-28 ENCOUNTER — TELEPHONE (OUTPATIENT)
Dept: ONCOLOGY | Age: 51
End: 2021-06-28

## 2021-06-28 DIAGNOSIS — G89.3 CANCER RELATED PAIN: Primary | ICD-10-CM

## 2021-06-28 RX ORDER — OXYCODONE AND ACETAMINOPHEN 5; 325 MG/1; MG/1
1 TABLET ORAL
Qty: 40 TABLET | Refills: 0 | Status: SHIPPED | OUTPATIENT
Start: 2021-06-28 | End: 2021-07-12 | Stop reason: SDUPTHER

## 2021-06-28 NOTE — TELEPHONE ENCOUNTER
Patient called stating she is experiencing muscle spasms and nerve pain in her hands, referring to it as a \"freezing feeling\". Patient requested a refill on PERCOCET medication confiming pharmacy on file.  also wanted to speak with Emily regarding incontinence supplies.

## 2021-06-29 ENCOUNTER — TELEPHONE (OUTPATIENT)
Dept: ONCOLOGY | Age: 51
End: 2021-06-29

## 2021-06-29 DIAGNOSIS — M79.2 NERVE PAIN: Primary | ICD-10-CM

## 2021-06-29 NOTE — TELEPHONE ENCOUNTER
Patient needs a referral to [facility] Neurologist.   Patient would also like to speak with RN-Yeny

## 2021-06-29 NOTE — TELEPHONE ENCOUNTER
Spoke with patient regarding MRI on Friday, all patient's questions answered. Will discuss referral with Dr. Slick Barnett tomorrow and send records once verbal order is received.

## 2021-07-04 PROBLEM — F31.9 BIPOLAR 1 DISORDER (HCC): Status: RESOLVED | Noted: 2018-02-09 | Resolved: 2021-07-04

## 2021-07-04 PROBLEM — F31.70 BIPOLAR DISORDER IN FULL REMISSION (HCC): Status: RESOLVED | Noted: 2018-01-22 | Resolved: 2021-07-04

## 2021-07-06 ENCOUNTER — HOSPITAL ENCOUNTER (OUTPATIENT)
Age: 51
Discharge: HOME | End: 2021-07-06
Attending: OBSTETRICS & GYNECOLOGY
Payer: MEDICARE

## 2021-07-06 DIAGNOSIS — R94.8 ABNORMAL PET SCAN OF RETROPERITONEUM: ICD-10-CM

## 2021-07-06 DIAGNOSIS — R93.2 ABNORMAL PET SCAN OF LIVER: ICD-10-CM

## 2021-07-06 DIAGNOSIS — C56.3 MALIGNANT NEOPLASM OF BOTH OVARIES (HCC): ICD-10-CM

## 2021-07-06 PROCEDURE — 74181 MRI ABDOMEN W/O CONTRAST: CPT

## 2021-07-06 PROCEDURE — 72195 MRI PELVIS W/O DYE: CPT

## 2021-07-06 NOTE — TELEPHONE ENCOUNTER
Spoke with patient who stated she is having nerve pain and is requesting medication for the pain to last until she sees a Neurologist. Pt requests 'nerve medication' not pain medication. Pt also states that she is again having discharge with odor from vagina and rectum. Pt requests a call with MRI results.

## 2021-07-07 DIAGNOSIS — C80.1 NEUROPATHY ASSOCIATED WITH CANCER (HCC): Primary | ICD-10-CM

## 2021-07-07 DIAGNOSIS — G63 NEUROPATHY ASSOCIATED WITH CANCER (HCC): Primary | ICD-10-CM

## 2021-07-07 DIAGNOSIS — G89.3 CANCER RELATED PAIN: ICD-10-CM

## 2021-07-07 RX ORDER — GABAPENTIN 100 MG/1
100 CAPSULE ORAL 3 TIMES DAILY
Qty: 90 CAPSULE | Refills: 3 | Status: SHIPPED | OUTPATIENT
Start: 2021-07-07

## 2021-07-08 NOTE — TELEPHONE ENCOUNTER
Spoke with patient and relayed that Gabapentin was sent to pharmacy on file. MRI is still pending, will call once results are available.

## 2021-07-12 NOTE — TELEPHONE ENCOUNTER
Patient contacted the office requesting a call back to review MRI results and a refill of her Percocet.

## 2021-07-13 RX ORDER — OXYCODONE AND ACETAMINOPHEN 5; 325 MG/1; MG/1
1 TABLET ORAL
Qty: 40 TABLET | Refills: 0 | Status: SHIPPED | OUTPATIENT
Start: 2021-07-13 | End: 2021-07-26 | Stop reason: SDUPTHER

## 2021-07-13 NOTE — PROGRESS NOTES
Reviewed MRI  Explained lesion in liver but not sure if it corresponds to PETCT lesion  In 2019 she had nothing in liver  Will d/w radiology and call her back  May need IR biopsy

## 2021-07-19 ENCOUNTER — DOCUMENTATION ONLY (OUTPATIENT)
Dept: ONCOLOGY | Age: 51
End: 2021-07-19

## 2021-07-19 ENCOUNTER — TELEPHONE (OUTPATIENT)
Dept: ONCOLOGY | Age: 51
End: 2021-07-19

## 2021-07-19 DIAGNOSIS — C56.3 MALIGNANT NEOPLASM OF BOTH OVARIES (HCC): Primary | ICD-10-CM

## 2021-07-26 DIAGNOSIS — G89.3 CANCER RELATED PAIN: ICD-10-CM

## 2021-07-26 DIAGNOSIS — C56.3 MALIGNANT NEOPLASM OF BOTH OVARIES (HCC): ICD-10-CM

## 2021-07-26 RX ORDER — OXYCODONE AND ACETAMINOPHEN 5; 325 MG/1; MG/1
1 TABLET ORAL
Qty: 40 TABLET | Refills: 0 | Status: SHIPPED | OUTPATIENT
Start: 2021-07-26 | End: 2021-08-09

## 2021-07-26 NOTE — TELEPHONE ENCOUNTER
Patient called the [facility] Patient also requested a refill on pain medication.

## 2021-07-26 NOTE — TELEPHONE ENCOUNTER
Patient returned call, discussed scheduling IR biopsy. Pt states she was told she would receive a call Friday or today and had not yet received a call. Advised her to contact the office if she has not received a call by 3 pm and I will reach out to IR for update.

## 2021-08-04 ENCOUNTER — APPOINTMENT (OUTPATIENT)
Dept: ULTRASOUND IMAGING | Age: 51
End: 2021-08-04
Attending: SURGERY
Payer: MEDICARE

## 2021-08-04 ENCOUNTER — HOSPITAL ENCOUNTER (OUTPATIENT)
Dept: CT IMAGING | Age: 51
Discharge: HOME | End: 2021-08-04
Attending: RADIOLOGY | Admitting: RADIOLOGY
Payer: MEDICARE

## 2021-08-04 VITALS
WEIGHT: 256 LBS | RESPIRATION RATE: 16 BRPM | HEIGHT: 63 IN | DIASTOLIC BLOOD PRESSURE: 82 MMHG | BODY MASS INDEX: 45.36 KG/M2 | HEART RATE: 80 BPM | SYSTOLIC BLOOD PRESSURE: 140 MMHG | OXYGEN SATURATION: 97 % | TEMPERATURE: 98.5 F

## 2021-08-04 LAB
ANION GAP SERPL CALC-SCNC: 2 MMOL/L (ref 3–18)
APTT PPP: 40.1 SEC (ref 23–36.4)
BUN SERPL-MCNC: 38 MG/DL (ref 7–18)
BUN/CREAT SERPL: 17 (ref 12–20)
CALCIUM SERPL-MCNC: 8.8 MG/DL (ref 8.5–10.1)
CHLORIDE SERPL-SCNC: 117 MMOL/L (ref 100–111)
CO2 SERPL-SCNC: 22 MMOL/L (ref 21–32)
CREAT SERPL-MCNC: 2.29 MG/DL (ref 0.6–1.3)
ERYTHROCYTE [DISTWIDTH] IN BLOOD BY AUTOMATED COUNT: 17.9 % (ref 11.6–14.5)
GLUCOSE SERPL-MCNC: 93 MG/DL (ref 74–99)
HCT VFR BLD AUTO: 28.6 % (ref 35–45)
HGB BLD-MCNC: 8.6 G/DL (ref 12–16)
INR PPP: 1.1 (ref 0.8–1.2)
MCH RBC QN AUTO: 26.7 PG (ref 24–34)
MCHC RBC AUTO-ENTMCNC: 30.1 G/DL (ref 31–37)
MCV RBC AUTO: 88.8 FL (ref 74–97)
PLATELET # BLD AUTO: 259 K/UL (ref 135–420)
PMV BLD AUTO: 8.5 FL (ref 9.2–11.8)
POTASSIUM SERPL-SCNC: 4.7 MMOL/L (ref 3.5–5.5)
PROTHROMBIN TIME: 14.5 SEC (ref 11.5–15.2)
RBC # BLD AUTO: 3.22 M/UL (ref 4.2–5.3)
SODIUM SERPL-SCNC: 141 MMOL/L (ref 136–145)
WBC # BLD AUTO: 5.8 K/UL (ref 4.6–13.2)

## 2021-08-04 PROCEDURE — 74011250636 HC RX REV CODE- 250/636: Performed by: RADIOLOGY

## 2021-08-04 PROCEDURE — 85730 THROMBOPLASTIN TIME PARTIAL: CPT

## 2021-08-04 PROCEDURE — 88307 TISSUE EXAM BY PATHOLOGIST: CPT

## 2021-08-04 PROCEDURE — 47000 NEEDLE BIOPSY OF LIVER PERQ: CPT

## 2021-08-04 PROCEDURE — 88334 PATH CONSLTJ SURG CYTO XM EA: CPT

## 2021-08-04 PROCEDURE — 88333 PATH CONSLTJ SURG CYTO XM 1: CPT

## 2021-08-04 PROCEDURE — 74011250636 HC RX REV CODE- 250/636: Performed by: PHYSICIAN ASSISTANT

## 2021-08-04 PROCEDURE — 88342 IMHCHEM/IMCYTCHM 1ST ANTB: CPT

## 2021-08-04 PROCEDURE — 76942 ECHO GUIDE FOR BIOPSY: CPT

## 2021-08-04 PROCEDURE — 80048 BASIC METABOLIC PNL TOTAL CA: CPT

## 2021-08-04 PROCEDURE — 88341 IMHCHEM/IMCYTCHM EA ADD ANTB: CPT

## 2021-08-04 PROCEDURE — 85610 PROTHROMBIN TIME: CPT

## 2021-08-04 PROCEDURE — 85027 COMPLETE CBC AUTOMATED: CPT

## 2021-08-04 RX ORDER — MIDAZOLAM HYDROCHLORIDE 1 MG/ML
.5-2 INJECTION, SOLUTION INTRAMUSCULAR; INTRAVENOUS
Status: DISPENSED | OUTPATIENT
Start: 2021-08-04 | End: 2021-08-04

## 2021-08-04 RX ORDER — ONDANSETRON 2 MG/ML
4 INJECTION INTRAMUSCULAR; INTRAVENOUS AS NEEDED
Status: DISCONTINUED | OUTPATIENT
Start: 2021-08-04 | End: 2021-08-04 | Stop reason: HOSPADM

## 2021-08-04 RX ORDER — FLUMAZENIL 0.1 MG/ML
0.2 INJECTION INTRAVENOUS
Status: DISCONTINUED | OUTPATIENT
Start: 2021-08-04 | End: 2021-08-04 | Stop reason: HOSPADM

## 2021-08-04 RX ORDER — OXYCODONE AND ACETAMINOPHEN 5; 325 MG/1; MG/1
1 TABLET ORAL
Status: DISCONTINUED | OUTPATIENT
Start: 2021-08-04 | End: 2021-08-04 | Stop reason: HOSPADM

## 2021-08-04 RX ORDER — NALOXONE HYDROCHLORIDE 0.4 MG/ML
0.2 INJECTION, SOLUTION INTRAMUSCULAR; INTRAVENOUS; SUBCUTANEOUS
Status: DISCONTINUED | OUTPATIENT
Start: 2021-08-04 | End: 2021-08-04 | Stop reason: HOSPADM

## 2021-08-04 RX ORDER — SODIUM CHLORIDE 9 MG/ML
20 INJECTION, SOLUTION INTRAVENOUS CONTINUOUS
Status: DISCONTINUED | OUTPATIENT
Start: 2021-08-04 | End: 2021-08-04 | Stop reason: HOSPADM

## 2021-08-04 RX ORDER — FENTANYL CITRATE 50 UG/ML
12.5-5 INJECTION, SOLUTION INTRAMUSCULAR; INTRAVENOUS
Status: DISPENSED | OUTPATIENT
Start: 2021-08-04 | End: 2021-08-04

## 2021-08-04 RX ADMIN — FENTANYL CITRATE 50 MCG: 50 INJECTION, SOLUTION INTRAMUSCULAR; INTRAVENOUS at 10:40

## 2021-08-04 RX ADMIN — SODIUM CHLORIDE 20 ML/HR: 900 INJECTION, SOLUTION INTRAVENOUS at 07:37

## 2021-08-04 RX ADMIN — MIDAZOLAM 1 MG: 1 INJECTION INTRAMUSCULAR; INTRAVENOUS at 10:40

## 2021-08-04 RX ADMIN — FENTANYL CITRATE 50 MCG: 50 INJECTION, SOLUTION INTRAMUSCULAR; INTRAVENOUS at 10:50

## 2021-08-04 RX ADMIN — MIDAZOLAM 1 MG: 1 INJECTION INTRAMUSCULAR; INTRAVENOUS at 10:45

## 2021-08-04 NOTE — DISCHARGE INSTRUCTIONS
Patient Education        Percutaneous Liver Biopsy: What to Expect at Home  Your Recovery  A needle biopsy of the liver is a procedure to take a tiny sample (biopsy) of your liver tissue. The tissue sample is looked at under a microscope. Your doctor can look for infection or other liver problems. You may have some pain where the biopsy needle entered your skin (the puncture site). You may also have pain in your shoulder. This is called referred pain. It is caused by pain traveling along a nerve near the biopsy site. The referred pain usually lasts less than 12 hours. You may have a small amount of bleeding from the puncture site. You can probably go home if you have no problems after the test. You will need to take it easy at home for 1 or 2 days after the procedure. You will probably be able to return to work and most of your usual activities after that. This care sheet gives you a general idea about how long it will take for you to recover. But each person recovers at a different pace. Follow the steps below to get better as quickly as possible. How can you care for yourself at home? Activity    · Rest when you feel tired. Getting enough sleep will help you recover.     · Try to walk each day. Start by walking a little more than you did the day before. Bit by bit, increase the amount you walk. Walking boosts blood flow and helps prevent pneumonia and constipation.     · Avoid exercises that use your belly muscles and strenuous activities, such as bicycle riding, jogging, weight lifting, or aerobic exercise, for 1 week or until your doctor says it is okay.     · Ask your doctor when you can drive again.     · You will probably need to take 1 or 2 days off from work. It depends on the type of work you do and how you feel.     · You will probably be able to shower the same day as the test, if your doctor says it is okay. Pat the puncture site dry.  Do not take a bath for at least 2 days after the test, or until your doctor tells you it is okay. Diet    · You can eat your normal diet. If your stomach is upset, try bland, low-fat foods like plain rice, broiled chicken, toast, and yogurt.     · Drink plenty of fluids (unless your doctor tells you not to). Medicines    · Your doctor will tell you if and when you can restart your medicines. He or she will also give you instructions about taking any new medicines.     · If you take aspirin or some other blood thinner, ask your doctor if and when to start taking it again. Make sure that you understand exactly what your doctor wants you to do.     · Be safe with medicines. Take pain medicines exactly as directed. ? If the doctor gave you a prescription medicine for pain, take it as prescribed. ? If you are not taking a prescription pain medicine, take an over-the-counter medicine that your doctor recommends. Read and follow all instructions on the label. ? Do not take aspirin, ibuprofen (Advil, Motrin), naproxen (Aleve), or other nonsteroidal anti-inflammatory drugs (NSAIDs) unless your doctor says it is okay.     · If you think your pain medicine is making you sick to your stomach:  ? Take your medicine after meals (unless your doctor has told you not to). ? Ask your doctor for a different kind of pain medicine. Care of the puncture site    · Keep a bandage over the puncture site for the first 1 or 2 days. Follow-up care is a key part of your treatment and safety. Be sure to make and go to all appointments, and call your doctor if you are having problems. It's also a good idea to know your test results and keep a list of the medicines you take. When should you call for help? Call 911 anytime you think you may need emergency care.  For example, call if:    · You passed out (lost consciousness).     · You have severe trouble breathing.     · You have sudden chest pain and shortness of breath, or you cough up blood.     · You have severe pain in your chest, shoulder, or belly. Call your doctor now or seek immediate medical care if:    · You have new or worse shortness of breath.     · Bright red blood has soaked through the bandage over the puncture site.     · You have pain that does not get better after you take your pain medicine.     · You are sick to your stomach or cannot keep fluids down.     · You have a fever, chills, or body aches.     · You have signs of infection, such as:  ? Increased pain, swelling, warmth, or redness. ? Red streaks leading from the puncture site. ? Pus draining from the puncture site. ? A fever.     · You have new or worse pain at the puncture site.     · You have new or worse belly swelling or bloating.     · You have trouble passing urine or stool.     · Your stools are black and tarlike or have streaks of blood.     · You have pale-colored stools along with dark urine and itching. Watch closely for changes in your health, and be sure to contact your doctor if you have any problems. Where can you learn more? Go to http://www.gray.com/  Enter B989 in the search box to learn more about \"Percutaneous Liver Biopsy: What to Expect at Home. \"  Current as of: September 23, 2020               Content Version: 12.8  © 2006-2021 [employer]. Care instructions adapted under license by [employer] (which disclaims liability or warranty for this information). If you have questions about a medical condition or this instruction, always ask your healthcare professional. [place] [address] any warranty or liability for your use of this information.          DISCHARGE SUMMARY from Nurse    PATIENT INSTRUCTIONS:    After general anesthesia or intravenous sedation, for 24 hours or while taking prescription Narcotics:  · Limit your activities  · Do not drive and operate hazardous machinery  · Do not make important personal or business decisions  · Do  not drink alcoholic beverages  · If you have not urinated within 8 hours after discharge, please contact your surgeon on call.     Report the following to your surgeon:  · Excessive pain, swelling, redness or odor of or around the surgical area  · Temperature over 100.5  · Nausea and vomiting lasting longer than 4 hours or if unable to take medications  · Any signs of decreased circulation or nerve impairment to extremity: change in color, persistent  numbness, tingling, coldness or increase pain  · Any questions

## 2021-08-04 NOTE — PROGRESS NOTES
conducted a pre-surgery visit with Anai Fried, who is a 45 y.o.,female. The  provided the following Interventions:  Initiated a relationship of care and support with the patient. Patient does not have an advance directive at this time. Offered prayer and assurance of continued prayers on patient's behalf. Plan:  Chaplains will continue to follow and will provide pastoral care on an as needed/requested basis.  recommends bedside caregivers page  on duty if patient shows signs of acute spiritual or emotional distress.     Reiseñor 3   Board Certified [address] [place] [address]   [phone]

## 2021-08-04 NOTE — H&P
History and Physical    Patient: Mario Scott           Sex: female       DOA: 8/4/2021  YOB: 1970      Age:  45 y.o.     LOS:  LOS: 0 days        HPI:     Mario Scott is a 45 y.o. 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 She presents today with new findings of a hypermetabolic liver lesion from recent PET, here for a liver lesion biopsy with moderate sedation. Past Medical History:   Diagnosis Date    AR (allergic rhinitis)     seasonal    [facility]adder [facility]     Cancer ([facility]     pt states between vgina and rectal area    Cardiac echocardiogram 04/11/2016    Tech difficult. EF 55-60%. No RWMA. Mild conc LVH. Gr 1 DDfx. RVSP normal.      Cardiac nuclear imaging test 05/05/2016    Low risk. Very patchy radiotracer uptake. Mild anterior artifact, low suspicion for ischemia. EF 68%. No RWMA. Normal EKG on pharm stress test.    Cardiovascular LE arterial duplex 10/07/2013    No significant arterial disease at rest bilaterally. R JUNE 1.21.  L JUNE 1.16. No significant sm vessel disease.     Depression     Dr. Rao Hayden, suicide attempt 2/12    Diverticulosis     Endometriosis     s/p hysterectomy 2006    GERD (gastroesophageal reflux disease)     Glaucoma     Dr. Collins Cowper Hematuria, gross     HTN (hypertension)     Hx of colonoscopy 04/05/2017    mild diverticulosis, g1 internal hemorrhoids, normal colon , repeat 10 year 2027    Hx of suicide attempt     Hyperlipidemia LDL goal < 130     IBS (irritable bowel syndrome)     Ill-defined condition     environmental allergies    Insomnia     [facility] peritoneum [facility] 2019    Nausea & vomiting     OA (osteoarthritis)     both knees, lower back    Preeclampsia     PTSD (post-traumatic stress disorder)     Seizures (HCC)     1 x with childbirth, had toxemia    Sleep apnea     does not use cpap machine    Spinal stenosis     Dr. Burgos Yoli Vertigo        Past Surgical History:   Procedure Laterality Date    COLONOSCOPY N/A 2017    COLONOSCOPY performed by Pamela Bates MD at [place] [address] N/A 2019    SIGMOIDOSCOPY FLEXIBLE performed by Vedia Bosworth, MD at [facility] ENDOSCOPY    HX  SECTION      HX COLONOSCOPY  2017    DR. MCRAE 2017     HX CYSTECTOMY  2020    HX HYSTERECTOMY      HX KNEE ARTHROSCOPY Left     left knee    HX KNEE REPLACEMENT Bilateral     (R)  (L)     HX LAPAROTOMY N/A 2019    and rectovaginal bx    HX OTHER SURGICAL  2016    all teeth removed    HX SALPINGO-OOPHORECTOMY  2008    HX TUBAL LIGATION      IR INSERT TUNL CVC W PORT OVER 5 YEARS  2019    MULTIPLE DELIVERY       1990    NV FEMUR/KNEE SURG UNLISTED Left 2009    External fixator    NV PART REMOVAL COLON W ANASTOMOSIS      NV TOTAL ABDOM HYSTERECTOMY  2006    TRANSURETHRAL RESEC BLADDER NECK         Family History   Problem Relation Age of Onset    Heart Disease Mother         CHF    Diabetes Mother     Hypertension Mother     Kidney Disease Mother     Breast Cancer Mother     Stroke Mother     Diabetes Father     Hypertension Father     Heart Attack Father         MI    Cancer Maternal Grandmother         stomach    Ovarian Cancer Maternal Aunt     Cancer Maternal Uncle        Social History     Socioeconomic History    Marital status: SINGLE     Spouse name: Not on file    Number of children: Not on file    Years of education: Not on file    Highest education level: Not on file   Occupational History    Occupation: disabled    Occupation: student   Tobacco Use    Smoking status: Never Smoker    Smokeless tobacco: Never Used   Substance and Sexual Activity    Alcohol use: No    Drug use: No    Sexual activity: Never     Social Determinants of Health     Financial Resource Strain:     Difficulty of Paying Living Expenses:    Food Insecurity:     Worried About Running Out of Food in the Last Year:     Ran Out of Food in the Last Year:    Transportation Needs:     Lack of Transportation (Medical):  Lack of Transportation (Non-Medical):    Physical Activity:     Days of Exercise per Week:     Minutes of Exercise per Session:    Stress:     Feeling of Stress :    Social Connections:     Frequency of Communication with Friends and Family:     Frequency of Social Gatherings with Friends and Family:     Attends Temple Services:     Active Member of Clubs or Organizations:     Attends Club or Organization Meetings:     Marital Status:        Prior to Admission medications    Medication Sig Start Date End Date Taking? Authorizing Provider   metoprolol succinate (TOPROL-XL) 100 mg tablet take 1 tablet by mouth once daily 7/27/21  Yes Nya Gomez MD   oxyCODONE-acetaminophen (Percocet) 5-325 mg per tablet Take 1 Tablet by mouth every four (4) hours as needed for Pain for up to 14 days. Max Daily Amount: 6 Tablets. 7/26/21 8/9/21 Yes Leatha Sanchez MD   gabapentin (NEURONTIN) 100 mg capsule Take 1 Capsule by mouth three (3) times daily. Max Daily Amount: 300 mg. 7/7/21  Yes Leatha Sanchez MD   PARoxetine (PAXIL) 20 mg tablet Take 1 Tab by mouth daily.  5/6/21  Yes Caron Primrose, MD   amLODIPine (NORVASC) 10 mg tablet take 1 tablet by mouth once daily 5/3/21  Yes Caron Primrose, MD   simvastatin (ZOCOR) 20 mg tablet take 1 tablet by mouth at bedtime 12/27/20  Yes Caron Primrose, MD   meclizine (ANTIVERT) 25 mg tablet take 1 tablet by mouth three times a day if needed FOR DIZZINESS 5/6/21   Valdez Mcfadden MD   latanoprost (XALATAN) 0.005 % ophthalmic solution Administer 1 Drop to both eyes nightly. Provider, Historical       Allergies   Allergen Reactions    Seafood Hives     FISH    Other Medication Hives     seafood    Pollen Extracts Other (comments)    Shellfish Derived Hives    Pantoprazole Other (comments)     Bleeding in stomach    Promethazine Other (comments)     Bleeding in stomach       Physical Exam:      Visit Vitals  /62 (BP 1 Location: Right upper arm, BP Patient Position: At rest)   Pulse 61   Temp 98.4 °F (36.9 °C)   Resp 14   Ht 5' 3\" (1.6 m)   Wt 116.1 kg (256 lb)   SpO2 99%   BMI 45.35 kg/m²       Physical Exam:  Mallampati 2 ASA 3  General: A&O x 4, NAD  Heart: RRR  Lungs: Normal work of breathing on room air    Labs Reviewed: All lab results for the last 24 hours reviewed.     Assessment/Plan     The patient is an appropriate candidate to undergo the planned procedure and sedation    JAMEY [place]

## 2021-08-04 NOTE — PROCEDURES
RADIOLOGY POST PROCEDURE NOTE     August 4, 2021       11:28 AM     Preoperative Diagnosis:   Liver mass    Postoperative Diagnosis:  Same. : Alonso Jaffe MD    Assistant:  None. Type of Anesthesia: Moderate Sedation    Procedure/Description:  CT and US guided liver mass biopsy    Findings:   Right lobe mass. Successful biopsy with 18G needle. 5 passes total under US and CT guidance. Path confirmed adequate tissue. Tract embolized with gelfoam.    Estimated blood Loss:  Minimal    Specimen Removed:   yes    Blood transfusions:  None. Implants:  None. Complications: None    Condition: Stable    Discharge Plan:  3 hour observation and discharge home.     Santos Nuñez MD  Interventional Radiology  08/04/21  11:28 AM

## 2021-08-06 ENCOUNTER — DOCUMENTATION ONLY (OUTPATIENT)
Dept: ONCOLOGY | Age: 51
End: 2021-08-06

## 2021-08-06 ENCOUNTER — TELEPHONE (OUTPATIENT)
Dept: ONCOLOGY | Age: 51
End: 2021-08-06

## 2021-08-06 NOTE — PROGRESS NOTES
Called and reviewed pathology from biopsy c/w recurrent disease. Will send tumor for CARIS testing and see in office after to review results.    Discussed likely will need chemo but pt somewhat reluctant

## 2021-08-09 NOTE — TELEPHONE ENCOUNTER
Pt contacted office this morning to inquire about starting treatment. Relayed that I would speak with Dr. Wiliam Lux and call back with update. After speaking with Dr. Wiliam Lux, called patient and relayed that Dr. Wiliam Lux would like CARIS genetic test results prior to deciding treatment plan. Order placed through [employer] for testing, will schedule follow up for patient to be seen once results are available. During call patient stated that she had tried 'nerve pain medication' again and had started spotting once she resumed medication. Pt states that nerve pain is increasing and she would like recommendations. Advised patient to try vitamin B6 and B12 twice a day for nerve pain.

## 2021-08-10 DIAGNOSIS — G89.3 CANCER ASSOCIATED PAIN: Primary | ICD-10-CM

## 2021-08-10 RX ORDER — OXYCODONE AND ACETAMINOPHEN 5; 325 MG/1; MG/1
1 TABLET ORAL
Qty: 40 TABLET | Refills: 0 | Status: SHIPPED | OUTPATIENT
Start: 2021-08-10 | End: 2021-08-24 | Stop reason: SDUPTHER

## 2021-08-10 NOTE — TELEPHONE ENCOUNTER
Pt would like refill on Percocet and is wanting to make sure you are aware pt is currently taking Gabapentin. She stated this rx is making her bleed.

## 2021-08-19 ENCOUNTER — TELEPHONE (OUTPATIENT)
Dept: ONCOLOGY | Age: 51
End: 2021-08-19

## 2021-08-19 NOTE — TELEPHONE ENCOUNTER
Pt would like results from [address] [place] [address] and has a chemo regimen been chosen.  Please call pt back

## 2021-08-19 NOTE — TELEPHONE ENCOUNTER
Spoke with patient and relayed that Gael Dick is still pending. Once results are received Dr. Radha Jennings will review the results with her and discuss treatment plan.

## 2021-08-23 ENCOUNTER — TELEPHONE (OUTPATIENT)
Dept: FAMILY MEDICINE CLINIC | Age: 51
End: 2021-08-23

## 2021-08-23 NOTE — TELEPHONE ENCOUNTER
Pt called in good spirits stated that after her PET, MRI, and a Biopsy her cancer has returned. Pt stated that now it has gone into the liver area. Pt stated she would like to know would you like to speak to her or do a viral appt. Pt stated she would like to keep you informed. Please call pt at your earliest convenience.

## 2021-08-24 DIAGNOSIS — G89.3 CANCER ASSOCIATED PAIN: ICD-10-CM

## 2021-08-24 RX ORDER — OXYCODONE AND ACETAMINOPHEN 5; 325 MG/1; MG/1
1 TABLET ORAL
Qty: 40 TABLET | Refills: 0 | Status: SHIPPED | OUTPATIENT
Start: 2021-08-24 | End: 2021-09-07

## 2021-08-24 RX ORDER — ONDANSETRON 4 MG/1
4 TABLET, FILM COATED ORAL EVERY 6 HOURS
Qty: 56 TABLET | Refills: 0 | Status: SHIPPED | OUTPATIENT
Start: 2021-08-24 | End: 2021-09-07

## 2021-08-24 NOTE — TELEPHONE ENCOUNTER
Sorry to hear, Yes, can schedule non urgent VV, advise if she can have BP monitor at home available to evaluate.  thanks

## 2021-08-25 NOTE — TELEPHONE ENCOUNTER
Left patient a detailed message that Dr. Ronni Hood approved a non urgent VV for appt.  Advised patient to return my call,

## 2021-08-31 ENCOUNTER — VIRTUAL VISIT (OUTPATIENT)
Dept: FAMILY MEDICINE CLINIC | Age: 51
End: 2021-08-31
Payer: MEDICARE

## 2021-08-31 ENCOUNTER — DOCUMENTATION ONLY (OUTPATIENT)
Dept: FAMILY MEDICINE CLINIC | Age: 51
End: 2021-08-31

## 2021-08-31 DIAGNOSIS — K21.9 GASTROESOPHAGEAL REFLUX DISEASE WITHOUT ESOPHAGITIS: ICD-10-CM

## 2021-08-31 DIAGNOSIS — I10 ESSENTIAL HYPERTENSION: ICD-10-CM

## 2021-08-31 DIAGNOSIS — C48.2 PERITONEAL CARCINOMA (HCC): ICD-10-CM

## 2021-08-31 DIAGNOSIS — F31.31 BIPOLAR AFFECTIVE DISORDER, CURRENTLY DEPRESSED, MILD (HCC): ICD-10-CM

## 2021-08-31 DIAGNOSIS — C56.9 MALIGNANT NEOPLASM OF OVARY, UNSPECIFIED LATERALITY (HCC): Primary | ICD-10-CM

## 2021-08-31 DIAGNOSIS — N18.4 CKD (CHRONIC KIDNEY DISEASE) STAGE 4, GFR 15-29 ML/MIN (HCC): ICD-10-CM

## 2021-08-31 PROCEDURE — G8427 DOCREV CUR MEDS BY ELIG CLIN: HCPCS | Performed by: FAMILY MEDICINE

## 2021-08-31 PROCEDURE — 99214 OFFICE O/P EST MOD 30 MIN: CPT | Performed by: FAMILY MEDICINE

## 2021-08-31 PROCEDURE — G8756 NO BP MEASURE DOC: HCPCS | Performed by: FAMILY MEDICINE

## 2021-08-31 PROCEDURE — G9717 DOC PT DX DEP/BP F/U NT REQ: HCPCS | Performed by: FAMILY MEDICINE

## 2021-08-31 PROCEDURE — G9899 SCRN MAM PERF RSLTS DOC: HCPCS | Performed by: FAMILY MEDICINE

## 2021-08-31 PROCEDURE — G9711 PT HX TOT COL OR COLON CA: HCPCS | Performed by: FAMILY MEDICINE

## 2021-08-31 NOTE — PROGRESS NOTES
Mario Scott is a 45 y.o. female who was seen by synchronous (real-time) audio-video technology on 8/31/2021. Consent: Mario Scott, who was seen by synchronous (real-time) audio-video technology, and/or her healthcare decision maker, is aware that this patient-initiated, Telehealth encounter on 8/31/2021 is a billable service, with coverage as determined by her insurance carrier. She is aware that she may receive a bill and has provided verbal consent to proceed: Yes. 712  Subjective:   Mario Scott is a 45 y.o. female who was seen for No chief complaint on file. Requesting supplies/update on cancer       ovarian/peritoneal  cancer s/p HIPEC,   s/p colectomy s/p cystecomty. Recent finding of liver metastasis. Continues to follow multispecialty team.  dr. Jose Jasso oncology surgeon/ dr Bob Joel onc/dr taylor urology. She is requesting pillows for better positioning in bed. CKD 4-following  nephrology dr. Juice Donato    HTN- normotensive on amlodipine and metoprolol, h/o hyponatremia on hctz    HLD on statin    GERD  On PPI. Depression/bipolar- reports  Doing well on paxil, no longer following psychiatry. Vertigo-chronic,  responding to prn meclizine, MRI brain 2019 neg. Prior to Admission medications    Medication Sig Start Date End Date Taking? Authorizing Provider   oxyCODONE-acetaminophen (Percocet) 5-325 mg per tablet Take 1 Tablet by mouth every four (4) hours as needed for Pain for up to 14 days. Max Daily Amount: 6 Tablets. 8/24/21 9/7/21 Yes Joanie England MD   ondansetron hcl (Zofran) 4 mg tablet Take 1 Tablet by mouth every six (6) hours for 14 days. 8/24/21 9/7/21 Yes Joanie England MD   metoprolol succinate (TOPROL-XL) 100 mg tablet take 1 tablet by mouth once daily 7/27/21  Yes Bartolo Murray MD   gabapentin (NEURONTIN) 100 mg capsule Take 1 Capsule by mouth three (3) times daily.  Max Daily Amount: 300 mg. 7/7/21  Yes Royer Mccallum MD meclizine (ANTIVERT) 25 mg tablet take 1 tablet by mouth three times a day if needed FOR DIZZINESS 5/6/21  Yes Richy Gomez MD   PARoxetine (PAXIL) 20 mg tablet Take 1 Tab by mouth daily. 5/6/21  Yes Tito Daigle MD   amLODIPine (NORVASC) 10 mg tablet take 1 tablet by mouth once daily 5/3/21  Yes Tito Daigle MD   latanoprost (XALATAN) 0.005 % ophthalmic solution Administer 1 Drop to both eyes nightly.    Yes Provider, Historical   simvastatin (ZOCOR) 20 mg tablet take 1 tablet by mouth at bedtime 12/27/20  Yes Tito Daigle MD     Allergies   Allergen Reactions    Seafood Hives     FISH    Other Medication Hives     seafood    Pollen Extracts Other (comments)    Shellfish Derived Hives    Pantoprazole Other (comments)     Bleeding in stomach    Promethazine Other (comments)     Bleeding in stomach       Patient Active Problem List    Diagnosis Date Noted    CKD (chronic kidney disease) stage 4, GFR 15-29 ml/min ([address] 02/11/2021    Acute congestive heart failure ([address] 02/04/2021    COVID-19 12/31/2020    History of abdominal surgery 12/31/2020    Melena 12/30/2020    Cancer of appendix ([address] 11/17/2020    Loss of appetite 10/14/2020    Other hydronephrosis 06/24/2020    Genetic carrier status 09/13/2019    Postoperative nausea 08/27/2019    Ovarian cancer ([address] 08/07/2019    Peritoneal carcinoma ([address] 02/14/2019    Pelvic mass in female 01/17/2019    Irritable bowel syndrome with both constipation and diarrhea 02/09/2018    Subclinical hypothyroidism 01/23/2018    Chronic abdominal pain 01/22/2018    Diverticulosis 01/22/2018    Hx of total hysterectomy 01/22/2018    Hyponatremia 01/22/2018    Advance care planning 01/31/2017    Dental caries 05/26/2016    Chronic periodontal disease 05/26/2016    SUAZO (dyspnea on exertion) 02/10/2016    Prediabetes 09/24/2015    Morbid obesity ([address] 08/31/2015    Arthritis, degenerative 08/31/2015    Gastroesophageal reflux disease without esophagitis 08/31/2015    ANAMIKA on CPAP 08/31/2015    Essential hypertension 08/28/2015    PTSD (post-traumatic stress disorder) 03/24/2014    S/P TKR (total knee replacement) 11/14/2012    Adjustment disorder with depressed mood 09/20/2012    Major depressive disorder, recurrent episode, moderate ([facility] 09/20/2012    Suicidal ideation 09/19/2012    Menopause 05/08/2012    Knee pain, right 05/08/2012    Hypokalemia 02/04/2012    Overdose 02/04/2012    Obesity 06/18/2010    Mixed hyperlipidemia 06/18/2010     Current Outpatient Medications   Medication Sig Dispense Refill    oxyCODONE-acetaminophen (Percocet) 5-325 mg per tablet Take 1 Tablet by mouth every four (4) hours as needed for Pain for up to 14 days. Max Daily Amount: 6 Tablets. 40 Tablet 0    ondansetron hcl (Zofran) 4 mg tablet Take 1 Tablet by mouth every six (6) hours for 14 days. 56 Tablet 0    metoprolol succinate (TOPROL-XL) 100 mg tablet take 1 tablet by mouth once daily 90 Tablet 1    gabapentin (NEURONTIN) 100 mg capsule Take 1 Capsule by mouth three (3) times daily. Max Daily Amount: 300 mg. 90 Capsule 3    meclizine (ANTIVERT) 25 mg tablet take 1 tablet by mouth three times a day if needed FOR DIZZINESS 30 Tab 0    PARoxetine (PAXIL) 20 mg tablet Take 1 Tab by mouth daily. 90 Tab 1    amLODIPine (NORVASC) 10 mg tablet take 1 tablet by mouth once daily 90 Tab 1    latanoprost (XALATAN) 0.005 % ophthalmic solution Administer 1 Drop to both eyes nightly.       simvastatin (ZOCOR) 20 mg tablet take 1 tablet by mouth at bedtime 90 Tab 3     Allergies   Allergen Reactions    Seafood Hives     FISH    Other Medication Hives     seafood    Pollen Extracts Other (comments)    Shellfish Derived Hives    Pantoprazole Other (comments)     Bleeding in stomach    Promethazine Other (comments)     Bleeding in stomach     Past Medical History:   Diagnosis Date    AR (allergic rhinitis)     seasonal    [facility]     Cancer [facility])     pt states between vgina and rectal area    Cardiac echocardiogram 2016    Tech difficult. EF 55-60%. No RWMA. Mild conc LVH. Gr 1 DDfx. RVSP normal.      Cardiac nuclear imaging test 2016    Low risk. Very patchy radiotracer uptake. Mild anterior artifact, low suspicion for ischemia. EF 68%. No RWMA. Normal EKG on pharm stress test.    Cardiovascular LE arterial duplex 10/07/2013    No significant arterial disease at rest bilaterally. R JUNE 1.21.  L JUNE 1.16. No significant sm vessel disease.  Depression     Dr. Anil Frost, suicide attempt     Diverticulosis     Endometriosis     s/p hysterectomy     GERD (gastroesophageal reflux disease)     Glaucoma     Dr. Bret Small Hematuria, gross     HTN (hypertension)     Hx of colonoscopy 2017    mild diverticulosis, g1 internal hemorrhoids, normal colon , repeat 10 year     Hx of suicide attempt     Hyperlipidemia LDL goal < 130     IBS (irritable bowel syndrome)     Ill-defined condition     environmental allergies    Insomnia     Malignant neoplasm of peritoneum (HCC) 2019    Nausea & vomiting     OA (osteoarthritis)     both knees, lower back    Preeclampsia     PTSD (post-traumatic stress disorder)     Seizures (HCC)     1 x with childbirth, had toxemia    Sleep apnea     does not use cpap machine    Spinal stenosis     Dr. Tutu Vizcaino Vertigo      Past Surgical History:   Procedure Laterality Date    COLONOSCOPY N/A 2017    COLONOSCOPY performed by Carlos March MD at [place] [address] N/A 2019    SIGMOIDOSCOPY FLEXIBLE performed by [facility]a Desir [facility] at [facility] ENDOSCOPY    HX  SECTION      HX COLONOSCOPY  2017    DR. MCRAE 2017     HX CYSTECTOMY  2020    HX HYSTERECTOMY      HX KNEE ARTHROSCOPY Left     left knee    HX KNEE REPLACEMENT Bilateral     (R)  (L) 2013    HX LAPAROTOMY N/A 2019    and rectovaginal bx    HX OTHER SURGICAL  2016    all teeth removed    HX SALPINGO-OOPHORECTOMY  2008    HX TUBAL LIGATION      IR INSERT TUNL CVC W PORT OVER 5 YEARS  2019    MULTIPLE DELIVERY       1990    ND FEMUR/KNEE SURG UNLISTED Left 2009    External fixator    ND PART REMOVAL COLON W ANASTOMOSIS      ND TOTAL ABDOM HYSTERECTOMY  2006    TRANSURETHRAL RESEC BLADDER NECK       Family History   Problem Relation Age of Onset    Heart Disease Mother         CHF    Diabetes Mother     Hypertension Mother     Kidney Disease Mother     Breast Cancer Mother     Stroke Mother     Diabetes Father     Hypertension Father     Heart Attack Father         MI    Cancer Maternal Grandmother         stomach    Ovarian Cancer Maternal Aunt     Cancer Maternal Uncle      Social History     Tobacco Use    Smoking status: Never Smoker    Smokeless tobacco: Never Used   Substance Use Topics    Alcohol use: No       ROS      Objective:     Visit Vitals  LMP 2008      General: alert, cooperative, no distress   Mental  status: normal mood, behavior, speech, dress, motor activity, and thought processes, able to follow commands   HENT: NCAT   Neck: no visualized mass   Resp: no respiratory distress   Neuro: no gross deficits   Skin: no discoloration or lesions of concern on visible areas   Psychiatric: normal affect, consistent with stated mood, no evidence of hallucinations     Additional exam findings: We discussed the expected course, resolution and complications of the diagnosis(es) in detail. Medication risks, benefits, costs, interactions, and alternatives were discussed as indicated. I advised her to contact the office if her condition worsens, changes or fails to improve as anticipated. She expressed understanding with the diagnosis(es) and plan.      James Santiago is a 45 y.o. female who was evaluated by a video visit encounter for concerns as above. Patient identification was verified prior to start of the visit. A caregiver was present when appropriate. Due to this being a TeleHealth encounter (During [facility] public health emergency), evaluation of the following organ systems was limited: Vitals/Constitutional/EENT/Resp/CV/GI//MS/Neuro/Skin/Heme-Lymph-Imm. Pursuant to the emergency declaration under the [facility] waiver authority and the [employer] Act, this Virtual  Visit was conducted, with patient's (and/or legal guardian's) consent, to reduce the patient's risk of exposure to COVID-19 and provide necessary medical care. Services were provided through a video synchronous discussion virtually to substitute for in-person clinic visit. Patient and provider were located at their individual homes.     Assessment & Plan:   Diagnoses and all orders for this visit:  Malignant neoplasm of ovary, unspecified laterality [facility])  Following multispecialty team  Will provide med supplies requested    Peritoneal carcinoma    CKD (chronic kidney disease) stage 4, GFR 15-29 ml/min (HCC)  Creatinine baselines 2;s  Monitoring  Following nephrology    Bipolar disorder in full remission, most recent episode unspecified type ([facility]  Stable  Cont paxil     Malignant neoplasm of peritoneum (HCC)       Gastroesophageal reflux disease without esophagitis  Stable  Cont protonix    Essential hypertension  Controlled  Cont norvasc, metoprolol    Ff-up in 6 months or sooner ginette Cruz MD

## 2021-08-31 NOTE — PATIENT INSTRUCTIONS
DASH Diet: Care Instructions  Your Care Instructions     The DASH diet is an eating plan that can help lower your blood pressure. DASH stands for Dietary Approaches to Stop Hypertension. Hypertension is high blood pressure. The DASH diet focuses on eating foods that are high in calcium, potassium, and magnesium. These nutrients can lower blood pressure. The foods that are highest in these nutrients are fruits, vegetables, low-fat dairy products, nuts, seeds, and legumes. But taking calcium, potassium, and magnesium supplements instead of eating foods that are high in those nutrients does not have the same effect. The DASH diet also includes whole grains, fish, and poultry. The DASH diet is one of several lifestyle changes your doctor may recommend to lower your high blood pressure. Your doctor may also want you to decrease the amount of sodium in your diet. Lowering sodium while following the DASH diet can lower blood pressure even further than just the DASH diet alone. Follow-up care is a key part of your treatment and safety. Be sure to make and go to all appointments, and call your doctor if you are having problems. It's also a good idea to know your test results and keep a list of the medicines you take. How can you care for yourself at home? Following the DASH diet  · Eat 4 to 5 servings of fruit each day. A serving is 1 medium-sized piece of fruit, ½ cup chopped or canned fruit, 1/4 cup dried fruit, or 4 ounces (½ cup) of fruit juice. Choose fruit more often than fruit juice. · Eat 4 to 5 servings of vegetables each day. A serving is 1 cup of lettuce or raw leafy vegetables, ½ cup of chopped or cooked vegetables, or 4 ounces (½ cup) of vegetable juice. Choose vegetables more often than vegetable juice. · Get 2 to 3 servings of low-fat and fat-free dairy each day. A serving is 8 ounces of milk, 1 cup of yogurt, or 1 ½ ounces of cheese. · Eat 6 to 8 servings of grains each day.  A serving is 1 slice of bread, 1 ounce of dry cereal, or ½ cup of cooked rice, pasta, or cooked cereal. Try to choose whole-grain products as much as possible. · Limit lean meat, poultry, and fish to 2 servings each day. A serving is 3 ounces, about the size of a deck of cards. · Eat 4 to 5 servings of nuts, seeds, and legumes (cooked dried beans, lentils, and split peas) each week. A serving is 1/3 cup of nuts, 2 tablespoons of seeds, or ½ cup of cooked beans or peas. · Limit fats and oils to 2 to 3 servings each day. A serving is 1 teaspoon of vegetable oil or 2 tablespoons of salad dressing. · Limit sweets and added sugars to 5 servings or less a week. A serving is 1 tablespoon jelly or jam, ½ cup sorbet, or 1 cup of lemonade. · Eat less than 2,300 milligrams (mg) of sodium a day. If you limit your sodium to 1,500 mg a day, you can lower your blood pressure even more. · Be aware that all of these are the suggested number of servings for people who eat 1,800 to 2,000 calories a day. Your recommended number of servings may be different if you need more or fewer calories. Tips for success  · Start small. Do not try to make dramatic changes to your diet all at once. You might feel that you are missing out on your favorite foods and then be more likely to not follow the plan. Make small changes, and stick with them. Once those changes become habit, add a few more changes. · Try some of the following:  ? Make it a goal to eat a fruit or vegetable at every meal and at snacks. This will make it easy to get the recommended amount of fruits and vegetables each day. ? Try yogurt topped with fruit and nuts for a snack or healthy dessert. ? Add lettuce, tomato, cucumber, and onion to sandwiches. ? Combine a ready-made pizza crust with low-fat mozzarella cheese and lots of vegetable toppings. Try using tomatoes, squash, spinach, broccoli, carrots, cauliflower, and onions. ?  Have a variety of cut-up vegetables with a low-fat dip as an appetizer instead of chips and dip. ? Sprinkle sunflower seeds or chopped almonds over salads. Or try adding chopped walnuts or almonds to cooked vegetables. ? Try some vegetarian meals using beans and peas. Add garbanzo or kidney beans to salads. Make burritos and tacos with mashed lee beans or black beans. Where can you learn more? Go to http://www.bennett.com/  Enter H967 in the search box to learn more about \"DASH Diet: Care Instructions. \"  Current as of: August 31, 2020               Content Version: 12.8  © 5711-6252 [employer]. Care instructions adapted under license by [employer] (which disclaims liability or warranty for this information). If you have questions about a medical condition or this instruction, always ask your healthcare professional. [address] any warranty or liability for your use of this information.

## 2021-09-07 ENCOUNTER — TELEPHONE (OUTPATIENT)
Dept: ONCOLOGY | Age: 51
End: 2021-09-07

## 2021-09-07 NOTE — TELEPHONE ENCOUNTER
Spoke with patient and relayed that Dr. Ekaterina Heard is recommending she keeps her appointment on 9/17/2021. Patient is anxious regarding starting treatment, explained that Dr. Ekaterina Heard would like to see her in office to discuss options instead of discussing options over the phone.

## 2021-09-13 DIAGNOSIS — G89.3 CANCER ASSOCIATED PAIN: Primary | ICD-10-CM

## 2021-09-13 RX ORDER — OXYCODONE AND ACETAMINOPHEN 5; 325 MG/1; MG/1
1 TABLET ORAL
Qty: 40 TABLET | Refills: 0 | Status: SHIPPED | OUTPATIENT
Start: 2021-09-13 | End: 2021-09-27 | Stop reason: SDUPTHER

## 2021-09-13 NOTE — TELEPHONE ENCOUNTER
Pt called to request a refill of her pain medication. Pt states she is using Gabapentin once a day, when she uses it more often she has vaginal bleeding.

## 2021-09-16 NOTE — PROGRESS NOTES
[address] [facility]  [address] [place] [address], [address], [address], [address] [place] [address] [place]   [phone]          Patient ID:  Name:  Jason Farrar  MRN:  [id number]  :  1970/51 y.o. Date:  2021      HISTORY OF PRESENT ILLNESS:  Jason Farrar is a 45 y.o.  postmenopausal female referred by Dr. Rubén Holland  With peritoneal cancer. Intitally seen be NP with reports of vaginal bleeding. Given pt's history of hysteretectomy with BSO for endometriosis in  a TVUS was ordered. Which revealed a 6.8 cm x 5.2 cm x 4.6 cm at midline vaginal cuff. This prompted pelvic CT with findings of a lesion measuring 7.6 x 5.8 x 7.2 cm and is compatible with a pelvic neoplasm vs endometrioma given prior history of endometriosis. Tumor markers done on 2018 all normal.  S/p  Exploratory laparotomy, exploration of retroperitoneal spaces, exam under anesthesia with biopsy of rectovaginal mass on 2019. Had sigmoidoscopy which revealed submucosal mass. S/p cycle #6 of carbo/taxol on 2019. S/p cytoreductive surgery with [facility] on 2019. S/p radiation treatment completed in 2019. Was seen in office and had a biopsy c/w recurrence. S/p cycle #6 of Carbo/Doxil on 2020. S/p Exploratory laparotomy. 2. Lysis of adhesions. 3. Simple appendectomy. 4. 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 On . She also had a revision of urostomy that is still leaking. S/p IR biopsy of liver on 2021. Still having vaginal bleeding and occasional rectal bleeding and discharge. She states worse with Neurontin. Neuropathy not well controlled. Has been eating okay.         Labs:  Component      Latest Ref Rng & Units 2021          12:00 AM   Cancer Ag (CA) 125      0.0 - 38.1 U/mL 5.1     Component      Latest Ref Rng & Units 2020          11:11 AM   CA-125 1.5 - 35.0 U/mL 6     Component      Latest Ref Rng & Units 8/31/2020          10:01 AM   CA-125      1.5 - 35.0 U/mL 8     Component      Latest Ref Rng & Units 7/7/2020          11:30 AM   CA-125      1.5 - 35.0 U/mL 5     Component      Latest Ref Rng & Units 6/8/2020          10:40 AM   CA-125      1.5 - 35.0 U/mL 7     Component      Latest Ref Rng & Units 5/11/2020          11:30 AM   CA-125      1.5 - 35.0 U/mL 7     Component      Latest Ref Rng & Units 3/4/2020           8:15 AM   CA-125      1.5 - 35.0 U/mL 7     Component      Latest Ref Rng & Units 11/15/2019           9:56 AM   CA-125      1.5 - 35.0 U/mL 6     Component      Latest Ref Rng & Units 6/6/2019           8:44 AM   CA-125      1.5 - 35.0 U/mL 7     Component      Latest Ref Rng & Units 5/16/2019 4/25/2019           8:26 AM  8:20 AM   Cancer Ag (CA) 125      0.0 - 38.1 U/mL 7.8 8.7     Component      Latest Ref Rng & Units 4/4/2019           8:28 AM   Cancer Ag (CA) 125      0.0 - 38.1 U/mL 8.8     Component      Latest Ref Rng & Units 3/14/2019 2/21/2019           8:15 AM  8:32 AM   Cancer Ag (CA) 125      0.0 - 38.1 U/mL 10.4 18.4     12/17/2018 : 9.3  12/17/2018 CEA: 2.0  12/17/2018 AFP: 3.8    Pathology  8/4/2021  Liver mass, core biopsies:        Metastatic adenocarcinoma, most consistent with serous type.      DIAGNOSIS COMMENT:   Although metastatic ovarian or primary peritoneal carcinoma forming   parenchymal liver lesions is uncommon, the histologic features in this   case are similar to those in the patient's prior rectovaginal mass biopsy   from 2019 and the immunohistochemical features are similar to those   documented in the EMR from a pelvic exenteration specimen performed at an   outside institution in 2020.   4/20/2020  Vaginal biopsy  Papillary serous adenocarcinoma    8/8/2019  A) COLON, PERICOLIC NODULE, EXCISION:      - ACELLULAR MUCIN WITH MULTIPLE CALCIFICATIONS, AND ASSOCIATED FIBROUS WALL WITH        HYALINIZATION, AND CHRONIC INFLAMMATION.     - ADJACENT BENIGN FIBROADIPOSE TISSUE, CONSISTENT WITH MESENTERY.     - NO EVIDENCE OF MALIGNANCY.      - SEE COMMENT. B) LIVER, RIGHT LOBE, BIOPSY:      - NODULAR FAT NECROSIS AND FIBROSIS OF THE LIVER CAPSULE.      - MILD STEATOSIS.     - NO EVIDENCE OF MALIGNANCY. C) COLON, SIGMOID, SEROSAL IMPLANT, EXCISION:      - NODULAR FAT NECROSIS WITH FIBROSIS, CHRONIC INFLAMMATION, CALCIFICATIONS, AND        CYSTIC DEGENERATION WITHOUT MUCIN.     - NO EVIDENCE OF MALIGNANCY. D) OMENTUM, OMENTECTOMY (RESECTION):      - CONGESTED FIBROADIPOSE TISSUE WITH FOCAL FIBROSIS AND CHRONIC INFLAMMATION, AND        CALCIFIED NODULAR FIBROSIS.     - NO EVIDENCE OF MALIGNANCY. E) PERITONEUM, LEFT ANTERIOR ABDOMINAL, RESECTION:      - CONGESTED PERITONEAL TISSUE, NO EVIDENCE OF MALIGNANCY. F) PERITONEUM, RIGHT ANTERIOR ABDOMINAL, RESECTION:      - CONGESTED PERITONEAL TISSUE, NO EVIDENCE OF MALIGNANCY. G) COLON, SIGMOID, SEROSAL IMPLANT, EXCISION:      - NODULAR FIBROSIS WITH HISTIOCYTES, SUGGESTIVE OF FAT NECROSIS.     - CYSTIC DEGENERATION, WITHOUT MUCIN.     - NO EVIDENCE OF MALIGNANCY. H) SOFT TISSUE, FALCIFORM, EXCISION:      - CONSISTENT WITH FALCIFORM LIGAMENT.     - NO EVIDENCE OF MALIGNANCY. PATHOLOGIC STAGE:  ypTx, pNx    1/17/2019   RECTOVAGINAL MASS (#1, 2) AND PELVIC MASS, BIOPSIES:   CONSISTENT WITH SEROUS CARCINOMA WITH EXTENSIVE NECROSIS. Imaging  MRI liver 7/6/2021     FINDINGS:     Abdominal Wall:  There is a circumscribed 8.3 cm-width x 2.4 cm-AP x 2.9  cm-craniocaudal length T2 hyperintense subcutaneous layer fluid collection along  the inferior margin of the ileal conduit. The colostomy site in the left lower  quadrant appears unremarkable.     Liver:  A new ovoid T1-low T2-high signal 1.8 x 1.4 cm lesion is identified in  the segment 8. Another 0.8 x 0.6 cm T1-low T2-high signal focus is identified  in the segment 3. These structures were not apparent on prior studies. With  diffusion imaging, no evidence of restricted diffusion is demonstrated at these  foci.     Biliary:  No evidence for significant common bile duct dilation.     Gallbladder:  Mild distention of the gallbladder. No definite gallstones.     Spleen, Adrenal Glands, Pancreas:  Unremarkable.     Kidneys:  Cyst at the left kidney lower pole region measuring about 1.2 x 1.2  cm. Increase in size compared to prior CTs from above 0.8 x 0.7 cm.     Miscellaneous: The largest of the periportal nodes measures up to about 1.3 x  1.1 cm.     IMPRESSION     1. Focal fluid collection in the subcutaneous tissue adjacent to the ileal  conduit. This fluid collection may be amenable to aspiration under ultrasound  guidance.     2. New hepatic lesions as described. Although there is apparent lack of  restricted diffusion, further investigation with ultrasound is warranted to  assess if these structures are indeed cystic in nature.     3.  Left renal cyst.     4.  Slight az prominence. MRI pelvis 7/6/2021  FINDINGS:     Along the inferior aspect of the ileal conduit stoma site, focal elongated T1  high T2-signal fluid collection is demonstrated to, measuring about 8.3 cm-width  x 2.2 cm-thickness x 2.3 cm-craniocaudal length. In retrospect, there was a  suggestion of possible 7.1 x 3.5 x 2.0 cm hypodense material collection along  the inferior aspect of the subcutaneous abdominal wall portion of the ileal  conduit on the 04/01/21 unenhanced CT. This fluid collection therefore may be  slightly increased in size in the elbow.     The intraperitoneal portion of the ileal conduit appears grossly unremarkable.     The presacral region demonstrates apparent continued pattern of nonspecific mild  edema. The source for this edematous changes is clear.   Most likely related to  residual changes from recent surgical intervention.     Mild edematous changes also noted in the right and to lesser extent left  piriformis muscles. Additional edematous changes are also identified in the  right obturator internus along the superior aspect.      Minimal free fluid in the posterior pelvic region.     There is some intraluminal fluid in the blind loop portion of the rectum. At  the right superior lateral vaginal cuff corner, a vague trail of T2 high signal  is observed (coronal T2 images #8-10 and fat-sat axial T2 image #5-10). This  [place] appears to course quite close to the anterior margin of the rectal remnant  wall. Whether there is indeed communication between the blind rectal segment  and the vaginal cuff can be further assessed with barium enema.     No distinct mass is detected pelvis.     Enlarged nodes are identified along the right pelvic sidewall. The largest  right pelvic sidewall node is identified subjacent to the external iliac  artery/vein vascular bundle, measuring up to about 2.2 x 1.2 cm (fat-sat axial  T2 image #12). Another slightly enlarged node more superiorly measuring about  1.9 x 1.0 cm (image #17). Multiple slightly borderline prominent nodes  identified in the mesentery, measuring up to about 1.2 x 1.0 cm (image #25). Additional note near the aortic bifurcation measuring about 1.3 x 0.9 cm (image  #34).    IMPRESSION     1. Focal T1- and T2-hyperintense subcutaneous fluid collection along the  inferior aspect of the ileal conduit stoma site. Possibly representing a seroma  or a focus of urinoma. This may be amenable to aspiration under ultrasound or  CT guidance.     2.  Questionable subtle trailed between the right superior lateral aspect of the  vaginal cuff with adjacent blind-tipped rectum. More definitive evaluation can  be performed with barium enema.     3.  Slight az prominence along the right pelvic sidewall and in the  mesentery.     4.   No distinct residual or recurrent mass.     5.  Persistent mild presacral edema  PETCT 7/17/2020 FINDINGS:        PET images: Study limited because of patient motion.     Mediastinal blood pool reference value = SUV Max. Mediastinal blood pool reference value = SUV Mean. Liver parenchyma reference value =  4.7   SUV Max. Liver parenchyma reference value =  2.3    SUV Mean.           NECK: There is no suspicious hypermetabolic activity at the neck. CHEST: There is no suspicious hypermetabolic activity at the chest.     ABDOMEN: Typical heterogeneity at the liver. No convincing malignant foci  hypermetabolic activity or underlying CT abnormality. PELVIS: There is soft tissue fullness in the right retrovesicular pelvis just  above the vaginal cuff and posterior to right ureter measuring about 3.8 cm with  Max SUV 13.5 (206), extending to the rectum posteriorly, levators inferiorly on  the right. Previously 4 cm and Max SUV 16.5.     Thickening anterior abdominal wall compatible with scar demonstrating diffuse  modest activity and Max SUV 2.7.      Additional CT findings: Mediport catheter has tip in the superior vena cava. Air  trapping in the superior segments lower lobes. Right-sided nephroureteral stent  without hydronephrosis. No ascites. IMPRESSION  Impression:       Treatment response. Decreased metabolic activity at the right retrovesicular  pelvic mass. No new evidence of metastatic disease.     Interval placement right-sided nephroureteral stent and resolved  hydroureteronephrosis. .     ROS:    As above      Patient Active Problem List    Diagnosis Date Noted    CKD (chronic kidney disease) stage 4, GFR 15-29 ml/min (HCC) 02/11/2021    Acute congestive heart failure ([address] 02/04/2021    COVID-19 12/31/2020    History of abdominal surgery 12/31/2020    Melena 12/30/2020    Cancer of appendix ([address] 11/17/2020    Loss of appetite 10/14/2020    Other hydronephrosis 06/24/2020    Genetic carrier status 09/13/2019    Postoperative nausea 08/27/2019    Ovarian cancer ([address] 08/07/2019  Peritoneal carcinoma ([address] 02/14/2019    Pelvic mass in female 01/17/2019    Irritable bowel syndrome with both constipation and diarrhea 02/09/2018    Subclinical hypothyroidism 01/23/2018    Chronic abdominal pain 01/22/2018    Diverticulosis 01/22/2018    Hx of total hysterectomy 01/22/2018    Hyponatremia 01/22/2018    Advance care planning 01/31/2017    Dental caries 05/26/2016    Chronic periodontal disease 05/26/2016    SUAZO (dyspnea on exertion) 02/10/2016    Prediabetes 09/24/2015    Morbid obesity ([address] 08/31/2015    Arthritis, degenerative 08/31/2015    Gastroesophageal reflux disease without esophagitis 08/31/2015    ANAMIKA on CPAP 08/31/2015    Essential hypertension 08/28/2015    PTSD (post-traumatic stress disorder) 03/24/2014    S/P TKR (total knee replacement) 11/14/2012    Adjustment disorder with depressed mood 09/20/2012    Major depressive disorder, recurrent episode, moderate ([address] 09/20/2012    Suicidal ideation 09/19/2012    Menopause 05/08/2012    Knee pain, right 05/08/2012    Hypokalemia 02/04/2012    Overdose 02/04/2012    Obesity 06/18/2010    Mixed hyperlipidemia 06/18/2010     Past Medical History:   Diagnosis Date    AR (allergic rhinitis)     seasonal    Bladder cancer ([address]     Cancer ([address]     pt states between vgina and rectal area    Cardiac echocardiogram 04/11/2016    Tech difficult. EF 55-60%. No RWMA. Mild conc LVH. Gr 1 DDfx. RVSP normal.      Cardiac nuclear imaging test 05/05/2016    Low risk. Very patchy radiotracer uptake. Mild anterior artifact, low suspicion for ischemia. EF 68%. No RWMA. Normal EKG on pharm stress test.    Cardiovascular LE arterial duplex 10/07/2013    No significant arterial disease at rest bilaterally. R JUNE 1.21.  L JUNE 1.16. No significant sm vessel disease.     Depression     Dr. aLyla Fontana, suicide attempt 2/12    Diverticulosis     Endometriosis     s/p hysterectomy 2006    GERD (gastroesophageal reflux disease)     Glaucoma     Dr. Ashley Wall Hematuria, gross     HTN (hypertension)     Hx of colonoscopy 2017    mild diverticulosis, g1 internal hemorrhoids, normal colon , repeat 10 year     Hx of suicide attempt     Hyperlipidemia LDL goal < 130     IBS (irritable bowel syndrome)     Ill-defined condition     environmental allergies    Insomnia     Malignant neoplasm of peritoneum (HCC) 2019    Nausea & vomiting     OA (osteoarthritis)     both knees, lower back    Preeclampsia     PTSD (post-traumatic stress disorder)     Seizures (HCC)     1 x with childbirth, had toxemia    Sleep apnea     does not use cpap machine    Spinal stenosis     Dr. Jenna Cardenas Vertigo       Past Surgical History:   Procedure Laterality Date    COLONOSCOPY N/A 2017    COLONOSCOPY performed by Bola Ross MD at [place] [address] N/A 2019    SIGMOIDOSCOPY FLEXIBLE performed by Joo Membreno MD at [facility] ENDOSCOPY    HX  SECTION      HX COLONOSCOPY  2017    DR. MCRAE 2017     HX CYSTECTOMY  2020    HX HYSTERECTOMY      HX KNEE ARTHROSCOPY Left     left knee    HX KNEE REPLACEMENT Bilateral     (R)  (L)     HX LAPAROTOMY N/A 2019    and rectovaginal bx    HX OTHER SURGICAL  2016    all teeth removed    HX SALPINGO-OOPHORECTOMY  2008    HX TUBAL LIGATION      IR INSERT TUNL CVC W PORT OVER 5 YEARS  2019    MULTIPLE DELIVERY       1990    MN FEMUR/KNEE SURG UNLISTED Left 2009    External fixator    MN PART REMOVAL COLON W ANASTOMOSIS      MN TOTAL ABDOM HYSTERECTOMY  2006    TRANSURETHRAL RESEC BLADDER NECK        OB History        2    Para   2    Term   2       0    AB   0    Living   0       SAB   0    TAB   0    Ectopic   0    Molar   0    Multiple   0    Live Births   0              Social History     Tobacco Use    Smoking status: Never Smoker    Smokeless tobacco: Never Used   Substance Use Topics    Alcohol use: No      Family History   Problem Relation Age of Onset    Heart Disease Mother         CHF    Diabetes Mother     Hypertension Mother     Kidney Disease Mother     Breast Cancer Mother     Stroke Mother     Diabetes Father     Hypertension Father     Heart Attack Father         MI    Cancer Maternal Grandmother         stomach    Ovarian Cancer Maternal Aunt     Cancer Maternal Uncle       Current Outpatient Medications   Medication Sig    oxyCODONE-acetaminophen (Percocet) 5-325 mg per tablet Take 1 Tablet by mouth every four (4) hours as needed for Pain for up to 14 days. Max Daily Amount: 6 Tablets.  metoprolol succinate (TOPROL-XL) 100 mg tablet take 1 tablet by mouth once daily    gabapentin (NEURONTIN) 100 mg capsule Take 1 Capsule by mouth three (3) times daily. Max Daily Amount: 300 mg.    meclizine (ANTIVERT) 25 mg tablet take 1 tablet by mouth three times a day if needed FOR DIZZINESS    PARoxetine (PAXIL) 20 mg tablet Take 1 Tab by mouth daily.  amLODIPine (NORVASC) 10 mg tablet take 1 tablet by mouth once daily    latanoprost (XALATAN) 0.005 % ophthalmic solution Administer 1 Drop to both eyes nightly.  simvastatin (ZOCOR) 20 mg tablet take 1 tablet by mouth at bedtime     No current facility-administered medications for this visit. Allergies   Allergen Reactions    Seafood Hives     FISH    Other Medication Hives     seafood    Pollen Extracts Other (comments)    Shellfish Derived Hives    Pantoprazole Other (comments)     Bleeding in stomach    Promethazine Other (comments)     Bleeding in stomach          OBJECTIVE:    Physical Exam  VITAL SIGNS: Visit Vitals  LMP 01/31/2008      GENERAL EMILY: in no apparent distress and well developed and well nourished   PELVIC: 2 cm friable mass at vaginal apex.             IMPRESSION/PLAN:  1.stage IV Primary peritoneal cancer with  recurrence   -previoulsy reviewed her pathology    -s/p carbo (auc=6), taxol (175 mg/m2) IV every 3 wks s/p 6 cycles completed 6/6/2019   -s/p cytoreductive surgery with [facility] with dr. Tatianna Tamez   -s/p pelvic radiation   Pain-script for percocet    -given platinum sensitive disease s/p  auc=5, Doxil 30 mg/m2 IV x 6 cycles s/p Total pelvic exenteration   -biopsy c/w recurrence-reviewed Caris and discussed treatment options. We discussed proceeding with hormonal therapy given tumor is ER\ME positive, immunotherapy given PD-L1 mutation although not FDA approved. Also discussed retreatment with platinum and if sensitive consider PARP inhibition. After good discussion we will plan to treat with letrozole 2.5 mg daily. We will reach out to company regarding [employer]. 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   Will monitor   -neuropathy-we will try Lyrica 25 mg 3 times daily   -Patient agreeable with plan and will follow up in 1 month           The total time spent was 40 minutes regarding this patients diagnosis of primary peritoneal cancer and >50% of this time was spent counseling and coordinating care    Dima Garcia MD  Gynecologic Oncology  5/85/725311:53 AM

## 2021-09-17 ENCOUNTER — OFFICE VISIT (OUTPATIENT)
Dept: ONCOLOGY | Age: 51
End: 2021-09-17
Payer: MEDICARE

## 2021-09-17 VITALS
SYSTOLIC BLOOD PRESSURE: 108 MMHG | WEIGHT: 254 LBS | DIASTOLIC BLOOD PRESSURE: 67 MMHG | OXYGEN SATURATION: 98 % | HEIGHT: 63 IN | BODY MASS INDEX: 45 KG/M2 | HEART RATE: 60 BPM | TEMPERATURE: 98.1 F

## 2021-09-17 DIAGNOSIS — G62.9 NEUROPATHY: Primary | ICD-10-CM

## 2021-09-17 DIAGNOSIS — C48.2 PERITONEAL CARCINOMA (HCC): ICD-10-CM

## 2021-09-17 PROCEDURE — G8752 SYS BP LESS 140: HCPCS | Performed by: OBSTETRICS & GYNECOLOGY

## 2021-09-17 PROCEDURE — G9899 SCRN MAM PERF RSLTS DOC: HCPCS | Performed by: OBSTETRICS & GYNECOLOGY

## 2021-09-17 PROCEDURE — G8417 CALC BMI ABV UP PARAM F/U: HCPCS | Performed by: OBSTETRICS & GYNECOLOGY

## 2021-09-17 PROCEDURE — G9717 DOC PT DX DEP/BP F/U NT REQ: HCPCS | Performed by: OBSTETRICS & GYNECOLOGY

## 2021-09-17 PROCEDURE — G8427 DOCREV CUR MEDS BY ELIG CLIN: HCPCS | Performed by: OBSTETRICS & GYNECOLOGY

## 2021-09-17 PROCEDURE — 99215 OFFICE O/P EST HI 40 MIN: CPT | Performed by: OBSTETRICS & GYNECOLOGY

## 2021-09-17 PROCEDURE — G9711 PT HX TOT COL OR COLON CA: HCPCS | Performed by: OBSTETRICS & GYNECOLOGY

## 2021-09-17 PROCEDURE — G8754 DIAS BP LESS 90: HCPCS | Performed by: OBSTETRICS & GYNECOLOGY

## 2021-09-17 RX ORDER — LETROZOLE 2.5 MG/1
2.5 TABLET, FILM COATED ORAL DAILY
Qty: 90 TABLET | Refills: 1 | Status: SHIPPED | OUTPATIENT
Start: 2021-09-17 | End: 2022-02-24 | Stop reason: SDUPTHER

## 2021-09-17 RX ORDER — PREGABALIN 25 MG/1
25 CAPSULE ORAL 3 TIMES DAILY
Qty: 90 CAPSULE | Refills: 3 | Status: SHIPPED | OUTPATIENT
Start: 2021-09-17 | End: 2022-04-15 | Stop reason: SDUPTHER

## 2021-09-17 NOTE — LETTER
9/17/2021    Patient: Paulo Zimmerman   YOB: 1970   Date of Visit: 9/17/2021     [address]  [address]  [address] [facility]    Dear Chinyere Gonsalez MD,      Thank you for referring Ms. Paulo Zimmerman to [address] [place] [address] for evaluation. My notes for this consultation are attached. If you have questions, please do not hesitate to call me. I look forward to following your patient along with you.       Sincerely,    Kenneth Russell MD

## 2021-09-17 NOTE — PROGRESS NOTES
Castro Valenzuela is a 45 y.o. female presents in office for paritoneal cancer, Caris results. Chief Complaint   Patient presents with    Cancer      Pt c/o 5/10 nerve pain, location varies by day per patient. Treated with Gabapentin, which causes bleeding. Pt has colostomy and urostomy. Do you have any unusual vaginal bleeding, discharge or irritation? Pt c/o vaginal and rectal bleeding, worsened with Gabapentin use. Described as a menstrual cycle. Do you have any changes in your bowel movements? Pt has colostomy. Have you been experiencing nausea or vomiting? Pt c/o daily nausea. Have you been experiencing any continuous or worsening abdominal pain? Pt c/o occasional abdominal pain. Gabapentin causes cramping. Any urinary burning? Pt has urostomy. Visit Vitals  /67 (BP 1 Location: Left arm, BP Patient Position: Sitting)   Pulse 60   Temp 98.1 °F (36.7 °C) (Oral)   Ht 5' 3\" (1.6 m)   Wt 115.2 kg (254 lb)   SpO2 98%   BMI 44.99 kg/m²         1. Have you been to the ER, urgent care clinic since your last visit? Hospitalized since your last visit? Yes Reason for visit: Fever in December, Swollen ankles in Spring/Summer 2021    2. Have you seen or consulted any other health care providers outside of the [address] [place] [address] since your last visit? Include any pap smears or colon screening. Yes Dr. Werner Briggs, Dr. Breezy Vargas    3 most recent [facility] Screens 3/30/2021   Little interest or pleasure in doing things Not at all   Feeling down, depressed, irritable, or hopeless Not at all   Total Score PHQ 2 0       Abuse Screening Questionnaire 3/30/2021   Do you ever feel afraid of your partner? N   Are you in a relationship with someone who physically or mentally threatens you? N   Is it safe for you to go home? Y       Fall Risk Assessment, last 12 mths 1/11/2021   Able to walk? Yes   Fall in past 12 months? 0   Do you feel unsteady?  0   Are you worried about falling 0       Learning Assessment 3/30/2021 PRIMARY LEARNER Patient   HIGHEST LEVEL OF EDUCATION - PRIMARY LEARNER  -   BARRIERS PRIMARY LEARNER -   CO-LEARNER CAREGIVER -   PRIMARY LANGUAGE ENGLISH   LEARNER PREFERENCE PRIMARY DEMONSTRATION     -   ANSWERED BY patient   RELATIONSHIP SELF

## 2021-09-27 DIAGNOSIS — G89.3 CANCER ASSOCIATED PAIN: ICD-10-CM

## 2021-09-27 RX ORDER — OXYCODONE AND ACETAMINOPHEN 5; 325 MG/1; MG/1
1 TABLET ORAL
Qty: 60 TABLET | Refills: 0 | Status: SHIPPED | OUTPATIENT
Start: 2021-09-27 | End: 2021-10-13 | Stop reason: SDUPTHER

## 2021-10-13 ENCOUNTER — OFFICE VISIT (OUTPATIENT)
Dept: ONCOLOGY | Age: 51
End: 2021-10-13
Payer: MEDICARE

## 2021-10-13 VITALS
HEIGHT: 63 IN | RESPIRATION RATE: 16 BRPM | TEMPERATURE: 97.9 F | BODY MASS INDEX: 45.54 KG/M2 | SYSTOLIC BLOOD PRESSURE: 134 MMHG | DIASTOLIC BLOOD PRESSURE: 74 MMHG | HEART RATE: 60 BPM | OXYGEN SATURATION: 100 % | WEIGHT: 257 LBS

## 2021-10-13 DIAGNOSIS — G89.3 CANCER ASSOCIATED PAIN: ICD-10-CM

## 2021-10-13 PROCEDURE — G8754 DIAS BP LESS 90: HCPCS | Performed by: OBSTETRICS & GYNECOLOGY

## 2021-10-13 PROCEDURE — G9717 DOC PT DX DEP/BP F/U NT REQ: HCPCS | Performed by: OBSTETRICS & GYNECOLOGY

## 2021-10-13 PROCEDURE — G8427 DOCREV CUR MEDS BY ELIG CLIN: HCPCS | Performed by: OBSTETRICS & GYNECOLOGY

## 2021-10-13 PROCEDURE — G8752 SYS BP LESS 140: HCPCS | Performed by: OBSTETRICS & GYNECOLOGY

## 2021-10-13 PROCEDURE — G9711 PT HX TOT COL OR COLON CA: HCPCS | Performed by: OBSTETRICS & GYNECOLOGY

## 2021-10-13 PROCEDURE — 99214 OFFICE O/P EST MOD 30 MIN: CPT | Performed by: OBSTETRICS & GYNECOLOGY

## 2021-10-13 PROCEDURE — G9899 SCRN MAM PERF RSLTS DOC: HCPCS | Performed by: OBSTETRICS & GYNECOLOGY

## 2021-10-13 PROCEDURE — G8417 CALC BMI ABV UP PARAM F/U: HCPCS | Performed by: OBSTETRICS & GYNECOLOGY

## 2021-10-13 RX ORDER — OXYCODONE AND ACETAMINOPHEN 5; 325 MG/1; MG/1
1 TABLET ORAL
Qty: 40 TABLET | Refills: 0 | Status: SHIPPED | OUTPATIENT
Start: 2021-10-13 | End: 2021-10-27 | Stop reason: SDUPTHER

## 2021-10-13 NOTE — LETTER
10/13/2021    Patient: Antonia Mallory   YOB: 1970   Date of Visit: 10/13/2021     [address]  [address]  [address] [facility]    Dear Vijay Calderon MD,      Thank you for referring Ms. Antonia Mallory to [address] [place] [address] for evaluation. My notes for this consultation are attached. If you have questions, please do not hesitate to call me. I look forward to following your patient along with you.       Sincerely,    Sam Meléndez MD

## 2021-10-13 NOTE — PROGRESS NOTES
Lorna Zacarias is a 45 y.o. female presents in office for paritoneal cancer. Chief Complaint   Patient presents with    Cancer    Follow-up      Pt c/o 5/10 nerve pain, location varies by day per patient. Treated with Lyrica once a day PRN. Pt c/o decreased appetite. Pt c/o dizziness, pt states it occurs even when she eats. Pt c/o blurry vision and headaches. Pt states one episode of walking and 'leg feeling like dead weight, couldn't calk on it'. Pt c/o left ankle swollen since Sunday, monitored by cardiac. Pt c/o frequent cold chills. Pt c/o neuropathy in hands and feet, mainly affects the left side. Pt states it sometimes 'hits my shoulder and jaw'. Do you have any unusual vaginal bleeding, discharge or irritation? Pt c/o vaginal and rectal bleeding, worsened with Gabapentin use. Described as a menstrual cycle. Do you have any changes in your bowel movements? Pt has colostomy, c/o diarrhea. Have you been experiencing nausea or vomiting? Pt c/o daily nausea. Have you been experiencing any continuous or worsening abdominal pain? Pt c/o occasional abdominal pain. Gabapentin causes cramping. Any urinary burning? Pt has urostomy. Visit Vitals  /74 (BP 1 Location: Left arm, BP Patient Position: Sitting)   Pulse 60   Temp 97.9 °F (36.6 °C) (Oral)   Resp 16   Ht 5' 3\" (1.6 m)   Wt 116.6 kg (257 lb)   SpO2 100%   BMI 45.53 kg/m²         1. Have you been to the ER, urgent care clinic since your last visit? Hospitalized since your last visit? No    2. Have you seen or consulted any other health care providers outside of the [address] [place] [address] since your last visit? Include any pap smears or colon screening. No    3 most recent PHQ Screens 3/30/2021   Little interest or pleasure in doing things Not at all   Feeling down, depressed, irritable, or hopeless Not at all   Total Score PHQ 2 0       Abuse Screening Questionnaire 3/30/2021   Do you ever feel afraid of your partner?  N   Are you in a relationship with someone who physically or mentally threatens you? N   Is it safe for you to go home? Y       Fall Risk Assessment, last 12 mths 1/11/2021   Able to walk? Yes   Fall in past 12 months? 0   Do you feel unsteady?  0   Are you worried about falling 0       Learning Assessment 3/30/2021   PRIMARY LEARNER Patient   HIGHEST LEVEL OF EDUCATION - PRIMARY LEARNER  -   BARRIERS PRIMARY LEARNER -   CO-LEARNER CAREGIVER -   PRIMARY LANGUAGE ENGLISH   LEARNER PREFERENCE PRIMARY DEMONSTRATION     -   ANSWERED BY patient   RELATIONSHIP SELF

## 2021-10-14 ENCOUNTER — TELEPHONE (OUTPATIENT)
Dept: ONCOLOGY | Age: 51
End: 2021-10-14

## 2021-10-20 ENCOUNTER — TELEPHONE (OUTPATIENT)
Dept: ONCOLOGY | Age: 51
End: 2021-10-20

## 2021-10-20 DIAGNOSIS — G89.3 CANCER ASSOCIATED PAIN: ICD-10-CM

## 2021-10-21 NOTE — TELEPHONE ENCOUNTER
Spoke with patient who stated that she only received 40 pills of Percocet, not 60 pills. Explained that Dr. Marion Bethea only prescribed 36, this was why she only received 36. Patient had no further questions.

## 2021-10-26 ENCOUNTER — TELEPHONE (OUTPATIENT)
Dept: ONCOLOGY | Age: 51
End: 2021-10-26

## 2021-10-26 NOTE — TELEPHONE ENCOUNTER
Pt called requesting refill on pain medication. Pt also would like to speak with nurse in ref to her care.

## 2021-10-27 DIAGNOSIS — G89.3 CANCER ASSOCIATED PAIN: ICD-10-CM

## 2021-10-27 RX ORDER — OXYCODONE AND ACETAMINOPHEN 5; 325 MG/1; MG/1
1 TABLET ORAL
Qty: 40 TABLET | Refills: 0 | Status: SHIPPED | OUTPATIENT
Start: 2021-10-27 | End: 2021-11-10

## 2021-10-27 NOTE — TELEPHONE ENCOUNTER
Patient requested a refill on pain medication. Patient stated she wasn't able to go to her infusion appointment due to death in her family.

## 2021-11-02 ENCOUNTER — TELEPHONE (OUTPATIENT)
Dept: ONCOLOGY | Age: 51
End: 2021-11-02

## 2021-11-02 DIAGNOSIS — G89.3 CANCER ASSOCIATED PAIN: Primary | ICD-10-CM

## 2021-11-02 DIAGNOSIS — C48.2 PERITONEAL CARCINOMA (HCC): ICD-10-CM

## 2021-11-02 RX ORDER — HYDROMORPHONE HYDROCHLORIDE 2 MG/1
2 TABLET ORAL
Qty: 40 TABLET | Refills: 0 | Status: SHIPPED | OUTPATIENT
Start: 2021-11-02 | End: 2021-11-16

## 2021-11-02 RX ORDER — AMLODIPINE BESYLATE 10 MG/1
TABLET ORAL
Qty: 90 TABLET | Refills: 1 | Status: SHIPPED | OUTPATIENT
Start: 2021-11-02 | End: 2022-05-31

## 2021-11-02 NOTE — TELEPHONE ENCOUNTER
Spoke with patient who stated that her spotting and bleeding had stopped after her last visit, but has recently restarted so she has stopped taking Lyrica. She believes the Lyrica causes bleeding and increased activity also contributed. States she will 'stay off my feet so I don't bleed'. She c/o 'extreme cramping' in her abdomen. She is requesting a stronger pain medication to help with her pain.

## 2021-11-11 ENCOUNTER — TELEPHONE (OUTPATIENT)
Dept: ONCOLOGY | Age: 51
End: 2021-11-11

## 2021-11-11 NOTE — TELEPHONE ENCOUNTER
Spoke with Ms. David Gonzalez who stated that she received COVID-19 booster shot yesterday and having L arm pain, fatigue, and nausea and wanted to know if she made a mistake by getting the booster. She also stated that the Dilaudid she is taking is not strong enough and will like to go back to the percocet. I explained to Ms. David Gonzalez that she did not make a mistake by getting the COVID-19 booster shot and the symptoms she is experiencing are expected and should go away with time. However, if the symptoms persists for more than a week, she should follow-up with her PCP. Also, I will send her percocet once she is due for a refill after 11/16/2021. Ms. David Gonzalez agreed.

## 2021-11-11 NOTE — TELEPHONE ENCOUNTER
Pt called wanting to speak to nurse in ref to side effects from COVID vaccine yesterday    NP ANA informed and call transferred to her.

## 2021-11-16 ENCOUNTER — TELEPHONE (OUTPATIENT)
Dept: ONCOLOGY | Age: 51
End: 2021-11-16

## 2021-11-16 DIAGNOSIS — G89.3 CANCER ASSOCIATED PAIN: Primary | ICD-10-CM

## 2021-11-16 RX ORDER — OXYCODONE AND ACETAMINOPHEN 5; 325 MG/1; MG/1
1 TABLET ORAL
Qty: 40 TABLET | Refills: 0 | Status: SHIPPED | OUTPATIENT
Start: 2021-11-16 | End: 2021-11-30

## 2021-11-16 NOTE — TELEPHONE ENCOUNTER
Patient requested a refill on pain medication. Patient stated the new medication that was prescribed isn't working and patient would like to go back [place] [facility] [place]. Patient also would like a call back regarding her KEYTRUDA treatments.

## 2021-11-19 NOTE — TELEPHONE ENCOUNTER
Spoke with patient who stated that she had received a call to schedule her Keytruda treatment. She wanted an update on status. Checked with [facility] who relayed that the call was made in error, authorization and [employer] assistance program enrollment are still pending. I will call Ms. Campbell Standing back once an update is available.

## 2021-11-29 ENCOUNTER — TELEPHONE (OUTPATIENT)
Dept: ONCOLOGY | Age: 51
End: 2021-11-29

## 2021-11-29 DIAGNOSIS — G89.3 CANCER ASSOCIATED PAIN: ICD-10-CM

## 2021-11-29 DIAGNOSIS — G89.3 CANCER ASSOCIATED PAIN: Primary | ICD-10-CM

## 2021-11-29 RX ORDER — ONDANSETRON 4 MG/1
4 TABLET, ORALLY DISINTEGRATING ORAL
Qty: 40 TABLET | Refills: 0 | Status: SHIPPED | OUTPATIENT
Start: 2021-11-29 | End: 2022-03-09 | Stop reason: ALTCHOICE

## 2021-11-29 RX ORDER — ONDANSETRON 4 MG/1
4 TABLET, FILM COATED ORAL EVERY 6 HOURS
Qty: 56 TABLET | Refills: 0 | Status: CANCELLED | OUTPATIENT
Start: 2021-11-29 | End: 2021-12-13

## 2021-11-29 RX ORDER — OXYCODONE AND ACETAMINOPHEN 5; 325 MG/1; MG/1
1 TABLET ORAL
Qty: 40 TABLET | Refills: 0 | Status: CANCELLED | OUTPATIENT
Start: 2021-11-29 | End: 2021-12-13

## 2021-11-29 RX ORDER — OXYCODONE AND ACETAMINOPHEN 5; 325 MG/1; MG/1
1 TABLET ORAL
Qty: 40 TABLET | Refills: 0 | Status: SHIPPED | OUTPATIENT
Start: 2021-11-30 | End: 2021-11-29

## 2021-11-29 RX ORDER — OXYCODONE AND ACETAMINOPHEN 5; 325 MG/1; MG/1
1 TABLET ORAL
Qty: 40 TABLET | Refills: 0 | Status: SHIPPED | OUTPATIENT
Start: 2021-11-30 | End: 2021-11-29 | Stop reason: SDUPTHER

## 2021-11-29 RX ORDER — ONDANSETRON 4 MG/1
4 TABLET, ORALLY DISINTEGRATING ORAL
Qty: 40 TABLET | Refills: 0 | Status: SHIPPED | OUTPATIENT
Start: 2021-11-29 | End: 2021-11-29

## 2021-11-29 NOTE — TELEPHONE ENCOUNTER
Last OV 08/31/2021  Next OV none scheduled    Due for f/u Return in about 6 months (around 2/28/2022),

## 2021-11-29 NOTE — TELEPHONE ENCOUNTER
Pt called and would like a new script for Meclizine 25mg to be called into the [facility].

## 2021-11-29 NOTE — TELEPHONE ENCOUNTER
Pt called requesting refill on Zofran and pain medication. Pt stated [facility] was not approved and would like to discuss in ref to this. What is the next step?

## 2021-11-29 NOTE — TELEPHONE ENCOUNTER
Spoke with patient regarding Jeny Tang denial, relayed that we expected this and the application for the [employer] Patient Assistance Program is being processed and she may receive a call from them if more information is needed. Pt c/o fatigue, nausea, and dizziness. Relayed that the fatigue and nausea will improve with time. Pt will reach out to PCP for vertigo medication refill. I will forward the request for zofran and pain medication to JOHN Orozco.

## 2021-11-30 RX ORDER — MECLIZINE HYDROCHLORIDE 25 MG/1
TABLET ORAL
Qty: 30 TABLET | Refills: 0 | Status: SHIPPED | OUTPATIENT
Start: 2021-11-30 | End: 2022-08-31

## 2021-12-02 ENCOUNTER — TELEPHONE (OUTPATIENT)
Dept: ONCOLOGY | Age: 51
End: 2021-12-02

## 2021-12-02 NOTE — TELEPHONE ENCOUNTER
Received call from [facility] requesting the office have patient call to provide income information. Spoke with patient and relayed information, request she call [phone] to provide requested information.

## 2021-12-03 ENCOUNTER — TELEPHONE (OUTPATIENT)
Dept: ONCOLOGY | Age: 51
End: 2021-12-03

## 2021-12-03 NOTE — TELEPHONE ENCOUNTER
Pt called stating she was informed by YADIRA ALVAREZ [facility] that she had been approved for [facility] and would like to speak w/ you in ref to this.

## 2021-12-06 ENCOUNTER — TELEPHONE (OUTPATIENT)
Dept: ONCOLOGY | Age: 51
End: 2021-12-06

## 2021-12-06 NOTE — TELEPHONE ENCOUNTER
Spoke with patient and relayed that we received approval from the [facility] assistance program. Baron Castro will contact her to schedule her lab and treatment appointments. Explained to patient that treatment is every 6 weeks and labs will be ever other week. Patient expressed understanding.

## 2021-12-07 ENCOUNTER — APPOINTMENT (OUTPATIENT)
Dept: INFUSION THERAPY | Age: 51
End: 2021-12-07

## 2021-12-10 RX ORDER — HEPARIN 100 UNIT/ML
300-500 SYRINGE INTRAVENOUS AS NEEDED
Status: CANCELLED
Start: 2022-01-28

## 2021-12-10 RX ORDER — HYDROCORTISONE SODIUM SUCCINATE 100 MG/2ML
100 INJECTION, POWDER, FOR SOLUTION INTRAMUSCULAR; INTRAVENOUS AS NEEDED
Status: CANCELLED | OUTPATIENT
Start: 2022-01-28

## 2021-12-10 RX ORDER — ONDANSETRON 2 MG/ML
8 INJECTION INTRAMUSCULAR; INTRAVENOUS AS NEEDED
Status: CANCELLED | OUTPATIENT
Start: 2022-01-28

## 2021-12-10 RX ORDER — DIPHENHYDRAMINE HYDROCHLORIDE 50 MG/ML
25 INJECTION, SOLUTION INTRAMUSCULAR; INTRAVENOUS AS NEEDED
Status: CANCELLED
Start: 2022-01-28

## 2021-12-10 RX ORDER — SODIUM CHLORIDE 9 MG/ML
10 INJECTION INTRAMUSCULAR; INTRAVENOUS; SUBCUTANEOUS AS NEEDED
Status: CANCELLED | OUTPATIENT
Start: 2022-01-28

## 2021-12-10 RX ORDER — POTASSIUM CHLORIDE 7.45 MG/ML
10 INJECTION INTRAVENOUS
Status: CANCELLED
Start: 2022-01-28

## 2021-12-10 RX ORDER — DIPHENHYDRAMINE HYDROCHLORIDE 50 MG/ML
50 INJECTION, SOLUTION INTRAMUSCULAR; INTRAVENOUS AS NEEDED
Status: CANCELLED
Start: 2022-01-28

## 2021-12-10 RX ORDER — ACETAMINOPHEN 325 MG/1
650 TABLET ORAL AS NEEDED
Status: CANCELLED
Start: 2022-01-28

## 2021-12-10 RX ORDER — SODIUM CHLORIDE 0.9 % (FLUSH) 0.9 %
10 SYRINGE (ML) INJECTION AS NEEDED
Status: CANCELLED | OUTPATIENT
Start: 2022-01-28

## 2021-12-10 RX ORDER — SODIUM CHLORIDE 9 MG/ML
25 INJECTION, SOLUTION INTRAVENOUS CONTINUOUS
Status: CANCELLED | OUTPATIENT
Start: 2022-01-28

## 2021-12-10 RX ORDER — EPINEPHRINE 1 MG/ML
0.3 INJECTION, SOLUTION, CONCENTRATE INTRAVENOUS AS NEEDED
Status: CANCELLED | OUTPATIENT
Start: 2022-01-28

## 2021-12-10 RX ORDER — ALBUTEROL SULFATE 0.83 MG/ML
2.5 SOLUTION RESPIRATORY (INHALATION) AS NEEDED
Status: CANCELLED
Start: 2022-01-28

## 2021-12-15 ENCOUNTER — TELEPHONE (OUTPATIENT)
Dept: ONCOLOGY | Age: 51
End: 2021-12-15

## 2021-12-15 DIAGNOSIS — G89.3 CANCER ASSOCIATED PAIN: Primary | ICD-10-CM

## 2021-12-15 RX ORDER — OXYCODONE AND ACETAMINOPHEN 5; 325 MG/1; MG/1
1 TABLET ORAL
Qty: 40 TABLET | Refills: 0 | Status: SHIPPED | OUTPATIENT
Start: 2021-12-15 | End: 2021-12-29

## 2021-12-15 NOTE — TELEPHONE ENCOUNTER
Patient requested a refill on pain medication. Patient confirmed pharmacy on file. Made patient aware that Juanjose Hartman is in surgery today and will also be in surgery tomorrow as well.

## 2021-12-16 ENCOUNTER — TELEPHONE (OUTPATIENT)
Dept: ONCOLOGY | Age: 51
End: 2021-12-16

## 2021-12-16 ENCOUNTER — HOSPITAL ENCOUNTER (OUTPATIENT)
Dept: INFUSION THERAPY | Age: 51
Discharge: HOME | End: 2021-12-16
Payer: MEDICARE

## 2021-12-16 ENCOUNTER — TELEPHONE (OUTPATIENT)
Dept: FAMILY MEDICINE CLINIC | Age: 51
End: 2021-12-16

## 2021-12-16 VITALS
SYSTOLIC BLOOD PRESSURE: 165 MMHG | BODY MASS INDEX: 45.43 KG/M2 | OXYGEN SATURATION: 93 % | HEIGHT: 63 IN | HEART RATE: 63 BPM | WEIGHT: 256.4 LBS | TEMPERATURE: 97.8 F | DIASTOLIC BLOOD PRESSURE: 80 MMHG | RESPIRATION RATE: 18 BRPM

## 2021-12-16 LAB
ALBUMIN SERPL-MCNC: 3.3 G/DL (ref 3.4–5)
ALBUMIN/GLOB SERPL: 0.8 {RATIO} (ref 0.8–1.7)
ALP SERPL-CCNC: 94 U/L (ref 45–117)
ALT SERPL-CCNC: 15 U/L (ref 13–56)
ANION GAP SERPL CALC-SCNC: 5 MMOL/L (ref 3–18)
APPEARANCE UR: CLEAR
AST SERPL-CCNC: 8 U/L (ref 10–38)
BACTERIA URNS QL MICRO: ABNORMAL /HPF
BASO+EOS+MONOS # BLD AUTO: 0.5 K/UL (ref 0–2.3)
BASO+EOS+MONOS NFR BLD AUTO: 6 % (ref 0.1–17)
BILIRUB SERPL-MCNC: 0.3 MG/DL (ref 0.2–1)
BILIRUB UR QL: NEGATIVE
BUN SERPL-MCNC: 42 MG/DL (ref 7–18)
BUN/CREAT SERPL: 19 (ref 12–20)
CALCIUM SERPL-MCNC: 8.6 MG/DL (ref 8.5–10.1)
CHLORIDE SERPL-SCNC: 112 MMOL/L (ref 100–111)
CO2 SERPL-SCNC: 22 MMOL/L (ref 21–32)
COLOR UR: YELLOW
CREAT SERPL-MCNC: 2.22 MG/DL (ref 0.6–1.3)
DIFFERENTIAL METHOD BLD: ABNORMAL
EPITH CASTS URNS QL MICRO: NEGATIVE /LPF (ref 0–5)
ERYTHROCYTE [DISTWIDTH] IN BLOOD BY AUTOMATED COUNT: 17.9 % (ref 11.5–14.5)
GLOBULIN SER CALC-MCNC: 4.1 G/DL (ref 2–4)
GLUCOSE SERPL-MCNC: 88 MG/DL (ref 74–99)
GLUCOSE UR STRIP.AUTO-MCNC: NEGATIVE MG/DL
HBV CORE IGM SER QL: NEGATIVE
HBV SURFACE AG SER QL: <0.1 INDEX
HBV SURFACE AG SER QL: NEGATIVE
HCT VFR BLD AUTO: 33.1 % (ref 36–48)
HGB BLD-MCNC: 9.8 G/DL (ref 12–16)
HGB UR QL STRIP: ABNORMAL
KETONES UR QL STRIP.AUTO: NEGATIVE MG/DL
LEUKOCYTE ESTERASE UR QL STRIP.AUTO: ABNORMAL
LYMPHOCYTES # BLD: 1.3 K/UL (ref 1.1–5.9)
LYMPHOCYTES NFR BLD: 17 % (ref 14–44)
MAGNESIUM SERPL-MCNC: 1.4 MG/DL (ref 1.6–2.6)
MCH RBC QN AUTO: 26.1 PG (ref 25–35)
MCHC RBC AUTO-ENTMCNC: 29.6 G/DL (ref 31–37)
MCV RBC AUTO: 88 FL (ref 78–102)
NEUTS SEG # BLD: 5.8 K/UL (ref 1.8–9.5)
NEUTS SEG NFR BLD: 77 % (ref 40–70)
NITRITE UR QL STRIP.AUTO: NEGATIVE
PH UR STRIP: 7 [PH] (ref 5–8)
PLATELET # BLD AUTO: 348 K/UL (ref 140–440)
POTASSIUM SERPL-SCNC: 4.9 MMOL/L (ref 3.5–5.5)
PROT SERPL-MCNC: 7.4 G/DL (ref 6.4–8.2)
PROT UR STRIP-MCNC: 300 MG/DL
RBC # BLD AUTO: 3.76 M/UL (ref 4.1–5.1)
RBC #/AREA URNS HPF: ABNORMAL /HPF (ref 0–5)
SODIUM SERPL-SCNC: 139 MMOL/L (ref 136–145)
SP GR UR REFRACTOMETRY: 1.01 (ref 1–1.03)
TSH SERPL DL<=0.05 MIU/L-ACNC: 3.43 UIU/ML (ref 0.36–3.74)
UROBILINOGEN UR QL STRIP.AUTO: 0.2 EU/DL (ref 0.2–1)
WBC # BLD AUTO: 7.6 K/UL (ref 4.5–13)
WBC URNS QL MICRO: ABNORMAL /HPF (ref 0–5)

## 2021-12-16 PROCEDURE — 81001 URINALYSIS AUTO W/SCOPE: CPT

## 2021-12-16 PROCEDURE — 36415 COLL VENOUS BLD VENIPUNCTURE: CPT

## 2021-12-16 PROCEDURE — 86304 IMMUNOASSAY TUMOR CA 125: CPT

## 2021-12-16 PROCEDURE — 86704 HEP B CORE ANTIBODY TOTAL: CPT

## 2021-12-16 PROCEDURE — 80053 COMPREHEN METABOLIC PANEL: CPT

## 2021-12-16 PROCEDURE — 84443 ASSAY THYROID STIM HORMONE: CPT

## 2021-12-16 PROCEDURE — 85025 COMPLETE CBC W/AUTO DIFF WBC: CPT

## 2021-12-16 PROCEDURE — 87186 SC STD MICRODIL/AGAR DIL: CPT

## 2021-12-16 PROCEDURE — 87086 URINE CULTURE/COLONY COUNT: CPT

## 2021-12-16 PROCEDURE — 83735 ASSAY OF MAGNESIUM: CPT

## 2021-12-16 PROCEDURE — 86705 HEP B CORE ANTIBODY IGM: CPT

## 2021-12-16 PROCEDURE — 87340 HEPATITIS B SURFACE AG IA: CPT

## 2021-12-16 PROCEDURE — 87077 CULTURE AEROBIC IDENTIFY: CPT

## 2021-12-16 NOTE — TELEPHONE ENCOUNTER
Was notified by nurse Rosy Phelps in [facility] of patient labs (Serum Creatinine 2.2, Urinalysis protein 300, wbc 15-20, bacteria 2+ and blood trace). will postpone C # 1 of Alphonso Sniff for now d/t elevated creatinine, verified with Dr. Marlon Apple. I notified Ms. Cannon of elevated creat of 2.2 and informed her that scheduled treatment for Alphonso Bruce will be postponed. I advised her to f/u with PCP and nephrology; and continued that treatment will be resumed once creatinine is wnl. All of Ms. Cannon's questions were answered.

## 2021-12-16 NOTE — TELEPHONE ENCOUNTER
----- Message from Vernon Burr sent at 12/16/2021  3:24 PM EST -----  Subject: Referral Request    QUESTIONS   Reason for referral request? pt need to have referral for kidney   specialist due to her elevated kidney levels she cannot start her jonathan   treatments with out seeing a kidney specialist please send referral and   call pt to advise   Has the physician seen you for this condition before? No   Preferred Specialist (if applicable)? Do you already have an appointment scheduled? No  Additional Information for Provider? needs this referral ASAP please call   to advise  ---------------------------------------------------------------------------  --------------  CALL BACK INFO  What is the best way for the office to contact you? OK to leave message on   voicemail  Preferred Call Back Phone Number?  [phone]

## 2021-12-16 NOTE — PROGRESS NOTES
[facility] Lab Visit:    Kira Sender  1970  [id number]    10:20 Pt arrived ambulatory w/o assist. Labs drawn per order via left AC venipuncture x1 attempt. Pt tolerated well without complaints. Gauze/ coban to site. Pt departed [facility] ambulatory and in no distress at 1040.      Visit Vitals  BP (!) 165/80 (BP 1 Location: Left upper arm, BP Patient Position: Sitting)   Pulse 63   Temp 97.8 °F (36.6 °C)   Resp 18   Ht 5' 3\" (1.6 m)   Wt 116.3 kg (256 lb 6.4 oz)   SpO2 93%   BMI 45.42 kg/m²

## 2021-12-16 NOTE — TELEPHONE ENCOUNTER
----- Message from Naheed Coon sent at 12/16/2021  3:24 PM EST -----  Subject: Referral Request    QUESTIONS   Reason for referral request? pt need to have referral for kidney   specialist due to her elevated kidney levels she cannot start her jonathan   treatments with out seeing a kidney specialist please send referral and   call pt to advise   Has the physician seen you for this condition before? No   Preferred Specialist (if applicable)? Do you already have an appointment scheduled? No  Additional Information for Provider? needs this referral ASAP please call   to advise  ---------------------------------------------------------------------------  --------------  CALL BACK INFO  What is the best way for the office to contact you? OK to leave message on   voicemail  Preferred Call Back Phone Number?  [phone]

## 2021-12-16 NOTE — PROGRESS NOTES
Spoke w/JOHN Hagan for Dr. Jhonny Sandhu re: patients labs (Serum Creatinine 2.2, Urinalysis protein 300, wbc 15-20, bacteria 2+ and blood trace). Patient's treatment for Keytruda C1 to be postponed for now. Patient to be notified by Dr. Deejay Ceballos office per Adriano Peraza.

## 2021-12-17 ENCOUNTER — HOSPITAL ENCOUNTER (OUTPATIENT)
Dept: INFUSION THERAPY | Age: 51
Discharge: HOME | End: 2021-12-17

## 2021-12-17 LAB
CANCER AG125 SERPL-ACNC: 5 U/ML (ref 1.5–35)
HBV CORE AB SERPL QL IA: NEGATIVE

## 2021-12-19 LAB
BACTERIA SPEC CULT: ABNORMAL
CC UR VC: ABNORMAL
SERVICE CMNT-IMP: ABNORMAL

## 2021-12-20 RX ORDER — SIMVASTATIN 20 MG/1
TABLET, FILM COATED ORAL
Qty: 90 TABLET | Refills: 3 | Status: SHIPPED | OUTPATIENT
Start: 2021-12-20

## 2021-12-23 DIAGNOSIS — N18.30 STAGE 3 CHRONIC KIDNEY DISEASE, UNSPECIFIED WHETHER STAGE 3A OR 3B CKD (HCC): Primary | ICD-10-CM

## 2021-12-23 DIAGNOSIS — T45.1X5D: ICD-10-CM

## 2022-01-22 RX ORDER — METOPROLOL SUCCINATE 100 MG/1
TABLET, EXTENDED RELEASE ORAL
Qty: 90 TABLET | Refills: 1 | Status: SHIPPED | OUTPATIENT
Start: 2022-01-22 | End: 2022-07-18

## 2022-01-24 ENCOUNTER — TELEPHONE (OUTPATIENT)
Dept: ONCOLOGY | Age: 52
End: 2022-01-24

## 2022-01-24 DIAGNOSIS — G89.3 CANCER ASSOCIATED PAIN: Primary | ICD-10-CM

## 2022-01-24 RX ORDER — OXYCODONE AND ACETAMINOPHEN 5; 325 MG/1; MG/1
1 TABLET ORAL
Qty: 40 TABLET | Refills: 0 | Status: SHIPPED | OUTPATIENT
Start: 2022-01-24 | End: 2022-02-07

## 2022-01-24 NOTE — TELEPHONE ENCOUNTER
Patient contacted the office to confirm her appointment on Wednesday and to request a refill of her Percocet. She is requesting a quantity of 60 tabs instead of 40 tabs and a call to let her know if the prescription was sent so that she can arrange .

## 2022-01-24 NOTE — TELEPHONE ENCOUNTER
I called and spoke with Ms. Ida Cota her that I am not comfortable increasing her Percocet from 40 tabs to 60 tabs/2 weeks as she's requesting, she will need to be evaluated in the office if her current pain regime isn't working. Ms. Shayla Vizcaino responded that it doesn't have to be 60 tabs. She said in the past, she's been asking for 60 tabs but she always gets 40 instead. She verbalized understanding that I will be sending 40 tabs of Percocet to the pharmacy we have on file.

## 2022-01-24 NOTE — PROGRESS NOTES
Pharmacy Note     Name: Octavio Cancer  : 1970  Estimated body surface area is 2.27 meters squared as calculated from the following:    Height as of 21: 160 cm (63\"). Weight as of 21: 116.3 kg (256 lb 6.4 oz). Diagnosis: Primary peritoneal cancer  Treatment Plan: pembrolizumab every 6 weeks  Cycle/Day: C1D1  Cycle Start Date: 22    Lab Results   Component Value Date/Time    WBC 7.6 2021 10:25 AM    WBC 6.1 2012 12:00 AM    PLATELET 466 79/10/7991 10:25 AM     Lab Results   Component Value Date/Time    ABS. NEUTROPHILS 5.8 2021 10:25 AM     Lab Results   Component Value Date/Time    Creatinine 2.22 (H) 2021 10:25 AM     Most Recent Creatinine Clearance:  CrCl: 0.0 mL/min (based on Adjusted Body Weight)  Creatinine: 2.22 mg/dL (2021)      Pharmacy Intervention:  · Treatment originally scheduled on 21 but was held due to elevated SCr. · After discussing with Andrea Martin RN with Dr. Ruth Moore office, it was determined that since the patient's SCr has been elevated (>2 mg/dL) over the past several months, that the pre-treatment labs that will be drawn prior to the rescheduled treatment day will be used to determine the patient's baseline SCr. This information will be noted and the treatment parameters will be updated with this information to reflect the new baseline. · Pharmacy to updated the treatment plan accordingly and will continue to monitor.     Pharmacist: Rosalinda Uribe, JULID

## 2022-01-26 ENCOUNTER — HOSPITAL ENCOUNTER (OUTPATIENT)
Dept: INFUSION THERAPY | Age: 52
Discharge: HOME | End: 2022-01-26
Payer: MEDICARE

## 2022-01-26 ENCOUNTER — OFFICE VISIT (OUTPATIENT)
Dept: ONCOLOGY | Age: 52
End: 2022-01-26

## 2022-01-26 VITALS
DIASTOLIC BLOOD PRESSURE: 74 MMHG | SYSTOLIC BLOOD PRESSURE: 136 MMHG | WEIGHT: 261.3 LBS | HEART RATE: 57 BPM | RESPIRATION RATE: 16 BRPM | TEMPERATURE: 98.1 F | HEIGHT: 63 IN | BODY MASS INDEX: 46.3 KG/M2 | OXYGEN SATURATION: 97 %

## 2022-01-26 VITALS
BODY MASS INDEX: 45.89 KG/M2 | OXYGEN SATURATION: 95 % | TEMPERATURE: 97 F | DIASTOLIC BLOOD PRESSURE: 79 MMHG | HEART RATE: 63 BPM | HEIGHT: 63 IN | WEIGHT: 259 LBS | SYSTOLIC BLOOD PRESSURE: 126 MMHG

## 2022-01-26 DIAGNOSIS — C56.3 MALIGNANT NEOPLASM OF BOTH OVARIES (HCC): ICD-10-CM

## 2022-01-26 DIAGNOSIS — G89.3 CANCER ASSOCIATED PAIN: Primary | ICD-10-CM

## 2022-01-26 LAB
ALBUMIN SERPL-MCNC: 3.1 G/DL (ref 3.4–5)
ALBUMIN/GLOB SERPL: 0.8 {RATIO} (ref 0.8–1.7)
ALP SERPL-CCNC: 93 U/L (ref 45–117)
ALT SERPL-CCNC: 17 U/L (ref 13–56)
ANION GAP SERPL CALC-SCNC: 6 MMOL/L (ref 3–18)
APPEARANCE UR: CLEAR
AST SERPL-CCNC: 10 U/L (ref 10–38)
BACTERIA URNS QL MICRO: ABNORMAL /HPF
BASOPHILS # BLD: 0 K/UL (ref 0–0.1)
BASOPHILS NFR BLD: 0 % (ref 0–2)
BILIRUB SERPL-MCNC: 0.3 MG/DL (ref 0.2–1)
BILIRUB UR QL: NEGATIVE
BUN SERPL-MCNC: 45 MG/DL (ref 7–18)
BUN/CREAT SERPL: 21 (ref 12–20)
CALCIUM SERPL-MCNC: 8.9 MG/DL (ref 8.5–10.1)
CANCER AG125 SERPL-ACNC: 4 U/ML (ref 1.5–35)
CHLORIDE SERPL-SCNC: 113 MMOL/L (ref 100–111)
CO2 SERPL-SCNC: 22 MMOL/L (ref 21–32)
COLOR UR: YELLOW
CREAT SERPL-MCNC: 2.14 MG/DL (ref 0.6–1.3)
DIFFERENTIAL METHOD BLD: ABNORMAL
EOSINOPHIL # BLD: 0.1 K/UL (ref 0–0.4)
EOSINOPHIL NFR BLD: 1 % (ref 0–5)
EPITH CASTS URNS QL MICRO: ABNORMAL /LPF (ref 0–5)
ERYTHROCYTE [DISTWIDTH] IN BLOOD BY AUTOMATED COUNT: 18.3 % (ref 11.6–14.5)
GLOBULIN SER CALC-MCNC: 4.1 G/DL (ref 2–4)
GLUCOSE SERPL-MCNC: 90 MG/DL (ref 74–99)
GLUCOSE UR STRIP.AUTO-MCNC: NEGATIVE MG/DL
HBV CORE IGM SER QL: NEGATIVE
HBV SURFACE AG SER QL: <0.1 INDEX
HBV SURFACE AG SER QL: NEGATIVE
HCT VFR BLD AUTO: 31.5 % (ref 35–45)
HGB BLD-MCNC: 9.3 G/DL (ref 12–16)
HGB UR QL STRIP: ABNORMAL
IMM GRANULOCYTES # BLD AUTO: 0 K/UL (ref 0–0.04)
IMM GRANULOCYTES NFR BLD AUTO: 0 % (ref 0–0.5)
KETONES UR QL STRIP.AUTO: NEGATIVE MG/DL
LEUKOCYTE ESTERASE UR QL STRIP.AUTO: ABNORMAL
LYMPHOCYTES # BLD: 1 K/UL (ref 0.9–3.6)
LYMPHOCYTES NFR BLD: 15 % (ref 21–52)
MAGNESIUM SERPL-MCNC: 1.9 MG/DL (ref 1.6–2.6)
MCH RBC QN AUTO: 25.7 PG (ref 24–34)
MCHC RBC AUTO-ENTMCNC: 29.5 G/DL (ref 31–37)
MCV RBC AUTO: 87 FL (ref 78–100)
MONOCYTES # BLD: 0.4 K/UL (ref 0.05–1.2)
MONOCYTES NFR BLD: 6 % (ref 3–10)
NEUTS SEG # BLD: 5.1 K/UL (ref 1.8–8)
NEUTS SEG NFR BLD: 78 % (ref 40–73)
NITRITE UR QL STRIP.AUTO: NEGATIVE
NRBC # BLD: 0 K/UL (ref 0–0.01)
NRBC BLD-RTO: 0 PER 100 WBC
PH UR STRIP: 7 [PH] (ref 5–8)
PLATELET # BLD AUTO: 333 K/UL (ref 135–420)
PMV BLD AUTO: 8.9 FL (ref 9.2–11.8)
POTASSIUM SERPL-SCNC: 4.8 MMOL/L (ref 3.5–5.5)
PROT SERPL-MCNC: 7.2 G/DL (ref 6.4–8.2)
PROT UR STRIP-MCNC: 300 MG/DL
RBC # BLD AUTO: 3.62 M/UL (ref 4.2–5.3)
RBC #/AREA URNS HPF: ABNORMAL /HPF (ref 0–5)
SODIUM SERPL-SCNC: 141 MMOL/L (ref 136–145)
SP GR UR REFRACTOMETRY: 1.01 (ref 1–1.03)
UROBILINOGEN UR QL STRIP.AUTO: 0.2 EU/DL (ref 0.2–1)
WBC # BLD AUTO: 6.6 K/UL (ref 4.6–13.2)
WBC URNS QL MICRO: ABNORMAL /HPF (ref 0–5)

## 2022-01-26 PROCEDURE — 87340 HEPATITIS B SURFACE AG IA: CPT

## 2022-01-26 PROCEDURE — 85025 COMPLETE CBC W/AUTO DIFF WBC: CPT

## 2022-01-26 PROCEDURE — G8417 CALC BMI ABV UP PARAM F/U: HCPCS | Performed by: OBSTETRICS & GYNECOLOGY

## 2022-01-26 PROCEDURE — G9717 DOC PT DX DEP/BP F/U NT REQ: HCPCS | Performed by: OBSTETRICS & GYNECOLOGY

## 2022-01-26 PROCEDURE — 99215 OFFICE O/P EST HI 40 MIN: CPT | Performed by: OBSTETRICS & GYNECOLOGY

## 2022-01-26 PROCEDURE — 83735 ASSAY OF MAGNESIUM: CPT

## 2022-01-26 PROCEDURE — G8752 SYS BP LESS 140: HCPCS | Performed by: OBSTETRICS & GYNECOLOGY

## 2022-01-26 PROCEDURE — G9899 SCRN MAM PERF RSLTS DOC: HCPCS | Performed by: OBSTETRICS & GYNECOLOGY

## 2022-01-26 PROCEDURE — G9711 PT HX TOT COL OR COLON CA: HCPCS | Performed by: OBSTETRICS & GYNECOLOGY

## 2022-01-26 PROCEDURE — 86304 IMMUNOASSAY TUMOR CA 125: CPT

## 2022-01-26 PROCEDURE — 80053 COMPREHEN METABOLIC PANEL: CPT

## 2022-01-26 PROCEDURE — G8754 DIAS BP LESS 90: HCPCS | Performed by: OBSTETRICS & GYNECOLOGY

## 2022-01-26 PROCEDURE — 81001 URINALYSIS AUTO W/SCOPE: CPT

## 2022-01-26 PROCEDURE — 36415 COLL VENOUS BLD VENIPUNCTURE: CPT

## 2022-01-26 PROCEDURE — 86704 HEP B CORE ANTIBODY TOTAL: CPT

## 2022-01-26 PROCEDURE — 86705 HEP B CORE ANTIBODY IGM: CPT

## 2022-01-26 PROCEDURE — G8427 DOCREV CUR MEDS BY ELIG CLIN: HCPCS | Performed by: OBSTETRICS & GYNECOLOGY

## 2022-01-26 RX ORDER — LIDOCAINE AND PRILOCAINE 25; 25 MG/G; MG/G
CREAM TOPICAL AS NEEDED
Qty: 30 G | Refills: 1 | Status: SHIPPED | OUTPATIENT
Start: 2022-01-26 | End: 2022-07-06 | Stop reason: SDUPTHER

## 2022-01-26 RX ORDER — ONDANSETRON 4 MG/1
4 TABLET, FILM COATED ORAL
Qty: 30 TABLET | Refills: 3 | Status: SHIPPED | OUTPATIENT
Start: 2022-01-26 | End: 2022-07-06 | Stop reason: ALTCHOICE

## 2022-01-26 NOTE — PROGRESS NOTES
[facility] Lab Visit:    Loree Bolaños  1970  [id number]    8934 Pt arrived ambulatory w/o assist. Labs drawn per order via Right hand venipuncture x1 attempt. Pt tolerated well without complaints. Gauze/ coban to site. Pt departed [facility] ambulatory and in no distress at 1020.      Visit Vitals  /74 (BP 1 Location: Right upper arm, BP Patient Position: Sitting)   Pulse (!) 57   Temp 98.1 °F (36.7 °C)   Resp 16   Ht 5' 3\" (1.6 m)   Wt 118.5 kg (261 lb 4.8 oz)   SpO2 97%   BMI 46.29 kg/m²

## 2022-01-26 NOTE — PROGRESS NOTES
[facility]  [address] [place] [address], [address], [address]  [place] [id number] [phone]          Patient ID:  Name:  Saloni Weber  MRN:  [id number]  :  1970/51 y.o. Date:  2022      HISTORY OF PRESENT ILLNESS:  Saloni Weber is a 45 y.o.  postmenopausal female referred by Dr. Hasmukh Hercules  With peritoneal cancer. Intitally seen be JOHN talaverawith reports of vaginal bleeding. Given pt's history of hysteretectomy with BSO for endometriosis in  a TVUS was ordered. Which revealed a 6.8 cm x 5.2 cm x 4.6 cm at midline vaginal cuff. This prompted pelvic CT with findings of a lesion measuring 7.6 x 5.8 x 7.2 cm and is compatible with a pelvic neoplasm vs endometrioma given prior history of endometriosis. Tumor markers done on 2018 all normal.  S/p  Exploratory laparotomy, exploration of retroperitoneal spaces, exam under anesthesia with biopsy of rectovaginal mass on 2019. Had sigmoidoscopy which revealed submucosal mass. S/p cycle #6 of carbo/taxol on 2019. S/p cytoreductive surgery with [facility] on 2019. S/p radiation treatment completed in 2019. Was seen in office and had a biopsy c/w recurrence. S/p cycle #6 of Carbo/Doxil on 2020. S/p Exploratory laparotomy. 2. Lysis of adhesions. 3. Simple appendectomy. 4. 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 On . She also had a revision of urostomy that is still leaking. S/p IR biopsy of liver on 2021. Has been taking letrozole. Here for follow-up continues to complain of vaginal bleeding and rectal bleeding as well as discharge from the rectum. Continues to have pain controlled with meds. Continues to have nausea controlled with meds.       Labs:  Component      Latest Ref Rng & Units 2021          10:25AM   Cancer Ag (CA) 125      0.0 - 38.1 U/mL 5 Component      Latest Ref Rng & Units 6/17/2021          12:00 AM   Cancer Ag (CA) 125      0.0 - 38.1 U/mL 5.1     Component      Latest Ref Rng & Units 9/28/2020          11:11 AM   CA-125      1.5 - 35.0 U/mL 6     Component      Latest Ref Rng & Units 8/31/2020          10:01 AM   CA-125      1.5 - 35.0 U/mL 8     Component      Latest Ref Rng & Units 7/7/2020          11:30 AM   CA-125      1.5 - 35.0 U/mL 5     Component      Latest Ref Rng & Units 6/8/2020          10:40 AM   CA-125      1.5 - 35.0 U/mL 7     Component      Latest Ref Rng & Units 5/11/2020          11:30 AM   CA-125      1.5 - 35.0 U/mL 7     Component      Latest Ref Rng & Units 3/4/2020           8:15 AM   CA-125      1.5 - 35.0 U/mL 7     Component      Latest Ref Rng & Units 11/15/2019           9:56 AM   CA-125      1.5 - 35.0 U/mL 6     Component      Latest Ref Rng & Units 6/6/2019           8:44 AM   CA-125      1.5 - 35.0 U/mL 7     Component      Latest Ref Rng & Units 5/16/2019 4/25/2019           8:26 AM  8:20 AM   Cancer Ag (CA) 125      0.0 - 38.1 U/mL 7.8 8.7     Component      Latest Ref Rng & Units 4/4/2019           8:28 AM   Cancer Ag (CA) 125      0.0 - 38.1 U/mL 8.8     Component      Latest Ref Rng & Units 3/14/2019 2/21/2019           8:15 AM  8:32 AM   Cancer Ag (CA) 125      0.0 - 38.1 U/mL 10.4 18.4     12/17/2018 : 9.3  12/17/2018 CEA: 2.0  12/17/2018 AFP: 3.8    Pathology  8/4/2021  Liver mass, core biopsies:        Metastatic adenocarcinoma, most consistent with serous type. DIAGNOSIS COMMENT:   Although metastatic ovarian or primary peritoneal carcinoma forming   parenchymal liver lesions is uncommon, the histologic features in this   case are similar to those in the patient's prior rectovaginal mass biopsy   from 2019 and the immunohistochemical features are similar to those   documented in the EMR from a pelvic exenteration specimen performed at an   outside institution in 2020. 4/20/2020  Vaginal biopsy  Papillary serous adenocarcinoma    8/8/2019  A) COLON, PERICOLIC NODULE, EXCISION:      - ACELLULAR MUCIN WITH MULTIPLE CALCIFICATIONS, AND ASSOCIATED FIBROUS WALL WITH        HYALINIZATION, AND CHRONIC INFLAMMATION.     - ADJACENT BENIGN FIBROADIPOSE TISSUE, CONSISTENT WITH MESENTERY.     - NO EVIDENCE OF MALIGNANCY.      - SEE COMMENT. B) LIVER, RIGHT LOBE, BIOPSY:      - NODULAR FAT NECROSIS AND FIBROSIS OF THE LIVER CAPSULE.      - MILD STEATOSIS.     - NO EVIDENCE OF MALIGNANCY. C) COLON, SIGMOID, SEROSAL IMPLANT, EXCISION:      - NODULAR FAT NECROSIS WITH FIBROSIS, CHRONIC INFLAMMATION, CALCIFICATIONS, AND        CYSTIC DEGENERATION WITHOUT MUCIN.     - NO EVIDENCE OF MALIGNANCY. D) OMENTUM, OMENTECTOMY (RESECTION):      - CONGESTED FIBROADIPOSE TISSUE WITH FOCAL FIBROSIS AND CHRONIC INFLAMMATION, AND        CALCIFIED NODULAR FIBROSIS.     - NO EVIDENCE OF MALIGNANCY. E) PERITONEUM, LEFT ANTERIOR ABDOMINAL, RESECTION:      - CONGESTED PERITONEAL TISSUE, NO EVIDENCE OF MALIGNANCY. F) PERITONEUM, RIGHT ANTERIOR ABDOMINAL, RESECTION:      - CONGESTED PERITONEAL TISSUE, NO EVIDENCE OF MALIGNANCY. G) COLON, SIGMOID, SEROSAL IMPLANT, EXCISION:      - NODULAR FIBROSIS WITH HISTIOCYTES, SUGGESTIVE OF FAT NECROSIS.     - CYSTIC DEGENERATION, WITHOUT MUCIN.     - NO EVIDENCE OF MALIGNANCY. H) SOFT TISSUE, FALCIFORM, EXCISION:      - CONSISTENT WITH FALCIFORM LIGAMENT.     - NO EVIDENCE OF MALIGNANCY. PATHOLOGIC STAGE:  ypTx, pNx    1/17/2019   RECTOVAGINAL MASS (#1, 2) AND PELVIC MASS, BIOPSIES:   CONSISTENT WITH SEROUS CARCINOMA WITH EXTENSIVE NECROSIS. Imaging  MRI liver 7/6/2021     FINDINGS:     Abdominal Wall:  There is a circumscribed 8.3 cm-width x 2.4 cm-AP x 2.9  cm-craniocaudal length T2 hyperintense subcutaneous layer fluid collection along  the inferior margin of the ileal conduit.   The colostomy site in the left lower  quadrant appears unremarkable.     Liver:  A new ovoid T1-low T2-high signal 1.8 x 1.4 cm lesion is identified in  the segment 8. Another 0.8 x 0.6 cm T1-low T2-high signal focus is identified  in the segment 3. These structures were not apparent on prior studies. With  diffusion imaging, no evidence of restricted diffusion is demonstrated at these  foci.     Biliary:  No evidence for significant common bile duct dilation.     Gallbladder:  Mild distention of the gallbladder. No definite gallstones.     Spleen, Adrenal Glands, Pancreas:  Unremarkable.     Kidneys:  Cyst at the left kidney lower pole region measuring about 1.2 x 1.2  cm. Increase in size compared to prior CTs from above 0.8 x 0.7 cm.     Miscellaneous: The largest of the periportal nodes measures up to about 1.3 x  1.1 cm.     IMPRESSION     1. Focal fluid collection in the subcutaneous tissue adjacent to the ileal  conduit. This fluid collection may be amenable to aspiration under ultrasound  guidance.     2. New hepatic lesions as described. Although there is apparent lack of  restricted diffusion, further investigation with ultrasound is warranted to  assess if these structures are indeed cystic in nature.     3.  Left renal cyst.     4.  Slight az prominence. MRI pelvis 7/6/2021  FINDINGS:     Along the inferior aspect of the ileal conduit stoma site, focal elongated T1  high T2-signal fluid collection is demonstrated to, measuring about 8.3 cm-width  x 2.2 cm-thickness x 2.3 cm-craniocaudal length. In retrospect, there was a  suggestion of possible 7.1 x 3.5 x 2.0 cm hypodense material collection along  the inferior aspect of the subcutaneous abdominal wall portion of the ileal  conduit on the 04/01/21 unenhanced CT.   This fluid collection therefore may be  slightly increased in size in the elbow.     The intraperitoneal portion of the ileal conduit appears grossly unremarkable.     The presacral region demonstrates apparent continued pattern of nonspecific mild  edema. The source for this edematous changes is clear. Most likely related to  residual changes from recent surgical intervention.     Mild edematous changes also noted in the right and to lesser extent left  piriformis muscles. Additional edematous changes are also identified in the  right obturator internus along the superior aspect.      Minimal free fluid in the posterior pelvic region.     There is some intraluminal fluid in the blind loop portion of the rectum. At  the right superior lateral vaginal cuff corner, a vague trail of T2 high signal  is observed (coronal T2 images #8-10 and fat-sat axial T2 image #5-10). This  [place] appears to course quite close to the anterior margin of the rectal remnant  wall. Whether there is indeed communication between the blind rectal segment  and the vaginal cuff can be further assessed with barium enema.     No distinct mass is detected pelvis.     Enlarged nodes are identified along the right pelvic sidewall. The largest  right pelvic sidewall node is identified subjacent to the external iliac  artery/vein vascular bundle, measuring up to about 2.2 x 1.2 cm (fat-sat axial  T2 image #12). Another slightly enlarged node more superiorly measuring about  1.9 x 1.0 cm (image #17). Multiple slightly borderline prominent nodes  identified in the mesentery, measuring up to about 1.2 x 1.0 cm (image #25). Additional note near the aortic bifurcation measuring about 1.3 x 0.9 cm (image  #34).    IMPRESSION     1. Focal T1- and T2-hyperintense subcutaneous fluid collection along the  inferior aspect of the ileal conduit stoma site. Possibly representing a seroma  or a focus of urinoma. This may be amenable to aspiration under ultrasound or  CT guidance.     2.  Questionable subtle trailed between the right superior lateral aspect of the  vaginal cuff with adjacent blind-tipped rectum.   More definitive evaluation can  be performed with barium enema.     3.  Slight az prominence along the right pelvic sidewall and in the  mesentery.     4. No distinct residual or recurrent mass.     5.  Persistent mild presacral edema  PETCT 7/17/2020   FINDINGS:        PET images: Study limited because of patient motion.     Mediastinal blood pool reference value = SUV Max. Mediastinal blood pool reference value = SUV Mean. Liver parenchyma reference value =  4.7   SUV Max. Liver parenchyma reference value =  2.3    SUV Mean.           NECK: There is no suspicious hypermetabolic activity at the neck. CHEST: There is no suspicious hypermetabolic activity at the chest.     ABDOMEN: Typical heterogeneity at the liver. No convincing malignant foci  hypermetabolic activity or underlying CT abnormality. PELVIS: There is soft tissue fullness in the right retrovesicular pelvis just  above the vaginal cuff and posterior to right ureter measuring about 3.8 cm with  Max SUV 13.5 (206), extending to the rectum posteriorly, levators inferiorly on  the right. Previously 4 cm and Max SUV 16.5.     Thickening anterior abdominal wall compatible with scar demonstrating diffuse  modest activity and Max SUV 2.7.      Additional CT findings: Mediport catheter has tip in the superior vena cava. Air  trapping in the superior segments lower lobes. Right-sided nephroureteral stent  without hydronephrosis. No ascites. IMPRESSION  Impression:       Treatment response. Decreased metabolic activity at the right retrovesicular  pelvic mass. No new evidence of metastatic disease.     Interval placement right-sided nephroureteral stent and resolved  hydroureteronephrosis. .     ROS:    As above      Patient Active Problem List    Diagnosis Date Noted    CKD (chronic kidney disease) stage 4, GFR 15-29 ml/min ([address] 02/11/2021    Acute congestive heart failure ([address] 02/04/2021    COVID-19 12/31/2020    History of abdominal surgery 12/31/2020    Melena 12/30/2020    Cancer of appendix ([facility] 11/17/2020    Loss of appetite 10/14/2020    Other hydronephrosis 06/24/2020    Genetic carrier status 09/13/2019    Postoperative nausea 08/27/2019    Ovarian cancer ([address] 08/07/2019    Peritoneal carcinoma ([facility] 02/14/2019    Pelvic mass in female 01/17/2019    Irritable bowel syndrome with both constipation and diarrhea 02/09/2018    Subclinical hypothyroidism 01/23/2018    Chronic abdominal pain 01/22/2018    Diverticulosis 01/22/2018    Hx of total hysterectomy 01/22/2018    Hyponatremia 01/22/2018    Advance care planning 01/31/2017    Dental caries 05/26/2016    Chronic periodontal disease 05/26/2016    SUAZO (dyspnea on exertion) 02/10/2016    Prediabetes 09/24/2015    Morbid obesity ([facility] 08/31/2015    Arthritis, degenerative 08/31/2015    Gastroesophageal reflux disease without esophagitis 08/31/2015    ANAMIKA on CPAP 08/31/2015    Essential hypertension 08/28/2015    PTSD (post-traumatic stress disorder) 03/24/2014    S/P TKR (total knee replacement) 11/14/2012    Adjustment disorder with depressed mood 09/20/2012    Major depressive disorder, recurrent episode, moderate ([address] 09/20/2012    Suicidal ideation 09/19/2012    Menopause 05/08/2012    Knee pain, right 05/08/2012    Hypokalemia 02/04/2012    Overdose 02/04/2012    Obesity 06/18/2010    Mixed hyperlipidemia 06/18/2010     Past Medical History:   Diagnosis Date    AR (allergic rhinitis)     seasonal    Bladder cancer ([facility]     Cancer ([facility]     pt states between vgina and rectal area    Cardiac echocardiogram 04/11/2016    Tech difficult. EF 55-60%. No RWMA. Mild conc LVH. Gr 1 DDfx. RVSP normal.      Cardiac nuclear imaging test 05/05/2016    Low risk. Very patchy radiotracer uptake. Mild anterior artifact, low suspicion for ischemia. EF 68%. No RWMA. Normal EKG on pharm stress test.    Cardiovascular LE arterial duplex 10/07/2013    No significant arterial disease at rest bilaterally.   R JUNE 1.21.  L JUNE 1.16. No significant sm vessel disease.  Chronic kidney disease     Depression     Dr. Shanda Smith, suicide attempt     Diverticulosis     Endometriosis     s/p hysterectomy     GERD (gastroesophageal reflux disease)     Glaucoma     Dr. Isa Flores Hematuria, gross     HTN (hypertension)     Hx of colonoscopy 2017    mild diverticulosis, g1 internal hemorrhoids, normal colon , repeat 10 year     Hx of suicide attempt     Hyperlipidemia LDL goal < 130     IBS (irritable bowel syndrome)     Ill-defined condition     environmental allergies    Insomnia     Malignant neoplasm of peritoneum (HCC) 2019    Nausea & vomiting     OA (osteoarthritis)     both knees, lower back    Preeclampsia     PTSD (post-traumatic stress disorder)     Seizures (HCC)     1 x with childbirth, had toxemia    Sleep apnea     does not use cpap machine    Spinal stenosis     Dr. Jaquan Rod Vertigo       Past Surgical History:   Procedure Laterality Date    COLONOSCOPY N/A 2017    COLONOSCOPY performed by Anne Baumann [place] at [place] [address] N/A 2019    SIGMOIDOSCOPY FLEXIBLE performed by Stephanie Zimmerman MD at [facility] ENDOSCOPY    HX  SECTION      HX COLONOSCOPY  2017    DR. MCRAE 2017     HX COLOSTOMY      Revision 2021    HX CYSTECTOMY  2020    HX HYSTERECTOMY      HX KNEE ARTHROSCOPY Left     left knee    HX KNEE REPLACEMENT Bilateral     (R)  (L)     HX LAPAROTOMY N/A 2019    and rectovaginal bx    HX OTHER SURGICAL  2016    all teeth removed    HX SALPINGO-OOPHORECTOMY  2008    HX TUBAL LIGATION      IR INSERT TUNL CVC W PORT OVER 5 YEARS  2019    MULTIPLE DELIVERY       1990    GA FEMUR/KNEE SURG UNLISTED Left 2009    External fixator    GA PART REMOVAL COLON W ANASTOMOSIS      GA TOTAL ABDOM HYSTERECTOMY      TRANSURETHRAL RESEC BLADDER NECK        OB History        2    Para   2    Term   2       0    AB   0    Living   0       SAB   0    IAB   0    Ectopic   0    Molar   0    Multiple   0    Live Births   0              Social History     Tobacco Use    Smoking status: Never Smoker    Smokeless tobacco: Never Used   Substance Use Topics    Alcohol use: No      Family History   Problem Relation Age of Onset    Heart Disease Mother         CHF    Diabetes Mother    [place] Hypertension Mother     Kidney Disease Mother     Breast Cancer Mother    [place] Stroke Mother     Diabetes Father     Hypertension Father     Heart Attack Father         MI    Cancer Maternal Grandmother         stomach    Ovarian Cancer Maternal Aunt     Cancer Maternal Uncle       Current Outpatient Medications   Medication Sig    oxyCODONE-acetaminophen (PERCOCET) 5-325 mg per tablet Take 1 Tablet by mouth every four (4) hours as needed for Pain for up to 14 days. Max Daily Amount: 6 Tablets.  metoprolol succinate (TOPROL-XL) 100 mg tablet take 1 tablet by mouth once daily    simvastatin (ZOCOR) 20 mg tablet take 1 tablet by mouth at bedtime    meclizine (ANTIVERT) 25 mg tablet take 1 tablet by mouth three times a day if needed FOR DIZZINESS    ondansetron (ZOFRAN ODT) 4 mg disintegrating tablet Take 1 Tablet by mouth every eight (8) hours as needed for Nausea or Vomiting.  amLODIPine (NORVASC) 10 mg tablet take 1 tablet by mouth once daily    PARoxetine (PAXIL) 20 mg tablet take 1 tablet by mouth once daily    pregabalin (LYRICA) 25 mg capsule Take 1 Capsule by mouth three (3) times daily. Max Daily Amount: 75 mg. (Patient not taking: Reported on 10/13/2021)    letrozole [facility]) 2.5 mg tablet Take 1 Tablet by mouth daily.  gabapentin (NEURONTIN) 100 mg capsule Take 1 Capsule by mouth three (3) times daily. Max Daily Amount: 300 mg.  (Patient not taking: Reported on 10/13/2021)    meclizine (ANTIVERT) 25 mg tablet take 1 tablet by mouth three times a day if needed FOR DIZZINESS    latanoprost (XALATAN) 0.005 % ophthalmic solution Administer 1 Drop to both eyes nightly. No current facility-administered medications for this visit. Allergies   Allergen Reactions    Seafood Hives     FISH    Other Medication Hives     seafood    Pollen Extracts Other (comments)    Shellfish Derived Hives    Pantoprazole Other (comments)     Bleeding in stomach    Promethazine Other (comments)     Bleeding in stomach          OBJECTIVE:    Physical Exam  VITAL SIGNS: Visit Vitals  LMP 01/31/2008      GENERAL EMILY: in no apparent distress and well developed and well nourished   PELVIC: NEFG, foreshortened vagina with no obvious mass visible. BME revealed mass at vaginal apex, firm, immobile            IMPRESSION/PLAN:  1.stage IV Primary peritoneal cancer with  recurrence   -previoulsy reviewed her pathology    -s/p carbo (auc=6), taxol (175 mg/m2) IV every 3 wks s/p 6 cycles completed 6/6/2019   -s/p cytoreductive surgery with [facility] with dr. Jossy Garcia   -s/p pelvic radiation   Pain-script for percocet    -given platinum sensitive disease s/p  auc=5, Doxil 30 mg/m2 IV x 6 cycles s/p Total pelvic exenteration   -biopsy c/w recurrence-reviewed Caris and discussed treatment options. We discussed proceeding with hormonal therapy given tumor is ER\MS positive, immunotherapy given PD-L1 mutation although not FDA approved. Also discussed retreatment with platinum and if sensitive consider PARP inhibition. After discussion patient was placed on letrozole. Patient to begin Keytruda 400 mg IV every 3 weeks. 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   Will monitor   -neuropathy-Lyrica 25 mg 3 times daily   -Okay for cycle 1    -Follow-up prior to cycle 2           The total time spent was 40 minutes regarding this patients diagnosis of primary peritoneal cancer and >50% of this time was spent counseling and coordinating care    Raffaele Amanda DO  Gynecologic Oncology  1/26/202211:50 AM

## 2022-01-26 NOTE — PROGRESS NOTES
Zulma Bailon is a 45 y.o. female presents in office for primary peritoneal ca f/u. Pt stated she will be starting Gabi Few 1/28/22. Chief Complaint   Patient presents with    Follow-up      Pt reports pain all over body. Do you have any unusual vaginal bleeding, discharge or irritation? Yes, intermittently  Do you have any changes in your bowel movements? No   Have you been experiencing nausea or vomiting? Yes pt states frequent nausea and zofran helps with this  Have you been experiencing any continuous or worsening abdominal pain? Yes, pt reports pelvic cramping  Any urinary burning? no    Visit Vitals  /79 (BP 1 Location: Right upper arm, BP Patient Position: Sitting)   Pulse 63   Temp 97 °F (36.1 °C) (Oral)   Ht 5' 3\" (1.6 m)   Wt 259 lb (117.5 kg)   SpO2 95%   BMI 45.88 kg/m²         1. Have you been to the ER, urgent care clinic since your last visit? Hospitalized since your last visit? No    2. Have you seen or consulted any other health care providers outside of the [address] [place] [address] since your last visit? Include any pap smears or colon screening. No    3 most recent PHQ Screens 3/30/2021   Little interest or pleasure in doing things Not at all   Feeling down, depressed, irritable, or hopeless Not at all   Total Score PHQ 2 0       Abuse Screening Questionnaire 3/30/2021   Do you ever feel afraid of your partner? N   Are you in a relationship with someone who physically or mentally threatens you? N   Is it safe for you to go home? Y       Fall Risk Assessment, last 12 mths 1/11/2021   Able to walk? Yes   Fall in past 12 months? 0   Do you feel unsteady?  0   Are you worried about falling 0       Learning Assessment 3/30/2021   PRIMARY LEARNER Patient   HIGHEST LEVEL OF EDUCATION - PRIMARY LEARNER  -   BARRIERS PRIMARY LEARNER -   CO-LEARNER CAREGIVER -   PRIMARY LANGUAGE ENGLISH   LEARNER PREFERENCE PRIMARY DEMONSTRATION     -   ANSWERED BY patient   RELATIONSHIP SELF

## 2022-01-26 NOTE — LETTER
1/26/2022    Patient: Eleno Claude   YOB: 1970   Date of Visit: 1/26/2022     [address]  [address]  [address] [facility]    Dear Clyde Bhagat MD,      Thank you for referring Ms. Drake Holstein to [address] [place] [address] for evaluation. My notes for this consultation are attached. If you have questions, please do not hesitate to call me. I look forward to following your patient along with you.       Sincerely,    Enriqueta [place] MD

## 2022-01-26 NOTE — LETTER
1/26/2022 9:41 AM    Patient:  Brennan Liu   YOB: 1970  Date of Visit: 1/26/2022      Dear Parvin Michaud MD  [address] [place] [address] [address]  [id number]  Via Fax: [phone]     Kahlil Potter MD  [address] [facility] [address] [facility] [address]  Via Fax: [phone]: Thank you for referring Ms. Nelida Levy to me for evaluation/treatment. Below are the relevant portions of my assessment and plan of care. If you have questions, please do not hesitate to call me. I look forward to following Ms. Ingrid Washburn along with you. Brennan Liu is a 45 y.o. female presents in office for primary peritoneal ca f/u. Pt stated she will be starting [facility] 1/28/22. Chief Complaint   Patient presents with    Follow-up      Pt reports pain all over body. Do you have any unusual vaginal bleeding, discharge or irritation? Yes, intermittently  Do you have any changes in your bowel movements? No   Have you been experiencing nausea or vomiting? Yes pt states frequent nausea and zofran helps with this  Have you been experiencing any continuous or worsening abdominal pain? Yes, pt reports pelvic cramping  Any urinary burning? no    Visit Vitals  /79 (BP 1 Location: Right upper arm, BP Patient Position: Sitting)   Pulse 63   Temp 97 °F (36.1 °C) (Oral)   Ht 5' 3\" (1.6 m)   Wt 259 lb (117.5 kg)   SpO2 95%   BMI 45.88 kg/m²         1. Have you been to the ER, urgent care clinic since your last visit? Hospitalized since your last visit? No    2. Have you seen or consulted any other health care providers outside of the [address] [place] [address] since your last visit? Include any pap smears or colon screening. No    3 most recent PHQ Screens 3/30/2021   Little interest or pleasure in doing things Not at all   Feeling down, depressed, irritable, or hopeless Not at all   Total Score PHQ 2 0       Abuse Screening Questionnaire 3/30/2021   Do you ever feel afraid of your partner?  N   Are you in a relationship with someone who physically or mentally threatens you? N   Is it safe for you to go home? Y       Fall Risk Assessment, last 12 mths 2021   Able to walk? Yes   Fall in past 12 months? 0   Do you feel unsteady? 0   Are you worried about falling 0       Learning Assessment 3/30/2021   PRIMARY LEARNER Patient   HIGHEST LEVEL OF EDUCATION - PRIMARY LEARNER  -   BARRIERS PRIMARY LEARNER -   CO-LEARNER CAREGIVER -   PRIMARY LANGUAGE ENGLISH   LEARNER PREFERENCE PRIMARY DEMONSTRATION     -   ANSWERED BY patient   RELATIONSHIP SELF         [facility]  [address] [place] [address] Drive, [address], [address] [facility]  [address], [address] [facility]  [phone]          Patient ID:  Name:  Lázaro Elizondo  MRN:  [id number]  :   y.o. Date:  2022      HISTORY OF PRESENT ILLNESS:  Lázaro Elizondo is a 45 y.o.  postmenopausal female referred by Dr. Mushtaq Malone  With peritoneal cancer. Intitally seen be JOHN talaverawith reports of vaginal bleeding. Given pt's history of hysteretectomy with BSO for endometriosis in  a TVUS was ordered. Which revealed a 6.8 cm x 5.2 cm x 4.6 cm at midline vaginal cuff. This prompted pelvic CT with findings of a lesion measuring 7.6 x 5.8 x 7.2 cm and is compatible with a pelvic neoplasm vs endometrioma given prior history of endometriosis. Tumor markers done on 2018 all normal.  S/p  Exploratory laparotomy, exploration of retroperitoneal spaces, exam under anesthesia with biopsy of rectovaginal mass on 2019. Had sigmoidoscopy which revealed submucosal mass. S/p cycle #6 of carbo/taxol on 2019. S/p cytoreductive surgery with [facility] on 2019. S/p radiation treatment completed in 2019. Was seen in office and had a biopsy c/w recurrence. S/p cycle #6 of Carbo/Doxil on 2020. S/p Exploratory laparotomy. 2. Lysis of adhesions. 3.  Simple appendectomy. 4. 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 On 11/202020. She also had a revision of urostomy that is still leaking. S/p IR biopsy of liver on 8/4/2021. Has been taking letrozole. Here for follow-up continues to complain of vaginal bleeding and rectal bleeding as well as discharge from the rectum. Continues to have pain controlled with meds. Continues to have nausea controlled with meds.       Labs:  Component      Latest Ref Rng & Units 12/16/2021          10:25AM   Cancer Ag (CA) 125      0.0 - 38.1 U/mL 5       Component      Latest Ref Rng & Units 6/17/2021          12:00 AM   Cancer Ag (CA) 125      0.0 - 38.1 U/mL 5.1     Component      Latest Ref Rng & Units 9/28/2020          11:11 AM   CA-125      1.5 - 35.0 U/mL 6     Component      Latest Ref Rng & Units 8/31/2020          10:01 AM   CA-125      1.5 - 35.0 U/mL 8     Component      Latest Ref Rng & Units 7/7/2020          11:30 AM   CA-125      1.5 - 35.0 U/mL 5     Component      Latest Ref Rng & Units 6/8/2020          10:40 AM   CA-125      1.5 - 35.0 U/mL 7     Component      Latest Ref Rng & Units 5/11/2020          11:30 AM   CA-125      1.5 - 35.0 U/mL 7     Component      Latest Ref Rng & Units 3/4/2020           8:15 AM   CA-125      1.5 - 35.0 U/mL 7     Component      Latest Ref Rng & Units 11/15/2019           9:56 AM   CA-125      1.5 - 35.0 U/mL 6     Component      Latest Ref Rng & Units 6/6/2019           8:44 AM   CA-125      1.5 - 35.0 U/mL 7     Component      Latest Ref Rng & Units 5/16/2019 4/25/2019           8:26 AM  8:20 AM   Cancer Ag (CA) 125      0.0 - 38.1 U/mL 7.8 8.7     Component      Latest Ref Rng & Units 4/4/2019           8:28 AM   Cancer Ag (CA) 125      0.0 - 38.1 U/mL 8.8     Component      Latest Ref Rng & Units 3/14/2019 2/21/2019           8:15 AM  8:32 AM   Cancer Ag (CA) 125      0.0 - 38.1 U/mL 10.4 18.4     12/17/2018 : 9.3  12/17/2018 CEA: 2.0  12/17/2018 AFP: 3.8    Pathology  8/4/2021  Liver mass, core biopsies:        Metastatic adenocarcinoma, most consistent with serous type. DIAGNOSIS COMMENT:   Although metastatic ovarian or primary peritoneal carcinoma forming   parenchymal liver lesions is uncommon, the histologic features in this   case are similar to those in the patient's prior rectovaginal mass biopsy   from 2019 and the immunohistochemical features are similar to those   documented in the EMR from a pelvic exenteration specimen performed at an   outside institution in 2020.   4/20/2020  Vaginal biopsy  Papillary serous adenocarcinoma    8/8/2019  A) COLON, PERICOLIC NODULE, EXCISION:      - ACELLULAR MUCIN WITH MULTIPLE CALCIFICATIONS, AND ASSOCIATED FIBROUS WALL WITH        HYALINIZATION, AND CHRONIC INFLAMMATION.     - ADJACENT BENIGN FIBROADIPOSE TISSUE, CONSISTENT WITH MESENTERY.     - NO EVIDENCE OF MALIGNANCY.      - SEE COMMENT. B) LIVER, RIGHT LOBE, BIOPSY:      - NODULAR FAT NECROSIS AND FIBROSIS OF THE LIVER CAPSULE.      - MILD STEATOSIS.     - NO EVIDENCE OF MALIGNANCY. C) COLON, SIGMOID, SEROSAL IMPLANT, EXCISION:      - NODULAR FAT NECROSIS WITH FIBROSIS, CHRONIC INFLAMMATION, CALCIFICATIONS, AND        CYSTIC DEGENERATION WITHOUT MUCIN.     - NO EVIDENCE OF MALIGNANCY. D) OMENTUM, OMENTECTOMY (RESECTION):      - CONGESTED FIBROADIPOSE TISSUE WITH FOCAL FIBROSIS AND CHRONIC INFLAMMATION, AND        CALCIFIED NODULAR FIBROSIS.     - NO EVIDENCE OF MALIGNANCY. E) PERITONEUM, LEFT ANTERIOR ABDOMINAL, RESECTION:      - CONGESTED PERITONEAL TISSUE, NO EVIDENCE OF MALIGNANCY. F) PERITONEUM, RIGHT ANTERIOR ABDOMINAL, RESECTION:      - CONGESTED PERITONEAL TISSUE, NO EVIDENCE OF MALIGNANCY. G) COLON, SIGMOID, SEROSAL IMPLANT, EXCISION:      - NODULAR FIBROSIS WITH HISTIOCYTES, SUGGESTIVE OF FAT NECROSIS.     - CYSTIC DEGENERATION, WITHOUT MUCIN.     - NO EVIDENCE OF MALIGNANCY.     H) SOFT TISSUE, FALCIFORM, EXCISION:      - CONSISTENT WITH FALCIFORM LIGAMENT.     - NO EVIDENCE OF MALIGNANCY. PATHOLOGIC STAGE:  ypTx, pNx    1/17/2019   RECTOVAGINAL MASS (#1, 2) AND PELVIC MASS, BIOPSIES:   CONSISTENT WITH SEROUS CARCINOMA WITH EXTENSIVE NECROSIS. Imaging  MRI liver 7/6/2021     FINDINGS:     Abdominal Wall:  There is a circumscribed 8.3 cm-width x 2.4 cm-AP x 2.9  cm-craniocaudal length T2 hyperintense subcutaneous layer fluid collection along  the inferior margin of the ileal conduit. The colostomy site in the left lower  quadrant appears unremarkable.     Liver:  A new ovoid T1-low T2-high signal 1.8 x 1.4 cm lesion is identified in  the segment 8. Another 0.8 x 0.6 cm T1-low T2-high signal focus is identified  in the segment 3. These structures were not apparent on prior studies. With  diffusion imaging, no evidence of restricted diffusion is demonstrated at these  foci.     Biliary:  No evidence for significant common bile duct dilation.     Gallbladder:  Mild distention of the gallbladder. No definite gallstones.     Spleen, Adrenal Glands, Pancreas:  Unremarkable.     Kidneys:  Cyst at the left kidney lower pole region measuring about 1.2 x 1.2  cm. Increase in size compared to prior CTs from above 0.8 x 0.7 cm.     Miscellaneous: The largest of the periportal nodes measures up to about 1.3 x  1.1 cm.     IMPRESSION     1. Focal fluid collection in the subcutaneous tissue adjacent to the ileal  conduit. This fluid collection may be amenable to aspiration under ultrasound  guidance.     2. New hepatic lesions as described. Although there is apparent lack of  restricted diffusion, further investigation with ultrasound is warranted to  assess if these structures are indeed cystic in nature.     3.  Left renal cyst.     4.  Slight az prominence.        MRI pelvis 7/6/2021  FINDINGS:     Along the inferior aspect of the ileal conduit stoma site, focal elongated T1  high T2-signal fluid collection is demonstrated to, measuring about 8.3 cm-width  x 2.2 cm-thickness x 2.3 cm-craniocaudal length. In retrospect, there was a  suggestion of possible 7.1 x 3.5 x 2.0 cm hypodense material collection along  the inferior aspect of the subcutaneous abdominal wall portion of the ileal  conduit on the 04/01/21 unenhanced CT. This fluid collection therefore may be  slightly increased in size in the elbow.     The intraperitoneal portion of the ileal conduit appears grossly unremarkable.     The presacral region demonstrates apparent continued pattern of nonspecific mild  edema. The source for this edematous changes is clear. Most likely related to  residual changes from recent surgical intervention.     Mild edematous changes also noted in the right and to lesser extent left  piriformis muscles. Additional edematous changes are also identified in the  right obturator internus along the superior aspect.      Minimal free fluid in the posterior pelvic region.     There is some intraluminal fluid in the blind loop portion of the rectum. At  the right superior lateral vaginal cuff corner, a vague trail of T2 high signal  is observed (coronal T2 images #8-10 and fat-sat axial T2 image #5-10). This  [place] appears to course quite close to the anterior margin of the rectal remnant  wall. Whether there is indeed communication between the blind rectal segment  and the vaginal cuff can be further assessed with barium enema.     No distinct mass is detected pelvis.     Enlarged nodes are identified along the right pelvic sidewall. The largest  right pelvic sidewall node is identified subjacent to the external iliac  artery/vein vascular bundle, measuring up to about 2.2 x 1.2 cm (fat-sat axial  T2 image #12). Another slightly enlarged node more superiorly measuring about  1.9 x 1.0 cm (image #17). Multiple slightly borderline prominent nodes  identified in the mesentery, measuring up to about 1.2 x 1.0 cm (image #25). Additional note near the aortic bifurcation measuring about 1.3 x 0.9 cm (image  #34).    IMPRESSION     1. Focal T1- and T2-hyperintense subcutaneous fluid collection along the  inferior aspect of the ileal conduit stoma site. Possibly representing a seroma  or a focus of urinoma. This may be amenable to aspiration under ultrasound or  CT guidance.     2.  Questionable subtle trailed between the right superior lateral aspect of the  vaginal cuff with adjacent blind-tipped rectum. More definitive evaluation can  be performed with barium enema.     3.  Slight az prominence along the right pelvic sidewall and in the  mesentery.     4. No distinct residual or recurrent mass.     5.  Persistent mild presacral edema  PETCT 7/17/2020   FINDINGS:        PET images: Study limited because of patient motion.     Mediastinal blood pool reference value = SUV Max. Mediastinal blood pool reference value = SUV Mean. Liver parenchyma reference value =  4.7   SUV Max. Liver parenchyma reference value =  2.3    SUV Mean.           NECK: There is no suspicious hypermetabolic activity at the neck. CHEST: There is no suspicious hypermetabolic activity at the chest.     ABDOMEN: Typical heterogeneity at the liver. No convincing malignant foci  hypermetabolic activity or underlying CT abnormality. PELVIS: There is soft tissue fullness in the right retrovesicular pelvis just  above the vaginal cuff and posterior to right ureter measuring about 3.8 cm with  Max SUV 13.5 (206), extending to the rectum posteriorly, levators inferiorly on  the right. Previously 4 cm and Max SUV 16.5.     Thickening anterior abdominal wall compatible with scar demonstrating diffuse  modest activity and Max SUV 2.7.      Additional CT findings: Mediport catheter has tip in the superior vena cava. Air  trapping in the superior segments lower lobes. Right-sided nephroureteral stent  without hydronephrosis. No ascites. IMPRESSION  Impression:       Treatment response. Decreased metabolic activity at the right retrovesicular  pelvic mass. No new evidence of metastatic disease.     Interval placement right-sided nephroureteral stent and resolved  hydroureteronephrosis. .     ROS:    As above      Patient Active Problem List    Diagnosis Date Noted    CKD (chronic kidney disease) stage 4, GFR 15-29 ml/min ([facility]) 02/11/2021    Acute congestive heart failure ([address] 02/04/2021    COVID-19 12/31/2020    History of abdominal surgery 12/31/2020    Melena 12/30/2020    Cancer of appendix ([address] 11/17/2020    Loss of appetite 10/14/2020    Other hydronephrosis 06/24/2020    Genetic carrier status 09/13/2019    Postoperative nausea 08/27/2019    Ovarian cancer ([address] 08/07/2019    Peritoneal carcinoma ([facility] 02/14/2019    Pelvic mass in female 01/17/2019    Irritable bowel syndrome with both constipation and diarrhea 02/09/2018    Subclinical hypothyroidism 01/23/2018    Chronic abdominal pain 01/22/2018    Diverticulosis 01/22/2018    Hx of total hysterectomy 01/22/2018    Hyponatremia 01/22/2018    Advance care planning 01/31/2017    Dental caries 05/26/2016    Chronic periodontal disease 05/26/2016    SUAZO (dyspnea on exertion) 02/10/2016    Prediabetes 09/24/2015    Morbid obesity ([address] 08/31/2015    Arthritis, degenerative 08/31/2015    Gastroesophageal reflux disease without esophagitis 08/31/2015    ANAMIKA on CPAP 08/31/2015    Essential hypertension 08/28/2015    PTSD (post-traumatic stress disorder) 03/24/2014    S/P TKR (total knee replacement) 11/14/2012    Adjustment disorder with depressed mood 09/20/2012    Major depressive disorder, recurrent episode, moderate ([address] 09/20/2012    Suicidal ideation 09/19/2012    Menopause 05/08/2012    Knee pain, right 05/08/2012    Hypokalemia 02/04/2012    Overdose 02/04/2012    Obesity 06/18/2010    Mixed hyperlipidemia 06/18/2010     Past Medical History: Diagnosis Date    AR (allergic rhinitis)     seasonal    Bladder cancer ([facility]     Cancer ([facility]     pt states between vgina and rectal area    Cardiac echocardiogram 2016    Tech difficult. EF 55-60%. No RWMA. Mild conc LVH. Gr 1 DDfx. RVSP normal.      Cardiac nuclear imaging test 2016    Low risk. Very patchy radiotracer uptake. Mild anterior artifact, low suspicion for ischemia. EF 68%. No RWMA. Normal EKG on pharm stress test.    Cardiovascular LE arterial duplex 10/07/2013    No significant arterial disease at rest bilaterally. R JUNE 1.21.  L JUNE 1.16. No significant sm vessel disease.  Chronic kidney disease     Depression     Dr. Khushi Reyes, suicide attempt     Diverticulosis     Endometriosis     s/p hysterectomy     GERD (gastroesophageal reflux disease)     Glaucoma     Dr. Zakiya Barker Hematuria, gross     HTN (hypertension)     Hx of colonoscopy 2017    mild diverticulosis, g1 internal hemorrhoids, normal colon , repeat 10 year     Hx of suicide attempt     Hyperlipidemia LDL goal < 130     IBS (irritable bowel syndrome)     Ill-defined condition     environmental allergies    Insomnia     Malignant neoplasm of peritoneum (HCC) 2019    Nausea & vomiting     OA (osteoarthritis)     both knees, lower back    Preeclampsia     PTSD (post-traumatic stress disorder)     Seizures (HCC)     1 x with childbirth, had toxemia    Sleep apnea     does not use cpap machine    Spinal stenosis     Dr. Aggarwal Rough Vertigo       Past Surgical History:   Procedure Laterality Date    COLONOSCOPY N/A 2017    COLONOSCOPY performed by Ninoska Johansen MD at [facility] N/A 2019    SIGMOIDOSCOPY FLEXIBLE performed by Sushma Sierra MD at [facility] ENDOSCOPY    HX  SECTION      HX COLONOSCOPY  2017    DR. MCRAE 2017     HX COLOSTOMY      Revision 2021    HX CYSTECTOMY  2020    HX HYSTERECTOMY      HX KNEE ARTHROSCOPY Left     left knee    HX KNEE REPLACEMENT Bilateral     (R)  (L)     HX LAPAROTOMY N/A 2019    and rectovaginal bx    HX OTHER SURGICAL  2016    all teeth removed    HX SALPINGO-OOPHORECTOMY  2008    HX TUBAL LIGATION      IR INSERT TUNL CVC W PORT OVER 5 YEARS  2019    MULTIPLE DELIVERY       1990    AZ FEMUR/KNEE SURG UNLISTED Left 2009    External fixator    AZ PART REMOVAL COLON W ANASTOMOSIS      AZ TOTAL ABDOM HYSTERECTOMY  2006    TRANSURETHRAL RESEC BLADDER NECK        OB History        2    Para   2    Term   2       0    AB   0    Living   0       SAB   0    IAB   0    Ectopic   0    Molar   0    Multiple   0    Live Births   0              Social History     Tobacco Use    Smoking status: Never Smoker    Smokeless tobacco: Never Used   Substance Use Topics    Alcohol use: No      Family History   Problem Relation Age of Onset    Heart Disease Mother         CHF    Diabetes Mother    [facility] Hypertension Mother     Kidney Disease Mother    [facility] Breast Cancer Mother    [facility] Stroke Mother     Diabetes Father     Hypertension Father     Heart Attack Father         MI    Cancer Maternal Grandmother         stomach    Ovarian Cancer Maternal Aunt     Cancer Maternal Uncle       Current Outpatient Medications   Medication Sig    oxyCODONE-acetaminophen (PERCOCET) 5-325 mg per tablet Take 1 Tablet by mouth every four (4) hours as needed for Pain for up to 14 days. Max Daily Amount: 6 Tablets.     metoprolol succinate (TOPROL-XL) 100 mg tablet take 1 tablet by mouth once daily    simvastatin (ZOCOR) 20 mg tablet take 1 tablet by mouth at bedtime    meclizine (ANTIVERT) 25 mg tablet take 1 tablet by mouth three times a day if needed FOR DIZZINESS    ondansetron (ZOFRAN ODT) 4 mg disintegrating tablet Take 1 Tablet by mouth every eight (8) hours as needed for Nausea or Vomiting.  amLODIPine (NORVASC) 10 mg tablet take 1 tablet by mouth once daily    PARoxetine (PAXIL) 20 mg tablet take 1 tablet by mouth once daily    pregabalin (LYRICA) 25 mg capsule Take 1 Capsule by mouth three (3) times daily. Max Daily Amount: 75 mg. (Patient not taking: Reported on 10/13/2021)    letrozole [facility]) 2.5 mg tablet Take 1 Tablet by mouth daily.  gabapentin (NEURONTIN) 100 mg capsule Take 1 Capsule by mouth three (3) times daily. Max Daily Amount: 300 mg. (Patient not taking: Reported on 10/13/2021)    meclizine (ANTIVERT) 25 mg tablet take 1 tablet by mouth three times a day if needed FOR DIZZINESS    latanoprost (XALATAN) 0.005 % ophthalmic solution Administer 1 Drop to both eyes nightly. No current facility-administered medications for this visit. Allergies   Allergen Reactions    Seafood Hives     FISH    Other Medication Hives     seafood    Pollen Extracts Other (comments)    Shellfish Derived Hives    Pantoprazole Other (comments)     Bleeding in stomach    Promethazine Other (comments)     Bleeding in stomach          OBJECTIVE:    Physical Exam  VITAL SIGNS: Visit Vitals  LMP 01/31/2008      GENERAL EMILY: in no apparent distress and well developed and well nourished   PELVIC: NEFG, foreshortened vagina with no obvious mass visible. BME revealed mass at vaginal apex, firm, immobile            IMPRESSION/PLAN:  1.stage IV Primary peritoneal cancer with  recurrence   -previoulsy reviewed her pathology    -s/p carbo (auc=6), taxol (175 mg/m2) IV every 3 wks s/p 6 cycles completed 6/6/2019   -s/p cytoreductive surgery with [facility] with dr. Rachel Thapa   -s/p pelvic radiation   Pain-script for percocet    -given platinum sensitive disease s/p  auc=5, Doxil 30 mg/m2 IV x 6 cycles s/p Total pelvic exenteration   -biopsy c/w recurrence-reviewed Caris and discussed treatment options.   We discussed proceeding with hormonal therapy given tumor is ER\CO positive, immunotherapy given PD-L1 mutation although not FDA approved. Also discussed retreatment with platinum and if sensitive consider PARP inhibition. After discussion patient was placed on letrozole. Patient to begin Keytruda 400 mg IV every 3 weeks. 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   Will monitor   -neuropathy-Lyrica 25 mg 3 times daily   -Okay for cycle 1    -Follow-up prior to cycle 2           The total time spent was 40 minutes regarding this patients diagnosis of primary peritoneal cancer and >50% of this time was spent counseling and coordinating care    Dyan Johnson DO  Gynecologic Oncology  1/26/202211:50 AM            Sincerely,      Jean-Pierre Mendez MD

## 2022-01-27 ENCOUNTER — APPOINTMENT (OUTPATIENT)
Dept: INFUSION THERAPY | Age: 52
End: 2022-01-27
Payer: MEDICARE

## 2022-01-27 LAB — HBV CORE AB SERPL QL IA: NEGATIVE

## 2022-01-28 ENCOUNTER — HOSPITAL ENCOUNTER (OUTPATIENT)
Dept: INFUSION THERAPY | Age: 52
Discharge: HOME | End: 2022-01-28
Payer: MEDICARE

## 2022-01-28 ENCOUNTER — TELEPHONE (OUTPATIENT)
Dept: ONCOLOGY | Age: 52
End: 2022-01-28

## 2022-01-28 VITALS
DIASTOLIC BLOOD PRESSURE: 74 MMHG | RESPIRATION RATE: 18 BRPM | TEMPERATURE: 98.2 F | HEART RATE: 57 BPM | SYSTOLIC BLOOD PRESSURE: 127 MMHG | OXYGEN SATURATION: 98 %

## 2022-01-28 DIAGNOSIS — C48.2 PERITONEAL CARCINOMA (HCC): Primary | ICD-10-CM

## 2022-01-28 PROCEDURE — 74011000250 HC RX REV CODE- 250: Performed by: OBSTETRICS & GYNECOLOGY

## 2022-01-28 PROCEDURE — 74011000258 HC RX REV CODE- 258: Performed by: OBSTETRICS & GYNECOLOGY

## 2022-01-28 PROCEDURE — 74011250636 HC RX REV CODE- 250/636: Performed by: OBSTETRICS & GYNECOLOGY

## 2022-01-28 PROCEDURE — 36593 DECLOT VASCULAR DEVICE: CPT

## 2022-01-28 PROCEDURE — 96413 CHEMO IV INFUSION 1 HR: CPT

## 2022-01-28 PROCEDURE — 77030012965 HC NDL HUBR BBMI -A

## 2022-01-28 RX ORDER — HEPARIN 100 UNIT/ML
300-500 SYRINGE INTRAVENOUS AS NEEDED
Status: DISPENSED | OUTPATIENT
Start: 2022-01-28 | End: 2022-01-28

## 2022-01-28 RX ORDER — SODIUM CHLORIDE 9 MG/ML
10 INJECTION INTRAMUSCULAR; INTRAVENOUS; SUBCUTANEOUS AS NEEDED
Status: ACTIVE | OUTPATIENT
Start: 2022-01-28 | End: 2022-01-28

## 2022-01-28 RX ORDER — SODIUM CHLORIDE 9 MG/ML
25 INJECTION, SOLUTION INTRAVENOUS CONTINUOUS
Status: DISPENSED | OUTPATIENT
Start: 2022-01-28 | End: 2022-01-28

## 2022-01-28 RX ORDER — SODIUM CHLORIDE 0.9 % (FLUSH) 0.9 %
10 SYRINGE (ML) INJECTION AS NEEDED
Status: DISPENSED | OUTPATIENT
Start: 2022-01-28 | End: 2022-01-28

## 2022-01-28 RX ADMIN — SODIUM CHLORIDE 400 MG: 9 INJECTION, SOLUTION INTRAVENOUS at 10:38

## 2022-01-28 RX ADMIN — SODIUM CHLORIDE 25 ML/HR: 9 INJECTION, SOLUTION INTRAVENOUS at 10:14

## 2022-01-28 RX ADMIN — WATER 2 MG: 1 INJECTION INTRAMUSCULAR; INTRAVENOUS; SUBCUTANEOUS at 09:37

## 2022-01-28 RX ADMIN — SODIUM CHLORIDE, PRESERVATIVE FREE 10 ML: 5 INJECTION INTRAVENOUS at 11:12

## 2022-01-28 RX ADMIN — HEPARIN 500 UNITS: 100 SYRINGE at 11:13

## 2022-01-28 RX ADMIN — SODIUM CHLORIDE, PRESERVATIVE FREE 10 ML: 5 INJECTION INTRAVENOUS at 11:11

## 2022-01-28 NOTE — PROGRESS NOTES
[facility] Progress Note    Date: 2022    Name: Rell Amado              MRN: [id number]              : 1970    Chemotherapy Cycle:C1 D1 Marbella Silvia every 6 weeks      Pt to [facility], ambulatory, at 0850. Ms. Arthur Barth was assessed and education was provided. Education provided by this writer and patient allowed to ask questions. Copy of information given to patient to take home. Signed consent also scanned this morning. Ms. Cannon's vitals were reviewed. Visit Vitals  /74   Pulse (!) 57   Temp 98.2 °F (36.8 °C)   Resp 18   SpO2 98%   Breastfeeding No     Spoke with [facility] [address] [facility] nurse and clearance given to proceed with chemo today per Dr. Conrad Estimable (see scanned order). Patient's Serum Creatinine 2.14 and urinalysis reveal 300+ protein and trace blood. Patient's renal labs normally elevated and patient is seeing a Urologist.    Right chest mediport accessed with 20 g 1 inch upton needle. Unable to flush port. Patient had port last flushed in 2021. This writer had another nurse re-access (Hannah Chi RN) and port flushed but no blood return noted. Cathflo administered per protocol and after 30 minutes brisk blood return noted. Per protocol 10 ml waste of blood aspirated. NS initiated @ [facility] throughout treatment. Keytruda 400 mg was infused over 30 minutes per order. VS stable at end of infusion and pt denied complaints. Line flushed with NS and blood return from port re-verified. Ms. Arthur Barth tolerated infusion, and had no complaints at this time. Patient stayed 1 hr for observation. No signs or symptoms of reaction noted. Discharge instructions reviewed with patient and patient verbalized understanding. Mediport flushed with NS 20 ml and Heparin 500 units then de-accessed. No irritation or bleeding noted. Bandaid applied. Patient armband removed and shredded. Ms. Arthur Barth was discharged from [place] [address] in stable condition at 1210.  She is to return every two weeks while on Keytruda for labs her next lab appointment is 2/9/22 at 0830 ([address] [facility]).       Lady Rizzo  January 28, 2022

## 2022-01-28 NOTE — TELEPHONE ENCOUNTER
Patient for [facility] teaching [facility]. Informed Consent will be signed in [facility]. All questions answered. Information reviewed regarding lab work prior to chemo (WBC'c, RBC's, Platelets and their basic functions) every two weeks. Possible side effects of chemo discussed, which include:   Nausea/vomiting, Constipation, Diarrhea, Fever, Skin, Fatigue, Dehydration, Neuropathy. Patient given a calendar for January with all appointments scheduled. Phone numbers given on how to reach the office for any questions or concerns.

## 2022-02-09 ENCOUNTER — TELEPHONE (OUTPATIENT)
Dept: ONCOLOGY | Age: 52
End: 2022-02-09

## 2022-02-09 ENCOUNTER — TELEPHONE (OUTPATIENT)
Dept: INFUSION THERAPY | Age: 52
End: 2022-02-09

## 2022-02-09 DIAGNOSIS — G89.3 CANCER ASSOCIATED PAIN: Primary | ICD-10-CM

## 2022-02-09 RX ORDER — ACETAMINOPHEN AND CODEINE PHOSPHATE 300; 30 MG/1; MG/1
1 TABLET ORAL
Qty: 40 TABLET | Refills: 0 | Status: SHIPPED | OUTPATIENT
Start: 2022-02-09 | End: 2022-02-23

## 2022-02-09 NOTE — TELEPHONE ENCOUNTER
Spoke with patient for clarification. Pt states that the Percocet works well but she has rectal bleeding when she uses the Percocet. She is requesting a different medication that would not be as strong. Pt states she is having diarrhea following Percocet, she is using medication to treat diarrhea. Pt states she is also having back pain following Keytruda treatment.  States overall 'I am doing good, I'm doing better'

## 2022-02-11 ENCOUNTER — HOSPITAL ENCOUNTER (OUTPATIENT)
Dept: INFUSION THERAPY | Age: 52
Discharge: HOME | End: 2022-02-11
Payer: MEDICARE

## 2022-02-11 VITALS
HEART RATE: 62 BPM | RESPIRATION RATE: 18 BRPM | OXYGEN SATURATION: 95 % | TEMPERATURE: 98.1 F | SYSTOLIC BLOOD PRESSURE: 146 MMHG | DIASTOLIC BLOOD PRESSURE: 76 MMHG

## 2022-02-11 LAB
ALBUMIN SERPL-MCNC: 2.9 G/DL (ref 3.4–5)
ALBUMIN/GLOB SERPL: 0.7 {RATIO} (ref 0.8–1.7)
ALP SERPL-CCNC: 90 U/L (ref 45–117)
ALT SERPL-CCNC: 14 U/L (ref 13–56)
ANION GAP SERPL CALC-SCNC: 7 MMOL/L (ref 3–18)
APPEARANCE UR: ABNORMAL
AST SERPL-CCNC: 6 U/L (ref 10–38)
BACTERIA URNS QL MICRO: ABNORMAL /HPF
BASOPHILS # BLD: 0 K/UL (ref 0–0.1)
BASOPHILS NFR BLD: 0 % (ref 0–2)
BILIRUB DIRECT SERPL-MCNC: 0.1 MG/DL (ref 0–0.2)
BILIRUB SERPL-MCNC: 0.3 MG/DL (ref 0.2–1)
BILIRUB UR QL: NEGATIVE
BUN SERPL-MCNC: 32 MG/DL (ref 7–18)
BUN/CREAT SERPL: 14 (ref 12–20)
CALCIUM SERPL-MCNC: 8.4 MG/DL (ref 8.5–10.1)
CANCER AG125 SERPL-ACNC: 4 U/ML (ref 1.5–35)
CHLORIDE SERPL-SCNC: 113 MMOL/L (ref 100–111)
CO2 SERPL-SCNC: 18 MMOL/L (ref 21–32)
COLOR UR: YELLOW
CREAT SERPL-MCNC: 2.21 MG/DL (ref 0.6–1.3)
DIFFERENTIAL METHOD BLD: ABNORMAL
EOSINOPHIL # BLD: 0.1 K/UL (ref 0–0.4)
EOSINOPHIL NFR BLD: 1 % (ref 0–5)
EPITH CASTS URNS QL MICRO: ABNORMAL /LPF (ref 0–5)
ERYTHROCYTE [DISTWIDTH] IN BLOOD BY AUTOMATED COUNT: 18.2 % (ref 11.6–14.5)
GLOBULIN SER CALC-MCNC: 3.9 G/DL (ref 2–4)
GLUCOSE SERPL-MCNC: 102 MG/DL (ref 74–99)
GLUCOSE UR STRIP.AUTO-MCNC: NEGATIVE MG/DL
HCT VFR BLD AUTO: 30.9 % (ref 35–45)
HGB BLD-MCNC: 9 G/DL (ref 12–16)
HGB UR QL STRIP: ABNORMAL
IMM GRANULOCYTES # BLD AUTO: 0 K/UL (ref 0–0.04)
IMM GRANULOCYTES NFR BLD AUTO: 0 % (ref 0–0.5)
KETONES UR QL STRIP.AUTO: NEGATIVE MG/DL
LEUKOCYTE ESTERASE UR QL STRIP.AUTO: ABNORMAL
LYMPHOCYTES # BLD: 0.9 K/UL (ref 0.9–3.6)
LYMPHOCYTES NFR BLD: 12 % (ref 21–52)
MCH RBC QN AUTO: 25.5 PG (ref 24–34)
MCHC RBC AUTO-ENTMCNC: 29.1 G/DL (ref 31–37)
MCV RBC AUTO: 87.5 FL (ref 78–100)
MONOCYTES # BLD: 0.5 K/UL (ref 0.05–1.2)
MONOCYTES NFR BLD: 7 % (ref 3–10)
MUCOUS THREADS URNS QL MICRO: ABNORMAL /LPF
NEUTS SEG # BLD: 5.6 K/UL (ref 1.8–8)
NEUTS SEG NFR BLD: 78 % (ref 40–73)
NITRITE UR QL STRIP.AUTO: NEGATIVE
NRBC # BLD: 0 K/UL (ref 0–0.01)
NRBC BLD-RTO: 0 PER 100 WBC
PH UR STRIP: 7 [PH] (ref 5–8)
PLATELET # BLD AUTO: 292 K/UL (ref 135–420)
PMV BLD AUTO: 9.1 FL (ref 9.2–11.8)
POTASSIUM SERPL-SCNC: 4.6 MMOL/L (ref 3.5–5.5)
PROT SERPL-MCNC: 6.8 G/DL (ref 6.4–8.2)
PROT UR STRIP-MCNC: 300 MG/DL
RBC # BLD AUTO: 3.53 M/UL (ref 4.2–5.3)
RBC #/AREA URNS HPF: ABNORMAL /HPF (ref 0–5)
SODIUM SERPL-SCNC: 138 MMOL/L (ref 136–145)
SP GR UR REFRACTOMETRY: 1.01 (ref 1–1.03)
TSH SERPL DL<=0.05 MIU/L-ACNC: 2.42 UIU/ML (ref 0.36–3.74)
UROBILINOGEN UR QL STRIP.AUTO: 0.2 EU/DL (ref 0.2–1)
WBC # BLD AUTO: 7.2 K/UL (ref 4.6–13.2)
WBC URNS QL MICRO: ABNORMAL /HPF (ref 0–5)

## 2022-02-11 PROCEDURE — 80053 COMPREHEN METABOLIC PANEL: CPT

## 2022-02-11 PROCEDURE — 84443 ASSAY THYROID STIM HORMONE: CPT

## 2022-02-11 PROCEDURE — 87077 CULTURE AEROBIC IDENTIFY: CPT

## 2022-02-11 PROCEDURE — 85025 COMPLETE CBC W/AUTO DIFF WBC: CPT

## 2022-02-11 PROCEDURE — 87186 SC STD MICRODIL/AGAR DIL: CPT

## 2022-02-11 PROCEDURE — 86304 IMMUNOASSAY TUMOR CA 125: CPT

## 2022-02-11 PROCEDURE — 81001 URINALYSIS AUTO W/SCOPE: CPT

## 2022-02-11 PROCEDURE — 83735 ASSAY OF MAGNESIUM: CPT

## 2022-02-11 PROCEDURE — 87086 URINE CULTURE/COLONY COUNT: CPT

## 2022-02-11 PROCEDURE — 82248 BILIRUBIN DIRECT: CPT

## 2022-02-11 PROCEDURE — 36415 COLL VENOUS BLD VENIPUNCTURE: CPT

## 2022-02-11 PROCEDURE — 77030012965 HC NDL HUBR BBMI -A

## 2022-02-11 RX ORDER — HEPARIN 100 UNIT/ML
500 SYRINGE INTRAVENOUS ONCE
Status: ACTIVE | OUTPATIENT
Start: 2022-02-11 | End: 2022-02-11

## 2022-02-11 RX ORDER — HEPARIN 100 UNIT/ML
SYRINGE INTRAVENOUS
Status: DISPENSED
Start: 2022-02-11 | End: 2022-02-11

## 2022-02-11 RX ORDER — SODIUM CHLORIDE 0.9 % (FLUSH) 0.9 %
10-40 SYRINGE (ML) INJECTION AS NEEDED
Status: DISCONTINUED | OUTPATIENT
Start: 2022-02-11 | End: 2022-02-15 | Stop reason: HOSPADM

## 2022-02-11 NOTE — PROGRESS NOTES
[facility] Progress Note    Date: 2022    Name: Gabriela Reina              MRN: [id number]              : 1970      Pt to [facility], ambulatory, at 56. Pt here for the following labs: CBC, CMP, Magnesium, CA-125, UA, Urine Cx, TSH, and Direct Bilirubin. Ms. Sahra Andrews was assessed and education was provided. Ms. Cannon's vitals were reviewed. There were no vitals taken for this visit. Right chest mediport accessed with 20 g 1 inch upton needle. Unable to flush port. Port was de-assessed and band aid was applied. Blood for ordered labs drawn via left posterior hand x1 attempt, and sent out for processing. Gauze/coban applied to site. Pt was only able to provide small urine sample from urostomy bag. Sample sent out for processing. Ms. Sahra Andrews tolerated well without complaints. Discharge/ follow-up instructions discussed w/ pt. Pt verbalized understanding. Patient armband removed and shredded. Ms. Sahra Andrews was discharged from [place] [address] in stable condition at [address] She is to return on 22 at 0900 for her next appointment.       Sukhwinder Hernandez RN  2022

## 2022-02-14 ENCOUNTER — TELEPHONE (OUTPATIENT)
Dept: ONCOLOGY | Age: 52
End: 2022-02-14

## 2022-02-14 LAB
BACTERIA SPEC CULT: ABNORMAL
CC UR VC: ABNORMAL
MAGNESIUM SERPL-MCNC: 1.5 MG/DL (ref 1.6–2.6)
SERVICE CMNT-IMP: ABNORMAL

## 2022-02-14 RX ORDER — LEVOFLOXACIN 250 MG/1
250 TABLET ORAL DAILY
Qty: 3 TABLET | Refills: 0 | Status: SHIPPED | OUTPATIENT
Start: 2022-02-14 | End: 2022-02-17

## 2022-02-14 NOTE — TELEPHONE ENCOUNTER
Spoke with Ms. Gaurang Milton reviewed urine culture results with her and informed her I have sent antibiotics to the pharmacy on file. All of her questions were answered.

## 2022-02-24 ENCOUNTER — TELEPHONE (OUTPATIENT)
Dept: ONCOLOGY | Age: 52
End: 2022-02-24

## 2022-02-24 DIAGNOSIS — G89.3 CANCER ASSOCIATED PAIN: Primary | ICD-10-CM

## 2022-02-25 ENCOUNTER — APPOINTMENT (OUTPATIENT)
Dept: INFUSION THERAPY | Age: 52
End: 2022-02-25

## 2022-02-25 RX ORDER — ACETAMINOPHEN AND CODEINE PHOSPHATE 300; 30 MG/1; MG/1
1 TABLET ORAL
Qty: 40 TABLET | Refills: 0 | Status: SHIPPED | OUTPATIENT
Start: 2022-02-25 | End: 2022-03-09 | Stop reason: SDUPTHER

## 2022-02-25 RX ORDER — LETROZOLE 2.5 MG/1
2.5 TABLET, FILM COATED ORAL DAILY
Qty: 90 TABLET | Refills: 1 | Status: SHIPPED | OUTPATIENT
Start: 2022-02-25 | End: 2022-04-15 | Stop reason: SDUPTHER

## 2022-03-04 RX ORDER — SODIUM CHLORIDE 9 MG/ML
25 INJECTION, SOLUTION INTRAVENOUS CONTINUOUS
Status: CANCELLED | OUTPATIENT
Start: 2022-03-11

## 2022-03-04 RX ORDER — ONDANSETRON 2 MG/ML
8 INJECTION INTRAMUSCULAR; INTRAVENOUS AS NEEDED
Status: CANCELLED | OUTPATIENT
Start: 2022-03-11

## 2022-03-04 RX ORDER — POTASSIUM CHLORIDE 7.45 MG/ML
10 INJECTION INTRAVENOUS
Status: CANCELLED
Start: 2022-03-11

## 2022-03-04 RX ORDER — MAGNESIUM SULFATE HEPTAHYDRATE 40 MG/ML
2 INJECTION, SOLUTION INTRAVENOUS
Status: CANCELLED
Start: 2022-03-11

## 2022-03-04 RX ORDER — EPINEPHRINE 1 MG/ML
0.3 INJECTION, SOLUTION, CONCENTRATE INTRAVENOUS AS NEEDED
Status: CANCELLED | OUTPATIENT
Start: 2022-03-11

## 2022-03-04 RX ORDER — HYDROCORTISONE SODIUM SUCCINATE 100 MG/2ML
100 INJECTION, POWDER, FOR SOLUTION INTRAMUSCULAR; INTRAVENOUS AS NEEDED
Status: CANCELLED | OUTPATIENT
Start: 2022-03-11

## 2022-03-04 RX ORDER — ALBUTEROL SULFATE 0.83 MG/ML
2.5 SOLUTION RESPIRATORY (INHALATION) AS NEEDED
Status: CANCELLED
Start: 2022-03-11

## 2022-03-04 RX ORDER — DIPHENHYDRAMINE HYDROCHLORIDE 50 MG/ML
25 INJECTION, SOLUTION INTRAMUSCULAR; INTRAVENOUS AS NEEDED
Status: CANCELLED
Start: 2022-03-11

## 2022-03-04 RX ORDER — DIPHENHYDRAMINE HYDROCHLORIDE 50 MG/ML
50 INJECTION, SOLUTION INTRAMUSCULAR; INTRAVENOUS AS NEEDED
Status: CANCELLED
Start: 2022-03-11

## 2022-03-04 RX ORDER — ACETAMINOPHEN 325 MG/1
650 TABLET ORAL AS NEEDED
Status: CANCELLED
Start: 2022-03-11

## 2022-03-09 ENCOUNTER — HOSPITAL ENCOUNTER (OUTPATIENT)
Dept: INFUSION THERAPY | Age: 52
Discharge: HOME | End: 2022-03-09
Payer: MEDICARE

## 2022-03-09 ENCOUNTER — OFFICE VISIT (OUTPATIENT)
Dept: ONCOLOGY | Age: 52
End: 2022-03-09
Payer: MEDICARE

## 2022-03-09 ENCOUNTER — APPOINTMENT (OUTPATIENT)
Dept: INFUSION THERAPY | Age: 52
End: 2022-03-09
Payer: MEDICARE

## 2022-03-09 VITALS
BODY MASS INDEX: 46.25 KG/M2 | WEIGHT: 261 LBS | RESPIRATION RATE: 16 BRPM | HEIGHT: 63 IN | SYSTOLIC BLOOD PRESSURE: 138 MMHG | TEMPERATURE: 98.9 F | HEART RATE: 55 BPM | OXYGEN SATURATION: 98 % | DIASTOLIC BLOOD PRESSURE: 91 MMHG

## 2022-03-09 VITALS
RESPIRATION RATE: 16 BRPM | HEART RATE: 60 BPM | SYSTOLIC BLOOD PRESSURE: 150 MMHG | DIASTOLIC BLOOD PRESSURE: 81 MMHG | WEIGHT: 258 LBS | HEIGHT: 63 IN | TEMPERATURE: 98.3 F | BODY MASS INDEX: 45.71 KG/M2 | OXYGEN SATURATION: 93 %

## 2022-03-09 DIAGNOSIS — C48.2 PERITONEAL CARCINOMA (HCC): ICD-10-CM

## 2022-03-09 DIAGNOSIS — C57.7 MALIGNANT NEOPLASM OF OTHER SPECIFIED FEMALE GENITAL ORGANS (HCC): ICD-10-CM

## 2022-03-09 DIAGNOSIS — C54.1 ENDOMETRIAL CANCER (HCC): Primary | ICD-10-CM

## 2022-03-09 DIAGNOSIS — G89.3 CANCER ASSOCIATED PAIN: ICD-10-CM

## 2022-03-09 LAB
ALBUMIN SERPL-MCNC: 3 G/DL (ref 3.4–5)
ALBUMIN/GLOB SERPL: 0.7 {RATIO} (ref 0.8–1.7)
ALP SERPL-CCNC: 82 U/L (ref 45–117)
ALT SERPL-CCNC: 15 U/L (ref 13–56)
AMORPH CRY URNS QL MICRO: ABNORMAL
ANION GAP SERPL CALC-SCNC: 3 MMOL/L (ref 3–18)
APPEARANCE UR: ABNORMAL
AST SERPL-CCNC: 14 U/L (ref 10–38)
BACTERIA URNS QL MICRO: ABNORMAL /HPF
BASOPHILS # BLD: 0 K/UL (ref 0–0.1)
BASOPHILS NFR BLD: 0 % (ref 0–2)
BILIRUB SERPL-MCNC: 0.4 MG/DL (ref 0.2–1)
BILIRUB UR QL: NEGATIVE
BUN SERPL-MCNC: 43 MG/DL (ref 7–18)
BUN/CREAT SERPL: 19 (ref 12–20)
CALCIUM SERPL-MCNC: 8.8 MG/DL (ref 8.5–10.1)
CHLORIDE SERPL-SCNC: 115 MMOL/L (ref 100–111)
CO2 SERPL-SCNC: 24 MMOL/L (ref 21–32)
COLOR UR: YELLOW
CREAT SERPL-MCNC: 2.29 MG/DL (ref 0.6–1.3)
DIFFERENTIAL METHOD BLD: ABNORMAL
EOSINOPHIL # BLD: 0.1 K/UL (ref 0–0.4)
EOSINOPHIL NFR BLD: 1 % (ref 0–5)
EPITH CASTS URNS QL MICRO: ABNORMAL /LPF (ref 0–5)
ERYTHROCYTE [DISTWIDTH] IN BLOOD BY AUTOMATED COUNT: 18.8 % (ref 11.6–14.5)
GLOBULIN SER CALC-MCNC: 4.1 G/DL (ref 2–4)
GLUCOSE SERPL-MCNC: 121 MG/DL (ref 74–99)
GLUCOSE UR STRIP.AUTO-MCNC: NEGATIVE MG/DL
HCT VFR BLD AUTO: 30 % (ref 35–45)
HGB BLD-MCNC: 8.7 G/DL (ref 12–16)
HGB UR QL STRIP: NEGATIVE
IMM GRANULOCYTES # BLD AUTO: 0 K/UL (ref 0–0.04)
IMM GRANULOCYTES NFR BLD AUTO: 1 % (ref 0–0.5)
KETONES UR QL STRIP.AUTO: NEGATIVE MG/DL
LEUKOCYTE ESTERASE UR QL STRIP.AUTO: ABNORMAL
LYMPHOCYTES # BLD: 0.9 K/UL (ref 0.9–3.6)
LYMPHOCYTES NFR BLD: 13 % (ref 21–52)
MAGNESIUM SERPL-MCNC: 1.9 MG/DL (ref 1.6–2.6)
MCH RBC QN AUTO: 25.7 PG (ref 24–34)
MCHC RBC AUTO-ENTMCNC: 29 G/DL (ref 31–37)
MCV RBC AUTO: 88.5 FL (ref 78–100)
MONOCYTES # BLD: 0.4 K/UL (ref 0.05–1.2)
MONOCYTES NFR BLD: 5 % (ref 3–10)
NEUTS SEG # BLD: 5.8 K/UL (ref 1.8–8)
NEUTS SEG NFR BLD: 80 % (ref 40–73)
NITRITE UR QL STRIP.AUTO: NEGATIVE
NRBC # BLD: 0 K/UL (ref 0–0.01)
NRBC BLD-RTO: 0 PER 100 WBC
PH UR STRIP: 7 [PH] (ref 5–8)
PLATELET # BLD AUTO: 296 K/UL (ref 135–420)
PMV BLD AUTO: 9.6 FL (ref 9.2–11.8)
POTASSIUM SERPL-SCNC: 4.9 MMOL/L (ref 3.5–5.5)
PROT SERPL-MCNC: 7.1 G/DL (ref 6.4–8.2)
PROT UR STRIP-MCNC: 300 MG/DL
RBC # BLD AUTO: 3.39 M/UL (ref 4.2–5.3)
RBC #/AREA URNS HPF: ABNORMAL /HPF (ref 0–5)
SODIUM SERPL-SCNC: 142 MMOL/L (ref 136–145)
SP GR UR REFRACTOMETRY: 1.01 (ref 1–1.03)
TSH SERPL DL<=0.05 MIU/L-ACNC: 1.66 UIU/ML (ref 0.36–3.74)
UROBILINOGEN UR QL STRIP.AUTO: 0.2 EU/DL (ref 0.2–1)
WBC # BLD AUTO: 7.3 K/UL (ref 4.6–13.2)
WBC URNS QL MICRO: ABNORMAL /HPF (ref 0–4)

## 2022-03-09 PROCEDURE — G8752 SYS BP LESS 140: HCPCS | Performed by: OBSTETRICS & GYNECOLOGY

## 2022-03-09 PROCEDURE — G8755 DIAS BP > OR = 90: HCPCS | Performed by: OBSTETRICS & GYNECOLOGY

## 2022-03-09 PROCEDURE — 80053 COMPREHEN METABOLIC PANEL: CPT

## 2022-03-09 PROCEDURE — G8417 CALC BMI ABV UP PARAM F/U: HCPCS | Performed by: OBSTETRICS & GYNECOLOGY

## 2022-03-09 PROCEDURE — 81001 URINALYSIS AUTO W/SCOPE: CPT

## 2022-03-09 PROCEDURE — 85025 COMPLETE CBC W/AUTO DIFF WBC: CPT

## 2022-03-09 PROCEDURE — 36415 COLL VENOUS BLD VENIPUNCTURE: CPT

## 2022-03-09 PROCEDURE — 99215 OFFICE O/P EST HI 40 MIN: CPT | Performed by: OBSTETRICS & GYNECOLOGY

## 2022-03-09 PROCEDURE — G8427 DOCREV CUR MEDS BY ELIG CLIN: HCPCS | Performed by: OBSTETRICS & GYNECOLOGY

## 2022-03-09 PROCEDURE — G9899 SCRN MAM PERF RSLTS DOC: HCPCS | Performed by: OBSTETRICS & GYNECOLOGY

## 2022-03-09 PROCEDURE — G9717 DOC PT DX DEP/BP F/U NT REQ: HCPCS | Performed by: OBSTETRICS & GYNECOLOGY

## 2022-03-09 PROCEDURE — 84443 ASSAY THYROID STIM HORMONE: CPT

## 2022-03-09 PROCEDURE — 83735 ASSAY OF MAGNESIUM: CPT

## 2022-03-09 PROCEDURE — 87086 URINE CULTURE/COLONY COUNT: CPT

## 2022-03-09 PROCEDURE — G9711 PT HX TOT COL OR COLON CA: HCPCS | Performed by: OBSTETRICS & GYNECOLOGY

## 2022-03-09 RX ORDER — ACETAMINOPHEN AND CODEINE PHOSPHATE 300; 30 MG/1; MG/1
1 TABLET ORAL
Qty: 40 TABLET | Refills: 0 | Status: SHIPPED | OUTPATIENT
Start: 2022-03-09 | End: 2022-03-21

## 2022-03-09 NOTE — PROGRESS NOTES
[facility] Lab Visit:    Nelida Mcnulty  1970  [id number]    [id number] Pt arrived ambulatory w/o assist. Labs drawn per order via Left AC venipuncture x1 attempt. Pt tolerated well without complaints. Gauze/ coban to site. Pt departed [facility] ambulatory and in no distress at 1150.      Visit Vitals  BP (!) 150/81 (BP 1 Location: Left upper arm, BP Patient Position: Sitting)   Pulse 60   Temp 98.3 °F (36.8 °C)   Resp 16   Ht 5' 3\" (1.6 m)   Wt 117 kg (258 lb)   SpO2 93%   BMI 45.70 kg/m²

## 2022-03-09 NOTE — PROGRESS NOTES
Eleno Claude is a 45 y.o. female presents in office for ovarian cancer, due for C2 Keytruda. Chief Complaint   Patient presents with    Ovarian Cancer      Pt reports she is having an MRI for her kidneys and a follow up next month. Pt denies dizziness, insomnia, SOB, decreased appetite. Pt c/o frequent fatigue, feels like she needs to rest every 2 hrs. Pt c/o occasional light headedness and headache. Pt states she feels like she has swelling in left ankle and foot sometimes. Pt c/o back, leg, and foot pain. Pt c/o tingling in hands a feet, legs feeling like they are being twisted, and a stabbing pain in back. Do you have any unusual vaginal bleeding, discharge or irritation? Pt c/o discharge and bleeding, improving. Do you have any changes in your bowel movements? Pt c/o diarrhea, treated with Imodium. Have you been experiencing nausea or vomiting? Pt c/o nausea. Have you been experiencing any continuous or worsening abdominal pain? Pt c/o cramping pain. Any urinary burning? No    Visit Vitals  BP (!) 138/91 (BP 1 Location: Left arm, BP Patient Position: Sitting)   Pulse (!) 55   Temp 98.9 °F (37.2 °C) (Oral)   Resp 16   Ht 5' 3\" (1.6 m)   Wt 118.4 kg (261 lb)   SpO2 98%   BMI 46.23 kg/m²         1. Have you been to the ER, urgent care clinic since your last visit? Hospitalized since your last visit? No    2. Have you seen or consulted any other health care providers outside of the [address] [place] [address] since your last visit? Include any pap smears or colon screening. No    3 most recent PHQ Screens 3/30/2021   Little interest or pleasure in doing things Not at all   Feeling down, depressed, irritable, or hopeless Not at all   Total Score PHQ 2 0       Abuse Screening Questionnaire 3/30/2021   Do you ever feel afraid of your partner? N   Are you in a relationship with someone who physically or mentally threatens you? N   Is it safe for you to go home?  Y       Fall Risk Assessment, last 12 mths 1/11/2021   Able to walk? Yes   Fall in past 12 months? 0   Do you feel unsteady?  0   Are you worried about falling 0       Learning Assessment 3/30/2021   PRIMARY LEARNER Patient   HIGHEST LEVEL OF EDUCATION - PRIMARY LEARNER  -   BARRIERS PRIMARY LEARNER -   CO-LEARNER CAREGIVER -   PRIMARY LANGUAGE ENGLISH   LEARNER PREFERENCE PRIMARY DEMONSTRATION     -   ANSWERED BY patient   RELATIONSHIP SELF

## 2022-03-09 NOTE — LETTER
3/9/2022    Patient: Heraclio May   YOB: 1970   Date of Visit: 3/9/2022     [address]  [address]  [address] [facility]    Dear Janelle Howe MD,      Thank you for referring Ms. Delbert Mcmahan to [address] [place] [address] for evaluation. My notes for this consultation are attached. If you have questions, please do not hesitate to call me. I look forward to following your patient along with you.       Sincerely,    Mami Holloway MD

## 2022-03-09 NOTE — PROGRESS NOTES
[address] [facility]  [address] [place] [address], [address], [address] [facility]  [address] [place] [address], [address] [place] [address]  [place] [address] [place], [place] [id number] [phone]          Patient ID:  Name:  Anjelica Combs  MRN:  [id number]  :  1970/51 y.o. Date:  3/9/2022      HISTORY OF PRESENT ILLNESS:  Anjelica Combs is a 45 y.o.  postmenopausal female referred by Dr. Stacie Lowry  With peritoneal cancer. Intitally seen be NP with reports of vaginal bleeding. Given pt's history of hysteretectomy with BSO for endometriosis in  a TVUS was ordered. Which revealed a 6.8 cm x 5.2 cm x 4.6 cm at midline vaginal cuff. This prompted pelvic CT with findings of a lesion measuring 7.6 x 5.8 x 7.2 cm and is compatible with a pelvic neoplasm vs endometrioma given prior history of endometriosis. Tumor markers done on 2018 all normal.  S/p  Exploratory laparotomy, exploration of retroperitoneal spaces, exam under anesthesia with biopsy of rectovaginal mass on 2019. Had sigmoidoscopy which revealed submucosal mass. S/p cycle #6 of carbo/taxol on 2019. S/p cytoreductive surgery with [facility] on 2019. S/p radiation treatment completed in 2019. Was seen in office and had a biopsy c/w recurrence. S/p cycle #6 of Carbo/Doxil on 2020. S/p Exploratory laparotomy. 2. Lysis of adhesions. 3. Simple appendectomy. 4. 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 On . She also had a revision of urostomy that is still leaking. S/p IR biopsy of liver on 2021 which demonstrated recurrent disease. S/p cycle #1 of keytruda on 2022. Here for follow-up continues to complain of vaginal bleeding and rectal bleeding as well as discharge from the rectum. Continues to have pain controlled with meds. Continues to have nausea controlled with meds. Having some fatigue.   Also complaining of diarrhea but controlled with Imodium. Labs:  Component      Latest Ref Rng & Units 2/11/2022           9:37 AM   CA-125      1.5 - 35.0 U/mL 4     Component      Latest Ref Rng & Units 12/16/2021          10:25AM   Cancer Ag (CA) 125      0.0 - 38.1 U/mL 5       Component      Latest Ref Rng & Units 6/17/2021          12:00 AM   Cancer Ag (CA) 125      0.0 - 38.1 U/mL 5.1     Component      Latest Ref Rng & Units 9/28/2020          11:11 AM   CA-125      1.5 - 35.0 U/mL 6     Component      Latest Ref Rng & Units 8/31/2020          10:01 AM   CA-125      1.5 - 35.0 U/mL 8     Component      Latest Ref Rng & Units 7/7/2020          11:30 AM   CA-125      1.5 - 35.0 U/mL 5     Component      Latest Ref Rng & Units 6/8/2020          10:40 AM   CA-125      1.5 - 35.0 U/mL 7     Component      Latest Ref Rng & Units 5/11/2020          11:30 AM   CA-125      1.5 - 35.0 U/mL 7     Component      Latest Ref Rng & Units 3/4/2020           8:15 AM   CA-125      1.5 - 35.0 U/mL 7     Component      Latest Ref Rng & Units 11/15/2019           9:56 AM   CA-125      1.5 - 35.0 U/mL 6     Component      Latest Ref Rng & Units 6/6/2019           8:44 AM   CA-125      1.5 - 35.0 U/mL 7     Component      Latest Ref Rng & Units 5/16/2019 4/25/2019           8:26 AM  8:20 AM   Cancer Ag (CA) 125      0.0 - 38.1 U/mL 7.8 8.7     Component      Latest Ref Rng & Units 4/4/2019           8:28 AM   Cancer Ag (CA) 125      0.0 - 38.1 U/mL 8.8     Component      Latest Ref Rng & Units 3/14/2019 2/21/2019           8:15 AM  8:32 AM   Cancer Ag (CA) 125      0.0 - 38.1 U/mL 10.4 18.4     12/17/2018 : 9.3  12/17/2018 CEA: 2.0  12/17/2018 AFP: 3.8    Pathology  8/4/2021  Liver mass, core biopsies:        Metastatic adenocarcinoma, most consistent with serous type.      DIAGNOSIS COMMENT:   Although metastatic ovarian or primary peritoneal carcinoma forming   parenchymal liver lesions is uncommon, the histologic features in this   case are similar to those in the patient's prior rectovaginal mass biopsy   from 2019 and the immunohistochemical features are similar to those   documented in the EMR from a pelvic exenteration specimen performed at an   outside institution in 2020.   4/20/2020  Vaginal biopsy  Papillary serous adenocarcinoma    8/8/2019  A) COLON, PERICOLIC NODULE, EXCISION:      - ACELLULAR MUCIN WITH MULTIPLE CALCIFICATIONS, AND ASSOCIATED FIBROUS WALL WITH        HYALINIZATION, AND CHRONIC INFLAMMATION.     - ADJACENT BENIGN FIBROADIPOSE TISSUE, CONSISTENT WITH MESENTERY.     - NO EVIDENCE OF MALIGNANCY.      - SEE COMMENT. B) LIVER, RIGHT LOBE, BIOPSY:      - NODULAR FAT NECROSIS AND FIBROSIS OF THE LIVER CAPSULE.      - MILD STEATOSIS.     - NO EVIDENCE OF MALIGNANCY. C) COLON, SIGMOID, SEROSAL IMPLANT, EXCISION:      - NODULAR FAT NECROSIS WITH FIBROSIS, CHRONIC INFLAMMATION, CALCIFICATIONS, AND        CYSTIC DEGENERATION WITHOUT MUCIN.     - NO EVIDENCE OF MALIGNANCY. D) OMENTUM, OMENTECTOMY (RESECTION):      - CONGESTED FIBROADIPOSE TISSUE WITH FOCAL FIBROSIS AND CHRONIC INFLAMMATION, AND        CALCIFIED NODULAR FIBROSIS.     - NO EVIDENCE OF MALIGNANCY. E) PERITONEUM, LEFT ANTERIOR ABDOMINAL, RESECTION:      - CONGESTED PERITONEAL TISSUE, NO EVIDENCE OF MALIGNANCY. F) PERITONEUM, RIGHT ANTERIOR ABDOMINAL, RESECTION:      - CONGESTED PERITONEAL TISSUE, NO EVIDENCE OF MALIGNANCY. G) COLON, SIGMOID, SEROSAL IMPLANT, EXCISION:      - NODULAR FIBROSIS WITH HISTIOCYTES, SUGGESTIVE OF FAT NECROSIS.     - CYSTIC DEGENERATION, WITHOUT MUCIN.     - NO EVIDENCE OF MALIGNANCY. H) SOFT TISSUE, FALCIFORM, EXCISION:      - CONSISTENT WITH FALCIFORM LIGAMENT.     - NO EVIDENCE OF MALIGNANCY. PATHOLOGIC STAGE:  ypTx, pNx    1/17/2019   RECTOVAGINAL MASS (#1, 2) AND PELVIC MASS, BIOPSIES:   CONSISTENT WITH SEROUS CARCINOMA WITH EXTENSIVE NECROSIS.      Imaging  MRI liver 7/6/2021     FINDINGS:     Abdominal Wall:  There is a circumscribed 8.3 cm-width x 2.4 cm-AP x 2.9  cm-craniocaudal length T2 hyperintense subcutaneous layer fluid collection along  the inferior margin of the ileal conduit. The colostomy site in the left lower  quadrant appears unremarkable.     Liver:  A new ovoid T1-low T2-high signal 1.8 x 1.4 cm lesion is identified in  the segment 8. Another 0.8 x 0.6 cm T1-low T2-high signal focus is identified  in the segment 3. These structures were not apparent on prior studies. With  diffusion imaging, no evidence of restricted diffusion is demonstrated at these  foci.     Biliary:  No evidence for significant common bile duct dilation.     Gallbladder:  Mild distention of the gallbladder. No definite gallstones.     Spleen, Adrenal Glands, Pancreas:  Unremarkable.     Kidneys:  Cyst at the left kidney lower pole region measuring about 1.2 x 1.2  cm. Increase in size compared to prior CTs from above 0.8 x 0.7 cm.     Miscellaneous: The largest of the periportal nodes measures up to about 1.3 x  1.1 cm.     IMPRESSION     1. Focal fluid collection in the subcutaneous tissue adjacent to the ileal  conduit. This fluid collection may be amenable to aspiration under ultrasound  guidance.     2. New hepatic lesions as described. Although there is apparent lack of  restricted diffusion, further investigation with ultrasound is warranted to  assess if these structures are indeed cystic in nature.     3.  Left renal cyst.     4.  Slight az prominence. MRI pelvis 7/6/2021  FINDINGS:     Along the inferior aspect of the ileal conduit stoma site, focal elongated T1  high T2-signal fluid collection is demonstrated to, measuring about 8.3 cm-width  x 2.2 cm-thickness x 2.3 cm-craniocaudal length. In retrospect, there was a  suggestion of possible 7.1 x 3.5 x 2.0 cm hypodense material collection along  the inferior aspect of the subcutaneous abdominal wall portion of the ileal  conduit on the 04/01/21 unenhanced CT.   This fluid collection therefore may be  slightly increased in size in the elbow.     The intraperitoneal portion of the ileal conduit appears grossly unremarkable.     The presacral region demonstrates apparent continued pattern of nonspecific mild  edema. The source for this edematous changes is clear. Most likely related to  residual changes from recent surgical intervention.     Mild edematous changes also noted in the right and to lesser extent left  piriformis muscles. Additional edematous changes are also identified in the  right obturator internus along the superior aspect.      Minimal free fluid in the posterior pelvic region.     There is some intraluminal fluid in the blind loop portion of the rectum. At  the right superior lateral vaginal cuff corner, a vague trail of T2 high signal  is observed (coronal T2 images #8-10 and fat-sat axial T2 image #5-10). This  [place] appears to course quite close to the anterior margin of the rectal remnant  wall. Whether there is indeed communication between the blind rectal segment  and the vaginal cuff can be further assessed with barium enema.     No distinct mass is detected pelvis.     Enlarged nodes are identified along the right pelvic sidewall. The largest  right pelvic sidewall node is identified subjacent to the external iliac  artery/vein vascular bundle, measuring up to about 2.2 x 1.2 cm (fat-sat axial  T2 image #12). Another slightly enlarged node more superiorly measuring about  1.9 x 1.0 cm (image #17). Multiple slightly borderline prominent nodes  identified in the mesentery, measuring up to about 1.2 x 1.0 cm (image #25). Additional note near the aortic bifurcation measuring about 1.3 x 0.9 cm (image  #34).    IMPRESSION     1. Focal T1- and T2-hyperintense subcutaneous fluid collection along the  inferior aspect of the ileal conduit stoma site. Possibly representing a seroma  or a focus of urinoma.   This may be amenable to aspiration under ultrasound or  CT guidance.     2.  Questionable subtle trailed between the right superior lateral aspect of the  vaginal cuff with adjacent blind-tipped rectum. More definitive evaluation can  be performed with barium enema.     3.  Slight az prominence along the right pelvic sidewall and in the  mesentery.     4. No distinct residual or recurrent mass.     5.  Persistent mild presacral edema  PETCT 7/17/2020   FINDINGS:        PET images: Study limited because of patient motion.     Mediastinal blood pool reference value = SUV Max. Mediastinal blood pool reference value = SUV Mean. Liver parenchyma reference value =  4.7   SUV Max. Liver parenchyma reference value =  2.3    SUV Mean.           NECK: There is no suspicious hypermetabolic activity at the neck. CHEST: There is no suspicious hypermetabolic activity at the chest.     ABDOMEN: Typical heterogeneity at the liver. No convincing malignant foci  hypermetabolic activity or underlying CT abnormality. PELVIS: There is soft tissue fullness in the right retrovesicular pelvis just  above the vaginal cuff and posterior to right ureter measuring about 3.8 cm with  Max SUV 13.5 (206), extending to the rectum posteriorly, levators inferiorly on  the right. Previously 4 cm and Max SUV 16.5.     Thickening anterior abdominal wall compatible with scar demonstrating diffuse  modest activity and Max SUV 2.7.      Additional CT findings: Mediport catheter has tip in the superior vena cava. Air  trapping in the superior segments lower lobes. Right-sided nephroureteral stent  without hydronephrosis. No ascites. IMPRESSION  Impression:       Treatment response. Decreased metabolic activity at the right retrovesicular  pelvic mass. No new evidence of metastatic disease.     Interval placement right-sided nephroureteral stent and resolved  hydroureteronephrosis. .     ROS:    As above      Patient Active Problem List    Diagnosis Date Noted    CKD (chronic kidney disease) stage 4, GFR 15-29 ml/min ([facility]) 02/11/2021    Acute congestive heart failure ([address] 02/04/2021    COVID-19 12/31/2020    History of abdominal surgery 12/31/2020    Melena 12/30/2020    Cancer of appendix ([address] 11/17/2020    Loss of appetite 10/14/2020    Other hydronephrosis 06/24/2020    Genetic carrier status 09/13/2019    Postoperative nausea 08/27/2019    Ovarian cancer ([address] 08/07/2019    Peritoneal carcinoma ([address] 02/14/2019    Pelvic mass in female 01/17/2019    Irritable bowel syndrome with both constipation and diarrhea 02/09/2018    Subclinical hypothyroidism 01/23/2018    Chronic abdominal pain 01/22/2018    Diverticulosis 01/22/2018    Hx of total hysterectomy 01/22/2018    Hyponatremia 01/22/2018    Advance care planning 01/31/2017    Dental caries 05/26/2016    Chronic periodontal disease 05/26/2016    SUAZO (dyspnea on exertion) 02/10/2016    Prediabetes 09/24/2015    Morbid obesity ([address] 08/31/2015    Arthritis, degenerative 08/31/2015    Gastroesophageal reflux disease without esophagitis 08/31/2015    ANAMIKA on CPAP 08/31/2015    Essential hypertension 08/28/2015    PTSD (post-traumatic stress disorder) 03/24/2014    S/P TKR (total knee replacement) 11/14/2012    Adjustment disorder with depressed mood 09/20/2012    Major depressive disorder, recurrent episode, moderate ([address] 09/20/2012    Suicidal ideation 09/19/2012    Menopause 05/08/2012    Knee pain, right 05/08/2012    Hypokalemia 02/04/2012    Overdose 02/04/2012    Obesity 06/18/2010    Mixed hyperlipidemia 06/18/2010     Past Medical History:   Diagnosis Date    AR (allergic rhinitis)     seasonal    Bladder cancer ([address]     Cancer ([address]     pt states between vgina and rectal area    Cardiac echocardiogram 04/11/2016    Tech difficult. EF 55-60%. No RWMA. Mild conc LVH. Gr 1 DDfx. RVSP normal.      Cardiac nuclear imaging test 05/05/2016    Low risk.   Very patchy radiotracer uptake. Mild anterior artifact, low suspicion for ischemia. EF 68%. No RWMA. Normal EKG on pharm stress test.    Cardiovascular LE arterial duplex 10/07/2013    No significant arterial disease at rest bilaterally. R JUNE 1.21.  L JUNE 1.16. No significant sm vessel disease.  Chronic kidney disease     Depression     Dr. Mirta Padron, suicide attempt     Diverticulosis     Endometriosis     s/p hysterectomy     GERD (gastroesophageal reflux disease)     Glaucoma     Dr. Liban Burnett Hematuria, gross     HTN (hypertension)     Hx of colonoscopy 2017    mild diverticulosis, g1 internal hemorrhoids, normal colon , repeat 10 year     Hx of suicide attempt     Hyperlipidemia LDL goal < 130     IBS (irritable bowel syndrome)     Ill-defined condition     environmental allergies    Insomnia     Malignant neoplasm of peritoneum (HCC) 2019    Nausea & vomiting     OA (osteoarthritis)     both knees, lower back    Preeclampsia     PTSD (post-traumatic stress disorder)     Seizures (HCC)     1 x with childbirth, had toxemia    Sleep apnea     does not use cpap machine    Spinal stenosis     Dr. Lali Hare Vertigo       Past Surgical History:   Procedure Laterality Date    COLONOSCOPY N/A 2017    COLONOSCOPY performed by Merla Romberg, [facility] at [facility] N/A 2019    SIGMOIDOSCOPY FLEXIBLE performed by Hilton Shields MD at [facility] ENDOSCOPY    HX  SECTION      HX COLONOSCOPY  2017    DR. MCRAE 2017     HX COLOSTOMY      Revision 2021    HX CYSTECTOMY  2020    HX HYSTERECTOMY      HX KNEE ARTHROSCOPY Left     left knee    HX KNEE REPLACEMENT Bilateral     (R)  (L)     HX LAPAROTOMY N/A 2019    and rectovaginal bx    HX OTHER SURGICAL  2016    all teeth removed    HX SALPINGO-OOPHORECTOMY  2008    HX TUBAL LIGATION      IR INSERT TUNL CVC W PORT OVER 5 YEARS  2019    MULTIPLE DELIVERY       1990    NM FEMUR/KNEE SURG UNLISTED Left 2009    External fixator    NM PART REMOVAL COLON W ANASTOMOSIS      NM TOTAL ABDOM HYSTERECTOMY  2006    TRANSURETHRAL RESEC BLADDER NECK        OB History        2    Para   2    Term   2       0    AB   0    Living   0       SAB   0    IAB   0    Ectopic   0    Molar   0    Multiple   0    Live Births   0              Social History     Tobacco Use    Smoking status: Never Smoker    Smokeless tobacco: Never Used   Substance Use Topics    Alcohol use: No      Family History   Problem Relation Age of Onset    Heart Disease Mother         CHF    Diabetes Mother    [facility] Hypertension Mother     Kidney Disease Mother     Breast Cancer Mother    [facility] Stroke Mother     Diabetes Father     Hypertension Father     Heart Attack Father         MI    Cancer Maternal Grandmother         stomach    Ovarian Cancer Maternal Aunt     Cancer Maternal Uncle       Current Outpatient Medications   Medication Sig    acetaminophen-codeine (TYLENOL #3) 300-30 mg per tablet Take 1 Tablet by mouth every four (4) hours as needed for Pain for up to 14 days. Max Daily Amount: 6 Tablets.  letrozole (FEMARA) 2.5 mg tablet Take 1 Tablet by mouth daily.  ondansetron hcl (Zofran) 4 mg tablet Take 1 Tablet by mouth every six (6) hours as needed for Nausea or Vomiting.  lidocaine-prilocaine (EMLA) topical cream Apply  to affected area as needed for Pain.  metoprolol succinate (TOPROL-XL) 100 mg tablet take 1 tablet by mouth once daily    simvastatin (ZOCOR) 20 mg tablet take 1 tablet by mouth at bedtime    amLODIPine (NORVASC) 10 mg tablet take 1 tablet by mouth once daily    PARoxetine (PAXIL) 20 mg tablet take 1 tablet by mouth once daily    latanoprost (XALATAN) 0.005 % ophthalmic solution Administer 1 Drop to both eyes nightly.     meclizine (ANTIVERT) 25 mg tablet take 1 tablet by mouth three times a day if needed FOR DIZZINESS (Patient not taking: Reported on 1/26/2022)    pregabalin (LYRICA) 25 mg capsule Take 1 Capsule by mouth three (3) times daily. Max Daily Amount: 75 mg. (Patient not taking: Reported on 10/13/2021)    gabapentin (NEURONTIN) 100 mg capsule Take 1 Capsule by mouth three (3) times daily. Max Daily Amount: 300 mg. (Patient not taking: Reported on 10/13/2021)    meclizine (ANTIVERT) 25 mg tablet take 1 tablet by mouth three times a day if needed FOR DIZZINESS     No current facility-administered medications for this visit. Allergies   Allergen Reactions    Seafood Hives     FISH    Other Medication Hives     seafood    Pollen Extracts Other (comments)    Shellfish Derived Hives    Pantoprazole Other (comments)     Bleeding in stomach    Promethazine Other (comments)     Bleeding in stomach          OBJECTIVE:    Physical Exam  VITAL SIGNS: Visit Vitals  BP (!) 138/91 (BP 1 Location: Left arm, BP Patient Position: Sitting)   Pulse (!) 55   Temp 98.9 °F (37.2 °C) (Oral)   Resp 16   Ht 5' 3\" (1.6 m)   Wt 118.4 kg (261 lb)   LMP 01/31/2008   SpO2 98%   BMI 46.23 kg/m²      GENERAL EMILY: in no apparent distress and well developed and well nourished   PELVIC: deferred            IMPRESSION/PLAN:  1.stage IV Primary peritoneal cancer with  recurrence   -previoulsy reviewed her pathology    -s/p carbo (auc=6), taxol (175 mg/m2) IV every 3 wks s/p 6 cycles completed 6/6/2019   -s/p cytoreductive surgery with [facility] with dr. Radha Carrero   -s/p pelvic radiation   Pain-script for tylenol #3   -given platinum sensitive disease s/p  auc=5, Doxil 30 mg/m2 IV x 6 cycles s/p Total pelvic exenteration   -biopsy c/w recurrence-reviewed Caris and discussed treatment options. We discussed proceeding with hormonal therapy given tumor is ER\MD positive, immunotherapy given PD-L1 mutation although not FDA approved. Also discussed retreatment with platinum and if sensitive consider PARP inhibition. After discussion patient was placed on letrozole. Patient to begin Keytruda 400 mg IV every 3 weeks. 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   Will monitor   -neuropathy-Lyrica 25 mg 3 times daily   -Okay for cycle 2   -Follow-up prior to cycle 3           The total time spent was 40 minutes regarding this patients diagnosis of primary peritoneal cancer and >50% of this time was spent counseling and coordinating care    Carolina Chacon DO  Gynecologic Oncology  3/9/513898:50 AM

## 2022-03-11 ENCOUNTER — HOSPITAL ENCOUNTER (OUTPATIENT)
Dept: INFUSION THERAPY | Age: 52
Discharge: HOME | End: 2022-03-11
Payer: MEDICARE

## 2022-03-11 VITALS
DIASTOLIC BLOOD PRESSURE: 77 MMHG | HEART RATE: 52 BPM | TEMPERATURE: 97.9 F | SYSTOLIC BLOOD PRESSURE: 144 MMHG | RESPIRATION RATE: 16 BRPM | OXYGEN SATURATION: 98 %

## 2022-03-11 DIAGNOSIS — C48.2 PERITONEAL CARCINOMA (HCC): Primary | ICD-10-CM

## 2022-03-11 LAB
BACTERIA SPEC CULT: NORMAL
CC UR VC: NORMAL
SERVICE CMNT-IMP: NORMAL

## 2022-03-11 PROCEDURE — 74011000258 HC RX REV CODE- 258: Performed by: OBSTETRICS & GYNECOLOGY

## 2022-03-11 PROCEDURE — 96413 CHEMO IV INFUSION 1 HR: CPT

## 2022-03-11 PROCEDURE — 74011000250 HC RX REV CODE- 250: Performed by: OBSTETRICS & GYNECOLOGY

## 2022-03-11 PROCEDURE — 74011250636 HC RX REV CODE- 250/636: Performed by: OBSTETRICS & GYNECOLOGY

## 2022-03-11 PROCEDURE — 77030012965 HC NDL HUBR BBMI -A

## 2022-03-11 PROCEDURE — 86304 IMMUNOASSAY TUMOR CA 125: CPT

## 2022-03-11 RX ORDER — SODIUM CHLORIDE 0.9 % (FLUSH) 0.9 %
10 SYRINGE (ML) INJECTION AS NEEDED
Status: DISPENSED | OUTPATIENT
Start: 2022-03-11 | End: 2022-03-11

## 2022-03-11 RX ORDER — SODIUM CHLORIDE 9 MG/ML
10 INJECTION INTRAMUSCULAR; INTRAVENOUS; SUBCUTANEOUS AS NEEDED
Status: ACTIVE | OUTPATIENT
Start: 2022-03-11 | End: 2022-03-11

## 2022-03-11 RX ORDER — HEPARIN 100 UNIT/ML
300-500 SYRINGE INTRAVENOUS AS NEEDED
Status: DISPENSED | OUTPATIENT
Start: 2022-03-11 | End: 2022-03-11

## 2022-03-11 RX ADMIN — SODIUM CHLORIDE, PRESERVATIVE FREE 30 ML: 5 INJECTION INTRAVENOUS at 09:10

## 2022-03-11 RX ADMIN — SODIUM CHLORIDE 400 MG: 9 INJECTION, SOLUTION INTRAVENOUS at 10:10

## 2022-03-11 RX ADMIN — SODIUM CHLORIDE, PRESERVATIVE FREE 20 ML: 5 INJECTION INTRAVENOUS at 10:50

## 2022-03-11 RX ADMIN — HEPARIN SODIUM (PORCINE) LOCK FLUSH IV SOLN 100 UNIT/ML 500 UNITS: 100 SOLUTION at 10:52

## 2022-03-11 NOTE — PROGRESS NOTES
[facility] Progress Note    Date: 2022    Name: Gonzales Soler              MRN: [id number]              : 1970    Chemotherapy Cycle:C1 D1 Richrd Mess every 6 weeks      Pt to [facility], ambulatory, at 0855. Generalized pain 10. Patient took pain medication at home. Ms. Ralph Beckett was assessed and education was provided. Ms. Cannon's vitals were reviewed. Visit Vitals  BP (!) 144/77 (BP 1 Location: Left lower arm, BP Patient Position: Sitting)   Pulse (!) 52   Temp 97.9 °F (36.6 °C)   Resp 16   SpO2 98%   Breastfeeding No     Results for Buckhorn Maira (MRN [id number])    Ref. Range 3/9/2022 11:45 3/9/2022 11:47   WBC Latest Ref Range: 4.6 - 13.2 K/uL 7.3    NRBC Latest Ref Range: 0  WBC 0.0    RBC Latest Ref Range: 4.20 - 5.30 M/uL 3.39 (L)    HGB Latest Ref Range: 12.0 - 16.0 g/dL 8.7 (L)    HCT Latest Ref Range: 35.0 - 45.0 % 30.0 (L)    MCV Latest Ref Range: 78.0 - 100.0 FL 88.5    MCH Latest Ref Range: 24.0 - 34.0 PG 25.7    MCHC Latest Ref Range: 31.0 - 37.0 g/dL 29.0 (L)    RDW Latest Ref Range: 11.6 - 14.5 % 18.8 (H)    PLATELET Latest Ref Range: 135 - 420 K/uL 296    MPV Latest Ref Range: 9.2 - 11.8 FL 9.6    NEUTROPHILS Latest Ref Range: 40 - 73 % 80 (H)    LYMPHOCYTES Latest Ref Range: 21 - 52 % 13 (L)    MONOCYTES Latest Ref Range: 3 - 10 % 5    EOSINOPHILS Latest Ref Range: 0 - 5 % 1    BASOPHILS Latest Ref Range: 0 - 2 % 0    IMMATURE GRANULOCYTES Latest Ref Range: 0.0 - 0.5 % 1 (H)    DF Latest Units:   AUTOMATED    ABSOLUTE NRBC Latest Ref Range: 0.00 - 0.01 K/uL 0.00    ABS. NEUTROPHILS Latest Ref Range: 1.8 - 8.0 K/UL 5.8    ABS. IMM. GRANS. Latest Ref Range: 0.00 - 0.04 K/UL 0.0    ABS. LYMPHOCYTES Latest Ref Range: 0.9 - 3.6 K/UL 0.9    ABS. MONOCYTES Latest Ref Range: 0.05 - 1.2 K/UL 0.4    ABS. EOSINOPHILS Latest Ref Range: 0.0 - 0.4 K/UL 0.1    ABS.  BASOPHILS Latest Ref Range: 0.0 - 0.1 K/UL 0.0    Color Latest Units:    YELLOW   Appearance Latest Units:    CLOUDY   Specific gravity Latest Ref Range: 1.005 - 1.030    1.010   pH (UA) Latest Ref Range: 5.0 - 8.0    7.0   Protein Latest Ref Range: NEG mg/dL  300 (A)   Glucose Latest Ref Range: NEG mg/dL  Negative   Ketone Latest Ref Range: NEG mg/dL  Negative   Blood Latest Ref Range: NEG    Negative   Bilirubin Latest Ref Range: NEG    Negative   Urobilinogen Latest Ref Range: 0.2 - 1.0 EU/dL  0.2   Nitrites Latest Ref Range: NEG    Negative   Leukocyte Esterase Latest Ref Range: NEG    TRACE (A)   Epithelial cells Latest Ref Range: 0 - 5 /lpf  FEW   WBC Latest Ref Range: 0 - 4 /hpf  4 to 5   RBC Latest Ref Range: 0 - 5 /hpf  NONE   Bacteria Latest Ref Range: NEG /hpf  FEW (A)   Amorphous Crystals Latest Ref Range: NEG   1+ (A)   Sodium Latest Ref Range: 136 - 145 mmol/L 142    Potassium Latest Ref Range: 3.5 - 5.5 mmol/L 4.9    Chloride Latest Ref Range: 100 - 111 mmol/L 115 (H)    CO2 Latest Ref Range: 21 - 32 mmol/L 24    Anion gap Latest Ref Range: 3.0 - 18 mmol/L 3    Glucose Latest Ref Range: 74 - 99 mg/dL 121 (H)    BUN Latest Ref Range: 7.0 - 18 MG/DL 43 (H)    Creatinine Latest Ref Range: 0.6 - 1.3 MG/DL 2.29 (H)    BUN/Creatinine ratio Latest Ref Range: 12 - 20   19    Calcium Latest Ref Range: 8.5 - 10.1 MG/DL 8.8    Magnesium Latest Ref Range: 1.6 - 2.6 mg/dL 1.9    GFR est non-AA Latest Ref Range: >60 ml/min/1.73m2 22 (L)    GFR est AA Latest Ref Range: >60 ml/min/1.73m2 27 (L)    Bilirubin, total Latest Ref Range: 0.2 - 1.0 MG/DL 0.4    Protein, total Latest Ref Range: 6.4 - 8.2 g/dL 7.1    Albumin Latest Ref Range: 3.4 - 5.0 g/dL 3.0 (L)    Globulin Latest Ref Range: 2.0 - 4.0 g/dL 4.1 (H)    A-G Ratio Latest Ref Range: 0.8 - 1.7   0.7 (L)    ALT Latest Ref Range: 13 - 56 U/L 15    AST Latest Ref Range: 10 - 38 U/L 14    Alk. phosphatase Latest Ref Range: 45 - 117 U/L 82    TSH Latest Ref Range: 0.36 - 3.74 uIU/mL 1.66    CULTURE, URINE Unknown  Rpt         Patient's Serum Creatinine 2.29.  Abnormal urine results seen by  Chapo Schmid. Okay to proceed with chemotherapy. Right chest mediport accessed with 20 g 1 inch upton needle. Brisk flush/blood returns noted      Keytruda 400 mg was infused over 30 minutes per order. VS stable at end of infusion and pt tolerated well without reaction. Line flushed with NS and blood return from port re-verified. Patient Vitals for the past 8 hrs:   Temp Pulse Resp BP SpO2   03/11/22 1055 97.9 °F (36.6 °C) (!) 52 16 (!) 144/77 98 %   03/11/22 0859 98.3 °F (36.8 °C) (!) 56 18 (!) 157/77 97 %        Generalized pain 3/10 prior discharge. Discharge instructions reviewed with patient and patient verbalized understanding. Mediport flushed with NS 20 ml and Heparin 500 units then de-accessed. No irritation or bleeding noted. Bandaid applied. Patient armband removed and shredded. Ms. Vikki Olivera was discharged from [place] [address] in stable condition at 1055. She is to return for her pre chemo lab  appointment at 4/20/22 at 0900 ([address] [place] [address]), and then 4/22/22 at 0900 am for her chemo appointment.       Sandeep Schafer, RN,RN  March 11, 2022

## 2022-03-12 LAB — CANCER AG125 SERPL-ACNC: 4 U/ML (ref 1.5–35)

## 2022-03-15 ENCOUNTER — TELEPHONE (OUTPATIENT)
Dept: ONCOLOGY | Age: 52
End: 2022-03-15

## 2022-03-15 NOTE — TELEPHONE ENCOUNTER
Spoke with patient who stated that the side effects of Keytruda seem stronger this time. Pt c/o nausea, muscle tightness (pt states 'it feels like my muscles are being twisted), back pain 'from the top to the bottom', cramping, diarrhea, neuropathy, fatigue, and dizziness.

## 2022-03-18 PROBLEM — Z71.89 ADVANCE CARE PLANNING: Status: ACTIVE | Noted: 2017-01-31

## 2022-03-18 PROBLEM — Z98.890 HISTORY OF ABDOMINAL SURGERY: Status: ACTIVE | Noted: 2020-12-31

## 2022-03-18 PROBLEM — Z90.710 HX OF TOTAL HYSTERECTOMY: Status: ACTIVE | Noted: 2018-01-22

## 2022-03-19 PROBLEM — K58.2 IRRITABLE BOWEL SYNDROME WITH BOTH CONSTIPATION AND DIARRHEA: Status: ACTIVE | Noted: 2018-02-09

## 2022-03-19 PROBLEM — C56.9 OVARIAN CANCER (HCC): Status: ACTIVE | Noted: 2019-08-07

## 2022-03-19 PROBLEM — U07.1 COVID-19: Status: ACTIVE | Noted: 2020-12-31

## 2022-03-19 PROBLEM — I50.9 ACUTE CONGESTIVE HEART FAILURE (HCC): Status: ACTIVE | Noted: 2021-02-04

## 2022-03-19 PROBLEM — G89.29 CHRONIC ABDOMINAL PAIN: Status: ACTIVE | Noted: 2018-01-22

## 2022-03-19 PROBLEM — R10.9 CHRONIC ABDOMINAL PAIN: Status: ACTIVE | Noted: 2018-01-22

## 2022-03-19 PROBLEM — Z14.8 GENETIC CARRIER STATUS: Status: ACTIVE | Noted: 2019-09-13

## 2022-03-19 PROBLEM — N13.39 OTHER HYDRONEPHROSIS: Status: ACTIVE | Noted: 2020-06-24

## 2022-03-19 PROBLEM — K57.90 DIVERTICULOSIS: Status: ACTIVE | Noted: 2018-01-22

## 2022-03-19 PROBLEM — R63.0 LOSS OF APPETITE: Status: ACTIVE | Noted: 2020-10-14

## 2022-03-19 PROBLEM — C18.1 CANCER OF APPENDIX (HCC): Status: ACTIVE | Noted: 2020-11-17

## 2022-03-19 PROBLEM — E03.8 SUBCLINICAL HYPOTHYROIDISM: Status: ACTIVE | Noted: 2018-01-23

## 2022-03-20 PROBLEM — Z98.890 POSTOPERATIVE NAUSEA: Status: ACTIVE | Noted: 2019-08-27

## 2022-03-20 PROBLEM — R19.00 PELVIC MASS IN FEMALE: Status: ACTIVE | Noted: 2019-01-17

## 2022-03-20 PROBLEM — N18.4 CKD (CHRONIC KIDNEY DISEASE) STAGE 4, GFR 15-29 ML/MIN (HCC): Status: ACTIVE | Noted: 2021-02-11

## 2022-03-20 PROBLEM — K92.1 MELENA: Status: ACTIVE | Noted: 2020-12-30

## 2022-03-20 PROBLEM — C48.2 PERITONEAL CARCINOMA (HCC): Status: ACTIVE | Noted: 2019-02-14

## 2022-03-20 PROBLEM — E87.1 HYPONATREMIA: Status: ACTIVE | Noted: 2018-01-22

## 2022-03-20 PROBLEM — R11.0 POSTOPERATIVE NAUSEA: Status: ACTIVE | Noted: 2019-08-27

## 2022-03-21 ENCOUNTER — TELEPHONE (OUTPATIENT)
Dept: ONCOLOGY | Age: 52
End: 2022-03-21

## 2022-03-21 DIAGNOSIS — G89.3 CANCER ASSOCIATED PAIN: Primary | ICD-10-CM

## 2022-03-21 RX ORDER — OXYCODONE AND ACETAMINOPHEN 5; 325 MG/1; MG/1
1 TABLET ORAL
Qty: 40 TABLET | Refills: 0 | Status: SHIPPED | OUTPATIENT
Start: 2022-03-21 | End: 2022-04-04 | Stop reason: ALTCHOICE

## 2022-03-21 NOTE — TELEPHONE ENCOUNTER
Spoke with patient who stated that she is having really bad pain and will like to go back on Percocet. Percocet sent to confirmed pharmacy.

## 2022-03-24 DIAGNOSIS — C57.7 MALIGNANT NEOPLASM OF OTHER SPECIFIED FEMALE GENITAL ORGANS (HCC): ICD-10-CM

## 2022-03-24 DIAGNOSIS — C48.2 PERITONEAL CARCINOMA (HCC): Primary | ICD-10-CM

## 2022-03-29 ENCOUNTER — TELEPHONE (OUTPATIENT)
Dept: ONCOLOGY | Age: 52
End: 2022-03-29

## 2022-03-29 DIAGNOSIS — M79.89 SWELLING OF BOTH LOWER EXTREMITIES: Primary | ICD-10-CM

## 2022-03-29 DIAGNOSIS — M79.89 SWELLING OF BOTH LOWER EXTREMITIES: ICD-10-CM

## 2022-03-29 NOTE — TELEPHONE ENCOUNTER
Spoke with patient regarding Left foot pain/swelling she has been having for the past week. She reports that the pain extends to her calf and occasionally will have pain in the right foot too, however the right foot is not swollen. discussed with patient that I will order ultrasound to evaluate. She agreed.

## 2022-03-29 NOTE — TELEPHONE ENCOUNTER
Pt called wanting to speak w/ nurse in ref to leg pain she has been experiencing for the past week, Lt foot swelling but has improved. Pt denies any fever, bleeding.

## 2022-03-30 ENCOUNTER — TELEPHONE (OUTPATIENT)
Dept: ONCOLOGY | Age: 52
End: 2022-03-30

## 2022-03-30 DIAGNOSIS — G89.3 CANCER ASSOCIATED PAIN: ICD-10-CM

## 2022-03-30 RX ORDER — OXYCODONE AND ACETAMINOPHEN 5; 325 MG/1; MG/1
1 TABLET ORAL
Qty: 40 TABLET | Refills: 0 | Status: CANCELLED | OUTPATIENT
Start: 2022-03-30 | End: 2022-04-13

## 2022-03-30 NOTE — TELEPHONE ENCOUNTER
Pt returned call stating that she had already spoken with JOHN Orozco regarding this matter. Pt states the nerve pain is starting in her left foot as well. She is still requesting a refill of pain medication.

## 2022-03-30 NOTE — TELEPHONE ENCOUNTER
Spoke With Ms Mayda Hanna in ref to pain med refill. I explained that next refill is due on 22. She reports that she is running low and not completely out of pain meds.

## 2022-04-04 ENCOUNTER — TELEPHONE (OUTPATIENT)
Dept: ONCOLOGY | Age: 52
End: 2022-04-04

## 2022-04-04 DIAGNOSIS — G89.3 CANCER ASSOCIATED PAIN: Primary | ICD-10-CM

## 2022-04-04 RX ORDER — OXYCODONE AND ACETAMINOPHEN 5; 325 MG/1; MG/1
1 TABLET ORAL
Qty: 40 TABLET | Refills: 0 | Status: SHIPPED | OUTPATIENT
Start: 2022-04-04 | End: 2022-04-15 | Stop reason: SDUPTHER

## 2022-04-12 ENCOUNTER — TELEPHONE (OUTPATIENT)
Dept: ONCOLOGY | Age: 52
End: 2022-04-12

## 2022-04-12 DIAGNOSIS — C48.2 PERITONEAL CARCINOMA (HCC): Primary | ICD-10-CM

## 2022-04-12 DIAGNOSIS — C48.2 PERITONEAL CARCINOMA (HCC): ICD-10-CM

## 2022-04-12 NOTE — TELEPHONE ENCOUNTER
Per Dr. Claudio Cat, pt will have CT C/A/P    I called pt to inform her of this and verbalized understanding.

## 2022-04-12 NOTE — TELEPHONE ENCOUNTER
PET scan will need peer to peer. Scheduled 4/14/22.      Contact [facility]   [phone]  CASE# [id number]

## 2022-04-14 RX ORDER — EPINEPHRINE 1 MG/ML
0.3 INJECTION, SOLUTION, CONCENTRATE INTRAVENOUS AS NEEDED
Status: CANCELLED | OUTPATIENT
Start: 2022-04-22

## 2022-04-14 RX ORDER — MAGNESIUM SULFATE HEPTAHYDRATE 40 MG/ML
2 INJECTION, SOLUTION INTRAVENOUS
Status: CANCELLED
Start: 2022-04-22

## 2022-04-14 RX ORDER — ACETAMINOPHEN 325 MG/1
650 TABLET ORAL AS NEEDED
Status: CANCELLED
Start: 2022-04-22

## 2022-04-14 RX ORDER — ONDANSETRON 2 MG/ML
8 INJECTION INTRAMUSCULAR; INTRAVENOUS AS NEEDED
Status: CANCELLED | OUTPATIENT
Start: 2022-04-22

## 2022-04-14 RX ORDER — POTASSIUM CHLORIDE 7.45 MG/ML
10 INJECTION INTRAVENOUS
Status: CANCELLED
Start: 2022-04-22

## 2022-04-14 RX ORDER — DIPHENHYDRAMINE HYDROCHLORIDE 50 MG/ML
50 INJECTION, SOLUTION INTRAMUSCULAR; INTRAVENOUS AS NEEDED
Status: CANCELLED
Start: 2022-04-22

## 2022-04-14 RX ORDER — HYDROCORTISONE SODIUM SUCCINATE 100 MG/2ML
100 INJECTION, POWDER, FOR SOLUTION INTRAMUSCULAR; INTRAVENOUS AS NEEDED
Status: CANCELLED | OUTPATIENT
Start: 2022-04-22

## 2022-04-14 RX ORDER — DIPHENHYDRAMINE HYDROCHLORIDE 50 MG/ML
25 INJECTION, SOLUTION INTRAMUSCULAR; INTRAVENOUS AS NEEDED
Status: CANCELLED
Start: 2022-04-22

## 2022-04-14 RX ORDER — ALBUTEROL SULFATE 0.83 MG/ML
2.5 SOLUTION RESPIRATORY (INHALATION) AS NEEDED
Status: CANCELLED
Start: 2022-04-22

## 2022-04-15 ENCOUNTER — OFFICE VISIT (OUTPATIENT)
Dept: ONCOLOGY | Age: 52
End: 2022-04-15
Payer: MEDICARE

## 2022-04-15 VITALS
DIASTOLIC BLOOD PRESSURE: 74 MMHG | TEMPERATURE: 97.6 F | BODY MASS INDEX: 44.47 KG/M2 | WEIGHT: 251 LBS | SYSTOLIC BLOOD PRESSURE: 148 MMHG | RESPIRATION RATE: 16 BRPM | OXYGEN SATURATION: 99 % | HEART RATE: 62 BPM | HEIGHT: 63 IN

## 2022-04-15 DIAGNOSIS — G89.3 CANCER ASSOCIATED PAIN: ICD-10-CM

## 2022-04-15 DIAGNOSIS — C48.2 PERITONEAL CARCINOMA (HCC): Primary | ICD-10-CM

## 2022-04-15 DIAGNOSIS — G62.9 NEUROPATHY: ICD-10-CM

## 2022-04-15 PROCEDURE — G8417 CALC BMI ABV UP PARAM F/U: HCPCS | Performed by: OBSTETRICS & GYNECOLOGY

## 2022-04-15 PROCEDURE — G8427 DOCREV CUR MEDS BY ELIG CLIN: HCPCS | Performed by: OBSTETRICS & GYNECOLOGY

## 2022-04-15 PROCEDURE — G9717 DOC PT DX DEP/BP F/U NT REQ: HCPCS | Performed by: OBSTETRICS & GYNECOLOGY

## 2022-04-15 PROCEDURE — G8754 DIAS BP LESS 90: HCPCS | Performed by: OBSTETRICS & GYNECOLOGY

## 2022-04-15 PROCEDURE — G8753 SYS BP > OR = 140: HCPCS | Performed by: OBSTETRICS & GYNECOLOGY

## 2022-04-15 PROCEDURE — G9711 PT HX TOT COL OR COLON CA: HCPCS | Performed by: OBSTETRICS & GYNECOLOGY

## 2022-04-15 PROCEDURE — 99215 OFFICE O/P EST HI 40 MIN: CPT | Performed by: OBSTETRICS & GYNECOLOGY

## 2022-04-15 PROCEDURE — G9899 SCRN MAM PERF RSLTS DOC: HCPCS | Performed by: OBSTETRICS & GYNECOLOGY

## 2022-04-15 RX ORDER — PREGABALIN 25 MG/1
25 CAPSULE ORAL 3 TIMES DAILY
Qty: 90 CAPSULE | Refills: 3 | Status: SHIPPED | OUTPATIENT
Start: 2022-04-15 | End: 2022-08-22 | Stop reason: SDUPTHER

## 2022-04-15 RX ORDER — LETROZOLE 2.5 MG/1
2.5 TABLET, FILM COATED ORAL DAILY
Qty: 90 TABLET | Refills: 1 | Status: SHIPPED | OUTPATIENT
Start: 2022-04-15 | End: 2022-08-22 | Stop reason: SDUPTHER

## 2022-04-15 RX ORDER — OXYCODONE AND ACETAMINOPHEN 5; 325 MG/1; MG/1
1 TABLET ORAL
Qty: 40 TABLET | Refills: 0 | Status: SHIPPED | OUTPATIENT
Start: 2022-04-15 | End: 2022-04-26 | Stop reason: SDUPTHER

## 2022-04-15 NOTE — PROGRESS NOTES
[facility]  [address] [place] [address], [address], [address] [place]  [address] [facility] [place], [facility], [address] [facility]  [phone]          Patient ID:  Name:  Brenda Khan  MRN:  [id number]  :  1970/51 y.o. Date:  4/15/2022      HISTORY OF PRESENT ILLNESS:  Brenda Khan is a 45 y.o.  postmenopausal female referred by Dr. Akua Blackwell  With peritoneal cancer. Intitally seen be JOHN talaverawith reports of vaginal bleeding. Given pt's history of hysteretectomy with BSO for endometriosis in  a TVUS was ordered. Which revealed a 6.8 cm x 5.2 cm x 4.6 cm at midline vaginal cuff. This prompted pelvic CT with findings of a lesion measuring 7.6 x 5.8 x 7.2 cm and is compatible with a pelvic neoplasm vs endometrioma given prior history of endometriosis. Tumor markers done on 2018 all normal.  S/p  Exploratory laparotomy, exploration of retroperitoneal spaces, exam under anesthesia with biopsy of rectovaginal mass on 2019. Had sigmoidoscopy which revealed submucosal mass. S/p cycle #6 of carbo/taxol on 2019. S/p cytoreductive surgery with HIP[facility] on 2019. S/p radiation treatment completed in 2019. Was seen in office and had a biopsy c/w recurrence. S/p cycle #6 of Carbo/Doxil on 2020. S/p Exploratory laparotomy. 2. Lysis of adhesions. 3. Simple appendectomy. 4. 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 On . She also had a revision of urostomy that is still leaking. S/p IR biopsy of liver on 2021 which demonstrated recurrent disease. Pt on letrozole. S/p cycle #2 of keytruda on 3/11/2022   Here for follow-up continues to complain of vaginal bleeding and rectal bleeding as well as discharge from the rectum. Continues to have pain controlled with meds. Continues to have nausea controlled with meds. Having some fatigue.   Patient states her neuropathy is worse and had stopped taking her Neurontin and Lyrica. Labs:  Component      Latest Ref Rng & Units 3/11/2022           9:10 AM   CA-125      1.5 - 35.0 U/mL 4     Component      Latest Ref Rng & Units 2/11/2022           9:37 AM   CA-125      1.5 - 35.0 U/mL 4     Component      Latest Ref Rng & Units 12/16/2021          10:25AM   Cancer Ag (CA) 125      0.0 - 38.1 U/mL 5       Component      Latest Ref Rng & Units 6/17/2021          12:00 AM   Cancer Ag (CA) 125      0.0 - 38.1 U/mL 5.1     Component      Latest Ref Rng & Units 9/28/2020          11:11 AM   CA-125      1.5 - 35.0 U/mL 6     Component      Latest Ref Rng & Units 8/31/2020          10:01 AM   CA-125      1.5 - 35.0 U/mL 8     Component      Latest Ref Rng & Units 7/7/2020          11:30 AM   CA-125      1.5 - 35.0 U/mL 5     Component      Latest Ref Rng & Units 6/8/2020          10:40 AM   CA-125      1.5 - 35.0 U/mL 7     Component      Latest Ref Rng & Units 5/11/2020          11:30 AM   CA-125      1.5 - 35.0 U/mL 7     Component      Latest Ref Rng & Units 3/4/2020           8:15 AM   CA-125      1.5 - 35.0 U/mL 7     Component      Latest Ref Rng & Units 11/15/2019           9:56 AM   CA-125      1.5 - 35.0 U/mL 6     Component      Latest Ref Rng & Units 6/6/2019           8:44 AM   CA-125      1.5 - 35.0 U/mL 7     Component      Latest Ref Rng & Units 5/16/2019 4/25/2019           8:26 AM  8:20 AM   Cancer Ag (CA) 125      0.0 - 38.1 U/mL 7.8 8.7     Component      Latest Ref Rng & Units 4/4/2019           8:28 AM   Cancer Ag (CA) 125      0.0 - 38.1 U/mL 8.8     Component      Latest Ref Rng & Units 3/14/2019 2/21/2019           8:15 AM  8:32 AM   Cancer Ag (CA) 125      0.0 - 38.1 U/mL 10.4 18.4     12/17/2018 : 9.3  12/17/2018 CEA: 2.0  12/17/2018 AFP: 3.8    Pathology  8/4/2021  Liver mass, core biopsies:        Metastatic adenocarcinoma, most consistent with serous type.      DIAGNOSIS COMMENT:   Although metastatic ovarian or primary peritoneal carcinoma forming   parenchymal liver lesions is uncommon, the histologic features in this   case are similar to those in the patient's prior rectovaginal mass biopsy   from 2019 and the immunohistochemical features are similar to those   documented in the EMR from a pelvic exenteration specimen performed at an   outside institution in 2020.   4/20/2020  Vaginal biopsy  Papillary serous adenocarcinoma    8/8/2019  A) COLON, PERICOLIC NODULE, EXCISION:      - ACELLULAR MUCIN WITH MULTIPLE CALCIFICATIONS, AND ASSOCIATED FIBROUS WALL WITH        HYALINIZATION, AND CHRONIC INFLAMMATION.     - ADJACENT BENIGN FIBROADIPOSE TISSUE, CONSISTENT WITH MESENTERY.     - NO EVIDENCE OF MALIGNANCY.      - SEE COMMENT. B) LIVER, RIGHT LOBE, BIOPSY:      - NODULAR FAT NECROSIS AND FIBROSIS OF THE LIVER CAPSULE.      - MILD STEATOSIS.     - NO EVIDENCE OF MALIGNANCY. C) COLON, SIGMOID, SEROSAL IMPLANT, EXCISION:      - NODULAR FAT NECROSIS WITH FIBROSIS, CHRONIC INFLAMMATION, CALCIFICATIONS, AND        CYSTIC DEGENERATION WITHOUT MUCIN.     - NO EVIDENCE OF MALIGNANCY. D) OMENTUM, OMENTECTOMY (RESECTION):      - CONGESTED FIBROADIPOSE TISSUE WITH FOCAL FIBROSIS AND CHRONIC INFLAMMATION, AND        CALCIFIED NODULAR FIBROSIS.     - NO EVIDENCE OF MALIGNANCY. E) PERITONEUM, LEFT ANTERIOR ABDOMINAL, RESECTION:      - CONGESTED PERITONEAL TISSUE, NO EVIDENCE OF MALIGNANCY. F) PERITONEUM, RIGHT ANTERIOR ABDOMINAL, RESECTION:      - CONGESTED PERITONEAL TISSUE, NO EVIDENCE OF MALIGNANCY. G) COLON, SIGMOID, SEROSAL IMPLANT, EXCISION:      - NODULAR FIBROSIS WITH HISTIOCYTES, SUGGESTIVE OF FAT NECROSIS.     - CYSTIC DEGENERATION, WITHOUT MUCIN.     - NO EVIDENCE OF MALIGNANCY. H) SOFT TISSUE, FALCIFORM, EXCISION:      - CONSISTENT WITH FALCIFORM LIGAMENT.     - NO EVIDENCE OF MALIGNANCY.     PATHOLOGIC STAGE:  ypTx, pNx    1/17/2019   RECTOVAGINAL MASS (#1, 2) AND PELVIC MASS, BIOPSIES:   CONSISTENT WITH SEROUS CARCINOMA WITH EXTENSIVE NECROSIS. Imaging  MRI liver 7/6/2021     FINDINGS:     Abdominal Wall:  There is a circumscribed 8.3 cm-width x 2.4 cm-AP x 2.9  cm-craniocaudal length T2 hyperintense subcutaneous layer fluid collection along  the inferior margin of the ileal conduit. The colostomy site in the left lower  quadrant appears unremarkable.     Liver:  A new ovoid T1-low T2-high signal 1.8 x 1.4 cm lesion is identified in  the segment 8. Another 0.8 x 0.6 cm T1-low T2-high signal focus is identified  in the segment 3. These structures were not apparent on prior studies. With  diffusion imaging, no evidence of restricted diffusion is demonstrated at these  foci.     Biliary:  No evidence for significant common bile duct dilation.     Gallbladder:  Mild distention of the gallbladder. No definite gallstones.     Spleen, Adrenal Glands, Pancreas:  Unremarkable.     Kidneys:  Cyst at the left kidney lower pole region measuring about 1.2 x 1.2  cm. Increase in size compared to prior CTs from above 0.8 x 0.7 cm.     Miscellaneous: The largest of the periportal nodes measures up to about 1.3 x  1.1 cm.     IMPRESSION     1. Focal fluid collection in the subcutaneous tissue adjacent to the ileal  conduit. This fluid collection may be amenable to aspiration under ultrasound  guidance.     2. New hepatic lesions as described. Although there is apparent lack of  restricted diffusion, further investigation with ultrasound is warranted to  assess if these structures are indeed cystic in nature.     3.  Left renal cyst.     4.  Slight az prominence. MRI pelvis 7/6/2021  FINDINGS:     Along the inferior aspect of the ileal conduit stoma site, focal elongated T1  high T2-signal fluid collection is demonstrated to, measuring about 8.3 cm-width  x 2.2 cm-thickness x 2.3 cm-craniocaudal length.   In retrospect, there was a  suggestion of possible 7.1 x 3.5 x 2.0 cm hypodense material collection along  the inferior aspect of the subcutaneous abdominal wall portion of the ileal  conduit on the 04/01/21 unenhanced CT. This fluid collection therefore may be  slightly increased in size in the elbow.     The intraperitoneal portion of the ileal conduit appears grossly unremarkable.     The presacral region demonstrates apparent continued pattern of nonspecific mild  edema. The source for this edematous changes is clear. Most likely related to  residual changes from recent surgical intervention.     Mild edematous changes also noted in the right and to lesser extent left  piriformis muscles. Additional edematous changes are also identified in the  right obturator internus along the superior aspect.      Minimal free fluid in the posterior pelvic region.     There is some intraluminal fluid in the blind loop portion of the rectum. At  the right superior lateral vaginal cuff corner, a vague trail of T2 high signal  is observed (coronal T2 images #8-10 and fat-sat axial T2 image #5-10). This  Glasgow appears to course quite close to the anterior margin of the rectal remnant  wall. Whether there is indeed communication between the blind rectal segment  and the vaginal cuff can be further assessed with barium enema.     No distinct mass is detected pelvis.     Enlarged nodes are identified along the right pelvic sidewall. The largest  right pelvic sidewall node is identified subjacent to the external iliac  artery/vein vascular bundle, measuring up to about 2.2 x 1.2 cm (fat-sat axial  T2 image #12). Another slightly enlarged node more superiorly measuring about  1.9 x 1.0 cm (image #17). Multiple slightly borderline prominent nodes  identified in the mesentery, measuring up to about 1.2 x 1.0 cm (image #25). Additional note near the aortic bifurcation measuring about 1.3 x 0.9 cm (image  #34).    IMPRESSION     1.   Focal T1- and T2-hyperintense subcutaneous fluid collection along the  inferior aspect of the ileal conduit stoma site. Possibly representing a seroma  or a focus of urinoma. This may be amenable to aspiration under ultrasound or  CT guidance.     2.  Questionable subtle trailed between the right superior lateral aspect of the  vaginal cuff with adjacent blind-tipped rectum. More definitive evaluation can  be performed with barium enema.     3.  Slight az prominence along the right pelvic sidewall and in the  mesentery.     4. No distinct residual or recurrent mass.     5.  Persistent mild presacral edema  PETCT 7/17/2020   FINDINGS:        PET images: Study limited because of patient motion.     Mediastinal blood pool reference value = SUV Max. Mediastinal blood pool reference value = SUV Mean. Liver parenchyma reference value =  4.7   SUV Max. Liver parenchyma reference value =  2.3    SUV Mean.           NECK: There is no suspicious hypermetabolic activity at the neck. CHEST: There is no suspicious hypermetabolic activity at the chest.     ABDOMEN: Typical heterogeneity at the liver. No convincing malignant foci  hypermetabolic activity or underlying CT abnormality. PELVIS: There is soft tissue fullness in the right retrovesicular pelvis just  above the vaginal cuff and posterior to right ureter measuring about 3.8 cm with  Max SUV 13.5 (206), extending to the rectum posteriorly, levators inferiorly on  the right. Previously 4 cm and Max SUV 16.5.     Thickening anterior abdominal wall compatible with scar demonstrating diffuse  modest activity and Max SUV 2.7.      Additional CT findings: Mediport catheter has tip in the superior vena cava. Air  trapping in the superior segments lower lobes. Right-sided nephroureteral stent  without hydronephrosis. No ascites. IMPRESSION  Impression:       Treatment response. Decreased metabolic activity at the right retrovesicular  pelvic mass.  No new evidence of metastatic disease.     Interval placement right-sided nephroureteral stent and resolved  hydroureteronephrosis. .     ROS:    As above      Patient Active Problem List    Diagnosis Date Noted    CKD (chronic kidney disease) stage 4, GFR 15-29 ml/min ([facility]) 02/11/2021    Acute congestive heart failure ([address] 02/04/2021    COVID-19 12/31/2020    History of abdominal surgery 12/31/2020    Melena 12/30/2020    Cancer of appendix ([facility] 11/17/2020    Loss of appetite 10/14/2020    Other hydronephrosis 06/24/2020    Genetic carrier status 09/13/2019    Postoperative nausea 08/27/2019    Ovarian cancer ([address] 08/07/2019    Peritoneal carcinoma ([facility] 02/14/2019    Pelvic mass in female 01/17/2019    Irritable bowel syndrome with both constipation and diarrhea 02/09/2018    Subclinical hypothyroidism 01/23/2018    Chronic abdominal pain 01/22/2018    Diverticulosis 01/22/2018    Hx of total hysterectomy 01/22/2018    Hyponatremia 01/22/2018    Advance care planning 01/31/2017    Dental caries 05/26/2016    Chronic periodontal disease 05/26/2016    SUAZO (dyspnea on exertion) 02/10/2016    Prediabetes 09/24/2015    Morbid obesity ([address] 08/31/2015    Arthritis, degenerative 08/31/2015    Gastroesophageal reflux disease without esophagitis 08/31/2015    ANAMIKA on CPAP 08/31/2015    Essential hypertension 08/28/2015    PTSD (post-traumatic stress disorder) 03/24/2014    S/P TKR (total knee replacement) 11/14/2012    Adjustment disorder with depressed mood 09/20/2012    Major depressive disorder, recurrent episode, moderate ([address] 09/20/2012    Suicidal ideation 09/19/2012    Menopause 05/08/2012    Knee pain, right 05/08/2012    Hypokalemia 02/04/2012    Overdose 02/04/2012    Obesity 06/18/2010    Mixed hyperlipidemia 06/18/2010     Past Medical History:   Diagnosis Date    AR (allergic rhinitis)     seasonal    Bladder cancer ([address]     Cancer ([facility]     pt states between vgina and rectal area    Cardiac echocardiogram 04/11/2016    Tech difficult. EF 55-60%. No RWMA. Mild conc LVH. Gr 1 DDfx. RVSP normal.      Cardiac nuclear imaging test 2016    Low risk. Very patchy radiotracer uptake. Mild anterior artifact, low suspicion for ischemia. EF 68%. No RWMA. Normal EKG on pharm stress test.    Cardiovascular LE arterial duplex 10/07/2013    No significant arterial disease at rest bilaterally. R JUNE 1.21.  L JUNE 1.16. No significant sm vessel disease.  Chronic kidney disease     Depression     Dr. Shayne Bonner, suicide attempt     Diverticulosis     Endometriosis     s/p hysterectomy     GERD (gastroesophageal reflux disease)     Glaucoma     Dr. Ernestine Christianson Hematuria, gross     HTN (hypertension)     Hx of colonoscopy 2017    mild diverticulosis, g1 internal hemorrhoids, normal colon , repeat 10 year     Hx of suicide attempt     Hyperlipidemia LDL goal < 130     IBS (irritable bowel syndrome)     Ill-defined condition     environmental allergies    Insomnia     Malignant neoplasm of peritoneum (HCC) 2019    Nausea & vomiting     OA (osteoarthritis)     both knees, lower back    Preeclampsia     PTSD (post-traumatic stress disorder)     Seizures (HCC)     1 x with childbirth, had toxemia    Sleep apnea     does not use cpap machine    Spinal stenosis     Dr. Annie Calderón Vertigo       Past Surgical History:   Procedure Laterality Date    COLONOSCOPY N/A 2017    COLONOSCOPY performed by Estevan Rene MD at [facility] N/A 2019    SIGMOIDOSCOPY FLEXIBLE performed by Davi Davila MD at [facility] ENDOSCOPY    HX  SECTION      HX COLONOSCOPY  2017    DR. MCRAE 2017     HX COLOSTOMY      Revision 2021    HX CYSTECTOMY  2020    HX HYSTERECTOMY      HX KNEE ARTHROSCOPY Left     left knee    HX KNEE REPLACEMENT Bilateral     (R)  (L)     HX LAPAROTOMY N/A 2019    and rectovaginal bx    HX OTHER SURGICAL  2016    all teeth removed    HX SALPINGO-OOPHORECTOMY  2008    HX TUBAL LIGATION      IR INSERT TUNL CVC W PORT OVER 5 YEARS  2019    MULTIPLE DELIVERY       1990    IN FEMUR/KNEE SURG UNLISTED Left 2009    External fixator    IN PART REMOVAL COLON W ANASTOMOSIS      IN TOTAL ABDOM HYSTERECTOMY  2006    TRANSURETHRAL RESEC BLADDER NECK        OB History        2    Para   2    Term   2       0    AB   0    Living   0       SAB   0    IAB   0    Ectopic   0    Molar   0    Multiple   0    Live Births   0              Social History     Tobacco Use    Smoking status: Never Smoker    Smokeless tobacco: Never Used   Substance Use Topics    Alcohol use: No      Family History   Problem Relation Age of Onset    Heart Disease Mother         CHF    Diabetes Mother    Shadi Barroso Hypertension Mother     Kidney Disease Mother     Breast Cancer Mother    Shadi Barroso Stroke Mother     Diabetes Father     Hypertension Father     Heart Attack Father         MI    Cancer Maternal Grandmother         stomach    Ovarian Cancer Maternal Aunt     Cancer Maternal Uncle       Current Outpatient Medications   Medication Sig    oxyCODONE-acetaminophen (PERCOCET) 5-325 mg per tablet Take 1 Tablet by mouth every four (4) hours as needed for Pain for up to 14 days. Max Daily Amount: 6 Tablets.  pregabalin (LYRICA) 25 mg capsule Take 1 Capsule by mouth three (3) times daily. Max Daily Amount: 75 mg.    letrozole (FEMARA) 2.5 mg tablet Take 1 Tablet by mouth daily.  ondansetron hcl (Zofran) 4 mg tablet Take 1 Tablet by mouth every six (6) hours as needed for Nausea or Vomiting.  lidocaine-prilocaine (EMLA) topical cream Apply  to affected area as needed for Pain.     metoprolol succinate (TOPROL-XL) 100 mg tablet take 1 tablet by mouth once daily    simvastatin (ZOCOR) 20 mg tablet take 1 tablet by mouth at bedtime    meclizine (ANTIVERT) 25 mg tablet take 1 tablet by mouth three times a day if needed FOR DIZZINESS (Patient not taking: Reported on 1/26/2022)    amLODIPine (NORVASC) 10 mg tablet take 1 tablet by mouth once daily    PARoxetine (PAXIL) 20 mg tablet take 1 tablet by mouth once daily    gabapentin (NEURONTIN) 100 mg capsule Take 1 Capsule by mouth three (3) times daily. Max Daily Amount: 300 mg. (Patient not taking: Reported on 10/13/2021)    meclizine (ANTIVERT) 25 mg tablet take 1 tablet by mouth three times a day if needed FOR DIZZINESS    latanoprost (XALATAN) 0.005 % ophthalmic solution Administer 1 Drop to both eyes nightly. No current facility-administered medications for this visit. Allergies   Allergen Reactions    Seafood Hives     FISH    Other Medication Hives     seafood    Pollen Extracts Other (comments)    Shellfish Derived Hives    Pantoprazole Other (comments)     Bleeding in stomach    Promethazine Other (comments)     Bleeding in stomach          OBJECTIVE:    Physical Exam  VITAL SIGNS: Visit Vitals  BP (!) 148/74 (BP 1 Location: Left arm, BP Patient Position: Sitting)   Pulse 62   Temp 97.6 °F (36.4 °C) (Oral)   Resp 16   Ht 5' 3\" (1.6 m)   Wt 113.9 kg (251 lb)   LMP 01/31/2008   SpO2 99%   BMI 44.46 kg/m²      GENERAL EMILY: in no apparent distress and well developed and well nourished   PELVIC: deferred            IMPRESSION/PLAN:  1.stage IV Primary peritoneal cancer with  recurrence   -previoulsy reviewed her pathology    -s/p carbo (auc=6), taxol (175 mg/m2) IV every 3 wks s/p 6 cycles completed 6/6/2019   -s/p cytoreductive surgery with [facility] with dr. Adwoa Ennis   -s/p pelvic radiation   Pain-script for tylenol #3   -given platinum sensitive disease s/p  auc=5, Doxil 30 mg/m2 IV x 6 cycles s/p Total pelvic exenteration   -biopsy c/w recurrence-reviewed Caris and discussed treatment options. We discussed proceeding with hormonal therapy given tumor is ER\MI positive, immunotherapy given PD-L1 mutation although not FDA approved.   Also discussed retreatment with platinum and if sensitive consider PARP inhibition. After discussion patient was placed on letrozole. Patient to begin Keytruda 400 mg IV every 3 weeks. We will plan to reimage after 3 cycles s/p cycle #2. 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   Will monitor   -neuropathy-Lyrica 25 mg 3 times daily refilled   -Okay for cycle 3   -Follow-up prior to cycle 4 with dr. downey           The total time spent was 40 minutes regarding this patients diagnosis of primary peritoneal cancer and >50% of this time was spent counseling and coordinating care    Abigail Rea DO  Gynecologic Oncology  9/10/098546:05 AM

## 2022-04-15 NOTE — PROGRESS NOTES
Citlalli Rondon is a 45 y.o. female presents in office for ovarian cancer, due for C3 Keytruda. Chief Complaint   Patient presents with    Ovarian Cancer      Pt reports she is scheduled for CT next week. Pt denies dizziness, insomnia, SOB, decreased appetite. Pt c/o frequent fatigue, feels like she needs to rest every 2 hrs. Pt c/o occasional light headedness and headache. Pt c/o back, leg, and foot pain. Pt c/o tingling in hands a feet, legs feeling like they are being twisted, and a stabbing pain in back. Pt c/o left foot swelling and pain. Pt c/o neuropathy pain in feet. Do you have any unusual vaginal bleeding, discharge or irritation? Pt c/o discharge and bleeding, stable. Do you have any changes in your bowel movements? Pt c/o diarrhea, treated with Imodium. Have you been experiencing nausea or vomiting? Pt c/o nausea. Have you been experiencing any continuous or worsening abdominal pain? Pt c/o cramping pain. Any urinary burning? No    Visit Vitals  BP (!) 148/74 (BP 1 Location: Left arm, BP Patient Position: Sitting)   Pulse 62   Temp 97.6 °F (36.4 °C) (Oral)   Resp 16   Ht 5' 3\" (1.6 m)   Wt 113.9 kg (251 lb)   SpO2 99%   BMI 44.46 kg/m²         1. Have you been to the ER, urgent care clinic since your last visit? Hospitalized since your last visit? No    2. Have you seen or consulted any other health care providers outside of the [address] [place] [address] since your last visit? Include any pap smears or colon screening. No    3 most recent PHQ Screens 3/30/2021   Little interest or pleasure in doing things Not at all   Feeling down, depressed, irritable, or hopeless Not at all   Total Score PHQ 2 0       Abuse Screening Questionnaire 3/30/2021   Do you ever feel afraid of your partner? N   Are you in a relationship with someone who physically or mentally threatens you? N   Is it safe for you to go home? Y       Fall Risk Assessment, last 12 mths 1/11/2021   Able to walk?  Yes   Fall in past 12 months? 0   Do you feel unsteady?  0   Are you worried about falling 0       Learning Assessment 3/30/2021   PRIMARY LEARNER Patient   HIGHEST LEVEL OF EDUCATION - PRIMARY LEARNER  -   BARRIERS PRIMARY LEARNER -   CO-LEARNER CAREGIVER -   PRIMARY LANGUAGE ENGLISH   LEARNER PREFERENCE PRIMARY DEMONSTRATION     -   ANSWERED BY patient   RELATIONSHIP SELF

## 2022-04-15 NOTE — LETTER
4/15/2022    Patient: Floyd Cho   YOB: 1970   Date of Visit: 4/15/2022     [address]  [address]  [address] [facility]    Dear Anastasiya Barron MD,      Thank you for referring Ms. Sonia England to [address] [place] [address] for evaluation. My notes for this consultation are attached. If you have questions, please do not hesitate to call me. I look forward to following your patient along with you.       Sincerely,    Susan Preciado MD

## 2022-04-20 ENCOUNTER — HOSPITAL ENCOUNTER (OUTPATIENT)
Dept: INFUSION THERAPY | Age: 52
Discharge: HOME | End: 2022-04-20
Payer: MEDICARE

## 2022-04-20 ENCOUNTER — TELEPHONE (OUTPATIENT)
Dept: ONCOLOGY | Age: 52
End: 2022-04-20

## 2022-04-20 VITALS
WEIGHT: 255.73 LBS | TEMPERATURE: 98.9 F | BODY MASS INDEX: 45.31 KG/M2 | DIASTOLIC BLOOD PRESSURE: 81 MMHG | HEART RATE: 68 BPM | RESPIRATION RATE: 18 BRPM | OXYGEN SATURATION: 97 % | SYSTOLIC BLOOD PRESSURE: 140 MMHG | HEIGHT: 63 IN

## 2022-04-20 LAB
ALBUMIN SERPL-MCNC: 2.9 G/DL (ref 3.4–5)
ALBUMIN/GLOB SERPL: 0.7 {RATIO} (ref 0.8–1.7)
ALP SERPL-CCNC: 95 U/L (ref 45–117)
ALT SERPL-CCNC: 14 U/L (ref 13–56)
AMORPH CRY URNS QL MICRO: ABNORMAL
ANION GAP SERPL CALC-SCNC: 6 MMOL/L (ref 3–18)
APPEARANCE UR: ABNORMAL
AST SERPL-CCNC: 14 U/L (ref 10–38)
BACTERIA URNS QL MICRO: ABNORMAL /HPF
BASOPHILS # BLD: 0 K/UL (ref 0–0.1)
BASOPHILS NFR BLD: 1 % (ref 0–2)
BILIRUB SERPL-MCNC: 0.3 MG/DL (ref 0.2–1)
BILIRUB UR QL: NEGATIVE
BUN SERPL-MCNC: 47 MG/DL (ref 7–18)
BUN/CREAT SERPL: 21 (ref 12–20)
CALCIUM SERPL-MCNC: 8.3 MG/DL (ref 8.5–10.1)
CHLORIDE SERPL-SCNC: 113 MMOL/L (ref 100–111)
CO2 SERPL-SCNC: 22 MMOL/L (ref 21–32)
COLOR UR: YELLOW
CREAT SERPL-MCNC: 2.25 MG/DL (ref 0.6–1.3)
DIFFERENTIAL METHOD BLD: ABNORMAL
EOSINOPHIL # BLD: 0.1 K/UL (ref 0–0.4)
EOSINOPHIL NFR BLD: 2 % (ref 0–5)
EPITH CASTS URNS QL MICRO: NEGATIVE /LPF (ref 0–5)
ERYTHROCYTE [DISTWIDTH] IN BLOOD BY AUTOMATED COUNT: 18.1 % (ref 11.6–14.5)
GLOBULIN SER CALC-MCNC: 3.9 G/DL (ref 2–4)
GLUCOSE SERPL-MCNC: 92 MG/DL (ref 74–99)
GLUCOSE UR STRIP.AUTO-MCNC: NEGATIVE MG/DL
GRAN CASTS URNS QL MICRO: ABNORMAL /LPF
HCT VFR BLD AUTO: 26.6 % (ref 35–45)
HGB BLD-MCNC: 7.6 G/DL (ref 12–16)
HGB UR QL STRIP: NEGATIVE
IMM GRANULOCYTES # BLD AUTO: 0 K/UL (ref 0–0.04)
IMM GRANULOCYTES NFR BLD AUTO: 0 % (ref 0–0.5)
KETONES UR QL STRIP.AUTO: NEGATIVE MG/DL
LEUKOCYTE ESTERASE UR QL STRIP.AUTO: ABNORMAL
LYMPHOCYTES # BLD: 1 K/UL (ref 0.9–3.6)
LYMPHOCYTES NFR BLD: 15 % (ref 21–52)
MAGNESIUM SERPL-MCNC: 1.7 MG/DL (ref 1.6–2.6)
MCH RBC QN AUTO: 24.5 PG (ref 24–34)
MCHC RBC AUTO-ENTMCNC: 28.6 G/DL (ref 31–37)
MCV RBC AUTO: 85.8 FL (ref 78–100)
MONOCYTES # BLD: 0.4 K/UL (ref 0.05–1.2)
MONOCYTES NFR BLD: 7 % (ref 3–10)
NEUTS SEG # BLD: 4.8 K/UL (ref 1.8–8)
NEUTS SEG NFR BLD: 76 % (ref 40–73)
NITRITE UR QL STRIP.AUTO: NEGATIVE
NRBC # BLD: 0 K/UL (ref 0–0.01)
NRBC BLD-RTO: 0 PER 100 WBC
PH UR STRIP: 7 [PH] (ref 5–8)
PLATELET # BLD AUTO: 317 K/UL (ref 135–420)
PMV BLD AUTO: 9.7 FL (ref 9.2–11.8)
POTASSIUM SERPL-SCNC: 4.6 MMOL/L (ref 3.5–5.5)
PROT SERPL-MCNC: 6.8 G/DL (ref 6.4–8.2)
PROT UR STRIP-MCNC: 300 MG/DL
RBC # BLD AUTO: 3.1 M/UL (ref 4.2–5.3)
RBC #/AREA URNS HPF: NEGATIVE /HPF (ref 0–5)
SODIUM SERPL-SCNC: 141 MMOL/L (ref 136–145)
SP GR UR REFRACTOMETRY: 1.01 (ref 1–1.03)
TSH SERPL DL<=0.05 MIU/L-ACNC: 1.82 UIU/ML (ref 0.36–3.74)
UROBILINOGEN UR QL STRIP.AUTO: 0.2 EU/DL (ref 0.2–1)
WBC # BLD AUTO: 6.4 K/UL (ref 4.6–13.2)
WBC URNS QL MICRO: ABNORMAL /HPF (ref 0–4)

## 2022-04-20 PROCEDURE — 80053 COMPREHEN METABOLIC PANEL: CPT

## 2022-04-20 PROCEDURE — 87086 URINE CULTURE/COLONY COUNT: CPT

## 2022-04-20 PROCEDURE — 84443 ASSAY THYROID STIM HORMONE: CPT

## 2022-04-20 PROCEDURE — 81001 URINALYSIS AUTO W/SCOPE: CPT

## 2022-04-20 PROCEDURE — 36591 DRAW BLOOD OFF VENOUS DEVICE: CPT

## 2022-04-20 PROCEDURE — 83735 ASSAY OF MAGNESIUM: CPT

## 2022-04-20 PROCEDURE — 85025 COMPLETE CBC W/AUTO DIFF WBC: CPT

## 2022-04-20 PROCEDURE — 77030012965 HC NDL HUBR BBMI -A

## 2022-04-20 PROCEDURE — 86304 IMMUNOASSAY TUMOR CA 125: CPT

## 2022-04-20 PROCEDURE — 74011250636 HC RX REV CODE- 250/636: Performed by: INTERNAL MEDICINE

## 2022-04-20 PROCEDURE — 74011000250 HC RX REV CODE- 250: Performed by: INTERNAL MEDICINE

## 2022-04-20 RX ORDER — HEPARIN 100 UNIT/ML
500 SYRINGE INTRAVENOUS ONCE
Status: COMPLETED | OUTPATIENT
Start: 2022-04-20 | End: 2022-04-20

## 2022-04-20 RX ORDER — SODIUM CHLORIDE 0.9 % (FLUSH) 0.9 %
10-40 SYRINGE (ML) INJECTION AS NEEDED
Status: DISCONTINUED | OUTPATIENT
Start: 2022-04-20 | End: 2022-04-24 | Stop reason: HOSPADM

## 2022-04-20 RX ADMIN — SODIUM CHLORIDE, PRESERVATIVE FREE 20 ML: 5 INJECTION INTRAVENOUS at 09:58

## 2022-04-20 RX ADMIN — HEPARIN SODIUM (PORCINE) LOCK FLUSH IV SOLN 100 UNIT/ML 500 UNITS: 100 SOLUTION at 09:58

## 2022-04-20 NOTE — TELEPHONE ENCOUNTER
Spoke with patient and provided appointment information for visit with Dr. Mari Tidwell on 5/27/22 at 9:15 am.

## 2022-04-20 NOTE — PROGRESS NOTES
[facility] OPI Progress Note    Date: 2022    Name: Sherley Amaya              MRN: [id number]              : 1970    Pre-Chemo labs (from [facility])    Pt to [facility] ambulatory at [address] Ms. Vane Bo was assessed and education was provided. Ms. Cannon's vitals were reviewed. Visit Vitals  BP (!) 140/81 (BP 1 Location: Left lower arm, BP Patient Position: Sitting)   Pulse 68   Temp 98.9 °F (37.2 °C)   Resp 18   Ht 5' 3\" (1.6 m)   Wt 116 kg (255 lb 11.7 oz)   SpO2 97%   BMI 45.30 kg/m²        Right upper chest single lumen mediport accessed with 20 g 1 inch iglesias needle using sterile technique. Flushed well with NS with positive blood return. CBC, CMP, Magnesium, CA-125, UA with reflex, Urine culture, TSH, and CA-125 drawn per treatment plan order. Specimens labeled and sent to hospital for processing. Iglseias needle flushed with NS followed by heparin flush. Needle removed and bandaid applied. Ms. Vane Bo tolerated well without complaints. Patient armband removed and shredded. Ms. Vane Bo was discharged from [place] [address] in stable condition at 1005. She is to return on 22 at 0900 for her next [facility] appointment.       Krayna Urias RN  2022

## 2022-04-21 LAB — CANCER AG125 SERPL-ACNC: 4 U/ML (ref 1.5–35)

## 2022-04-21 NOTE — PROGRESS NOTES
[facility] Progress Note    Date: 2022    Name: Johny Dupree    MRN: [id number]         : 1970    Ms. Angel Haro had her pre-chemo labs obtained on 22, and the results were as follows:      Results for Chavo Zarate (MRN [id number])    Ref. Range 2022 10:00   WBC Latest Ref Range: 4.6 - 13.2 K/uL 6.4   NRBC Latest Ref Range: 0  WBC 0.0   RBC Latest Ref Range: 4.20 - 5.30 M/uL 3.10 (L)   HGB Latest Ref Range: 12.0 - 16.0 g/dL 7.6 (L)   HCT Latest Ref Range: 35.0 - 45.0 % 26.6 (L)   MCV Latest Ref Range: 78.0 - 100.0 FL 85.8   MCH Latest Ref Range: 24.0 - 34.0 PG 24.5   MCHC Latest Ref Range: 31.0 - 37.0 g/dL 28.6 (L)   RDW Latest Ref Range: 11.6 - 14.5 % 18.1 (H)   PLATELET Latest Ref Range: 135 - 420 K/uL 317   MPV Latest Ref Range: 9.2 - 11.8 FL 9.7   NEUTROPHILS Latest Ref Range: 40 - 73 % 76 (H)   LYMPHOCYTES Latest Ref Range: 21 - 52 % 15 (L)   MONOCYTES Latest Ref Range: 3 - 10 % 7   EOSINOPHILS Latest Ref Range: 0 - 5 % 2   BASOPHILS Latest Ref Range: 0 - 2 % 1   IMMATURE GRANULOCYTES Latest Ref Range: 0.0 - 0.5 % 0   DF Latest Units:   AUTOMATED   ABSOLUTE NRBC Latest Ref Range: 0.00 - 0.01 K/uL 0.00   ABS. NEUTROPHILS Latest Ref Range: 1.8 - 8.0 K/UL 4.8   ABS. IMM. GRANS. Latest Ref Range: 0.00 - 0.04 K/UL 0.0   ABS. LYMPHOCYTES Latest Ref Range: 0.9 - 3.6 K/UL 1.0   ABS. MONOCYTES Latest Ref Range: 0.05 - 1.2 K/UL 0.4   ABS. EOSINOPHILS Latest Ref Range: 0.0 - 0.4 K/UL 0.1   ABS.  BASOPHILS Latest Ref Range: 0.0 - 0.1 K/UL 0.0   Color Latest Units:   YELLOW   Appearance Latest Units:   CLOUDY   Specific gravity Latest Ref Range: 1.005 - 1.030   1.013   pH (UA) Latest Ref Range: 5.0 - 8.0   7.0   Protein Latest Ref Range: NEG mg/dL 300 (A)   Glucose Latest Ref Range: NEG mg/dL Negative   Ketone Latest Ref Range: NEG mg/dL Negative   Blood Latest Ref Range: NEG   Negative   Bilirubin Latest Ref Range: NEG   Negative   Urobilinogen Latest Ref Range: 0.2 - 1.0 EU/dL 0.2   Nitrites Latest Ref Range: NEG   Negative   Leukocyte Esterase Latest Ref Range: NEG   TRACE (A)   Epithelial cells Latest Ref Range: 0 - 5 /lpf Negative   WBC Latest Ref Range: 0 - 4 /hpf 2 to 6   RBC Latest Ref Range: 0 - 5 /hpf Negative   Bacteria Latest Ref Range: NEG /hpf 3+ (A)   Amorphous Crystals Latest Ref Range: NEG  2+ (A)   Granular cast Latest Ref Range: NEG /lpf 1 to 4   Sodium Latest Ref Range: 136 - 145 mmol/L 141   Potassium Latest Ref Range: 3.5 - 5.5 mmol/L 4.6   Chloride Latest Ref Range: 100 - 111 mmol/L 113 (H)   CO2 Latest Ref Range: 21 - 32 mmol/L 22   Anion gap Latest Ref Range: 3.0 - 18 mmol/L 6   Glucose Latest Ref Range: 74 - 99 mg/dL 92   BUN Latest Ref Range: 7.0 - 18 MG/DL 47 (H)   Creatinine Latest Ref Range: 0.6 - 1.3 MG/DL 2.25 (H)   BUN/Creatinine ratio Latest Ref Range: 12 - 20   21 (H)   Calcium Latest Ref Range: 8.5 - 10.1 MG/DL 8.3 (L)   Magnesium Latest Ref Range: 1.6 - 2.6 mg/dL 1.7   GFR est non-AA Latest Ref Range: >60 ml/min/1.73m2 23 (L)   GFR est AA Latest Ref Range: >60 ml/min/1.73m2 28 (L)   Bilirubin, total Latest Ref Range: 0.2 - 1.0 MG/DL 0.3   Protein, total Latest Ref Range: 6.4 - 8.2 g/dL 6.8   Albumin Latest Ref Range: 3.4 - 5.0 g/dL 2.9 (L)   Globulin Latest Ref Range: 2.0 - 4.0 g/dL 3.9   A-G Ratio Latest Ref Range: 0.8 - 1.7   0.7 (L)   ALT Latest Ref Range: 13 - 56 U/L 14   AST Latest Ref Range: 10 - 38 U/L 14   Alk.  phosphatase Latest Ref Range: 45 - 117 U/L 95   CA-125 Latest Ref Range: 1.5 - 35.0 U/mL 4   TSH Latest Ref Range: 0.36 - 3.74 uIU/mL 1.82   CULTURE, URINE Unknown Rpt (A)   CANCER ANTIGEN 125 Unknown Rpt               Contains abnormal data CULTURE, URINE  Order: [id number]   Collected 4/20/2022 10:00     Status: Preliminary result    Specimen Information: Clean catch; Urine         0 Result Notes     Ref Range & Units 4/20/22 1000   Special Requests:   NO SPECIAL REQUESTS P    [place] Count   >100,000   COLONIES/mL  P      Culture result:   POSSIBLE ENTEROCOCCUS SPECIES Abnormal  P    Culture result:   CHECKING FOR POSSIBLE 2ND ORGANISM [address]              Specimen Collected: 04/20/22 10:00 Last Resulted: 04/21/22 14:26        Order Details      Lab and Collection Details      Routing      Result History        P=Value has a preliminary status          Result Care Coordination                  The above abnormal labs (specifically the low HGB & HCT results and the abnormal urine culture), were reported to Nurse Practitioner Kym Zapata, at Dr. Diaz Crystal office today. She stated that she would review the lab results with Dr. Dallas Goss, and get back with us here in the [facility] tomorrow morning, before or by Ms. Cannon's scheduled appointment time of 0900. Brayden Pride pharmacist was also made aware.                Elner Riedel, RN  April 21, 2022  4:37 PM

## 2022-04-22 ENCOUNTER — HOSPITAL ENCOUNTER (OUTPATIENT)
Dept: INFUSION THERAPY | Age: 52
Discharge: HOME | End: 2022-04-22
Payer: MEDICARE

## 2022-04-22 VITALS
DIASTOLIC BLOOD PRESSURE: 77 MMHG | RESPIRATION RATE: 18 BRPM | SYSTOLIC BLOOD PRESSURE: 143 MMHG | OXYGEN SATURATION: 98 % | TEMPERATURE: 97.8 F | HEART RATE: 62 BPM

## 2022-04-22 DIAGNOSIS — C48.2 PERITONEAL CARCINOMA (HCC): Primary | ICD-10-CM

## 2022-04-22 LAB
BACTERIA SPEC CULT: NORMAL
CC UR VC: NORMAL
SERVICE CMNT-IMP: NORMAL

## 2022-04-22 PROCEDURE — 77030012965 HC NDL HUBR BBMI -A

## 2022-04-22 PROCEDURE — 96413 CHEMO IV INFUSION 1 HR: CPT

## 2022-04-22 PROCEDURE — 74011250636 HC RX REV CODE- 250/636: Performed by: OBSTETRICS & GYNECOLOGY

## 2022-04-22 PROCEDURE — 74011000258 HC RX REV CODE- 258: Performed by: OBSTETRICS & GYNECOLOGY

## 2022-04-22 PROCEDURE — 74011000250 HC RX REV CODE- 250: Performed by: OBSTETRICS & GYNECOLOGY

## 2022-04-22 RX ORDER — SODIUM CHLORIDE 0.9 % (FLUSH) 0.9 %
10 SYRINGE (ML) INJECTION AS NEEDED
Status: DISPENSED | OUTPATIENT
Start: 2022-04-22 | End: 2022-04-22

## 2022-04-22 RX ORDER — HEPARIN 100 UNIT/ML
300-500 SYRINGE INTRAVENOUS AS NEEDED
Status: DISPENSED | OUTPATIENT
Start: 2022-04-22 | End: 2022-04-22

## 2022-04-22 RX ORDER — SODIUM CHLORIDE 9 MG/ML
10 INJECTION INTRAMUSCULAR; INTRAVENOUS; SUBCUTANEOUS AS NEEDED
Status: ACTIVE | OUTPATIENT
Start: 2022-04-22 | End: 2022-04-22

## 2022-04-22 RX ORDER — SODIUM CHLORIDE 9 MG/ML
25 INJECTION, SOLUTION INTRAVENOUS CONTINUOUS
Status: DISPENSED | OUTPATIENT
Start: 2022-04-22 | End: 2022-04-22

## 2022-04-22 RX ADMIN — HEPARIN SODIUM (PORCINE) LOCK FLUSH IV SOLN 100 UNIT/ML 500 UNITS: 100 SOLUTION at 10:41

## 2022-04-22 RX ADMIN — SODIUM CHLORIDE, PRESERVATIVE FREE 10 ML: 5 INJECTION INTRAVENOUS at 10:41

## 2022-04-22 RX ADMIN — SODIUM CHLORIDE 25 ML/HR: 9 INJECTION, SOLUTION INTRAVENOUS at 09:35

## 2022-04-22 RX ADMIN — SODIUM CHLORIDE 400 MG: 9 INJECTION, SOLUTION INTRAVENOUS at 10:07

## 2022-04-22 RX ADMIN — SODIUM CHLORIDE, PRESERVATIVE FREE 10 ML: 5 INJECTION INTRAVENOUS at 10:40

## 2022-04-22 NOTE — PROGRESS NOTES
[facility] Progress Note    Date: 2022    Name: Darren Paulino              MRN: [id number]              : 1970    Chemotherapy Cycle:C3 D1 Americo Maeve every 6 weeks      Pt to [facility], ambulatory, at 0915. Generalized pain 5/10. Patient took pain medication at home. Ms. Dahiana Carvalho was assessed and education was provided. Ms. Cannon's vitals were reviewed. Visit Vitals  BP (!) 143/77 (BP 1 Location: Right lower arm)   Pulse 62   Temp 97.8 °F (36.6 °C)   Resp 18   SpO2 98%   Breastfeeding No     Abnormal lab results from urine specimen and low h/h.  Spoke with [facility] at Dr. Junior Payne office. Okay to proceed with chemotherapy today. They will monitor patient's h/h for now and follow up on patient's urine culture results. Right chest mediport accessed with 20 g 1 inch upton needle. Brisk flush/blood returns noted      Keytruda 400 mg was infused over 30 minutes per order. VS stable at end of infusion and pt tolerated well without reaction. Line flushed with NS and blood return from port re-verified. Patient Vitals for the past 8 hrs:   Temp Pulse Resp BP SpO2   22 1042 97.8 °F (36.6 °C) 62 18 (!) 143/77 98 %   22 0915 98.4 °F (36.9 °C) 62 18 (!) 148/81 97 %           Discharge instructions reviewed with patient and patient verbalized understanding. Mediport flushed with NS 20 ml and Heparin 500 units then de-accessed. No irritation or bleeding noted. Bandaid applied. Patient armband removed and shredded. Ms. Dahiana Carvalho was discharged from [place] [address] in stable condition at 1045. She is to return for her pre chemo lab  appointment at 22 at 0900 ([address] [facility]), and then 22 at 1000 am for her chemo appointment.       Ishmael Trent  2022

## 2022-04-25 ENCOUNTER — TELEPHONE (OUTPATIENT)
Dept: ONCOLOGY | Age: 52
End: 2022-04-25

## 2022-04-25 NOTE — TELEPHONE ENCOUNTER
Patient contacted the office regarding urine culture results. Relayed that it showed mixed oliverio and the office doesn't normally treat for that. Advised her to contact Dr. Montserrat Felder office for clarification as he is taking over her care. Number provided.

## 2022-04-26 ENCOUNTER — TELEPHONE (OUTPATIENT)
Dept: ONCOLOGY | Age: 52
End: 2022-04-26

## 2022-04-26 DIAGNOSIS — G89.3 CANCER ASSOCIATED PAIN: ICD-10-CM

## 2022-04-26 RX ORDER — OXYCODONE AND ACETAMINOPHEN 5; 325 MG/1; MG/1
1 TABLET ORAL
Qty: 40 TABLET | Refills: 0 | Status: SHIPPED | OUTPATIENT
Start: 2022-04-26 | End: 2022-05-20 | Stop reason: SDUPTHER

## 2022-05-20 DIAGNOSIS — G89.3 CANCER ASSOCIATED PAIN: ICD-10-CM

## 2022-05-20 RX ORDER — OXYCODONE AND ACETAMINOPHEN 5; 325 MG/1; MG/1
1 TABLET ORAL
Qty: 40 TABLET | Refills: 0 | Status: SHIPPED | OUTPATIENT
Start: 2022-05-20 | End: 2022-06-03 | Stop reason: SDUPTHER

## 2022-05-31 ENCOUNTER — OFFICE VISIT (OUTPATIENT)
Dept: ONCOLOGY | Age: 52
End: 2022-05-31
Payer: MEDICARE

## 2022-05-31 VITALS
BODY MASS INDEX: 43.48 KG/M2 | TEMPERATURE: 97.9 F | HEIGHT: 63 IN | OXYGEN SATURATION: 96 % | WEIGHT: 245.4 LBS | SYSTOLIC BLOOD PRESSURE: 134 MMHG | DIASTOLIC BLOOD PRESSURE: 74 MMHG | HEART RATE: 51 BPM

## 2022-05-31 DIAGNOSIS — C57.7 MALIGNANT NEOPLASM OF OTHER SPECIFIED FEMALE GENITAL ORGANS (HCC): ICD-10-CM

## 2022-05-31 DIAGNOSIS — E53.8 B12 DEFICIENCY: ICD-10-CM

## 2022-05-31 DIAGNOSIS — C48.2 PERITONEAL CARCINOMA (HCC): ICD-10-CM

## 2022-05-31 DIAGNOSIS — G89.3 CANCER ASSOCIATED PAIN: ICD-10-CM

## 2022-05-31 DIAGNOSIS — C54.1 ENDOMETRIAL CANCER (HCC): ICD-10-CM

## 2022-05-31 DIAGNOSIS — D64.9 NORMOCYTIC ANEMIA: ICD-10-CM

## 2022-05-31 DIAGNOSIS — G62.9 NEUROPATHY: ICD-10-CM

## 2022-05-31 DIAGNOSIS — C48.2 PERITONEAL CARCINOMA (HCC): Primary | ICD-10-CM

## 2022-05-31 PROCEDURE — G9711 PT HX TOT COL OR COLON CA: HCPCS | Performed by: INTERNAL MEDICINE

## 2022-05-31 PROCEDURE — 99205 OFFICE O/P NEW HI 60 MIN: CPT | Performed by: INTERNAL MEDICINE

## 2022-05-31 PROCEDURE — G9899 SCRN MAM PERF RSLTS DOC: HCPCS | Performed by: INTERNAL MEDICINE

## 2022-05-31 PROCEDURE — G8427 DOCREV CUR MEDS BY ELIG CLIN: HCPCS | Performed by: INTERNAL MEDICINE

## 2022-05-31 PROCEDURE — G8754 DIAS BP LESS 90: HCPCS | Performed by: INTERNAL MEDICINE

## 2022-05-31 PROCEDURE — G8752 SYS BP LESS 140: HCPCS | Performed by: INTERNAL MEDICINE

## 2022-05-31 PROCEDURE — G9717 DOC PT DX DEP/BP F/U NT REQ: HCPCS | Performed by: INTERNAL MEDICINE

## 2022-05-31 PROCEDURE — G8417 CALC BMI ABV UP PARAM F/U: HCPCS | Performed by: INTERNAL MEDICINE

## 2022-05-31 NOTE — PROGRESS NOTES
[id number] [facility]        Patient ID:  Name:  Jeremías Moore  MRN:  [id number]  :  1970/51 y.o. Date:  2022      HISTORY OF PRESENT ILLNESS:  Jeremías Moore is a 45 y.o.  postmenopausal female referred by Dr. Meghna Chung  With peritoneal cancer. Intitally seen be JOHN talaverawith reports of vaginal bleeding. Given pt's history of hysteretectomy with BSO for endometriosis in  a TVUS was ordered. Which revealed a 6.8 cm x 5.2 cm x 4.6 cm at midline vaginal cuff. This prompted pelvic CT with findings of a lesion measuring 7.6 x 5.8 x 7.2 cm and is compatible with a pelvic neoplasm vs endometrioma given prior history of endometriosis. Tumor markers done on 2018 all normal.  S/p  Exploratory laparotomy, exploration of retroperitoneal spaces, exam under anesthesia with biopsy of rectovaginal mass on 2019. Had sigmoidoscopy which revealed submucosal mass. S/p cycle #6 of carbo/taxol on 2019. S/p cytoreductive surgery with HIPEC on 2019. S/p radiation treatment completed in 2019. Was seen in office and had a biopsy c/w recurrence. S/p cycle #6 of Carbo/Doxil on 2020. S/p Exploratory laparotomy. 2. Lysis of adhesions. 3. Simple appendectomy. 4. 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 On . She also had a revision of urostomy that is still leaking. S/p IR biopsy of liver on 2021 which demonstrated recurrent disease. Pt on letrozole. S/p cycle #2 of keytruda on 3/11/2022   Here for follow-up continues to complain of vaginal bleeding and rectal bleeding as well as discharge from the rectum. Continues to have pain controlled with meds. Continues to have nausea controlled with meds. Having some fatigue. Patient states her neuropathy is worse and had stopped taking her Neurontin and Lyrica. Patient was being followed by Dr. Kevin Bach who left the practice.  The patient presents to our HBV clinic today for follows up and continuing cancer treatment as scheduled. The patient denied fever, chills, night sweat, skin lumps or bumps, acute bleeding or bruising issues. Denied headache, acute vision change, dizziness, chest pain, worsen shortness of breath, palpitation, productive cough, nausea, vomiting, worsening abdominal pain, altered bowel habits, dysuria, new bone pain or back pain, focal numbness or weakness.        Component      Latest Ref Rng & Units 3/11/2022           9:10 AM   CA-125      1.5 - 35.0 U/mL 4     Component      Latest Ref Rng & Units 2/11/2022           9:37 AM   CA-125      1.5 - 35.0 U/mL 4     Component      Latest Ref Rng & Units 12/16/2021          10:25AM   Cancer Ag (CA) 125      0.0 - 38.1 U/mL 5       Component      Latest Ref Rng & Units 6/17/2021          12:00 AM   Cancer Ag (CA) 125      0.0 - 38.1 U/mL 5.1     Component      Latest Ref Rng & Units 9/28/2020          11:11 AM   CA-125      1.5 - 35.0 U/mL 6     Component      Latest Ref Rng & Units 8/31/2020          10:01 AM   CA-125      1.5 - 35.0 U/mL 8     Component      Latest Ref Rng & Units 7/7/2020          11:30 AM   CA-125      1.5 - 35.0 U/mL 5     Component      Latest Ref Rng & Units 6/8/2020          10:40 AM   CA-125      1.5 - 35.0 U/mL 7     Component      Latest Ref Rng & Units 5/11/2020          11:30 AM   CA-125      1.5 - 35.0 U/mL 7     Component      Latest Ref Rng & Units 3/4/2020           8:15 AM   CA-125      1.5 - 35.0 U/mL 7     Component      Latest Ref Rng & Units 11/15/2019           9:56 AM   CA-125      1.5 - 35.0 U/mL 6     Component      Latest Ref Rng & Units 6/6/2019           8:44 AM   CA-125      1.5 - 35.0 U/mL 7     Component      Latest Ref Rng & Units 5/16/2019 4/25/2019           8:26 AM  8:20 AM   Cancer Ag (CA) 125      0.0 - 38.1 U/mL 7.8 8.7     Component      Latest Ref Rng & Units 4/4/2019           8:28 AM   Cancer Ag (CA) 125      0.0 - 38.1 U/mL 8.8     Component      Latest Ref Rng & Units 3/14/2019 2/21/2019           8:15 AM  8:32 AM   Cancer Ag (CA) 125      0.0 - 38.1 U/mL 10.4 18.4     12/17/2018 : 9.3  12/17/2018 CEA: 2.0  12/17/2018 AFP: 3.8    Pathology  8/4/2021  Liver mass, core biopsies:        Metastatic adenocarcinoma, most consistent with serous type. DIAGNOSIS COMMENT:   Although metastatic ovarian or primary peritoneal carcinoma forming   parenchymal liver lesions is uncommon, the histologic features in this   case are similar to those in the patient's prior rectovaginal mass biopsy   from 2019 and the immunohistochemical features are similar to those   documented in the EMR from a pelvic exenteration specimen performed at an   outside institution in 2020.   4/20/2020  Vaginal biopsy  Papillary serous adenocarcinoma    8/8/2019  A) COLON, PERICOLIC NODULE, EXCISION:      - ACELLULAR MUCIN WITH MULTIPLE CALCIFICATIONS, AND ASSOCIATED FIBROUS WALL WITH        HYALINIZATION, AND CHRONIC INFLAMMATION.     - ADJACENT BENIGN FIBROADIPOSE TISSUE, CONSISTENT WITH MESENTERY.     - NO EVIDENCE OF MALIGNANCY.      - SEE COMMENT. B) LIVER, RIGHT LOBE, BIOPSY:      - NODULAR FAT NECROSIS AND FIBROSIS OF THE LIVER CAPSULE.      - MILD STEATOSIS.     - NO EVIDENCE OF MALIGNANCY. C) COLON, SIGMOID, SEROSAL IMPLANT, EXCISION:      - NODULAR FAT NECROSIS WITH FIBROSIS, CHRONIC INFLAMMATION, CALCIFICATIONS, AND        CYSTIC DEGENERATION WITHOUT MUCIN.     - NO EVIDENCE OF MALIGNANCY. D) OMENTUM, OMENTECTOMY (RESECTION):      - CONGESTED FIBROADIPOSE TISSUE WITH FOCAL FIBROSIS AND CHRONIC INFLAMMATION, AND        CALCIFIED NODULAR FIBROSIS.     - NO EVIDENCE OF MALIGNANCY. E) PERITONEUM, LEFT ANTERIOR ABDOMINAL, RESECTION:      - CONGESTED PERITONEAL TISSUE, NO EVIDENCE OF MALIGNANCY. F) PERITONEUM, RIGHT ANTERIOR ABDOMINAL, RESECTION:      - CONGESTED PERITONEAL TISSUE, NO EVIDENCE OF MALIGNANCY.     G) COLON, SIGMOID, SEROSAL IMPLANT, EXCISION:      - NODULAR FIBROSIS WITH HISTIOCYTES, SUGGESTIVE OF FAT NECROSIS.     - CYSTIC DEGENERATION, WITHOUT MUCIN.     - NO EVIDENCE OF MALIGNANCY. H) SOFT TISSUE, FALCIFORM, EXCISION:      - CONSISTENT WITH FALCIFORM LIGAMENT.     - NO EVIDENCE OF MALIGNANCY. PATHOLOGIC STAGE:  ypTx, pNx    1/17/2019   RECTOVAGINAL MASS (#1, 2) AND PELVIC MASS, BIOPSIES:   CONSISTENT WITH SEROUS CARCINOMA WITH EXTENSIVE NECROSIS. Imaging  MRI liver 7/6/2021     FINDINGS:     Abdominal Wall:  There is a circumscribed 8.3 cm-width x 2.4 cm-AP x 2.9  cm-craniocaudal length T2 hyperintense subcutaneous layer fluid collection along  the inferior margin of the ileal conduit. The colostomy site in the left lower  quadrant appears unremarkable.     Liver:  A new ovoid T1-low T2-high signal 1.8 x 1.4 cm lesion is identified in  the segment 8. Another 0.8 x 0.6 cm T1-low T2-high signal focus is identified  in the segment 3. These structures were not apparent on prior studies. With  diffusion imaging, no evidence of restricted diffusion is demonstrated at these  foci.     Biliary:  No evidence for significant common bile duct dilation.     Gallbladder:  Mild distention of the gallbladder. No definite gallstones.     Spleen, Adrenal Glands, Pancreas:  Unremarkable.     Kidneys:  Cyst at the left kidney lower pole region measuring about 1.2 x 1.2  cm. Increase in size compared to prior CTs from above 0.8 x 0.7 cm.     Miscellaneous: The largest of the periportal nodes measures up to about 1.3 x  1.1 cm.     IMPRESSION     1. Focal fluid collection in the subcutaneous tissue adjacent to the ileal  conduit. This fluid collection may be amenable to aspiration under ultrasound  guidance.     2. New hepatic lesions as described.   Although there is apparent lack of  restricted diffusion, further investigation with ultrasound is warranted to  assess if these structures are indeed cystic in nature.     3.  Left renal cyst.     4.  Slight az prominence. MRI pelvis 7/6/2021  FINDINGS:     Along the inferior aspect of the ileal conduit stoma site, focal elongated T1  high T2-signal fluid collection is demonstrated to, measuring about 8.3 cm-width  x 2.2 cm-thickness x 2.3 cm-craniocaudal length. In retrospect, there was a  suggestion of possible 7.1 x 3.5 x 2.0 cm hypodense material collection along  the inferior aspect of the subcutaneous abdominal wall portion of the ileal  conduit on the 04/01/21 unenhanced CT. This fluid collection therefore may be  slightly increased in size in the elbow.     The intraperitoneal portion of the ileal conduit appears grossly unremarkable.     The presacral region demonstrates apparent continued pattern of nonspecific mild  edema. The source for this edematous changes is clear. Most likely related to  residual changes from recent surgical intervention.     Mild edematous changes also noted in the right and to lesser extent left  piriformis muscles. Additional edematous changes are also identified in the  right obturator internus along the superior aspect.      Minimal free fluid in the posterior pelvic region.     There is some intraluminal fluid in the blind loop portion of the rectum. At  the right superior lateral vaginal cuff corner, a vague trail of T2 high signal  is observed (coronal T2 images #8-10 and fat-sat axial T2 image #5-10). This  [place] appears to course quite close to the anterior margin of the rectal remnant  wall. Whether there is indeed communication between the blind rectal segment  and the vaginal cuff can be further assessed with barium enema.     No distinct mass is detected pelvis.     Enlarged nodes are identified along the right pelvic sidewall. The largest  right pelvic sidewall node is identified subjacent to the external iliac  artery/vein vascular bundle, measuring up to about 2.2 x 1.2 cm (fat-sat axial  T2 image #12).   Another slightly enlarged node more superiorly measuring about  1.9 x 1.0 cm (image #17). Multiple slightly borderline prominent nodes  identified in the mesentery, measuring up to about 1.2 x 1.0 cm (image #25). Additional note near the aortic bifurcation measuring about 1.3 x 0.9 cm (image  #34).    IMPRESSION     1. Focal T1- and T2-hyperintense subcutaneous fluid collection along the  inferior aspect of the ileal conduit stoma site. Possibly representing a seroma  or a focus of urinoma. This may be amenable to aspiration under ultrasound or  CT guidance.     2.  Questionable subtle trailed between the right superior lateral aspect of the  vaginal cuff with adjacent blind-tipped rectum. More definitive evaluation can  be performed with barium enema.     3.  Slight az prominence along the right pelvic sidewall and in the  mesentery.     4. No distinct residual or recurrent mass.     5.  Persistent mild presacral edema  PETCT 7/17/2020   FINDINGS:        PET images: Study limited because of patient motion.     Mediastinal blood pool reference value = SUV Max. Mediastinal blood pool reference value = SUV Mean. Liver parenchyma reference value =  4.7   SUV Max. Liver parenchyma reference value =  2.3    SUV Mean.           NECK: There is no suspicious hypermetabolic activity at the neck. CHEST: There is no suspicious hypermetabolic activity at the chest.     ABDOMEN: Typical heterogeneity at the liver. No convincing malignant foci  hypermetabolic activity or underlying CT abnormality. PELVIS: There is soft tissue fullness in the right retrovesicular pelvis just  above the vaginal cuff and posterior to right ureter measuring about 3.8 cm with  Max SUV 13.5 (206), extending to the rectum posteriorly, levators inferiorly on  the right.  Previously 4 cm and Max SUV 16.5.     Thickening anterior abdominal wall compatible with scar demonstrating diffuse  modest activity and Max SUV 2.7.      Additional CT findings: Mediport catheter has tip in the superior vena cava. Air  trapping in the superior segments lower lobes. Right-sided nephroureteral stent  without hydronephrosis. No ascites. IMPRESSION  Impression:       Treatment response. Decreased metabolic activity at the right retrovesicular  pelvic mass. No new evidence of metastatic disease.     Interval placement right-sided nephroureteral stent and resolved  hydroureteronephrosis. .     ROS:    As above      Patient Active Problem List    Diagnosis Date Noted    CKD (chronic kidney disease) stage 4, GFR 15-29 ml/min ([facility]) 02/11/2021    Acute congestive heart failure ([address] 02/04/2021    COVID-19 12/31/2020    History of abdominal surgery 12/31/2020    Melena 12/30/2020    Cancer of appendix ([address] 11/17/2020    Loss of appetite 10/14/2020    Other hydronephrosis 06/24/2020    Genetic carrier status 09/13/2019    Postoperative nausea 08/27/2019    Ovarian cancer ([address] 08/07/2019    Peritoneal carcinoma ([address] 02/14/2019    Pelvic mass in female 01/17/2019    Irritable bowel syndrome with both constipation and diarrhea 02/09/2018    Subclinical hypothyroidism 01/23/2018    Chronic abdominal pain 01/22/2018    Diverticulosis 01/22/2018    Hx of total hysterectomy 01/22/2018    Hyponatremia 01/22/2018    Advance care planning 01/31/2017    Dental caries 05/26/2016    Chronic periodontal disease 05/26/2016    SUAZO (dyspnea on exertion) 02/10/2016    Prediabetes 09/24/2015    Morbid obesity ([address] 08/31/2015    Arthritis, degenerative 08/31/2015    Gastroesophageal reflux disease without esophagitis 08/31/2015    ANAMIKA on CPAP 08/31/2015    Essential hypertension 08/28/2015    PTSD (post-traumatic stress disorder) 03/24/2014    S/P TKR (total knee replacement) 11/14/2012    Adjustment disorder with depressed mood 09/20/2012    Major depressive disorder, recurrent episode, moderate ([address] 09/20/2012    Suicidal ideation 09/19/2012    Menopause 05/08/2012    Knee pain, right 05/08/2012    Hypokalemia 02/04/2012    Overdose 02/04/2012    Obesity 06/18/2010    Mixed hyperlipidemia 06/18/2010     Past Medical History:   Diagnosis Date    AR (allergic rhinitis)     seasonal    [facility]     Cancer ([facility]     pt states between vgina and rectal area    Cardiac echocardiogram 04/11/2016    Tech difficult. EF 55-60%. No RWMA. Mild conc LVH. Gr 1 DDfx. RVSP normal.      Cardiac nuclear imaging test 05/05/2016    Low risk. Very patchy radiotracer uptake. Mild anterior artifact, low suspicion for ischemia. EF 68%. No RWMA. Normal EKG on pharm stress test.    Cardiovascular LE arterial duplex 10/07/2013    No significant arterial disease at rest bilaterally. R JUNE 1.21.  L JUNE 1.16. No significant sm vessel disease.     Chronic kidney disease     Depression     Dr. Jimenez Situ, suicide attempt 2/12    Diverticulosis     Endometriosis     s/p hysterectomy 2006    GERD (gastroesophageal reflux disease)     Glaucoma     Dr. Dede Matt Hematuria, gross     HTN (hypertension)     Hx of colonoscopy 04/05/2017    mild diverticulosis, g1 internal hemorrhoids, normal colon , repeat 10 year 2027    Hx of suicide attempt     Hyperlipidemia LDL goal < 130     IBS (irritable bowel syndrome)     Ill-defined condition     environmental allergies    Insomnia     Malignant neoplasm of peritoneum (HCC) 2/14/2019    Nausea & vomiting     OA (osteoarthritis)     both knees, lower back    Preeclampsia     PTSD (post-traumatic stress disorder)     Seizures (HCC)     1 x with childbirth, had toxemia    Sleep apnea     does not use cpap machine    Spinal stenosis     Dr. Chance Radish Vertigo       Past Surgical History:   Procedure Laterality Date    COLONOSCOPY N/A 4/5/2017    COLONOSCOPY performed by Marzette Landau, [facility] at [facility] N/A 2/13/2019    SIGMOIDOSCOPY FLEXIBLE performed by Ryley Villeda MD EDIL at [facility] ENDOSCOPY    HX  SECTION      HX COLONOSCOPY  2017    DR. MCRAE 2017     HX COLOSTOMY      Revision 2021    HX CYSTECTOMY  2020    HX HYSTERECTOMY      HX KNEE ARTHROSCOPY Left     left knee    HX KNEE REPLACEMENT Bilateral     (R)  (L)     HX LAPAROTOMY N/A 2019    and rectovaginal bx    HX OTHER SURGICAL  2016    all teeth removed    HX SALPINGO-OOPHORECTOMY  2008    HX TUBAL LIGATION      IR INSERT TUNL CVC W PORT OVER 5 YEARS  2019    MULTIPLE DELIVERY       1990    MA FEMUR/KNEE SURG UNLISTED Left 2009    External fixator    MA PART REMOVAL COLON W ANASTOMOSIS      MA TOTAL ABDOM HYSTERECTOMY      TRANSURETHRAL RESEC BLADDER NECK        OB History        2    Para   2    Term   2       0    AB   0    Living   0       SAB   0    IAB   0    Ectopic   0    Molar   0    Multiple   0    Live Births   0              Social History     Tobacco Use    Smoking status: Never Smoker    Smokeless tobacco: Never Used   Substance Use Topics    Alcohol use: No      Family History   Problem Relation Age of Onset    Heart Disease Mother         CHF    Diabetes Mother    [facility] Hypertension Mother     Kidney Disease Mother    [facility] Breast Cancer Mother     Stroke Mother     Diabetes Father     Hypertension Father     Heart Attack Father         MI    Cancer Maternal Grandmother         stomach    Ovarian Cancer Maternal Aunt     Cancer Maternal Uncle       Current Outpatient Medications   Medication Sig    amLODIPine (NORVASC) 10 mg tablet take 1 tablet by mouth once daily    oxyCODONE-acetaminophen (PERCOCET) 5-325 mg per tablet Take 1 Tablet by mouth every four (4) hours as needed for Pain for up to 14 days. Max Daily Amount: 6 Tablets.  pregabalin (LYRICA) 25 mg capsule Take 1 Capsule by mouth three (3) times daily.  Max Daily Amount: 75 mg.    letrozole (FEMARA) 2.5 mg tablet Take 1 Tablet by mouth daily.  ondansetron hcl (Zofran) 4 mg tablet Take 1 Tablet by mouth every six (6) hours as needed for Nausea or Vomiting.  lidocaine-prilocaine (EMLA) topical cream Apply  to affected area as needed for Pain.  metoprolol succinate (TOPROL-XL) 100 mg tablet take 1 tablet by mouth once daily    simvastatin (ZOCOR) 20 mg tablet take 1 tablet by mouth at bedtime    meclizine (ANTIVERT) 25 mg tablet take 1 tablet by mouth three times a day if needed FOR DIZZINESS (Patient not taking: Reported on 1/26/2022)    PARoxetine (PAXIL) 20 mg tablet take 1 tablet by mouth once daily    gabapentin (NEURONTIN) 100 mg capsule Take 1 Capsule by mouth three (3) times daily. Max Daily Amount: 300 mg.    meclizine (ANTIVERT) 25 mg tablet take 1 tablet by mouth three times a day if needed FOR DIZZINESS    latanoprost (XALATAN) 0.005 % ophthalmic solution Administer 1 Drop to both eyes nightly. No current facility-administered medications for this visit. Allergies   Allergen Reactions    Seafood Hives     FISH    Other Medication Hives     seafood    Pollen Extracts Other (comments)    Shellfish Derived Hives    Pantoprazole Other (comments)     Bleeding in stomach    Promethazine Other (comments)     Bleeding in stomach          OBJECTIVE:    Physical Exam  VITAL SIGNS: Visit Vitals  /74   Pulse (!) 51   Temp 97.9 °F (36.6 °C)   Ht 5' 3\" (1.6 m)   Wt 111.3 kg (245 lb 6.4 oz)   LMP 01/31/2008   SpO2 96%   BMI 43.47 kg/m²      GENERAL EMILY: in no apparent distress and well developed and well nourished   PELVIC: deferred            IMPRESSION/PLAN:  Stage IV Primary peritoneal cancer with  recurrence  -- Patient was being followed by Dr. Cecy Rizzo who left the practice.     -- s/p carbo (auc=6), taxol (175 mg/m2) IV every 3 wks s/p 6 cycles completed 6/6/2019  -- s/p cytoreductive surgery with [facility] with dr. Wilmar Archibald  -- s/p pelvic radiation  -- Pain-script for tylenol #3  -- given platinum sensitive disease s/p  auc=5, Doxil 30 mg/m2 IV x 6 cycles s/p Total pelvic exenteration  -- Biopsy c/w recurrence-reviewed Caris and discussed treatment options. Discussed proceeding with hormonal therapy given tumor is ER\NE positive, immunotherapy given PD-L1 mutation although not FDA approved. Also discussed retreatment with platinum and if sensitive consider PARP inhibition. After discussion patient was placed on letrozole. Patient to begin Keytruda 400 mg IV every 3 weeks. -- Vaginal bleeding: Dr. 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 -- The patient presents to our HBV clinic today for follows up and continuing cancer treatment as scheduled. -- She has tolerated Keytruda Q6 weeks, no high graded toxicities. She was agreeable to continue current therapy. Plan:  -- Continue Keytruda C#4 on 6/3 tentatively  -- Prechemo labs: CBC, CMP, TSH,   -- Will obtain PET scan after 4 cycles for interval assessment. -- RTC in 4 weeks, always sooner if required. Normocytic Anemia  -- Anemia w/u CBC, Iron profile, ferritin, B12/folate/MMA, SPEP  -- Replacement as indicated. Chemotherapy related Neuropathy  -- On Lyrica 25 mg 3 times daily         -- We will see the patient back in about 4 weeks. Always sooner if required.         Orders Placed This Encounter    PET/CT TUMOR IMAGE SKULL THIGH (SUB)     Standing Status:   Future     Standing Expiration Date:   6/30/2023    CANCER ANTIGEN 125     Standing Status:   Future     Standing Expiration Date:   6/1/2023    IRON PROFILE     Standing Status:   Future     Standing Expiration Date:   6/1/2023    FERRITIN     Standing Status:   Future     Standing Expiration Date:   5/31/2023    METHYLMALONIC ACID     Standing Status:   Future     Standing Expiration Date:   5/31/2023    GAMMOPATHY EVAL, SPEP/CHONG, IG QT/FLC     Standing Status:   Future     Standing Expiration Date:   5/31/2023    VITAMIN B12 & FOLATE     Standing Status:   Future Standing Expiration Date:   6/1/2023           Ms. Elio Johnson has a reminder for a \"due or due soon\" health maintenance. I have asked that she contact her primary care provider for follow-up on this health maintenance. All of patient's questions answered to their apparent satisfaction. They verbally show understanding and agreement with aforementioned plan. Noel Horner MD  5/31/2022          Above mentioned total time spent for this encounter with more than 50% of the time spent in face-to-face counseling, discussing on diagnosis and management plan going forward, and co-ordination of care. Parts of this document has been produced using Dragon dictation system. Unrecognized errors in transcription may be present. Please do not hesitate to reach out for any questions or clarifications.       CC: Skip Bansal MD

## 2022-06-01 ENCOUNTER — HOSPITAL ENCOUNTER (OUTPATIENT)
Dept: INFUSION THERAPY | Age: 52
Discharge: HOME | End: 2022-06-01
Payer: MEDICARE

## 2022-06-01 VITALS
DIASTOLIC BLOOD PRESSURE: 76 MMHG | BODY MASS INDEX: 43.41 KG/M2 | SYSTOLIC BLOOD PRESSURE: 115 MMHG | HEIGHT: 63 IN | TEMPERATURE: 98.4 F | RESPIRATION RATE: 18 BRPM | OXYGEN SATURATION: 92 % | WEIGHT: 245 LBS | HEART RATE: 61 BPM

## 2022-06-01 LAB
ALBUMIN SERPL-MCNC: 2.9 G/DL (ref 3.4–5)
ALBUMIN/GLOB SERPL: 0.7 {RATIO} (ref 0.8–1.7)
ALP SERPL-CCNC: 102 U/L (ref 45–117)
ALT SERPL-CCNC: 13 U/L (ref 13–56)
ANION GAP SERPL CALC-SCNC: 6 MMOL/L (ref 3–18)
APPEARANCE UR: ABNORMAL
AST SERPL-CCNC: 13 U/L (ref 10–38)
BACTERIA URNS QL MICRO: ABNORMAL /HPF
BASOPHILS # BLD: 0 K/UL (ref 0–0.1)
BASOPHILS NFR BLD: 0 % (ref 0–2)
BILIRUB SERPL-MCNC: 0.2 MG/DL (ref 0.2–1)
BILIRUB UR QL: NEGATIVE
BUN SERPL-MCNC: 36 MG/DL (ref 7–18)
BUN/CREAT SERPL: 17 (ref 12–20)
CALCIUM SERPL-MCNC: 8.8 MG/DL (ref 8.5–10.1)
CHLORIDE SERPL-SCNC: 109 MMOL/L (ref 100–111)
CO2 SERPL-SCNC: 25 MMOL/L (ref 21–32)
COLOR UR: YELLOW
CREAT SERPL-MCNC: 2.12 MG/DL (ref 0.6–1.3)
DIFFERENTIAL METHOD BLD: ABNORMAL
EOSINOPHIL # BLD: 0.1 K/UL (ref 0–0.4)
EOSINOPHIL NFR BLD: 2 % (ref 0–5)
EPITH CASTS URNS QL MICRO: NEGATIVE /LPF (ref 0–5)
ERYTHROCYTE [DISTWIDTH] IN BLOOD BY AUTOMATED COUNT: 18.9 % (ref 11.6–14.5)
FERRITIN SERPL-MCNC: 63 NG/ML (ref 8–388)
FOLATE SERPL-MCNC: 5.5 NG/ML (ref 3.1–17.5)
GLOBULIN SER CALC-MCNC: 4.1 G/DL (ref 2–4)
GLUCOSE SERPL-MCNC: 83 MG/DL (ref 74–99)
GLUCOSE UR STRIP.AUTO-MCNC: NEGATIVE MG/DL
HCT VFR BLD AUTO: 28.4 % (ref 35–45)
HGB BLD-MCNC: 8 G/DL (ref 12–16)
HGB UR QL STRIP: NEGATIVE
IMM GRANULOCYTES # BLD AUTO: 0 K/UL (ref 0–0.04)
IMM GRANULOCYTES NFR BLD AUTO: 0 % (ref 0–0.5)
KETONES UR QL STRIP.AUTO: NEGATIVE MG/DL
LEUKOCYTE ESTERASE UR QL STRIP.AUTO: ABNORMAL
LYMPHOCYTES # BLD: 0.8 K/UL (ref 0.9–3.6)
LYMPHOCYTES NFR BLD: 13 % (ref 21–52)
MAGNESIUM SERPL-MCNC: 1.8 MG/DL (ref 1.6–2.6)
MCH RBC QN AUTO: 23.8 PG (ref 24–34)
MCHC RBC AUTO-ENTMCNC: 28.2 G/DL (ref 31–37)
MCV RBC AUTO: 84.5 FL (ref 78–100)
MONOCYTES # BLD: 0.4 K/UL (ref 0.05–1.2)
MONOCYTES NFR BLD: 7 % (ref 3–10)
NEUTS SEG # BLD: 4.8 K/UL (ref 1.8–8)
NEUTS SEG NFR BLD: 78 % (ref 40–73)
NITRITE UR QL STRIP.AUTO: NEGATIVE
NRBC # BLD: 0 K/UL (ref 0–0.01)
NRBC BLD-RTO: 0 PER 100 WBC
PH UR STRIP: 7.5 [PH] (ref 5–8)
PLATELET # BLD AUTO: 400 K/UL (ref 135–420)
PMV BLD AUTO: 9.4 FL (ref 9.2–11.8)
POTASSIUM SERPL-SCNC: 4.7 MMOL/L (ref 3.5–5.5)
PROT SERPL-MCNC: 7 G/DL (ref 6.4–8.2)
PROT UR STRIP-MCNC: 300 MG/DL
RBC # BLD AUTO: 3.36 M/UL (ref 4.2–5.3)
RBC #/AREA URNS HPF: NEGATIVE /HPF (ref 0–5)
SODIUM SERPL-SCNC: 140 MMOL/L (ref 136–145)
SP GR UR REFRACTOMETRY: 1.01 (ref 1–1.03)
UROBILINOGEN UR QL STRIP.AUTO: 0.2 EU/DL (ref 0.2–1)
VIT B12 SERPL-MCNC: 507 PG/ML (ref 211–911)
WBC # BLD AUTO: 6.2 K/UL (ref 4.6–13.2)
WBC URNS QL MICRO: ABNORMAL /HPF (ref 0–4)

## 2022-06-01 PROCEDURE — 87086 URINE CULTURE/COLONY COUNT: CPT

## 2022-06-01 PROCEDURE — 83921 ORGANIC ACID SINGLE QUANT: CPT

## 2022-06-01 PROCEDURE — 86304 IMMUNOASSAY TUMOR CA 125: CPT

## 2022-06-01 PROCEDURE — 82784 ASSAY IGA/IGD/IGG/IGM EACH: CPT

## 2022-06-01 PROCEDURE — 77030012965 HC NDL HUBR BBMI -A

## 2022-06-01 PROCEDURE — 82728 ASSAY OF FERRITIN: CPT

## 2022-06-01 PROCEDURE — 82607 VITAMIN B-12: CPT

## 2022-06-01 PROCEDURE — 80053 COMPREHEN METABOLIC PANEL: CPT

## 2022-06-01 PROCEDURE — 81001 URINALYSIS AUTO W/SCOPE: CPT

## 2022-06-01 PROCEDURE — 36591 DRAW BLOOD OFF VENOUS DEVICE: CPT

## 2022-06-01 PROCEDURE — 85025 COMPLETE CBC W/AUTO DIFF WBC: CPT

## 2022-06-01 PROCEDURE — 83735 ASSAY OF MAGNESIUM: CPT

## 2022-06-01 RX ORDER — HEPARIN 100 UNIT/ML
500 SYRINGE INTRAVENOUS AS NEEDED
Status: DISCONTINUED | OUTPATIENT
Start: 2022-06-01 | End: 2022-06-05 | Stop reason: HOSPADM

## 2022-06-01 RX ORDER — SODIUM CHLORIDE 0.9 % (FLUSH) 0.9 %
10-40 SYRINGE (ML) INJECTION AS NEEDED
Status: DISCONTINUED | OUTPATIENT
Start: 2022-06-01 | End: 2022-06-05 | Stop reason: HOSPADM

## 2022-06-01 RX ORDER — HEPARIN 100 UNIT/ML
SYRINGE INTRAVENOUS
Status: DISPENSED
Start: 2022-06-01 | End: 2022-06-01

## 2022-06-01 NOTE — PROGRESS NOTES
[facility] Progress Note    Date: 2022    Name: Itz Galindo              MRN: [id number]              : 1970    Pre-Chemo labs (from [facility])    Pt to [facility] ambulatory at 1005. Ms. Xu Bella was assessed and education was provided. Ms. Cannon's vitals were reviewed. Visit Vitals  /76 (BP 1 Location: Left arm, BP Patient Position: Sitting)   Pulse 61   Temp 98.4 °F (36.9 °C)   Resp 18   Ht 5' 3\" (1.6 m)   Wt 111.1 kg (245 lb)   SpO2 92%   BMI 43.40 kg/m²        Right upper chest single lumen mediport accessed with 20 g 1 inch iglesias needle using sterile technique. Flushed well with NS with positive blood return. CBC, CMP, Magnesium, CA-125, UA with reflex, Urine culture, TSH, and CA-125 drawn per treatment plan order. Also collected labs for Dr Holloway Cure vit b 12, Folate, Gammapathy eval, methylmalonic acid, iron profile, and ferritin. Specimens labeled and sent to hospital for processing. Iglesias needle flushed with 20 ml NS followed by heparin flush 500 units/5ml. Needle removed and bandaid applied. Ms Xu Bella stated that today is the day she would change her ostomy bag in the evening. She did not do it prior to coming to this appointment. This nurse recommended that she change her bag in the morning the day of her lab appointments. Ms. Xu Bella tolerated well without complaints. Patient armband removed and shredded. Ms. Xu Bella was discharged from [place] [id number] in stable condition at 1030. She is to return on 6/3/22 at 0900 for her next [facility] appointment.       Jeo Sharpe RN  2022

## 2022-06-02 DIAGNOSIS — C48.2 PERITONEAL CARCINOMA (HCC): Primary | ICD-10-CM

## 2022-06-02 LAB — CANCER AG125 SERPL-ACNC: 5 U/ML (ref 1.5–35)

## 2022-06-02 RX ORDER — HYDROCORTISONE SODIUM SUCCINATE 100 MG/2ML
100 INJECTION, POWDER, FOR SOLUTION INTRAMUSCULAR; INTRAVENOUS AS NEEDED
Status: CANCELLED | OUTPATIENT
Start: 2022-06-03

## 2022-06-02 RX ORDER — EPINEPHRINE 1 MG/ML
0.3 INJECTION, SOLUTION, CONCENTRATE INTRAVENOUS AS NEEDED
Status: CANCELLED | OUTPATIENT
Start: 2022-06-03

## 2022-06-02 RX ORDER — HEPARIN 100 UNIT/ML
300-500 SYRINGE INTRAVENOUS AS NEEDED
Status: CANCELLED
Start: 2022-06-03

## 2022-06-02 RX ORDER — SODIUM CHLORIDE 9 MG/ML
10 INJECTION INTRAMUSCULAR; INTRAVENOUS; SUBCUTANEOUS AS NEEDED
Status: CANCELLED | OUTPATIENT
Start: 2022-06-03

## 2022-06-02 RX ORDER — ONDANSETRON 2 MG/ML
8 INJECTION INTRAMUSCULAR; INTRAVENOUS AS NEEDED
Status: CANCELLED | OUTPATIENT
Start: 2022-06-03

## 2022-06-02 RX ORDER — POTASSIUM CHLORIDE 7.45 MG/ML
10 INJECTION INTRAVENOUS
Status: CANCELLED
Start: 2022-06-03

## 2022-06-02 RX ORDER — ACETAMINOPHEN 325 MG/1
650 TABLET ORAL AS NEEDED
Status: CANCELLED
Start: 2022-06-03

## 2022-06-02 RX ORDER — ALBUTEROL SULFATE 0.83 MG/ML
2.5 SOLUTION RESPIRATORY (INHALATION) AS NEEDED
Status: CANCELLED
Start: 2022-06-03

## 2022-06-02 RX ORDER — SODIUM CHLORIDE 0.9 % (FLUSH) 0.9 %
10 SYRINGE (ML) INJECTION AS NEEDED
Status: CANCELLED | OUTPATIENT
Start: 2022-06-03

## 2022-06-02 RX ORDER — DIPHENHYDRAMINE HYDROCHLORIDE 50 MG/ML
50 INJECTION, SOLUTION INTRAMUSCULAR; INTRAVENOUS AS NEEDED
Status: CANCELLED
Start: 2022-06-03

## 2022-06-02 RX ORDER — DIPHENHYDRAMINE HYDROCHLORIDE 50 MG/ML
25 INJECTION, SOLUTION INTRAMUSCULAR; INTRAVENOUS AS NEEDED
Status: CANCELLED
Start: 2022-06-03

## 2022-06-02 RX ORDER — SODIUM CHLORIDE 9 MG/ML
25 INJECTION, SOLUTION INTRAVENOUS CONTINUOUS
Status: CANCELLED | OUTPATIENT
Start: 2022-06-03

## 2022-06-03 ENCOUNTER — HOSPITAL ENCOUNTER (OUTPATIENT)
Dept: INFUSION THERAPY | Age: 52
Discharge: HOME | End: 2022-06-03
Payer: MEDICARE

## 2022-06-03 VITALS
RESPIRATION RATE: 18 BRPM | OXYGEN SATURATION: 92 % | DIASTOLIC BLOOD PRESSURE: 75 MMHG | TEMPERATURE: 97.8 F | SYSTOLIC BLOOD PRESSURE: 134 MMHG | HEART RATE: 51 BPM

## 2022-06-03 DIAGNOSIS — G89.3 CANCER ASSOCIATED PAIN: ICD-10-CM

## 2022-06-03 DIAGNOSIS — C48.2 PERITONEAL CARCINOMA (HCC): Primary | ICD-10-CM

## 2022-06-03 LAB
BACTERIA SPEC CULT: NORMAL
CC UR VC: NORMAL
SERVICE CMNT-IMP: NORMAL

## 2022-06-03 PROCEDURE — 96413 CHEMO IV INFUSION 1 HR: CPT

## 2022-06-03 PROCEDURE — 77030012965 HC NDL HUBR BBMI -A

## 2022-06-03 PROCEDURE — 74011000258 HC RX REV CODE- 258: Performed by: INTERNAL MEDICINE

## 2022-06-03 PROCEDURE — 74011250636 HC RX REV CODE- 250/636: Performed by: INTERNAL MEDICINE

## 2022-06-03 PROCEDURE — 74011000250 HC RX REV CODE- 250: Performed by: INTERNAL MEDICINE

## 2022-06-03 RX ORDER — HEPARIN 100 UNIT/ML
300-500 SYRINGE INTRAVENOUS AS NEEDED
Status: DISPENSED | OUTPATIENT
Start: 2022-06-03 | End: 2022-06-03

## 2022-06-03 RX ORDER — SODIUM CHLORIDE 0.9 % (FLUSH) 0.9 %
10 SYRINGE (ML) INJECTION AS NEEDED
Status: DISPENSED | OUTPATIENT
Start: 2022-06-03 | End: 2022-06-03

## 2022-06-03 RX ORDER — OXYCODONE AND ACETAMINOPHEN 5; 325 MG/1; MG/1
1 TABLET ORAL
Qty: 40 TABLET | Refills: 0 | Status: SHIPPED | OUTPATIENT
Start: 2022-06-03 | End: 2022-06-17

## 2022-06-03 RX ORDER — SODIUM CHLORIDE 9 MG/ML
25 INJECTION, SOLUTION INTRAVENOUS CONTINUOUS
Status: DISPENSED | OUTPATIENT
Start: 2022-06-03 | End: 2022-06-03

## 2022-06-03 RX ADMIN — SODIUM CHLORIDE, PRESERVATIVE FREE 10 ML: 5 INJECTION INTRAVENOUS at 10:20

## 2022-06-03 RX ADMIN — SODIUM CHLORIDE 400 MG: 9 INJECTION, SOLUTION INTRAVENOUS at 09:43

## 2022-06-03 RX ADMIN — HEPARIN 500 UNITS: 100 SYRINGE at 10:20

## 2022-06-03 RX ADMIN — SODIUM CHLORIDE, PRESERVATIVE FREE 10 ML: 5 INJECTION INTRAVENOUS at 09:07

## 2022-06-03 NOTE — PROGRESS NOTES
[facility] Progress Note    Date: Kirstie 3, 2022    Name: Jason Farrar              MRN: [id number]              : 1970    Chemotherapy Cycle:C4 D1 Nan Back every 6 weeks    Pt to [address], ambulatory, at 0850. Ms. Elmira Oropeza was assessed and education was provided. Pt c/o 9/10 chronic generalized pain, pt requesting refill on pain medication, Dr Cindy Thakkar. made aware. Pt HR <60, pt states she took her Metoprolol this morning. Pt advised to monitor HR at home and notify PCP. Ms. Cannon's vitals were reviewed. Visit Vitals  /80 (BP 1 Location: Right upper arm, BP Patient Position: Sitting)   Pulse (!) 59   Temp 97.7 °F (36.5 °C)   Resp 18   SpO2 97%   Breastfeeding No     Lab results from 22 were obtained and reviewed and were within parameters for tx today. (Of note, pt previous Dr. Gopal Desai pt and was providing urine samples from her urostomy bag. Per Dr. Cindy Thakkar, urine samples will no longer be required for her pre-chemo labs. \[facility] [place] [facility]\"" pharmacist made aware.)    Right chest mediport accessed with 20 g 1 inch upton needle after chloraprep, brisk blood return and flushed well with NS. Keytruda 400 mg IV was infused over 30 minutes per order. Mediport flushed with NS and Heparin then de-accessed. Bandaid applied. Discharge/ follow-up instructions discussed w/ pt. Pt verbalized understanding. Pt armband removed and shredded. Ms. Elmira Oropeza was discharged from [place] [id number] in stable condition at 1025. She is to return on 22 at 1000.      Trang Sanchez RN  Kirstie 3, 2022

## 2022-06-04 LAB — METHYLMALONATE SERPL-SCNC: 224 NMOL/L (ref 0–378)

## 2022-06-05 LAB
ALBUMIN SERPL ELPH-MCNC: 3.1 G/DL (ref 2.9–4.4)
ALBUMIN/GLOB SERPL: 0.9 {RATIO} (ref 0.7–1.7)
ALPHA1 GLOB SERPL ELPH-MCNC: 0.3 G/DL (ref 0–0.4)
ALPHA2 GLOB SERPL ELPH-MCNC: 1.1 G/DL (ref 0.4–1)
B-GLOBULIN SERPL ELPH-MCNC: 0.9 G/DL (ref 0.7–1.3)
GAMMA GLOB SERPL ELPH-MCNC: 1.2 G/DL (ref 0.4–1.8)
GLOBULIN SER-MCNC: 3.6 G/DL (ref 2.2–3.9)
IGA SERPL-MCNC: 172 MG/DL (ref 87–352)
IGG SERPL-MCNC: 1305 MG/DL (ref 586–1602)
IGM SERPL-MCNC: 58 MG/DL (ref 26–217)
INTERPRETATION SERPL IEP-IMP: ABNORMAL
KAPPA LC FREE SER-MCNC: 99 MG/L (ref 3.3–19.4)
KAPPA LC FREE/LAMBDA FREE SER: 2.16 {RATIO} (ref 0.26–1.65)
LAMBDA LC FREE SERPL-MCNC: 45.8 MG/L (ref 5.7–26.3)
M PROTEIN SERPL ELPH-MCNC: ABNORMAL G/DL
PROT SERPL-MCNC: 6.7 G/DL (ref 6–8.5)

## 2022-06-06 ENCOUNTER — NURSE NAVIGATOR (OUTPATIENT)
Dept: OTHER | Age: 52
End: 2022-06-06

## 2022-06-06 NOTE — NURSE NAVIGATOR
Initial assessment for Jovita Hutchison,  diagnosed with Metastatic peritoneal cancer. Meeting with pt included pt only Patient was assessed for the following barriers:    Communication:       Yes    No  -Ability to read/write,understand [place]       [x]      []    -Ability to talk with family/children,friends about diagnosis     [x]      []    -Other:  Comments/Referrals/Services provided: Pt is able to discuss diagnosis with family and friends. Employment/Financial/Legal:     Yes    No  -Loss of employment/income         []      [x]    -Insurance coverage          [x]      []     -Needs help applying for Social Security/Disability/FMLA     []      [x]     -Help with Co-Pays for office visits         []      [x]    -Medication assistance/medical supplies or equipment     []      [x]    -Difficulty paying bills: utilities/housing       []      [x]    -Means to buy food                     [x]      []    -Legal issues           []      [x]    -Other:  Comments/Referrals/Services provided: Pt receives disability. She has insurance with Medicare/Medicaid. No financial concerns at this time. Psychosocial        Yes    No  Housing:  -Homeless           []      [x]    -Extended care needs: long term care/home care/Hospice     []      [x]    -Other:  Comments/Referrals/Services provided: Pt rents home. Transportation:       Yes    No  -Lack of vehicle or public transportation options      []      [x]    -Funds need for public transportation: bus/taxi      []      [x]    -Other:  Comments/Referrals/Services provided: Pt receives transportation services provided by insurance. Support system:       Yes No  -Family members at home: spouse/significant other/children    []      [x]    -Has a support system         [x]      []    Other:  Comments/Referrals/Services provided: Pt lives alone, has good support from mao RichGurinder Shannan and Scientologist members.   Spirituality:        Yes No  -Spiritual issues          []      [x] -Cultural concerns          []      [x]    -Other:  -Comments/Referrals/Services provided: No spiritual concerns today. Pt is Moravian. She hopes to to to school for biblical studies. 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 Sexuality:        Yes No  -Body image concerns                     []      []    -Relationships/significant other issues        []      []    -Other:  Comments/Referrals/Services provided:    Coping:        Yes    No  -Able to manage emotions         [x]      []    -Feeling fearful or anxious         []      [x]    -Interest in attending support groups        []      []    -Cancer related pain/control         [x]      []    -Other:  Comments/Referrals/Services provided: Pt denies anxiety at this time. Tobacco dependency:      Yes No  -Currently smokes: cigarettes/cigars        []      [x]    -Uses smokeless tobacco         []      [x]    -Other:  Comments/Referrals/Services provided: Pt denies smoking. Education/review of Disease process and Management    Yes No  -Explanation of navigator role/contact information      [x]      []           -Pathology/Staging          [x]      []    -Diagnostic tests          [x]      []    -Genetic testing needed         []      []    -Treatment options/plan: surgery/chemotherapy/radiation     [x]      []    -Mediport placement as needed                              []      []    -Tobacco cessation as needed        []      [x]    -Need for a second opinion         []      [x]    -Importance of bringing family/friends to medical appts     [x]      []   -Other:  Patient/family verbalized understanding of treatment plan: Yes. Pt was seen by Dr. Talib Beard in office, she was transferred from Dr. Rebeka Nam diagnosed with metastatic peritoneal cancer. She is s/p cytoreductive surgery with [facility] on 8/8/2019. S/p radiation 11/2019. Had prior chemotherapy treatments. Has a urostomy and colostomy from prior surgery.  Pt is presently on Keytruda, she will continue with this treatment. PET scan ordered. She is requesting a rollator to assist with walking due to neuropathy in legs. Pt will follow up with Dr. Delia Christianson in 4 weeks. CCN Distress Tool    Katalina Navas  was assessed for management of distress using the NCCN Distress Thermometer for Patients tool. Mild to moderate distress  Scorin-4        Yes    No    -Practical/physical issues       [x]      []      -Provide community resources      []      []      -Provide list of support groups      []      []      -Provide list of national organizations and websites               []      []      -Provide ongoing supportive care by oncology medical team        [x]      []                  Comments/Referrals/Services provided:  Yes. Score-0    Moderate to severe distress  Scorin or greater                   Yes    No    -Navigator consulted with MD      []      []     -Navigator follow up         []      []     -Spiritual concerns        []      []     -Emotional/family concerns       []      []     -Refer to /mental health professional/PCP   []      []      Comments/Referral/Services provided:  Yes.

## 2022-06-07 ENCOUNTER — NURSE NAVIGATOR (OUTPATIENT)
Dept: OTHER | Age: 52
End: 2022-06-07

## 2022-06-07 NOTE — NURSE NAVIGATOR
[facility] does not supply rollator, order faxed to [facility] at [phone]. Tel:[phone].

## 2022-06-17 ENCOUNTER — HOSPITAL ENCOUNTER (OUTPATIENT)
Dept: PET IMAGING | Age: 52
Discharge: HOME | End: 2022-06-17
Attending: INTERNAL MEDICINE
Payer: MEDICARE

## 2022-06-17 PROCEDURE — 74011000250 HC RX REV CODE- 250: Performed by: INTERNAL MEDICINE

## 2022-06-17 PROCEDURE — A9552 F18 FDG: HCPCS

## 2022-06-17 RX ORDER — BARIUM SULFATE 20 MG/ML
300 SUSPENSION ORAL
Status: COMPLETED | OUTPATIENT
Start: 2022-06-17 | End: 2022-06-17

## 2022-06-17 RX ORDER — FLUDEOXYGLUCOSE F-18 200 MCI/ML
5-10 INJECTION INTRAVENOUS ONCE
Status: COMPLETED | OUTPATIENT
Start: 2022-06-17 | End: 2022-06-17

## 2022-06-17 RX ADMIN — FLUDEOXYGLUCOSE F-18 7.59 MILLICURIE: 200 INJECTION INTRAVENOUS at 10:53

## 2022-06-17 RX ADMIN — BARIUM SULFATE 300 ML: 20 SUSPENSION ORAL at 11:15

## 2022-06-28 ENCOUNTER — NURSE NAVIGATOR (OUTPATIENT)
Dept: OTHER | Age: 52
End: 2022-06-28

## 2022-06-28 NOTE — NURSE NAVIGATOR
Spoke with pt to inform her that Dr. June Mckeon  Was changing her appointment from virtual to office visit as he needed to discuss scan results and treatment plan. Pt did not want to change appointment to office visit as she has issues with transportation, informed pt that we can call insurance to provide transportation and reschedule her appointment tomorrow-6/29/22. Pt stated that she wanted to know her results today over the phone. Dr. Zendejas Informed. He will call pt with results today and see pt in office tomorrow to discuss plan of care. Pt informed of plan and agreed. Transportation scheduled with pt's [id number] for short notice trip. Was informed to fill out [place] precert form online-unable to find form-called [facility] office spoke with  Diane Burgos. Who stated that there was no form to complete. . Pt appointment scheduled for 8:45. Transportation will pick pt up at 7:35 am. Trip # [id number].

## 2022-06-29 ENCOUNTER — OFFICE VISIT (OUTPATIENT)
Dept: ONCOLOGY | Age: 52
End: 2022-06-29
Payer: MEDICARE

## 2022-06-29 VITALS
SYSTOLIC BLOOD PRESSURE: 142 MMHG | DIASTOLIC BLOOD PRESSURE: 90 MMHG | OXYGEN SATURATION: 98 % | WEIGHT: 237 LBS | RESPIRATION RATE: 16 BRPM | HEIGHT: 63 IN | BODY MASS INDEX: 41.99 KG/M2 | HEART RATE: 52 BPM

## 2022-06-29 DIAGNOSIS — R63.4 WEIGHT LOSS: ICD-10-CM

## 2022-06-29 DIAGNOSIS — Z79.01 ON ANTICOAGULANT THERAPY: ICD-10-CM

## 2022-06-29 DIAGNOSIS — D50.0 IRON DEFICIENCY ANEMIA DUE TO CHRONIC BLOOD LOSS: ICD-10-CM

## 2022-06-29 DIAGNOSIS — C57.7 MALIGNANT NEOPLASM OF OTHER SPECIFIED FEMALE GENITAL ORGANS (HCC): ICD-10-CM

## 2022-06-29 DIAGNOSIS — C56.3 MALIGNANT NEOPLASM OF BOTH OVARIES (HCC): ICD-10-CM

## 2022-06-29 DIAGNOSIS — G89.3 CANCER ASSOCIATED PAIN: ICD-10-CM

## 2022-06-29 DIAGNOSIS — D64.9 NORMOCYTIC ANEMIA: ICD-10-CM

## 2022-06-29 DIAGNOSIS — C48.2 PERITONEAL CARCINOMA (HCC): Primary | ICD-10-CM

## 2022-06-29 DIAGNOSIS — R93.2 ABNORMAL PET SCAN OF LIVER: ICD-10-CM

## 2022-06-29 PROCEDURE — G8755 DIAS BP > OR = 90: HCPCS | Performed by: INTERNAL MEDICINE

## 2022-06-29 PROCEDURE — 99215 OFFICE O/P EST HI 40 MIN: CPT | Performed by: INTERNAL MEDICINE

## 2022-06-29 PROCEDURE — G8427 DOCREV CUR MEDS BY ELIG CLIN: HCPCS | Performed by: INTERNAL MEDICINE

## 2022-06-29 PROCEDURE — G8753 SYS BP > OR = 140: HCPCS | Performed by: INTERNAL MEDICINE

## 2022-06-29 PROCEDURE — G8417 CALC BMI ABV UP PARAM F/U: HCPCS | Performed by: INTERNAL MEDICINE

## 2022-06-29 PROCEDURE — G9899 SCRN MAM PERF RSLTS DOC: HCPCS | Performed by: INTERNAL MEDICINE

## 2022-06-29 PROCEDURE — G9717 DOC PT DX DEP/BP F/U NT REQ: HCPCS | Performed by: INTERNAL MEDICINE

## 2022-06-29 PROCEDURE — G9711 PT HX TOT COL OR COLON CA: HCPCS | Performed by: INTERNAL MEDICINE

## 2022-06-29 NOTE — PROGRESS NOTES
[id number] [facility]        Patient ID:  Name:  Parvez Loera  MRN:  [id number]  :  1970/51 y.o. Date:  2022      Oncology History  Parvez Loera is a 45 y.o.  postmenopausal female referred by Dr. Jose Martinez with peritoneal cancer. Patient was being followed by Dr. Nanette Corcoran who left the practice. Intitally seen be JOHN talaverawith reports of vaginal bleeding. Given pt's history of hysteretectomy with BSO for endometriosis in  a TVUS was ordered. Which revealed a 6.8 cm x 5.2 cm x 4.6 cm at midline vaginal cuff. This prompted pelvic CT with findings of a lesion measuring 7.6 x 5.8 x 7.2 cm and is compatible with a pelvic neoplasm vs endometrioma given prior history of endometriosis. Tumor markers done on 2018 all normal.  S/p  Exploratory laparotomy, exploration of retroperitoneal spaces, exam under anesthesia with biopsy of rectovaginal mass on 2019. Had sigmoidoscopy which revealed submucosal mass. S/p cycle #6 of carbo/taxol on 2019. S/p cytoreductive surgery with HIPEC on 2019. S/p radiation treatment completed in 2019. Was seen in office and had a biopsy c/w recurrence. S/p cycle #6 of Carbo/Doxil on 2020. S/p Exploratory laparotomy. 2. Lysis of adhesions. 3. Simple appendectomy. 4. 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 On . She also had a revision of urostomy that is still leaking. S/p IR biopsy of liver on 2021 which demonstrated recurrent disease. Pt on palliative letrozole and keytruda. HPI  Patient was being followed by Dr. Nanette Corcoran who left the practice. The patient presents to our [facility] clinic today for follows up and continuing cancer treatment as scheduled. She was accompanied by her family today. Here for follow-up continues to complain of vaginal bleeding and rectal bleeding as well as discharge from the rectum. Continues to have pain controlled with meds.   Continues to have nausea controlled with meds. Having some fatigue. Patient states her neuropathy is worse and had stopped taking her Neurontin and Lyrica. The patient denied fever, chills, night sweat, skin lumps or bumps, acute bleeding or bruising issues. Denied headache, acute vision change, dizziness, chest pain, worsen shortness of breath, palpitation, productive cough, nausea, vomiting, worsening abdominal pain, altered bowel habits, dysuria, new bone pain or back pain, focal numbness or weakness.        Component      Latest Ref Rng & Units 3/11/2022           9:10 AM   CA-125      1.5 - 35.0 U/mL 4     Component      Latest Ref Rng & Units 2/11/2022           9:37 AM   CA-125      1.5 - 35.0 U/mL 4     Component      Latest Ref Rng & Units 12/16/2021          10:25AM   Cancer Ag (CA) 125      0.0 - 38.1 U/mL 5       Component      Latest Ref Rng & Units 6/17/2021          12:00 AM   Cancer Ag (CA) 125      0.0 - 38.1 U/mL 5.1     Component      Latest Ref Rng & Units 9/28/2020          11:11 AM   CA-125      1.5 - 35.0 U/mL 6     Component      Latest Ref Rng & Units 8/31/2020          10:01 AM   CA-125      1.5 - 35.0 U/mL 8     Component      Latest Ref Rng & Units 7/7/2020          11:30 AM   CA-125      1.5 - 35.0 U/mL 5     Component      Latest Ref Rng & Units 6/8/2020          10:40 AM   CA-125      1.5 - 35.0 U/mL 7     Component      Latest Ref Rng & Units 5/11/2020          11:30 AM   CA-125      1.5 - 35.0 U/mL 7     Component      Latest Ref Rng & Units 3/4/2020           8:15 AM   CA-125      1.5 - 35.0 U/mL 7     Component      Latest Ref Rng & Units 11/15/2019           9:56 AM   CA-125      1.5 - 35.0 U/mL 6     Component      Latest Ref Rng & Units 6/6/2019           8:44 AM   CA-125      1.5 - 35.0 U/mL 7     Component      Latest Ref Rng & Units 5/16/2019 4/25/2019           8:26 AM  8:20 AM   Cancer Ag (CA) 125      0.0 - 38.1 U/mL 7.8 8.7     Component      Latest Ref Rng & Units 4/4/2019           8:28 AM   Cancer Ag (CA) 125      0.0 - 38.1 U/mL 8.8     Component      Latest Ref Rng & Units 3/14/2019 2/21/2019           8:15 AM  8:32 AM   Cancer Ag (CA) 125      0.0 - 38.1 U/mL 10.4 18.4     12/17/2018 : 9.3  12/17/2018 CEA: 2.0  12/17/2018 AFP: 3.8    Pathology  8/4/2021  Liver mass, core biopsies:        Metastatic adenocarcinoma, most consistent with serous type. DIAGNOSIS COMMENT:   Although metastatic ovarian or primary peritoneal carcinoma forming   parenchymal liver lesions is uncommon, the histologic features in this   case are similar to those in the patient's prior rectovaginal mass biopsy   from 2019 and the immunohistochemical features are similar to those   documented in the EMR from a pelvic exenteration specimen performed at an   outside institution in 2020.   4/20/2020  Vaginal biopsy  Papillary serous adenocarcinoma    8/8/2019  A) COLON, PERICOLIC NODULE, EXCISION:      - ACELLULAR MUCIN WITH MULTIPLE CALCIFICATIONS, AND ASSOCIATED FIBROUS WALL WITH        HYALINIZATION, AND CHRONIC INFLAMMATION.     - ADJACENT BENIGN FIBROADIPOSE TISSUE, CONSISTENT WITH MESENTERY.     - NO EVIDENCE OF MALIGNANCY.      - SEE COMMENT. B) LIVER, RIGHT LOBE, BIOPSY:      - NODULAR FAT NECROSIS AND FIBROSIS OF THE LIVER CAPSULE.      - MILD STEATOSIS.     - NO EVIDENCE OF MALIGNANCY. C) COLON, SIGMOID, SEROSAL IMPLANT, EXCISION:      - NODULAR FAT NECROSIS WITH FIBROSIS, CHRONIC INFLAMMATION, CALCIFICATIONS, AND        CYSTIC DEGENERATION WITHOUT MUCIN.     - NO EVIDENCE OF MALIGNANCY. D) OMENTUM, OMENTECTOMY (RESECTION):      - CONGESTED FIBROADIPOSE TISSUE WITH FOCAL FIBROSIS AND CHRONIC INFLAMMATION, AND        CALCIFIED NODULAR FIBROSIS.     - NO EVIDENCE OF MALIGNANCY. E) PERITONEUM, LEFT ANTERIOR ABDOMINAL, RESECTION:      - CONGESTED PERITONEAL TISSUE, NO EVIDENCE OF MALIGNANCY.     F) PERITONEUM, RIGHT ANTERIOR ABDOMINAL, RESECTION:      - CONGESTED PERITONEAL TISSUE, NO EVIDENCE OF MALIGNANCY. G) COLON, SIGMOID, SEROSAL IMPLANT, EXCISION:      - NODULAR FIBROSIS WITH HISTIOCYTES, SUGGESTIVE OF FAT NECROSIS.     - CYSTIC DEGENERATION, WITHOUT MUCIN.     - NO EVIDENCE OF MALIGNANCY. H) SOFT TISSUE, FALCIFORM, EXCISION:      - CONSISTENT WITH FALCIFORM LIGAMENT.     - NO EVIDENCE OF MALIGNANCY. PATHOLOGIC STAGE:  ypTx, pNx    1/17/2019   RECTOVAGINAL MASS (#1, 2) AND PELVIC MASS, BIOPSIES:   CONSISTENT WITH SEROUS CARCINOMA WITH EXTENSIVE NECROSIS. Imaging  MRI liver 7/6/2021     FINDINGS:     Abdominal Wall:  There is a circumscribed 8.3 cm-width x 2.4 cm-AP x 2.9  cm-craniocaudal length T2 hyperintense subcutaneous layer fluid collection along  the inferior margin of the ileal conduit. The colostomy site in the left lower  quadrant appears unremarkable.     Liver:  A new ovoid T1-low T2-high signal 1.8 x 1.4 cm lesion is identified in  the segment 8. Another 0.8 x 0.6 cm T1-low T2-high signal focus is identified  in the segment 3. These structures were not apparent on prior studies. With  diffusion imaging, no evidence of restricted diffusion is demonstrated at these  foci.     Biliary:  No evidence for significant common bile duct dilation.     Gallbladder:  Mild distention of the gallbladder. No definite gallstones.     Spleen, Adrenal Glands, Pancreas:  Unremarkable.     Kidneys:  Cyst at the left kidney lower pole region measuring about 1.2 x 1.2  cm. Increase in size compared to prior CTs from above 0.8 x 0.7 cm.     Miscellaneous: The largest of the periportal nodes measures up to about 1.3 x  1.1 cm.     IMPRESSION     1. Focal fluid collection in the subcutaneous tissue adjacent to the ileal  conduit. This fluid collection may be amenable to aspiration under ultrasound  guidance.     2. New hepatic lesions as described.   Although there is apparent lack of  restricted diffusion, further investigation with ultrasound is warranted to  assess if these structures are indeed cystic in nature.     3.  Left renal cyst.     4.  Slight az prominence. MRI pelvis 7/6/2021  FINDINGS:     Along the inferior aspect of the ileal conduit stoma site, focal elongated T1  high T2-signal fluid collection is demonstrated to, measuring about 8.3 cm-width  x 2.2 cm-thickness x 2.3 cm-craniocaudal length. In retrospect, there was a  suggestion of possible 7.1 x 3.5 x 2.0 cm hypodense material collection along  the inferior aspect of the subcutaneous abdominal wall portion of the ileal  conduit on the 04/01/21 unenhanced CT. This fluid collection therefore may be  slightly increased in size in the elbow.     The intraperitoneal portion of the ileal conduit appears grossly unremarkable.     The presacral region demonstrates apparent continued pattern of nonspecific mild  edema. The source for this edematous changes is clear. Most likely related to  residual changes from recent surgical intervention.     Mild edematous changes also noted in the right and to lesser extent left  piriformis muscles. Additional edematous changes are also identified in the  right obturator internus along the superior aspect.      Minimal free fluid in the posterior pelvic region.     There is some intraluminal fluid in the blind loop portion of the rectum. At  the right superior lateral vaginal cuff corner, a vague trail of T2 high signal  is observed (coronal T2 images #8-10 and fat-sat axial T2 image #5-10). This  Eva appears to course quite close to the anterior margin of the rectal remnant  wall. Whether there is indeed communication between the blind rectal segment  and the vaginal cuff can be further assessed with barium enema.     No distinct mass is detected pelvis.     Enlarged nodes are identified along the right pelvic sidewall.   The largest  right pelvic sidewall node is identified subjacent to the external iliac  artery/vein vascular bundle, measuring up to about 2.2 x 1.2 cm (fat-sat axial  T2 image #12). Another slightly enlarged node more superiorly measuring about  1.9 x 1.0 cm (image #17). Multiple slightly borderline prominent nodes  identified in the mesentery, measuring up to about 1.2 x 1.0 cm (image #25). Additional note near the aortic bifurcation measuring about 1.3 x 0.9 cm (image  #34).    IMPRESSION     1. Focal T1- and T2-hyperintense subcutaneous fluid collection along the  inferior aspect of the ileal conduit stoma site. Possibly representing a seroma  or a focus of urinoma. This may be amenable to aspiration under ultrasound or  CT guidance.     2.  Questionable subtle trailed between the right superior lateral aspect of the  vaginal cuff with adjacent blind-tipped rectum. More definitive evaluation can  be performed with barium enema.     3.  Slight az prominence along the right pelvic sidewall and in the  mesentery.     4. No distinct residual or recurrent mass.     5.  Persistent mild presacral edema  PETCT 7/17/2020   FINDINGS:        PET images: Study limited because of patient motion.     Mediastinal blood pool reference value = SUV Max. Mediastinal blood pool reference value = SUV Mean. Liver parenchyma reference value =  4.7   SUV Max. Liver parenchyma reference value =  2.3    SUV Mean.           NECK: There is no suspicious hypermetabolic activity at the neck. CHEST: There is no suspicious hypermetabolic activity at the chest.     ABDOMEN: Typical heterogeneity at the liver. No convincing malignant foci  hypermetabolic activity or underlying CT abnormality. PELVIS: There is soft tissue fullness in the right retrovesicular pelvis just  above the vaginal cuff and posterior to right ureter measuring about 3.8 cm with  Max SUV 13.5 (206), extending to the rectum posteriorly, levators inferiorly on  the right.  Previously 4 cm and Max SUV 16.5.     Thickening anterior abdominal wall compatible with scar demonstrating diffuse  modest activity and Max SUV 2.7.      Additional CT findings: Mediport catheter has tip in the superior vena cava. Air  trapping in the superior segments lower lobes. Right-sided nephroureteral stent  without hydronephrosis. No ascites. IMPRESSION  Impression:       Treatment response. Decreased metabolic activity at the right retrovesicular  pelvic mass. No new evidence of metastatic disease.     Interval placement right-sided nephroureteral stent and resolved  hydroureteronephrosis. .       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[address] 11/17/2020    Loss of appetite 10/14/2020    Other hydronephrosis 06/24/2020    Genetic carrier status 09/13/2019    Postoperative nausea 08/27/2019    Ovarian cancer ([address] 08/07/2019    Peritoneal carcinoma ([address] 02/14/2019    Pelvic mass in female 01/17/2019    Irritable bowel syndrome with both constipation and diarrhea 02/09/2018    Subclinical hypothyroidism 01/23/2018    Chronic abdominal pain 01/22/2018    Diverticulosis 01/22/2018    Hx of total hysterectomy 01/22/2018    Hyponatremia 01/22/2018    Advance care planning 01/31/2017    Dental caries 05/26/2016    Chronic periodontal disease 05/26/2016    SUAZO (dyspnea on exertion) 02/10/2016    Prediabetes 09/24/2015    Morbid obesity ([address] 08/31/2015    Arthritis, degenerative 08/31/2015    Gastroesophageal reflux disease without esophagitis 08/31/2015    ANAMIKA on CPAP 08/31/2015    Essential hypertension 08/28/2015    PTSD (post-traumatic stress disorder) 03/24/2014    S/P TKR (total knee replacement) 11/14/2012    Adjustment disorder with depressed mood 09/20/2012    Major depressive disorder, recurrent episode, moderate ([address] 09/20/2012    Suicidal ideation 09/19/2012  Menopause 05/08/2012    Knee pain, right 05/08/2012    Hypokalemia 02/04/2012    Overdose 02/04/2012    Obesity 06/18/2010    Mixed hyperlipidemia 06/18/2010     Past Medical History:   Diagnosis Date    AR (allergic rhinitis)     seasonal    [facility]     Cancer ([facility]     pt states between vgina and rectal area    Cardiac echocardiogram 04/11/2016    Tech difficult. EF 55-60%. No RWMA. Mild conc LVH. Gr 1 DDfx. RVSP normal.      Cardiac nuclear imaging test 05/05/2016    Low risk. Very patchy radiotracer uptake. Mild anterior artifact, low suspicion for ischemia. EF 68%. No RWMA. Normal EKG on pharm stress test.    Cardiovascular LE arterial duplex 10/07/2013    No significant arterial disease at rest bilaterally. R JUNE 1.21.  L JUNE 1.16. No significant sm vessel disease.     Chronic kidney disease     Depression     Dr. Rico Mak, suicide attempt 2/12    Diverticulosis     Endometriosis     s/p hysterectomy 2006    GERD (gastroesophageal reflux disease)     Glaucoma     Dr. Flavia Cintron Hematuria, gross     HTN (hypertension)     Hx of colonoscopy 04/05/2017    mild diverticulosis, g1 internal hemorrhoids, normal colon , repeat 10 year 2027    Hx of suicide attempt     Hyperlipidemia LDL goal < 130     IBS (irritable bowel syndrome)     Ill-defined condition     environmental allergies    Insomnia     Malignant neoplasm of peritoneum (HCC) 2/14/2019    Nausea & vomiting     OA (osteoarthritis)     both knees, lower back    Preeclampsia     PTSD (post-traumatic stress disorder)     Seizures (HCC)     1 x with childbirth, had toxemia    Sleep apnea     does not use cpap machine    Spinal stenosis     Dr. Frankey Jupiter Vertigo       Past Surgical History:   Procedure Laterality Date    COLONOSCOPY N/A 4/5/2017    COLONOSCOPY performed by Chilo Friedman MD at [place] [address] N/A 2/13/2019 SIGMOIDOSCOPY FLEXIBLE performed by Bryon Chang MD at [facility] ENDOSCOPY    HX  SECTION      HX COLONOSCOPY  2017    DR. MCRAE 2017     HX COLOSTOMY      Revision 2021    HX CYSTECTOMY  2020    HX HYSTERECTOMY      HX KNEE ARTHROSCOPY Left     left knee    HX KNEE REPLACEMENT Bilateral     (R)  (L)     HX LAPAROTOMY N/A 2019    and rectovaginal bx    HX OTHER SURGICAL  2016    all teeth removed    HX SALPINGO-OOPHORECTOMY  2008    HX TUBAL LIGATION      IR INSERT TUNL CVC W PORT OVER 5 YEARS  2019    MULTIPLE DELIVERY       1990    [facility] FEMUR/KNEE SURG UNLISTED Left 2009    External fixator    [facility] PART REMOVAL COLON W ANASTOMOSIS      [facility] TOTAL ABDOM HYSTERECTOMY      TRANSURETHRAL RESEC BLADDER NECK        OB History        2    Para   2    Term   2       0    AB   0    Living   0       SAB   0    IAB   0    Ectopic   0    Molar   0    Multiple   0    Live Births   0              Social History     Tobacco Use    Smoking status: Never Smoker    Smokeless tobacco: Never Used   Substance Use Topics    Alcohol use: No      Family History   Problem Relation Age of Onset    Heart Disease Mother         CHF    Diabetes Mother    [facility] Hypertension Mother     Kidney Disease Mother    [facility] Breast Cancer Mother    [facility] Stroke Mother     Diabetes Father     Hypertension Father     Heart Attack Father         MI    Cancer Maternal Grandmother         stomach    Ovarian Cancer Maternal Aunt     Cancer Maternal Uncle       Current Outpatient Medications   Medication Sig    oxyCODONE-acetaminophen (PERCOCET) 5-325 mg per tablet Take 1 Tablet by mouth every four (4) hours as needed for Pain for up to 14 days. Max Daily Amount: 6 Tablets.  amLODIPine (NORVASC) 10 mg tablet take 1 tablet by mouth once daily    pregabalin (LYRICA) 25 mg capsule Take 1 Capsule by mouth three (3) times daily.  Max Daily Amount: 75 mg.   [facility] letrozole (FEMARA) 2.5 mg tablet Take 1 Tablet by mouth daily.  ondansetron hcl (Zofran) 4 mg tablet Take 1 Tablet by mouth every six (6) hours as needed for Nausea or Vomiting.  lidocaine-prilocaine (EMLA) topical cream Apply  to affected area as needed for Pain.  metoprolol succinate (TOPROL-XL) 100 mg tablet take 1 tablet by mouth once daily    simvastatin (ZOCOR) 20 mg tablet take 1 tablet by mouth at bedtime    meclizine (ANTIVERT) 25 mg tablet take 1 tablet by mouth three times a day if needed FOR DIZZINESS (Patient not taking: Reported on 1/26/2022)    PARoxetine (PAXIL) 20 mg tablet take 1 tablet by mouth once daily    gabapentin (NEURONTIN) 100 mg capsule Take 1 Capsule by mouth three (3) times daily. Max Daily Amount: 300 mg.    meclizine (ANTIVERT) 25 mg tablet take 1 tablet by mouth three times a day if needed FOR DIZZINESS    latanoprost (XALATAN) 0.005 % ophthalmic solution Administer 1 Drop to both eyes nightly. No current facility-administered medications for this visit. Allergies   Allergen Reactions    Seafood Hives     FISH    Other Medication Hives     seafood    Pollen Extracts Other (comments)    Shellfish Derived Hives    Pantoprazole Other (comments)     Bleeding in stomach    Promethazine Other (comments)     Bleeding in stomach          Review of Systems   Constitutional: Positive for malaise/fatigue. Negative for chills, diaphoresis, fever and weight loss. Respiratory: Negative for cough, hemoptysis, shortness of breath and wheezing. Cardiovascular: Negative for chest pain, palpitations and leg swelling. Gastrointestinal: Negative for abdominal pain, diarrhea, heartburn, nausea and vomiting. Genitourinary: Negative for dysuria, frequency, hematuria and urgency. Musculoskeletal: Negative for joint pain and myalgias. Skin: Negative for itching and rash. Neurological: Negative for dizziness, seizures, weakness and headaches. Psychiatric/Behavioral: Negative for depression. The patient does not have insomnia. Objective:     Visit Vitals  BP (!) 142/90   Pulse (!) 52   Resp 16   Ht 5' 3\" (1.6 m)   Wt 107.5 kg (237 lb)   LMP 01/31/2008   SpO2 98%   BMI 41.98 kg/m²       ECOG Performance Status (grade): 1  0 - able to carry on all pre-disease activity w/out restriction  1 - restricted but able to carry out light work  2 - ambulatory and can self- care but unable to carry out work  3 - bed or chair >50% of waking hours  4 - completely disable, total care, confined to bed or chair    Physical Exam  Constitutional:       Appearance: Normal appearance. HENT:      Head: Normocephalic and atraumatic. Eyes:      Pupils: Pupils are equal, round, and reactive to light. Cardiovascular:      Rate and Rhythm: Normal rate and regular rhythm. Heart sounds: Normal heart sounds. Pulmonary:      Effort: Pulmonary effort is normal.      Breath sounds: Normal breath sounds. Abdominal:      General: Bowel sounds are normal.      Palpations: Abdomen is soft. Tenderness: There is no abdominal tenderness. There is no guarding. Musculoskeletal:         General: Normal range of motion. Cervical back: Neck supple. Right lower leg: No edema. Left lower leg: No edema. Skin:     General: Skin is warm. Neurological:      General: No focal deficit present. Mental Status: She is alert and oriented to person, place, and time. Mental status is at baseline. Diagnostics:      No results found for this or any previous visit (from the past 96 hour(s)). Imaging:  Results for orders placed during the hospital encounter of 02/20/19    IR ESTAB PT LEVEL I    Narrative  This procedure was performed in is entirety by a physician assistant.     : Anitha Shine PA-C    Outpatient: Mediport incision check    CPT code: 69753    Attending physician: Dr. Azul Gallegos    History: Status post Mediport placement    Exam: Patient states that the Mediport has been used without pain or incident. Patient denies fever, chills, nausea, vomiting or pain at the site. Patient  denies drainage, swelling, bleeding or tenderness. Site well approximated and healing well with Dermabond still covering the  incision. No drainage, swelling, erythema or tenderness at the site. Impression  Impression: Mediport properly placed and healing well. Advised if have any  questions or concerns or start have signs of infection to contact interventional  radiology      Results for orders placed during the hospital encounter of 09/22/17    XR ABD (KUB)    Narrative  PROCEDURE:  Abdomen AP. INDICATION:  Constipation. Sitz markers study day #5. COMPARISON:  9/20/17. FINDINGS:    There are still 24 markers, similar to 9/20/17. Notably all of the markers are  now located in the descending colon. No evidence of acute small bowel obstruction is detected. No mass effect or  pathological calcification is identified. No evidence of free intraperitoneal  air is detected. Impression  IMPRESSION:    1. Interval progression of the markers to the descending colon but all of the  24 markers are still present in the colon. 2.  Nonobstructive abdomen. Results for orders placed in visit on 07/26/21    CT BX LIVER NDL PERC    Narrative  CT BX LIVER NDL PERC    : Sharon Sanchez MD    Indication: Liver lesion. Preoperative Diagnosis: Liver lesion. Postoperative Diagnosis: Same. Anesthesia: Local anesthesia with 1% lidocaine. Moderate sedation with Versed  and Fentanyl given. I personally monitored the patient face-to-face throughout  the entire procedure. See detailed nursing records for precise medication  dosing. Sedation time: 30 minutes    Technique: The risks, benefits, and alternatives of the procedure were discussed  with the patient. Verbal and written consent was obtained.  Time-out was  performed to confirm the correct patient, procedure, and site. With the patient  supine, the skin was prepped and draped in usual sterile fashion. Maximum  sterile barrier technique used. Local anesthesia was administered. Under CT and  ultrasound guidance, a 17 gauge introducer was directed to the target through  which 18 gauge cores were obtained. Tract embolization was performed with  gelfoam slurry. Manual compression performed for hemostasis. Sterile dressing  was applied. Postprocedure imaging was performed. Note: All CT scans at this facility are performed using dose optimization  technique as appropriate to a performed exam, to include automated exposure  control, adjustment of the mA and/or kV according to patient size (including  appropriate matching for site-specific examinations), or use of iterative  reconstruction technique. Specimen: 5 cores. Bedside evaluation performed by Pathology department. Estimated Blood Loss: Minimal    Contrast: None    Complications: No immediate    Drain/Implant: None    Condition: Stable  _______________    Impression  Successful ultrasound and CT-guided liver mass biopsy. IMPRESSION/PLAN:  Stage IV Primary peritoneal cancer with recurrence    -- Patient was being followed by Dr. Laurie Cueto who left the practice. -- s/p carbo (auc=6), taxol (175 mg/m2) IV every 3 wks s/p 6 cycles completed 6/6/2019  -- s/p cytoreductive surgery with [facility] with dr. Samira Diego  -- s/p pelvic radiation  -- Pain-script for tylenol #3  -- 5/13/-9/30/2020 given platinum sensitive disease s/p  auc=5, Doxil 30 mg/m2 IV x 6 cycles s/p Total pelvic exenteration  -- 8/4/2021 Liver mass, core biopsies: Metastatic adenocarcinoma, most consistent with serous type. -- Biopsy c/w recurrence-reviewed Caris and discussed treatment options.  Dr. 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 Also discussed retreatment with platinum or if sensitive consider PARP inhibition. After discussion patient was placed on letrozole. -- 1/8/2022 Patient started Keytruda 400 mg IV every 3 weeks. -- Vaginal bleeding: Dr. 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 Has follow up with Urology. -- 5/31/2022 The patient presents to our [facility] clinic today for follows up and continuing cancer treatment as scheduled. -- She has tolerated Keytruda Q6 weeks, no high graded toxicities. She was agreeable to continue current therapy. -- 6/3/2022 S.p Keytruda   -- 6/17/2022 PET scan reported progressive disease. Stage IV metastatic malignancy with interval progression, dominant intensely hypermetabolic hepatic metastasis with substantial  interval enlargement compared to prior studies. Interval increase in size/extent of irregular intense increased FDG activity centered at and right of midline in the soft tissues of the deep  posterior pelvis, extending cephalad in the presacral region, at least some of which corresponds to irregular mass-like soft tissue density on CT, highly suspicious for malignancy. Small 6 mm subpleural pulmonary nodule left lower lobe laterally, not evident on PET but too small to be adequately assessed using PET  -- Today I have reviewed with the patient and her family about recent PET which showed progressive disease on multiple lines of therapy. We have discussed at length about next lines of therapy. The patient states she still believed she can achieve curable diease. I have showed her PET images which showed high burden tumor. I have explained to the patient and family that the goals of treatment of metastatic cancer are not curable but treatable in order to prolong survival and improve quality of life by reducing cancer-related symptoms.    Given her burden disease and progressive on immunotherapy plus hormonal therapy, we suggested re-challenging chemotherapy with platinum regimens. 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 I have discussed about potential side effects of chemotherapy. The patient was agreeable with the plan. Plan:  -- D/c Keytruda  -- Plan to start palliative Carboplatin + Gemcitabine in 1-2 weeks  -- Prechemo labs: CBC, CMP,   -- Nutritional support: Referral to Nutritionist while on chemotherapy  -- RTC in 1 week for prechemotherapy assessment, always sooner if required. Carboplatin + Gemcitabine  Days 1,8: Gemcitabine 800-1,000mg/m2 IV, followed by:  Day 1: Carboplatin AUC 4 IV   Repeat cycle every 3 weeks. Normocytic Anemia  Iron deficiency anemia  -- Anemia w/u CBC, Iron profile, ferritin, B12/folate/MMA, SPEP  -- Replacement as indicated. 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 Carla Kaiser has a reminder for a \"due or due soon\" health maintenance. I have asked that she contact her primary care provider for follow-up on this health maintenance. All of patient's questions answered to their apparent satisfaction. They verbally show understanding and agreement with aforementioned plan. Sussy Pack MD  6/29/2022          Above mentioned total time spent for this encounter with more than 50% of the time spent in face-to-face counseling, discussing on diagnosis and management plan going forward, and co-ordination of care.   Parts of this document has been produced using Dragon dictation system. Unrecognized errors in transcription may be present. Please do not hesitate to reach out for any questions or clarifications.       CC: Tea Jaime MD

## 2022-07-01 ENCOUNTER — TELEPHONE (OUTPATIENT)
Dept: ONCOLOGY | Age: 52
End: 2022-07-01

## 2022-07-05 ENCOUNTER — NURSE NAVIGATOR (OUTPATIENT)
Dept: OTHER | Age: 52
End: 2022-07-05

## 2022-07-05 DIAGNOSIS — C48.2 PERITONEAL CARCINOMA (HCC): ICD-10-CM

## 2022-07-05 DIAGNOSIS — G89.3 CANCER ASSOCIATED PAIN: ICD-10-CM

## 2022-07-06 ENCOUNTER — OFFICE VISIT (OUTPATIENT)
Dept: ONCOLOGY | Age: 52
End: 2022-07-06
Payer: MEDICARE

## 2022-07-06 VITALS
RESPIRATION RATE: 16 BRPM | HEIGHT: 63 IN | BODY MASS INDEX: 41.64 KG/M2 | OXYGEN SATURATION: 98 % | DIASTOLIC BLOOD PRESSURE: 78 MMHG | TEMPERATURE: 98 F | HEART RATE: 43 BPM | SYSTOLIC BLOOD PRESSURE: 114 MMHG | WEIGHT: 235 LBS

## 2022-07-06 DIAGNOSIS — C48.2 PERITONEAL CARCINOMA (HCC): Primary | ICD-10-CM

## 2022-07-06 DIAGNOSIS — G89.3 CANCER ASSOCIATED PAIN: ICD-10-CM

## 2022-07-06 PROCEDURE — G8417 CALC BMI ABV UP PARAM F/U: HCPCS | Performed by: NURSE PRACTITIONER

## 2022-07-06 PROCEDURE — G8752 SYS BP LESS 140: HCPCS | Performed by: NURSE PRACTITIONER

## 2022-07-06 PROCEDURE — G9711 PT HX TOT COL OR COLON CA: HCPCS | Performed by: NURSE PRACTITIONER

## 2022-07-06 PROCEDURE — G8754 DIAS BP LESS 90: HCPCS | Performed by: NURSE PRACTITIONER

## 2022-07-06 PROCEDURE — G9717 DOC PT DX DEP/BP F/U NT REQ: HCPCS | Performed by: NURSE PRACTITIONER

## 2022-07-06 PROCEDURE — G8427 DOCREV CUR MEDS BY ELIG CLIN: HCPCS | Performed by: NURSE PRACTITIONER

## 2022-07-06 PROCEDURE — G9899 SCRN MAM PERF RSLTS DOC: HCPCS | Performed by: NURSE PRACTITIONER

## 2022-07-06 PROCEDURE — 99215 OFFICE O/P EST HI 40 MIN: CPT | Performed by: NURSE PRACTITIONER

## 2022-07-06 RX ORDER — OXYCODONE AND ACETAMINOPHEN 5; 325 MG/1; MG/1
1 TABLET ORAL
Qty: 90 TABLET | Refills: 0 | Status: SHIPPED | OUTPATIENT
Start: 2022-07-06 | End: 2022-08-05

## 2022-07-06 RX ORDER — LOPERAMIDE HCL 2 MG
TABLET ORAL
Qty: 30 TABLET | Refills: 2 | Status: SHIPPED | OUTPATIENT
Start: 2022-07-06 | End: 2022-09-19 | Stop reason: SDUPTHER

## 2022-07-06 RX ORDER — ONDANSETRON HYDROCHLORIDE 8 MG/1
TABLET, FILM COATED ORAL
Qty: 30 TABLET | Refills: 2 | Status: SHIPPED | OUTPATIENT
Start: 2022-07-06 | End: 2022-09-06 | Stop reason: SDUPTHER

## 2022-07-06 RX ORDER — OXYCODONE AND ACETAMINOPHEN 5; 325 MG/1; MG/1
1 TABLET ORAL
Qty: 40 TABLET | Refills: 0 | Status: SHIPPED | OUTPATIENT
Start: 2022-07-06 | End: 2022-07-20

## 2022-07-06 RX ORDER — LIDOCAINE AND PRILOCAINE 25; 25 MG/G; MG/G
CREAM TOPICAL
Qty: 30 G | Refills: 2 | Status: SHIPPED | OUTPATIENT
Start: 2022-07-06

## 2022-07-06 NOTE — LETTER
7/6/2022    Patient: Lam Logan   YOB: 1970   Date of Visit: 7/6/2022     [address]  [address]  [facility]    Dear Filippo Haines MD,      Thank you for referring Ms. Radha Galloway to [facility] for evaluation. My notes for this consultation are attached. If you have questions, please do not hesitate to call me. I look forward to following your patient along with you.       Sincerely,    Chiara Ventura, DNP

## 2022-07-06 NOTE — PROGRESS NOTES
CHEMOTHERAPY EDUCATION/PATIENT TEACHING    NAME: Nichol Elmore  : 1970  DATE: 22    Diagnosis: Stage IV Primary Peritoneal Cancer with Recurrence    Oncology History  Nichol Elmore is a 45 y.o.  postmenopausal female referred by Dr. Elda Dunn with peritoneal cancer. Patient was being followed by Dr. Lala Hansen who left the practice. Intitally seen be JOHN talaverawith reports of vaginal bleeding. Given pt's history of hysteretectomy with BSO for endometriosis in  a TVUS was ordered. Which revealed a 6.8 cm x 5.2 cm x 4.6 cm at midline vaginal cuff. This prompted pelvic CT with findings of a lesion measuring 7.6 x 5.8 x 7.2 cm and is compatible with a pelvic neoplasm vs endometrioma given prior history of endometriosis. Tumor markers done on 2018 all normal.  S/p  Exploratory laparotomy, exploration of retroperitoneal spaces, exam under anesthesia with biopsy of rectovaginal mass on 2019. Had sigmoidoscopy which revealed submucosal mass. S/p cycle #6 of carbo/taxol on 2019. S/p cytoreductive surgery with HIPEC on 2019. S/p radiation treatment completed in 2019. Was seen in office and had a biopsy c/w recurrence. S/p cycle #6 of Carbo/Doxil on 2020. S/p Exploratory laparotomy. 2. Lysis of adhesions. 3. Simple appendectomy. 4. 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 On . She also had a revision of urostomy that is still leaking. S/p IR biopsy of liver on 2021 which demonstrated recurrent disease. Pt on palliative letrozole and keytruda. Treatment:   Carboplatin + Gemcitabine  Days 1,8: Gemcitabine 800-1,000mg/m2 IV, followed by:  Day 1: Carboplatin AUC 4 IV   Repeat cycle every 3 weeks.                 The benefits, risks, and potential adverse effects of these combination chemotherapy including but not limited to burning sensation at the injection site, abdominal pain, diarrhea, constipation, mouth sores, infection, peripheral neuropathy: Although uncommon, a serious side effect of decreased sensation and paresthesia (numbness and tingling of the extremities) may be noted. Sensory loss, numbness and tingling, and difficulty in walking may last for at least as long as therapy is continued. These side effects may become progressively more severe with continued treatment, and your doctor may decide to decrease your dose. Central neurotoxicity, Nephrotoxicity, Hearing loss (ototoxicity), Abnormal blood electrolyte levels (sodium, potassium, calcium). Abnormal blood liver enzymes (SGOT, Alkaline phosphatase) (see liver problems). Cardiovascular events. Although infrequent, heart failure, blood clots and strokes have been reported with Carboplatin use. Less than 1% were life-threatening. Allergic reaction may occur. It would occur during the actual transfusion. This may include itching, rash, shortness of breath or dizziness were discussed with patient. All of her questions were answered. She agreed to continue with the plan. Teach-back Method used. Patient signed consent. Chemotherapy information printed and provided to the patient. LEARNING ASSESSMENT:     Patient- Barriers to learning [] Yes /  [x] No  Primary Language: English   Preferred method of teaching;   reading[x] Yes /  [] No, demonstration/visual[x] Yes /  [] No     Co-learner present ?  [] Yes /  [x] No   ( Daughters)  Preferred method of teaching;   reading[] Yes /  [] No, NA demonstration/visual[] Yes /  [] No NA         Mediport placement: Patient has port from previous treatment  Prechemo labs on: 07/27/2022  Treatment tentatively scheduled on: 07/28/2022        Patient verbalized understanding [x] Yes /  [] No  Acceptance of teaching, eager to learn [x] Yes /  [] No  Acceptance of teaching but needs reinforcement [] Yes /  [x] No      TOTAL TIME 45 minutes    Follow up in 4 weeks with repeat labs or sooner if indicated. Orders Placed This Encounter    lidocaine-prilocaine (EMLA) topical cream     Sig: Apply to Mediport 1 hour prior to chemotherapy. Place kitchen saran wrap over cream and port. This will numb site. Dispense:  30 g     Refill:  2    ondansetron hcl (ZOFRAN) 8 mg tablet     Sig: Take 1 tablet every 8 hours for nausea and vomiting beginning the night of your chemotherapy treatment for 3 days.      Dispense:  30 Tablet     Refill:  2    loperamide (IMMODIUM) 2 mg tablet     Sig: Take 2 tabs=4mg after first loose stool, then 2 mg(1 tab) after each loose stool after that up to maximum of 16mg (8tabs) in 1 day     Dispense:  30 Tablet     Refill:  2         Som Cuevas DNP, FNP-C  07/06/22      CC: Lluvia Angelo MD

## 2022-07-07 ENCOUNTER — TELEPHONE (OUTPATIENT)
Dept: ONCOLOGY | Age: 52
End: 2022-07-07

## 2022-07-08 ENCOUNTER — TELEPHONE (OUTPATIENT)
Dept: ONCOLOGY | Age: 52
End: 2022-07-08

## 2022-07-08 NOTE — TELEPHONE ENCOUNTER
Medical Nutrition Therapy Note    Nutritional Assessment:  IBW: Ideal body weight: 115 lb 8.3 oz (52.4 kg)  Adjusted ideal body weight: 163 lb 5 oz (74.1 kg)   UBW: unk  Wt hx:   Wt Readings from Last 20 Encounters:   07/06/22 235 lb (106.6 kg)   06/29/22 237 lb (107.5 kg)   06/01/22 245 lb (111.1 kg)   05/31/22 245 lb 6.4 oz (111.3 kg)   04/20/22 255 lb 11.7 oz (116 kg)   04/15/22 251 lb (113.9 kg)   03/09/22 258 lb (117 kg)   03/09/22 261 lb (118.4 kg)   01/26/22 261 lb 4.8 oz (118.5 kg)   01/26/22 259 lb (117.5 kg)   12/16/21 256 lb 6.4 oz (116.3 kg)   10/13/21 257 lb (116.6 kg)   09/17/21 254 lb (115.2 kg)   08/04/21 256 lb (116.1 kg)   06/30/21 254 lb (115.2 kg)   05/07/21 258 lb 11.2 oz (117.3 kg)   04/09/21 258 lb (117 kg)   03/30/21 258 lb (117 kg)   02/24/21 279 lb (126.6 kg)   12/17/20 279 lb (126.6 kg)     Current Ht:   Ht Readings from Last 1 Encounters:   07/06/22 5' 3\" (1.6 m)       Current BMI: Estimated body mass index is 41.63 kg/m² as calculated from the following:    Height as of 7/6/22: 5' 3\" (1.6 m). Weight as of 7/6/22: 235 lb (106.6 kg). Wt change: 8.1% weight loss over the last < 3 month(s)    Estimated nutritional needs:   Calorie Range: 2060   Formula: MSJ x 1.25  Protein Range: 105   Formula: 20% of kcals  Fluid Needs: 1 mL/kcal    Nutrition-impact symptoms: Diarrhea, Poor appetite and Weight loss    07/08/22: Pt currently endorses unintentional wt loss r/t poor appetite. Pt reports limiting food choices d/t ostomy. Pt states desire to lose wt to become healthier. Pt mentioned that she's overwhelmed with everything right now and requested she contact writer when she's ready to make changes to her diet.     Nutritional Diagnosis: not ready for diet/lifestyle change related to limited self-efficacy for making change or demoralization from previous challenges associated with change as evidenced by limited self efficacy to make change or to overcome barriers to change     inadequate oral intake related to poor appetite as evidenced by wt loss > 7.5% in 3 months    Nutritional Interventions:   1. Offered to assist pt with finding a diet that's tolerable with her ostomy  2. Advised pt that primary focus of diet for overall health, should be to aid pt in completing treatment as prescribed by oncologist thus recommend first focus on stopping unintentional wt loss  3. Encouraged pt to contact dietitian with any nutritional questions/concerns or when she's ready to work on her diet in a formal manner    Nutritional Monitoring/Goals:   1. Pt will contact dietitian for guidance on diet or will have control of wt within 3 weeks    Initial consult per provider    Cancer Dx: No matching staging information was found for the patient. Cancer Staging  No matching staging information was found for the patient. Treatment Plan:   Chemotherapy Flowsheet 6/3/2022   Cycle C4   Date 6/3/2022   Drug / Regimen KEYTRUDA   Dosage 400 mg   Pre Hydration -   Post Hydration None   Pre Meds None   Notes See Note    +   Radiation Treatments     No radiation treatments to show.  (Treatments may have been administered in another system.)          Lab Results   Component Value Date/Time    Sodium 140 06/01/2022 10:25 AM    Sodium 141 04/20/2022 10:00 AM    Sodium 142 03/09/2022 11:45 AM    Sodium 138 02/11/2022 09:37 AM    Sodium 141 01/26/2022 10:15 AM    Potassium 4.7 06/01/2022 10:25 AM    Chloride 109 06/01/2022 10:25 AM    Chloride 113 (H) 04/20/2022 10:00 AM    Chloride 115 (H) 03/09/2022 11:45 AM    Chloride 113 (H) 02/11/2022 09:37 AM    Chloride 113 (H) 01/26/2022 10:15 AM    CO2 25 06/01/2022 10:25 AM    CO2 22 04/20/2022 10:00 AM    CO2 24 03/09/2022 11:45 AM    CO2 18 (L) 02/11/2022 09:37 AM    CO2 22 01/26/2022 10:15 AM    Anion gap 6 06/01/2022 10:25 AM    Anion gap 6 04/20/2022 10:00 AM    Anion gap 3 03/09/2022 11:45 AM    Anion gap 7 02/11/2022 09:37 AM    Anion gap 6 01/26/2022 10:15 AM    Glucose 83 06/01/2022 10:25 AM    Glucose 92 04/20/2022 10:00 AM    Glucose 121 (H) 03/09/2022 11:45 AM    Glucose 102 (H) 02/11/2022 09:37 AM    Glucose 90 01/26/2022 10:15 AM    BUN 36 (H) 06/01/2022 10:25 AM    BUN 47 (H) 04/20/2022 10:00 AM    BUN 43 (H) 03/09/2022 11:45 AM    BUN 32 (H) 02/11/2022 09:37 AM    BUN 45 (H) 01/26/2022 10:15 AM    Creatinine 2.12 (H) 06/01/2022 10:25 AM    Creatinine 2.25 (H) 04/20/2022 10:00 AM    Creatinine 2.29 (H) 03/09/2022 11:45 AM    Creatinine 2.21 (H) 02/11/2022 09:37 AM    Creatinine 2.14 (H) 01/26/2022 10:15 AM    BUN/Creatinine ratio 17 06/01/2022 10:25 AM    BUN/Creatinine ratio 21 (H) 04/20/2022 10:00 AM    BUN/Creatinine ratio 19 03/09/2022 11:45 AM    BUN/Creatinine ratio 14 02/11/2022 09:37 AM    BUN/Creatinine ratio 21 (H) 01/26/2022 10:15 AM    GFR est AA 30 (L) 06/01/2022 10:25 AM    GFR est AA 28 (L) 04/20/2022 10:00 AM    GFR est AA 27 (L) 03/09/2022 11:45 AM    GFR est AA 28 (L) 02/11/2022 09:37 AM    GFR est AA 29 (L) 01/26/2022 10:15 AM    GFR est non-AA 24 (L) 06/01/2022 10:25 AM    GFR est non-AA 23 (L) 04/20/2022 10:00 AM    GFR est non-AA 22 (L) 03/09/2022 11:45 AM    GFR est non-AA 23 (L) 02/11/2022 09:37 AM    GFR est non-AA 24 (L) 01/26/2022 10:15 AM    Calcium 8.8 06/01/2022 10:25 AM    Calcium 8.3 (L) 04/20/2022 10:00 AM    Calcium 8.8 03/09/2022 11:45 AM    Calcium 8.4 (L) 02/11/2022 09:37 AM    Calcium 8.9 01/26/2022 10:15 AM         Current Outpatient Medications:     PARoxetine (PAXIL) 20 mg tablet, take 1 tablet by mouth once daily, Disp: 30 Tablet, Rfl: 0    oxyCODONE-acetaminophen (PERCOCET) 5-325 mg per tablet, Take 1 Tablet by mouth every four (4) hours as needed for Pain for up to 14 days. Max Daily Amount: 6 Tablets. , Disp: 40 Tablet, Rfl: 0    lidocaine-prilocaine (EMLA) topical cream, Apply to Mediport 1 hour prior to chemotherapy. Place kitchen saran wrap over cream and port.  This will numb site., Disp: 30 g, Rfl: 2    ondansetron hcl (ZOFRAN) 8 mg tablet, Take 1 tablet every 8 hours for nausea and vomiting beginning the night of your chemotherapy treatment for 3 days. , Disp: 30 Tablet, Rfl: 2    loperamide (IMMODIUM) 2 mg tablet, Take 2 tabs=4mg after first loose stool, then 2 mg(1 tab) after each loose stool after that up to maximum of 16mg (8tabs) in 1 day, Disp: 30 Tablet, Rfl: 2    oxyCODONE-acetaminophen (PERCOCET) 5-325 mg per tablet, Take 1 Tablet by mouth every four (4) hours as needed for Pain for up to 30 days. Max Daily Amount: 6 Tablets. , Disp: 90 Tablet, Rfl: 0    amLODIPine (NORVASC) 10 mg tablet, take 1 tablet by mouth once daily, Disp: 90 Tablet, Rfl: 0    pregabalin (LYRICA) 25 mg capsule, Take 1 Capsule by mouth three (3) times daily. Max Daily Amount: 75 mg., Disp: 90 Capsule, Rfl: 3    letrozole (FEMARA) 2.5 mg tablet, Take 1 Tablet by mouth daily. , Disp: 90 Tablet, Rfl: 1    metoprolol succinate (TOPROL-XL) 100 mg tablet, take 1 tablet by mouth once daily, Disp: 90 Tablet, Rfl: 1    simvastatin (ZOCOR) 20 mg tablet, take 1 tablet by mouth at bedtime, Disp: 90 Tablet, Rfl: 3    meclizine (ANTIVERT) 25 mg tablet, take 1 tablet by mouth three times a day if needed FOR DIZZINESS (Patient not taking: Reported on 1/26/2022), Disp: 30 Tablet, Rfl: 0    gabapentin (NEURONTIN) 100 mg capsule, Take 1 Capsule by mouth three (3) times daily. Max Daily Amount: 300 mg. (Patient not taking: Reported on 7/6/2022), Disp: 90 Capsule, Rfl: 3    latanoprost (XALATAN) 0.005 % ophthalmic solution, Administer 1 Drop to both eyes nightly., Disp: , Rfl:     Diet/po intake: prior to unintentional wt loss was eating 5-6 small meals/snacks daily. ..currently eating once daily

## 2022-07-13 ENCOUNTER — APPOINTMENT (OUTPATIENT)
Dept: INFUSION THERAPY | Age: 52
End: 2022-07-13
Payer: MEDICARE

## 2022-07-14 ENCOUNTER — APPOINTMENT (OUTPATIENT)
Dept: INFUSION THERAPY | Age: 52
End: 2022-07-14

## 2022-07-20 ENCOUNTER — TELEPHONE (OUTPATIENT)
Dept: FAMILY MEDICINE CLINIC | Age: 52
End: 2022-07-20

## 2022-07-20 NOTE — TELEPHONE ENCOUNTER
Pt states that she has been feeling nauseated, dizziness and spasms from her medications since she has been on the cancer medication. She states that she dealt this before when she first started with cancer treatments. She states that she never had anything done about it because of COVID. Please advise.

## 2022-07-21 ENCOUNTER — APPOINTMENT (OUTPATIENT)
Dept: INFUSION THERAPY | Age: 52
End: 2022-07-21

## 2022-07-22 DIAGNOSIS — C48.2 PERITONEAL CARCINOMA (HCC): Primary | ICD-10-CM

## 2022-07-22 RX ORDER — SODIUM CHLORIDE 9 MG/ML
5-250 INJECTION, SOLUTION INTRAVENOUS AS NEEDED
Status: CANCELLED | OUTPATIENT
Start: 2022-08-04

## 2022-07-22 RX ORDER — EPINEPHRINE 1 MG/ML
0.3 INJECTION, SOLUTION, CONCENTRATE INTRAVENOUS AS NEEDED
Status: CANCELLED | OUTPATIENT
Start: 2022-07-28

## 2022-07-22 RX ORDER — SODIUM CHLORIDE 0.9 % (FLUSH) 0.9 %
5-40 SYRINGE (ML) INJECTION AS NEEDED
Status: CANCELLED | OUTPATIENT
Start: 2022-08-04

## 2022-07-22 RX ORDER — DIPHENHYDRAMINE HYDROCHLORIDE 50 MG/ML
25 INJECTION, SOLUTION INTRAMUSCULAR; INTRAVENOUS AS NEEDED
Status: CANCELLED
Start: 2022-07-28

## 2022-07-22 RX ORDER — ALBUTEROL SULFATE 0.83 MG/ML
2.5 SOLUTION RESPIRATORY (INHALATION) AS NEEDED
Status: CANCELLED
Start: 2022-08-04

## 2022-07-22 RX ORDER — SODIUM CHLORIDE 9 MG/ML
5-40 INJECTION INTRAMUSCULAR; INTRAVENOUS; SUBCUTANEOUS AS NEEDED
Status: CANCELLED | OUTPATIENT
Start: 2022-08-04

## 2022-07-22 RX ORDER — ACETAMINOPHEN 325 MG/1
650 TABLET ORAL AS NEEDED
Status: CANCELLED
Start: 2022-08-04

## 2022-07-22 RX ORDER — HEPARIN 100 UNIT/ML
500 SYRINGE INTRAVENOUS AS NEEDED
Status: CANCELLED
Start: 2022-08-04

## 2022-07-22 RX ORDER — SODIUM CHLORIDE 0.9 % (FLUSH) 0.9 %
5-40 SYRINGE (ML) INJECTION AS NEEDED
Status: CANCELLED | OUTPATIENT
Start: 2022-07-28

## 2022-07-22 RX ORDER — SODIUM CHLORIDE 9 MG/ML
5-250 INJECTION, SOLUTION INTRAVENOUS AS NEEDED
Status: CANCELLED | OUTPATIENT
Start: 2022-07-28

## 2022-07-22 RX ORDER — ONDANSETRON 2 MG/ML
8 INJECTION INTRAMUSCULAR; INTRAVENOUS AS NEEDED
Status: CANCELLED | OUTPATIENT
Start: 2022-07-28

## 2022-07-22 RX ORDER — DIPHENHYDRAMINE HYDROCHLORIDE 50 MG/ML
50 INJECTION, SOLUTION INTRAMUSCULAR; INTRAVENOUS AS NEEDED
Status: CANCELLED
Start: 2022-07-28

## 2022-07-22 RX ORDER — HEPARIN 100 UNIT/ML
500 SYRINGE INTRAVENOUS AS NEEDED
Status: CANCELLED
Start: 2022-07-28

## 2022-07-22 RX ORDER — DIPHENHYDRAMINE HYDROCHLORIDE 50 MG/ML
25 INJECTION, SOLUTION INTRAMUSCULAR; INTRAVENOUS AS NEEDED
Status: CANCELLED
Start: 2022-08-04

## 2022-07-22 RX ORDER — ACETAMINOPHEN 325 MG/1
650 TABLET ORAL AS NEEDED
Status: CANCELLED
Start: 2022-07-28

## 2022-07-22 RX ORDER — ALBUTEROL SULFATE 0.83 MG/ML
2.5 SOLUTION RESPIRATORY (INHALATION) AS NEEDED
Status: CANCELLED
Start: 2022-07-28

## 2022-07-22 RX ORDER — PALONOSETRON 0.05 MG/ML
0.25 INJECTION, SOLUTION INTRAVENOUS ONCE
Status: CANCELLED | OUTPATIENT
Start: 2022-07-28 | End: 2022-07-28

## 2022-07-22 RX ORDER — EPINEPHRINE 1 MG/ML
0.3 INJECTION, SOLUTION, CONCENTRATE INTRAVENOUS AS NEEDED
Status: CANCELLED | OUTPATIENT
Start: 2022-08-04

## 2022-07-22 RX ORDER — HYDROCORTISONE SODIUM SUCCINATE 100 MG/2ML
100 INJECTION, POWDER, FOR SOLUTION INTRAMUSCULAR; INTRAVENOUS AS NEEDED
Status: CANCELLED | OUTPATIENT
Start: 2022-08-04

## 2022-07-22 RX ORDER — SODIUM CHLORIDE 9 MG/ML
5-40 INJECTION INTRAMUSCULAR; INTRAVENOUS; SUBCUTANEOUS AS NEEDED
Status: CANCELLED | OUTPATIENT
Start: 2022-07-28

## 2022-07-22 RX ORDER — DIPHENHYDRAMINE HYDROCHLORIDE 50 MG/ML
50 INJECTION, SOLUTION INTRAMUSCULAR; INTRAVENOUS AS NEEDED
Status: CANCELLED
Start: 2022-08-04

## 2022-07-22 RX ORDER — HYDROCORTISONE SODIUM SUCCINATE 100 MG/2ML
100 INJECTION, POWDER, FOR SOLUTION INTRAMUSCULAR; INTRAVENOUS AS NEEDED
Status: CANCELLED | OUTPATIENT
Start: 2022-07-28

## 2022-07-22 RX ORDER — ONDANSETRON 2 MG/ML
8 INJECTION INTRAMUSCULAR; INTRAVENOUS AS NEEDED
Status: CANCELLED | OUTPATIENT
Start: 2022-08-04

## 2022-07-22 RX ORDER — ONDANSETRON 2 MG/ML
8 INJECTION INTRAMUSCULAR; INTRAVENOUS ONCE
Status: CANCELLED | OUTPATIENT
Start: 2022-08-04 | End: 2022-08-04

## 2022-07-22 NOTE — TELEPHONE ENCOUNTER
Patient identified with 2 identifiers (name and ). Patient states that she has not been feeling well. Low grade temp for several days. Dizziness, cold symptoms. Patient states she has stopped her Norvasc due to thinking it may be causing her dizziness. Patient has stopped x 2 days now. BP this am 144/72. Advised patient to monitor her BP daily. Attempted to schedule patient an appt for next week. Patient was unable to make date and time. ER precautions have been reviewed with patient. Patient verbalizes understanding. Patient states she will call call back monday with update.

## 2022-07-27 ENCOUNTER — HOSPITAL ENCOUNTER (OUTPATIENT)
Dept: INFUSION THERAPY | Age: 52
Discharge: HOME | End: 2022-07-27
Payer: MEDICARE

## 2022-07-27 VITALS
OXYGEN SATURATION: 96 % | WEIGHT: 237.6 LBS | RESPIRATION RATE: 18 BRPM | HEART RATE: 60 BPM | DIASTOLIC BLOOD PRESSURE: 82 MMHG | TEMPERATURE: 98.2 F | BODY MASS INDEX: 42.09 KG/M2 | SYSTOLIC BLOOD PRESSURE: 133 MMHG

## 2022-07-27 LAB
ALBUMIN SERPL-MCNC: 2.9 G/DL (ref 3.4–5)
ALBUMIN/GLOB SERPL: 0.7 {RATIO} (ref 0.8–1.7)
ALP SERPL-CCNC: 183 U/L (ref 45–117)
ALT SERPL-CCNC: 20 U/L (ref 13–56)
ANION GAP SERPL CALC-SCNC: 6 MMOL/L (ref 3–18)
AST SERPL-CCNC: 21 U/L (ref 10–38)
BASOPHILS # BLD: 0 K/UL (ref 0–0.1)
BASOPHILS NFR BLD: 0 % (ref 0–2)
BILIRUB SERPL-MCNC: 0.3 MG/DL (ref 0.2–1)
BUN SERPL-MCNC: 45 MG/DL (ref 7–18)
BUN/CREAT SERPL: 20 (ref 12–20)
CALCIUM SERPL-MCNC: 8.5 MG/DL (ref 8.5–10.1)
CHLORIDE SERPL-SCNC: 110 MMOL/L (ref 100–111)
CO2 SERPL-SCNC: 22 MMOL/L (ref 21–32)
CREAT SERPL-MCNC: 2.2 MG/DL (ref 0.6–1.3)
DIFFERENTIAL METHOD BLD: ABNORMAL
EOSINOPHIL # BLD: 0.1 K/UL (ref 0–0.4)
EOSINOPHIL NFR BLD: 1 % (ref 0–5)
ERYTHROCYTE [DISTWIDTH] IN BLOOD BY AUTOMATED COUNT: 19.9 % (ref 11.6–14.5)
GLOBULIN SER CALC-MCNC: 4.2 G/DL (ref 2–4)
GLUCOSE SERPL-MCNC: 87 MG/DL (ref 74–99)
HCT VFR BLD AUTO: 28.6 % (ref 35–45)
HGB BLD-MCNC: 8.3 G/DL (ref 12–16)
IMM GRANULOCYTES # BLD AUTO: 0 K/UL (ref 0–0.04)
IMM GRANULOCYTES NFR BLD AUTO: 0 % (ref 0–0.5)
LYMPHOCYTES # BLD: 0.9 K/UL (ref 0.9–3.6)
LYMPHOCYTES NFR BLD: 13 % (ref 21–52)
MCH RBC QN AUTO: 23.8 PG (ref 24–34)
MCHC RBC AUTO-ENTMCNC: 29 G/DL (ref 31–37)
MCV RBC AUTO: 81.9 FL (ref 78–100)
MONOCYTES # BLD: 0.5 K/UL (ref 0.05–1.2)
MONOCYTES NFR BLD: 8 % (ref 3–10)
NEUTS SEG # BLD: 5.5 K/UL (ref 1.8–8)
NEUTS SEG NFR BLD: 78 % (ref 40–73)
NRBC # BLD: 0 K/UL (ref 0–0.01)
NRBC BLD-RTO: 0 PER 100 WBC
PLATELET # BLD AUTO: 364 K/UL (ref 135–420)
PMV BLD AUTO: 9.3 FL (ref 9.2–11.8)
POTASSIUM SERPL-SCNC: 5.1 MMOL/L (ref 3.5–5.5)
PROT SERPL-MCNC: 7.1 G/DL (ref 6.4–8.2)
RBC # BLD AUTO: 3.49 M/UL (ref 4.2–5.3)
SODIUM SERPL-SCNC: 138 MMOL/L (ref 136–145)
WBC # BLD AUTO: 7.1 K/UL (ref 4.6–13.2)

## 2022-07-27 PROCEDURE — 36415 COLL VENOUS BLD VENIPUNCTURE: CPT

## 2022-07-27 PROCEDURE — 85025 COMPLETE CBC W/AUTO DIFF WBC: CPT

## 2022-07-27 PROCEDURE — 80053 COMPREHEN METABOLIC PANEL: CPT

## 2022-07-27 NOTE — PROGRESS NOTES
[facility] Lab Visit:    William Gamez  1970  [id number]    7540: Pt arrived ambulatory w/o assist. Labs drawn per order via Left AC venipuncture x1 attempt. Pt tolerated well without complaints. Gauze/ coban to site. Pt departed [facility] ambulatory and in no distress at 0845.      Visit Vitals  /82 (BP 1 Location: Right upper arm, BP Patient Position: Sitting)   Pulse 60   Temp 98.2 °F (36.8 °C)   Resp 18   Wt 107.8 kg (237 lb 9.6 oz)   SpO2 96%   BMI 42.09 kg/m²

## 2022-07-28 ENCOUNTER — HOSPITAL ENCOUNTER (OUTPATIENT)
Dept: INFUSION THERAPY | Age: 52
Discharge: HOME | End: 2022-07-28
Payer: MEDICARE

## 2022-07-28 VITALS
RESPIRATION RATE: 18 BRPM | TEMPERATURE: 98.1 F | HEART RATE: 55 BPM | SYSTOLIC BLOOD PRESSURE: 111 MMHG | DIASTOLIC BLOOD PRESSURE: 72 MMHG | OXYGEN SATURATION: 100 %

## 2022-07-28 DIAGNOSIS — C48.2 PERITONEAL CARCINOMA (HCC): Primary | ICD-10-CM

## 2022-07-28 PROCEDURE — 96417 CHEMO IV INFUS EACH ADDL SEQ: CPT

## 2022-07-28 PROCEDURE — 74011000250 HC RX REV CODE- 250: Performed by: INTERNAL MEDICINE

## 2022-07-28 PROCEDURE — 96375 TX/PRO/DX INJ NEW DRUG ADDON: CPT

## 2022-07-28 PROCEDURE — 74011250636 HC RX REV CODE- 250/636: Performed by: INTERNAL MEDICINE

## 2022-07-28 PROCEDURE — 77030012965 HC NDL HUBR BBMI -A

## 2022-07-28 PROCEDURE — 96367 TX/PROPH/DG ADDL SEQ IV INF: CPT

## 2022-07-28 PROCEDURE — 74011000258 HC RX REV CODE- 258: Performed by: INTERNAL MEDICINE

## 2022-07-28 PROCEDURE — 96413 CHEMO IV INFUSION 1 HR: CPT

## 2022-07-28 RX ORDER — SODIUM CHLORIDE 9 MG/ML
5-40 INJECTION INTRAMUSCULAR; INTRAVENOUS; SUBCUTANEOUS AS NEEDED
Status: ACTIVE | OUTPATIENT
Start: 2022-07-28 | End: 2022-07-28

## 2022-07-28 RX ORDER — PALONOSETRON 0.05 MG/ML
0.25 INJECTION, SOLUTION INTRAVENOUS ONCE
Status: COMPLETED | OUTPATIENT
Start: 2022-07-28 | End: 2022-07-28

## 2022-07-28 RX ORDER — HEPARIN 100 UNIT/ML
500 SYRINGE INTRAVENOUS AS NEEDED
Status: DISPENSED | OUTPATIENT
Start: 2022-07-28 | End: 2022-07-28

## 2022-07-28 RX ORDER — SODIUM CHLORIDE 9 MG/ML
5-250 INJECTION, SOLUTION INTRAVENOUS AS NEEDED
Status: DISPENSED | OUTPATIENT
Start: 2022-07-28 | End: 2022-07-28

## 2022-07-28 RX ADMIN — HEPARIN 500 UNITS: 100 SYRINGE at 13:57

## 2022-07-28 RX ADMIN — FOSAPREPITANT: 150 INJECTION, POWDER, LYOPHILIZED, FOR SOLUTION INTRAVENOUS at 10:02

## 2022-07-28 RX ADMIN — CARBOPLATIN 240 MG: 600 INJECTION, SOLUTION INTRAVENOUS at 12:40

## 2022-07-28 RX ADMIN — SODIUM CHLORIDE, PRESERVATIVE FREE 10 ML: 5 INJECTION INTRAVENOUS at 09:51

## 2022-07-28 RX ADMIN — SODIUM CHLORIDE 25 ML/HR: 9 INJECTION, SOLUTION INTRAVENOUS at 09:52

## 2022-07-28 RX ADMIN — SODIUM CHLORIDE, PRESERVATIVE FREE 10 ML: 5 INJECTION INTRAVENOUS at 13:57

## 2022-07-28 RX ADMIN — GEMCITABINE 1700 MG: 100 INJECTION, SOLUTION INTRAVENOUS at 11:22

## 2022-07-28 RX ADMIN — DEXAMETHASONE SODIUM PHOSPHATE 12 MG: 10 INJECTION, SOLUTION INTRAMUSCULAR; INTRAVENOUS at 10:27

## 2022-07-28 RX ADMIN — PALONOSETRON 0.25 MG: 0.05 INJECTION, SOLUTION INTRAVENOUS at 09:57

## 2022-07-28 NOTE — PROGRESS NOTES
[facility] Progress Note    Date: 2022    Name: Maye Lagos              MRN: [id number]              : 1970    Chemotherapy Cycle:C1D1 Gemzar/Carbo       Pt to [facility], ambulatory, at 0910 ambulatory with rolling walker. Ms. Florentino Jaocbs was assessed and education was provided. Ms. Cannon's vitals were reviewed. Visit Vitals  /80 (BP 1 Location: Right upper arm, BP Patient Position: Sitting)   Pulse (!) 58   Temp 98.5 °F (36.9 °C)   Resp 18   SpO2 100%   Breastfeeding No       Right chest mediport accessed with 20 g 1 inch upton needle. Port flushed easily and had brisk blood return. NS initiated @ 25ml/hr. Lab results were obtained and reviewed. No results found for this or any previous visit (from the past 12 hour(s)). Lab results within ordered parameters to give chemo today. ANC = 5.5, PLT = 364. Chemo dosages verified with today's BSA and found to be within 10% of ordered dosages. Pre-medications (Emend 150mg, Aloxi 0.25mg, Decadron 12mg) were administered as ordered and chemotherapy was initiated after blood return from port re-verified. Reviewed expected side effects of premeds with patient. Gemzar 1700mg was infused over 30 minutes. VS stable at end of infusion and pt denied complaints. Line flushed with NS and blood return from port re-verified. Carboplatin 240mg was infused over 30 minutes. VS stable at end of infusion and pt denied complaints. Line flushed with NS and blood return from port re-verified. Shortly after Carbo was started(5 min), pt rang call jain. Marychemo Arya answered jain and found patient with primary tubing disconnected from port tubing and pt holding it in her hand. Small amount of iv fluid noticed on floor, iv pump stopped, chemo spill protocol initiated / completed per policy. Pharmacy, RN supervisor and NP Rameshoo notified. Restarted infusion as ordered (received ok by Pharmacy). Infusion completed without further incident.      Ms. Florentino Jacobs tolerated infusion, and had no complaints at this time. Patient remained in clinic for 30 minutes for observation per policy. Patient denied complaints. Mediport flushed with NS 20 ml and Heparin 500 units then de-accessed. No irritation or bleeding noted. Bandaid applied. Patient armband removed and shredded. Ms. Muriel Palafox was discharged from [place] [address] in stable condition at 1400. She is to return on 08/04/22 at 0900 for her next Chemo appointment.     Raeann Mae RN  July 28, 2022  1:30 PM

## 2022-08-02 RX ORDER — PAROXETINE HYDROCHLORIDE 20 MG/1
TABLET, FILM COATED ORAL
Qty: 30 TABLET | Refills: 0 | Status: SHIPPED | OUTPATIENT
Start: 2022-08-02 | End: 2022-08-29

## 2022-08-04 ENCOUNTER — HOSPITAL ENCOUNTER (OUTPATIENT)
Dept: INFUSION THERAPY | Age: 52
Discharge: HOME | End: 2022-08-04
Payer: MEDICARE

## 2022-08-04 ENCOUNTER — OFFICE VISIT (OUTPATIENT)
Dept: ONCOLOGY | Age: 52
End: 2022-08-04
Payer: MEDICARE

## 2022-08-04 VITALS
HEIGHT: 63 IN | HEART RATE: 52 BPM | BODY MASS INDEX: 41.21 KG/M2 | DIASTOLIC BLOOD PRESSURE: 68 MMHG | OXYGEN SATURATION: 100 % | RESPIRATION RATE: 16 BRPM | WEIGHT: 232.6 LBS | SYSTOLIC BLOOD PRESSURE: 128 MMHG | TEMPERATURE: 98.2 F

## 2022-08-04 DIAGNOSIS — N93.9 VAGINAL BLEEDING: ICD-10-CM

## 2022-08-04 DIAGNOSIS — G62.0 CHEMOTHERAPY-INDUCED NEUROPATHY (HCC): ICD-10-CM

## 2022-08-04 DIAGNOSIS — C48.2 PERITONEAL CARCINOMA (HCC): Primary | ICD-10-CM

## 2022-08-04 DIAGNOSIS — T45.1X5A CHEMOTHERAPY-INDUCED NEUROPATHY (HCC): ICD-10-CM

## 2022-08-04 DIAGNOSIS — K62.5 RECTAL BLEEDING: ICD-10-CM

## 2022-08-04 DIAGNOSIS — D64.9 NORMOCYTIC ANEMIA: ICD-10-CM

## 2022-08-04 DIAGNOSIS — D50.8 IRON DEFICIENCY ANEMIA SECONDARY TO INADEQUATE DIETARY IRON INTAKE: ICD-10-CM

## 2022-08-04 DIAGNOSIS — E53.8 B12 DEFICIENCY: ICD-10-CM

## 2022-08-04 LAB
BASO+EOS+MONOS # BLD AUTO: 0.2 K/UL (ref 0–2.3)
BASO+EOS+MONOS NFR BLD AUTO: 9 % (ref 0.1–17)
DIFFERENTIAL METHOD BLD: ABNORMAL
ERYTHROCYTE [DISTWIDTH] IN BLOOD BY AUTOMATED COUNT: 18.2 % (ref 11.5–14.5)
HCT VFR BLD AUTO: 27.1 % (ref 36–48)
HGB BLD-MCNC: 8.1 G/DL (ref 12–16)
LYMPHOCYTES # BLD: 0.6 K/UL (ref 1.1–5.9)
LYMPHOCYTES NFR BLD: 24 % (ref 14–44)
MCH RBC QN AUTO: 24.3 PG (ref 25–35)
MCHC RBC AUTO-ENTMCNC: 29.9 G/DL (ref 31–37)
MCV RBC AUTO: 81.4 FL (ref 78–102)
NEUTS SEG # BLD: 1.7 K/UL (ref 1.8–9.5)
NEUTS SEG NFR BLD: 67 % (ref 40–70)
PLATELET # BLD AUTO: 254 K/UL (ref 140–440)
RBC # BLD AUTO: 3.33 M/UL (ref 4.1–5.1)
WBC # BLD AUTO: 2.5 K/UL (ref 4.5–13)

## 2022-08-04 PROCEDURE — 74011000250 HC RX REV CODE- 250: Performed by: INTERNAL MEDICINE

## 2022-08-04 PROCEDURE — G9717 DOC PT DX DEP/BP F/U NT REQ: HCPCS | Performed by: INTERNAL MEDICINE

## 2022-08-04 PROCEDURE — G8752 SYS BP LESS 140: HCPCS | Performed by: INTERNAL MEDICINE

## 2022-08-04 PROCEDURE — 74011250636 HC RX REV CODE- 250/636: Performed by: INTERNAL MEDICINE

## 2022-08-04 PROCEDURE — G8428 CUR MEDS NOT DOCUMENT: HCPCS | Performed by: INTERNAL MEDICINE

## 2022-08-04 PROCEDURE — G9899 SCRN MAM PERF RSLTS DOC: HCPCS | Performed by: INTERNAL MEDICINE

## 2022-08-04 PROCEDURE — 74011250636 HC RX REV CODE- 250/636

## 2022-08-04 PROCEDURE — 85025 COMPLETE CBC W/AUTO DIFF WBC: CPT

## 2022-08-04 PROCEDURE — 99214 OFFICE O/P EST MOD 30 MIN: CPT | Performed by: INTERNAL MEDICINE

## 2022-08-04 PROCEDURE — 36591 DRAW BLOOD OFF VENOUS DEVICE: CPT

## 2022-08-04 PROCEDURE — G8417 CALC BMI ABV UP PARAM F/U: HCPCS | Performed by: INTERNAL MEDICINE

## 2022-08-04 PROCEDURE — G9711 PT HX TOT COL OR COLON CA: HCPCS | Performed by: INTERNAL MEDICINE

## 2022-08-04 PROCEDURE — 99211 OFF/OP EST MAY X REQ PHY/QHP: CPT

## 2022-08-04 PROCEDURE — G8754 DIAS BP LESS 90: HCPCS | Performed by: INTERNAL MEDICINE

## 2022-08-04 PROCEDURE — 77030012965 HC NDL HUBR BBMI -A

## 2022-08-04 RX ORDER — SODIUM CHLORIDE 9 MG/ML
5-250 INJECTION, SOLUTION INTRAVENOUS AS NEEDED
Status: DISCONTINUED | OUTPATIENT
Start: 2022-08-04 | End: 2022-08-04

## 2022-08-04 RX ORDER — ONDANSETRON 2 MG/ML
8 INJECTION INTRAMUSCULAR; INTRAVENOUS ONCE
Status: DISCONTINUED | OUTPATIENT
Start: 2022-08-04 | End: 2022-08-04

## 2022-08-04 RX ORDER — HEPARIN 100 UNIT/ML
SYRINGE INTRAVENOUS
Status: COMPLETED
Start: 2022-08-04 | End: 2022-08-04

## 2022-08-04 RX ORDER — HEPARIN 100 UNIT/ML
500 SYRINGE INTRAVENOUS AS NEEDED
Status: DISCONTINUED | OUTPATIENT
Start: 2022-08-04 | End: 2022-08-04

## 2022-08-04 RX ORDER — SODIUM CHLORIDE 0.9 % (FLUSH) 0.9 %
5-40 SYRINGE (ML) INJECTION AS NEEDED
Status: DISCONTINUED | OUTPATIENT
Start: 2022-08-04 | End: 2022-08-04

## 2022-08-04 RX ADMIN — SODIUM CHLORIDE, PRESERVATIVE FREE 30 ML: 5 INJECTION INTRAVENOUS at 09:15

## 2022-08-04 RX ADMIN — HEPARIN 500 UNITS: 100 SYRINGE at 11:35

## 2022-08-04 RX ADMIN — SODIUM CHLORIDE, PRESERVATIVE FREE 20 ML: 5 INJECTION INTRAVENOUS at 11:35

## 2022-08-04 RX ADMIN — SODIUM CHLORIDE 25 ML/HR: 0.9 INJECTION, SOLUTION INTRAVENOUS at 09:45

## 2022-08-04 NOTE — PROGRESS NOTES
[facility] Progress Note    Date: 2022    Name: Kira Sender    MRN: [id number]         : 1970    Ms. Zack Glover arrived in the [facility] today at 0900, in stable condition, here for CYCLE 1, DAY 8, IV CARBOPLATIN + GEMCITABINE CHEMOTHERAPY REGIMEN (DAYS 1 & 8, OF EVERY 21 DAY CYCLE). She was assessed and education was provided. Carboplatin + Gemcitabine on Day 1 of every cycle & Gemcitabine only on every day 8. DAY 8 TODAY (GEMCITABINE ONLY)                                 Ms. Cannon's vitals were reviewed. Visit Vitals  /68 (BP 1 Location: Right upper arm, BP Patient Position: Sitting)   Pulse (!) 52   Temp 98.2 °F (36.8 °C)   Resp 16   Ht 5' 3\" (1.6 m)   Wt 105.5 kg (232 lb 9.6 oz)   SpO2 100%   Breastfeeding No   BMI 41.20 kg/m²         Ms. Zack Glover presented to the [facility] today, stating that she felt very nauseated, tired, weak & dizzy. She also stated that she had been having what she described as \"bloody rectal discharge\" for the past few days. Overall though, she stated that she felt like she tolerated her chemotherapy treatment last week, fairly well. Signed chemotherapy consent was viewed in her electronic record. Her RIGHT chest single lumen port was accessed without incident at 0915, and brisk blood return was obtained. Blood for the ordered CBC was drawn from her port, and was processed on site. The CBC results from today were as follows:    Recent Results (from the past 12 hour(s))   CBC WITH 3 PART DIFF    Collection Time: 22  9:15 AM   Result Value Ref Range    WBC 2.5 (L) 4.5 - 13.0 K/uL    RBC 3.33 (L) 4.10 - 5.10 M/uL    HGB 8.1 (L) 12.0 - 16 g/dL    HCT 27.1 (L) 36 - 48 %    MCV 81.4 78 - 102 FL    MCH 24.3 (L) 25.0 - 35.0 PG    MCHC 29.9 (L) 31 - 37 g/dL    RDW 18.2 (H) 11.5 - 14.5 %    PLATELET 769 839 - 553 K/uL    NEUTROPHILS 67 40 - 70 %    Mixed cells 9 0.1 - 17 %    LYMPHOCYTES 24 14 - 44 %    ABS. NEUTROPHILS 1.7 (L) 1.8 - 9.5 K/UL    ABS.  MIXED CELLS 0.2 0.0 - 2.3 K/uL    ABS. LYMPHOCYTES 0.6 (L) 1.1 - 5.9 K/UL    DF AUTOMATED            ml IV BAG was initiated to infuse Sean Bojorquez throughout treatment today. The above CBC results were noted to be satisfactory for treatment today. However, due to the above stated symptoms/issues voiced by Ms. Beatriz Love, Dr. Jacinta Landa Do wanted to see Ms. Beatriz Love for her scheduled office visit prior to her receiving any chemotherapy today. Therefore, she was taken over to an exam room to see Dr. Severiano Kindler at 1000. The NS primary Bag was stopped, and her port was clamped and left intact. Ms. Beatriz Love returned to the [facility], after her office visit with Dr. Severiano Kindler, at [phone]. ORDER WAS RECEIVED FROM DR. FARIDA RG, TO HOLD CHEMOTHERAPY TODAY, AND RESUME IN 1 WEEK. So, when Ms. Beatriz Love returned from her office visit with Dr. Severiano Kindler, her port was flushed well per policy with NS & Heparin, and then the upton needle was removed and gauze/bandaid was applied. Ms. Beatriz Love voiced no additional complaints. Ms. Beatriz Love was discharged from [place] [address] in stable condition at 1140. She is to return on next Thursday, 8-11-22 at 0900, for her next appointment, for Dose # 1 of 3, IV VENOFER Infusion & Cycle 1, Day 8, Carboplatin + Gemcitabine Chemotherapy Regimen.     Mathew More RN  August 4, 2022  9:43 AM

## 2022-08-04 NOTE — PROGRESS NOTES
[facility] HEMATOLOGY/MEDICAL ONCOLOGY      Name: Rosie Resendez  MRN: [id number]  : 1970/52 y.o. Date: 2022    Oncology History  Rosie Resendez is a 47 y.o.  postmenopausal female referred by Dr. Angus Hanna with peritoneal cancer. Patient was being followed by Dr. Aleksander Castro who left the practice. Intitally seen be JOHN talaverawith reports of vaginal bleeding. Given pt's history of hysteretectomy with BSO for endometriosis in  a TVUS was ordered. Which revealed a 6.8 cm x 5.2 cm x 4.6 cm at midline vaginal cuff. This prompted pelvic CT with findings of a lesion measuring 7.6 x 5.8 x 7.2 cm and is compatible with a pelvic neoplasm vs endometrioma given prior history of endometriosis. Tumor markers done on 2018 all normal.  S/p  Exploratory laparotomy, exploration of retroperitoneal spaces, exam under anesthesia with biopsy of rectovaginal mass on 2019. Had sigmoidoscopy which revealed submucosal mass. S/p cycle #6 of carbo/taxol on 2019. S/p cytoreductive surgery with HIPEC on 2019. S/p radiation treatment completed in 2019. Was seen in office and had a biopsy c/w recurrence. S/p cycle #6 of Carbo/Doxil on 2020. S/p Exploratory laparotomy. 2. Lysis of adhesions. 3. Simple appendectomy. 4. 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 On . She also had a revision of urostomy that is still leaking. S/p IR biopsy of liver on 2021 which demonstrated recurrent disease. Pt on palliative letrozole and keytruda. HPI  Patient was being followed by Dr. Aleksander Castor who left the practice. The patient presents to our [facility] clinic today for follows up and continuing cancer treatment as scheduled. Here for follow-up. She continues to complain of vaginal bleeding and rectal bleeding as well as discharge from the rectum. Continues to have pain controlled with meds. Continues to have nausea controlled with meds. Having some fatigue.  Patient states her neuropathy is worse and had stopped taking her Neurontin and Lyrica. The patient denies fevers, chills, night sweats, skin lumps or bumps, acute bleeding or bruising issues. Denies headaches, acute vision change, dizziness, chest pain, worsen shortness of breath, palpitation, productive cough, vomiting, worsening abdominal pain, altered bowel habits, dysuria, new bone pain or back pain, focal numbness or weakness.        Component      Latest Ref Rng & Units 3/11/2022           9:10 AM   CA-125      1.5 - 35.0 U/mL 4     Component      Latest Ref Rng & Units 2/11/2022           9:37 AM   CA-125      1.5 - 35.0 U/mL 4     Component      Latest Ref Rng & Units 12/16/2021          10:25AM   Cancer Ag (CA) 125      0.0 - 38.1 U/mL 5       Component      Latest Ref Rng & Units 6/17/2021          12:00 AM   Cancer Ag (CA) 125      0.0 - 38.1 U/mL 5.1     Component      Latest Ref Rng & Units 9/28/2020          11:11 AM   CA-125      1.5 - 35.0 U/mL 6     Component      Latest Ref Rng & Units 8/31/2020          10:01 AM   CA-125      1.5 - 35.0 U/mL 8     Component      Latest Ref Rng & Units 7/7/2020          11:30 AM   CA-125      1.5 - 35.0 U/mL 5     Component      Latest Ref Rng & Units 6/8/2020          10:40 AM   CA-125      1.5 - 35.0 U/mL 7     Component      Latest Ref Rng & Units 5/11/2020          11:30 AM   CA-125      1.5 - 35.0 U/mL 7     Component      Latest Ref Rng & Units 3/4/2020           8:15 AM   CA-125      1.5 - 35.0 U/mL 7     Component      Latest Ref Rng & Units 11/15/2019           9:56 AM   CA-125      1.5 - 35.0 U/mL 6     Component      Latest Ref Rng & Units 6/6/2019           8:44 AM   CA-125      1.5 - 35.0 U/mL 7     Component      Latest Ref Rng & Units 5/16/2019 4/25/2019           8:26 AM  8:20 AM   Cancer Ag (CA) 125      0.0 - 38.1 U/mL 7.8 8.7     Component      Latest Ref Rng & Units 4/4/2019           8:28 AM   Cancer Ag (CA) 125      0.0 - 38.1 U/mL 8.8     Component Latest Ref Rng & Units 3/14/2019 2/21/2019           8:15 AM  8:32 AM   Cancer Ag (CA) 125      0.0 - 38.1 U/mL 10.4 18.4     12/17/2018 : 9.3  12/17/2018 CEA: 2.0  12/17/2018 AFP: 3.8    Pathology  8/4/2021  Liver mass, core biopsies:        Metastatic adenocarcinoma, most consistent with serous type. DIAGNOSIS COMMENT:   Although metastatic ovarian or primary peritoneal carcinoma forming   parenchymal liver lesions is uncommon, the histologic features in this   case are similar to those in the patient's prior rectovaginal mass biopsy   from 2019 and the immunohistochemical features are similar to those   documented in the EMR from a pelvic exenteration specimen performed at an   outside institution in 2020.   4/20/2020  Vaginal biopsy  Papillary serous adenocarcinoma    8/8/2019  A) COLON, PERICOLIC NODULE, EXCISION:      - ACELLULAR MUCIN WITH MULTIPLE CALCIFICATIONS, AND ASSOCIATED FIBROUS WALL WITH        HYALINIZATION, AND CHRONIC INFLAMMATION. - ADJACENT BENIGN FIBROADIPOSE TISSUE, CONSISTENT WITH MESENTERY. - NO EVIDENCE OF MALIGNANCY. - SEE COMMENT. B) LIVER, RIGHT LOBE, BIOPSY:      - NODULAR FAT NECROSIS AND FIBROSIS OF THE LIVER CAPSULE.      - MILD STEATOSIS.       - NO EVIDENCE OF MALIGNANCY. C) COLON, SIGMOID, SEROSAL IMPLANT, EXCISION:      - NODULAR FAT NECROSIS WITH FIBROSIS, CHRONIC INFLAMMATION, CALCIFICATIONS, AND        CYSTIC DEGENERATION WITHOUT MUCIN.      - NO EVIDENCE OF MALIGNANCY. D) OMENTUM, OMENTECTOMY (RESECTION):      - CONGESTED FIBROADIPOSE TISSUE WITH FOCAL FIBROSIS AND CHRONIC INFLAMMATION, AND        CALCIFIED NODULAR FIBROSIS.      - NO EVIDENCE OF MALIGNANCY. E) PERITONEUM, LEFT ANTERIOR ABDOMINAL, RESECTION:      - CONGESTED PERITONEAL TISSUE, NO EVIDENCE OF MALIGNANCY. F) PERITONEUM, RIGHT ANTERIOR ABDOMINAL, RESECTION:      - CONGESTED PERITONEAL TISSUE, NO EVIDENCE OF MALIGNANCY.     G) COLON, SIGMOID, SEROSAL IMPLANT, EXCISION: - NODULAR FIBROSIS WITH HISTIOCYTES, SUGGESTIVE OF FAT NECROSIS.      - CYSTIC DEGENERATION, WITHOUT MUCIN.      - NO EVIDENCE OF MALIGNANCY. H) SOFT TISSUE, FALCIFORM, EXCISION:      - CONSISTENT WITH FALCIFORM LIGAMENT.      - NO EVIDENCE OF MALIGNANCY. PATHOLOGIC STAGE:  ypTx, pNx    1/17/2019   RECTOVAGINAL MASS (#1, 2) AND PELVIC MASS, BIOPSIES:   CONSISTENT WITH SEROUS CARCINOMA WITH EXTENSIVE NECROSIS. Imaging  MRI liver 7/6/2021     FINDINGS:     Abdominal Wall:  There is a circumscribed 8.3 cm-width x 2.4 cm-AP x 2.9  cm-craniocaudal length T2 hyperintense subcutaneous layer fluid collection along  the inferior margin of the ileal conduit. The colostomy site in the left lower  quadrant appears unremarkable. Liver:  A new ovoid T1-low T2-high signal 1.8 x 1.4 cm lesion is identified in  the segment 8. Another 0.8 x 0.6 cm T1-low T2-high signal focus is identified  in the segment 3. These structures were not apparent on prior studies. With  diffusion imaging, no evidence of restricted diffusion is demonstrated at these  foci. Biliary:  No evidence for significant common bile duct dilation. Gallbladder:  Mild distention of the gallbladder. No definite gallstones. Spleen, Adrenal Glands, Pancreas:  Unremarkable. Kidneys:  Cyst at the left kidney lower pole region measuring about 1.2 x 1.2  cm. Increase in size compared to prior CTs from above 0.8 x 0.7 cm. Miscellaneous: The largest of the periportal nodes measures up to about 1.3 x  1.1 cm. IMPRESSION     1. Focal fluid collection in the subcutaneous tissue adjacent to the ileal  conduit. This fluid collection may be amenable to aspiration under ultrasound  guidance. 2.  New hepatic lesions as described. Although there is apparent lack of  restricted diffusion, further investigation with ultrasound is warranted to  assess if these structures are indeed cystic in nature.      3.  Left renal cyst.     4. Slight az prominence. MRI pelvis 7/6/2021  FINDINGS:     Along the inferior aspect of the ileal conduit stoma site, focal elongated T1  high T2-signal fluid collection is demonstrated to, measuring about 8.3 cm-width  x 2.2 cm-thickness x 2.3 cm-craniocaudal length. In retrospect, there was a  suggestion of possible 7.1 x 3.5 x 2.0 cm hypodense material collection along  the inferior aspect of the subcutaneous abdominal wall portion of the ileal  conduit on the 04/01/21 unenhanced CT. This fluid collection therefore may be  slightly increased in size in the elbow. The intraperitoneal portion of the ileal conduit appears grossly unremarkable. The presacral region demonstrates apparent continued pattern of nonspecific mild  edema. The source for this edematous changes is clear. Most likely related to  residual changes from recent surgical intervention. Mild edematous changes also noted in the right and to lesser extent left  piriformis muscles. Additional edematous changes are also identified in the  right obturator internus along the superior aspect. Minimal free fluid in the posterior pelvic region. There is some intraluminal fluid in the blind loop portion of the rectum. At  the right superior lateral vaginal cuff corner, a vague trail of T2 high signal  is observed (coronal T2 images #8-10 and fat-sat axial T2 image #5-10). This  [place] appears to course quite close to the anterior margin of the rectal remnant  wall. Whether there is indeed communication between the blind rectal segment  and the vaginal cuff can be further assessed with barium enema. No distinct mass is detected pelvis. Enlarged nodes are identified along the right pelvic sidewall. The largest  right pelvic sidewall node is identified subjacent to the external iliac  artery/vein vascular bundle, measuring up to about 2.2 x 1.2 cm (fat-sat axial  T2 image #12).   Another slightly enlarged node more superiorly measuring about  1.9 x 1.0 cm (image #17). Multiple slightly borderline prominent nodes  identified in the mesentery, measuring up to about 1.2 x 1.0 cm (image #25). Additional note near the aortic bifurcation measuring about 1.3 x 0.9 cm (image  #34). IMPRESSION     1. Focal T1- and T2-hyperintense subcutaneous fluid collection along the  inferior aspect of the ileal conduit stoma site. Possibly representing a seroma  or a focus of urinoma. This may be amenable to aspiration under ultrasound or  CT guidance. 2.  Questionable subtle trailed between the right superior lateral aspect of the  vaginal cuff with adjacent blind-tipped rectum. More definitive evaluation can  be performed with barium enema. 3.  Slight az prominence along the right pelvic sidewall and in the  mesentery. 4.  No distinct residual or recurrent mass. 5.  Persistent mild presacral edema  PETCT 7/17/2020   FINDINGS:        PET images: Study limited because of patient motion. Mediastinal blood pool reference value = SUV Max. Mediastinal blood pool reference value = SUV Mean. Liver parenchyma reference value =  4.7   SUV Max. Liver parenchyma reference value =  2.3    SUV Mean. NECK: There is no suspicious hypermetabolic activity at the neck. CHEST: There is no suspicious hypermetabolic activity at the chest.     ABDOMEN: Typical heterogeneity at the liver. No convincing malignant foci  hypermetabolic activity or underlying CT abnormality. PELVIS: There is soft tissue fullness in the right retrovesicular pelvis just  above the vaginal cuff and posterior to right ureter measuring about 3.8 cm with  Max SUV 13.5 (206), extending to the rectum posteriorly, levators inferiorly on  the right. Previously 4 cm and Max SUV 16.5. Thickening anterior abdominal wall compatible with scar demonstrating diffuse  modest activity and Max SUV 2.7.       Additional CT findings: Mediport catheter has tip in the superior vena cava. Air  trapping in the superior segments lower lobes. Right-sided nephroureteral stent  without hydronephrosis. No ascites. IMPRESSION      Treatment response. Decreased metabolic activity at the right retrovesicular  pelvic mass. No new evidence of metastatic disease. Interval placement right-sided nephroureteral stent and resolved  hydroureteronephrosis. .       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    Loss of appetite 10/14/2020    Other hydronephrosis 06/24/2020    Genetic carrier status 09/13/2019    Postoperative nausea 08/27/2019    Ovarian cancer ([address] 08/07/2019    Peritoneal carcinoma ([address] 02/14/2019    Pelvic mass in female 01/17/2019    Irritable bowel syndrome with both constipation and diarrhea 02/09/2018    Subclinical hypothyroidism 01/23/2018    Chronic abdominal pain 01/22/2018    Diverticulosis 01/22/2018    Hx of total hysterectomy 01/22/2018    Hyponatremia 01/22/2018    Advance care planning 01/31/2017    Dental caries 05/26/2016    Chronic periodontal disease 05/26/2016    SUAZO (dyspnea on exertion) 02/10/2016    Prediabetes 09/24/2015    Morbid obesity ([address] 08/31/2015    Arthritis, degenerative 08/31/2015    Gastroesophageal reflux disease without esophagitis 08/31/2015    ANAMIKA on CPAP 08/31/2015    Essential hypertension 08/28/2015    PTSD (post-traumatic stress disorder) 03/24/2014    S/P TKR (total knee replacement) 11/14/2012    Adjustment disorder with depressed mood 09/20/2012    Major depressive disorder, recurrent episode, moderate ([address] 09/20/2012    Suicidal ideation 09/19/2012    Menopause 05/08/2012    Knee pain, right 05/08/2012    Hypokalemia 02/04/2012    Overdose 02/04/2012 Obesity 2010    Mixed hyperlipidemia 2010     Past Medical History:   Diagnosis Date    AR (allergic rhinitis)     seasonal    Bladder cancer [facility]     Cancer ([facility]     pt states between vgina and rectal area    Cardiac echocardiogram 2016    Tech difficult. EF 55-60%. No RWMA. Mild conc LVH. Gr 1 DDfx. RVSP normal.      Cardiac nuclear imaging test 2016    Low risk. Very patchy radiotracer uptake. Mild anterior artifact, low suspicion for ischemia. EF 68%. No RWMA. Normal EKG on pharm stress test.    Cardiovascular LE arterial duplex 10/07/2013    No significant arterial disease at rest bilaterally. R JUNE 1.21.  L JUNE 1.16. No significant sm vessel disease. Chronic kidney disease     Depression     Dr. Kusum Solorio, suicide attempt     Diverticulosis     Endometriosis     s/p hysterectomy     GERD (gastroesophageal reflux disease)     Glaucoma     Dr. Danika Andrade    Hematuria, gross     HTN (hypertension)     Hx of colonoscopy 2017    mild diverticulosis, g1 internal hemorrhoids, normal colon , repeat 10 year     Hx of suicide attempt     Hyperlipidemia LDL goal < 130     IBS (irritable bowel syndrome)     Ill-defined condition     environmental allergies    Insomnia     Malignant neoplasm of peritoneum ([facility] 2019    Nausea & vomiting     OA (osteoarthritis)     both knees, lower back    Preeclampsia     PTSD (post-traumatic stress disorder)     Seizures (HCC)     1 x with childbirth, had toxemia    Sleep apnea     does not use cpap machine    Spinal stenosis     Dr. Uriel Alvarenga    Vertigo       Past Surgical History:   Procedure Laterality Date    COLONOSCOPY N/A 2017    COLONOSCOPY performed by Zahra Urbano MD at [address] [place] [address] N/A 2019    SIGMOIDOSCOPY FLEXIBLE performed by Yaritza Limon MD at [facility] ENDOSCOPY    HX  SECTION      HX COLONOSCOPY  2017    DR. Brokc Fraser 2017     HX COLOSTOMY      Revision 2021    HX CYSTECTOMY  2020    HX HYSTERECTOMY      HX KNEE ARTHROSCOPY Left     left knee    HX KNEE REPLACEMENT Bilateral     (R)  (L)     HX LAPAROTOMY N/A 2019    and rectovaginal bx    HX OTHER SURGICAL  2016    all teeth removed    HX SALPINGO-OOPHORECTOMY  2008    HX TUBAL LIGATION      IR INSERT TUNL CVC W PORT OVER 5 YEARS  2019    MULTIPLE DELIVERY       1990    AR FEMUR/KNEE SURG UNLISTED Left 2009    External fixator    AR PART REMOVAL COLON W ANASTOMOSIS      AR TOTAL ABDOM HYSTERECTOMY  2006    TRANSURETHRAL RESEC BLADDER NECK        OB History          2    Para   2    Term   2       0    AB   0    Living   0         SAB   0    IAB   0    Ectopic   0    Molar   0    Multiple   0    Live Births   0              Social History     Tobacco Use    Smoking status: Never    Smokeless tobacco: Never   Substance Use Topics    Alcohol use: No      Family History   Problem Relation Age of Onset    Heart Disease Mother         CHF    Diabetes Mother     Hypertension Mother     Kidney Disease Mother     Breast Cancer Mother     Stroke Mother     Diabetes Father     Hypertension Father     Heart Attack Father         MI    Cancer Maternal Grandmother         stomach    Ovarian Cancer Maternal Aunt     Cancer Maternal Uncle       Current Outpatient Medications   Medication Sig    PARoxetine (PAXIL) 20 mg tablet take 1 tablet by mouth once daily    metoprolol succinate (TOPROL-XL) 100 mg tablet take 1 tablet by mouth once daily    lidocaine-prilocaine (EMLA) topical cream Apply to Mediport 1 hour prior to chemotherapy. Place kitchen saran wrap over cream and port. This will numb site. ondansetron hcl (ZOFRAN) 8 mg tablet Take 1 tablet every 8 hours for nausea and vomiting beginning the night of your chemotherapy treatment for 3 days.     loperamide (IMMODIUM) 2 mg tablet Take 2 tabs=4mg after first loose stool, then 2 mg(1 tab) after each loose stool after that up to maximum of 16mg (8tabs) in 1 day    oxyCODONE-acetaminophen (PERCOCET) 5-325 mg per tablet Take 1 Tablet by mouth every four (4) hours as needed for Pain for up to 30 days. Max Daily Amount: 6 Tablets. amLODIPine (NORVASC) 10 mg tablet take 1 tablet by mouth once daily    pregabalin (LYRICA) 25 mg capsule Take 1 Capsule by mouth three (3) times daily. Max Daily Amount: 75 mg.    letrozole (FEMARA) 2.5 mg tablet Take 1 Tablet by mouth daily. simvastatin (ZOCOR) 20 mg tablet take 1 tablet by mouth at bedtime    meclizine (ANTIVERT) 25 mg tablet take 1 tablet by mouth three times a day if needed FOR DIZZINESS (Patient not taking: Reported on 1/26/2022)    gabapentin (NEURONTIN) 100 mg capsule Take 1 Capsule by mouth three (3) times daily. Max Daily Amount: 300 mg. (Patient not taking: Reported on 7/6/2022)    latanoprost (XALATAN) 0.005 % ophthalmic solution Administer 1 Drop to both eyes nightly. No current facility-administered medications for this visit. Facility-Administered Medications Ordered in Other Visits   Medication Dose Route Frequency    0.9% sodium chloride infusion  5-250 mL/hr IntraVENous PRN    ondansetron (ZOFRAN) injection 8 mg  8 mg IntraVENous ONCE    gemcitabine (GEMZAR) 1,700 mg in 0.9% sodium chloride 250 mL chemo infusion  1,700 mg IntraVENous ONCE    sodium chloride (NS) flush 5-40 mL  5-40 mL IntraVENous PRN    heparin (porcine) pf 500 Units  500 Units InterCATHeter PRN     Allergies   Allergen Reactions    Seafood Hives     FISH    Other Medication Hives     seafood    Pollen Extracts Other (comments)    Shellfish Derived Hives    Pantoprazole Other (comments)     Bleeding in stomach    Promethazine Other (comments)     Bleeding in stomach          Review of Systems   Constitutional:  Positive for malaise/fatigue. Negative for chills, diaphoresis, fever and weight loss.    Respiratory:  Negative for cough, hemoptysis, shortness of breath and wheezing. Cardiovascular:  Negative for chest pain, palpitations and leg swelling. Gastrointestinal:  Negative for abdominal pain, diarrhea, heartburn, nausea and vomiting. Genitourinary:  Negative for dysuria, frequency, hematuria and urgency. Musculoskeletal:  Negative for joint pain and myalgias. Skin:  Negative for itching and rash. Neurological:  Negative for dizziness, seizures, weakness and headaches. Psychiatric/Behavioral:  Negative for depression. The patient does not have insomnia. Objective:     Visit Vitals  LMP 01/31/2008       ECOG Performance Status (grade): 1  0 - able to carry on all pre-disease activity w/out restriction  1 - restricted but able to carry out light work  2 - ambulatory and can self- care but unable to carry out work  3 - bed or chair >50% of waking hours  4 - completely disable, total care, confined to bed or chair    Physical Exam  Constitutional:       Appearance: Normal appearance. HENT:      Head: Normocephalic and atraumatic. Eyes:      Pupils: Pupils are equal, round, and reactive to light. Cardiovascular:      Rate and Rhythm: Normal rate and regular rhythm. Heart sounds: Normal heart sounds. Pulmonary:      Effort: Pulmonary effort is normal.      Breath sounds: Normal breath sounds. Abdominal:      General: Bowel sounds are normal.      Palpations: Abdomen is soft. Tenderness: There is no abdominal tenderness. There is no guarding. Musculoskeletal:         General: Normal range of motion. Cervical back: Neck supple. Right lower leg: No edema. Left lower leg: No edema. Skin:     General: Skin is warm. Neurological:      General: No focal deficit present. Mental Status: She is alert and oriented to person, place, and time. Mental status is at baseline.         Diagnostics:      Recent Results (from the past 96 hour(s))   CBC WITH 3 PART DIFF    Collection Time: 08/04/22 9:15 AM   Result Value Ref Range    WBC 2.5 (L) 4.5 - 13.0 K/uL    RBC 3.33 (L) 4.10 - 5.10 M/uL    HGB 8.1 (L) 12.0 - 16 g/dL    HCT 27.1 (L) 36 - 48 %    MCV 81.4 78 - 102 FL    MCH 24.3 (L) 25.0 - 35.0 PG    MCHC 29.9 (L) 31 - 37 g/dL    RDW 18.2 (H) 11.5 - 14.5 %    PLATELET 925 834 - 166 K/uL    NEUTROPHILS 67 40 - 70 %    Mixed cells 9 0.1 - 17 %    LYMPHOCYTES 24 14 - 44 %    ABS. NEUTROPHILS 1.7 (L) 1.8 - 9.5 K/UL    ABS. MIXED CELLS 0.2 0.0 - 2.3 K/uL    ABS. LYMPHOCYTES 0.6 (L) 1.1 - 5.9 K/UL    DF AUTOMATED         Imaging:  Results for orders placed during the hospital encounter of 02/20/19    IR ESTAB PT LEVEL I    Narrative  This procedure was performed in is entirety by a physician assistant. : Gabriela Smallwood PA-C    Outpatient: Mediport incision check    CPT code: 68074    Attending physician: Dr. Rosendo Mandujano    History: Status post Mediport placement    Exam: Patient states that the Mediport has been used without pain or incident. Patient denies fever, chills, nausea, vomiting or pain at the site. Patient  denies drainage, swelling, bleeding or tenderness. Site well approximated and healing well with Dermabond still covering the  incision. No drainage, swelling, erythema or tenderness at the site. Impression  Impression: Mediport properly placed and healing well. Advised if have any  questions or concerns or start have signs of infection to contact interventional  radiology      Results for orders placed during the hospital encounter of 09/22/17    XR ABD (KUB)    Narrative  PROCEDURE:  Abdomen AP. INDICATION:  Constipation. Sitz markers study day #5. COMPARISON:  9/20/17. FINDINGS:    There are still 24 markers, similar to 9/20/17. Notably all of the markers are  now located in the descending colon. No evidence of acute small bowel obstruction is detected. No mass effect or  pathological calcification is identified.   No evidence of free intraperitoneal  air is detected. Impression  IMPRESSION:    1. Interval progression of the markers to the descending colon but all of the  24 markers are still present in the colon. 2.  Nonobstructive abdomen. Results for orders placed in visit on 07/26/21    CT BX LIVER NDL PERC    Narrative  CT BX LIVER NDL PERC    : Treva Alarcon MD    Indication: Liver lesion. Preoperative Diagnosis: Liver lesion. Postoperative Diagnosis: Same. Anesthesia: Local anesthesia with 1% lidocaine. Moderate sedation with Versed  and Fentanyl given. I personally monitored the patient face-to-face throughout  the entire procedure. See detailed nursing records for precise medication  dosing. Sedation time: 30 minutes    Technique: The risks, benefits, and alternatives of the procedure were discussed  with the patient. Verbal and written consent was obtained. Time-out was  performed to confirm the correct patient, procedure, and site. With the patient  supine, the skin was prepped and draped in usual sterile fashion. Maximum  sterile barrier technique used. Local anesthesia was administered. Under CT and  ultrasound guidance, a 17 gauge introducer was directed to the target through  which 18 gauge cores were obtained. Tract embolization was performed with  gelfoam slurry. Manual compression performed for hemostasis. Sterile dressing  was applied. Postprocedure imaging was performed. Note: All CT scans at this facility are performed using dose optimization  technique as appropriate to a performed exam, to include automated exposure  control, adjustment of the mA and/or kV according to patient size (including  appropriate matching for site-specific examinations), or use of iterative  reconstruction technique. Specimen: 5 cores. Bedside evaluation performed by Pathology department.     Estimated Blood Loss: Minimal    Contrast: None    Complications: No immediate    Drain/Implant: None    Condition: Stable  _______________    Impression  Successful ultrasound and CT-guided liver mass biopsy. IMPRESSION/PLAN:  Stage IV Primary peritoneal cancer with recurrence    -- Patient was being followed by Dr. Quang Gresham who left the practice. -- s/p carbo (auc=6), taxol (175 mg/m2) IV every 3 wks s/p 6 cycles completed 6/6/2019  -- s/p cytoreductive surgery with [facility] with dr. Cierra Burgos  -- s/p pelvic radiation  -- Pain-script for tylenol #3  -- 5/13/-9/30/2020 given platinum sensitive disease s/p  auc=5, Doxil 30 mg/m2 IV x 6 cycles s/p Total pelvic exenteration  -- 8/4/2021 Liver mass, core biopsies: Metastatic adenocarcinoma, most consistent with serous type. -- Biopsy c/w recurrence-reviewed [facility] and discussed treatment options. Dr. 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 Also discussed retreatment with platinum or if sensitive consider PARP inhibition. After discussion patient was placed on letrozole. -- 1/8/2022 Patient started Keytruda 400 mg IV every 3 weeks. -- Vaginal bleeding: Dr. 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 Has follow up with Urology. -- 5/31/2022 The patient presents to our HBV clinic today for follows up and continuing cancer treatment as scheduled. -- She has tolerated Keytruda Q6 weeks, no high graded toxicities. She was agreeable to continue current therapy. -- 6/3/2022 S.p Keytruda   -- 6/17/2022 PET scan reported progressive disease. Stage IV metastatic malignancy with interval progression, dominant intensely hypermetabolic hepatic metastasis with substantial  interval enlargement compared to prior studies.  Interval increase in size/extent of irregular intense increased FDG activity centered at and right of midline in the soft tissues of the deep  posterior pelvis, extending cephalad in the presacral region, at least some of which corresponds to irregular mass-like soft tissue density on CT, highly suspicious for malignancy. Small 6 mm subpleural pulmonary nodule left lower lobe laterally, not evident on PET but too small to be adequately assessed using PET  -- Today I have reviewed with the patient and her family about recent PET which showed progressive disease on multiple lines of therapy. We have discussed at length about next lines of therapy. The patient states she still believed she can achieve curable diease. I have showed her PET images which showed high burden tumor. I have explained to the patient and family that the goals of treatment of metastatic cancer are not curable but treatable in order to prolong survival and improve quality of life by reducing cancer-related symptoms. Given her burden disease and progressive on immunotherapy plus hormonal therapy, we suggested re-challenging chemotherapy with platinum regimens. 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 I have discussed about potential side effects of chemotherapy. The patient was agreeable with the plan. -- 7/28/2022 C1 D1 palliative Carboplatin + Gemcitabine. Tolerated therapy with no high graded toxicities. -- Today she reported on-off vaginal bleeding and rectal bleeding as well as discharge from the rectum. Having generalized weakness    Plan:  -- Will defer C1 D8 for 1 week, 8/11/2022 tentatively. -- Advised the patient to present to ED if worsening symptoms, bleeding, or concerns. -- Labs: CBC, CMP, . Supportive transfusion if indicated. -- Nutritional support: Referral to Nutritionist while on chemotherapy  -- RTC in 2 weeks, always sooner if required. Carboplatin + Gemcitabine  Days 1,8: Gemcitabine 800-1,000mg/m2 IV, followed by:  Day 1: Carboplatin AUC 4 IV   Repeat cycle every 3 weeks.        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 Zoila Hernandez for rectal bleeding  -- Monitor CBC, Iron profile, ferritin      Normocytic Anemia  Iron deficiency anemia  --Anemia w/u CBC, Iron profile, ferritin, B12/folate/MMA, SPEP  --Replacement as indicated. --Due to iron deficiency and vaginal bleeding related patient is scheduled for IV Venofer x 3  --We will continue to monitor labs CBC, Iron profile, B12/folate      Chemotherapy related Neuropathy  --On Lyrica 25 mg 3 times daily     Follow up in 2 weeks or sooner if indicated. Orders Placed This Encounter    CBC WITH AUTOMATED DIFF     Standing Status:   Future     Standing Expiration Date:   8/0/6275    METABOLIC PANEL, COMPREHENSIVE     Standing Status:   Future     Standing Expiration Date:   8/5/2023    IRON PROFILE     Standing Status:   Future     Standing Expiration Date:   8/5/2023    FERRITIN     Standing Status:   Future     Standing Expiration Date:   8/5/2023    VITAMIN B12 & FOLATE     Standing Status:   Future     Standing Expiration Date:   8/5/2023       Ms. Lexus Gant has a reminder for a \"due or due soon\" health maintenance. I have asked that she contact her primary care provider for follow-up on this health maintenance. All of patient's questions answered to their apparent satisfaction. They verbally show understanding and agreement with aforementioned plan. Sherice Felder MD  8/4/2022        Above mentioned total time spent for this encounter with more than 50% of the time spent in face-to-face counseling, discussing on diagnosis and management plan going forward, and co-ordination of care. Parts of this document has been produced using Dragon dictation system. Unrecognized errors in transcription may be present. Please do not hesitate to reach out for any questions or clarifications.       CC: Alverto Elena MD

## 2022-08-05 RX ORDER — HYDROCORTISONE SODIUM SUCCINATE 100 MG/2ML
100 INJECTION, POWDER, FOR SOLUTION INTRAMUSCULAR; INTRAVENOUS AS NEEDED
Status: CANCELLED | OUTPATIENT
Start: 2022-08-11

## 2022-08-05 RX ORDER — SODIUM CHLORIDE 9 MG/ML
5-40 INJECTION INTRAMUSCULAR; INTRAVENOUS; SUBCUTANEOUS AS NEEDED
Status: CANCELLED | OUTPATIENT
Start: 2022-08-11

## 2022-08-05 RX ORDER — ONDANSETRON 2 MG/ML
8 INJECTION INTRAMUSCULAR; INTRAVENOUS ONCE
Status: CANCELLED | OUTPATIENT
Start: 2022-08-11 | End: 2022-08-11

## 2022-08-05 RX ORDER — EPINEPHRINE 1 MG/ML
0.3 INJECTION, SOLUTION, CONCENTRATE INTRAVENOUS AS NEEDED
Status: CANCELLED | OUTPATIENT
Start: 2022-08-11

## 2022-08-05 RX ORDER — HEPARIN 100 UNIT/ML
500 SYRINGE INTRAVENOUS AS NEEDED
Status: CANCELLED | OUTPATIENT
Start: 2022-08-11

## 2022-08-05 RX ORDER — SODIUM CHLORIDE 9 MG/ML
5-250 INJECTION, SOLUTION INTRAVENOUS AS NEEDED
Status: CANCELLED | OUTPATIENT
Start: 2022-08-11

## 2022-08-05 RX ORDER — ALBUTEROL SULFATE 0.83 MG/ML
2.5 SOLUTION RESPIRATORY (INHALATION) AS NEEDED
Status: CANCELLED
Start: 2022-08-11

## 2022-08-05 RX ORDER — DIPHENHYDRAMINE HYDROCHLORIDE 50 MG/ML
50 INJECTION, SOLUTION INTRAMUSCULAR; INTRAVENOUS AS NEEDED
Status: CANCELLED
Start: 2022-08-11

## 2022-08-05 RX ORDER — ONDANSETRON 2 MG/ML
8 INJECTION INTRAMUSCULAR; INTRAVENOUS AS NEEDED
Status: CANCELLED | OUTPATIENT
Start: 2022-08-11

## 2022-08-05 RX ORDER — SODIUM CHLORIDE 0.9 % (FLUSH) 0.9 %
5-40 SYRINGE (ML) INJECTION AS NEEDED
Status: CANCELLED | OUTPATIENT
Start: 2022-08-11

## 2022-08-05 RX ORDER — DIPHENHYDRAMINE HYDROCHLORIDE 50 MG/ML
25 INJECTION, SOLUTION INTRAMUSCULAR; INTRAVENOUS AS NEEDED
Status: CANCELLED
Start: 2022-08-11

## 2022-08-05 RX ORDER — ACETAMINOPHEN 325 MG/1
650 TABLET ORAL AS NEEDED
Status: CANCELLED
Start: 2022-08-11

## 2022-08-08 DIAGNOSIS — D50.8 IRON DEFICIENCY ANEMIA SECONDARY TO INADEQUATE DIETARY IRON INTAKE: Primary | ICD-10-CM

## 2022-08-08 DIAGNOSIS — K62.5 RECTAL BLEEDING: ICD-10-CM

## 2022-08-08 DIAGNOSIS — G89.3 CANCER ASSOCIATED PAIN: ICD-10-CM

## 2022-08-08 RX ORDER — OXYCODONE AND ACETAMINOPHEN 5; 325 MG/1; MG/1
1 TABLET ORAL
Qty: 40 TABLET | Refills: 0 | Status: SHIPPED | OUTPATIENT
Start: 2022-08-08 | End: 2022-08-22 | Stop reason: SDUPTHER

## 2022-08-11 ENCOUNTER — HOSPITAL ENCOUNTER (OUTPATIENT)
Dept: INFUSION THERAPY | Age: 52
Discharge: HOME | End: 2022-08-11
Payer: MEDICARE

## 2022-08-11 VITALS
HEART RATE: 56 BPM | OXYGEN SATURATION: 96 % | WEIGHT: 231 LBS | BODY MASS INDEX: 40.92 KG/M2 | RESPIRATION RATE: 16 BRPM | DIASTOLIC BLOOD PRESSURE: 70 MMHG | TEMPERATURE: 97.7 F | SYSTOLIC BLOOD PRESSURE: 117 MMHG

## 2022-08-11 DIAGNOSIS — C48.2 PERITONEAL CARCINOMA (HCC): Primary | ICD-10-CM

## 2022-08-11 DIAGNOSIS — D50.8 OTHER IRON DEFICIENCY ANEMIA: ICD-10-CM

## 2022-08-11 PROBLEM — D50.9 IRON DEFICIENCY ANEMIA: Status: ACTIVE | Noted: 2022-08-11

## 2022-08-11 LAB
BASO+EOS+MONOS # BLD AUTO: 0.6 K/UL (ref 0–2.3)
BASO+EOS+MONOS NFR BLD AUTO: 10 % (ref 0.1–17)
DIFFERENTIAL METHOD BLD: ABNORMAL
ERYTHROCYTE [DISTWIDTH] IN BLOOD BY AUTOMATED COUNT: 18.8 % (ref 11.5–14.5)
HCT VFR BLD AUTO: 27.6 % (ref 36–48)
HGB BLD-MCNC: 8.2 G/DL (ref 12–16)
LYMPHOCYTES # BLD: 1 K/UL (ref 1.1–5.9)
LYMPHOCYTES NFR BLD: 19 % (ref 14–44)
MCH RBC QN AUTO: 24.5 PG (ref 25–35)
MCHC RBC AUTO-ENTMCNC: 29.7 G/DL (ref 31–37)
MCV RBC AUTO: 82.4 FL (ref 78–102)
NEUTS SEG # BLD: 3.8 K/UL (ref 1.8–9.5)
NEUTS SEG NFR BLD: 71 % (ref 40–70)
PLATELET # BLD AUTO: 332 K/UL (ref 140–440)
RBC # BLD AUTO: 3.35 M/UL (ref 4.1–5.1)
WBC # BLD AUTO: 5.4 K/UL (ref 4.5–13)

## 2022-08-11 PROCEDURE — 96367 TX/PROPH/DG ADDL SEQ IV INF: CPT

## 2022-08-11 PROCEDURE — 77030012965 HC NDL HUBR BBMI -A

## 2022-08-11 PROCEDURE — 74011250636 HC RX REV CODE- 250/636: Performed by: INTERNAL MEDICINE

## 2022-08-11 PROCEDURE — 96377 APPLICATON ON-BODY INJECTOR: CPT

## 2022-08-11 PROCEDURE — 96375 TX/PRO/DX INJ NEW DRUG ADDON: CPT

## 2022-08-11 PROCEDURE — 74011000250 HC RX REV CODE- 250: Performed by: INTERNAL MEDICINE

## 2022-08-11 PROCEDURE — 96413 CHEMO IV INFUSION 1 HR: CPT

## 2022-08-11 PROCEDURE — 96366 THER/PROPH/DIAG IV INF ADDON: CPT

## 2022-08-11 PROCEDURE — 85025 COMPLETE CBC W/AUTO DIFF WBC: CPT

## 2022-08-11 RX ORDER — SODIUM CHLORIDE 9 MG/ML
5-40 INJECTION INTRAMUSCULAR; INTRAVENOUS; SUBCUTANEOUS AS NEEDED
Status: CANCELLED | OUTPATIENT
Start: 2022-08-11

## 2022-08-11 RX ORDER — HEPARIN 100 UNIT/ML
500 SYRINGE INTRAVENOUS AS NEEDED
Status: CANCELLED
Start: 2022-08-25

## 2022-08-11 RX ORDER — SODIUM CHLORIDE 0.9 % (FLUSH) 0.9 %
5-40 SYRINGE (ML) INJECTION AS NEEDED
Status: DISPENSED | OUTPATIENT
Start: 2022-08-11 | End: 2022-08-11

## 2022-08-11 RX ORDER — SODIUM CHLORIDE 9 MG/ML
5-250 INJECTION, SOLUTION INTRAVENOUS AS NEEDED
Status: CANCELLED | OUTPATIENT
Start: 2022-08-11

## 2022-08-11 RX ORDER — EPINEPHRINE 1 MG/ML
0.3 INJECTION, SOLUTION, CONCENTRATE INTRAVENOUS AS NEEDED
Status: CANCELLED | OUTPATIENT
Start: 2022-08-25

## 2022-08-11 RX ORDER — HEPARIN 100 UNIT/ML
500 SYRINGE INTRAVENOUS AS NEEDED
Status: DISPENSED | OUTPATIENT
Start: 2022-08-11 | End: 2022-08-11

## 2022-08-11 RX ORDER — SODIUM CHLORIDE 9 MG/ML
5-250 INJECTION, SOLUTION INTRAVENOUS AS NEEDED
Status: CANCELLED | OUTPATIENT
Start: 2022-08-25

## 2022-08-11 RX ORDER — HEPARIN 100 UNIT/ML
500 SYRINGE INTRAVENOUS AS NEEDED
Status: CANCELLED
Start: 2022-08-11

## 2022-08-11 RX ORDER — ONDANSETRON 2 MG/ML
8 INJECTION INTRAMUSCULAR; INTRAVENOUS ONCE
Status: COMPLETED | OUTPATIENT
Start: 2022-08-11 | End: 2022-08-11

## 2022-08-11 RX ORDER — ACETAMINOPHEN 325 MG/1
650 TABLET ORAL AS NEEDED
Status: CANCELLED
Start: 2022-08-11

## 2022-08-11 RX ORDER — HYDROCORTISONE SODIUM SUCCINATE 100 MG/2ML
100 INJECTION, POWDER, FOR SOLUTION INTRAMUSCULAR; INTRAVENOUS AS NEEDED
Status: CANCELLED | OUTPATIENT
Start: 2022-08-11

## 2022-08-11 RX ORDER — ONDANSETRON 2 MG/ML
8 INJECTION INTRAMUSCULAR; INTRAVENOUS AS NEEDED
Status: CANCELLED | OUTPATIENT
Start: 2022-08-11

## 2022-08-11 RX ORDER — SODIUM CHLORIDE 9 MG/ML
5-250 INJECTION, SOLUTION INTRAVENOUS AS NEEDED
Status: DISPENSED | OUTPATIENT
Start: 2022-08-11 | End: 2022-08-11

## 2022-08-11 RX ORDER — ACETAMINOPHEN 325 MG/1
650 TABLET ORAL AS NEEDED
Status: CANCELLED
Start: 2022-08-25

## 2022-08-11 RX ORDER — EPINEPHRINE 1 MG/ML
0.3 INJECTION, SOLUTION, CONCENTRATE INTRAVENOUS AS NEEDED
Status: CANCELLED | OUTPATIENT
Start: 2022-08-11

## 2022-08-11 RX ORDER — ONDANSETRON 2 MG/ML
8 INJECTION INTRAMUSCULAR; INTRAVENOUS AS NEEDED
Status: CANCELLED | OUTPATIENT
Start: 2022-08-25

## 2022-08-11 RX ORDER — ALBUTEROL SULFATE 0.83 MG/ML
2.5 SOLUTION RESPIRATORY (INHALATION) AS NEEDED
Status: CANCELLED
Start: 2022-08-11

## 2022-08-11 RX ORDER — SODIUM CHLORIDE 0.9 % (FLUSH) 0.9 %
5-40 SYRINGE (ML) INJECTION AS NEEDED
Status: CANCELLED | OUTPATIENT
Start: 2022-08-25

## 2022-08-11 RX ORDER — HYDROCORTISONE SODIUM SUCCINATE 100 MG/2ML
100 INJECTION, POWDER, FOR SOLUTION INTRAMUSCULAR; INTRAVENOUS AS NEEDED
Status: CANCELLED | OUTPATIENT
Start: 2022-08-25

## 2022-08-11 RX ORDER — DIPHENHYDRAMINE HYDROCHLORIDE 50 MG/ML
25 INJECTION, SOLUTION INTRAMUSCULAR; INTRAVENOUS AS NEEDED
Status: CANCELLED
Start: 2022-08-11

## 2022-08-11 RX ORDER — DIPHENHYDRAMINE HYDROCHLORIDE 50 MG/ML
50 INJECTION, SOLUTION INTRAMUSCULAR; INTRAVENOUS AS NEEDED
Status: CANCELLED
Start: 2022-08-11

## 2022-08-11 RX ORDER — DIPHENHYDRAMINE HYDROCHLORIDE 50 MG/ML
50 INJECTION, SOLUTION INTRAMUSCULAR; INTRAVENOUS AS NEEDED
Status: CANCELLED
Start: 2022-08-25

## 2022-08-11 RX ORDER — SODIUM CHLORIDE 9 MG/ML
5-40 INJECTION INTRAMUSCULAR; INTRAVENOUS; SUBCUTANEOUS AS NEEDED
Status: CANCELLED | OUTPATIENT
Start: 2022-08-25

## 2022-08-11 RX ORDER — DIPHENHYDRAMINE HYDROCHLORIDE 50 MG/ML
25 INJECTION, SOLUTION INTRAMUSCULAR; INTRAVENOUS AS NEEDED
Status: CANCELLED
Start: 2022-08-25

## 2022-08-11 RX ORDER — ALBUTEROL SULFATE 0.83 MG/ML
2.5 SOLUTION RESPIRATORY (INHALATION) AS NEEDED
Status: CANCELLED
Start: 2022-08-25

## 2022-08-11 RX ADMIN — PEGFILGRASTIM 6 MG: KIT SUBCUTANEOUS at 11:32

## 2022-08-11 RX ADMIN — HEPARIN 500 UNITS: 100 SYRINGE at 13:29

## 2022-08-11 RX ADMIN — SODIUM CHLORIDE 25 ML/HR: 9 INJECTION, SOLUTION INTRAVENOUS at 11:34

## 2022-08-11 RX ADMIN — IRON SUCROSE 300 MG: 20 INJECTION, SOLUTION INTRAVENOUS at 11:37

## 2022-08-11 RX ADMIN — ONDANSETRON 8 MG: 2 INJECTION INTRAMUSCULAR; INTRAVENOUS at 09:53

## 2022-08-11 RX ADMIN — SODIUM CHLORIDE 25 ML/HR: 9 INJECTION, SOLUTION INTRAVENOUS at 09:29

## 2022-08-11 RX ADMIN — SODIUM CHLORIDE, PRESERVATIVE FREE 10 ML: 5 INJECTION INTRAVENOUS at 13:29

## 2022-08-11 RX ADMIN — GEMCITABINE 1700 MG: 100 INJECTION, SOLUTION INTRAVENOUS at 10:36

## 2022-08-11 NOTE — PROGRESS NOTES
[facility] Progress Note    Date: 2022    Name: Anitha Ventura    MRN: [id number]         : 1970    Ms. Max Heller arrived in the [facility] today ambulatory at [phone], in stable condition, here for CYCLE 1, DAY 8, IV CARBOPLATIN + GEMCITABINE CHEMOTHERAPY REGIMEN (DAYS 1 & 8, OF EVERY 21 DAY CYCLE) + Venofer infusion 1 of 3. Carboplatin + Gemcitabine on Day 1 of every cycle & Gemcitabine only on every day 8.     **DAY 8 TODAY (GEMCITABINE ONLY)**         She was assessed and education was provided. Pt states \"today is a great day\". Pt states she is feeling much better. Denies any rectal bleeding. Pt walking without any assistance of cane, walker or wheelchair today. States she still has nausea on and off but continues to take her zofran prn. Pt given verbal and written education on Neulata OBI and Venofer  infusion. Patient education handouts given to patient and extensive verbal education given. All questions were answered and reinforced. Pt also watched Neulasta video. Ms. Cannon's vitals were reviewed. Visit Vitals  /74 (BP 1 Location: Left lower arm, BP Patient Position: Sitting)   Pulse (!) 59   Temp 97.9 °F (36.6 °C)   Resp 18   SpO2 97%     Signed chemotherapy consent was viewed in her electronic record. Right upper chest single lumen mediport accessed with 20 ga 1 in upton needle using sterile technique. Flushed well with NS with positive blood return. After 10ml blood waste, CBC drawn per treatment order and ran in house.      CBC results from today are within parameters to proceed with chemo:    Recent Results (from the past 12 hour(s))   CBC WITH 3 PART DIFF    Collection Time: 22  9:15 AM   Result Value Ref Range    WBC 5.4 4.5 - 13.0 K/uL    RBC 3.35 (L) 4.10 - 5.10 M/uL    HGB 8.2 (L) 12.0 - 16 g/dL    HCT 27.6 (L) 36 - 48 %    MCV 82.4 78 - 102 FL    MCH 24.5 (L) 25.0 - 35.0 PG    MCHC 29.7 (L) 31 - 37 g/dL    RDW 18.8 (H) 11.5 - 14.5 %    PLATELET 390 648 - 440 K/uL    NEUTROPHILS 71 (H) 40 - 70 %    Mixed cells 10 0.1 - 17 %    LYMPHOCYTES 19 14 - 44 %    ABS. NEUTROPHILS 3.8 1.8 - 9.5 K/UL    ABS. MIXED CELLS 0.6 0.0 - 2.3 K/uL    ABS. LYMPHOCYTES 1.0 (L) 1.1 - 5.9 K/UL    DF AUTOMATED            ml IV BAG started to infuse @ [facility] throughout treatment today. Pre-medications given approximately 30 minutes prior to start of chemotherapy per order:  Zofran 8 mg IVP. Gemcitabine (Gemzar) 1,700 mg (800mg/m2) IV infused approximately over 30 minutes per order followed by NS flush. Tape secured around tubing and connector to ensure placement during chemo infusion due to tubing disconnecting during last chemo spill. After completion of medication chemo tubing disconnected and discarded. Pegfilgrastim (Neulasta) SQ OBI wearable injectafer 6mg placed to RT back of arm. Device secured with 2 transparent dressings. Pt made aware not to pull off dressings. Pt made aware to remove injector at 1600 tomorrow pm. She verbalizes understanding. Pt given education pamphlet from  out of neulasta box. New IV tubing with 250 ml NS started at [facility] to infuse throughout venofer infusion. Iron Sucrose (Venofer) 300 mg IV infused over approximately 90 minutes per order followed by NS flush. After completion of venofer infusion, pt declined 30 minute observation stay. Pt given instructions on if any reactions to report to ED. She verbalizes understanding. PIV flushed with NS and removed. 2x2 applied with coban. Ms. Zack Glover voiced no complaints or concerns. Pt armband removed and shredded. Ms. Zack Glover was discharged from [place] [address] in stable condition at 1335.  She is to return on 8/22/22 at 0900 for follow-up with Dr. Jone Garcia, RN  August 11, 2022  9:43 AM

## 2022-08-17 ENCOUNTER — APPOINTMENT (OUTPATIENT)
Dept: INFUSION THERAPY | Age: 52
End: 2022-08-17
Payer: MEDICARE

## 2022-08-18 ENCOUNTER — HOSPITAL ENCOUNTER (OUTPATIENT)
Dept: INFUSION THERAPY | Age: 52
End: 2022-08-18
Payer: MEDICARE

## 2022-08-18 RX ORDER — ALBUTEROL SULFATE 0.83 MG/ML
2.5 SOLUTION RESPIRATORY (INHALATION) AS NEEDED
Status: CANCELLED
Start: 2022-08-25

## 2022-08-18 RX ORDER — HEPARIN 100 UNIT/ML
500 SYRINGE INTRAVENOUS AS NEEDED
Status: CANCELLED
Start: 2022-09-08

## 2022-08-18 RX ORDER — ACETAMINOPHEN 325 MG/1
650 TABLET ORAL AS NEEDED
Status: CANCELLED
Start: 2022-08-25

## 2022-08-18 RX ORDER — ONDANSETRON 2 MG/ML
8 INJECTION INTRAMUSCULAR; INTRAVENOUS AS NEEDED
Status: CANCELLED | OUTPATIENT
Start: 2022-08-25

## 2022-08-18 RX ORDER — SODIUM CHLORIDE 0.9 % (FLUSH) 0.9 %
5-40 SYRINGE (ML) INJECTION AS NEEDED
Status: CANCELLED | OUTPATIENT
Start: 2022-09-08

## 2022-08-18 RX ORDER — ACETAMINOPHEN 325 MG/1
650 TABLET ORAL AS NEEDED
Status: CANCELLED
Start: 2022-09-08

## 2022-08-18 RX ORDER — DIPHENHYDRAMINE HYDROCHLORIDE 50 MG/ML
25 INJECTION, SOLUTION INTRAMUSCULAR; INTRAVENOUS AS NEEDED
Status: CANCELLED
Start: 2022-08-25

## 2022-08-18 RX ORDER — DIPHENHYDRAMINE HYDROCHLORIDE 50 MG/ML
25 INJECTION, SOLUTION INTRAMUSCULAR; INTRAVENOUS AS NEEDED
Status: CANCELLED
Start: 2022-09-08

## 2022-08-18 RX ORDER — SODIUM CHLORIDE 9 MG/ML
5-250 INJECTION, SOLUTION INTRAVENOUS AS NEEDED
Status: CANCELLED | OUTPATIENT
Start: 2022-08-25

## 2022-08-18 RX ORDER — ONDANSETRON 2 MG/ML
8 INJECTION INTRAMUSCULAR; INTRAVENOUS ONCE
Status: CANCELLED | OUTPATIENT
Start: 2022-09-08 | End: 2022-09-01

## 2022-08-18 RX ORDER — EPINEPHRINE 1 MG/ML
0.3 INJECTION, SOLUTION, CONCENTRATE INTRAVENOUS AS NEEDED
Status: CANCELLED | OUTPATIENT
Start: 2022-09-08

## 2022-08-18 RX ORDER — HYDROCORTISONE SODIUM SUCCINATE 100 MG/2ML
100 INJECTION, POWDER, FOR SOLUTION INTRAMUSCULAR; INTRAVENOUS AS NEEDED
Status: CANCELLED | OUTPATIENT
Start: 2022-09-08

## 2022-08-18 RX ORDER — DIPHENHYDRAMINE HYDROCHLORIDE 50 MG/ML
50 INJECTION, SOLUTION INTRAMUSCULAR; INTRAVENOUS AS NEEDED
Status: CANCELLED
Start: 2022-09-08

## 2022-08-18 RX ORDER — EPINEPHRINE 1 MG/ML
0.3 INJECTION, SOLUTION, CONCENTRATE INTRAVENOUS AS NEEDED
Status: CANCELLED | OUTPATIENT
Start: 2022-08-25

## 2022-08-18 RX ORDER — ONDANSETRON 2 MG/ML
8 INJECTION INTRAMUSCULAR; INTRAVENOUS AS NEEDED
Status: CANCELLED | OUTPATIENT
Start: 2022-09-08

## 2022-08-18 RX ORDER — SODIUM CHLORIDE 9 MG/ML
5-250 INJECTION, SOLUTION INTRAVENOUS AS NEEDED
Status: CANCELLED | OUTPATIENT
Start: 2022-09-08

## 2022-08-18 RX ORDER — ALBUTEROL SULFATE 0.83 MG/ML
2.5 SOLUTION RESPIRATORY (INHALATION) AS NEEDED
Status: CANCELLED
Start: 2022-09-08

## 2022-08-18 RX ORDER — HYDROCORTISONE SODIUM SUCCINATE 100 MG/2ML
100 INJECTION, POWDER, FOR SOLUTION INTRAMUSCULAR; INTRAVENOUS AS NEEDED
Status: CANCELLED | OUTPATIENT
Start: 2022-08-25

## 2022-08-18 RX ORDER — SODIUM CHLORIDE 9 MG/ML
5-40 INJECTION INTRAMUSCULAR; INTRAVENOUS; SUBCUTANEOUS AS NEEDED
Status: CANCELLED | OUTPATIENT
Start: 2022-09-08

## 2022-08-18 RX ORDER — DIPHENHYDRAMINE HYDROCHLORIDE 50 MG/ML
50 INJECTION, SOLUTION INTRAMUSCULAR; INTRAVENOUS AS NEEDED
Status: CANCELLED
Start: 2022-08-25

## 2022-08-18 RX ORDER — SODIUM CHLORIDE 9 MG/ML
5-40 INJECTION INTRAMUSCULAR; INTRAVENOUS; SUBCUTANEOUS AS NEEDED
Status: CANCELLED | OUTPATIENT
Start: 2022-08-25

## 2022-08-22 ENCOUNTER — OFFICE VISIT (OUTPATIENT)
Dept: ONCOLOGY | Age: 52
End: 2022-08-22
Payer: MEDICARE

## 2022-08-22 ENCOUNTER — HOSPITAL ENCOUNTER (OUTPATIENT)
Dept: INFUSION THERAPY | Age: 52
Discharge: HOME | End: 2022-08-22
Payer: MEDICARE

## 2022-08-22 VITALS
HEART RATE: 55 BPM | TEMPERATURE: 98.4 F | DIASTOLIC BLOOD PRESSURE: 83 MMHG | SYSTOLIC BLOOD PRESSURE: 138 MMHG | HEIGHT: 63 IN | BODY MASS INDEX: 40.92 KG/M2

## 2022-08-22 DIAGNOSIS — K62.5 RECTAL BLEEDING: ICD-10-CM

## 2022-08-22 DIAGNOSIS — C57.7 MALIGNANT NEOPLASM OF OTHER SPECIFIED FEMALE GENITAL ORGANS (HCC): ICD-10-CM

## 2022-08-22 DIAGNOSIS — D50.8 IRON DEFICIENCY ANEMIA SECONDARY TO INADEQUATE DIETARY IRON INTAKE: ICD-10-CM

## 2022-08-22 DIAGNOSIS — C48.2 PERITONEAL CARCINOMA (HCC): Primary | ICD-10-CM

## 2022-08-22 DIAGNOSIS — D50.0 IRON DEFICIENCY ANEMIA DUE TO CHRONIC BLOOD LOSS: ICD-10-CM

## 2022-08-22 DIAGNOSIS — G62.9 NEUROPATHY: ICD-10-CM

## 2022-08-22 DIAGNOSIS — C56.3 MALIGNANT NEOPLASM OF BOTH OVARIES (HCC): ICD-10-CM

## 2022-08-22 DIAGNOSIS — G89.3 CANCER ASSOCIATED PAIN: ICD-10-CM

## 2022-08-22 DIAGNOSIS — C48.2 PERITONEAL CARCINOMA (HCC): ICD-10-CM

## 2022-08-22 DIAGNOSIS — Z79.899 ON ANTINEOPLASTIC CHEMOTHERAPY: ICD-10-CM

## 2022-08-22 LAB
ALBUMIN SERPL-MCNC: 3.3 G/DL (ref 3.4–5)
ALBUMIN/GLOB SERPL: 0.8 {RATIO} (ref 0.8–1.7)
ALP SERPL-CCNC: 197 U/L (ref 45–117)
ALT SERPL-CCNC: 50 U/L (ref 13–56)
ANION GAP SERPL CALC-SCNC: 8 MMOL/L (ref 3–18)
AST SERPL-CCNC: 28 U/L (ref 10–38)
BASO+EOS+MONOS # BLD AUTO: 1.1 K/UL (ref 0–2.3)
BASO+EOS+MONOS NFR BLD AUTO: 9 % (ref 0.1–17)
BILIRUB SERPL-MCNC: 0.4 MG/DL (ref 0.2–1)
BUN SERPL-MCNC: 43 MG/DL (ref 7–18)
BUN/CREAT SERPL: 19 (ref 12–20)
CALCIUM SERPL-MCNC: 8.6 MG/DL (ref 8.5–10.1)
CHLORIDE SERPL-SCNC: 113 MMOL/L (ref 100–111)
CO2 SERPL-SCNC: 19 MMOL/L (ref 21–32)
CREAT SERPL-MCNC: 2.25 MG/DL (ref 0.6–1.3)
DIFFERENTIAL METHOD BLD: ABNORMAL
ERYTHROCYTE [DISTWIDTH] IN BLOOD BY AUTOMATED COUNT: 19.7 % (ref 11.5–14.5)
GLOBULIN SER CALC-MCNC: 3.9 G/DL (ref 2–4)
GLUCOSE SERPL-MCNC: 89 MG/DL (ref 74–99)
HCT VFR BLD AUTO: 28.1 % (ref 36–48)
HGB BLD-MCNC: 8.3 G/DL (ref 12–16)
LYMPHOCYTES # BLD: 1.2 K/UL (ref 1.1–5.9)
LYMPHOCYTES NFR BLD: 9 % (ref 14–44)
MCH RBC QN AUTO: 24.3 PG (ref 25–35)
MCHC RBC AUTO-ENTMCNC: 29.5 G/DL (ref 31–37)
MCV RBC AUTO: 82.2 FL (ref 78–102)
NEUTS SEG # BLD: 10.5 K/UL (ref 1.8–9.5)
NEUTS SEG NFR BLD: 82 % (ref 40–70)
PLATELET # BLD AUTO: 143 K/UL (ref 140–440)
POTASSIUM SERPL-SCNC: 4.7 MMOL/L (ref 3.5–5.5)
PROT SERPL-MCNC: 7.2 G/DL (ref 6.4–8.2)
RBC # BLD AUTO: 3.42 M/UL (ref 4.1–5.1)
SODIUM SERPL-SCNC: 140 MMOL/L (ref 136–145)
WBC # BLD AUTO: 12.8 K/UL (ref 4.5–13)

## 2022-08-22 PROCEDURE — G8754 DIAS BP LESS 90: HCPCS | Performed by: INTERNAL MEDICINE

## 2022-08-22 PROCEDURE — 85025 COMPLETE CBC W/AUTO DIFF WBC: CPT

## 2022-08-22 PROCEDURE — G9899 SCRN MAM PERF RSLTS DOC: HCPCS | Performed by: INTERNAL MEDICINE

## 2022-08-22 PROCEDURE — G8427 DOCREV CUR MEDS BY ELIG CLIN: HCPCS | Performed by: INTERNAL MEDICINE

## 2022-08-22 PROCEDURE — 80053 COMPREHEN METABOLIC PANEL: CPT

## 2022-08-22 PROCEDURE — G9717 DOC PT DX DEP/BP F/U NT REQ: HCPCS | Performed by: INTERNAL MEDICINE

## 2022-08-22 PROCEDURE — 36415 COLL VENOUS BLD VENIPUNCTURE: CPT

## 2022-08-22 PROCEDURE — 99214 OFFICE O/P EST MOD 30 MIN: CPT | Performed by: INTERNAL MEDICINE

## 2022-08-22 PROCEDURE — G8417 CALC BMI ABV UP PARAM F/U: HCPCS | Performed by: INTERNAL MEDICINE

## 2022-08-22 PROCEDURE — G9711 PT HX TOT COL OR COLON CA: HCPCS | Performed by: INTERNAL MEDICINE

## 2022-08-22 PROCEDURE — G8752 SYS BP LESS 140: HCPCS | Performed by: INTERNAL MEDICINE

## 2022-08-22 RX ORDER — OLANZAPINE 2.5 MG/1
2.5 TABLET ORAL
Qty: 30 TABLET | Refills: 1 | Status: SHIPPED | OUTPATIENT
Start: 2022-08-22

## 2022-08-22 RX ORDER — LETROZOLE 2.5 MG/1
2.5 TABLET, FILM COATED ORAL DAILY
Qty: 90 TABLET | Refills: 1 | Status: SHIPPED | OUTPATIENT
Start: 2022-08-22

## 2022-08-22 RX ORDER — SODIUM CHLORIDE 0.9 % (FLUSH) 0.9 %
5-40 SYRINGE (ML) INJECTION AS NEEDED
Status: CANCELLED | OUTPATIENT
Start: 2022-09-01

## 2022-08-22 RX ORDER — ACETAMINOPHEN 325 MG/1
650 TABLET ORAL AS NEEDED
Status: CANCELLED
Start: 2022-09-01

## 2022-08-22 RX ORDER — OXYCODONE AND ACETAMINOPHEN 5; 325 MG/1; MG/1
1 TABLET ORAL
Qty: 40 TABLET | Refills: 0 | Status: SHIPPED | OUTPATIENT
Start: 2022-08-22 | End: 2022-09-06 | Stop reason: SDUPTHER

## 2022-08-22 RX ORDER — PROCHLORPERAZINE MALEATE 10 MG
10 TABLET ORAL
Qty: 50 TABLET | Refills: 5 | Status: CANCELLED | OUTPATIENT
Start: 2022-08-22

## 2022-08-22 RX ORDER — HYDROCORTISONE SODIUM SUCCINATE 100 MG/2ML
100 INJECTION, POWDER, FOR SOLUTION INTRAMUSCULAR; INTRAVENOUS AS NEEDED
Status: CANCELLED | OUTPATIENT
Start: 2022-09-01

## 2022-08-22 RX ORDER — ONDANSETRON 2 MG/ML
8 INJECTION INTRAMUSCULAR; INTRAVENOUS AS NEEDED
Status: CANCELLED | OUTPATIENT
Start: 2022-09-01

## 2022-08-22 RX ORDER — ALBUTEROL SULFATE 0.83 MG/ML
2.5 SOLUTION RESPIRATORY (INHALATION) AS NEEDED
Status: CANCELLED
Start: 2022-09-01

## 2022-08-22 RX ORDER — DIPHENHYDRAMINE HYDROCHLORIDE 50 MG/ML
25 INJECTION, SOLUTION INTRAMUSCULAR; INTRAVENOUS AS NEEDED
Status: CANCELLED
Start: 2022-09-01

## 2022-08-22 RX ORDER — SODIUM CHLORIDE 9 MG/ML
5-250 INJECTION, SOLUTION INTRAVENOUS AS NEEDED
Status: CANCELLED | OUTPATIENT
Start: 2022-09-01

## 2022-08-22 RX ORDER — EPINEPHRINE 1 MG/ML
0.3 INJECTION, SOLUTION, CONCENTRATE INTRAVENOUS AS NEEDED
Status: CANCELLED | OUTPATIENT
Start: 2022-09-01

## 2022-08-22 RX ORDER — DIPHENHYDRAMINE HYDROCHLORIDE 50 MG/ML
50 INJECTION, SOLUTION INTRAMUSCULAR; INTRAVENOUS AS NEEDED
Status: CANCELLED
Start: 2022-09-01

## 2022-08-22 RX ORDER — HEPARIN 100 UNIT/ML
500 SYRINGE INTRAVENOUS AS NEEDED
Status: CANCELLED
Start: 2022-09-01

## 2022-08-22 RX ORDER — AMLODIPINE BESYLATE 10 MG/1
TABLET ORAL
Qty: 90 TABLET | Refills: 0 | Status: SHIPPED | OUTPATIENT
Start: 2022-08-22

## 2022-08-22 RX ORDER — PREGABALIN 25 MG/1
25 CAPSULE ORAL 3 TIMES DAILY
Qty: 90 CAPSULE | Refills: 3 | Status: SHIPPED | OUTPATIENT
Start: 2022-08-22

## 2022-08-22 RX ORDER — SODIUM CHLORIDE 9 MG/ML
5-40 INJECTION INTRAMUSCULAR; INTRAVENOUS; SUBCUTANEOUS AS NEEDED
Status: CANCELLED | OUTPATIENT
Start: 2022-09-01

## 2022-08-22 NOTE — PROGRESS NOTES
[facility] HEMATOLOGY/MEDICAL ONCOLOGY      Name: Lam Logan  MRN: [id number]  : 1970/52 y.o. Date: 2022    Oncology History  Lam Logan is a 47 y.o.  postmenopausal female referred by Dr. Chao Gilbert with peritoneal cancer. Patient was being followed by Dr. Nell Anglin who left the practice. Intitally seen be JOHN talaverawith reports of vaginal bleeding. Given pt's history of hysteretectomy with BSO for endometriosis in  a TVUS was ordered. Which revealed a 6.8 cm x 5.2 cm x 4.6 cm at midline vaginal cuff. This prompted pelvic CT with findings of a lesion measuring 7.6 x 5.8 x 7.2 cm and is compatible with a pelvic neoplasm vs endometrioma given prior history of endometriosis. Tumor markers done on 2018 all normal.  S/p  Exploratory laparotomy, exploration of retroperitoneal spaces, exam under anesthesia with biopsy of rectovaginal mass on 2019. Had sigmoidoscopy which revealed submucosal mass. S/p cycle #6 of carbo/taxol on 2019. S/p cytoreductive surgery with HIPEC on 2019. S/p radiation treatment completed in 2019. Was seen in office and had a biopsy c/w recurrence. S/p cycle #6 of Carbo/Doxil on 2020. S/p Exploratory laparotomy. 2. Lysis of adhesions. 3. Simple appendectomy. 4. 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 On . She also had a revision of urostomy that is still leaking. S/p IR biopsy of liver on 2021 which demonstrated recurrent disease. Pt on palliative letrozole and keytruda. HPI  Patient was being followed previously by Dr. Nell Anglin who left the practice. The patient presents to our HBV clinic today for follows up and continuing cancer treatment as scheduled. Here for follow-up. She continues to report on and off vaginal bleeding and rectal bleeding as well as discharge from the rectum. Continues to have pain controlled with meds.  Continues to have nausea  and report recent occasional emesis. Report having some fatigue. Patient states her neuropathy is worse and had stopped taking her Neurontin and Lyrica. The patient denies fevers, chills, night sweats, skin lumps or bumps, acute bleeding or bruising issues. Denies headaches, acute vision change, dizziness, chest pain, worsen shortness of breath, palpitation, productive cough, vomiting, worsening abdominal pain, altered bowel habits, dysuria, new bone pain or back pain, focal numbness or weakness.        Component      Latest Ref Rng & Units 3/11/2022           9:10 AM   CA-125      1.5 - 35.0 U/mL 4     Component      Latest Ref Rng & Units 2/11/2022           9:37 AM   CA-125      1.5 - 35.0 U/mL 4     Component      Latest Ref Rng & Units 12/16/2021          10:25AM   Cancer Ag (CA) 125      0.0 - 38.1 U/mL 5       Component      Latest Ref Rng & Units 6/17/2021          12:00 AM   Cancer Ag (CA) 125      0.0 - 38.1 U/mL 5.1     Component      Latest Ref Rng & Units 9/28/2020          11:11 AM   CA-125      1.5 - 35.0 U/mL 6     Component      Latest Ref Rng & Units 8/31/2020          10:01 AM   CA-125      1.5 - 35.0 U/mL 8     Component      Latest Ref Rng & Units 7/7/2020          11:30 AM   CA-125      1.5 - 35.0 U/mL 5     Component      Latest Ref Rng & Units 6/8/2020          10:40 AM   CA-125      1.5 - 35.0 U/mL 7     Component      Latest Ref Rng & Units 5/11/2020          11:30 AM   CA-125      1.5 - 35.0 U/mL 7     Component      Latest Ref Rng & Units 3/4/2020           8:15 AM   CA-125      1.5 - 35.0 U/mL 7     Component      Latest Ref Rng & Units 11/15/2019           9:56 AM   CA-125      1.5 - 35.0 U/mL 6     Component      Latest Ref Rng & Units 6/6/2019           8:44 AM   CA-125      1.5 - 35.0 U/mL 7     Component      Latest Ref Rng & Units 5/16/2019 4/25/2019           8:26 AM  8:20 AM   Cancer Ag (CA) 125      0.0 - 38.1 U/mL 7.8 8.7     Component      Latest Ref Rng & Units 4/4/2019           8:28 AM   Cancer Ag (CA) 125      0.0 - 38.1 U/mL 8.8     Component      Latest Ref Rng & Units 3/14/2019 2/21/2019           8:15 AM  8:32 AM   Cancer Ag (CA) 125      0.0 - 38.1 U/mL 10.4 18.4     12/17/2018 : 9.3  12/17/2018 CEA: 2.0  12/17/2018 AFP: 3.8    Pathology  8/4/2021  Liver mass, core biopsies:        Metastatic adenocarcinoma, most consistent with serous type. DIAGNOSIS COMMENT:   Although metastatic ovarian or primary peritoneal carcinoma forming   parenchymal liver lesions is uncommon, the histologic features in this   case are similar to those in the patient's prior rectovaginal mass biopsy   from 2019 and the immunohistochemical features are similar to those   documented in the EMR from a pelvic exenteration specimen performed at an   outside institution in 2020.   4/20/2020  Vaginal biopsy  Papillary serous adenocarcinoma    8/8/2019  A) COLON, PERICOLIC NODULE, EXCISION:      - ACELLULAR MUCIN WITH MULTIPLE CALCIFICATIONS, AND ASSOCIATED FIBROUS WALL WITH        HYALINIZATION, AND CHRONIC INFLAMMATION. - ADJACENT BENIGN FIBROADIPOSE TISSUE, CONSISTENT WITH MESENTERY. - NO EVIDENCE OF MALIGNANCY. - SEE COMMENT. B) LIVER, RIGHT LOBE, BIOPSY:      - NODULAR FAT NECROSIS AND FIBROSIS OF THE LIVER CAPSULE.      - MILD STEATOSIS.       - NO EVIDENCE OF MALIGNANCY. C) COLON, SIGMOID, SEROSAL IMPLANT, EXCISION:      - NODULAR FAT NECROSIS WITH FIBROSIS, CHRONIC INFLAMMATION, CALCIFICATIONS, AND        CYSTIC DEGENERATION WITHOUT MUCIN.      - NO EVIDENCE OF MALIGNANCY. D) OMENTUM, OMENTECTOMY (RESECTION):      - CONGESTED FIBROADIPOSE TISSUE WITH FOCAL FIBROSIS AND CHRONIC INFLAMMATION, AND        CALCIFIED NODULAR FIBROSIS.      - NO EVIDENCE OF MALIGNANCY. E) PERITONEUM, LEFT ANTERIOR ABDOMINAL, RESECTION:      - CONGESTED PERITONEAL TISSUE, NO EVIDENCE OF MALIGNANCY. F) PERITONEUM, RIGHT ANTERIOR ABDOMINAL, RESECTION:      - CONGESTED PERITONEAL TISSUE, NO EVIDENCE OF MALIGNANCY.     G) COLON, SIGMOID, SEROSAL IMPLANT, EXCISION:      - NODULAR FIBROSIS WITH HISTIOCYTES, SUGGESTIVE OF FAT NECROSIS.      - CYSTIC DEGENERATION, WITHOUT MUCIN.      - NO EVIDENCE OF MALIGNANCY. H) SOFT TISSUE, FALCIFORM, EXCISION:      - CONSISTENT WITH FALCIFORM LIGAMENT.      - NO EVIDENCE OF MALIGNANCY. PATHOLOGIC STAGE:  ypTx, pNx    1/17/2019   RECTOVAGINAL MASS (#1, 2) AND PELVIC MASS, BIOPSIES:   CONSISTENT WITH SEROUS CARCINOMA WITH EXTENSIVE NECROSIS. Imaging  MRI liver 7/6/2021     FINDINGS:     Abdominal Wall:  There is a circumscribed 8.3 cm-width x 2.4 cm-AP x 2.9  cm-craniocaudal length T2 hyperintense subcutaneous layer fluid collection along  the inferior margin of the ileal conduit. The colostomy site in the left lower  quadrant appears unremarkable. Liver:  A new ovoid T1-low T2-high signal 1.8 x 1.4 cm lesion is identified in  the segment 8. Another 0.8 x 0.6 cm T1-low T2-high signal focus is identified  in the segment 3. These structures were not apparent on prior studies. With  diffusion imaging, no evidence of restricted diffusion is demonstrated at these  foci. Biliary:  No evidence for significant common bile duct dilation. Gallbladder:  Mild distention of the gallbladder. No definite gallstones. Spleen, Adrenal Glands, Pancreas:  Unremarkable. Kidneys:  Cyst at the left kidney lower pole region measuring about 1.2 x 1.2  cm. Increase in size compared to prior CTs from above 0.8 x 0.7 cm. Miscellaneous: The largest of the periportal nodes measures up to about 1.3 x  1.1 cm. IMPRESSION     1. Focal fluid collection in the subcutaneous tissue adjacent to the ileal  conduit. This fluid collection may be amenable to aspiration under ultrasound  guidance. 2.  New hepatic lesions as described.   Although there is apparent lack of  restricted diffusion, further investigation with ultrasound is warranted to  assess if these structures are indeed cystic in nature. 3.  Left renal cyst.     4.  Slight az prominence. MRI pelvis 7/6/2021  FINDINGS:     Along the inferior aspect of the ileal conduit stoma site, focal elongated T1  high T2-signal fluid collection is demonstrated to, measuring about 8.3 cm-width  x 2.2 cm-thickness x 2.3 cm-craniocaudal length. In retrospect, there was a  suggestion of possible 7.1 x 3.5 x 2.0 cm hypodense material collection along  the inferior aspect of the subcutaneous abdominal wall portion of the ileal  conduit on the 04/01/21 unenhanced CT. This fluid collection therefore may be  slightly increased in size in the elbow. The intraperitoneal portion of the ileal conduit appears grossly unremarkable. The presacral region demonstrates apparent continued pattern of nonspecific mild  edema. The source for this edematous changes is clear. Most likely related to  residual changes from recent surgical intervention. Mild edematous changes also noted in the right and to lesser extent left  piriformis muscles. Additional edematous changes are also identified in the  right obturator internus along the superior aspect. Minimal free fluid in the posterior pelvic region. There is some intraluminal fluid in the blind loop portion of the rectum. At  the right superior lateral vaginal cuff corner, a vague trail of T2 high signal  is observed (coronal T2 images #8-10 and fat-sat axial T2 image #5-10). This  [place] appears to course quite close to the anterior margin of the rectal remnant  wall. Whether there is indeed communication between the blind rectal segment  and the vaginal cuff can be further assessed with barium enema. No distinct mass is detected pelvis. Enlarged nodes are identified along the right pelvic sidewall. The largest  right pelvic sidewall node is identified subjacent to the external iliac  artery/vein vascular bundle, measuring up to about 2.2 x 1.2 cm (fat-sat axial  T2 image #12). Another slightly enlarged node more superiorly measuring about  1.9 x 1.0 cm (image #17). Multiple slightly borderline prominent nodes  identified in the mesentery, measuring up to about 1.2 x 1.0 cm (image #25). Additional note near the aortic bifurcation measuring about 1.3 x 0.9 cm (image  #34). IMPRESSION     1. Focal T1- and T2-hyperintense subcutaneous fluid collection along the  inferior aspect of the ileal conduit stoma site. Possibly representing a seroma  or a focus of urinoma. This may be amenable to aspiration under ultrasound or  CT guidance. 2.  Questionable subtle trailed between the right superior lateral aspect of the  vaginal cuff with adjacent blind-tipped rectum. More definitive evaluation can  be performed with barium enema. 3.  Slight az prominence along the right pelvic sidewall and in the  mesentery. 4.  No distinct residual or recurrent mass. 5.  Persistent mild presacral edema  PETCT 7/17/2020   FINDINGS:        PET images: Study limited because of patient motion. Mediastinal blood pool reference value = SUV Max. Mediastinal blood pool reference value = SUV Mean. Liver parenchyma reference value =  4.7   SUV Max. Liver parenchyma reference value =  2.3    SUV Mean. NECK: There is no suspicious hypermetabolic activity at the neck. CHEST: There is no suspicious hypermetabolic activity at the chest.     ABDOMEN: Typical heterogeneity at the liver. No convincing malignant foci  hypermetabolic activity or underlying CT abnormality. PELVIS: There is soft tissue fullness in the right retrovesicular pelvis just  above the vaginal cuff and posterior to right ureter measuring about 3.8 cm with  Max SUV 13.5 (206), extending to the rectum posteriorly, levators inferiorly on  the right. Previously 4 cm and Max SUV 16.5. Thickening anterior abdominal wall compatible with scar demonstrating diffuse  modest activity and Max SUV 2.7.       Additional CT findings: Mediport catheter has tip in the superior vena cava. Air  trapping in the superior segments lower lobes. Right-sided nephroureteral stent  without hydronephrosis. No ascites. IMPRESSION      Treatment response. Decreased metabolic activity at the right retrovesicular  pelvic mass. No new evidence of metastatic disease. Interval placement right-sided nephroureteral stent and resolved  hydroureteronephrosis. .       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    Loss of appetite 10/14/2020    Other hydronephrosis 06/24/2020    Genetic carrier status 09/13/2019    Postoperative nausea 08/27/2019    Ovarian cancer ([address] 08/07/2019    Peritoneal carcinoma ([address] 02/14/2019    Pelvic mass in female 01/17/2019    Irritable bowel syndrome with both constipation and diarrhea 02/09/2018    Subclinical hypothyroidism 01/23/2018    Chronic abdominal pain 01/22/2018    Diverticulosis 01/22/2018    Hx of total hysterectomy 01/22/2018    Hyponatremia 01/22/2018    Advance care planning 01/31/2017    Dental caries 05/26/2016    Chronic periodontal disease 05/26/2016    SUAZO (dyspnea on exertion) 02/10/2016    Prediabetes 09/24/2015    Morbid obesity ([address] 08/31/2015    Arthritis, degenerative 08/31/2015    Gastroesophageal reflux disease without esophagitis 08/31/2015    ANAMIKA on CPAP 08/31/2015    Essential hypertension 08/28/2015    PTSD (post-traumatic stress disorder) 03/24/2014    S/P TKR (total knee replacement) 11/14/2012    Adjustment disorder with depressed mood 09/20/2012    Major depressive disorder, recurrent episode, moderate ([address] 09/20/2012    Suicidal ideation 09/19/2012    Menopause 05/08/2012    Knee pain, right 05/08/2012    Hypokalemia 02/04/2012    Overdose 02/04/2012    Obesity 06/18/2010    Mixed hyperlipidemia 06/18/2010     Past Medical History:   Diagnosis Date    AR (allergic rhinitis)     seasonal    Bladder cancer ([facility]     Can[facility]     pt states between vgina and rectal area    Cardiac echocardiogram 04/11/2016    Tech difficult. EF 55-60%. No RWMA. Mild conc LVH. Gr 1 DDfx. RVSP normal.      Cardiac nuclear imaging test 05/05/2016    Low risk. Very patchy radiotracer uptake. Mild anterior artifact, low suspicion for ischemia. EF 68%. No RWMA. Normal EKG on pharm stress test.    Cardiovascular LE arterial duplex 10/07/2013    No significant arterial disease at rest bilaterally. R JUNE 1.21.  L JUNE 1.16. No significant sm vessel disease.     Chronic kidney disease     Depression     Dr. Edouard Ann, suicide attempt 2/12    Diverticulosis     Endometriosis     s/p hysterectomy 2006    GERD (gastroesophageal reflux disease)     Glaucoma     Dr. Ernestine Garcia    Hematuria, gross     HTN (hypertension)     Hx of colonoscopy 04/05/2017    mild diverticulosis, g1 internal hemorrhoids, normal colon , repeat 10 year 2027    Hx of suicide attempt     Hyperlipidemia LDL goal < 130     IBS (irritable bowel syndrome)     Ill-defined condition     environmental allergies    Insomnia     Malignant neoplasm of peritoneum ([facility] 2/14/2019    Nausea & vomiting     OA (osteoarthritis)     both knees, lower back    Preeclampsia     PTSD (post-traumatic stress disorder)     Seizures ([facility]     1 x with childbirth, had toxemia    Sleep apnea     does not use cpap machine    Spinal stenosis     Dr. Alexandra Murcia    Vertigo       Past Surgical History:   Procedure Laterality Date    COLONOSCOPY N/A 4/5/2017    COLONOSCOPY performed by Rosy Covarrubias MD at [address] [place] [address] N/A 2/13/2019    SIGMOIDOSCOPY FLEXIBLE performed by Ana Vu MD at [facility] ENDOSCOPY    HX  SECTION      HX COLONOSCOPY  2017    DR. MCRAE 2017     HX COLOSTOMY      Revision 2021    HX CYSTECTOMY  2020    HX HYSTERECTOMY      HX KNEE ARTHROSCOPY Left     left knee    HX KNEE REPLACEMENT Bilateral     (R)  (L)     HX LAPAROTOMY N/A 2019    and rectovaginal bx    HX OTHER SURGICAL  2016    all teeth removed    HX SALPINGO-OOPHORECTOMY  2008    HX TUBAL LIGATION      IR INSERT TUNL CVC W PORT OVER 5 YEARS  2019    MULTIPLE DELIVERY       1990    NV FEMUR/KNEE SURG UNLISTED Left 2009    External fixator    NV PART REMOVAL COLON W ANASTOMOSIS      NV TOTAL ABDOM HYSTERECTOMY  2006    TRANSURETHRAL RESEC BLADDER NECK        OB History          2    Para   2    Term   2       0    AB   0    Living   0         SAB   0    IAB   0    Ectopic   0    Molar   0    Multiple   0    Live Births   0              Social History     Tobacco Use    Smoking status: Never    Smokeless tobacco: Never   Substance Use Topics    Alcohol use: No      Family History   Problem Relation Age of Onset    Heart Disease Mother         CHF    Diabetes Mother     Hypertension Mother     Kidney Disease Mother     Breast Cancer Mother     Stroke Mother     Diabetes Father     Hypertension Father     Heart Attack Father         MI    Cancer Maternal Grandmother         stomach    Ovarian Cancer Maternal Aunt     Cancer Maternal Uncle       Current Outpatient Medications   Medication Sig    oxyCODONE-acetaminophen (PERCOCET) 5-325 mg per tablet Take 1 Tablet by mouth every four (4) hours as needed for Pain for up to 14 days. Max Daily Amount: 6 Tablets. letrozole (FEMARA) 2.5 mg tablet Take 1 Tablet by mouth daily. OLANZapine (ZyPREXA) 2.5 mg tablet Take 1 Tablet by mouth nightly. Indications: prevent nausea and vomiting from cancer chemotherapy    pregabalin (LYRICA) 25 mg capsule Take 1 Capsule by mouth three (3) times daily.  Max Daily Amount: 75 mg.    PARoxetine (PAXIL) 20 mg tablet take 1 tablet by mouth once daily    metoprolol succinate (TOPROL-XL) 100 mg tablet take 1 tablet by mouth once daily    lidocaine-prilocaine (EMLA) topical cream Apply to Mediport 1 hour prior to chemotherapy. Place kitchen saran wrap over cream and port. This will numb site. ondansetron hcl (ZOFRAN) 8 mg tablet Take 1 tablet every 8 hours for nausea and vomiting beginning the night of your chemotherapy treatment for 3 days. loperamide (IMMODIUM) 2 mg tablet Take 2 tabs=4mg after first loose stool, then 2 mg(1 tab) after each loose stool after that up to maximum of 16mg (8tabs) in 1 day    amLODIPine (NORVASC) 10 mg tablet take 1 tablet by mouth once daily    simvastatin (ZOCOR) 20 mg tablet take 1 tablet by mouth at bedtime    meclizine (ANTIVERT) 25 mg tablet take 1 tablet by mouth three times a day if needed FOR DIZZINESS (Patient not taking: Reported on 1/26/2022)    gabapentin (NEURONTIN) 100 mg capsule Take 1 Capsule by mouth three (3) times daily. Max Daily Amount: 300 mg. (Patient not taking: Reported on 7/6/2022)    latanoprost (XALATAN) 0.005 % ophthalmic solution Administer 1 Drop to both eyes nightly. No current facility-administered medications for this visit. Allergies   Allergen Reactions    Seafood Hives     FISH    Other Medication Hives     seafood    Pollen Extracts Other (comments)    Shellfish Derived Hives    Pantoprazole Other (comments)     Bleeding in stomach    Promethazine Other (comments)     Bleeding in stomach          Review of Systems   Constitutional:  Positive for malaise/fatigue. Negative for chills, diaphoresis, fever and weight loss. Respiratory:  Negative for cough, hemoptysis, shortness of breath and wheezing. Cardiovascular:  Negative for chest pain, palpitations and leg swelling. Gastrointestinal:  Negative for abdominal pain, diarrhea, heartburn, nausea and vomiting.    Genitourinary:  Negative for dysuria, frequency, hematuria and urgency. Musculoskeletal:  Negative for joint pain and myalgias. Skin:  Negative for itching and rash. Neurological:  Negative for dizziness, seizures, weakness and headaches. Psychiatric/Behavioral:  Negative for depression. The patient does not have insomnia. Objective:     Visit Vitals  /83   Pulse (!) 55   Temp 98.4 °F (36.9 °C)   Ht 5' 3\" (1.6 m)   LMP 01/31/2008   BMI 40.92 kg/m²       ECOG Performance Status (grade): 1  0 - able to carry on all pre-disease activity w/out restriction  1 - restricted but able to carry out light work  2 - ambulatory and can self- care but unable to carry out work  3 - bed or chair >50% of waking hours  4 - completely disable, total care, confined to bed or chair    Physical Exam  Constitutional:       Appearance: Normal appearance. HENT:      Head: Normocephalic and atraumatic. Eyes:      Pupils: Pupils are equal, round, and reactive to light. Cardiovascular:      Rate and Rhythm: Normal rate and regular rhythm. Heart sounds: Normal heart sounds. Pulmonary:      Effort: Pulmonary effort is normal.      Breath sounds: Normal breath sounds. Abdominal:      General: Bowel sounds are normal.      Palpations: Abdomen is soft. Tenderness: no abdominal tenderness There is no guarding. Musculoskeletal:         General: Normal range of motion. Cervical back: Neck supple. Right lower leg: No edema. Left lower leg: No edema. Skin:     General: Skin is warm. Neurological:      General: No focal deficit present. Mental Status: She is alert and oriented to person, place, and time. Mental status is at baseline.         Diagnostics:      Recent Results (from the past 96 hour(s))   CBC WITH 3 PART DIFF    Collection Time: 08/22/22 10:30 AM   Result Value Ref Range    WBC 12.8 4.5 - 13.0 K/uL    RBC 3.42 (L) 4.10 - 5.10 M/uL    HGB 8.3 (L) 12.0 - 16 g/dL    HCT 28.1 (L) 36 - 48 %    MCV 82.2 78 - 102 FL MCH 24.3 (L) 25.0 - 35.0 PG    MCHC 29.5 (L) 31 - 37 g/dL    RDW 19.7 (H) 11.5 - 14.5 %    PLATELET 108 505 - 682 K/uL    NEUTROPHILS 82 (H) 40 - 70 %    Mixed cells 9 0.1 - 17 %    LYMPHOCYTES 9 (L) 14 - 44 %    ABS. NEUTROPHILS 10.5 (H) 1.8 - 9.5 K/UL    ABS. MIXED CELLS 1.1 0.0 - 2.3 K/uL    ABS. LYMPHOCYTES 1.2 1.1 - 5.9 K/UL    DF AUTOMATED         Imaging:  Results for orders placed during the hospital encounter of 02/20/19    IR ESTAB PT LEVEL I    Narrative  This procedure was performed in is entirety by a physician assistant. : Rancho Paiz PA-C    Outpatient: Mediport incision check    CPT code: 70947    Attending physician: Dr. Ashley Alvarado    History: Status post Mediport placement    Exam: Patient states that the Mediport has been used without pain or incident. Patient denies fever, chills, nausea, vomiting or pain at the site. Patient  denies drainage, swelling, bleeding or tenderness. Site well approximated and healing well with Dermabond still covering the  incision. No drainage, swelling, erythema or tenderness at the site. Impression  Impression: Mediport properly placed and healing well. Advised if have any  questions or concerns or start have signs of infection to contact interventional  radiology      Results for orders placed during the hospital encounter of 09/22/17    XR ABD (KUB)    Narrative  PROCEDURE:  Abdomen AP. INDICATION:  Constipation. Sitz markers study day #5. COMPARISON:  9/20/17. FINDINGS:    There are still 24 markers, similar to 9/20/17. Notably all of the markers are  now located in the descending colon. No evidence of acute small bowel obstruction is detected. No mass effect or  pathological calcification is identified. No evidence of free intraperitoneal  air is detected. Impression  IMPRESSION:    1. Interval progression of the markers to the descending colon but all of the  24 markers are still present in the colon.     2. Nonobstructive abdomen. Results for orders placed in visit on 07/26/21    CT BX LIVER NDL PERC    Narrative  CT BX LIVER NDL PERC    : Eliezer Schilder, MD    Indication: Liver lesion. Preoperative Diagnosis: Liver lesion. Postoperative Diagnosis: Same. Anesthesia: Local anesthesia with 1% lidocaine. Moderate sedation with Versed  and Fentanyl given. I personally monitored the patient face-to-face throughout  the entire procedure. See detailed nursing records for precise medication  dosing. Sedation time: 30 minutes    Technique: The risks, benefits, and alternatives of the procedure were discussed  with the patient. Verbal and written consent was obtained. Time-out was  performed to confirm the correct patient, procedure, and site. With the patient  supine, the skin was prepped and draped in usual sterile fashion. Maximum  sterile barrier technique used. Local anesthesia was administered. Under CT and  ultrasound guidance, a 17 gauge introducer was directed to the target through  which 18 gauge cores were obtained. Tract embolization was performed with  gelfoam slurry. Manual compression performed for hemostasis. Sterile dressing  was applied. Postprocedure imaging was performed. Note: All CT scans at this facility are performed using dose optimization  technique as appropriate to a performed exam, to include automated exposure  control, adjustment of the mA and/or kV according to patient size (including  appropriate matching for site-specific examinations), or use of iterative  reconstruction technique. Specimen: 5 cores. Bedside evaluation performed by Pathology department. Estimated Blood Loss: Minimal    Contrast: None    Complications: No immediate    Drain/Implant: None    Condition: Stable  _______________    Impression  Successful ultrasound and CT-guided liver mass biopsy.             IMPRESSION/PLAN:  Stage IV Primary peritoneal cancer with recurrence    -- Patient was being followed by Dr. Bello Arevalo who left the practice. -- s/p carbo (auc=6), taxol (175 mg/m2) IV every 3 wks s/p 6 cycles completed 6/6/2019  -- s/p cytoreductive surgery with [facility] with dr. Jenny Garcia  -- s/p pelvic radiation  -- Pain-script for tylenol #3  -- 5/13/-9/30/2020 given platinum sensitive disease s/p  auc=5, Doxil 30 mg/m2 IV x 6 cycles s/p Total pelvic exenteration  -- 8/4/2021 Liver mass, core biopsies: Metastatic adenocarcinoma, most consistent with serous type. -- Biopsy c/w recurrence-reviewed [facility] and discussed treatment options. Dr. 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 Also discussed retreatment with platinum or if sensitive consider PARP inhibition. After discussion patient was placed on letrozole. -- 1/8/2022 Patient started Keytruda 400 mg IV every 3 weeks. -- Vaginal bleeding: Dr. 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 Has follow up with Urology. -- 5/31/2022 The patient presents to our [facility] clinic today for follows up and continuing cancer treatment as scheduled. -- She has tolerated Keytruda Q6 weeks, no high graded toxicities. She was agreeable to continue current therapy. -- 6/3/2022 S.p Keytruda   -- 6/17/2022 PET scan reported progressive disease. Stage IV metastatic malignancy with interval progression, dominant intensely hypermetabolic hepatic metastasis with substantial  interval enlargement compared to prior studies. Interval increase in size/extent of irregular intense increased FDG activity centered at and right of midline in the soft tissues of the deep  posterior pelvis, extending cephalad in the presacral region, at least some of which corresponds to irregular mass-like soft tissue density on CT, highly suspicious for malignancy.    Small 6 mm subpleural pulmonary nodule left lower lobe laterally, not evident on PET but too small to be adequately assessed using PET  -- Today I have reviewed with the patient and her family about recent PET which showed progressive disease on multiple lines of therapy. We have discussed at length about next lines of therapy. The patient states she still believed she can achieve curable diease. I have showed her PET images which showed high burden tumor. I have explained to the patient and family that the goals of treatment of metastatic cancer are not curable but treatable in order to prolong survival and improve quality of life by reducing cancer-related symptoms. Given her burden disease and progressive on immunotherapy plus hormonal therapy, we suggested re-challenging chemotherapy with platinum regimens. 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 I have discussed about potential side effects of chemotherapy. The patient was agreeable with the plan. -- 7/28/2022 C1 D1 palliative Carboplatin + Gemcitabine. Tolerated therapy with no high graded toxicities. -- Today she reported on-off vaginal bleeding and rectal bleeding as well as discharge from the rectum. Having generalized weakness    Plan:  --  C2 D1 tentatively schedule for 8/25/2022  --  Supportive IV hydration with each cycle   -- Olanzapine ordered for CINV  -- Advised the patient to present to ED if worsening symptoms, bleeding, or concerns. -- Labs: CBC, CMP, . Supportive transfusion if indicated. -- Nutritional support: Referral to Nutritionist while on chemotherapy  -- RTC in 2 weeks, always sooner if required. Carboplatin + Gemcitabine  Days 1,8: Gemcitabine 800-1,000mg/m2 IV, followed by:  Day 1: Carboplatin AUC 4 IV   Repeat cycle every 3 weeks. CINV  -- Patient is allergic to Promethazine, was unable to order compazine due to the allergies.    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 Lis Kelly for rectal bleeding. She reported no further intervention offered at this time. -- No active bleeding reported  -- Monitor CBC, Iron profile, ferritin      Normocytic Anemia  Iron deficiency anemia  --Anemia w/u CBC, Iron profile, ferritin, B12/folate/MMA, SPEP  --Replacement as indicated. --Due to iron deficiency and vaginal bleeding related patient  was scheduled for IV Venofer x 3 (Dose # 1 completed on 8/11/2022)  --- Procrit 60,000 unit to start every 4 weeks when H/H is below 10/30  --We will continue to monitor labs CBC, Iron profile, B12/folate      Chemotherapy related Neuropathy  Cancer associated pain   --On Lyrica 25 mg 3 times daily   -- Percocet PRN will be refilled    Follow up in 2 weeks or sooner if indicated. Orders Placed This Encounter    oxyCODONE-acetaminophen (PERCOCET) 5-325 mg per tablet     Sig: Take 1 Tablet by mouth every four (4) hours as needed for Pain for up to 14 days. Max Daily Amount: 6 Tablets. Dispense:  40 Tablet     Refill:  0    letrozole (FEMARA) 2.5 mg tablet     Sig: Take 1 Tablet by mouth daily. Dispense:  90 Tablet     Refill:  1    OLANZapine (ZyPREXA) 2.5 mg tablet     Sig: Take 1 Tablet by mouth nightly. Indications: prevent nausea and vomiting from cancer chemotherapy     Dispense:  30 Tablet     Refill:  1    pregabalin (LYRICA) 25 mg capsule     Sig: Take 1 Capsule by mouth three (3) times daily. Max Daily Amount: 75 mg. Dispense:  90 Capsule     Refill:  3       Ms. Max Heller has a reminder for a \"due or due soon\" health maintenance. I have asked that she contact her primary care provider for follow-up on this health maintenance. All of patient's questions answered to their apparent satisfaction. They verbally show understanding and agreement with aforementioned plan.          Faith Bocanegra MD  8/22/2022        Above mentioned total time spent for this encounter with more than 50% of the time spent in face-to-face counseling, discussing on diagnosis and management plan going forward, and co-ordination of care. Parts of this document has been produced using Dragon dictation system. Unrecognized errors in transcription may be present. Please do not hesitate to reach out for any questions or clarifications.       CC: Kailey Solano MD

## 2022-08-22 NOTE — PROGRESS NOTES
[facility] Progress Note    Date: 2022    Name: Angel Anne              MRN: [id number]              : 1970    Pre-Chemo labs     Pt to [facility] ambulatory at 1020 as an add on for pre-chemo labs after Dr. Chuyita Hinojosa follow-up appointment. Ms. Abhinav Keith was assessed and education was provided. Ms. Cannon's vitals were reviewed. Visit Vitals  /80 (BP 1 Location: Left upper arm, BP Patient Position: Sitting)   Pulse (!) 58   Temp 97.3 °F (36.3 °C)   Resp 18   Wt 104.3 kg (230 lb)   SpO2 97%   BMI 40.74 kg/m²        Ordered CBC and CMP drawn from [facility] X1 Attempt. 2x2 applied with coban. CBC ran in house per current changes and CMP sent out to hospital for processing via . Ms. Abhinav Keith tolerated well without complaints. Patient armband removed and shredded. Ms. Abhinav Keith was discharged from [place] [address] in stable condition at 1030. She is to return on 22 at 1000 for her next Chemo appointment.       Kenneth Martino RN  2022

## 2022-08-24 ENCOUNTER — HOSPITAL ENCOUNTER (OUTPATIENT)
Dept: INFUSION THERAPY | Age: 52
End: 2022-08-24
Payer: MEDICARE

## 2022-08-24 ENCOUNTER — APPOINTMENT (OUTPATIENT)
Dept: INFUSION THERAPY | Age: 52
End: 2022-08-24

## 2022-08-25 ENCOUNTER — HOSPITAL ENCOUNTER (OUTPATIENT)
Dept: INFUSION THERAPY | Age: 52
Discharge: HOME | End: 2022-08-25
Payer: MEDICARE

## 2022-08-25 VITALS
TEMPERATURE: 98.4 F | SYSTOLIC BLOOD PRESSURE: 96 MMHG | RESPIRATION RATE: 16 BRPM | DIASTOLIC BLOOD PRESSURE: 60 MMHG | HEART RATE: 60 BPM | OXYGEN SATURATION: 96 %

## 2022-08-25 VITALS
SYSTOLIC BLOOD PRESSURE: 125 MMHG | WEIGHT: 230 LBS | HEART RATE: 58 BPM | TEMPERATURE: 97.3 F | OXYGEN SATURATION: 97 % | RESPIRATION RATE: 18 BRPM | DIASTOLIC BLOOD PRESSURE: 80 MMHG | HEIGHT: 63 IN | BODY MASS INDEX: 40.75 KG/M2

## 2022-08-25 DIAGNOSIS — C48.2 PERITONEAL CARCINOMA (HCC): Primary | ICD-10-CM

## 2022-08-25 DIAGNOSIS — D50.8 OTHER IRON DEFICIENCY ANEMIA: ICD-10-CM

## 2022-08-25 PROCEDURE — 74011000250 HC RX REV CODE- 250: Performed by: INTERNAL MEDICINE

## 2022-08-25 PROCEDURE — 77030012965 HC NDL HUBR BBMI -A

## 2022-08-25 PROCEDURE — 96366 THER/PROPH/DIAG IV INF ADDON: CPT

## 2022-08-25 PROCEDURE — 96367 TX/PROPH/DG ADDL SEQ IV INF: CPT

## 2022-08-25 PROCEDURE — 74011250636 HC RX REV CODE- 250/636: Performed by: INTERNAL MEDICINE

## 2022-08-25 PROCEDURE — 96413 CHEMO IV INFUSION 1 HR: CPT

## 2022-08-25 PROCEDURE — 96375 TX/PRO/DX INJ NEW DRUG ADDON: CPT

## 2022-08-25 PROCEDURE — 96417 CHEMO IV INFUS EACH ADDL SEQ: CPT

## 2022-08-25 PROCEDURE — 74011000258 HC RX REV CODE- 258: Performed by: INTERNAL MEDICINE

## 2022-08-25 RX ORDER — ONDANSETRON 2 MG/ML
8 INJECTION INTRAMUSCULAR; INTRAVENOUS AS NEEDED
Status: CANCELLED | OUTPATIENT
Start: 2022-09-08

## 2022-08-25 RX ORDER — HYDROCORTISONE SODIUM SUCCINATE 100 MG/2ML
100 INJECTION, POWDER, FOR SOLUTION INTRAMUSCULAR; INTRAVENOUS AS NEEDED
Status: CANCELLED | OUTPATIENT
Start: 2022-09-08

## 2022-08-25 RX ORDER — DIPHENHYDRAMINE HYDROCHLORIDE 50 MG/ML
50 INJECTION, SOLUTION INTRAMUSCULAR; INTRAVENOUS AS NEEDED
Status: CANCELLED
Start: 2022-09-08

## 2022-08-25 RX ORDER — SODIUM CHLORIDE 0.9 % (FLUSH) 0.9 %
5-40 SYRINGE (ML) INJECTION AS NEEDED
Status: DISPENSED | OUTPATIENT
Start: 2022-08-25 | End: 2022-08-25

## 2022-08-25 RX ORDER — HEPARIN 100 UNIT/ML
500 SYRINGE INTRAVENOUS AS NEEDED
Status: DISPENSED | OUTPATIENT
Start: 2022-08-25 | End: 2022-08-25

## 2022-08-25 RX ORDER — SODIUM CHLORIDE 9 MG/ML
5-250 INJECTION, SOLUTION INTRAVENOUS AS NEEDED
Status: DISPENSED | OUTPATIENT
Start: 2022-08-25 | End: 2022-08-25

## 2022-08-25 RX ORDER — EPINEPHRINE 1 MG/ML
0.3 INJECTION, SOLUTION, CONCENTRATE INTRAVENOUS AS NEEDED
Status: CANCELLED | OUTPATIENT
Start: 2022-09-08

## 2022-08-25 RX ORDER — SODIUM CHLORIDE 0.9 % (FLUSH) 0.9 %
5-40 SYRINGE (ML) INJECTION AS NEEDED
Status: CANCELLED | OUTPATIENT
Start: 2022-09-08

## 2022-08-25 RX ORDER — ALBUTEROL SULFATE 0.83 MG/ML
2.5 SOLUTION RESPIRATORY (INHALATION) AS NEEDED
Status: CANCELLED
Start: 2022-09-08

## 2022-08-25 RX ORDER — SODIUM CHLORIDE 9 MG/ML
5-250 INJECTION, SOLUTION INTRAVENOUS AS NEEDED
Status: CANCELLED | OUTPATIENT
Start: 2022-09-08

## 2022-08-25 RX ORDER — PALONOSETRON 0.05 MG/ML
0.25 INJECTION, SOLUTION INTRAVENOUS ONCE
Status: COMPLETED | OUTPATIENT
Start: 2022-08-25 | End: 2022-08-25

## 2022-08-25 RX ORDER — HEPARIN 100 UNIT/ML
500 SYRINGE INTRAVENOUS AS NEEDED
Status: CANCELLED
Start: 2022-09-08

## 2022-08-25 RX ORDER — ACETAMINOPHEN 325 MG/1
650 TABLET ORAL AS NEEDED
Status: CANCELLED
Start: 2022-09-08

## 2022-08-25 RX ORDER — DIPHENHYDRAMINE HYDROCHLORIDE 50 MG/ML
25 INJECTION, SOLUTION INTRAMUSCULAR; INTRAVENOUS AS NEEDED
Status: CANCELLED
Start: 2022-09-08

## 2022-08-25 RX ORDER — SODIUM CHLORIDE 9 MG/ML
5-40 INJECTION INTRAMUSCULAR; INTRAVENOUS; SUBCUTANEOUS AS NEEDED
Status: CANCELLED | OUTPATIENT
Start: 2022-09-08

## 2022-08-25 RX ADMIN — CARBOPLATIN 240 MG: 600 INJECTION, SOLUTION INTRAVENOUS at 11:30

## 2022-08-25 RX ADMIN — SODIUM CHLORIDE 150 MG: 900 INJECTION, SOLUTION INTRAVENOUS at 08:55

## 2022-08-25 RX ADMIN — DEXAMETHASONE SODIUM PHOSPHATE 12 MG: 4 INJECTION, SOLUTION INTRA-ARTICULAR; INTRALESIONAL; INTRAMUSCULAR; INTRAVENOUS; SOFT TISSUE at 09:35

## 2022-08-25 RX ADMIN — SODIUM CHLORIDE, PRESERVATIVE FREE 10 ML: 5 INJECTION INTRAVENOUS at 08:15

## 2022-08-25 RX ADMIN — PALONOSETRON 0.25 MG: 0.05 INJECTION, SOLUTION INTRAVENOUS at 08:52

## 2022-08-25 RX ADMIN — GEMCITABINE 1700 MG: 100 INJECTION, SOLUTION INTRAVENOUS at 10:45

## 2022-08-25 RX ADMIN — SODIUM CHLORIDE 30 ML/HR: 9 INJECTION, SOLUTION INTRAVENOUS at 08:20

## 2022-08-25 RX ADMIN — IRON SUCROSE 300 MG: 20 INJECTION, SOLUTION INTRAVENOUS at 12:25

## 2022-08-25 RX ADMIN — HEPARIN 500 UNITS: 100 SYRINGE at 14:15

## 2022-08-25 RX ADMIN — SODIUM CHLORIDE, PRESERVATIVE FREE 30 ML: 5 INJECTION INTRAVENOUS at 14:15

## 2022-08-25 NOTE — PROGRESS NOTES
[facility] Progress Note    Date: 2022    Name: Megan Guan    MRN: [id number]         : 1970    Ms. Jaylan Melo arrived in the [facility] today at 0800, in stable condition, here for DOSE # 2 of 3, IV VENOFER INFUSION & CYCLE 2, DAY 1, IV CARBOPLATIN + GEMCITABINE CHEMOTHERAPY REGIMEN (DAYS 1 & 8, OF EVERY 21 DAY CYCLE). She was assessed and education was provided. Carboplatin + Gemcitabine on Day 1 of every cycle & Gemcitabine only on every day 8. DAY 1 TODAY (GEMCITABINE & CARBOPLATIN)                                 Ms. Cannon's vitals were reviewed. Visit Vitals  /60 (BP 1 Location: Right upper arm, BP Patient Position: Sitting)   Pulse 62   Temp 98.3 °F (36.8 °C)   Resp 16   SpO2 96%         Ms. Jaylan Melo presented to the [facility] today, stating  that overall, she felt like she was tolerating her chemotherapy treatments fairly well. Her only major complaints today were intermittent nausea & fatigue. Signed chemotherapy consent was viewed in her electronic record. Her pre-chemotherapy labs obtained on 22, were all noted to be satisfactory for treatment today, and were as follows:     Latest Reference Range & Units 22 10:30   WBC 4.5 - 13.0 K/uL 12.8   RBC 4.10 - 5.10 M/uL 3.42 (L)   HGB 12.0 - 16 g/dL 8.3 (L)   HCT 36 - 48 % 28.1 (L)   MCV 78 - 102 FL 82.2   MCH 25.0 - 35.0 PG 24.3 (L)   MCHC 31 - 37 g/dL 29.5 (L)   RDW 11.5 - 14.5 % 19.7 (H)   PLATELET 947 - 884 K/uL 143   NEUTROPHILS 40 - 70 % 82 (H)   Mixed cells 0.1 - 17 % 9   LYMPHOCYTES 14 - 44 % 9 (L)   DF -   AUTOMATED   ABS. NEUTROPHILS 1.8 - 9.5 K/UL 10.5 (H)   ABS. LYMPHOCYTES 1.1 - 5.9 K/UL 1.2   ABS.  MIXED CELLS 0.0 - 2.3 K/uL 1.1   Sodium 136 - 145 mmol/L 140   Potassium 3.5 - 5.5 mmol/L 4.7   Chloride 100 - 111 mmol/L 113 (H)   CO2 21 - 32 mmol/L 19 (L)   Anion gap 3.0 - 18 mmol/L 8   Glucose 74 - 99 mg/dL 89   BUN 7.0 - 18 MG/DL 43 (H)   Creatinine 0.6 - 1.3 MG/DL 2.25 (H)   BUN/Creatinine ratio 12 - 20   19 Calcium 8.5 - 10.1 MG/DL 8.6   GFR est non-AA >60 ml/min/1.73m2 23 (L)   GFR est AA >60 ml/min/1.73m2 28 (L)   Bilirubin, total 0.2 - 1.0 MG/DL 0.4   Protein, total 6.4 - 8.2 g/dL 7.2   Albumin 3.4 - 5.0 g/dL 3.3 (L)   Globulin 2.0 - 4.0 g/dL 3.9   A-G Ratio 0.8 - 1.7   0.8   ALT 13 - 56 U/L 50   AST 10 - 38 U/L 28   Alk. phosphatase 45 - 117 U/L 197 (H)   (L): Data is abnormally low  (H): Data is abnormally high        Her RIGHT chest single lumen port was accessed without incident at 0815, and brisk blood return was obtained.  ml IV BAG was initiated to infuse Sean Sosa throughout treatment today. The following pre-medications were administered approximately 30 minutes prior to starting chemotherapy as follows: EMEND 150 MG IV, ALOXI 0.25 MG IV, & DECADRON 12 MG IV. The following chemotherapy was administered:    Gemcitabine (GEMZAR) 1,700 mg IV (800 mg/m2), was administered over approximately 30 minutes, per order and without incident. Carboplatin (PARAPLATIN) 240 mg IV (AUC 4), was administered over approximately 30 minutes, per order and without incident. After completion of the chemotherapy medications, her port was flushed very well with NS, per policy. Iron Sucrose (VENOFER) 300 mg IV, was administered over approximately 90 minutes, per order and without incident. After completion of the IV VENOFER infusion, her port was flushed well per policy with NS & Heparin, and then the upton needle was removed and gauze/bandaid was applied. Ms. Rohan Freitas tolerated treatment very well today, and voiced no complaints. Ms. Rohan Freitas was discharged from [place] [address] in stable condition at [phone]. She is to return on next Thursday, 9-1-22 at 0800, for her next appointment, for Cycle 2, Day 8, Carboplatin + Gemcitabine Chemotherapy Regimen. And then, Dose # 3 of 3, VENOFER infusion is scheduled in 2 weeks, on Thursday, 9-8-22 at 0800.     Andrae Durham, RN  August 25, 2022  9:43 AM

## 2022-08-26 ENCOUNTER — APPOINTMENT (OUTPATIENT)
Dept: INFUSION THERAPY | Age: 52
End: 2022-08-26
Payer: MEDICARE

## 2022-08-29 RX ORDER — PAROXETINE HYDROCHLORIDE 20 MG/1
TABLET, FILM COATED ORAL
Qty: 30 TABLET | Refills: 0 | Status: SHIPPED | OUTPATIENT
Start: 2022-08-29 | End: 2022-10-31 | Stop reason: SDUPTHER

## 2022-08-30 ENCOUNTER — NURSE NAVIGATOR (OUTPATIENT)
Dept: OTHER | Age: 52
End: 2022-08-30

## 2022-08-30 NOTE — NURSE NAVIGATOR
Received call from patient requesting transportation assistance-stating that her transportation benefits with the insurance has been exhausted. Pt referred to [facility].

## 2022-09-01 ENCOUNTER — DOCUMENTATION ONLY (OUTPATIENT)
Dept: ONCOLOGY | Age: 52
End: 2022-09-01

## 2022-09-01 ENCOUNTER — HOSPITAL ENCOUNTER (OUTPATIENT)
Dept: INFUSION THERAPY | Age: 52
End: 2022-09-01
Payer: MEDICARE

## 2022-09-01 NOTE — PROGRESS NOTES
Called patient back to follow up with her about her cancelling her appointment due to Nausea/Vomiting. She reported she had x1 emesis. She report occasional nausea. Report tolerating the olanzapine. Recommended IV hydration for her today. She refuse and reported she does not think she needs it and also she does not have transportation available. Advise her to continue oral hydration  with the nausea medication at home.  Treatment next week as schedule with  hydration as warranted

## 2022-09-01 NOTE — PROGRESS NOTES
Patient called and spoke w/PSR Nuria Zapata and stated she had been throwing up since 2 am this morning and would not be coming for her Chemo treatment today.

## 2022-09-05 DIAGNOSIS — G89.3 CANCER ASSOCIATED PAIN: ICD-10-CM

## 2022-09-05 DIAGNOSIS — K62.5 RECTAL BLEEDING: ICD-10-CM

## 2022-09-05 DIAGNOSIS — G62.9 NEUROPATHY: ICD-10-CM

## 2022-09-05 DIAGNOSIS — D50.8 IRON DEFICIENCY ANEMIA SECONDARY TO INADEQUATE DIETARY IRON INTAKE: ICD-10-CM

## 2022-09-05 DIAGNOSIS — D50.0 IRON DEFICIENCY ANEMIA DUE TO CHRONIC BLOOD LOSS: ICD-10-CM

## 2022-09-05 DIAGNOSIS — C56.3 MALIGNANT NEOPLASM OF BOTH OVARIES (HCC): ICD-10-CM

## 2022-09-05 DIAGNOSIS — C57.7 MALIGNANT NEOPLASM OF OTHER SPECIFIED FEMALE GENITAL ORGANS (HCC): ICD-10-CM

## 2022-09-05 DIAGNOSIS — Z79.899 ON ANTINEOPLASTIC CHEMOTHERAPY: ICD-10-CM

## 2022-09-05 DIAGNOSIS — C48.2 PERITONEAL CARCINOMA (HCC): ICD-10-CM

## 2022-09-06 DIAGNOSIS — C48.2 PERITONEAL CARCINOMA (HCC): ICD-10-CM

## 2022-09-06 DIAGNOSIS — G89.3 CANCER ASSOCIATED PAIN: ICD-10-CM

## 2022-09-06 DIAGNOSIS — C56.3 MALIGNANT NEOPLASM OF BOTH OVARIES (HCC): ICD-10-CM

## 2022-09-06 DIAGNOSIS — C57.7 MALIGNANT NEOPLASM OF OTHER SPECIFIED FEMALE GENITAL ORGANS (HCC): ICD-10-CM

## 2022-09-06 DIAGNOSIS — K62.5 RECTAL BLEEDING: ICD-10-CM

## 2022-09-06 DIAGNOSIS — D50.8 IRON DEFICIENCY ANEMIA SECONDARY TO INADEQUATE DIETARY IRON INTAKE: ICD-10-CM

## 2022-09-06 DIAGNOSIS — Z79.899 ON ANTINEOPLASTIC CHEMOTHERAPY: ICD-10-CM

## 2022-09-06 DIAGNOSIS — G62.9 NEUROPATHY: ICD-10-CM

## 2022-09-06 RX ORDER — ONDANSETRON HYDROCHLORIDE 8 MG/1
TABLET, FILM COATED ORAL
Qty: 30 TABLET | Refills: 2 | Status: SHIPPED | OUTPATIENT
Start: 2022-09-06 | End: 2022-09-30 | Stop reason: SDUPTHER

## 2022-09-06 RX ORDER — OXYCODONE AND ACETAMINOPHEN 5; 325 MG/1; MG/1
1 TABLET ORAL
Qty: 40 TABLET | Refills: 0 | Status: SHIPPED | OUTPATIENT
Start: 2022-09-06 | End: 2022-09-19 | Stop reason: SDUPTHER

## 2022-09-07 ENCOUNTER — HOSPITAL ENCOUNTER (OUTPATIENT)
Dept: INFUSION THERAPY | Age: 52
Discharge: HOME | End: 2022-09-07
Payer: MEDICARE

## 2022-09-07 VITALS
BODY MASS INDEX: 41.73 KG/M2 | HEART RATE: 56 BPM | SYSTOLIC BLOOD PRESSURE: 153 MMHG | DIASTOLIC BLOOD PRESSURE: 55 MMHG | WEIGHT: 235.6 LBS | OXYGEN SATURATION: 99 % | TEMPERATURE: 98.2 F

## 2022-09-07 LAB
BASOPHILS # BLD: 0 K/UL (ref 0–0.1)
BASOPHILS NFR BLD: 0 % (ref 0–2)
DIFFERENTIAL METHOD BLD: ABNORMAL
EOSINOPHIL # BLD: 0.1 K/UL (ref 0–0.4)
EOSINOPHIL NFR BLD: 1 % (ref 0–5)
ERYTHROCYTE [DISTWIDTH] IN BLOOD BY AUTOMATED COUNT: 22.5 % (ref 11.6–14.5)
FERRITIN SERPL-MCNC: 566 NG/ML (ref 8–388)
FOLATE SERPL-MCNC: 7.9 NG/ML (ref 3.1–17.5)
HCT VFR BLD AUTO: 22.1 % (ref 35–45)
HGB BLD-MCNC: 6.6 G/DL (ref 12–16)
IMM GRANULOCYTES # BLD AUTO: 0 K/UL (ref 0–0.04)
IMM GRANULOCYTES NFR BLD AUTO: 0 % (ref 0–0.5)
IRON SATN MFR SERPL: 25 % (ref 20–50)
IRON SERPL-MCNC: 49 UG/DL (ref 50–175)
LYMPHOCYTES # BLD: 0.8 K/UL (ref 0.9–3.6)
LYMPHOCYTES NFR BLD: 16 % (ref 21–52)
MCH RBC QN AUTO: 24.5 PG (ref 24–34)
MCHC RBC AUTO-ENTMCNC: 29.9 G/DL (ref 31–37)
MCV RBC AUTO: 82.2 FL (ref 78–100)
MONOCYTES # BLD: 0.5 K/UL (ref 0.05–1.2)
MONOCYTES NFR BLD: 10 % (ref 3–10)
NEUTS SEG # BLD: 3.6 K/UL (ref 1.8–8)
NEUTS SEG NFR BLD: 73 % (ref 40–73)
NRBC # BLD: 0.06 K/UL (ref 0–0.01)
NRBC BLD-RTO: 1.2 PER 100 WBC
PLATELET # BLD AUTO: 211 K/UL (ref 135–420)
PLATELET COMMENTS,PCOM: ABNORMAL
PMV BLD AUTO: 10.3 FL (ref 9.2–11.8)
RBC # BLD AUTO: 2.69 M/UL (ref 4.2–5.3)
RBC MORPH BLD: ABNORMAL
TIBC SERPL-MCNC: 193 UG/DL (ref 250–450)
VIT B12 SERPL-MCNC: >2000 PG/ML (ref 211–911)
WBC # BLD AUTO: 5 K/UL (ref 4.6–13.2)

## 2022-09-07 PROCEDURE — 85025 COMPLETE CBC W/AUTO DIFF WBC: CPT

## 2022-09-07 PROCEDURE — 36415 COLL VENOUS BLD VENIPUNCTURE: CPT

## 2022-09-07 PROCEDURE — 82728 ASSAY OF FERRITIN: CPT

## 2022-09-07 PROCEDURE — 83540 ASSAY OF IRON: CPT

## 2022-09-07 PROCEDURE — 83921 ORGANIC ACID SINGLE QUANT: CPT

## 2022-09-07 PROCEDURE — 82607 VITAMIN B-12: CPT

## 2022-09-07 NOTE — PROGRESS NOTES
[facility] Lab Visit:    Maye Lagos  1970  [id number]    7120 Pt arrived ambulatory w/o assist. Labs drawn per order via Left AC venipuncture x1 attempt. Pt tolerated well without complaints. Gauze/ coban to site. Pt departed [facility] ambulatory and in no distress at 0820.      Visit Vitals  BP (!) 153/55 (BP 1 Location: Left upper arm, BP Patient Position: At rest)   Pulse (!) 56   Temp 98.2 °F (36.8 °C)   Wt 106.9 kg (235 lb 9.6 oz)   SpO2 99%   BMI 41.73 kg/m²

## 2022-09-08 ENCOUNTER — HOSPITAL ENCOUNTER (OUTPATIENT)
Dept: INFUSION THERAPY | Age: 52
Discharge: HOME | End: 2022-09-08
Payer: MEDICARE

## 2022-09-08 VITALS
HEART RATE: 56 BPM | OXYGEN SATURATION: 99 % | TEMPERATURE: 98 F | RESPIRATION RATE: 16 BRPM | DIASTOLIC BLOOD PRESSURE: 62 MMHG | SYSTOLIC BLOOD PRESSURE: 112 MMHG

## 2022-09-08 DIAGNOSIS — C48.2 PERITONEAL CARCINOMA (HCC): Primary | ICD-10-CM

## 2022-09-08 DIAGNOSIS — D50.8 OTHER IRON DEFICIENCY ANEMIA: ICD-10-CM

## 2022-09-08 LAB — HISTORY CHECKED?,CKHIST: NORMAL

## 2022-09-08 PROCEDURE — 96413 CHEMO IV INFUSION 1 HR: CPT

## 2022-09-08 PROCEDURE — 96367 TX/PROPH/DG ADDL SEQ IV INF: CPT

## 2022-09-08 PROCEDURE — 96366 THER/PROPH/DIAG IV INF ADDON: CPT

## 2022-09-08 PROCEDURE — 96360 HYDRATION IV INFUSION INIT: CPT

## 2022-09-08 PROCEDURE — 96375 TX/PRO/DX INJ NEW DRUG ADDON: CPT

## 2022-09-08 PROCEDURE — 86900 BLOOD TYPING SEROLOGIC ABO: CPT

## 2022-09-08 PROCEDURE — 77030012965 HC NDL HUBR BBMI -A

## 2022-09-08 PROCEDURE — 96377 APPLICATON ON-BODY INJECTOR: CPT

## 2022-09-08 PROCEDURE — 86923 COMPATIBILITY TEST ELECTRIC: CPT

## 2022-09-08 PROCEDURE — 74011250636 HC RX REV CODE- 250/636: Performed by: NURSE PRACTITIONER

## 2022-09-08 PROCEDURE — 74011000250 HC RX REV CODE- 250: Performed by: INTERNAL MEDICINE

## 2022-09-08 PROCEDURE — 96361 HYDRATE IV INFUSION ADD-ON: CPT

## 2022-09-08 PROCEDURE — 74011250636 HC RX REV CODE- 250/636: Performed by: INTERNAL MEDICINE

## 2022-09-08 RX ORDER — ONDANSETRON 2 MG/ML
8 INJECTION INTRAMUSCULAR; INTRAVENOUS AS NEEDED
Status: CANCELLED | OUTPATIENT
Start: 2022-09-09

## 2022-09-08 RX ORDER — ONDANSETRON 2 MG/ML
8 INJECTION INTRAMUSCULAR; INTRAVENOUS AS NEEDED
Status: CANCELLED | OUTPATIENT
Start: 2022-09-08

## 2022-09-08 RX ORDER — SODIUM CHLORIDE 9 MG/ML
5-250 INJECTION, SOLUTION INTRAVENOUS AS NEEDED
Status: CANCELLED | OUTPATIENT
Start: 2022-09-08

## 2022-09-08 RX ORDER — ONDANSETRON 2 MG/ML
8 INJECTION INTRAMUSCULAR; INTRAVENOUS ONCE
Status: COMPLETED | OUTPATIENT
Start: 2022-09-08 | End: 2022-09-08

## 2022-09-08 RX ORDER — SODIUM CHLORIDE 9 MG/ML
5-40 INJECTION INTRAMUSCULAR; INTRAVENOUS; SUBCUTANEOUS AS NEEDED
Status: CANCELLED | OUTPATIENT
Start: 2022-09-08

## 2022-09-08 RX ORDER — SODIUM CHLORIDE 9 MG/ML
5-250 INJECTION, SOLUTION INTRAVENOUS AS NEEDED
Status: CANCELLED | OUTPATIENT
Start: 2022-09-09

## 2022-09-08 RX ORDER — SODIUM CHLORIDE 9 MG/ML
5-250 INJECTION, SOLUTION INTRAVENOUS AS NEEDED
Status: DISPENSED | OUTPATIENT
Start: 2022-09-08 | End: 2022-09-08

## 2022-09-08 RX ORDER — ACETAMINOPHEN 325 MG/1
650 TABLET ORAL AS NEEDED
Status: CANCELLED
Start: 2022-09-08

## 2022-09-08 RX ORDER — SODIUM CHLORIDE 0.9 % (FLUSH) 0.9 %
5-40 SYRINGE (ML) INJECTION AS NEEDED
Status: DISPENSED | OUTPATIENT
Start: 2022-09-08 | End: 2022-09-08

## 2022-09-08 RX ORDER — HEPARIN 100 UNIT/ML
500 SYRINGE INTRAVENOUS AS NEEDED
Status: DISPENSED | OUTPATIENT
Start: 2022-09-08 | End: 2022-09-08

## 2022-09-08 RX ORDER — EPINEPHRINE 1 MG/ML
0.3 INJECTION, SOLUTION, CONCENTRATE INTRAVENOUS AS NEEDED
Status: CANCELLED | OUTPATIENT
Start: 2022-09-08

## 2022-09-08 RX ORDER — DIPHENHYDRAMINE HYDROCHLORIDE 50 MG/ML
25 INJECTION, SOLUTION INTRAMUSCULAR; INTRAVENOUS AS NEEDED
Status: CANCELLED
Start: 2022-09-09

## 2022-09-08 RX ORDER — SODIUM CHLORIDE 0.9 % (FLUSH) 0.9 %
5-40 SYRINGE (ML) INJECTION AS NEEDED
Status: CANCELLED | OUTPATIENT
Start: 2022-09-08

## 2022-09-08 RX ORDER — EPINEPHRINE 1 MG/ML
0.3 INJECTION, SOLUTION, CONCENTRATE INTRAVENOUS AS NEEDED
Status: CANCELLED | OUTPATIENT
Start: 2022-09-09

## 2022-09-08 RX ORDER — DIPHENHYDRAMINE HYDROCHLORIDE 50 MG/ML
25 INJECTION, SOLUTION INTRAMUSCULAR; INTRAVENOUS AS NEEDED
Status: CANCELLED
Start: 2022-09-08

## 2022-09-08 RX ORDER — HYDROCORTISONE SODIUM SUCCINATE 100 MG/2ML
100 INJECTION, POWDER, FOR SOLUTION INTRAMUSCULAR; INTRAVENOUS AS NEEDED
Status: CANCELLED | OUTPATIENT
Start: 2022-09-08

## 2022-09-08 RX ORDER — ALBUTEROL SULFATE 0.83 MG/ML
2.5 SOLUTION RESPIRATORY (INHALATION) AS NEEDED
Status: CANCELLED
Start: 2022-09-09

## 2022-09-08 RX ORDER — DIPHENHYDRAMINE HYDROCHLORIDE 50 MG/ML
50 INJECTION, SOLUTION INTRAMUSCULAR; INTRAVENOUS AS NEEDED
Status: CANCELLED
Start: 2022-09-08

## 2022-09-08 RX ORDER — HEPARIN 100 UNIT/ML
500 SYRINGE INTRAVENOUS AS NEEDED
Status: CANCELLED
Start: 2022-09-08

## 2022-09-08 RX ORDER — ACETAMINOPHEN 325 MG/1
650 TABLET ORAL AS NEEDED
Status: CANCELLED
Start: 2022-09-09

## 2022-09-08 RX ORDER — HYDROCORTISONE SODIUM SUCCINATE 100 MG/2ML
100 INJECTION, POWDER, FOR SOLUTION INTRAMUSCULAR; INTRAVENOUS AS NEEDED
Status: CANCELLED | OUTPATIENT
Start: 2022-09-09

## 2022-09-08 RX ORDER — SODIUM CHLORIDE 9 MG/ML
5-40 INJECTION INTRAMUSCULAR; INTRAVENOUS; SUBCUTANEOUS AS NEEDED
Status: CANCELLED | OUTPATIENT
Start: 2022-09-09

## 2022-09-08 RX ORDER — ALBUTEROL SULFATE 0.83 MG/ML
2.5 SOLUTION RESPIRATORY (INHALATION) AS NEEDED
Status: CANCELLED
Start: 2022-09-08

## 2022-09-08 RX ORDER — DIPHENHYDRAMINE HYDROCHLORIDE 50 MG/ML
50 INJECTION, SOLUTION INTRAMUSCULAR; INTRAVENOUS AS NEEDED
Status: CANCELLED
Start: 2022-09-09

## 2022-09-08 RX ADMIN — GEMCITABINE 1700 MG: 100 INJECTION, SOLUTION INTRAVENOUS at 09:52

## 2022-09-08 RX ADMIN — ONDANSETRON 8 MG: 2 INJECTION INTRAMUSCULAR; INTRAVENOUS at 08:42

## 2022-09-08 RX ADMIN — PEGFILGRASTIM 6 MG: KIT SUBCUTANEOUS at 13:15

## 2022-09-08 RX ADMIN — HEPARIN 500 UNITS: 100 SYRINGE at 13:15

## 2022-09-08 RX ADMIN — SODIUM CHLORIDE 500 ML: 0.9 INJECTION, SOLUTION INTRAVENOUS at 08:36

## 2022-09-08 RX ADMIN — SODIUM CHLORIDE 25 ML/HR: 9 INJECTION, SOLUTION INTRAVENOUS at 08:27

## 2022-09-08 RX ADMIN — IRON SUCROSE 400 MG: 20 INJECTION, SOLUTION INTRAVENOUS at 10:30

## 2022-09-08 RX ADMIN — SODIUM CHLORIDE, PRESERVATIVE FREE 10 ML: 5 INJECTION INTRAVENOUS at 13:15

## 2022-09-08 NOTE — PROGRESS NOTES
[facility] Progress Note    Date: 2022    Name: Juanita Begum    MRN: [id number]         : 1970    Ms. Isela Warner arrived in the [facility] today at [address], in stable condition, here for DOSE # 3 of 3, IV VENOFER INFUSION & CYCLE 2, DAY 8, IV CARBOPLATIN + GEMCITABINE CHEMOTHERAPY REGIMEN (DAYS 1 & 8, OF EVERY 21 DAY CYCLE & Neulasta). She was assessed and education was provided. Carboplatin + Gemcitabine on Day 1 of every cycle & Gemcitabine only on every day 8. DAY 8 TODAY (GEMCITABINE ONLY TODAY)        Ms. Cannon's vitals were reviewed. Visit Vitals  /66 (BP 1 Location: Right upper arm)   Pulse 65   Temp 98.1 °F (36.7 °C)   Resp 16   SpO2 100%   Breastfeeding No         Ms. Isela Warner presented to the [facility] today, stating  that overall, she felt like she was tolerating her chemotherapy treatments fairly well. Her only major complaints today still are intermittent nausea & fatigue. Signed chemotherapy consent was viewed in her electronic record. Her pre-chemotherapy labs obtained on 22, were all noted to be satisfactory for treatment today. However, patient's H/H 6.6/22.1, this writer to follow up with Dr. Beryl Granado for further instruction. Her RIGHT chest single lumen port was accessed and brisk blood return was obtained. Hydration 500 ml of NS given over 1 hours as ordered. After completion of hydration blood drawn after 10 ml wasted per protocol for type and cross for 1 unit of PRBC's per MD order. Patient to have a blood transfusion tomorrow. Blood sent out for processing by . Blood armband placed on patient's left wrist and patient instructed not to remove. Patient verbalized understanding.  ml IV BAG was initiated to infuse @ [facility] Rist throughout treatment today.        The following pre-medication was administered approximately 30 minutes prior to starting chemotherapy as follows: Zofran 8 mg IV        The following chemotherapy was administered:    Gemcitabine (GEMZAR) 1,700 mg IV (800 mg/m2), was administered over approximately 30 minutes as ordered. After completion of the chemotherapy medications, her port was flushed very well with NS, per policy. Iron Sucrose (VENOFER) 400 mg IV, was administered over 2.5 hours as ordered. After completion of the IV VENOFER infusion, her port was flushed well per policy with NS & Heparin, and then the upton needle was removed and gauze/bandaid was applied. Patient declined to keep port accessed as she lays on her right side at night she said. Pegfilgrastim (Neulasta) SQ OBI wearable injectafer 6mg placed to RT back of arm. Device secured with 1 transparent dressing. Pt made aware to remove injector at 515 tomorrow pm. She verbalizes understanding. Ms. Dave Watt tolerated treatment very well today, and voiced no complaints. Ms. Dave Watt was discharged from [place] [address] in stable condition at 1320. She is to return on 9/9/22 at 0800 for her Blood Transfusion for 2 units appointment.       Abril English  September 8, 2022

## 2022-09-09 ENCOUNTER — HOSPITAL ENCOUNTER (OUTPATIENT)
Dept: INFUSION THERAPY | Age: 52
Discharge: HOME | End: 2022-09-09
Payer: MEDICARE

## 2022-09-09 VITALS
TEMPERATURE: 97.2 F | HEART RATE: 58 BPM | DIASTOLIC BLOOD PRESSURE: 68 MMHG | SYSTOLIC BLOOD PRESSURE: 126 MMHG | RESPIRATION RATE: 16 BRPM | OXYGEN SATURATION: 97 %

## 2022-09-09 DIAGNOSIS — D50.8 IRON DEFICIENCY ANEMIA SECONDARY TO INADEQUATE DIETARY IRON INTAKE: Primary | ICD-10-CM

## 2022-09-09 DIAGNOSIS — D50.8 OTHER IRON DEFICIENCY ANEMIA: ICD-10-CM

## 2022-09-09 PROCEDURE — 77030012965 HC NDL HUBR BBMI -A

## 2022-09-09 PROCEDURE — 74011250636 HC RX REV CODE- 250/636: Performed by: INTERNAL MEDICINE

## 2022-09-09 PROCEDURE — 74011250637 HC RX REV CODE- 250/637: Performed by: INTERNAL MEDICINE

## 2022-09-09 PROCEDURE — P9016 RBC LEUKOCYTES REDUCED: HCPCS

## 2022-09-09 PROCEDURE — 74011000250 HC RX REV CODE- 250: Performed by: INTERNAL MEDICINE

## 2022-09-09 PROCEDURE — 77030013169 SET IV BLD ICUM -A

## 2022-09-09 PROCEDURE — 36430 TRANSFUSION BLD/BLD COMPNT: CPT

## 2022-09-09 RX ORDER — DIPHENHYDRAMINE HCL 25 MG
25 CAPSULE ORAL ONCE
Status: CANCELLED | OUTPATIENT
Start: 2022-09-09 | End: 2022-09-09

## 2022-09-09 RX ORDER — SODIUM CHLORIDE 9 MG/ML
5-250 INJECTION, SOLUTION INTRAVENOUS AS NEEDED
Status: CANCELLED | OUTPATIENT
Start: 2022-09-09

## 2022-09-09 RX ORDER — HYDROCORTISONE SODIUM SUCCINATE 100 MG/2ML
100 INJECTION, POWDER, FOR SOLUTION INTRAMUSCULAR; INTRAVENOUS AS NEEDED
Status: CANCELLED | OUTPATIENT
Start: 2022-09-09

## 2022-09-09 RX ORDER — HEPARIN 100 UNIT/ML
500 SYRINGE INTRAVENOUS AS NEEDED
Status: CANCELLED
Start: 2022-09-09

## 2022-09-09 RX ORDER — SODIUM CHLORIDE 9 MG/ML
5-250 INJECTION, SOLUTION INTRAVENOUS AS NEEDED
Status: DISPENSED | OUTPATIENT
Start: 2022-09-09 | End: 2022-09-09

## 2022-09-09 RX ORDER — ALBUTEROL SULFATE 0.83 MG/ML
2.5 SOLUTION RESPIRATORY (INHALATION) AS NEEDED
Status: CANCELLED
Start: 2022-09-09

## 2022-09-09 RX ORDER — ACETAMINOPHEN 325 MG/1
650 TABLET ORAL ONCE
Status: COMPLETED | OUTPATIENT
Start: 2022-09-09 | End: 2022-09-09

## 2022-09-09 RX ORDER — SODIUM CHLORIDE 0.9 % (FLUSH) 0.9 %
5-40 SYRINGE (ML) INJECTION AS NEEDED
Status: DISPENSED | OUTPATIENT
Start: 2022-09-09 | End: 2022-09-09

## 2022-09-09 RX ORDER — HEPARIN 100 UNIT/ML
500 SYRINGE INTRAVENOUS AS NEEDED
Status: DISPENSED | OUTPATIENT
Start: 2022-09-09 | End: 2022-09-09

## 2022-09-09 RX ORDER — ACETAMINOPHEN 325 MG/1
650 TABLET ORAL AS NEEDED
Status: CANCELLED
Start: 2022-09-09

## 2022-09-09 RX ORDER — DIPHENHYDRAMINE HYDROCHLORIDE 50 MG/ML
25 INJECTION, SOLUTION INTRAMUSCULAR; INTRAVENOUS AS NEEDED
Status: CANCELLED
Start: 2022-09-09

## 2022-09-09 RX ORDER — DIPHENHYDRAMINE HYDROCHLORIDE 50 MG/ML
50 INJECTION, SOLUTION INTRAMUSCULAR; INTRAVENOUS AS NEEDED
Status: CANCELLED
Start: 2022-09-09

## 2022-09-09 RX ORDER — ACETAMINOPHEN 325 MG/1
650 TABLET ORAL ONCE
Status: CANCELLED | OUTPATIENT
Start: 2022-09-09 | End: 2022-09-09

## 2022-09-09 RX ORDER — DIPHENHYDRAMINE HCL 25 MG
25 CAPSULE ORAL ONCE
Status: COMPLETED | OUTPATIENT
Start: 2022-09-09 | End: 2022-09-09

## 2022-09-09 RX ORDER — SODIUM CHLORIDE 9 MG/ML
5-40 INJECTION INTRAMUSCULAR; INTRAVENOUS; SUBCUTANEOUS AS NEEDED
Status: CANCELLED | OUTPATIENT
Start: 2022-09-09

## 2022-09-09 RX ORDER — ONDANSETRON 2 MG/ML
8 INJECTION INTRAMUSCULAR; INTRAVENOUS AS NEEDED
Status: CANCELLED | OUTPATIENT
Start: 2022-09-09

## 2022-09-09 RX ORDER — EPINEPHRINE 1 MG/ML
0.3 INJECTION, SOLUTION, CONCENTRATE INTRAVENOUS AS NEEDED
Status: CANCELLED | OUTPATIENT
Start: 2022-09-09

## 2022-09-09 RX ORDER — SODIUM CHLORIDE 0.9 % (FLUSH) 0.9 %
5-40 SYRINGE (ML) INJECTION AS NEEDED
Status: CANCELLED | OUTPATIENT
Start: 2022-09-09

## 2022-09-09 RX ADMIN — SODIUM CHLORIDE, PRESERVATIVE FREE 10 ML: 5 INJECTION INTRAVENOUS at 14:22

## 2022-09-09 RX ADMIN — DIPHENHYDRAMINE HYDROCHLORIDE 25 MG: 25 CAPSULE ORAL at 08:33

## 2022-09-09 RX ADMIN — ACETAMINOPHEN 650 MG: 325 TABLET ORAL at 08:33

## 2022-09-09 RX ADMIN — HEPARIN 500 UNITS: 100 SYRINGE at 14:22

## 2022-09-09 RX ADMIN — SODIUM CHLORIDE 25 ML/HR: 9 INJECTION, SOLUTION INTRAVENOUS at 08:24

## 2022-09-09 RX ADMIN — SODIUM CHLORIDE, PRESERVATIVE FREE 10 ML: 5 INJECTION INTRAVENOUS at 08:24

## 2022-09-09 NOTE — PROGRESS NOTES
[address] [facility] Progress Note    Date: 2022    Name: Adry Tyson    MRN: [id number]         : 1970    2 Units PRBC     Ms. Oscar Nunez arrived to [facility] at [address] [facility] with her rollator walker, pt requested wheelchair to get back to infusion area. Ms. Oscar Nunez was assessed and education was provided. Discussed risks and benefits of blood transfusion with patient, including risk of transfusion reaction and disease transmission. Tahira-comp handout provided. Patient verbalized understanding and signed consent placed on chart. Ms. Cannon's vitals were reviewed. Visit Vitals  /68 (BP 1 Location: Right lower arm, BP Patient Position: Reclining)   Pulse (!) 58   Temp 97.2 °F (36.2 °C)   Resp 16   SpO2 97%     Right chest mediport accessed with 20 g 1 inch upton needle. Port flushed easily and had brisk blood return. Normal saline initiated at [facility]. Pre-medications (Tylenol 650 mg PO and Benadryl 25 mg PO) were administered as ordered. First unit of two ordered units of PRBCs initiated @ 75 ml/hr at 0920. Fifteen minutes into infusion, VS stable and pt denied c/o SOB, itching/hives, lip/tongue/facial swelling, CP or other complaints. Infusion rate increased to 155ml/hr. Unit finished @ 1125. VS stable and no transfusion reaction suspected. Second unit of two ordered units of PRBCs initiated @ 75 ml/hr at 1155. Fifteen minutes into infusion, VS stable and pt denied c/o SOB, itching/hives, lip/tongue/facial swelling, CP or other complaints. Infusion rate increased to 155 ml/hr for the remainder of the transfusion. Unit finished @ 1418. VS stable and no transfusion reaction suspected. Ms. Oscar Nunez tolerated infusion without complaints. Patient remained in [facility] for 15 minutes for observation. Pt unable to stay for any longer. No signs of allergic reaction noted. VS remain stable.     Patient Vitals for the past 12 hrs:   Temp Pulse Resp BP SpO2   22 1420 97.2 °F (36.2 °C) (!) 58 16 126/68 97 % 09/09/22 1408 97.5 °F (36.4 °C) (!) 54 16 131/71 96 %   09/09/22 1210 97.8 °F (36.6 °C) (!) 57 16 120/69 97 %   09/09/22 1150 98 °F (36.7 °C) (!) 55 16 114/66 97 %   09/09/22 1125 97.7 °F (36.5 °C) (!) 56 16 103/63 99 %   09/09/22 0935 98 °F (36.7 °C) 63 16 115/64 100 %   09/09/22 0912 98.9 °F (37.2 °C) 70 16 111/65 97 %   09/09/22 0817 97.8 °F (36.6 °C) 61 16 138/74 99 %       After completion of the infusion, her port was flushed well per policy with NS & Heparin, and then the upton needle was removed and gauze/bandaid was applied. Discharge instructions reviewed with pt. Pt instructed to report SOB, CP, elevated temp, back pain, or other symptoms of transfusion reaction to MD or ED. Pt verbalized understanding. Patient armband removed and shredded    Ms. Abhinav Keith was discharged from [place] [address] in stable condition at   1435. She is to return 09/21/22 for pre chemo labs.     Lisa Solomon RN  September 9, 2022

## 2022-09-09 NOTE — PROGRESS NOTES
LATE ENTRY:  Per Dr. Rosalio Reina on 09/08/22 at 0 patient is not to receive irradiated blood. Spoke w/ZINA Bautista regarding need for updated order as well. Spoke pedrito/Alfonzo in Blood Bank on 09/08/22 at 1545 and patient's to 2 units are available and will not be irradiated.

## 2022-09-10 LAB
ABO + RH BLD: NORMAL
BLD PROD TYP BPU: NORMAL
BLD PROD TYP BPU: NORMAL
BLOOD GROUP ANTIBODIES SERPL: NORMAL
BPU ID: NORMAL
BPU ID: NORMAL
CROSSMATCH RESULT,%XM: NORMAL
CROSSMATCH RESULT,%XM: NORMAL
METHYLMALONATE SERPL-SCNC: 151 NMOL/L (ref 0–378)
SPECIMEN EXP DATE BLD: NORMAL
STATUS OF UNIT,%ST: NORMAL
STATUS OF UNIT,%ST: NORMAL
UNIT DIVISION, %UDIV: 0
UNIT DIVISION, %UDIV: 0

## 2022-09-14 ENCOUNTER — APPOINTMENT (OUTPATIENT)
Dept: INFUSION THERAPY | Age: 52
End: 2022-09-14

## 2022-09-15 ENCOUNTER — APPOINTMENT (OUTPATIENT)
Dept: INFUSION THERAPY | Age: 52
End: 2022-09-15
Payer: MEDICARE

## 2022-09-15 RX ORDER — ONDANSETRON 2 MG/ML
8 INJECTION INTRAMUSCULAR; INTRAVENOUS AS NEEDED
Status: CANCELLED | OUTPATIENT
Start: 2022-09-22

## 2022-09-15 RX ORDER — DIPHENHYDRAMINE HYDROCHLORIDE 50 MG/ML
25 INJECTION, SOLUTION INTRAMUSCULAR; INTRAVENOUS AS NEEDED
Status: CANCELLED
Start: 2022-10-06

## 2022-09-15 RX ORDER — DIPHENHYDRAMINE HYDROCHLORIDE 50 MG/ML
50 INJECTION, SOLUTION INTRAMUSCULAR; INTRAVENOUS AS NEEDED
Status: CANCELLED
Start: 2022-09-22

## 2022-09-15 RX ORDER — SODIUM CHLORIDE 9 MG/ML
5-40 INJECTION INTRAMUSCULAR; INTRAVENOUS; SUBCUTANEOUS AS NEEDED
Status: CANCELLED | OUTPATIENT
Start: 2022-10-06

## 2022-09-15 RX ORDER — HEPARIN 100 UNIT/ML
500 SYRINGE INTRAVENOUS AS NEEDED
Status: CANCELLED
Start: 2022-10-06

## 2022-09-15 RX ORDER — ACETAMINOPHEN 325 MG/1
650 TABLET ORAL AS NEEDED
Status: CANCELLED
Start: 2022-09-22

## 2022-09-15 RX ORDER — SODIUM CHLORIDE 9 MG/ML
5-40 INJECTION INTRAMUSCULAR; INTRAVENOUS; SUBCUTANEOUS AS NEEDED
Status: CANCELLED | OUTPATIENT
Start: 2022-09-22

## 2022-09-15 RX ORDER — SODIUM CHLORIDE 9 MG/ML
5-250 INJECTION, SOLUTION INTRAVENOUS AS NEEDED
Status: CANCELLED | OUTPATIENT
Start: 2022-09-22

## 2022-09-15 RX ORDER — ALBUTEROL SULFATE 0.83 MG/ML
2.5 SOLUTION RESPIRATORY (INHALATION) AS NEEDED
Status: CANCELLED
Start: 2022-09-22

## 2022-09-15 RX ORDER — ONDANSETRON 2 MG/ML
8 INJECTION INTRAMUSCULAR; INTRAVENOUS AS NEEDED
Status: CANCELLED | OUTPATIENT
Start: 2022-10-06

## 2022-09-15 RX ORDER — ACETAMINOPHEN 325 MG/1
650 TABLET ORAL AS NEEDED
Status: CANCELLED
Start: 2022-10-06

## 2022-09-15 RX ORDER — ALBUTEROL SULFATE 0.83 MG/ML
2.5 SOLUTION RESPIRATORY (INHALATION) AS NEEDED
Status: CANCELLED
Start: 2022-10-06

## 2022-09-15 RX ORDER — EPINEPHRINE 1 MG/ML
0.3 INJECTION, SOLUTION, CONCENTRATE INTRAVENOUS AS NEEDED
Status: CANCELLED | OUTPATIENT
Start: 2022-09-22

## 2022-09-15 RX ORDER — HYDROCORTISONE SODIUM SUCCINATE 100 MG/2ML
100 INJECTION, POWDER, FOR SOLUTION INTRAMUSCULAR; INTRAVENOUS AS NEEDED
Status: CANCELLED | OUTPATIENT
Start: 2022-10-06

## 2022-09-15 RX ORDER — SODIUM CHLORIDE 0.9 % (FLUSH) 0.9 %
5-40 SYRINGE (ML) INJECTION AS NEEDED
Status: CANCELLED | OUTPATIENT
Start: 2022-10-06

## 2022-09-15 RX ORDER — ONDANSETRON 2 MG/ML
8 INJECTION INTRAMUSCULAR; INTRAVENOUS ONCE
Status: CANCELLED | OUTPATIENT
Start: 2022-10-06 | End: 2022-09-29

## 2022-09-15 RX ORDER — EPINEPHRINE 1 MG/ML
0.3 INJECTION, SOLUTION, CONCENTRATE INTRAVENOUS AS NEEDED
Status: CANCELLED | OUTPATIENT
Start: 2022-10-06

## 2022-09-15 RX ORDER — SODIUM CHLORIDE 9 MG/ML
5-250 INJECTION, SOLUTION INTRAVENOUS AS NEEDED
Status: CANCELLED | OUTPATIENT
Start: 2022-10-06

## 2022-09-15 RX ORDER — DIPHENHYDRAMINE HYDROCHLORIDE 50 MG/ML
25 INJECTION, SOLUTION INTRAMUSCULAR; INTRAVENOUS AS NEEDED
Status: CANCELLED
Start: 2022-09-22

## 2022-09-15 RX ORDER — HYDROCORTISONE SODIUM SUCCINATE 100 MG/2ML
100 INJECTION, POWDER, FOR SOLUTION INTRAMUSCULAR; INTRAVENOUS AS NEEDED
Status: CANCELLED | OUTPATIENT
Start: 2022-09-22

## 2022-09-15 RX ORDER — DIPHENHYDRAMINE HYDROCHLORIDE 50 MG/ML
50 INJECTION, SOLUTION INTRAMUSCULAR; INTRAVENOUS AS NEEDED
Status: CANCELLED
Start: 2022-10-06

## 2022-09-19 DIAGNOSIS — G89.3 CANCER ASSOCIATED PAIN: ICD-10-CM

## 2022-09-19 DIAGNOSIS — C57.7 MALIGNANT NEOPLASM OF OTHER SPECIFIED FEMALE GENITAL ORGANS (HCC): ICD-10-CM

## 2022-09-19 DIAGNOSIS — K62.5 RECTAL BLEEDING: ICD-10-CM

## 2022-09-19 DIAGNOSIS — G62.9 NEUROPATHY: ICD-10-CM

## 2022-09-19 DIAGNOSIS — C48.2 PERITONEAL CARCINOMA (HCC): ICD-10-CM

## 2022-09-19 DIAGNOSIS — D50.8 IRON DEFICIENCY ANEMIA SECONDARY TO INADEQUATE DIETARY IRON INTAKE: ICD-10-CM

## 2022-09-19 DIAGNOSIS — C56.3 MALIGNANT NEOPLASM OF BOTH OVARIES (HCC): ICD-10-CM

## 2022-09-19 DIAGNOSIS — Z79.899 ON ANTINEOPLASTIC CHEMOTHERAPY: ICD-10-CM

## 2022-09-20 RX ORDER — OXYCODONE AND ACETAMINOPHEN 5; 325 MG/1; MG/1
1 TABLET ORAL
Qty: 40 TABLET | Refills: 0 | Status: SHIPPED | OUTPATIENT
Start: 2022-09-20 | End: 2022-09-30 | Stop reason: SDUPTHER

## 2022-09-20 RX ORDER — LOPERAMIDE HCL 2 MG
TABLET ORAL
Qty: 30 TABLET | Refills: 2 | Status: SHIPPED | OUTPATIENT
Start: 2022-09-20

## 2022-09-21 ENCOUNTER — APPOINTMENT (OUTPATIENT)
Dept: INFUSION THERAPY | Age: 52
End: 2022-09-21

## 2022-09-21 ENCOUNTER — HOSPITAL ENCOUNTER (OUTPATIENT)
Dept: INFUSION THERAPY | Age: 52
Discharge: HOME | End: 2022-09-21
Payer: MEDICARE

## 2022-09-21 VITALS
HEART RATE: 54 BPM | SYSTOLIC BLOOD PRESSURE: 131 MMHG | DIASTOLIC BLOOD PRESSURE: 84 MMHG | WEIGHT: 239 LBS | RESPIRATION RATE: 16 BRPM | TEMPERATURE: 97.8 F | OXYGEN SATURATION: 98 % | BODY MASS INDEX: 42.34 KG/M2

## 2022-09-21 LAB
ALBUMIN SERPL-MCNC: 3.2 G/DL (ref 3.4–5)
ALBUMIN/GLOB SERPL: 0.9 {RATIO} (ref 0.8–1.7)
ALP SERPL-CCNC: 277 U/L (ref 45–117)
ALT SERPL-CCNC: 56 U/L (ref 13–56)
ANION GAP SERPL CALC-SCNC: 7 MMOL/L (ref 3–18)
AST SERPL-CCNC: 28 U/L (ref 10–38)
BASO+EOS+MONOS # BLD AUTO: 1.5 K/UL (ref 0–2.3)
BASO+EOS+MONOS NFR BLD AUTO: 6 % (ref 0.1–17)
BASOPHILS # BLD: 0 K/UL (ref 0–0.1)
BASOPHILS NFR BLD: 0 % (ref 0–2)
BILIRUB SERPL-MCNC: 0.4 MG/DL (ref 0.2–1)
BUN SERPL-MCNC: 40 MG/DL (ref 7–18)
BUN/CREAT SERPL: 19 (ref 12–20)
CALCIUM SERPL-MCNC: 8 MG/DL (ref 8.5–10.1)
CHLORIDE SERPL-SCNC: 113 MMOL/L (ref 100–111)
CO2 SERPL-SCNC: 23 MMOL/L (ref 21–32)
CREAT SERPL-MCNC: 2.12 MG/DL (ref 0.6–1.3)
DIFFERENTIAL METHOD BLD: ABNORMAL
DIFFERENTIAL METHOD BLD: ABNORMAL
EOSINOPHIL # BLD: 0 K/UL (ref 0–0.4)
EOSINOPHIL NFR BLD: 0 % (ref 0–5)
ERYTHROCYTE [DISTWIDTH] IN BLOOD BY AUTOMATED COUNT: 23.9 % (ref 11.6–14.5)
ERYTHROCYTE [DISTWIDTH] IN BLOOD BY AUTOMATED COUNT: 24.2 % (ref 11.5–14.5)
GLOBULIN SER CALC-MCNC: 3.4 G/DL (ref 2–4)
GLUCOSE SERPL-MCNC: 76 MG/DL (ref 74–99)
HCT VFR BLD AUTO: 26.7 % (ref 35–45)
HCT VFR BLD AUTO: 28.6 % (ref 36–48)
HGB BLD-MCNC: 8.4 G/DL (ref 12–16)
HGB BLD-MCNC: 8.8 G/DL (ref 12–16)
IMM GRANULOCYTES # BLD AUTO: 0 K/UL (ref 0–0.04)
IMM GRANULOCYTES NFR BLD AUTO: 0 % (ref 0–0.5)
LYMPHOCYTES # BLD: 2.1 K/UL (ref 1.1–5.9)
LYMPHOCYTES # BLD: 2.6 K/UL (ref 0.9–3.6)
LYMPHOCYTES NFR BLD: 11 % (ref 21–52)
LYMPHOCYTES NFR BLD: 9 % (ref 14–44)
MCH RBC QN AUTO: 27.2 PG (ref 25–35)
MCH RBC QN AUTO: 27.3 PG (ref 24–34)
MCHC RBC AUTO-ENTMCNC: 30.8 G/DL (ref 31–37)
MCHC RBC AUTO-ENTMCNC: 31.5 G/DL (ref 31–37)
MCV RBC AUTO: 86.7 FL (ref 78–100)
MCV RBC AUTO: 88.3 FL (ref 78–102)
MONOCYTES # BLD: 0.7 K/UL (ref 0.05–1.2)
MONOCYTES NFR BLD: 3 % (ref 3–10)
NEUTS SEG # BLD: 20 K/UL (ref 1.8–9.5)
NEUTS SEG # BLD: 20.2 K/UL (ref 1.8–8)
NEUTS SEG NFR BLD: 85 % (ref 40–70)
NEUTS SEG NFR BLD: 86 % (ref 40–73)
NRBC # BLD: 0.41 K/UL (ref 0–0.01)
NRBC BLD-RTO: 1.7 PER 100 WBC
PLATELET # BLD AUTO: 104 K/UL (ref 135–420)
PLATELET COMMENTS,PCOM: ABNORMAL
POTASSIUM SERPL-SCNC: 5.1 MMOL/L (ref 3.5–5.5)
PROT SERPL-MCNC: 6.6 G/DL (ref 6.4–8.2)
RBC # BLD AUTO: 3.08 M/UL (ref 4.2–5.3)
RBC # BLD AUTO: 3.24 M/UL (ref 4.1–5.1)
RBC MORPH BLD: ABNORMAL
RBC MORPH BLD: ABNORMAL
SODIUM SERPL-SCNC: 143 MMOL/L (ref 136–145)
WBC # BLD AUTO: 23.5 K/UL (ref 4.6–13.2)
WBC # BLD AUTO: 23.6 K/UL (ref 4.5–13)

## 2022-09-21 PROCEDURE — 80053 COMPREHEN METABOLIC PANEL: CPT

## 2022-09-21 PROCEDURE — 85025 COMPLETE CBC W/AUTO DIFF WBC: CPT

## 2022-09-21 PROCEDURE — 36415 COLL VENOUS BLD VENIPUNCTURE: CPT

## 2022-09-21 NOTE — PROGRESS NOTES
[facility] Lab Visit:    Willow Ortega  1970  [id number]    2044 Pt arrived ambulatory w/o assist. Labs drawn per order via Left AC venipuncture x1 attempt. Pt tolerated well without complaints. Gauze/ coban to site. Pt departed [facility] ambulatory and in no distress at 845.      Visit Vitals  /84 (BP 1 Location: Right lower arm, BP Patient Position: Sitting)   Pulse (!) 54   Temp 97.8 °F (36.6 °C)   Resp 16   Wt 108.4 kg (239 lb)   SpO2 98%   BMI 42.34 kg/m²

## 2022-09-22 ENCOUNTER — HOSPITAL ENCOUNTER (OUTPATIENT)
Dept: INFUSION THERAPY | Age: 52
Discharge: HOME | End: 2022-09-22
Payer: MEDICARE

## 2022-09-22 VITALS
HEART RATE: 60 BPM | OXYGEN SATURATION: 97 % | DIASTOLIC BLOOD PRESSURE: 69 MMHG | SYSTOLIC BLOOD PRESSURE: 128 MMHG | TEMPERATURE: 98.1 F | RESPIRATION RATE: 16 BRPM

## 2022-09-22 DIAGNOSIS — C48.2 PERITONEAL CARCINOMA (HCC): Primary | ICD-10-CM

## 2022-09-22 PROCEDURE — 96375 TX/PRO/DX INJ NEW DRUG ADDON: CPT

## 2022-09-22 PROCEDURE — 77030012965 HC NDL HUBR BBMI -A

## 2022-09-22 PROCEDURE — 96367 TX/PROPH/DG ADDL SEQ IV INF: CPT

## 2022-09-22 PROCEDURE — 74011250636 HC RX REV CODE- 250/636: Performed by: INTERNAL MEDICINE

## 2022-09-22 PROCEDURE — 74011000250 HC RX REV CODE- 250: Performed by: INTERNAL MEDICINE

## 2022-09-22 PROCEDURE — 96413 CHEMO IV INFUSION 1 HR: CPT

## 2022-09-22 PROCEDURE — 96361 HYDRATE IV INFUSION ADD-ON: CPT

## 2022-09-22 PROCEDURE — 74011000258 HC RX REV CODE- 258: Performed by: INTERNAL MEDICINE

## 2022-09-22 PROCEDURE — 96409 CHEMO IV PUSH SNGL DRUG: CPT

## 2022-09-22 PROCEDURE — 96417 CHEMO IV INFUS EACH ADDL SEQ: CPT

## 2022-09-22 RX ORDER — PALONOSETRON 0.05 MG/ML
0.25 INJECTION, SOLUTION INTRAVENOUS ONCE
Status: COMPLETED | OUTPATIENT
Start: 2022-09-22 | End: 2022-09-22

## 2022-09-22 RX ORDER — SODIUM CHLORIDE 9 MG/ML
5-250 INJECTION, SOLUTION INTRAVENOUS AS NEEDED
Status: DISPENSED | OUTPATIENT
Start: 2022-09-22 | End: 2022-09-22

## 2022-09-22 RX ORDER — HEPARIN 100 UNIT/ML
500 SYRINGE INTRAVENOUS AS NEEDED
Status: DISPENSED | OUTPATIENT
Start: 2022-09-22 | End: 2022-09-22

## 2022-09-22 RX ORDER — SODIUM CHLORIDE 0.9 % (FLUSH) 0.9 %
5-40 SYRINGE (ML) INJECTION AS NEEDED
Status: DISPENSED | OUTPATIENT
Start: 2022-09-22 | End: 2022-09-22

## 2022-09-22 RX ADMIN — CARBOPLATIN 240 MG: 10 INJECTION INTRAVENOUS at 11:27

## 2022-09-22 RX ADMIN — SODIUM CHLORIDE 500 ML: 0.9 INJECTION, SOLUTION INTRAVENOUS at 08:49

## 2022-09-22 RX ADMIN — SODIUM CHLORIDE, PRESERVATIVE FREE 10 ML: 5 INJECTION INTRAVENOUS at 12:03

## 2022-09-22 RX ADMIN — SODIUM CHLORIDE, PRESERVATIVE FREE 10 ML: 5 INJECTION INTRAVENOUS at 08:45

## 2022-09-22 RX ADMIN — DEXAMETHASONE SODIUM PHOSPHATE 12 MG: 4 INJECTION, SOLUTION INTRA-ARTICULAR; INTRALESIONAL; INTRAMUSCULAR; INTRAVENOUS; SOFT TISSUE at 09:34

## 2022-09-22 RX ADMIN — SODIUM CHLORIDE 25 ML/HR: 9 INJECTION, SOLUTION INTRAVENOUS at 10:00

## 2022-09-22 RX ADMIN — HEPARIN 500 UNITS: 100 SYRINGE at 12:03

## 2022-09-22 RX ADMIN — PALONOSETRON 0.25 MG: 0.05 INJECTION, SOLUTION INTRAVENOUS at 09:31

## 2022-09-22 RX ADMIN — GEMCITABINE 1700 MG: 100 INJECTION, SOLUTION INTRAVENOUS at 10:47

## 2022-09-22 RX ADMIN — FOSAPREPITANT 150 MG: 150 INJECTION, POWDER, LYOPHILIZED, FOR SOLUTION INTRAVENOUS at 09:55

## 2022-09-22 NOTE — PROGRESS NOTES
[facility] Progress Note    Date: 2022    Name: Arthur Iraheta    MRN: [id number]         : 1970    Ms. Beatriz Love arrived in the [place] today ambulatory at 0830, in stable condition, here for CYCLE 3, DAY 1, IV CARBOPLATIN + GEMCITABINE CHEMOTHERAPY REGIMEN (DAYS 1 & 8, OF EVERY 21 DAY CYCLE). Carboplatin + Gemcitabine on Day 1 of every cycle & Gemcitabine only on every day 8. She was assessed and education was provided. Pt denies any new complaints or concerns. Spoke with Nidia Izquierdo in pharmacy regarding increasing elevated alk. Phosphatase and increase WBC, she states ok to proceed. Elevated WBC most likely due to neulasta injection. Ms. Cannon's  vitals were reviewed. Visit Vitals  /70 (BP 1 Location: Left lower arm, BP Patient Position: Sitting)   Pulse 65   Temp 97.7 °F (36.5 °C)   Resp 18   SpO2 93%     Signed chemotherapy consent viewed in chart. Right upper chest single lumen mediport accessed with 20 ga 1 in iglesias needle using sterile technique. Flushed well with NS with positive blood return. CBC and CMP from 22 within parameters to proceed with treatment today. 500 ml bolus given over approximately 1 hour per order. Pre-medications given approximately 30 minutes prior to start of chemotherapy per order:  Aloxi IVP, Decadron 12mg IVPB and Emend 150mg IVPB.  ml started to infuse @ Karli Bojorquez throughout treatment today. Gemcitabine (Gemzar) 1,700 mg (800mg/m2) IV infused approximately over 30 minutes per order followed by NS flush. T    Carboplatin 240 mg (AUC 4) IV infused over approximately 30 minutes per order followed by NS flush. Pt tolerated treatment without any s/s of reaction or complaints. Pt did state she felt a little nauseated after Gemzar however after drinking some ginger ale and eating a peppermint, she states she felt better. VSS. Port flushed with NS and positive blood return noted.  Iglesias needle d/c'd and bandaid applied. I have reviewed discharge instructions with the patient. The patient verbalized understanding. Pt armband removed and shredded. Ms. Pj Fabian was discharged from [place] [address] in stable condition at 0830. She is to return on 9/29/22 at 0800 for C3D8 Gemzar chemotherapy treatment.       Dao Ahn, LUIS E  September 22, 2022  9:43 AM

## 2022-09-26 ENCOUNTER — OFFICE VISIT (OUTPATIENT)
Dept: ONCOLOGY | Age: 52
End: 2022-09-26
Payer: MEDICARE

## 2022-09-26 VITALS
RESPIRATION RATE: 16 BRPM | OXYGEN SATURATION: 95 % | HEART RATE: 62 BPM | HEIGHT: 63 IN | DIASTOLIC BLOOD PRESSURE: 70 MMHG | BODY MASS INDEX: 42.45 KG/M2 | WEIGHT: 239.6 LBS | SYSTOLIC BLOOD PRESSURE: 125 MMHG

## 2022-09-26 DIAGNOSIS — Z79.899 ON ANTINEOPLASTIC CHEMOTHERAPY: ICD-10-CM

## 2022-09-26 DIAGNOSIS — D50.8 IRON DEFICIENCY ANEMIA SECONDARY TO INADEQUATE DIETARY IRON INTAKE: ICD-10-CM

## 2022-09-26 DIAGNOSIS — C48.2 PERITONEAL CARCINOMA (HCC): Primary | ICD-10-CM

## 2022-09-26 DIAGNOSIS — C56.3 MALIGNANT NEOPLASM OF BOTH OVARIES (HCC): ICD-10-CM

## 2022-09-26 DIAGNOSIS — G62.9 NEUROPATHY: ICD-10-CM

## 2022-09-26 DIAGNOSIS — D50.0 IRON DEFICIENCY ANEMIA DUE TO CHRONIC BLOOD LOSS: ICD-10-CM

## 2022-09-26 DIAGNOSIS — G89.3 CANCER ASSOCIATED PAIN: ICD-10-CM

## 2022-09-26 DIAGNOSIS — K62.5 RECTAL BLEEDING: ICD-10-CM

## 2022-09-26 PROCEDURE — G8428 CUR MEDS NOT DOCUMENT: HCPCS | Performed by: INTERNAL MEDICINE

## 2022-09-26 PROCEDURE — G9711 PT HX TOT COL OR COLON CA: HCPCS | Performed by: INTERNAL MEDICINE

## 2022-09-26 PROCEDURE — G8752 SYS BP LESS 140: HCPCS | Performed by: INTERNAL MEDICINE

## 2022-09-26 PROCEDURE — G9717 DOC PT DX DEP/BP F/U NT REQ: HCPCS | Performed by: INTERNAL MEDICINE

## 2022-09-26 PROCEDURE — 99214 OFFICE O/P EST MOD 30 MIN: CPT | Performed by: INTERNAL MEDICINE

## 2022-09-26 PROCEDURE — G8417 CALC BMI ABV UP PARAM F/U: HCPCS | Performed by: INTERNAL MEDICINE

## 2022-09-26 PROCEDURE — G8754 DIAS BP LESS 90: HCPCS | Performed by: INTERNAL MEDICINE

## 2022-09-26 PROCEDURE — G9899 SCRN MAM PERF RSLTS DOC: HCPCS | Performed by: INTERNAL MEDICINE

## 2022-09-26 NOTE — PROGRESS NOTES
[facility] HEMATOLOGY/MEDICAL ONCOLOGY      Name: Erin Houston  MRN: [id number]  : 1970/52 y.o. Date: 2022    Oncology History  Erin Houston is a 47 y.o.  postmenopausal female referred by Dr. Nneka Hernandez with peritoneal cancer. Patient was being followed by Dr. Quang Gresham who left the practice. Intitally seen be JOHN talaverawith reports of vaginal bleeding. Given pt's history of hysteretectomy with BSO for endometriosis in  a TVUS was ordered. Which revealed a 6.8 cm x 5.2 cm x 4.6 cm at midline vaginal cuff. This prompted pelvic CT with findings of a lesion measuring 7.6 x 5.8 x 7.2 cm and is compatible with a pelvic neoplasm vs endometrioma given prior history of endometriosis. Tumor markers done on 2018 all normal.  S/p  Exploratory laparotomy, exploration of retroperitoneal spaces, exam under anesthesia with biopsy of rectovaginal mass on 2019. Had sigmoidoscopy which revealed submucosal mass. S/p cycle #6 of carbo/taxol on 2019. S/p cytoreductive surgery with HIPEC on 2019. S/p radiation treatment completed in 2019. Was seen in office and had a biopsy c/w recurrence. S/p cycle #6 of Carbo/Doxil on 2020. S/p Exploratory laparotomy. 2. Lysis of adhesions. 3. Simple appendectomy. 4. 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 On . She also had a revision of urostomy that is still leaking. S/p IR biopsy of liver on 2021 which demonstrated recurrent disease. Pt on palliative letrozole and keytruda. HPI  Patient was being followed previously by Dr. Quang Gresham who left the practice. The patient presents to our HBV clinic today for follows up and continuing cancer treatment as scheduled. Here for follow-up. She continues to report on and off vaginal bleeding and rectal bleeding as well as discharge from the rectum. Continues to have pain controlled with meds.  Continues to have nausea  and report recent occasional emesis. Report having some fatigue. Patient states her neuropathy is worse and had stopped taking her Neurontin and Lyrica. The patient denies fevers, chills, night sweats, skin lumps or bumps, acute bleeding or bruising issues. Denies headaches, acute vision change, dizziness, chest pain, worsen shortness of breath, palpitation, productive cough, vomiting, worsening abdominal pain, altered bowel habits, dysuria, new bone pain or back pain, focal numbness or weakness.        Component      Latest Ref Rng & Units 3/11/2022           9:10 AM   CA-125      1.5 - 35.0 U/mL 4     Component      Latest Ref Rng & Units 2/11/2022           9:37 AM   CA-125      1.5 - 35.0 U/mL 4     Component      Latest Ref Rng & Units 12/16/2021          10:25AM   Cancer Ag (CA) 125      0.0 - 38.1 U/mL 5       Component      Latest Ref Rng & Units 6/17/2021          12:00 AM   Cancer Ag (CA) 125      0.0 - 38.1 U/mL 5.1     Component      Latest Ref Rng & Units 9/28/2020          11:11 AM   CA-125      1.5 - 35.0 U/mL 6     Component      Latest Ref Rng & Units 8/31/2020          10:01 AM   CA-125      1.5 - 35.0 U/mL 8     Component      Latest Ref Rng & Units 7/7/2020          11:30 AM   CA-125      1.5 - 35.0 U/mL 5     Component      Latest Ref Rng & Units 6/8/2020          10:40 AM   CA-125      1.5 - 35.0 U/mL 7     Component      Latest Ref Rng & Units 5/11/2020          11:30 AM   CA-125      1.5 - 35.0 U/mL 7     Component      Latest Ref Rng & Units 3/4/2020           8:15 AM   CA-125      1.5 - 35.0 U/mL 7     Component      Latest Ref Rng & Units 11/15/2019           9:56 AM   CA-125      1.5 - 35.0 U/mL 6     Component      Latest Ref Rng & Units 6/6/2019           8:44 AM   CA-125      1.5 - 35.0 U/mL 7     Component      Latest Ref Rng & Units 5/16/2019 4/25/2019           8:26 AM  8:20 AM   Cancer Ag (CA) 125      0.0 - 38.1 U/mL 7.8 8.7     Component      Latest Ref Rng & Units 4/4/2019           8:28 AM   Cancer Ag (CA) 125      0.0 - 38.1 U/mL 8.8     Component      Latest Ref Rng & Units 3/14/2019 2/21/2019           8:15 AM  8:32 AM   Cancer Ag (CA) 125      0.0 - 38.1 U/mL 10.4 18.4     12/17/2018 : 9.3  12/17/2018 CEA: 2.0  12/17/2018 AFP: 3.8    Pathology  8/4/2021  Liver mass, core biopsies:        Metastatic adenocarcinoma, most consistent with serous type. DIAGNOSIS COMMENT:   Although metastatic ovarian or primary peritoneal carcinoma forming   parenchymal liver lesions is uncommon, the histologic features in this   case are similar to those in the patient's prior rectovaginal mass biopsy   from 2019 and the immunohistochemical features are similar to those   documented in the EMR from a pelvic exenteration specimen performed at an   outside institution in 2020.   4/20/2020  Vaginal biopsy  Papillary serous adenocarcinoma    8/8/2019  A) COLON, PERICOLIC NODULE, EXCISION:      - ACELLULAR MUCIN WITH MULTIPLE CALCIFICATIONS, AND ASSOCIATED FIBROUS WALL WITH        HYALINIZATION, AND CHRONIC INFLAMMATION. - ADJACENT BENIGN FIBROADIPOSE TISSUE, CONSISTENT WITH MESENTERY. - NO EVIDENCE OF MALIGNANCY. - SEE COMMENT. B) LIVER, RIGHT LOBE, BIOPSY:      - NODULAR FAT NECROSIS AND FIBROSIS OF THE LIVER CAPSULE.      - MILD STEATOSIS.       - NO EVIDENCE OF MALIGNANCY. C) COLON, SIGMOID, SEROSAL IMPLANT, EXCISION:      - NODULAR FAT NECROSIS WITH FIBROSIS, CHRONIC INFLAMMATION, CALCIFICATIONS, AND        CYSTIC DEGENERATION WITHOUT MUCIN.      - NO EVIDENCE OF MALIGNANCY. D) OMENTUM, OMENTECTOMY (RESECTION):      - CONGESTED FIBROADIPOSE TISSUE WITH FOCAL FIBROSIS AND CHRONIC INFLAMMATION, AND        CALCIFIED NODULAR FIBROSIS.      - NO EVIDENCE OF MALIGNANCY. E) PERITONEUM, LEFT ANTERIOR ABDOMINAL, RESECTION:      - CONGESTED PERITONEAL TISSUE, NO EVIDENCE OF MALIGNANCY. F) PERITONEUM, RIGHT ANTERIOR ABDOMINAL, RESECTION:      - CONGESTED PERITONEAL TISSUE, NO EVIDENCE OF MALIGNANCY.     G) COLON, SIGMOID, SEROSAL IMPLANT, EXCISION:      - NODULAR FIBROSIS WITH HISTIOCYTES, SUGGESTIVE OF FAT NECROSIS.      - CYSTIC DEGENERATION, WITHOUT MUCIN.      - NO EVIDENCE OF MALIGNANCY. H) SOFT TISSUE, FALCIFORM, EXCISION:      - CONSISTENT WITH FALCIFORM LIGAMENT.      - NO EVIDENCE OF MALIGNANCY. PATHOLOGIC STAGE:  ypTx, pNx    1/17/2019   RECTOVAGINAL MASS (#1, 2) AND PELVIC MASS, BIOPSIES:   CONSISTENT WITH SEROUS CARCINOMA WITH EXTENSIVE NECROSIS. Imaging  MRI liver 7/6/2021     FINDINGS:     Abdominal Wall:  There is a circumscribed 8.3 cm-width x 2.4 cm-AP x 2.9  cm-craniocaudal length T2 hyperintense subcutaneous layer fluid collection along  the inferior margin of the ileal conduit. The colostomy site in the left lower  quadrant appears unremarkable. Liver:  A new ovoid T1-low T2-high signal 1.8 x 1.4 cm lesion is identified in  the segment 8. Another 0.8 x 0.6 cm T1-low T2-high signal focus is identified  in the segment 3. These structures were not apparent on prior studies. With  diffusion imaging, no evidence of restricted diffusion is demonstrated at these  foci. Biliary:  No evidence for significant common bile duct dilation. Gallbladder:  Mild distention of the gallbladder. No definite gallstones. Spleen, Adrenal Glands, Pancreas:  Unremarkable. Kidneys:  Cyst at the left kidney lower pole region measuring about 1.2 x 1.2  cm. Increase in size compared to prior CTs from above 0.8 x 0.7 cm. Miscellaneous: The largest of the periportal nodes measures up to about 1.3 x  1.1 cm. IMPRESSION     1. Focal fluid collection in the subcutaneous tissue adjacent to the ileal  conduit. This fluid collection may be amenable to aspiration under ultrasound  guidance. 2.  New hepatic lesions as described.   Although there is apparent lack of  restricted diffusion, further investigation with ultrasound is warranted to  assess if these structures are indeed cystic in nature. 3.  Left renal cyst.     4.  Slight az prominence. MRI pelvis 7/6/2021  FINDINGS:     Along the inferior aspect of the ileal conduit stoma site, focal elongated T1  high T2-signal fluid collection is demonstrated to, measuring about 8.3 cm-width  x 2.2 cm-thickness x 2.3 cm-craniocaudal length. In retrospect, there was a  suggestion of possible 7.1 x 3.5 x 2.0 cm hypodense material collection along  the inferior aspect of the subcutaneous abdominal wall portion of the ileal  conduit on the 04/01/21 unenhanced CT. This fluid collection therefore may be  slightly increased in size in the elbow. The intraperitoneal portion of the ileal conduit appears grossly unremarkable. The presacral region demonstrates apparent continued pattern of nonspecific mild  edema. The source for this edematous changes is clear. Most likely related to  residual changes from recent surgical intervention. Mild edematous changes also noted in the right and to lesser extent left  piriformis muscles. Additional edematous changes are also identified in the  right obturator internus along the superior aspect. Minimal free fluid in the posterior pelvic region. There is some intraluminal fluid in the blind loop portion of the rectum. At  the right superior lateral vaginal cuff corner, a vague trail of T2 high signal  is observed (coronal T2 images #8-10 and fat-sat axial T2 image #5-10). This  [place] appears to course quite close to the anterior margin of the rectal remnant  wall. Whether there is indeed communication between the blind rectal segment  and the vaginal cuff can be further assessed with barium enema. No distinct mass is detected pelvis. Enlarged nodes are identified along the right pelvic sidewall. The largest  right pelvic sidewall node is identified subjacent to the external iliac  artery/vein vascular bundle, measuring up to about 2.2 x 1.2 cm (fat-sat axial  T2 image #12). Another slightly enlarged node more superiorly measuring about  1.9 x 1.0 cm (image #17). Multiple slightly borderline prominent nodes  identified in the mesentery, measuring up to about 1.2 x 1.0 cm (image #25). Additional note near the aortic bifurcation measuring about 1.3 x 0.9 cm (image  #34). IMPRESSION     1. Focal T1- and T2-hyperintense subcutaneous fluid collection along the  inferior aspect of the ileal conduit stoma site. Possibly representing a seroma  or a focus of urinoma. This may be amenable to aspiration under ultrasound or  CT guidance. 2.  Questionable subtle trailed between the right superior lateral aspect of the  vaginal cuff with adjacent blind-tipped rectum. More definitive evaluation can  be performed with barium enema. 3.  Slight az prominence along the right pelvic sidewall and in the  mesentery. 4.  No distinct residual or recurrent mass. 5.  Persistent mild presacral edema  PETCT 7/17/2020   FINDINGS:        PET images: Study limited because of patient motion. Mediastinal blood pool reference value = SUV Max. Mediastinal blood pool reference value = SUV Mean. Liver parenchyma reference value =  4.7   SUV Max. Liver parenchyma reference value =  2.3    SUV Mean. NECK: There is no suspicious hypermetabolic activity at the neck. CHEST: There is no suspicious hypermetabolic activity at the chest.     ABDOMEN: Typical heterogeneity at the liver. No convincing malignant foci  hypermetabolic activity or underlying CT abnormality. PELVIS: There is soft tissue fullness in the right retrovesicular pelvis just  above the vaginal cuff and posterior to right ureter measuring about 3.8 cm with  Max SUV 13.5 (206), extending to the rectum posteriorly, levators inferiorly on  the right. Previously 4 cm and Max SUV 16.5. Thickening anterior abdominal wall compatible with scar demonstrating diffuse  modest activity and Max SUV 2.7.       Additional CT findings: Mediport catheter has tip in the superior vena cava. Air  trapping in the superior segments lower lobes. Right-sided nephroureteral stent  without hydronephrosis. No ascites. IMPRESSION      Treatment response. Decreased metabolic activity at the right retrovesicular  pelvic mass. No new evidence of metastatic disease. Interval placement right-sided nephroureteral stent and resolved  hydroureteronephrosis. .       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    Loss of appetite 10/14/2020    Other hydronephrosis 06/24/2020    Genetic carrier status 09/13/2019    Postoperative nausea 08/27/2019    Ovarian cancer ([address] 08/07/2019    Peritoneal carcinoma ([address] 02/14/2019    Pelvic mass in female 01/17/2019    Irritable bowel syndrome with both constipation and diarrhea 02/09/2018    Subclinical hypothyroidism 01/23/2018    Chronic abdominal pain 01/22/2018    Diverticulosis 01/22/2018    Hx of total hysterectomy 01/22/2018    Hyponatremia 01/22/2018    Advance care planning 01/31/2017    Dental caries 05/26/2016    Chronic periodontal disease 05/26/2016    SUAZO (dyspnea on exertion) 02/10/2016    Prediabetes 09/24/2015    Morbid obesity ([address] 08/31/2015    Arthritis, degenerative 08/31/2015    Gastroesophageal reflux disease without esophagitis 08/31/2015    ANAMIKA on CPAP 08/31/2015    Essential hypertension 08/28/2015    PTSD (post-traumatic stress disorder) 03/24/2014    S/P TKR (total knee replacement) 11/14/2012    Adjustment disorder with depressed mood 09/20/2012    Major depressive disorder, recurrent episode, moderate ([address] 09/20/2012    Suicidal ideation 09/19/2012    Menopause 05/08/2012    Knee pain, right 05/08/2012    Hypokalemia 02/04/2012    Overdose 02/04/2012    Obesity 06/18/2010    Mixed hyperlipidemia 06/18/2010     Past Medical History:   Diagnosis Date    AR (allergic rhinitis)     seasonal    Bladder cancer ([facility]     Cancer ([facility]     pt states between vgina and rectal area    Cardiac echocardiogram 04/11/2016    Tech difficult. EF 55-60%. No RWMA. Mild conc LVH. Gr 1 DDfx. RVSP normal.      Cardiac nuclear imaging test 05/05/2016    Low risk. Very patchy radiotracer uptake. Mild anterior artifact, low suspicion for ischemia. EF 68%. No RWMA. Normal EKG on pharm stress test.    Cardiovascular LE arterial duplex 10/07/2013    No significant arterial disease at rest bilaterally. R JUNE 1.21.  L JUNE 1.16. No significant sm vessel disease.     Chronic kidney disease     Depression     Dr. Lindaann Lundborg, suicide attempt 2/12    Diverticulosis     Endometriosis     s/p hysterectomy 2006    GERD (gastroesophageal reflux disease)     Glaucoma     Dr. Aria Vázquez    Hematuria, gross     HTN (hypertension)     Hx of colonoscopy 04/05/2017    mild diverticulosis, g1 internal hemorrhoids, normal colon , repeat 10 year 2027    Hx of suicide attempt     Hyperlipidemia LDL goal < 130     IBS (irritable bowel syndrome)     Ill-defined condition     environmental allergies    Insomnia     Malignant neoplasm of peritoneum ([facility] 2/14/2019    Nausea & vomiting     OA (osteoarthritis)     both knees, lower back    Preeclampsia     PTSD (post-traumatic stress disorder)     Seizures ([facility]     1 x with childbirth, had toxemia    Sleep apnea     does not use cpap machine    Spinal stenosis     Dr. Sonam Redding    Vertigo       Past Surgical History:   Procedure Laterality Date    COLONOSCOPY N/A 4/5/2017    COLONOSCOPY performed by Ana Lackey MD at [address] N/A 2/13/2019 SIGMOIDOSCOPY FLEXIBLE [facility] ENDOSCOPY    HX  SECTION      HX COLONOSCOPY  2017    DR. MCRAE 2017     HX COLOSTOMY      Revision 2021    HX CYSTECTOMY  2020    HX HYSTERECTOMY      HX KNEE ARTHROSCOPY Left     left knee    HX KNEE REPLACEMENT Bilateral     (R)  (L)     HX LAPAROTOMY N/A 2019    and rectovaginal bx    HX OTHER SURGICAL  2016    all teeth removed    HX SALPINGO-OOPHORECTOMY  2008    HX TUBAL LIGATION      IR INSERT TUNL CVC W PORT OVER 5 YEARS  2019    MULTIPLE DELIVERY       1990    KS FEMUR/KNEE SURG UNLISTED Left 2009    External fixator    KS PART REMOVAL COLON W ANASTOMOSIS      KS TOTAL ABDOM HYSTERECTOMY      TRANSURETHRAL RESEC BLADDER NECK        OB History          2    Para   2    Term   2       0    AB   0    Living   0         SAB   0    IAB   0    Ectopic   0    Molar   0    Multiple   0    Live Births   0              Social History     Tobacco Use    Smoking status: Never    Smokeless tobacco: Never   Substance Use Topics    Alcohol use: No      Family History   Problem Relation Age of Onset    Heart Disease Mother         CHF    Diabetes Mother     Hypertension Mother     Kidney Disease Mother     Breast Cancer Mother     Stroke Mother     Diabetes Father     Hypertension Father     Heart Attack Father         MI    Cancer Maternal Grandmother         stomach    Ovarian Cancer Maternal Aunt     Cancer Maternal Uncle       Current Outpatient Medications   Medication Sig    oxyCODONE-acetaminophen (PERCOCET) 5-325 mg per tablet Take 1 Tablet by mouth every four (4) hours as needed for Pain for up to 14 days. Max Daily Amount: 6 Tablets.     loperamide (IMMODIUM) 2 mg tablet Take 2 tabs=4mg after first loose stool, then 2 mg(1 tab) after each loose stool after that up to maximum of 16mg (8tabs) in 1 day    ondansetron hcl (ZOFRAN) 8 mg tablet Take 1 tablet every 8 hours for nausea and vomiting beginning the night of your chemotherapy treatment for 3 days. PARoxetine (PAXIL) 20 mg tablet take 1 tablet by mouth once daily    amLODIPine (NORVASC) 10 mg tablet take 1 tablet by mouth once daily    letrozole (FEMARA) 2.5 mg tablet Take 1 Tablet by mouth daily. OLANZapine (ZyPREXA) 2.5 mg tablet Take 1 Tablet by mouth nightly. Indications: prevent nausea and vomiting from cancer chemotherapy    pregabalin (LYRICA) 25 mg capsule Take 1 Capsule by mouth three (3) times daily. Max Daily Amount: 75 mg.    metoprolol succinate (TOPROL-XL) 100 mg tablet take 1 tablet by mouth once daily    lidocaine-prilocaine (EMLA) topical cream Apply to Mediport 1 hour prior to chemotherapy. Place kitchen saran wrap over cream and port. This will numb site. simvastatin (ZOCOR) 20 mg tablet take 1 tablet by mouth at bedtime    gabapentin (NEURONTIN) 100 mg capsule Take 1 Capsule by mouth three (3) times daily. Max Daily Amount: 300 mg.    latanoprost (XALATAN) 0.005 % ophthalmic solution Administer 1 Drop to both eyes nightly. No current facility-administered medications for this visit. Allergies   Allergen Reactions    Pantoprazole Other (comments)     Bleeding in stomach    Promethazine Other (comments)     Bleeding in stomach    Seafood Hives     FISH    Other Medication Hives     seafood    Pollen Extracts Other (comments)    Shellfish Derived Hives          Review of Systems   Constitutional:  Positive for malaise/fatigue. Negative for chills, diaphoresis, fever and weight loss. Respiratory:  Negative for cough, hemoptysis, shortness of breath and wheezing. Cardiovascular:  Negative for chest pain, palpitations and leg swelling. Gastrointestinal:  Positive for nausea. Negative for abdominal pain, diarrhea, heartburn and vomiting. Genitourinary:  Negative for dysuria, frequency, hematuria and urgency. Musculoskeletal:  Negative for joint pain and myalgias.    Skin: Negative for itching and rash. Neurological:  Negative for dizziness, seizures, weakness and headaches. Psychiatric/Behavioral:  Negative for depression. The patient does not have insomnia. Objective:     Visit Vitals  /70   Pulse 62   Resp 16   Ht 5' 3\" (1.6 m)   Wt 108.7 kg (239 lb 9.6 oz)   LMP 01/31/2008   SpO2 95%   BMI 42.44 kg/m²       ECOG Performance Status (grade): 1  0 - able to carry on all pre-disease activity w/out restriction  1 - restricted but able to carry out light work  2 - ambulatory and can self- care but unable to carry out work  3 - bed or chair >50% of waking hours  4 - completely disable, total care, confined to bed or chair    Physical Exam  Constitutional:       Appearance: Normal appearance. HENT:      Head: Normocephalic and atraumatic. Eyes:      Pupils: Pupils are equal, round, and reactive to light. Cardiovascular:      Rate and Rhythm: Normal rate and regular rhythm. Heart sounds: Normal heart sounds. Pulmonary:      Effort: Pulmonary effort is normal.      Breath sounds: Normal breath sounds. Abdominal:      General: Bowel sounds are normal.      Palpations: Abdomen is soft. Tenderness: no abdominal tenderness There is no guarding. Musculoskeletal:         General: Normal range of motion. Cervical back: Neck supple. Right lower leg: No edema. Left lower leg: No edema. Skin:     General: Skin is warm. Neurological:      General: No focal deficit present. Mental Status: She is alert and oriented to person, place, and time. Mental status is at baseline. Diagnostics:      No results found for this or any previous visit (from the past 96 hour(s)). Imaging:  Results for orders placed during the hospital encounter of 02/20/19    IR ESTAB PT LEVEL I    Narrative  This procedure was performed in is entirety by a physician assistant.     : Jerson Short PA-C    Outpatient: Mediport incision check    CPT code: 49509    Attending physician: Dr. Azul Gallegos    History: Status post Mediport placement    Exam: Patient states that the Mediport has been used without pain or incident. Patient denies fever, chills, nausea, vomiting or pain at the site. Patient  denies drainage, swelling, bleeding or tenderness. Site well approximated and healing well with Dermabond still covering the  incision. No drainage, swelling, erythema or tenderness at the site. Impression  Impression: Mediport properly placed and healing well. Advised if have any  questions or concerns or start have signs of infection to contact interventional  radiology      Results for orders placed during the hospital encounter of 09/22/17    XR ABD (KUB)    Narrative  PROCEDURE:  Abdomen AP. INDICATION:  Constipation. Sitz markers study day #5. COMPARISON:  9/20/17. FINDINGS:    There are still 24 markers, similar to 9/20/17. Notably all of the markers are  now located in the descending colon. No evidence of acute small bowel obstruction is detected. No mass effect or  pathological calcification is identified. No evidence of free intraperitoneal  air is detected. Impression  IMPRESSION:    1. Interval progression of the markers to the descending colon but all of the  24 markers are still present in the colon. 2.  Nonobstructive abdomen. Results for orders placed in visit on 07/26/21    CT BX LIVER NDL PERC    Narrative  CT BX LIVER NDL PERC    : Saúl Iqbal MD    Indication: Liver lesion. Preoperative Diagnosis: Liver lesion. Postoperative Diagnosis: Same. Anesthesia: Local anesthesia with 1% lidocaine. Moderate sedation with Versed  and Fentanyl given. I personally monitored the patient face-to-face throughout  the entire procedure. See detailed nursing records for precise medication  dosing. Sedation time: 30 minutes    Technique:  The risks, benefits, and alternatives of the procedure were discussed  with the patient. Verbal and written consent was obtained. Time-out was  performed to confirm the correct patient, procedure, and site. With the patient  supine, the skin was prepped and draped in usual sterile fashion. Maximum  sterile barrier technique used. Local anesthesia was administered. Under CT and  ultrasound guidance, a 17 gauge introducer was directed to the target through  which 18 gauge cores were obtained. Tract embolization was performed with  gelfoam slurry. Manual compression performed for hemostasis. Sterile dressing  was applied. Postprocedure imaging was performed. Note: All CT scans at this facility are performed using dose optimization  technique as appropriate to a performed exam, to include automated exposure  control, adjustment of the mA and/or kV according to patient size (including  appropriate matching for site-specific examinations), or use of iterative  reconstruction technique. Specimen: 5 cores. Bedside evaluation performed by Pathology department. Estimated Blood Loss: Minimal    Contrast: None    Complications: No immediate    Drain/Implant: None    Condition: Stable  _______________    Impression  Successful ultrasound and CT-guided liver mass biopsy. IMPRESSION/PLAN:  Stage IV Primary Peritoneal Cancer with Recurrence  -- Patient was being followed by Dr. Caden Lafleur who left the practice. -- s/p carbo (auc=6), taxol (175 mg/m2) IV every 3 wks s/p 6 cycles completed 6/6/2019  -- s/p cytoreductive surgery with [facility] with dr. Tasha Rajput  -- s/p pelvic radiation  -- Pain-script for tylenol #3  -- 5/13/-9/30/2020 given platinum sensitive disease s/p  auc=5, Doxil 30 mg/m2 IV x 6 cycles s/p Total pelvic exenteration  -- 8/4/2021 Liver mass, core biopsies: Metastatic adenocarcinoma, most consistent with serous type. -- Biopsy c/w recurrence-reviewed [facility] and discussed treatment options.  Dr. 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 Also discussed retreatment with platinum or if sensitive consider PARP inhibition. After discussion patient was placed on letrozole. -- 1/8/2022 Patient started Keytruda 400 mg IV every 3 weeks. -- Vaginal bleeding: Dr. 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 Has follow up with Urology. -- 5/31/2022 The patient presents to our [facility] clinic today for follows up and continuing cancer treatment as scheduled. -- She has tolerated Keytruda Q6 weeks, no high graded toxicities. She was agreeable to continue current therapy. -- 6/3/2022 S.p Keytruda   -- 6/17/2022 PET scan reported progressive disease. Stage IV metastatic malignancy with interval progression, dominant intensely hypermetabolic hepatic metastasis with substantial  interval enlargement compared to prior studies. Interval increase in size/extent of irregular intense increased FDG activity centered at and right of midline in the soft tissues of the deep  posterior pelvis, extending cephalad in the presacral region, at least some of which corresponds to irregular mass-like soft tissue density on CT, highly suspicious for malignancy. Small 6 mm subpleural pulmonary nodule left lower lobe laterally, not evident on PET but too small to be adequately assessed using PET  -- Today I have reviewed with the patient and her family about recent PET which showed progressive disease on multiple lines of therapy. We have discussed at length about next lines of therapy. The patient states she still believed she can achieve curable diease. I have showed her PET images which showed high burden tumor. I have explained to the patient and family that the goals of treatment of metastatic cancer are not curable but treatable in order to prolong survival and improve quality of life by reducing cancer-related symptoms.    Given her burden disease and progressive on immunotherapy plus hormonal therapy, we suggested re-challenging chemotherapy with platinum regimens. 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 I have discussed about potential side effects of chemotherapy. The patient was agreeable with the plan. -- 7/28/2022 C1 D1 palliative Carboplatin + Gemcitabine. Tolerated therapy with no high graded toxicities. -- Today she reported on-off vaginal bleeding and rectal bleeding as well as discharge from the rectum. Having generalized weakness. Otherwise she has tolerated with therapy. Plan:  -- Z9G4 completed on 09/22/2022  -- Supportive IV hydration with each cycle   -- Olanzapine ordered for CINV  -- Advised the patient to present to ED if worsening symptoms, bleeding, or concerns. -- Labs: CBC, CMP, . Supportive transfusion if indicated. -- Nutritional support: Referral to Nutritionist while on chemotherapy  -- RTC in 2 weeks, always sooner if required. Carboplatin + Gemcitabine  Days 1,8: Gemcitabine 800-1,000mg/m2 IV, followed by:  Day 1: Carboplatin AUC 4 IV   Repeat cycle every 3 weeks. 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 Ana Moya for rectal bleeding. She reported no further intervention offered at this time. --No active bleeding reported  --Monitor CBC, Iron Profile, Ferritin and replacement as indicated. --Advised to f/u Urology as scheduled. New referral sent to Urology       CINV  -- Patient is allergic to Promethazine, was unable to order compazine due to the allergies. -- Olanzapine ordered for CINV      Normocytic Anemia  Iron Deficiency Anemia  --Anemia w/u CBC, Iron profile, ferritin, B12/folate/MMA, SPEP  --Replacement as indicated.   --Due to iron deficiency and vaginal bleeding related patient  was scheduled for IV Venofer x 3 (Dose # 1 completed on 8/11/2022)  --Procrit 60,000 unit to start every 3 weeks when H/H is below 10/30  --We will continue to monitor labs CBC, Iron profile, B12/folate    Chemotherapy related Neuropathy  Cancer associated pain   --On Lyrica 25 mg 3 times daily   -- Percocet PRN will be refilled    Follow up in 2 weeks or sooner if indicated. Orders Placed This Encounter    CBC WITH AUTOMATED DIFF     Standing Status:   Future     Standing Expiration Date:   9/27/2023    FERRITIN     Standing Status:   Future     Standing Expiration Date:   9/27/2023    IRON PROFILE     Standing Status:   Future     Standing Expiration Date:   1/98/8595    METABOLIC PANEL, COMPREHENSIVE     Standing Status:   Future     Standing Expiration Date:   9/27/2023    CANCER ANTIGEN 125     Standing Status:   Future     Standing Expiration Date:   9/27/2023         Ms. David Gonzalez has a reminder for a \"due or due soon\" health maintenance. I have asked that she contact her primary care provider for follow-up on this health maintenance. All of patient's questions answered to their apparent satisfaction. They verbally show understanding and agreement with aforementioned plan. Liu Pérez MD  9/26/2022        Above mentioned total time spent for this encounter with more than 50% of the time spent in face-to-face counseling, discussing on diagnosis and management plan going forward, and co-ordination of care. Parts of this document has been produced using Dragon dictation system. Unrecognized errors in transcription may be present. Please do not hesitate to reach out for any questions or clarifications.       CC: Manuela Mcgill MD

## 2022-09-29 ENCOUNTER — HOSPITAL ENCOUNTER (OUTPATIENT)
Dept: INFUSION THERAPY | Age: 52
End: 2022-09-29

## 2022-09-29 NOTE — PROGRESS NOTES
[facility] Progress Note    Date: 2022    Name: Gonzales Soler    MRN: [id number]         : 1970    Ms. Ralph Beckett was scheduled today for C3D8 Chemo Regimen. Pt was contacted and pt reports she started having diarrhea last night and will not be coming in for her tx today. Pt reports she is taking her prescribed Imodium. Dr. Monica Klein made aware and advised that pt can be rescheduled for next week as long as she is feeling up to it. PSR made aware and will reschedule accordingly.     Roberto Masters RN  2022

## 2022-09-30 DIAGNOSIS — G62.9 NEUROPATHY: ICD-10-CM

## 2022-09-30 DIAGNOSIS — D50.8 IRON DEFICIENCY ANEMIA SECONDARY TO INADEQUATE DIETARY IRON INTAKE: ICD-10-CM

## 2022-09-30 DIAGNOSIS — K62.5 RECTAL BLEEDING: ICD-10-CM

## 2022-09-30 DIAGNOSIS — C48.2 PERITONEAL CARCINOMA (HCC): ICD-10-CM

## 2022-09-30 DIAGNOSIS — C56.3 MALIGNANT NEOPLASM OF BOTH OVARIES (HCC): ICD-10-CM

## 2022-09-30 DIAGNOSIS — C57.7 MALIGNANT NEOPLASM OF OTHER SPECIFIED FEMALE GENITAL ORGANS (HCC): ICD-10-CM

## 2022-09-30 DIAGNOSIS — Z79.899 ON ANTINEOPLASTIC CHEMOTHERAPY: ICD-10-CM

## 2022-09-30 DIAGNOSIS — G89.3 CANCER ASSOCIATED PAIN: ICD-10-CM

## 2022-10-06 ENCOUNTER — HOSPITAL ENCOUNTER (OUTPATIENT)
Dept: INFUSION THERAPY | Age: 52
Discharge: HOME | End: 2022-10-06
Payer: MEDICARE

## 2022-10-06 VITALS
SYSTOLIC BLOOD PRESSURE: 131 MMHG | RESPIRATION RATE: 18 BRPM | HEART RATE: 64 BPM | TEMPERATURE: 98.3 F | DIASTOLIC BLOOD PRESSURE: 80 MMHG | WEIGHT: 247.1 LBS | OXYGEN SATURATION: 100 % | BODY MASS INDEX: 43.77 KG/M2

## 2022-10-06 DIAGNOSIS — C48.2 PERITONEAL CARCINOMA (HCC): Primary | ICD-10-CM

## 2022-10-06 PROBLEM — Z79.899 NEED FOR PROPHYLACTIC CHEMOTHERAPY: Status: ACTIVE | Noted: 2022-10-06

## 2022-10-06 LAB
BASOPHILS # BLD: 0 K/UL (ref 0–0.1)
BASOPHILS NFR BLD: 0 % (ref 0–2)
DIFFERENTIAL METHOD BLD: ABNORMAL
EOSINOPHIL # BLD: 0.1 K/UL (ref 0–0.4)
EOSINOPHIL NFR BLD: 1 % (ref 0–5)
ERYTHROCYTE [DISTWIDTH] IN BLOOD BY AUTOMATED COUNT: 27.4 % (ref 11.6–14.5)
HCT VFR BLD AUTO: 20.9 % (ref 35–45)
HGB BLD-MCNC: 6.3 G/DL (ref 12–16)
HISTORY CHECKED?,CKHIST: NORMAL
IMM GRANULOCYTES # BLD AUTO: 0.1 K/UL (ref 0–0.04)
IMM GRANULOCYTES NFR BLD AUTO: 1 % (ref 0–0.5)
LYMPHOCYTES # BLD: 0.6 K/UL (ref 0.9–3.6)
LYMPHOCYTES NFR BLD: 10 % (ref 21–52)
MCH RBC QN AUTO: 27.4 PG (ref 24–34)
MCHC RBC AUTO-ENTMCNC: 30.1 G/DL (ref 31–37)
MCV RBC AUTO: 90.9 FL (ref 78–100)
MONOCYTES # BLD: 0.6 K/UL (ref 0.05–1.2)
MONOCYTES NFR BLD: 10 % (ref 3–10)
NEUTS SEG # BLD: 4.3 K/UL (ref 1.8–8)
NEUTS SEG NFR BLD: 78 % (ref 40–73)
NRBC # BLD: 0.06 K/UL (ref 0–0.01)
NRBC BLD-RTO: 1.1 PER 100 WBC
PLATELET # BLD AUTO: 176 K/UL (ref 135–420)
PLATELET COMMENTS,PCOM: ABNORMAL
PMV BLD AUTO: 10.6 FL (ref 9.2–11.8)
RBC # BLD AUTO: 2.3 M/UL (ref 4.2–5.3)
RBC MORPH BLD: ABNORMAL
RBC MORPH BLD: ABNORMAL
WBC # BLD AUTO: 5.7 K/UL (ref 4.6–13.2)

## 2022-10-06 PROCEDURE — 36591 DRAW BLOOD OFF VENOUS DEVICE: CPT

## 2022-10-06 PROCEDURE — 77030012965 HC NDL HUBR BBMI -A

## 2022-10-06 PROCEDURE — 74011000250 HC RX REV CODE- 250: Performed by: INTERNAL MEDICINE

## 2022-10-06 PROCEDURE — 85025 COMPLETE CBC W/AUTO DIFF WBC: CPT

## 2022-10-06 PROCEDURE — 74011250636 HC RX REV CODE- 250/636: Performed by: INTERNAL MEDICINE

## 2022-10-06 PROCEDURE — 99211 OFF/OP EST MAY X REQ PHY/QHP: CPT

## 2022-10-06 PROCEDURE — 86923 COMPATIBILITY TEST ELECTRIC: CPT

## 2022-10-06 PROCEDURE — 86900 BLOOD TYPING SEROLOGIC ABO: CPT

## 2022-10-06 RX ORDER — ONDANSETRON 2 MG/ML
8 INJECTION INTRAMUSCULAR; INTRAVENOUS AS NEEDED
Status: CANCELLED | OUTPATIENT
Start: 2022-10-07

## 2022-10-06 RX ORDER — SODIUM CHLORIDE 9 MG/ML
5-40 INJECTION INTRAMUSCULAR; INTRAVENOUS; SUBCUTANEOUS AS NEEDED
Status: CANCELLED | OUTPATIENT
Start: 2022-10-07

## 2022-10-06 RX ORDER — HYDROCORTISONE SODIUM SUCCINATE 100 MG/2ML
100 INJECTION, POWDER, FOR SOLUTION INTRAMUSCULAR; INTRAVENOUS AS NEEDED
Status: CANCELLED | OUTPATIENT
Start: 2022-10-07

## 2022-10-06 RX ORDER — DIPHENHYDRAMINE HYDROCHLORIDE 50 MG/ML
25 INJECTION, SOLUTION INTRAMUSCULAR; INTRAVENOUS AS NEEDED
Status: CANCELLED
Start: 2022-10-07

## 2022-10-06 RX ORDER — DIPHENHYDRAMINE HYDROCHLORIDE 50 MG/ML
50 INJECTION, SOLUTION INTRAMUSCULAR; INTRAVENOUS AS NEEDED
Status: CANCELLED
Start: 2022-10-07

## 2022-10-06 RX ORDER — ALBUTEROL SULFATE 0.83 MG/ML
2.5 SOLUTION RESPIRATORY (INHALATION) AS NEEDED
Status: CANCELLED
Start: 2022-10-07

## 2022-10-06 RX ORDER — ACETAMINOPHEN 325 MG/1
650 TABLET ORAL AS NEEDED
Status: CANCELLED
Start: 2022-10-07

## 2022-10-06 RX ORDER — SODIUM CHLORIDE 9 MG/ML
5-250 INJECTION, SOLUTION INTRAVENOUS AS NEEDED
Status: CANCELLED | OUTPATIENT
Start: 2022-10-07

## 2022-10-06 RX ORDER — EPINEPHRINE 1 MG/ML
0.3 INJECTION, SOLUTION, CONCENTRATE INTRAVENOUS AS NEEDED
Status: CANCELLED | OUTPATIENT
Start: 2022-10-07

## 2022-10-06 RX ORDER — SODIUM CHLORIDE 0.9 % (FLUSH) 0.9 %
10-40 SYRINGE (ML) INJECTION AS NEEDED
Status: DISCONTINUED | OUTPATIENT
Start: 2022-10-06 | End: 2022-10-10 | Stop reason: HOSPADM

## 2022-10-06 RX ORDER — HEPARIN 100 UNIT/ML
500 SYRINGE INTRAVENOUS ONCE
Status: COMPLETED | OUTPATIENT
Start: 2022-10-06 | End: 2022-10-06

## 2022-10-06 RX ADMIN — SODIUM CHLORIDE, PRESERVATIVE FREE 10 ML: 5 INJECTION INTRAVENOUS at 11:24

## 2022-10-06 RX ADMIN — HEPARIN 500 UNITS: 100 SYRINGE at 11:24

## 2022-10-06 RX ADMIN — SODIUM CHLORIDE, PRESERVATIVE FREE 10 ML: 5 INJECTION INTRAVENOUS at 09:50

## 2022-10-06 RX ADMIN — SODIUM CHLORIDE 500 ML: 0.9 INJECTION, SOLUTION INTRAVENOUS at 09:55

## 2022-10-06 NOTE — PROGRESS NOTES
[facility] Progress Note    Date: 2022    Name: Sha Pelaez    MRN: [id number]         : 1970    Ms. Clayton Montoya arrived in the [facility] today ambulatory at [id number], in stable condition, here for *DELAYED* CYCLE 3, DAY 8, IV CARBOPLATIN + GEMCITABINE CHEMOTHERAPY REGIMEN (DAYS 1 & 8, OF EVERY 21 DAY CYCLE). dAY 8 WAS HELD LAST WEEK DUE TO DIARRHEA AND PT NOT FEELING WELL. Carboplatin + Gemcitabine on Day 1 of every cycle & Gemcitabine only on every day 8. She was assessed and education was provided. Pt states she has been having \"extreme fatigue\". Reports not getting chemo last week due to having diarrhea and not feeling well. Pt states she still have nausea on and off but Dr. Ruthie Bear prescribed Olanzapine at her follow-up last week and states it seems to work better than zofran. Ms. Cannon's  vitals were reviewed. Visit Vitals  /80 (BP 1 Location: Left upper arm, BP Patient Position: Sitting)   Pulse 64   Temp 98.3 °F (36.8 °C)   Resp 18   Wt 112.1 kg (247 lb 1.6 oz)   SpO2 100%   Breastfeeding No   BMI 43.77 kg/m²       Right upper chest single lumen mediport accessed with 20 ga 1 in upton needle using sterile technique. Flushed well with NS with positive blood return. After 10ml blood waste, CBC drawn per treatment plan order. CBC ran in house. Due to critical H/H results, cbc with automated ordered and sent out to hospital for processing via . H/H 1st run: 6.2/21.4; 2nd run: 6.4/21.4    1020-spoke with Melvern Duane NP to made aware of critical H/H results. Verbal orders received to delay chemo to next week and type and crossmatch for 2 units of prbc's. Ant Montemayor MA aware and will get written order and consent. Rodney Rogel in pharmacy made aware to delay treatment to next week. 3 pink top tubes and 1 lavender tube drawn from mediport after 10ml blood waste and labeled. Red bloodbank wrist band placed to patients right wrist and pt made aware to not remove armband.  Blood specimens sent out to hospital for processing and order to allocate 2 unit prbc's placed. Port flushed with NS and positive blood return noted followed by heparin flush. Iglesias needle d/c'd and bandaid applied. I have reviewed discharge instructions with the patient. The patient verbalized understanding. Pt armband removed and shredded. Ms. Issa Soto was discharged from [place] [address] in stable condition at 1130. She is to return on 10/7/22 at 0900 for 2 units of PRBC's.     Eboni Henry RN  October 6, 2022  9:43 AM

## 2022-10-07 ENCOUNTER — HOSPITAL ENCOUNTER (OUTPATIENT)
Dept: INFUSION THERAPY | Age: 52
Discharge: HOME | End: 2022-10-07
Payer: MEDICARE

## 2022-10-07 VITALS
TEMPERATURE: 98 F | RESPIRATION RATE: 16 BRPM | DIASTOLIC BLOOD PRESSURE: 62 MMHG | OXYGEN SATURATION: 96 % | HEART RATE: 60 BPM | SYSTOLIC BLOOD PRESSURE: 112 MMHG

## 2022-10-07 DIAGNOSIS — D50.0 IRON DEFICIENCY ANEMIA DUE TO CHRONIC BLOOD LOSS: ICD-10-CM

## 2022-10-07 DIAGNOSIS — Z79.899 NEED FOR PROPHYLACTIC CHEMOTHERAPY: Primary | ICD-10-CM

## 2022-10-07 DIAGNOSIS — D50.8 IRON DEFICIENCY ANEMIA SECONDARY TO INADEQUATE DIETARY IRON INTAKE: ICD-10-CM

## 2022-10-07 PROCEDURE — 36430 TRANSFUSION BLD/BLD COMPNT: CPT

## 2022-10-07 PROCEDURE — P9040 RBC LEUKOREDUCED IRRADIATED: HCPCS

## 2022-10-07 PROCEDURE — 74011250637 HC RX REV CODE- 250/637: Performed by: NURSE PRACTITIONER

## 2022-10-07 PROCEDURE — 74011250636 HC RX REV CODE- 250/636: Performed by: NURSE PRACTITIONER

## 2022-10-07 PROCEDURE — 77030013169 SET IV BLD ICUM -A

## 2022-10-07 PROCEDURE — 74011000250 HC RX REV CODE- 250: Performed by: NURSE PRACTITIONER

## 2022-10-07 PROCEDURE — 96372 THER/PROPH/DIAG INJ SC/IM: CPT

## 2022-10-07 RX ORDER — ALBUTEROL SULFATE 0.83 MG/ML
2.5 SOLUTION RESPIRATORY (INHALATION) AS NEEDED
Status: CANCELLED
Start: 2022-10-28

## 2022-10-07 RX ORDER — SODIUM CHLORIDE 9 MG/ML
5-250 INJECTION, SOLUTION INTRAVENOUS AS NEEDED
Status: DISPENSED | OUTPATIENT
Start: 2022-10-07 | End: 2022-10-07

## 2022-10-07 RX ORDER — OXYCODONE AND ACETAMINOPHEN 5; 325 MG/1; MG/1
1 TABLET ORAL
Qty: 40 TABLET | Refills: 0 | Status: SHIPPED | OUTPATIENT
Start: 2022-10-07 | End: 2022-10-13 | Stop reason: SDUPTHER

## 2022-10-07 RX ORDER — HEPARIN 100 UNIT/ML
500 SYRINGE INTRAVENOUS AS NEEDED
Status: CANCELLED
Start: 2022-10-28

## 2022-10-07 RX ORDER — ACETAMINOPHEN 325 MG/1
650 TABLET ORAL ONCE
Status: CANCELLED | OUTPATIENT
Start: 2022-10-07 | End: 2022-10-07

## 2022-10-07 RX ORDER — DIPHENHYDRAMINE HYDROCHLORIDE 50 MG/ML
25 INJECTION, SOLUTION INTRAMUSCULAR; INTRAVENOUS AS NEEDED
Status: CANCELLED
Start: 2022-10-07

## 2022-10-07 RX ORDER — ACETAMINOPHEN 325 MG/1
650 TABLET ORAL ONCE
Status: COMPLETED | OUTPATIENT
Start: 2022-10-07 | End: 2022-10-07

## 2022-10-07 RX ORDER — SODIUM CHLORIDE 0.9 % (FLUSH) 0.9 %
5-40 SYRINGE (ML) INJECTION AS NEEDED
Status: CANCELLED | OUTPATIENT
Start: 2022-10-07

## 2022-10-07 RX ORDER — DIPHENHYDRAMINE HYDROCHLORIDE 50 MG/ML
50 INJECTION, SOLUTION INTRAMUSCULAR; INTRAVENOUS AS NEEDED
Status: CANCELLED
Start: 2022-10-07

## 2022-10-07 RX ORDER — ONDANSETRON HYDROCHLORIDE 8 MG/1
TABLET, FILM COATED ORAL
Qty: 30 TABLET | Refills: 2 | Status: SHIPPED | OUTPATIENT
Start: 2022-10-07

## 2022-10-07 RX ORDER — DIPHENHYDRAMINE HCL 25 MG
25 CAPSULE ORAL ONCE
Status: CANCELLED | OUTPATIENT
Start: 2022-10-07 | End: 2022-10-07

## 2022-10-07 RX ORDER — EPINEPHRINE 1 MG/ML
0.3 INJECTION, SOLUTION, CONCENTRATE INTRAVENOUS AS NEEDED
Status: CANCELLED | OUTPATIENT
Start: 2022-10-28

## 2022-10-07 RX ORDER — SODIUM CHLORIDE 9 MG/ML
5-40 INJECTION INTRAMUSCULAR; INTRAVENOUS; SUBCUTANEOUS AS NEEDED
Status: CANCELLED | OUTPATIENT
Start: 2022-10-28

## 2022-10-07 RX ORDER — SODIUM CHLORIDE 9 MG/ML
250 INJECTION, SOLUTION INTRAVENOUS AS NEEDED
Status: DISCONTINUED | OUTPATIENT
Start: 2022-10-07 | End: 2022-10-11 | Stop reason: HOSPADM

## 2022-10-07 RX ORDER — SODIUM CHLORIDE 9 MG/ML
5-40 INJECTION INTRAMUSCULAR; INTRAVENOUS; SUBCUTANEOUS AS NEEDED
Status: CANCELLED | OUTPATIENT
Start: 2022-10-07

## 2022-10-07 RX ORDER — EPINEPHRINE 1 MG/ML
0.3 INJECTION, SOLUTION, CONCENTRATE INTRAVENOUS AS NEEDED
Status: CANCELLED | OUTPATIENT
Start: 2022-10-07

## 2022-10-07 RX ORDER — ONDANSETRON 2 MG/ML
8 INJECTION INTRAMUSCULAR; INTRAVENOUS AS NEEDED
Status: CANCELLED | OUTPATIENT
Start: 2022-10-28

## 2022-10-07 RX ORDER — HEPARIN 100 UNIT/ML
500 SYRINGE INTRAVENOUS AS NEEDED
Status: CANCELLED
Start: 2022-10-07

## 2022-10-07 RX ORDER — SODIUM CHLORIDE 0.9 % (FLUSH) 0.9 %
5-40 SYRINGE (ML) INJECTION AS NEEDED
Status: DISPENSED | OUTPATIENT
Start: 2022-10-07 | End: 2022-10-07

## 2022-10-07 RX ORDER — ACETAMINOPHEN 325 MG/1
650 TABLET ORAL AS NEEDED
Status: CANCELLED
Start: 2022-10-07

## 2022-10-07 RX ORDER — HEPARIN 100 UNIT/ML
500 SYRINGE INTRAVENOUS AS NEEDED
Status: DISPENSED | OUTPATIENT
Start: 2022-10-07 | End: 2022-10-07

## 2022-10-07 RX ORDER — HYDROCORTISONE SODIUM SUCCINATE 100 MG/2ML
100 INJECTION, POWDER, FOR SOLUTION INTRAMUSCULAR; INTRAVENOUS AS NEEDED
Status: CANCELLED | OUTPATIENT
Start: 2022-10-07

## 2022-10-07 RX ORDER — DIPHENHYDRAMINE HYDROCHLORIDE 50 MG/ML
25 INJECTION, SOLUTION INTRAMUSCULAR; INTRAVENOUS AS NEEDED
Status: CANCELLED
Start: 2022-10-28

## 2022-10-07 RX ORDER — ALBUTEROL SULFATE 0.83 MG/ML
2.5 SOLUTION RESPIRATORY (INHALATION) AS NEEDED
Status: CANCELLED
Start: 2022-10-07

## 2022-10-07 RX ORDER — HYDROCORTISONE SODIUM SUCCINATE 100 MG/2ML
100 INJECTION, POWDER, FOR SOLUTION INTRAMUSCULAR; INTRAVENOUS AS NEEDED
Status: CANCELLED | OUTPATIENT
Start: 2022-10-28

## 2022-10-07 RX ORDER — SODIUM CHLORIDE 0.9 % (FLUSH) 0.9 %
5-40 SYRINGE (ML) INJECTION AS NEEDED
Status: CANCELLED | OUTPATIENT
Start: 2022-10-28

## 2022-10-07 RX ORDER — ACETAMINOPHEN 325 MG/1
650 TABLET ORAL AS NEEDED
Status: CANCELLED
Start: 2022-10-28

## 2022-10-07 RX ORDER — SODIUM CHLORIDE 9 MG/ML
5-250 INJECTION, SOLUTION INTRAVENOUS AS NEEDED
Status: CANCELLED | OUTPATIENT
Start: 2022-10-07

## 2022-10-07 RX ORDER — DIPHENHYDRAMINE HYDROCHLORIDE 50 MG/ML
50 INJECTION, SOLUTION INTRAMUSCULAR; INTRAVENOUS AS NEEDED
Status: CANCELLED
Start: 2022-10-28

## 2022-10-07 RX ORDER — DIPHENHYDRAMINE HCL 25 MG
25 CAPSULE ORAL ONCE
Status: COMPLETED | OUTPATIENT
Start: 2022-10-07 | End: 2022-10-07

## 2022-10-07 RX ORDER — SODIUM CHLORIDE 9 MG/ML
5-250 INJECTION, SOLUTION INTRAVENOUS AS NEEDED
Status: CANCELLED | OUTPATIENT
Start: 2022-10-28

## 2022-10-07 RX ORDER — ONDANSETRON 2 MG/ML
8 INJECTION INTRAMUSCULAR; INTRAVENOUS AS NEEDED
Status: CANCELLED | OUTPATIENT
Start: 2022-10-07

## 2022-10-07 RX ADMIN — ACETAMINOPHEN 650 MG: 325 TABLET, FILM COATED ORAL at 09:14

## 2022-10-07 RX ADMIN — SODIUM CHLORIDE 25 ML/HR: 9 INJECTION, SOLUTION INTRAVENOUS at 09:18

## 2022-10-07 RX ADMIN — HEPARIN 500 UNITS: 100 SYRINGE at 15:15

## 2022-10-07 RX ADMIN — DIPHENHYDRAMINE HYDROCHLORIDE 25 MG: 25 CAPSULE ORAL at 09:14

## 2022-10-07 RX ADMIN — Medication 10 ML: at 09:05

## 2022-10-07 RX ADMIN — EPOETIN ALFA-EPBX 60000 UNITS: 20000 INJECTION, SOLUTION INTRAVENOUS; SUBCUTANEOUS at 15:00

## 2022-10-07 RX ADMIN — Medication 10 ML: at 15:15

## 2022-10-07 NOTE — PROGRESS NOTES
[facility] Progress Note    Date: 2022    Name: Morgan Clemons    MRN: [id number]         : 1970    2 Units of PRBCs and Retacrit injection Q3 weeks    Ms. Issa Soto arrived to [facility] ambulatory with rolling walker at 4398. Ms. Issa Soto was assessed and education was provided. Discussed risks and benefits of blood transfusion with patient, including risk of transfusion reaction and disease transmission. Pt politely declined written patient education handout due to having transfusion recently. Consent note and signed consent by patient noted in chart. Red blood bracelet to patient's wrist verified. Clarified written order for PRBC's that units ordered should be irradiated and leukoreduced by Paulino Anderson NP. Ms. Cannon's vitals were reviewed. Visit Vitals  BP (!) 142/87 (BP 1 Location: Left upper arm, BP Patient Position: Sitting)   Pulse 67   Temp 97.7 °F (36.5 °C)   Resp 18   SpO2 99%     Right upper chest single lumen mediport accessed with 20 ga 1 in upton needle. Port flushed well with NS with positive blood return. NS 250ml IV started at [facility] via blood tubing per order. Pre-medications given approximately 30 minutes prior to starting blood transfusion:  Tylenol 650 mg PO and Benadryl 25 mg PO per order. 1st unit of PRBCs started at 75 ml/hr via blood tubing at 1010. Fifteen minutes into infusion, VS stable and pt denied c/o SOB, itching/hives, lip/tongue/facial swelling, CP or any other complaints. Infusion rate increased to 916GH/CA per policy. Unit completed at 1125 followed by NS flush. VS stable and no transfusion reaction suspected. 2nd unit of PRBCs started at 75 ml/hr at 1300 via new blood tubing. Fifteen minutes into infusion, VS stable and pt denied c/o SOB, itching/hives, lip/tongue/facial swelling, CP or any other complaints. Infusion rate increased to 155 ml/hr for the remainder of the transfusion per policy. Unit completed at    followed by NS flush.  VS stable and no transfusion reaction suspected. Ms. Gaurang Milton tolerated blood transfusion without any s/s of reaction or complaints. Pt politely declined to stay for 1 hour observation due to medical transport however she did stay for 30 minutes post transfusion and no s/s of reaction noted. VSS. Patient Vitals for the past 12 hrs:   Temp Pulse Resp BP SpO2   10/07/22 1458 98 °F (36.7 °C) 60 -- 112/62 96 %   10/07/22 1415 97.9 °F (36.6 °C) 66 16 131/74 98 %   10/07/22 1315 98 °F (36.7 °C) (!) 58 16 122/73 98 %   10/07/22 1222 (!) 96 °F (35.6 °C) 60 16 129/77 96 %   10/07/22 1125 98 °F (36.7 °C) (!) 54 16 116/72 98 %   10/07/22 1025 97.7 °F (36.5 °C) (!) 55 16 116/77 98 %   10/07/22 1003 97.9 °F (36.6 °C) (!) 56 16 107/65 95 %   10/07/22 0900 97.7 °F (36.5 °C) 67 18 (!) 142/87 99 %     Retacrit 60,000 units given SQ to LEFT upper back of arm per order. Bandaid applied. Patient education handout given to patient and education verbally discussed. Port flushed with NS followed by heparin flush. Iglesias needle d/c'd and bandaid applied. Discharge instructions reviewed with pt. Pt instructed to report SOB, CP, elevated temp, back pain, or other symptoms of transfusion reaction to MD or ED. Pt verbalized understanding. Patient armband removed and shredded    Ms. Gaurang Mliton was discharged from [place] [address] in stable condition at   1520. She is to return 10/13/22 at 0800 for delayed C3D8 Gemzar.     Viviane Blackwood RN  October 7, 2022

## 2022-10-08 LAB
ABO + RH BLD: NORMAL
BLD PROD TYP BPU: NORMAL
BLD PROD TYP BPU: NORMAL
BLOOD GROUP ANTIBODIES SERPL: NORMAL
BPU ID: NORMAL
BPU ID: NORMAL
CROSSMATCH RESULT,%XM: NORMAL
CROSSMATCH RESULT,%XM: NORMAL
SPECIMEN EXP DATE BLD: NORMAL
STATUS OF UNIT,%ST: NORMAL
STATUS OF UNIT,%ST: NORMAL
UNIT DIVISION, %UDIV: 0
UNIT DIVISION, %UDIV: 0

## 2022-10-12 ENCOUNTER — TELEPHONE (OUTPATIENT)
Dept: ONCOLOGY | Age: 52
End: 2022-10-12

## 2022-10-12 DIAGNOSIS — C48.2 PERITONEAL CARCINOMA (HCC): Primary | ICD-10-CM

## 2022-10-12 RX ORDER — SODIUM CHLORIDE 9 MG/ML
5-40 INJECTION INTRAMUSCULAR; INTRAVENOUS; SUBCUTANEOUS AS NEEDED
Status: CANCELLED | OUTPATIENT
Start: 2022-10-13

## 2022-10-12 RX ORDER — SODIUM CHLORIDE 0.9 % (FLUSH) 0.9 %
5-40 SYRINGE (ML) INJECTION AS NEEDED
Status: CANCELLED | OUTPATIENT
Start: 2022-10-13

## 2022-10-12 RX ORDER — ALBUTEROL SULFATE 0.83 MG/ML
2.5 SOLUTION RESPIRATORY (INHALATION) AS NEEDED
Status: CANCELLED
Start: 2022-10-13

## 2022-10-12 RX ORDER — ONDANSETRON 2 MG/ML
8 INJECTION INTRAMUSCULAR; INTRAVENOUS AS NEEDED
Status: CANCELLED | OUTPATIENT
Start: 2022-10-13

## 2022-10-12 RX ORDER — HEPARIN 100 UNIT/ML
500 SYRINGE INTRAVENOUS AS NEEDED
Status: CANCELLED
Start: 2022-10-13

## 2022-10-12 RX ORDER — DIPHENHYDRAMINE HYDROCHLORIDE 50 MG/ML
25 INJECTION, SOLUTION INTRAMUSCULAR; INTRAVENOUS AS NEEDED
Status: CANCELLED
Start: 2022-10-13

## 2022-10-12 RX ORDER — ACETAMINOPHEN 325 MG/1
650 TABLET ORAL AS NEEDED
Status: CANCELLED
Start: 2022-10-13

## 2022-10-12 RX ORDER — EPINEPHRINE 1 MG/ML
0.3 INJECTION, SOLUTION, CONCENTRATE INTRAVENOUS AS NEEDED
Status: CANCELLED | OUTPATIENT
Start: 2022-10-13

## 2022-10-12 RX ORDER — DIPHENHYDRAMINE HYDROCHLORIDE 50 MG/ML
50 INJECTION, SOLUTION INTRAMUSCULAR; INTRAVENOUS AS NEEDED
Status: CANCELLED
Start: 2022-10-13

## 2022-10-12 RX ORDER — ONDANSETRON 2 MG/ML
8 INJECTION INTRAMUSCULAR; INTRAVENOUS ONCE
Status: CANCELLED | OUTPATIENT
Start: 2022-10-13 | End: 2022-10-13

## 2022-10-12 RX ORDER — SODIUM CHLORIDE 9 MG/ML
5-250 INJECTION, SOLUTION INTRAVENOUS AS NEEDED
Status: CANCELLED | OUTPATIENT
Start: 2022-10-13

## 2022-10-12 RX ORDER — HYDROCORTISONE SODIUM SUCCINATE 100 MG/2ML
100 INJECTION, POWDER, FOR SOLUTION INTRAMUSCULAR; INTRAVENOUS AS NEEDED
Status: CANCELLED | OUTPATIENT
Start: 2022-10-13

## 2022-10-12 NOTE — TELEPHONE ENCOUNTER
Pt. Called asking to have refill on her Percoset. Explained to pt. That med is not due to be refilled as it was just done on 10/7/2022 quantity 40. She states she is taking them as prescribed every 4 hours as needed, with a max of 6 tabs per day. At that rate she will be out in approx. 7 days. What can we do for her?

## 2022-10-13 ENCOUNTER — HOSPITAL ENCOUNTER (OUTPATIENT)
Dept: INFUSION THERAPY | Age: 52
End: 2022-10-13
Payer: MEDICARE

## 2022-10-13 ENCOUNTER — HOSPITAL ENCOUNTER (OUTPATIENT)
Dept: INFUSION THERAPY | Age: 52
Discharge: HOME | End: 2022-10-13
Payer: MEDICARE

## 2022-10-13 VITALS
OXYGEN SATURATION: 96 % | SYSTOLIC BLOOD PRESSURE: 143 MMHG | BODY MASS INDEX: 43.61 KG/M2 | HEART RATE: 57 BPM | WEIGHT: 246.2 LBS | DIASTOLIC BLOOD PRESSURE: 78 MMHG | RESPIRATION RATE: 18 BRPM | TEMPERATURE: 97.9 F

## 2022-10-13 DIAGNOSIS — C48.2 PERITONEAL CARCINOMA (HCC): ICD-10-CM

## 2022-10-13 DIAGNOSIS — K62.5 RECTAL BLEEDING: ICD-10-CM

## 2022-10-13 DIAGNOSIS — G62.9 NEUROPATHY: ICD-10-CM

## 2022-10-13 DIAGNOSIS — C57.7 MALIGNANT NEOPLASM OF OTHER SPECIFIED FEMALE GENITAL ORGANS (HCC): ICD-10-CM

## 2022-10-13 DIAGNOSIS — C56.3 MALIGNANT NEOPLASM OF BOTH OVARIES (HCC): ICD-10-CM

## 2022-10-13 DIAGNOSIS — Z79.899 ON ANTINEOPLASTIC CHEMOTHERAPY: ICD-10-CM

## 2022-10-13 DIAGNOSIS — D50.8 IRON DEFICIENCY ANEMIA SECONDARY TO INADEQUATE DIETARY IRON INTAKE: ICD-10-CM

## 2022-10-13 DIAGNOSIS — G89.3 CANCER ASSOCIATED PAIN: ICD-10-CM

## 2022-10-13 DIAGNOSIS — C48.2 PERITONEAL CARCINOMA (HCC): Primary | ICD-10-CM

## 2022-10-13 LAB
BASOPHILS # BLD: 0.1 K/UL (ref 0–0.1)
BASOPHILS NFR BLD: 1 % (ref 0–2)
DIFFERENTIAL METHOD BLD: ABNORMAL
EOSINOPHIL # BLD: 0.1 K/UL (ref 0–0.4)
EOSINOPHIL NFR BLD: 2 % (ref 0–5)
ERYTHROCYTE [DISTWIDTH] IN BLOOD BY AUTOMATED COUNT: 26.4 % (ref 11.6–14.5)
HCT VFR BLD AUTO: 29.8 % (ref 35–45)
HGB BLD-MCNC: 8.9 G/DL (ref 12–16)
IMM GRANULOCYTES # BLD AUTO: 0.1 K/UL (ref 0–0.04)
IMM GRANULOCYTES NFR BLD AUTO: 1 % (ref 0–0.5)
LYMPHOCYTES # BLD: 0.8 K/UL (ref 0.9–3.6)
LYMPHOCYTES NFR BLD: 15 % (ref 21–52)
MCH RBC QN AUTO: 28.1 PG (ref 24–34)
MCHC RBC AUTO-ENTMCNC: 29.9 G/DL (ref 31–37)
MCV RBC AUTO: 94 FL (ref 78–100)
MONOCYTES # BLD: 1 K/UL (ref 0.05–1.2)
MONOCYTES NFR BLD: 19 % (ref 3–10)
NEUTS SEG # BLD: 3.3 K/UL (ref 1.8–8)
NEUTS SEG NFR BLD: 62 % (ref 40–73)
NRBC # BLD: 0.07 K/UL (ref 0–0.01)
NRBC BLD-RTO: 1.3 PER 100 WBC
PLATELET # BLD AUTO: 247 K/UL (ref 135–420)
PLATELET COMMENTS,PCOM: ABNORMAL
PMV BLD AUTO: 9.2 FL (ref 9.2–11.8)
RBC # BLD AUTO: 3.17 M/UL (ref 4.2–5.3)
RBC MORPH BLD: ABNORMAL
WBC # BLD AUTO: 5.4 K/UL (ref 4.6–13.2)

## 2022-10-13 PROCEDURE — 77030012965 HC NDL HUBR BBMI -A

## 2022-10-13 PROCEDURE — 96375 TX/PRO/DX INJ NEW DRUG ADDON: CPT

## 2022-10-13 PROCEDURE — 96361 HYDRATE IV INFUSION ADD-ON: CPT

## 2022-10-13 PROCEDURE — 74011000250 HC RX REV CODE- 250: Performed by: INTERNAL MEDICINE

## 2022-10-13 PROCEDURE — 85025 COMPLETE CBC W/AUTO DIFF WBC: CPT

## 2022-10-13 PROCEDURE — 74011250636 HC RX REV CODE- 250/636: Performed by: INTERNAL MEDICINE

## 2022-10-13 PROCEDURE — 96413 CHEMO IV INFUSION 1 HR: CPT

## 2022-10-13 PROCEDURE — 96377 APPLICATON ON-BODY INJECTOR: CPT

## 2022-10-13 RX ORDER — ONDANSETRON 2 MG/ML
8 INJECTION INTRAMUSCULAR; INTRAVENOUS ONCE
Status: COMPLETED | OUTPATIENT
Start: 2022-10-13 | End: 2022-10-13

## 2022-10-13 RX ORDER — SODIUM CHLORIDE 9 MG/ML
5-250 INJECTION, SOLUTION INTRAVENOUS AS NEEDED
Status: DISPENSED | OUTPATIENT
Start: 2022-10-13 | End: 2022-10-13

## 2022-10-13 RX ORDER — OXYCODONE AND ACETAMINOPHEN 5; 325 MG/1; MG/1
1 TABLET ORAL
Qty: 84 TABLET | Refills: 0 | Status: SHIPPED | OUTPATIENT
Start: 2022-10-13 | End: 2022-10-27

## 2022-10-13 RX ORDER — HEPARIN 100 UNIT/ML
500 SYRINGE INTRAVENOUS AS NEEDED
Status: DISPENSED | OUTPATIENT
Start: 2022-10-13 | End: 2022-10-13

## 2022-10-13 RX ORDER — SODIUM CHLORIDE 0.9 % (FLUSH) 0.9 %
5-40 SYRINGE (ML) INJECTION AS NEEDED
Status: DISPENSED | OUTPATIENT
Start: 2022-10-13 | End: 2022-10-13

## 2022-10-13 RX ADMIN — SODIUM CHLORIDE, PRESERVATIVE FREE 10 ML: 5 INJECTION INTRAVENOUS at 08:24

## 2022-10-13 RX ADMIN — SODIUM CHLORIDE, PRESERVATIVE FREE 10 ML: 5 INJECTION INTRAVENOUS at 10:25

## 2022-10-13 RX ADMIN — PEGFILGRASTIM 6 MG: KIT SUBCUTANEOUS at 10:30

## 2022-10-13 RX ADMIN — SODIUM CHLORIDE 25 ML/HR: 0.9 INJECTION, SOLUTION INTRAVENOUS at 09:25

## 2022-10-13 RX ADMIN — SODIUM CHLORIDE, PRESERVATIVE FREE 10 ML: 5 INJECTION INTRAVENOUS at 10:26

## 2022-10-13 RX ADMIN — ONDANSETRON 8 MG: 2 INJECTION INTRAMUSCULAR; INTRAVENOUS at 09:06

## 2022-10-13 RX ADMIN — GEMCITABINE 1700 MG: 100 INJECTION, SOLUTION INTRAVENOUS at 09:48

## 2022-10-13 RX ADMIN — HEPARIN 500 UNITS: 100 SYRINGE at 10:26

## 2022-10-13 RX ADMIN — SODIUM CHLORIDE 500 ML: 0.9 INJECTION, SOLUTION INTRAVENOUS at 08:25

## 2022-10-13 NOTE — TELEPHONE ENCOUNTER
Patient contacted office again to check on the status of her pain meds. I did tell her shes really not due until the 21st of this month. She explains she is taking her medication every 4 hours around the clock. She is taking 6 tabs a day. If she is taking 6 tabs a day an we gave her 40 tabs she only has enough for 6 days. Please send a break through medication or rx with more tabs if possible.

## 2022-10-13 NOTE — PROGRESS NOTES
[facility] Progress Note    Date: 2022    Name: Jw Fraser    MRN: [id number]         : 1970    Ms. Claudio Rodriguez arrived in the [facility] today ambulatory at 0800 in stable condition here for *DELAYED* CYCLE 3, DAY 8, IV CARBOPLATIN + GEMCITABINE CHEMOTHERAPY REGIMEN (EVERY 21 DAY CYCLE). Carboplatin + Gemcitabine on Day 1 of every cycle & Gemcitabine only on every day 8. She was assessed and education was provided. Pt states she still has nausea on and off but she manages with Olanzapine and Zofran at home. Pt denies any other complaints. Pt reports feeling a little better since receiving blood transfusion. Ms. Cannon's  vitals were reviewed. Visit Vitals  BP (!) 143/78 (BP 1 Location: Right upper arm, BP Patient Position: Reclining)   Pulse (!) 57   Temp 97.9 °F (36.6 °C)   Resp 18   Wt 111.7 kg (246 lb 3.2 oz)   SpO2 96%   BMI 43.61 kg/m²       Right upper chest single lumen mediport accessed with 20 ga 1 in upton needle using sterile technique. Flushed well with NS with positive blood return. After 10ml blood waste, CBC drawn per treatment plan order. CBC ran in house. CBC differential flagged in house, cbc with automated ordered and sent out to hospital for processing via . Preliminary results scanned into chart, results as follows: WBC 5.6, HGB 9.2, HCT 31.0, Platelets 109  Preliminary results discussed with Dr. Mary Alice Lewis- per Dr. Mary Alice Lewis okay to proceed with treatment today.       Final CBC results below:  Recent Results (from the past 12 hour(s))   CBC WITH AUTOMATED DIFF    Collection Time: 10/13/22  8:50 AM   Result Value Ref Range    WBC 5.4 4.6 - 13.2 K/uL    RBC 3.17 (L) 4.20 - 5.30 M/uL    HGB 8.9 (L) 12.0 - 16.0 g/dL    HCT 29.8 (L) 35.0 - 45.0 %    MCV 94.0 78.0 - 100.0 FL    MCH 28.1 24.0 - 34.0 PG    MCHC 29.9 (L) 31.0 - 37.0 g/dL    RDW 26.4 (H) 11.6 - 14.5 %    PLATELET 606 443 - 405 K/uL    MPV 9.2 9.2 - 11.8 FL    NRBC 1.3 (H) 0  WBC    ABSOLUTE NRBC 0.07 (H) 0.00 - 0.01 K/uL    NEUTROPHILS 62 40 - 73 %    LYMPHOCYTES 15 (L) 21 - 52 %    MONOCYTES 19 (H) 3 - 10 %    EOSINOPHILS 2 0 - 5 %    BASOPHILS 1 0 - 2 %    IMMATURE GRANULOCYTES 1 (H) 0.0 - 0.5 %    ABS. NEUTROPHILS 3.3 1.8 - 8.0 K/UL    ABS. LYMPHOCYTES 0.8 (L) 0.9 - 3.6 K/UL    ABS. MONOCYTES 1.0 0.05 - 1.2 K/UL    ABS. EOSINOPHILS 0.1 0.0 - 0.4 K/UL    ABS. BASOPHILS 0.1 0.0 - 0.1 K/UL    ABS. IMM. GRANS. 0.1 (H) 0.00 - 0.04 K/UL    DF SMEAR SCANNED      PLATELET COMMENTS ADEQUATE PLATELETS      RBC COMMENTS ANISOCYTOSIS  2+        RBC COMMENTS OVALOCYTES  1+        RBC COMMENTS POLYCHROMASIA  1+            Signed chemotherapy consent was viewed in her electronic record. Pre-chemo Hydration  ml given over 1 hours as ordered.  ml IV BAG was initiated to infuse @ [facility] throughout treatment today. The following pre-medication was administered approximately 30 minutes prior to starting chemotherapy as follows: Zofran 8 mg IV     Gemcitabine (GEMZAR) 1,700 mg IV (800 mg/m2), was administered over approximately 30 minutes as ordered. Her port was flushed well per policy with NS & Heparin, and then the upton needle was removed and gauze/bandaid was applied. Pegfilgrastim (Neulasta) SQ OBI wearable injectafer 6mg placed to RT back of arm. Device secured with 1 PodPal dressing. Pt made aware to remove injector at 3:00pm tomorrow. She verbalizes understanding. I have reviewed discharge instructions with the patient. The patient verbalized understanding. Pt armband removed and shredded. Ms. Claudio Rodriguez was discharged from [place] [address] in stable condition at [id number]. She is to return on 10/26/22 at [id number] for pre-chemo labs.     Alric Koyanagi, RN  October 13, 2022

## 2022-10-20 ENCOUNTER — APPOINTMENT (OUTPATIENT)
Dept: INFUSION THERAPY | Age: 52
End: 2022-10-20

## 2022-10-24 RX ORDER — ACETAMINOPHEN 325 MG/1
650 TABLET ORAL AS NEEDED
Status: CANCELLED
Start: 2022-11-04

## 2022-10-24 RX ORDER — SODIUM CHLORIDE 9 MG/ML
5-250 INJECTION, SOLUTION INTRAVENOUS AS NEEDED
Status: CANCELLED | OUTPATIENT
Start: 2022-11-04

## 2022-10-24 RX ORDER — DIPHENHYDRAMINE HYDROCHLORIDE 50 MG/ML
25 INJECTION, SOLUTION INTRAMUSCULAR; INTRAVENOUS AS NEEDED
Status: CANCELLED
Start: 2022-10-28

## 2022-10-24 RX ORDER — ALBUTEROL SULFATE 0.83 MG/ML
2.5 SOLUTION RESPIRATORY (INHALATION) AS NEEDED
Status: CANCELLED
Start: 2022-11-04

## 2022-10-24 RX ORDER — ACETAMINOPHEN 325 MG/1
650 TABLET ORAL AS NEEDED
Status: CANCELLED
Start: 2022-10-28

## 2022-10-24 RX ORDER — DIPHENHYDRAMINE HYDROCHLORIDE 50 MG/ML
50 INJECTION, SOLUTION INTRAMUSCULAR; INTRAVENOUS AS NEEDED
Status: CANCELLED
Start: 2022-11-04

## 2022-10-24 RX ORDER — HYDROCORTISONE SODIUM SUCCINATE 100 MG/2ML
100 INJECTION, POWDER, FOR SOLUTION INTRAMUSCULAR; INTRAVENOUS AS NEEDED
Status: CANCELLED | OUTPATIENT
Start: 2022-10-28

## 2022-10-24 RX ORDER — HYDROCORTISONE SODIUM SUCCINATE 100 MG/2ML
100 INJECTION, POWDER, FOR SOLUTION INTRAMUSCULAR; INTRAVENOUS AS NEEDED
Status: CANCELLED | OUTPATIENT
Start: 2022-11-04

## 2022-10-24 RX ORDER — DIPHENHYDRAMINE HYDROCHLORIDE 50 MG/ML
25 INJECTION, SOLUTION INTRAMUSCULAR; INTRAVENOUS AS NEEDED
Status: CANCELLED
Start: 2022-11-04

## 2022-10-24 RX ORDER — SODIUM CHLORIDE 9 MG/ML
5-250 INJECTION, SOLUTION INTRAVENOUS AS NEEDED
Status: CANCELLED | OUTPATIENT
Start: 2022-10-28

## 2022-10-24 RX ORDER — SODIUM CHLORIDE 9 MG/ML
5-40 INJECTION INTRAMUSCULAR; INTRAVENOUS; SUBCUTANEOUS AS NEEDED
Status: CANCELLED | OUTPATIENT
Start: 2022-10-28

## 2022-10-24 RX ORDER — EPINEPHRINE 1 MG/ML
0.3 INJECTION, SOLUTION, CONCENTRATE INTRAVENOUS AS NEEDED
Status: CANCELLED | OUTPATIENT
Start: 2022-11-04

## 2022-10-24 RX ORDER — HEPARIN 100 UNIT/ML
500 SYRINGE INTRAVENOUS AS NEEDED
Status: CANCELLED
Start: 2022-11-04

## 2022-10-24 RX ORDER — EPINEPHRINE 1 MG/ML
0.3 INJECTION, SOLUTION, CONCENTRATE INTRAVENOUS AS NEEDED
Status: CANCELLED | OUTPATIENT
Start: 2022-10-28

## 2022-10-24 RX ORDER — SODIUM CHLORIDE 0.9 % (FLUSH) 0.9 %
5-40 SYRINGE (ML) INJECTION AS NEEDED
Status: CANCELLED | OUTPATIENT
Start: 2022-11-04

## 2022-10-24 RX ORDER — DIPHENHYDRAMINE HYDROCHLORIDE 50 MG/ML
50 INJECTION, SOLUTION INTRAMUSCULAR; INTRAVENOUS AS NEEDED
Status: CANCELLED
Start: 2022-10-28

## 2022-10-24 RX ORDER — ONDANSETRON 2 MG/ML
8 INJECTION INTRAMUSCULAR; INTRAVENOUS AS NEEDED
Status: CANCELLED | OUTPATIENT
Start: 2022-11-04

## 2022-10-24 RX ORDER — ONDANSETRON 2 MG/ML
8 INJECTION INTRAMUSCULAR; INTRAVENOUS ONCE
Status: CANCELLED | OUTPATIENT
Start: 2022-11-04 | End: 2022-11-04

## 2022-10-24 RX ORDER — ALBUTEROL SULFATE 0.83 MG/ML
2.5 SOLUTION RESPIRATORY (INHALATION) AS NEEDED
Status: CANCELLED
Start: 2022-10-28

## 2022-10-24 RX ORDER — SODIUM CHLORIDE 9 MG/ML
5-40 INJECTION INTRAMUSCULAR; INTRAVENOUS; SUBCUTANEOUS AS NEEDED
Status: CANCELLED | OUTPATIENT
Start: 2022-11-04

## 2022-10-24 RX ORDER — ONDANSETRON 2 MG/ML
8 INJECTION INTRAMUSCULAR; INTRAVENOUS AS NEEDED
Status: CANCELLED | OUTPATIENT
Start: 2022-10-28

## 2022-10-27 ENCOUNTER — HOSPITAL ENCOUNTER (OUTPATIENT)
Dept: INFUSION THERAPY | Age: 52
Discharge: HOME | End: 2022-10-27
Payer: MEDICARE

## 2022-10-27 VITALS
RESPIRATION RATE: 18 BRPM | TEMPERATURE: 98 F | DIASTOLIC BLOOD PRESSURE: 65 MMHG | HEIGHT: 63 IN | OXYGEN SATURATION: 94 % | HEART RATE: 71 BPM | WEIGHT: 254.5 LBS | BODY MASS INDEX: 45.09 KG/M2 | SYSTOLIC BLOOD PRESSURE: 122 MMHG

## 2022-10-27 LAB
ALBUMIN SERPL-MCNC: 3.2 G/DL (ref 3.4–5)
ALBUMIN/GLOB SERPL: 0.9 {RATIO} (ref 0.8–1.7)
ALP SERPL-CCNC: 347 U/L (ref 45–117)
ALT SERPL-CCNC: 27 U/L (ref 13–56)
ANION GAP SERPL CALC-SCNC: 5 MMOL/L (ref 3–18)
AST SERPL-CCNC: 29 U/L (ref 10–38)
BASO+EOS+MONOS # BLD AUTO: 1.3 K/UL (ref 0–2.3)
BASO+EOS+MONOS NFR BLD AUTO: 9 % (ref 0.1–17)
BILIRUB SERPL-MCNC: 0.4 MG/DL (ref 0.2–1)
BUN SERPL-MCNC: 34 MG/DL (ref 7–18)
BUN/CREAT SERPL: 17 (ref 12–20)
CALCIUM SERPL-MCNC: 8.4 MG/DL (ref 8.5–10.1)
CANCER AG125 SERPL-ACNC: 19 U/ML (ref 1.5–35)
CHLORIDE SERPL-SCNC: 116 MMOL/L (ref 100–111)
CO2 SERPL-SCNC: 20 MMOL/L (ref 21–32)
CREAT SERPL-MCNC: 2.01 MG/DL (ref 0.6–1.3)
DIFFERENTIAL METHOD BLD: ABNORMAL
ERYTHROCYTE [DISTWIDTH] IN BLOOD BY AUTOMATED COUNT: 26.1 % (ref 11.5–14.5)
FERRITIN SERPL-MCNC: 1430 NG/ML (ref 8–388)
GLOBULIN SER CALC-MCNC: 3.4 G/DL (ref 2–4)
GLUCOSE SERPL-MCNC: 83 MG/DL (ref 74–99)
HCT VFR BLD AUTO: 27.5 % (ref 36–48)
HGB BLD-MCNC: 8.2 G/DL (ref 12–16)
IRON SATN MFR SERPL: 33 % (ref 20–50)
IRON SERPL-MCNC: 68 UG/DL (ref 50–175)
LYMPHOCYTES # BLD: 1.6 K/UL (ref 1.1–5.9)
LYMPHOCYTES NFR BLD: 11 % (ref 14–44)
MCH RBC QN AUTO: 28 PG (ref 25–35)
MCHC RBC AUTO-ENTMCNC: 29.8 G/DL (ref 31–37)
MCV RBC AUTO: 93.9 FL (ref 78–102)
NEUTS SEG # BLD: 12.1 K/UL (ref 1.8–9.5)
NEUTS SEG NFR BLD: 81 % (ref 40–70)
PLATELET # BLD AUTO: 96 K/UL (ref 135–420)
POTASSIUM SERPL-SCNC: 4.8 MMOL/L (ref 3.5–5.5)
PROT SERPL-MCNC: 6.6 G/DL (ref 6.4–8.2)
RBC # BLD AUTO: 2.93 M/UL (ref 4.1–5.1)
SODIUM SERPL-SCNC: 141 MMOL/L (ref 136–145)
TIBC SERPL-MCNC: 203 UG/DL (ref 250–450)
WBC # BLD AUTO: 15 K/UL (ref 4.5–13)

## 2022-10-27 PROCEDURE — 80053 COMPREHEN METABOLIC PANEL: CPT

## 2022-10-27 PROCEDURE — 82728 ASSAY OF FERRITIN: CPT

## 2022-10-27 PROCEDURE — 85025 COMPLETE CBC W/AUTO DIFF WBC: CPT

## 2022-10-27 PROCEDURE — 83540 ASSAY OF IRON: CPT

## 2022-10-27 PROCEDURE — 86304 IMMUNOASSAY TUMOR CA 125: CPT

## 2022-10-27 PROCEDURE — 85049 AUTOMATED PLATELET COUNT: CPT

## 2022-10-27 PROCEDURE — 36415 COLL VENOUS BLD VENIPUNCTURE: CPT

## 2022-10-27 NOTE — PROGRESS NOTES
[facility] Progress Note    Date: 2022    Name: Efrain Akins    MRN: [id number]         : 1970      Pre-chemo labs reviewed and discussed with Dr. Nik Shook. Per Dr. Nik Shook, okay to proceed with Gemzar/Carbo tomorrow 10/28/22 with platelets 96 and elevated alk. Phos of 347. Per Dr. Nik Shook he will continue to trend labs. Recent Results (from the past 12 hour(s))   CBC WITH 3 PART DIFF    Collection Time: 10/27/22  8:30 AM   Result Value Ref Range    WBC 15.0 (H) 4.5 - 13.0 K/uL    RBC 2.93 (L) 4.10 - 5.10 M/uL    HGB 8.2 (L) 12.0 - 16 g/dL    HCT 27.5 (L) 36 - 48 %    MCV 93.9 78 - 102 FL    MCH 28.0 25.0 - 35.0 PG    MCHC 29.8 (L) 31 - 37 g/dL    RDW 26.1 (H) 11.5 - 14.5 %    NEUTROPHILS 81 (H) 40 - 70 %    Mixed cells 9 0.1 - 17 %    LYMPHOCYTES 11 (L) 14 - 44 %    ABS. NEUTROPHILS 12.1 (H) 1.8 - 9.5 K/UL    ABS. MIXED CELLS 1.3 0.0 - 2.3 K/uL    ABS. LYMPHOCYTES 1.6 1.1 - 5.9 K/UL    DF AUTOMATED     METABOLIC PANEL, COMPREHENSIVE    Collection Time: 10/27/22  8:35 AM   Result Value Ref Range    Sodium 141 136 - 145 mmol/L    Potassium 4.8 3.5 - 5.5 mmol/L    Chloride 116 (H) 100 - 111 mmol/L    CO2 20 (L) 21 - 32 mmol/L    Anion gap 5 3.0 - 18 mmol/L    Glucose 83 74 - 99 mg/dL    BUN 34 (H) 7.0 - 18 MG/DL    Creatinine 2.01 (H) 0.6 - 1.3 MG/DL    BUN/Creatinine ratio 17 12 - 20      eGFR 29 (L) >60 ml/min/1.73m2    Calcium 8.4 (L) 8.5 - 10.1 MG/DL    Bilirubin, total 0.4 0.2 - 1.0 MG/DL    ALT (SGPT) 27 13 - 56 U/L    AST (SGOT) 29 10 - 38 U/L    Alk.  phosphatase 347 (H) 45 - 117 U/L    Protein, total 6.6 6.4 - 8.2 g/dL    Albumin 3.2 (L) 3.4 - 5.0 g/dL    Globulin 3.4 2.0 - 4.0 g/dL    A-G Ratio 0.9 0.8 - 1.7     IRON PROFILE    Collection Time: 10/27/22  8:35 AM   Result Value Ref Range    Iron 68 50 - 175 ug/dL    TIBC 203 (L) 250 - 450 ug/dL    Iron % saturation 33 20 - 50 %   PLATELET COUNT    Collection Time: 10/27/22  9:50 AM   Result Value Ref Range    PLATELET 96 (L) 756 - 420 K/uL Cyn Paiz RN  October 27, 2022 68

## 2022-10-27 NOTE — PROGRESS NOTES
[facility] Lab Visit:    Gonzales Soler  1970  [id number]    0825 Pt arrived ambulatory w/o assist. Labs drawn per order via Left AC venipuncture x1 attempt. Pt tolerated well without complaints. Gauze/ coban to site. Pt departed [facility] ambulatory and in no distress at 0835.      Visit Vitals  /65 (BP 1 Location: Right upper arm, BP Patient Position: Sitting)   Pulse 71   Temp 98 °F (36.7 °C)   Resp 18   Ht 5' 3\" (1.6 m)   Wt 115.4 kg (254 lb 8 oz)   SpO2 94%   BMI 45.08 kg/m²

## 2022-10-28 ENCOUNTER — HOSPITAL ENCOUNTER (OUTPATIENT)
Dept: INFUSION THERAPY | Age: 52
Discharge: HOME | End: 2022-10-28
Payer: MEDICARE

## 2022-10-28 VITALS
HEART RATE: 69 BPM | SYSTOLIC BLOOD PRESSURE: 131 MMHG | RESPIRATION RATE: 18 BRPM | OXYGEN SATURATION: 95 % | DIASTOLIC BLOOD PRESSURE: 79 MMHG | TEMPERATURE: 97.7 F

## 2022-10-28 DIAGNOSIS — C48.2 PERITONEAL CARCINOMA (HCC): Primary | ICD-10-CM

## 2022-10-28 DIAGNOSIS — D50.0 IRON DEFICIENCY ANEMIA DUE TO CHRONIC BLOOD LOSS: ICD-10-CM

## 2022-10-28 DIAGNOSIS — D50.8 IRON DEFICIENCY ANEMIA SECONDARY TO INADEQUATE DIETARY IRON INTAKE: ICD-10-CM

## 2022-10-28 PROCEDURE — 96413 CHEMO IV INFUSION 1 HR: CPT

## 2022-10-28 PROCEDURE — 74011000250 HC RX REV CODE- 250: Performed by: INTERNAL MEDICINE

## 2022-10-28 PROCEDURE — 96367 TX/PROPH/DG ADDL SEQ IV INF: CPT

## 2022-10-28 PROCEDURE — 77030012965 HC NDL HUBR BBMI -A

## 2022-10-28 PROCEDURE — 96372 THER/PROPH/DIAG INJ SC/IM: CPT

## 2022-10-28 PROCEDURE — 74011000258 HC RX REV CODE- 258: Performed by: INTERNAL MEDICINE

## 2022-10-28 PROCEDURE — 96417 CHEMO IV INFUS EACH ADDL SEQ: CPT

## 2022-10-28 PROCEDURE — 96375 TX/PRO/DX INJ NEW DRUG ADDON: CPT

## 2022-10-28 PROCEDURE — 74011250636 HC RX REV CODE- 250/636: Performed by: INTERNAL MEDICINE

## 2022-10-28 PROCEDURE — 96361 HYDRATE IV INFUSION ADD-ON: CPT

## 2022-10-28 PROCEDURE — 74011250636 HC RX REV CODE- 250/636: Performed by: NURSE PRACTITIONER

## 2022-10-28 RX ORDER — SODIUM CHLORIDE 9 MG/ML
5-40 INJECTION INTRAMUSCULAR; INTRAVENOUS; SUBCUTANEOUS AS NEEDED
OUTPATIENT
Start: 2022-11-01

## 2022-10-28 RX ORDER — EPINEPHRINE 1 MG/ML
0.3 INJECTION, SOLUTION, CONCENTRATE INTRAVENOUS AS NEEDED
OUTPATIENT
Start: 2022-11-01

## 2022-10-28 RX ORDER — PALONOSETRON 0.05 MG/ML
0.25 INJECTION, SOLUTION INTRAVENOUS ONCE
Status: COMPLETED | OUTPATIENT
Start: 2022-10-28 | End: 2022-10-28

## 2022-10-28 RX ORDER — HEPARIN 100 UNIT/ML
500 SYRINGE INTRAVENOUS AS NEEDED
Status: DISPENSED | OUTPATIENT
Start: 2022-10-28 | End: 2022-10-28

## 2022-10-28 RX ORDER — DIPHENHYDRAMINE HYDROCHLORIDE 50 MG/ML
25 INJECTION, SOLUTION INTRAMUSCULAR; INTRAVENOUS AS NEEDED
Start: 2022-11-01

## 2022-10-28 RX ORDER — SODIUM CHLORIDE 9 MG/ML
5-250 INJECTION, SOLUTION INTRAVENOUS AS NEEDED
Status: DISPENSED | OUTPATIENT
Start: 2022-10-28 | End: 2022-10-28

## 2022-10-28 RX ORDER — HYDROCORTISONE SODIUM SUCCINATE 100 MG/2ML
100 INJECTION, POWDER, FOR SOLUTION INTRAMUSCULAR; INTRAVENOUS AS NEEDED
OUTPATIENT
Start: 2022-11-01

## 2022-10-28 RX ORDER — SODIUM CHLORIDE 0.9 % (FLUSH) 0.9 %
5-40 SYRINGE (ML) INJECTION AS NEEDED
Status: DISPENSED | OUTPATIENT
Start: 2022-10-28 | End: 2022-10-28

## 2022-10-28 RX ORDER — HEPARIN 100 UNIT/ML
500 SYRINGE INTRAVENOUS AS NEEDED
Start: 2022-11-01

## 2022-10-28 RX ORDER — SODIUM CHLORIDE 9 MG/ML
5-250 INJECTION, SOLUTION INTRAVENOUS AS NEEDED
OUTPATIENT
Start: 2022-11-01

## 2022-10-28 RX ORDER — SODIUM CHLORIDE 0.9 % (FLUSH) 0.9 %
5-40 SYRINGE (ML) INJECTION AS NEEDED
OUTPATIENT
Start: 2022-11-01

## 2022-10-28 RX ORDER — ALBUTEROL SULFATE 0.83 MG/ML
2.5 SOLUTION RESPIRATORY (INHALATION) AS NEEDED
Start: 2022-11-01

## 2022-10-28 RX ORDER — ACETAMINOPHEN 325 MG/1
650 TABLET ORAL AS NEEDED
Start: 2022-11-01

## 2022-10-28 RX ORDER — DIPHENHYDRAMINE HYDROCHLORIDE 50 MG/ML
50 INJECTION, SOLUTION INTRAMUSCULAR; INTRAVENOUS AS NEEDED
Start: 2022-11-01

## 2022-10-28 RX ORDER — ONDANSETRON 2 MG/ML
8 INJECTION INTRAMUSCULAR; INTRAVENOUS AS NEEDED
OUTPATIENT
Start: 2022-11-01

## 2022-10-28 RX ADMIN — GEMCITABINE 1700 MG: 100 INJECTION, SOLUTION INTRAVENOUS at 10:40

## 2022-10-28 RX ADMIN — HEPARIN 500 UNITS: 100 SYRINGE at 11:56

## 2022-10-28 RX ADMIN — EPOETIN ALFA-EPBX 60000 UNITS: 20000 INJECTION, SOLUTION INTRAVENOUS; SUBCUTANEOUS at 11:52

## 2022-10-28 RX ADMIN — SODIUM CHLORIDE 500 ML: 0.9 INJECTION, SOLUTION INTRAVENOUS at 08:31

## 2022-10-28 RX ADMIN — SODIUM CHLORIDE, PRESERVATIVE FREE 10 ML: 5 INJECTION INTRAVENOUS at 08:23

## 2022-10-28 RX ADMIN — SODIUM CHLORIDE, PRESERVATIVE FREE 10 ML: 5 INJECTION INTRAVENOUS at 11:56

## 2022-10-28 RX ADMIN — CARBOPLATIN 240 MG: 10 INJECTION, SOLUTION INTRAVENOUS at 11:18

## 2022-10-28 RX ADMIN — DEXAMETHASONE SODIUM PHOSPHATE 12 MG: 4 INJECTION, SOLUTION INTRAMUSCULAR; INTRAVENOUS at 09:06

## 2022-10-28 RX ADMIN — SODIUM CHLORIDE 25 ML/HR: 9 INJECTION, SOLUTION INTRAVENOUS at 09:33

## 2022-10-28 RX ADMIN — PALONOSETRON 0.25 MG: 0.05 INJECTION, SOLUTION INTRAVENOUS at 10:10

## 2022-10-28 RX ADMIN — SODIUM CHLORIDE 150 MG: 900 INJECTION, SOLUTION INTRAVENOUS at 09:37

## 2022-10-28 NOTE — PROGRESS NOTES
[facility] Progress Note    Date: 2022    Name: Octavio Almeida    MRN: [id number]         : 1970    Ms. Vikki Olivera arrived in the [facility] today at [id number], here for C4D1 Carbo/Gemzar (Q21 Days) + Retacrit (Q3 weeks). Carboplatin + Gemcitabine on Day 1 of every cycle & Gemcitabine only on day 8. She was assessed and education was provided. Ms. Cannon's  vitals were reviewed. Visit Vitals  BP (!) 155/84 (BP 1 Location: Right upper arm, BP Patient Position: Sitting)   Pulse 72   Temp 98.3 °F (36.8 °C)   Resp 18   SpO2 94%     Lab results from 10/27/22 were obtained and reviewed. Plt count of 96 and Alk phos of 347 were discussed with Dr. Luz Morejon by LUIS E OLMEDO and per aleida Lazaro to proceed with tx today. Right upper chest single lumen mediport accessed using 20g 1\" upton after chloraprep. Brisk blood return obtained and flushed well with NS.     500cc NS bolus administered over approximately 1 hour per order. Pre-meds administered approximately 30 min prior to start of chemo: Decadron 12mg IVPB, Emend 150mg IVPB, and Aloxi 0.25mg IVP. 250cc NS bag ran @ [facility]. Gemcitabine (Gemzar) 1,700 mg IV administered over approximately 30 min, followed by NS flush. Carboplatin (Paraplatin) 240 mg IV administered over approximately 30 min, followed by NS flush. Epoetin-meenakshi-epbx (Retacrit) 60,000 units administered SQ to pt's R upper arm. Band-Aid applied to site. Pt tolerated well and voiced no complaints. Port flushed well with NS and heparin and de-accessed. Gauze/Band-Aid applied to site. Discharge/ follow-up instructions discussed w/ pt. Pt verbalized understanding. Pt armband removed and shredded. Ms. Vikki Olivera was discharged from [place] [address] in stable condition at 1200. She is to return on 22 at 0800 for C4D8.       Migue Harman RN  2022

## 2022-10-31 RX ORDER — METOPROLOL SUCCINATE 100 MG/1
100 TABLET, EXTENDED RELEASE ORAL DAILY
Qty: 30 TABLET | Refills: 0 | Status: SHIPPED | OUTPATIENT
Start: 2022-10-31

## 2022-10-31 RX ORDER — PAROXETINE HYDROCHLORIDE 20 MG/1
20 TABLET, FILM COATED ORAL DAILY
Qty: 30 TABLET | Refills: 0 | Status: SHIPPED | OUTPATIENT
Start: 2022-10-31 | End: 2022-11-28

## 2022-10-31 NOTE — TELEPHONE ENCOUNTER
This patient contacted office for the following prescriptions to be filled:    Last office visit: 8/31/2021  Follow up appointment: 11/28/2022  Medication requested :   Requested Prescriptions     Pending Prescriptions Disp Refills    metoprolol succinate (TOPROL-XL) 100 mg tablet 90 Tablet 0     Sig: Take 1 Tablet by mouth daily. PARoxetine (PAXIL) 20 mg tablet 30 Tablet 0     Sig: Take 1 Tablet by mouth daily.      PCP: Amanda Trammell order or Local pharmacy name [facility] Medical Referral Source [facility] [phone]

## 2022-11-02 NOTE — TELEPHONE ENCOUNTER
Patient contacted office to report she is going on her first airplane ride on the 17th of this month. She was asking if we could send in her some medication for motion sickness.

## 2022-11-03 ENCOUNTER — APPOINTMENT (OUTPATIENT)
Dept: INFUSION THERAPY | Age: 52
End: 2022-11-03

## 2022-11-03 ENCOUNTER — TELEPHONE (OUTPATIENT)
Dept: ONCOLOGY | Age: 52
End: 2022-11-03

## 2022-11-03 RX ORDER — PROCHLORPERAZINE MALEATE 10 MG
10 TABLET ORAL
Qty: 30 TABLET | Refills: 1 | Status: SHIPPED | OUTPATIENT
Start: 2022-11-03 | End: 2022-11-10

## 2022-11-03 RX ORDER — MECLIZINE HYDROCHLORIDE 25 MG/1
25 TABLET ORAL
Qty: 12 TABLET | Refills: 0 | Status: SHIPPED | OUTPATIENT
Start: 2022-11-03

## 2022-11-03 NOTE — TELEPHONE ENCOUNTER
Patient called [facility] to speak with someone about the current side effects she is having. Patient is due to coming in tomorrow early morning for her next treatment.    Please contact patient today, thank you

## 2022-11-03 NOTE — TELEPHONE ENCOUNTER
Patient called at 1:40pm saying she has been at [facility] ED for about an hour for her pain and shortness of breathpatient stated she will call back with an update after talking to the ED physician

## 2022-11-04 ENCOUNTER — HOSPITAL ENCOUNTER (OUTPATIENT)
Dept: INFUSION THERAPY | Age: 52
Discharge: HOME | End: 2022-11-04
Payer: MEDICARE

## 2022-11-04 VITALS
HEART RATE: 67 BPM | TEMPERATURE: 98.9 F | SYSTOLIC BLOOD PRESSURE: 126 MMHG | RESPIRATION RATE: 18 BRPM | OXYGEN SATURATION: 100 % | DIASTOLIC BLOOD PRESSURE: 83 MMHG

## 2022-11-04 LAB
BASOPHILS # BLD: 0 K/UL (ref 0–0.1)
BASOPHILS NFR BLD: 0 % (ref 0–2)
DIFFERENTIAL METHOD BLD: ABNORMAL
EOSINOPHIL # BLD: 0 K/UL (ref 0–0.4)
EOSINOPHIL NFR BLD: 2 % (ref 0–5)
ERYTHROCYTE [DISTWIDTH] IN BLOOD BY AUTOMATED COUNT: 24.4 % (ref 11.6–14.5)
HCT VFR BLD AUTO: 20.9 % (ref 35–45)
HGB BLD-MCNC: 6.4 G/DL (ref 12–16)
HISTORY CHECKED?,CKHIST: NORMAL
IMM GRANULOCYTES # BLD AUTO: 0 K/UL (ref 0–0.04)
IMM GRANULOCYTES NFR BLD AUTO: 0 % (ref 0–0.5)
LYMPHOCYTES # BLD: 0.4 K/UL (ref 0.9–3.6)
LYMPHOCYTES NFR BLD: 22 % (ref 21–52)
MCH RBC QN AUTO: 29.2 PG (ref 24–34)
MCHC RBC AUTO-ENTMCNC: 30.6 G/DL (ref 31–37)
MCV RBC AUTO: 95.4 FL (ref 78–100)
MONOCYTES # BLD: 0 K/UL (ref 0.05–1.2)
MONOCYTES NFR BLD: 2 % (ref 3–10)
NEUTS SEG # BLD: 1.2 K/UL (ref 1.8–8)
NEUTS SEG NFR BLD: 74 % (ref 40–73)
NRBC # BLD: 0 K/UL (ref 0–0.01)
NRBC BLD-RTO: 0 PER 100 WBC
PLATELET # BLD AUTO: 59 K/UL (ref 135–420)
PLATELET COMMENTS,PCOM: ABNORMAL
PMV BLD AUTO: 8.8 FL (ref 9.2–11.8)
RBC # BLD AUTO: 2.19 M/UL (ref 4.2–5.3)
RBC MORPH BLD: ABNORMAL
RBC MORPH BLD: ABNORMAL
TOTAL CELLS COUNTED SPEC: 50
WBC # BLD AUTO: 1.6 K/UL (ref 4.6–13.2)

## 2022-11-04 PROCEDURE — 36591 DRAW BLOOD OFF VENOUS DEVICE: CPT

## 2022-11-04 PROCEDURE — 99213 OFFICE O/P EST LOW 20 MIN: CPT

## 2022-11-04 PROCEDURE — 77030012965 HC NDL HUBR BBMI -A

## 2022-11-04 PROCEDURE — 36415 COLL VENOUS BLD VENIPUNCTURE: CPT

## 2022-11-04 PROCEDURE — 74011250636 HC RX REV CODE- 250/636: Performed by: INTERNAL MEDICINE

## 2022-11-04 PROCEDURE — 86900 BLOOD TYPING SEROLOGIC ABO: CPT

## 2022-11-04 PROCEDURE — 86923 COMPATIBILITY TEST ELECTRIC: CPT

## 2022-11-04 PROCEDURE — 85025 COMPLETE CBC W/AUTO DIFF WBC: CPT

## 2022-11-04 PROCEDURE — 74011000250 HC RX REV CODE- 250: Performed by: INTERNAL MEDICINE

## 2022-11-04 RX ORDER — SODIUM CHLORIDE 9 MG/ML
5-250 INJECTION, SOLUTION INTRAVENOUS AS NEEDED
Status: CANCELLED | OUTPATIENT
Start: 2022-11-07

## 2022-11-04 RX ORDER — DIPHENHYDRAMINE HYDROCHLORIDE 50 MG/ML
25 INJECTION, SOLUTION INTRAMUSCULAR; INTRAVENOUS AS NEEDED
Status: CANCELLED
Start: 2022-11-07

## 2022-11-04 RX ORDER — ALBUTEROL SULFATE 0.83 MG/ML
2.5 SOLUTION RESPIRATORY (INHALATION) AS NEEDED
Status: CANCELLED
Start: 2022-11-07

## 2022-11-04 RX ORDER — DIPHENHYDRAMINE HYDROCHLORIDE 50 MG/ML
50 INJECTION, SOLUTION INTRAMUSCULAR; INTRAVENOUS AS NEEDED
Status: CANCELLED
Start: 2022-11-07

## 2022-11-04 RX ORDER — ACETAMINOPHEN 325 MG/1
650 TABLET ORAL AS NEEDED
Status: CANCELLED
Start: 2022-11-07

## 2022-11-04 RX ORDER — EPINEPHRINE 1 MG/ML
0.3 INJECTION, SOLUTION, CONCENTRATE INTRAVENOUS AS NEEDED
Status: CANCELLED | OUTPATIENT
Start: 2022-11-07

## 2022-11-04 RX ORDER — HEPARIN 100 UNIT/ML
500 SYRINGE INTRAVENOUS ONCE
Status: COMPLETED | OUTPATIENT
Start: 2022-11-04 | End: 2022-11-04

## 2022-11-04 RX ORDER — ONDANSETRON 2 MG/ML
8 INJECTION INTRAMUSCULAR; INTRAVENOUS AS NEEDED
Status: CANCELLED | OUTPATIENT
Start: 2022-11-07

## 2022-11-04 RX ORDER — SODIUM CHLORIDE 9 MG/ML
5-40 INJECTION INTRAMUSCULAR; INTRAVENOUS; SUBCUTANEOUS AS NEEDED
Status: CANCELLED | OUTPATIENT
Start: 2022-11-07

## 2022-11-04 RX ORDER — HYDROCORTISONE SODIUM SUCCINATE 100 MG/2ML
100 INJECTION, POWDER, FOR SOLUTION INTRAMUSCULAR; INTRAVENOUS AS NEEDED
Status: CANCELLED | OUTPATIENT
Start: 2022-11-07

## 2022-11-04 RX ORDER — SODIUM CHLORIDE 0.9 % (FLUSH) 0.9 %
10-40 SYRINGE (ML) INJECTION EVERY 8 HOURS
Status: DISCONTINUED | OUTPATIENT
Start: 2022-11-04 | End: 2022-11-08 | Stop reason: HOSPADM

## 2022-11-04 RX ADMIN — SODIUM CHLORIDE, PRESERVATIVE FREE 30 ML: 5 INJECTION INTRAVENOUS at 09:50

## 2022-11-04 RX ADMIN — HEPARIN 500 UNITS: 100 SYRINGE at 09:50

## 2022-11-04 NOTE — PROGRESS NOTES
[facility] Progress Note    Date: 2022    Name: Shasta Negro    MRN: [id number]         : 1970    ([address] [facility])    Ms. Dayna Groves arrived in the [facility] today using wheelchair because of complaints of increase SOB at 0815, here for C4D8 Carbo/Gemzar (Q21 Days). Per patient she was in the ED dept at [facility] yesterday 22 for 5-6 hours with complaints of pain, nausea and increase SOB. Per patient she was given Zofran for nausea and Morphine for pain but nothing for her breathing. Patient has no notable swelling or edema and VSS stable. Informed patient would have a provider see her today. Patient's H/H from her ED visit was .4 and platelets 86. Spoke w/[facility] located [facility] and he requested we repeat a CBC on her this morning. She was assessed and education was provided. Ms. Cannon's  vitals were reviewed. Visit Vitals  /83 (BP 1 Location: Right upper arm, BP Patient Position: Sitting)   Pulse 67   Temp 98.9 °F (37.2 °C)   Resp 18   SpO2 100%     Right upper chest single lumen mediport accessed using 20g 1\" upton after chloraprep. Patient jerking back in chair while this writer trying to access port. Once port accessed unable to flush. Needle removed and patient requesting to be stuck peripherally. This writer had to make 2 attempts to get blood from patient's left hand as patient pulled her hand back and stated \"I don't want to be stuck now, use my port. \"  Informed patient she needed to decide which way she wanted the blood to be obtained because we could not go back and forth. Patient agreed and CBC results obtained from her left hand and processed on site. 1st RUN   H/H 6.5/21.3  ANC 1.1  PLAT 52    2nd RUN  H/H 6.4/21.0  ANC 1.1  PLT 67    Spoke w/C/ BuddyNP and patient ordered to receive 1 unit of blood on 22. Alonso Varma spoke w/patient and addressed her concerns re: pain and the possibility of referral to pain management.   She also informed patient she will need to follow up with Dr. Smitha Philip re: her chemo regimen as patient had concerns. Patient has an appointment w/Dr. Smitha Philip on 11/07/22 at [address] [place] [address]. Patient informed of her blood counts and need for transfusion on 11/07/22  Patient verbalized understanding. Patient's Right upper chest single lumen mediport re-accessed using 20g 1\" upton after chloraprep by Bernardo Cole. Brisk blood return obtained and flushed well with NS. Three pinks and a lavender obtained for Type & Screen. This writer called Blood Bank prior to blood being drawn for transfusion and per Birdie palma for 2 weeks. Patient blood band placed to patient's left wrist and patient instructed not to remove under any circumstances prior to her blood transfusion on 11/07/22 at 1000. Patient also instructed that if she has increasing SOB, weakness, cp or dizziness to go to her nearest ED Dept. Patient verbalized understanding. Pt tolerated well and voiced no complaints. Port flushed well with NS and heparin and de-accessed. Gauze/Band-Aid applied to site. Discharge/ follow-up instructions discussed w/ pt. Pt verbalized understanding. Pt armband removed and shredded. Ms. Jus Batista was discharged from [place] [address] in stable condition at 1000. She is to return on 11/7/22 at 1000 for her Blood Transfusion appointment.       Amarilis Estevez  November 4, 2022

## 2022-11-07 ENCOUNTER — HOSPITAL ENCOUNTER (OUTPATIENT)
Dept: INFUSION THERAPY | Age: 52
Discharge: HOME | End: 2022-11-07
Payer: MEDICARE

## 2022-11-07 ENCOUNTER — OFFICE VISIT (OUTPATIENT)
Dept: ONCOLOGY | Age: 52
End: 2022-11-07
Payer: MEDICARE

## 2022-11-07 VITALS
RESPIRATION RATE: 18 BRPM | DIASTOLIC BLOOD PRESSURE: 69 MMHG | TEMPERATURE: 98.4 F | OXYGEN SATURATION: 98 % | SYSTOLIC BLOOD PRESSURE: 138 MMHG | HEART RATE: 57 BPM

## 2022-11-07 VITALS
HEIGHT: 63 IN | RESPIRATION RATE: 16 BRPM | OXYGEN SATURATION: 97 % | BODY MASS INDEX: 42.17 KG/M2 | WEIGHT: 238 LBS | HEART RATE: 66 BPM

## 2022-11-07 DIAGNOSIS — C48.2 PERITONEAL CARCINOMA (HCC): ICD-10-CM

## 2022-11-07 DIAGNOSIS — D50.0 IRON DEFICIENCY ANEMIA DUE TO CHRONIC BLOOD LOSS: ICD-10-CM

## 2022-11-07 DIAGNOSIS — G62.9 NEUROPATHY: ICD-10-CM

## 2022-11-07 DIAGNOSIS — Z79.899 NEED FOR PROPHYLACTIC CHEMOTHERAPY: Primary | ICD-10-CM

## 2022-11-07 DIAGNOSIS — N93.9 VAGINAL BLEEDING: ICD-10-CM

## 2022-11-07 DIAGNOSIS — C48.2 PERITONEAL CARCINOMA (HCC): Primary | ICD-10-CM

## 2022-11-07 DIAGNOSIS — C56.3 MALIGNANT NEOPLASM OF BOTH OVARIES (HCC): ICD-10-CM

## 2022-11-07 DIAGNOSIS — G89.3 CANCER ASSOCIATED PAIN: ICD-10-CM

## 2022-11-07 DIAGNOSIS — Z79.899 ON ANTINEOPLASTIC CHEMOTHERAPY: ICD-10-CM

## 2022-11-07 PROCEDURE — G8752 SYS BP LESS 140: HCPCS | Performed by: INTERNAL MEDICINE

## 2022-11-07 PROCEDURE — G8754 DIAS BP LESS 90: HCPCS | Performed by: INTERNAL MEDICINE

## 2022-11-07 PROCEDURE — 74011250637 HC RX REV CODE- 250/637: Performed by: NURSE PRACTITIONER

## 2022-11-07 PROCEDURE — 74011000250 HC RX REV CODE- 250: Performed by: NURSE PRACTITIONER

## 2022-11-07 PROCEDURE — G9717 DOC PT DX DEP/BP F/U NT REQ: HCPCS | Performed by: INTERNAL MEDICINE

## 2022-11-07 PROCEDURE — G8417 CALC BMI ABV UP PARAM F/U: HCPCS | Performed by: INTERNAL MEDICINE

## 2022-11-07 PROCEDURE — 77030013169 SET IV BLD ICUM -A

## 2022-11-07 PROCEDURE — P9040 RBC LEUKOREDUCED IRRADIATED: HCPCS

## 2022-11-07 PROCEDURE — 99214 OFFICE O/P EST MOD 30 MIN: CPT | Performed by: INTERNAL MEDICINE

## 2022-11-07 PROCEDURE — G8427 DOCREV CUR MEDS BY ELIG CLIN: HCPCS | Performed by: INTERNAL MEDICINE

## 2022-11-07 PROCEDURE — 77030012965 HC NDL HUBR BBMI -A

## 2022-11-07 PROCEDURE — 74011250636 HC RX REV CODE- 250/636: Performed by: NURSE PRACTITIONER

## 2022-11-07 PROCEDURE — G9899 SCRN MAM PERF RSLTS DOC: HCPCS | Performed by: INTERNAL MEDICINE

## 2022-11-07 PROCEDURE — G9711 PT HX TOT COL OR COLON CA: HCPCS | Performed by: INTERNAL MEDICINE

## 2022-11-07 PROCEDURE — 36430 TRANSFUSION BLD/BLD COMPNT: CPT

## 2022-11-07 RX ORDER — ACETAMINOPHEN 325 MG/1
650 TABLET ORAL ONCE
Status: COMPLETED | OUTPATIENT
Start: 2022-11-07 | End: 2022-11-07

## 2022-11-07 RX ORDER — DIPHENHYDRAMINE HYDROCHLORIDE 50 MG/ML
25 INJECTION, SOLUTION INTRAMUSCULAR; INTRAVENOUS AS NEEDED
Status: CANCELLED
Start: 2022-11-07

## 2022-11-07 RX ORDER — SODIUM CHLORIDE 9 MG/ML
5-40 INJECTION INTRAMUSCULAR; INTRAVENOUS; SUBCUTANEOUS AS NEEDED
Status: CANCELLED | OUTPATIENT
Start: 2022-11-07

## 2022-11-07 RX ORDER — SODIUM CHLORIDE 9 MG/ML
5-250 INJECTION, SOLUTION INTRAVENOUS AS NEEDED
Status: CANCELLED | OUTPATIENT
Start: 2022-11-07

## 2022-11-07 RX ORDER — DIPHENHYDRAMINE HCL 25 MG
25 CAPSULE ORAL ONCE
Status: CANCELLED | OUTPATIENT
Start: 2022-11-07 | End: 2022-11-07

## 2022-11-07 RX ORDER — EPINEPHRINE 1 MG/ML
0.3 INJECTION, SOLUTION, CONCENTRATE INTRAVENOUS AS NEEDED
Status: CANCELLED | OUTPATIENT
Start: 2022-11-07

## 2022-11-07 RX ORDER — SODIUM CHLORIDE 9 MG/ML
5-250 INJECTION, SOLUTION INTRAVENOUS AS NEEDED
Status: DISPENSED | OUTPATIENT
Start: 2022-11-07 | End: 2022-11-07

## 2022-11-07 RX ORDER — DIPHENHYDRAMINE HYDROCHLORIDE 50 MG/ML
50 INJECTION, SOLUTION INTRAMUSCULAR; INTRAVENOUS AS NEEDED
Status: CANCELLED
Start: 2022-11-07

## 2022-11-07 RX ORDER — SODIUM CHLORIDE 0.9 % (FLUSH) 0.9 %
5-40 SYRINGE (ML) INJECTION AS NEEDED
Status: DISPENSED | OUTPATIENT
Start: 2022-11-07 | End: 2022-11-07

## 2022-11-07 RX ORDER — SODIUM CHLORIDE 0.9 % (FLUSH) 0.9 %
5-40 SYRINGE (ML) INJECTION AS NEEDED
Status: CANCELLED | OUTPATIENT
Start: 2022-11-07

## 2022-11-07 RX ORDER — DIPHENHYDRAMINE HCL 25 MG
25 CAPSULE ORAL ONCE
Status: COMPLETED | OUTPATIENT
Start: 2022-11-07 | End: 2022-11-07

## 2022-11-07 RX ORDER — ONDANSETRON 2 MG/ML
8 INJECTION INTRAMUSCULAR; INTRAVENOUS AS NEEDED
Status: CANCELLED | OUTPATIENT
Start: 2022-11-07

## 2022-11-07 RX ORDER — HYDROCORTISONE SODIUM SUCCINATE 100 MG/2ML
100 INJECTION, POWDER, FOR SOLUTION INTRAMUSCULAR; INTRAVENOUS AS NEEDED
Status: CANCELLED | OUTPATIENT
Start: 2022-11-07

## 2022-11-07 RX ORDER — ACETAMINOPHEN 325 MG/1
650 TABLET ORAL ONCE
Status: CANCELLED | OUTPATIENT
Start: 2022-11-07 | End: 2022-11-07

## 2022-11-07 RX ORDER — ACETAMINOPHEN 325 MG/1
650 TABLET ORAL AS NEEDED
Status: CANCELLED
Start: 2022-11-07

## 2022-11-07 RX ORDER — HEPARIN 100 UNIT/ML
500 SYRINGE INTRAVENOUS AS NEEDED
Status: CANCELLED
Start: 2022-11-07

## 2022-11-07 RX ORDER — ALBUTEROL SULFATE 0.83 MG/ML
2.5 SOLUTION RESPIRATORY (INHALATION) AS NEEDED
Status: CANCELLED
Start: 2022-11-07

## 2022-11-07 RX ORDER — HEPARIN 100 UNIT/ML
500 SYRINGE INTRAVENOUS AS NEEDED
Status: DISPENSED | OUTPATIENT
Start: 2022-11-07 | End: 2022-11-07

## 2022-11-07 RX ADMIN — DIPHENHYDRAMINE HYDROCHLORIDE 25 MG: 25 CAPSULE ORAL at 10:38

## 2022-11-07 RX ADMIN — SODIUM CHLORIDE, PRESERVATIVE FREE 10 ML: 5 INJECTION INTRAVENOUS at 10:20

## 2022-11-07 RX ADMIN — SODIUM CHLORIDE, PRESERVATIVE FREE 10 ML: 5 INJECTION INTRAVENOUS at 14:13

## 2022-11-07 RX ADMIN — HEPARIN 500 UNITS: 100 SYRINGE at 14:14

## 2022-11-07 RX ADMIN — SODIUM CHLORIDE 25 ML/HR: 9 INJECTION, SOLUTION INTRAVENOUS at 10:25

## 2022-11-07 RX ADMIN — ACETAMINOPHEN 650 MG: 325 TABLET, FILM COATED ORAL at 10:38

## 2022-11-07 NOTE — LETTER
11/7/2022 2:27 PM    Ms. Gonzales Soler  [address]      Dear Mr. Josi Lucas is requesting to go to [place] to see her daughter Joseph Thomas graduate. She will be flying on November 17th-20th. From our stand point will resume treatment once she returns from her trip.            Sincerely,      Mitra Dawkins MD

## 2022-11-07 NOTE — LETTER
11/7/2022 2:30 PM    Ms. Loree Bolaños  [facility]      Dear Mr. Josi Kingston is requesting to go to [facility] to see her daughter Steph Gibson graduation. She will be flying on November 17th-20th.  From our stand point will resume treatment once she returns from her trip.               Sincerely,          Kaleigh Estes MD

## 2022-11-07 NOTE — PROGRESS NOTES
[facility] Progress Note    Date: 2022    Name: Jaden Infante    MRN: [id number]         : 1970    1 Units of PRBCs    Ms. Anne-Marie Pérez arrived to [facility] via wheelchair at 1005. Ms. Anne-Marie Pérez was assessed and education was provided. Discussed risks and benefits of blood transfusion with patient, including risk of transfusion reaction and disease transmission. Pt politely declined written patient education handout due to having transfusion recently. Consent note and signed consent by patient noted in chart. Red blood bracelet to patient's wrist verified. Ms. Cannon's vitals were reviewed. Visit Vitals  BP (!) 152/75 (BP 1 Location: Left lower arm, BP Patient Position: Sitting)   Pulse 65   Temp 98.3 °F (36.8 °C)   Resp 18   SpO2 98%   Breastfeeding No     Right upper chest single lumen mediport accessed with 20 ga 1 in iglesias needle. Port flushed well with NS with positive blood return. NS 250ml IV started at [facility] via blood tubing per order. Pre-medications given approximately 30 minutes prior to starting blood transfusion:  Tylenol 650 mg PO and Benadryl 25 mg PO per order. 1st unit of PRBCs started at 75 ml/hr via blood tubing at 1125. Fifteen minutes into infusion, VS stable and pt denied c/o SOB, itching/hives, lip/tongue/facial swelling, CP or any other complaints. Infusion rate increased to [id number]/XJ per policy. Unit completed at [phone] [id number] followed by NS flush. VS stable and no transfusion reaction suspected. Ms. Anne-Marie Pérez tolerated blood transfusion without any s/s of reaction or complaints. Pt politely declined to stay for 1 hour observation due to medical transport. VSS. Port flushed with NS followed by heparin flush. Iglesias needle d/c'd and bandaid applied. Discharge instructions reviewed with pt. Pt instructed to report SOB, CP, elevated temp, back pain, or other symptoms of transfusion reaction to MD or ED. Pt verbalized understanding.      Patient armband removed and shredded    Ms. Napoleon Liao was discharged from [place] [address] in stable condition at   1420. She is to return 12/1/22 at [address] [place] [address] for pre-chemo labs.     Jesu Vuong RN  November 7, 2022

## 2022-11-07 NOTE — PROGRESS NOTES
[facility] HEMATOLOGY/MEDICAL ONCOLOGY      Name: Krys Brunner  MRN: [id number]  : 1970/52 y.o. Date: 2022    Oncology History  Krys Brunner is a 47 y.o.  postmenopausal female referred by Dr. Timbo Escobar with peritoneal cancer. Patient was being followed by Dr. Adolph Santacruz who left the practice. Intitally seen be JOHN talaverawith reports of vaginal bleeding. Given pt's history of hysteretectomy with BSO for endometriosis in  a TVUS was ordered. Which revealed a 6.8 cm x 5.2 cm x 4.6 cm at midline vaginal cuff. This prompted pelvic CT with findings of a lesion measuring 7.6 x 5.8 x 7.2 cm and is compatible with a pelvic neoplasm vs endometrioma given prior history of endometriosis. Tumor markers done on 2018 all normal.  S/p  Exploratory laparotomy, exploration of retroperitoneal spaces, exam under anesthesia with biopsy of rectovaginal mass on 2019. Had sigmoidoscopy which revealed submucosal mass. S/p cycle #6 of carbo/taxol on 2019. S/p cytoreductive surgery with HIPEC on 2019. S/p radiation treatment completed in 2019. Was seen in office and had a biopsy c/w recurrence. S/p cycle #6 of Carbo/Doxil on 2020. S/p Exploratory laparotomy. 2. Lysis of adhesions. 3. Simple appendectomy. 4. 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 On . She also had a revision of urostomy that is still leaking. S/p IR biopsy of liver on 2021 which demonstrated recurrent disease. Pt on palliative letrozole and keytruda. HPI  Patient was being followed previously by Dr. Adolph Santacruz who left the practice. The patient presents to our HBV clinic today for follows up and continuing cancer treatment as scheduled. Here for follow-up. Today she continues to report on and off vaginal bleeding and rectal bleeding as well as discharge from the rectum but noticed that her bleeding is better now. Continues to have pain controlled with meds. Continues to have nausea and states Compazine helps. Reports having some fatigue. The patient denies fevers, chills, night sweats, skin lumps or bumps, acute bleeding or bruising issues. Denies headaches, acute vision changes, dizziness, chest pain, shortness of breath, palpitation, productive cough, vomiting, worsening abdominal pain, altered bowel habits, dysuria, new bone pain or back pain, focal numbness or weakness.      Past Medical History, Surgical History, Social, and Family History reviewed and remain unchanged:  Component      Latest Ref Rng & Units 3/11/2022           9:10 AM   CA-125      1.5 - 35.0 U/mL 4     Component      Latest Ref Rng & Units 2/11/2022           9:37 AM   CA-125      1.5 - 35.0 U/mL 4     Component      Latest Ref Rng & Units 12/16/2021          10:25AM   Cancer Ag (CA) 125      0.0 - 38.1 U/mL 5       Component      Latest Ref Rng & Units 6/17/2021          12:00 AM   Cancer Ag (CA) 125      0.0 - 38.1 U/mL 5.1     Component      Latest Ref Rng & Units 9/28/2020          11:11 AM   CA-125      1.5 - 35.0 U/mL 6     Component      Latest Ref Rng & Units 8/31/2020          10:01 AM   CA-125      1.5 - 35.0 U/mL 8     Component      Latest Ref Rng & Units 7/7/2020          11:30 AM   CA-125      1.5 - 35.0 U/mL 5     Component      Latest Ref Rng & Units 6/8/2020          10:40 AM   CA-125      1.5 - 35.0 U/mL 7     Component      Latest Ref Rng & Units 5/11/2020          11:30 AM   CA-125      1.5 - 35.0 U/mL 7     Component      Latest Ref Rng & Units 3/4/2020           8:15 AM   CA-125      1.5 - 35.0 U/mL 7     Component      Latest Ref Rng & Units 11/15/2019           9:56 AM   CA-125      1.5 - 35.0 U/mL 6     Component      Latest Ref Rng & Units 6/6/2019           8:44 AM   CA-125      1.5 - 35.0 U/mL 7     Component      Latest Ref Rng & Units 5/16/2019 4/25/2019           8:26 AM  8:20 AM   Cancer Ag (CA) 125      0.0 - 38.1 U/mL 7.8 8.7     Component      Latest Ref Rng & Units 4/4/2019           8:28 AM   Cancer Ag (CA) 125      0.0 - 38.1 U/mL 8.8     Component      Latest Ref Rng & Units 3/14/2019 2/21/2019           8:15 AM  8:32 AM   Cancer Ag (CA) 125      0.0 - 38.1 U/mL 10.4 18.4     12/17/2018 : 9.3  12/17/2018 CEA: 2.0  12/17/2018 AFP: 3.8    Pathology  8/4/2021  Liver mass, core biopsies:        Metastatic adenocarcinoma, most consistent with serous type. DIAGNOSIS COMMENT:   Although metastatic ovarian or primary peritoneal carcinoma forming   parenchymal liver lesions is uncommon, the histologic features in this   case are similar to those in the patient's prior rectovaginal mass biopsy   from 2019 and the immunohistochemical features are similar to those   documented in the EMR from a pelvic exenteration specimen performed at an   outside institution in 2020.   4/20/2020  Vaginal biopsy  Papillary serous adenocarcinoma    8/8/2019  A) COLON, PERICOLIC NODULE, EXCISION:      - ACELLULAR MUCIN WITH MULTIPLE CALCIFICATIONS, AND ASSOCIATED FIBROUS WALL WITH        HYALINIZATION, AND CHRONIC INFLAMMATION. - ADJACENT BENIGN FIBROADIPOSE TISSUE, CONSISTENT WITH MESENTERY. - NO EVIDENCE OF MALIGNANCY. - SEE COMMENT. B) LIVER, RIGHT LOBE, BIOPSY:      - NODULAR FAT NECROSIS AND FIBROSIS OF THE LIVER CAPSULE.      - MILD STEATOSIS.       - NO EVIDENCE OF MALIGNANCY. C) COLON, SIGMOID, SEROSAL IMPLANT, EXCISION:      - NODULAR FAT NECROSIS WITH FIBROSIS, CHRONIC INFLAMMATION, CALCIFICATIONS, AND        CYSTIC DEGENERATION WITHOUT MUCIN.      - NO EVIDENCE OF MALIGNANCY. D) OMENTUM, OMENTECTOMY (RESECTION):      - CONGESTED FIBROADIPOSE TISSUE WITH FOCAL FIBROSIS AND CHRONIC INFLAMMATION, AND        CALCIFIED NODULAR FIBROSIS.      - NO EVIDENCE OF MALIGNANCY. E) PERITONEUM, LEFT ANTERIOR ABDOMINAL, RESECTION:      - CONGESTED PERITONEAL TISSUE, NO EVIDENCE OF MALIGNANCY.     F) PERITONEUM, RIGHT ANTERIOR ABDOMINAL, RESECTION:      - CONGESTED PERITONEAL TISSUE, NO EVIDENCE OF MALIGNANCY. G) COLON, SIGMOID, SEROSAL IMPLANT, EXCISION:      - NODULAR FIBROSIS WITH HISTIOCYTES, SUGGESTIVE OF FAT NECROSIS.      - CYSTIC DEGENERATION, WITHOUT MUCIN.      - NO EVIDENCE OF MALIGNANCY. H) SOFT TISSUE, FALCIFORM, EXCISION:      - CONSISTENT WITH FALCIFORM LIGAMENT.      - NO EVIDENCE OF MALIGNANCY. PATHOLOGIC STAGE:  ypTx, pNx    1/17/2019   RECTOVAGINAL MASS (#1, 2) AND PELVIC MASS, BIOPSIES:   CONSISTENT WITH SEROUS CARCINOMA WITH EXTENSIVE NECROSIS. Imaging  MRI liver 7/6/2021     FINDINGS:     Abdominal Wall:  There is a circumscribed 8.3 cm-width x 2.4 cm-AP x 2.9  cm-craniocaudal length T2 hyperintense subcutaneous layer fluid collection along  the inferior margin of the ileal conduit. The colostomy site in the left lower  quadrant appears unremarkable. Liver:  A new ovoid T1-low T2-high signal 1.8 x 1.4 cm lesion is identified in  the segment 8. Another 0.8 x 0.6 cm T1-low T2-high signal focus is identified  in the segment 3. These structures were not apparent on prior studies. With  diffusion imaging, no evidence of restricted diffusion is demonstrated at these  foci. Biliary:  No evidence for significant common bile duct dilation. Gallbladder:  Mild distention of the gallbladder. No definite gallstones. Spleen, Adrenal Glands, Pancreas:  Unremarkable. Kidneys:  Cyst at the left kidney lower pole region measuring about 1.2 x 1.2  cm. Increase in size compared to prior CTs from above 0.8 x 0.7 cm. Miscellaneous: The largest of the periportal nodes measures up to about 1.3 x  1.1 cm. IMPRESSION     1. Focal fluid collection in the subcutaneous tissue adjacent to the ileal  conduit. This fluid collection may be amenable to aspiration under ultrasound  guidance. 2.  New hepatic lesions as described.   Although there is apparent lack of  restricted diffusion, further investigation with ultrasound is warranted to  assess if these structures are indeed cystic in nature. 3.  Left renal cyst.     4.  Slight az prominence. MRI pelvis 7/6/2021  FINDINGS:     Along the inferior aspect of the ileal conduit stoma site, focal elongated T1  high T2-signal fluid collection is demonstrated to, measuring about 8.3 cm-width  x 2.2 cm-thickness x 2.3 cm-craniocaudal length. In retrospect, there was a  suggestion of possible 7.1 x 3.5 x 2.0 cm hypodense material collection along  the inferior aspect of the subcutaneous abdominal wall portion of the ileal  conduit on the 04/01/21 unenhanced CT. This fluid collection therefore may be  slightly increased in size in the elbow. The intraperitoneal portion of the ileal conduit appears grossly unremarkable. The presacral region demonstrates apparent continued pattern of nonspecific mild  edema. The source for this edematous changes is clear. Most likely related to  residual changes from recent surgical intervention. Mild edematous changes also noted in the right and to lesser extent left  piriformis muscles. Additional edematous changes are also identified in the  right obturator internus along the superior aspect. Minimal free fluid in the posterior pelvic region. There is some intraluminal fluid in the blind loop portion of the rectum. At  the right superior lateral vaginal cuff corner, a vague trail of T2 high signal  is observed (coronal T2 images #8-10 and fat-sat axial T2 image #5-10). This  [place] appears to course quite close to the anterior margin of the rectal remnant  wall. Whether there is indeed communication between the blind rectal segment  and the vaginal cuff can be further assessed with barium enema. No distinct mass is detected pelvis. Enlarged nodes are identified along the right pelvic sidewall.   The largest  right pelvic sidewall node is identified subjacent to the external iliac  artery/vein vascular bundle, measuring up to about 2.2 x 1.2 cm (fat-sat axial  T2 image #12). Another slightly enlarged node more superiorly measuring about  1.9 x 1.0 cm (image #17). Multiple slightly borderline prominent nodes  identified in the mesentery, measuring up to about 1.2 x 1.0 cm (image #25). Additional note near the aortic bifurcation measuring about 1.3 x 0.9 cm (image  #34). IMPRESSION     1. Focal T1- and T2-hyperintense subcutaneous fluid collection along the  inferior aspect of the ileal conduit stoma site. Possibly representing a seroma  or a focus of urinoma. This may be amenable to aspiration under ultrasound or  CT guidance. 2.  Questionable subtle trailed between the right superior lateral aspect of the  vaginal cuff with adjacent blind-tipped rectum. More definitive evaluation can  be performed with barium enema. 3.  Slight az prominence along the right pelvic sidewall and in the  mesentery. 4.  No distinct residual or recurrent mass. 5.  Persistent mild presacral edema  PETCT 7/17/2020   FINDINGS:        PET images: Study limited because of patient motion. Mediastinal blood pool reference value = SUV Max. Mediastinal blood pool reference value = SUV Mean. Liver parenchyma reference value =  4.7   SUV Max. Liver parenchyma reference value =  2.3    SUV Mean. NECK: There is no suspicious hypermetabolic activity at the neck. CHEST: There is no suspicious hypermetabolic activity at the chest.     ABDOMEN: Typical heterogeneity at the liver. No convincing malignant foci  hypermetabolic activity or underlying CT abnormality. PELVIS: There is soft tissue fullness in the right retrovesicular pelvis just  above the vaginal cuff and posterior to right ureter measuring about 3.8 cm with  Max SUV 13.5 (206), extending to the rectum posteriorly, levators inferiorly on  the right. Previously 4 cm and Max SUV 16.5.      Thickening anterior abdominal wall compatible with scar demonstrating diffuse  modest activity and Max SUV 2.7. Additional CT findings: Mediport catheter has tip in the superior vena cava. Air  trapping in the superior segments lower lobes. Right-sided nephroureteral stent  without hydronephrosis. No ascites. IMPRESSION      Treatment response. Decreased metabolic activity at the right retrovesicular  pelvic mass. No new evidence of metastatic disease. Interval placement right-sided nephroureteral stent and resolved  hydroureteronephrosis. .       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    Loss of appetite 10/14/2020    Other hydronephrosis 06/24/2020    Genetic carrier status 09/13/2019    Postoperative nausea 08/27/2019    Ovarian cancer ([address] 08/07/2019    Peritoneal carcinoma ([address] 02/14/2019    Pelvic mass in female 01/17/2019    Irritable bowel syndrome with both constipation and diarrhea 02/09/2018    Subclinical hypothyroidism 01/23/2018    Chronic abdominal pain 01/22/2018    Diverticulosis 01/22/2018    Hx of total hysterectomy 01/22/2018    Hyponatremia 01/22/2018    Advance care planning 01/31/2017    Dental caries 05/26/2016    Chronic periodontal disease 05/26/2016    SUAZO (dyspnea on exertion) 02/10/2016    Prediabetes 09/24/2015    Morbid obesity ([address] 08/31/2015    Arthritis, degenerative 08/31/2015    Gastroesophageal reflux disease without esophagitis 08/31/2015    ANAMIKA on CPAP 08/31/2015    Essential hypertension 08/28/2015    PTSD (post-traumatic stress disorder) 03/24/2014    S/P TKR (total knee replacement) 11/14/2012 Adjustment disorder with depressed mood 09/20/2012    Major depressive disorder, recurrent episode, moderate ([address] 09/20/2012    Suicidal ideation 09/19/2012    Menopause 05/08/2012    Knee pain, right 05/08/2012    Hypokalemia 02/04/2012    Overdose 02/04/2012    Obesity 06/18/2010    Mixed hyperlipidemia 06/18/2010     Past Medical History:   Diagnosis Date    AR (allergic rhinitis)     seasonal    Bladder cancer ([facility]     Cancer ([facility]     pt states between vgina and rectal area    Cardiac echocardiogram 04/11/2016    Tech difficult. EF 55-60%. No RWMA. Mild conc LVH. Gr 1 DDfx. RVSP normal.      Cardiac nuclear imaging test 05/05/2016    Low risk. Very patchy radiotracer uptake. Mild anterior artifact, low suspicion for ischemia. EF 68%. No RWMA. Normal EKG on pharm stress test.    Cardiovascular LE arterial duplex 10/07/2013    No significant arterial disease at rest bilaterally. R JUNE 1.21.  L JUNE 1.16. No significant sm vessel disease.     Chronic kidney disease     Depression     Dr. Kodi Herndon, suicide attempt 2/12    Diverticulosis     Endometriosis     s/p hysterectomy 2006    GERD (gastroesophageal reflux disease)     Glaucoma     Dr. Sammie Frost    Hematuria, gross     HTN (hypertension)     Hx of colonoscopy 04/05/2017    mild diverticulosis, g1 internal hemorrhoids, normal colon , repeat 10 year 2027    Hx of suicide attempt     Hyperlipidemia LDL goal < 130     IBS (irritable bowel syndrome)     Ill-defined condition     environmental allergies    Insomnia     Malignant neoplasm of peritoneum ([facility] 2/14/2019    Nausea & vomiting     OA (osteoarthritis)     both knees, lower back    Preeclampsia     PTSD (post-traumatic stress disorder)     Seizures ([address]     1 x with childbirth, had toxemia    Sleep apnea     does not use cpap machine    Spinal stenosis     Dr. Conchis Inman    Vertigo       Past Surgical History:   Procedure Laterality Date    COLONOSCOPY N/A 4/5/2017    COLONOSCOPY performed by Adarsh Soto MD at [address] N/A 2019    SIGMOIDOSCOPY FLEXIBLE performed by Silvino Fletcher MD at [facility] ENDOSCOPY    HX  SECTION      HX COLONOSCOPY  2017    DR. MCRAE 2017     HX COLOSTOMY      Revision 2021    HX CYSTECTOMY  2020    HX HYSTERECTOMY      HX KNEE ARTHROSCOPY Left     left knee    HX KNEE REPLACEMENT Bilateral     (R)  (L)     HX LAPAROTOMY N/A 2019    and rectovaginal bx    HX OTHER SURGICAL  2016    all teeth removed    HX SALPINGO-OOPHORECTOMY  2008    HX TUBAL LIGATION      IR INSERT TUNL CVC W PORT OVER 5 YEARS  2019    MULTIPLE DELIVERY       1990    OR FEMUR/KNEE SURG UNLISTED Left 2009    External fixator    OR PART REMOVAL COLON W ANASTOMOSIS      OR TOTAL ABDOM HYSTERECTOMY      TRANSURETHRAL RESEC BLADDER NECK        OB History          2    Para   2    Term   2       0    AB   0    Living   0         SAB   0    IAB   0    Ectopic   0    Molar   0    Multiple   0    Live Births   0              Social History     Tobacco Use    Smoking status: Never    Smokeless tobacco: Never   Substance Use Topics    Alcohol use: No      Family History   Problem Relation Age of Onset    Heart Disease Mother         CHF    Diabetes Mother     Hypertension Mother     Kidney Disease Mother     Breast Cancer Mother     Stroke Mother     Diabetes Father     Hypertension Father     Heart Attack Father         MI    Cancer Maternal Grandmother         stomach    Ovarian Cancer Maternal Aunt     Cancer Maternal Uncle       Current Outpatient Medications   Medication Sig    meclizine (Motion Sickness, Meclizine,) 25 mg tablet Take 1 Tablet by mouth three (3) times daily as needed for Dizziness. prochlorperazine (COMPAZINE) 10 mg tablet Take 1 Tablet by mouth every six (6) hours as needed for Nausea or Vomiting for up to 7 days.  Indications: nausea and vomiting caused by cancer drugs    metoprolol succinate (TOPROL-XL) 100 mg tablet Take 1 Tablet by mouth daily. PARoxetine (PAXIL) 20 mg tablet Take 1 Tablet by mouth daily. ondansetron hcl (ZOFRAN) 8 mg tablet Take 1 tablet every 8 hours for nausea and vomiting beginning the night of your chemotherapy treatment for 3 days. loperamide (IMMODIUM) 2 mg tablet Take 2 tabs=4mg after first loose stool, then 2 mg(1 tab) after each loose stool after that up to maximum of 16mg (8tabs) in 1 day    amLODIPine (NORVASC) 10 mg tablet take 1 tablet by mouth once daily    letrozole (FEMARA) 2.5 mg tablet Take 1 Tablet by mouth daily. OLANZapine (ZyPREXA) 2.5 mg tablet Take 1 Tablet by mouth nightly. Indications: prevent nausea and vomiting from cancer chemotherapy    pregabalin (LYRICA) 25 mg capsule Take 1 Capsule by mouth three (3) times daily. Max Daily Amount: 75 mg.    lidocaine-prilocaine (EMLA) topical cream Apply to Mediport 1 hour prior to chemotherapy. Place kitchen saran wrap over cream and port. This will numb site. simvastatin (ZOCOR) 20 mg tablet take 1 tablet by mouth at bedtime    gabapentin (NEURONTIN) 100 mg capsule Take 1 Capsule by mouth three (3) times daily. Max Daily Amount: 300 mg.    latanoprost (XALATAN) 0.005 % ophthalmic solution Administer 1 Drop to both eyes nightly. No current facility-administered medications for this visit. Facility-Administered Medications Ordered in Other Visits   Medication Dose Route Frequency    sodium chloride (NS) flush 10-40 mL  10-40 mL IntraVENous Q8H     Allergies   Allergen Reactions    Pantoprazole Other (comments)     Bleeding in stomach    Promethazine Other (comments)     Bleeding in stomach    Seafood Hives     FISH    Other Medication Hives     seafood    Pollen Extracts Other (comments)    Shellfish Derived Hives        Review of Systems   Constitutional:  Positive for malaise/fatigue.  Negative for chills, diaphoresis, fever and weight loss. Respiratory:  Negative for cough, hemoptysis, shortness of breath and wheezing. Cardiovascular:  Negative for chest pain, palpitations and leg swelling. Gastrointestinal:  Positive for nausea. Negative for abdominal pain, diarrhea, heartburn and vomiting. Genitourinary:  Negative for dysuria, frequency, hematuria and urgency. Musculoskeletal:  Negative for joint pain and myalgias. Skin:  Negative for itching and rash. Neurological:  Negative for dizziness, seizures, weakness and headaches. Psychiatric/Behavioral:  Negative for depression. The patient does not have insomnia. Objective:     Visit Vitals  Pulse 66   Resp 16   Ht 5' 3\" (1.6 m)   Wt 108 kg (238 lb)   LMP 01/31/2008   SpO2 97%   BMI 42.16 kg/m²       ECOG Performance Status (grade): 1  0 - able to carry on all pre-disease activity w/out restriction  1 - restricted but able to carry out light work  2 - ambulatory and can self- care but unable to carry out work  3 - bed or chair >50% of waking hours  4 - completely disable, total care, confined to bed or chair    Physical Exam  Constitutional:       Appearance: Normal appearance. HENT:      Head: Normocephalic and atraumatic. Eyes:      Pupils: Pupils are equal, round, and reactive to light. Cardiovascular:      Rate and Rhythm: Normal rate and regular rhythm. Heart sounds: Normal heart sounds. Pulmonary:      Effort: Pulmonary effort is normal.      Breath sounds: Normal breath sounds. Abdominal:      General: Bowel sounds are normal.      Palpations: Abdomen is soft. Tenderness: There is no abdominal tenderness. There is no guarding. Musculoskeletal:         General: Normal range of motion. Cervical back: Neck supple. Right lower leg: No edema. Left lower leg: No edema. Skin:     General: Skin is warm. Neurological:      General: No focal deficit present. Mental Status: She is alert and oriented to person, place, and time. Mental status is at baseline. Diagnostics:      Recent Results (from the past 96 hour(s))   CBC WITH AUTOMATED DIFF    Collection Time: 11/04/22  9:40 AM   Result Value Ref Range    WBC 1.6 (L) 4.6 - 13.2 K/uL    RBC 2.19 (L) 4.20 - 5.30 M/uL    HGB 6.4 (L) 12.0 - 16.0 g/dL    HCT 20.9 (L) 35.0 - 45.0 %    MCV 95.4 78.0 - 100.0 FL    MCH 29.2 24.0 - 34.0 PG    MCHC 30.6 (L) 31.0 - 37.0 g/dL    RDW 24.4 (H) 11.6 - 14.5 %    PLATELET 59 (L) 178 - 420 K/uL    MPV 8.8 (L) 9.2 - 11.8 FL    NRBC 0.0 0  WBC    ABSOLUTE NRBC 0.00 0.00 - 0.01 K/uL    NEUTROPHILS 74 (H) 40 - 73 %    LYMPHOCYTES 22 21 - 52 %    MONOCYTES 2 (L) 3 - 10 %    EOSINOPHILS 2 0 - 5 %    BASOPHILS 0 0 - 2 %    IMMATURE GRANULOCYTES 0 0.0 - 0.5 %    TOTAL CELLS COUNTED 50      ABS. NEUTROPHILS 1.2 (L) 1.8 - 8.0 K/UL    ABS. LYMPHOCYTES 0.4 (L) 0.9 - 3.6 K/UL    ABS. MONOCYTES 0.0 (L) 0.05 - 1.2 K/UL    ABS. EOSINOPHILS 0.0 0.0 - 0.4 K/UL    ABS. BASOPHILS 0.0 0.0 - 0.1 K/UL    ABS. IMM. GRANS. 0.0 0.00 - 0.04 K/UL    DF MANUAL      PLATELET COMMENTS DECREASED PLATELETS      RBC COMMENTS ANISOCYTOSIS  2+        RBC COMMENTS SPHEROCYTES  1+       TYPE & SCREEN    Collection Time: 11/04/22  9:45 AM   Result Value Ref Range    Crossmatch Expiration 11/07/2022,3360     ABO/Rh(D) O POSITIVE     Antibody screen NEG     Unit number [id number]     Blood component type RC LRIR     Unit division 00     Status of unit ISSUED     Crossmatch result Compatible    RBC, ALLOCATE    Collection Time: 11/04/22 10:45 AM   Result Value Ref Range    HISTORY CHECKED? Historical check performed        Imaging:  Results for orders placed during the hospital encounter of 02/20/19    IR ESTAB PT LEVEL I    Narrative  This procedure was performed in is entirety by a physician assistant.     : Delbert Henderson PA-C    Outpatient: Mediport incision check    CPT code: 28754    Attending physician: Dr. Ana Patterson    History: Status post Mediport placement    Exam: Patient states that the Mediport has been used without pain or incident. Patient denies fever, chills, nausea, vomiting or pain at the site. Patient  denies drainage, swelling, bleeding or tenderness. Site well approximated and healing well with Dermabond still covering the  incision. No drainage, swelling, erythema or tenderness at the site. Impression  Impression: Mediport properly placed and healing well. Advised if have any  questions or concerns or start have signs of infection to contact interventional  radiology      Results for orders placed during the hospital encounter of 09/22/17    XR ABD (KUB)    Narrative  PROCEDURE:  Abdomen AP. INDICATION:  Constipation. Sitz markers study day #5. COMPARISON:  9/20/17. FINDINGS:    There are still 24 markers, similar to 9/20/17. Notably all of the markers are  now located in the descending colon. No evidence of acute small bowel obstruction is detected. No mass effect or  pathological calcification is identified. No evidence of free intraperitoneal  air is detected. Impression  IMPRESSION:    1. Interval progression of the markers to the descending colon but all of the  24 markers are still present in the colon. 2.  Nonobstructive abdomen. Results for orders placed in visit on 07/26/21    CT BX LIVER NDL PERC    Narrative  CT BX LIVER NDL PERC    : Rakesh Cantor MD    Indication: Liver lesion. Preoperative Diagnosis: Liver lesion. Postoperative Diagnosis: Same. Anesthesia: Local anesthesia with 1% lidocaine. Moderate sedation with Versed  and Fentanyl given. I personally monitored the patient face-to-face throughout  the entire procedure. See detailed nursing records for precise medication  dosing. Sedation time: 30 minutes    Technique: The risks, benefits, and alternatives of the procedure were discussed  with the patient. Verbal and written consent was obtained.  Time-out was  performed to confirm the correct patient, procedure, and site. With the patient  supine, the skin was prepped and draped in usual sterile fashion. Maximum  sterile barrier technique used. Local anesthesia was administered. Under CT and  ultrasound guidance, a 17 gauge introducer was directed to the target through  which 18 gauge cores were obtained. Tract embolization was performed with  gelfoam slurry. Manual compression performed for hemostasis. Sterile dressing  was applied. Postprocedure imaging was performed. Note: All CT scans at this facility are performed using dose optimization  technique as appropriate to a performed exam, to include automated exposure  control, adjustment of the mA and/or kV according to patient size (including  appropriate matching for site-specific examinations), or use of iterative  reconstruction technique. Specimen: 5 cores. Bedside evaluation performed by Pathology department. Estimated Blood Loss: Minimal    Contrast: None    Complications: No immediate    Drain/Implant: None    Condition: Stable  _______________    Impression  Successful ultrasound and CT-guided liver mass biopsy. IMPRESSION/PLAN:  Stage IV Primary Peritoneal Cancer with Recurrence  -- Patient was being followed by Dr. Estella Lieberman who left the practice. -- s/p carbo (auc=6), taxol (175 mg/m2) IV every 3 wks s/p 6 cycles completed 6/6/2019  -- s/p cytoreductive surgery with [facility] with dr. Esteban Price  -- s/p pelvic radiation  -- Pain-script for tylenol #3  -- 5/13/-9/30/2020 given platinum sensitive disease s/p  auc=5, Doxil 30 mg/m2 IV x 6 cycles s/p Total pelvic exenteration  -- 8/4/2021 Liver mass, core biopsies: Metastatic adenocarcinoma, most consistent with serous type. -- Biopsy c/w recurrence-reviewed [facility] and discussed treatment options. Dr. 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   Also discussed retreatment with platinum or if sensitive consider PARP inhibition. After discussion patient was placed on letrozole. -- 1/8/2022 Patient started Keytruda 400 mg IV every 3 weeks. -- Vaginal bleeding: Dr. 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 Has follow up with Urology. -- 5/31/2022 The patient presents to our [facility] clinic today for follows up and continuing cancer treatment as scheduled. -- She has tolerated Keytruda Q6 weeks, no high graded toxicities. She was agreeable to continue current therapy. -- 6/3/2022 S.p Keytruda   -- 6/17/2022 PET scan reported progressive disease. Stage IV metastatic malignancy with interval progression, dominant intensely hypermetabolic hepatic metastasis with substantial  interval enlargement compared to prior studies. Interval increase in size/extent of irregular intense increased FDG activity centered at and right of midline in the soft tissues of the deep  posterior pelvis, extending cephalad in the presacral region, at least some of which corresponds to irregular mass-like soft tissue density on CT, highly suspicious for malignancy. Small 6 mm subpleural pulmonary nodule left lower lobe laterally, not evident on PET but too small to be adequately assessed using PET  -- Today I have reviewed with the patient and her family about recent PET which showed progressive disease on multiple lines of therapy. We have discussed at length about next lines of therapy. The patient states she still believed she can achieve curable diease. I have showed her PET images which showed high burden tumor. I have explained to the patient and family that the goals of treatment of metastatic cancer are not curable but treatable in order to prolong survival and improve quality of life by reducing cancer-related symptoms. Given her burden disease and progressive on immunotherapy plus hormonal therapy, we suggested re-challenging chemotherapy with platinum regimens.  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 I have discussed about potential side effects of chemotherapy. The patient was agreeable with the plan. -- 7/28/2022 C1 D1 palliative Carboplatin + Gemcitabine. Tolerated therapy with no high graded toxicities. -- 10/28/2022 C4D1   -- 11/04/2022: O9F3 Chemo held due to SOB  -- Today she reported on-off vaginal bleeding and rectal bleeding as well as discharge from the rectum. She reported her bleeding was actually improving for the past few weeks. Otherwise she has tolerated with therapy. Plan:  -- Patient states she is travelling to [place] for her daughter's graduation and prefers to be treated when she comes back. She will defer chemo treatment to the Week of 11/21/2022.   -- Will plan to repeat MRI Pelvis and CT CAP after 6 cycles. -- Supportive IV hydration with each cycle   -- CINV: Olanzapine ordered for CINV. Patient is on Compazine as well. -- Advised the patient to present to ED if worsening symptoms, bleeding, or concerns. -- Labs: CBC, CMP, . Supportive transfusion if indicated. -- Nutritional support: Referral to Nutritionist while on chemotherapy  -- RTC in 3 weeks, always sooner if required. Carboplatin + Gemcitabine  Days 1,8: Gemcitabine 800-1,000mg/m2 IV, followed by:  Day 1: Carboplatin AUC 4 IV   Repeat cycle every 3 weeks. 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 Johnathan Reese for rectal bleeding. She states she was told no further intervention offered at this time. -- No active bleeding reported at this time  -- Monitor CBC, Iron Profile, Ferritin and replacement as indicated. -- Advised to f/u Urology as scheduled. CINV  -- Patient is allergic to Promethazine, was unable to order compazine due to the allergies.    -- Olanzapine ordered for CINV    Normocytic Anemia  Iron Deficiency Anemia  --Due to iron deficiency and vaginal bleeding related patient  was scheduled for IV Venofer x 3 (Dose #3 completed on 09/08/2022)  --11/04/2022: CBC showed WBC 1.6K/uL, hemoglobin 6.4g/dL with hematocrit of 20.9%. Platelet 59. 17/98/7809 CBC showed Iron Sat 33%. Ferritin 1430. --Procrit 60,000 unit to start every 3 weeks when H/H is below 10/30  --Patient is scheduled for blood transfusion today. --We will continue to monitor labs CBC, CMP, Iron Profile, Ferritin, and B12/folate    Chemotherapy related Neuropathy  Cancer associated pain   --On Lyrica 25mg PO 3 times daily   --Percocet PRN will be refilled    Follow up in 3 weeks or sooner if indicated. Orders Placed This Encounter    MRI HIP BI WO CONT     Standing Status:   Future     Standing Expiration Date:   12/7/2023     Order Specific Question:   Arthrogram study     Answer:   Yes    CT CHEST ABD PELV W CONT     Standing Status:   Future     Standing Expiration Date:   12/7/2023     Order Specific Question:   STAT Creatinine as indicated     Answer:   Yes     Order Specific Question: This order utilizes IV contrast.  What additional contrast is needed? Answer:   Per Radiologist Protocol         Ms. Sarah Payne has a reminder for a \"due or due soon\" health maintenance. I have asked that she contact her primary care provider for follow-up on this health maintenance. All of patient's questions answered to their apparent satisfaction. They verbally show understanding and agreement with aforementioned plan. Kit Chandler MD  11/7/2022        Above mentioned total time spent for this encounter with more than 50% of the time spent in face-to-face counseling, discussing on diagnosis and management plan going forward, and co-ordination of care. Parts of this document has been produced using Dragon dictation system. Unrecognized errors in transcription may be present. Please do not hesitate to reach out for any questions or clarifications.       CC: Chloé Aguilar, Manjit Camara MD

## 2022-11-08 LAB
ABO + RH BLD: NORMAL
BLD PROD TYP BPU: NORMAL
BLOOD GROUP ANTIBODIES SERPL: NORMAL
BPU ID: NORMAL
CROSSMATCH RESULT,%XM: NORMAL
SPECIMEN EXP DATE BLD: NORMAL
STATUS OF UNIT,%ST: NORMAL
UNIT DIVISION, %UDIV: 0

## 2022-11-09 ENCOUNTER — APPOINTMENT (OUTPATIENT)
Dept: INFUSION THERAPY | Age: 52
End: 2022-11-09
Payer: MEDICARE

## 2022-11-10 ENCOUNTER — APPOINTMENT (OUTPATIENT)
Dept: INFUSION THERAPY | Age: 52
End: 2022-11-10
Payer: MEDICARE

## 2022-11-10 DIAGNOSIS — C56.3 MALIGNANT NEOPLASM OF BOTH OVARIES (HCC): ICD-10-CM

## 2022-11-10 DIAGNOSIS — C48.2 PERITONEAL CARCINOMA (HCC): Primary | ICD-10-CM

## 2022-11-10 DIAGNOSIS — D50.8 IRON DEFICIENCY ANEMIA SECONDARY TO INADEQUATE DIETARY IRON INTAKE: ICD-10-CM

## 2022-11-10 DIAGNOSIS — C57.7 MALIGNANT NEOPLASM OF OTHER SPECIFIED FEMALE GENITAL ORGANS (HCC): ICD-10-CM

## 2022-11-10 DIAGNOSIS — G89.3 CANCER ASSOCIATED PAIN: ICD-10-CM

## 2022-11-10 DIAGNOSIS — D50.0 IRON DEFICIENCY ANEMIA DUE TO CHRONIC BLOOD LOSS: ICD-10-CM

## 2022-11-10 DIAGNOSIS — K62.5 RECTAL BLEEDING: ICD-10-CM

## 2022-11-10 RX ORDER — OXYCODONE AND ACETAMINOPHEN 5; 325 MG/1; MG/1
1 TABLET ORAL
Qty: 84 TABLET | Refills: 0 | Status: SHIPPED | OUTPATIENT
Start: 2022-11-10 | End: 2022-11-24

## 2022-11-11 RX ORDER — AMLODIPINE BESYLATE 10 MG/1
TABLET ORAL
Qty: 90 TABLET | Refills: 0 | Status: SHIPPED | OUTPATIENT
Start: 2022-11-11

## 2022-11-14 ENCOUNTER — APPOINTMENT (OUTPATIENT)
Dept: ONCOLOGY | Age: 52
End: 2022-11-14

## 2022-11-15 ENCOUNTER — TELEPHONE (OUTPATIENT)
Dept: ONCOLOGY | Age: 52
End: 2022-11-15

## 2022-11-15 ENCOUNTER — HOSPITAL ENCOUNTER (OUTPATIENT)
Dept: INFUSION THERAPY | Age: 52
Discharge: HOME | End: 2022-11-15
Payer: MEDICARE

## 2022-11-15 VITALS
HEART RATE: 58 BPM | DIASTOLIC BLOOD PRESSURE: 72 MMHG | TEMPERATURE: 98.2 F | OXYGEN SATURATION: 99 % | SYSTOLIC BLOOD PRESSURE: 136 MMHG | RESPIRATION RATE: 18 BRPM

## 2022-11-15 LAB
ALBUMIN SERPL-MCNC: 3.9 G/DL (ref 3.8–4.9)
ALBUMIN/GLOB SERPL: 1.4 {RATIO} (ref 1.2–2.2)
ALP SERPL-CCNC: 291 IU/L (ref 44–121)
ALT SERPL-CCNC: 12 IU/L (ref 0–32)
AST SERPL-CCNC: 17 IU/L (ref 0–40)
BASOPHILS # BLD AUTO: 0 X10E3/UL (ref 0–0.2)
BASOPHILS NFR BLD AUTO: 0 %
BILIRUB SERPL-MCNC: 0.2 MG/DL (ref 0–1.2)
BUN SERPL-MCNC: 40 MG/DL (ref 6–24)
BUN/CREAT SERPL: 18 (ref 9–23)
CALCIUM SERPL-MCNC: 8.4 MG/DL (ref 8.7–10.2)
CANCER AG125 SERPL-ACNC: 8.9 U/ML (ref 0–38.1)
CHLORIDE SERPL-SCNC: 112 MMOL/L (ref 96–106)
CO2 SERPL-SCNC: 16 MMOL/L (ref 20–29)
CREAT SERPL-MCNC: 2.18 MG/DL (ref 0.57–1)
EGFR: 26 ML/MIN/1.73
EOSINOPHIL # BLD AUTO: 0.1 X10E3/UL (ref 0–0.4)
EOSINOPHIL NFR BLD AUTO: 3 %
ERYTHROCYTE [DISTWIDTH] IN BLOOD BY AUTOMATED COUNT: 22.3 % (ref 11.7–15.4)
FERRITIN SERPL-MCNC: 1390 NG/ML (ref 15–150)
GLOBULIN SER CALC-MCNC: 2.8 G/DL (ref 1.5–4.5)
GLUCOSE SERPL-MCNC: 91 MG/DL (ref 70–99)
HCT VFR BLD AUTO: 24.2 % (ref 34–46.6)
HGB BLD-MCNC: 7.9 G/DL (ref 11.1–15.9)
HISTORY CHECKED?,CKHIST: NORMAL
IMM GRANULOCYTES # BLD AUTO: 0 X10E3/UL (ref 0–0.1)
IMM GRANULOCYTES NFR BLD AUTO: 0 %
IRON SATN MFR SERPL: 20 % (ref 15–55)
IRON SERPL-MCNC: 37 UG/DL (ref 27–159)
LYMPHOCYTES # BLD AUTO: 0.6 X10E3/UL (ref 0.7–3.1)
LYMPHOCYTES NFR BLD AUTO: 17 %
MCH RBC QN AUTO: 30.5 PG (ref 26.6–33)
MCHC RBC AUTO-ENTMCNC: 32.6 G/DL (ref 31.5–35.7)
MCV RBC AUTO: 93 FL (ref 79–97)
MONOCYTES # BLD AUTO: 0.8 X10E3/UL (ref 0.1–0.9)
MONOCYTES NFR BLD AUTO: 24 %
MORPHOLOGY BLD-IMP: ABNORMAL
NEUTROPHILS # BLD AUTO: 1.9 X10E3/UL (ref 1.4–7)
NEUTROPHILS NFR BLD AUTO: 56 %
PLATELET # BLD AUTO: 229 X10E3/UL (ref 150–450)
POTASSIUM SERPL-SCNC: 5.8 MMOL/L (ref 3.5–5.2)
PROT SERPL-MCNC: 6.7 G/DL (ref 6–8.5)
RBC # BLD AUTO: 2.59 X10E6/UL (ref 3.77–5.28)
SODIUM SERPL-SCNC: 141 MMOL/L (ref 134–144)
TIBC SERPL-MCNC: 181 UG/DL (ref 250–450)
UIBC SERPL-MCNC: 144 UG/DL (ref 131–425)
WBC # BLD AUTO: 3.4 X10E3/UL (ref 3.4–10.8)

## 2022-11-15 PROCEDURE — 86923 COMPATIBILITY TEST ELECTRIC: CPT

## 2022-11-15 PROCEDURE — 86900 BLOOD TYPING SEROLOGIC ABO: CPT

## 2022-11-15 PROCEDURE — 36415 COLL VENOUS BLD VENIPUNCTURE: CPT

## 2022-11-15 RX ORDER — DIPHENHYDRAMINE HYDROCHLORIDE 50 MG/ML
25 INJECTION, SOLUTION INTRAMUSCULAR; INTRAVENOUS AS NEEDED
Status: CANCELLED
Start: 2022-11-16

## 2022-11-15 RX ORDER — ONDANSETRON 2 MG/ML
8 INJECTION INTRAMUSCULAR; INTRAVENOUS AS NEEDED
Status: CANCELLED | OUTPATIENT
Start: 2022-11-16

## 2022-11-15 RX ORDER — DIPHENHYDRAMINE HYDROCHLORIDE 50 MG/ML
50 INJECTION, SOLUTION INTRAMUSCULAR; INTRAVENOUS AS NEEDED
Status: CANCELLED
Start: 2022-11-16

## 2022-11-15 RX ORDER — HYDROCORTISONE SODIUM SUCCINATE 100 MG/2ML
100 INJECTION, POWDER, FOR SOLUTION INTRAMUSCULAR; INTRAVENOUS AS NEEDED
Status: CANCELLED | OUTPATIENT
Start: 2022-11-16

## 2022-11-15 RX ORDER — SODIUM CHLORIDE 0.9 % (FLUSH) 0.9 %
5-40 SYRINGE (ML) INJECTION AS NEEDED
Status: CANCELLED | OUTPATIENT
Start: 2022-11-16

## 2022-11-15 RX ORDER — ALBUTEROL SULFATE 0.83 MG/ML
2.5 SOLUTION RESPIRATORY (INHALATION) AS NEEDED
Status: CANCELLED
Start: 2022-11-16

## 2022-11-15 RX ORDER — ACETAMINOPHEN 325 MG/1
650 TABLET ORAL AS NEEDED
Status: CANCELLED
Start: 2022-11-16

## 2022-11-15 RX ORDER — EPINEPHRINE 1 MG/ML
0.3 INJECTION, SOLUTION, CONCENTRATE INTRAVENOUS AS NEEDED
Status: CANCELLED | OUTPATIENT
Start: 2022-11-16

## 2022-11-15 RX ORDER — SODIUM CHLORIDE 9 MG/ML
5-40 INJECTION INTRAMUSCULAR; INTRAVENOUS; SUBCUTANEOUS AS NEEDED
Status: CANCELLED | OUTPATIENT
Start: 2022-11-16

## 2022-11-15 RX ORDER — SODIUM CHLORIDE 9 MG/ML
5-250 INJECTION, SOLUTION INTRAVENOUS AS NEEDED
Status: CANCELLED | OUTPATIENT
Start: 2022-11-16

## 2022-11-16 ENCOUNTER — HOSPITAL ENCOUNTER (OUTPATIENT)
Dept: INFUSION THERAPY | Age: 52
Discharge: HOME | End: 2022-11-16
Payer: MEDICARE

## 2022-11-16 VITALS
RESPIRATION RATE: 16 BRPM | HEART RATE: 62 BPM | OXYGEN SATURATION: 98 % | SYSTOLIC BLOOD PRESSURE: 146 MMHG | DIASTOLIC BLOOD PRESSURE: 75 MMHG | TEMPERATURE: 98.2 F

## 2022-11-16 DIAGNOSIS — Z79.899 NEED FOR PROPHYLACTIC CHEMOTHERAPY: Primary | ICD-10-CM

## 2022-11-16 PROCEDURE — 77030013169 SET IV BLD ICUM -A

## 2022-11-16 PROCEDURE — 77030012965 HC NDL HUBR BBMI -A

## 2022-11-16 PROCEDURE — 74011250637 HC RX REV CODE- 250/637: Performed by: INTERNAL MEDICINE

## 2022-11-16 PROCEDURE — 74011000250 HC RX REV CODE- 250: Performed by: INTERNAL MEDICINE

## 2022-11-16 PROCEDURE — 74011250636 HC RX REV CODE- 250/636: Performed by: INTERNAL MEDICINE

## 2022-11-16 PROCEDURE — 36430 TRANSFUSION BLD/BLD COMPNT: CPT

## 2022-11-16 PROCEDURE — P9040 RBC LEUKOREDUCED IRRADIATED: HCPCS

## 2022-11-16 RX ORDER — SODIUM CHLORIDE 9 MG/ML
5-250 INJECTION, SOLUTION INTRAVENOUS AS NEEDED
OUTPATIENT
Start: 2022-11-16

## 2022-11-16 RX ORDER — ALBUTEROL SULFATE 0.83 MG/ML
2.5 SOLUTION RESPIRATORY (INHALATION) AS NEEDED
Start: 2022-11-16

## 2022-11-16 RX ORDER — ACETAMINOPHEN 325 MG/1
650 TABLET ORAL ONCE
Status: CANCELLED | OUTPATIENT
Start: 2022-11-16 | End: 2022-11-16

## 2022-11-16 RX ORDER — ONDANSETRON 2 MG/ML
8 INJECTION INTRAMUSCULAR; INTRAVENOUS AS NEEDED
OUTPATIENT
Start: 2022-11-16

## 2022-11-16 RX ORDER — SODIUM CHLORIDE 9 MG/ML
5-40 INJECTION INTRAMUSCULAR; INTRAVENOUS; SUBCUTANEOUS AS NEEDED
OUTPATIENT
Start: 2022-11-16

## 2022-11-16 RX ORDER — DIPHENHYDRAMINE HCL 25 MG
25 CAPSULE ORAL ONCE
Status: CANCELLED | OUTPATIENT
Start: 2022-11-16 | End: 2022-11-16

## 2022-11-16 RX ORDER — SODIUM CHLORIDE 0.9 % (FLUSH) 0.9 %
5-40 SYRINGE (ML) INJECTION AS NEEDED
OUTPATIENT
Start: 2022-11-16

## 2022-11-16 RX ORDER — DIPHENHYDRAMINE HYDROCHLORIDE 50 MG/ML
25 INJECTION, SOLUTION INTRAMUSCULAR; INTRAVENOUS AS NEEDED
Start: 2022-11-16

## 2022-11-16 RX ORDER — HEPARIN 100 UNIT/ML
SYRINGE INTRAVENOUS
Status: DISPENSED
Start: 2022-11-16 | End: 2022-11-16

## 2022-11-16 RX ORDER — SODIUM CHLORIDE 0.9 % (FLUSH) 0.9 %
10-40 SYRINGE (ML) INJECTION AS NEEDED
Status: DISCONTINUED | OUTPATIENT
Start: 2022-11-16 | End: 2022-11-20 | Stop reason: HOSPADM

## 2022-11-16 RX ORDER — SODIUM CHLORIDE 9 MG/ML
250 INJECTION, SOLUTION INTRAVENOUS AS NEEDED
Status: DISCONTINUED | OUTPATIENT
Start: 2022-11-16 | End: 2022-11-20 | Stop reason: HOSPADM

## 2022-11-16 RX ORDER — ACETAMINOPHEN 325 MG/1
650 TABLET ORAL ONCE
Status: COMPLETED | OUTPATIENT
Start: 2022-11-16 | End: 2022-11-16

## 2022-11-16 RX ORDER — DIPHENHYDRAMINE HCL 25 MG
25 CAPSULE ORAL ONCE
Status: COMPLETED | OUTPATIENT
Start: 2022-11-16 | End: 2022-11-16

## 2022-11-16 RX ORDER — HEPARIN 100 UNIT/ML
500 SYRINGE INTRAVENOUS AS NEEDED
Status: ACTIVE | OUTPATIENT
Start: 2022-11-16 | End: 2022-11-16

## 2022-11-16 RX ORDER — EPINEPHRINE 1 MG/ML
0.3 INJECTION, SOLUTION, CONCENTRATE INTRAVENOUS AS NEEDED
OUTPATIENT
Start: 2022-11-16

## 2022-11-16 RX ORDER — DIPHENHYDRAMINE HYDROCHLORIDE 50 MG/ML
50 INJECTION, SOLUTION INTRAMUSCULAR; INTRAVENOUS AS NEEDED
Start: 2022-11-16

## 2022-11-16 RX ORDER — HYDROCORTISONE SODIUM SUCCINATE 100 MG/2ML
100 INJECTION, POWDER, FOR SOLUTION INTRAMUSCULAR; INTRAVENOUS AS NEEDED
OUTPATIENT
Start: 2022-11-16

## 2022-11-16 RX ORDER — HEPARIN 100 UNIT/ML
500 SYRINGE INTRAVENOUS AS NEEDED
Start: 2022-11-16

## 2022-11-16 RX ORDER — ACETAMINOPHEN 325 MG/1
650 TABLET ORAL AS NEEDED
Start: 2022-11-16

## 2022-11-16 RX ADMIN — Medication 10 ML: at 08:11

## 2022-11-16 RX ADMIN — SODIUM CHLORIDE 250 ML: 9 INJECTION, SOLUTION INTRAVENOUS at 08:20

## 2022-11-16 RX ADMIN — ACETAMINOPHEN 650 MG: 325 TABLET, FILM COATED ORAL at 08:05

## 2022-11-16 RX ADMIN — Medication 10 ML: at 11:28

## 2022-11-16 RX ADMIN — DIPHENHYDRAMINE HYDROCHLORIDE 25 MG: 25 CAPSULE ORAL at 08:05

## 2022-11-16 RX ADMIN — HEPARIN 500 UNITS: 100 SYRINGE at 11:29

## 2022-11-16 NOTE — PROGRESS NOTES
[facility] Progress Note    Date: 2022    Name: Doron Giraldo    MRN: [id number]         : 1970    1 Units of PRBCs    Ms. Mayda Hanna arrived to [place] via wheelchair at 0800. Back pain 5/10. Will receive pre meds for blood transfusion. Positioned for comfort. States she has slight nausea. States she took anti-nausea medication prior opic admit. Ms. Mayda Hanna was assessed and education was provided. Discussed risks and benefits of blood transfusion with patient, including risk of transfusion reaction and disease transmission. Pt politely declined written patient education handout due to having transfusion recently. Consent note and signed consent by patient noted in chart. Red blood bracelet to patient's wrist verified. Ms. Cannon's vitals were reviewed. Visit Vitals  BP (!) 146/75 (BP 1 Location: Left upper arm, BP Patient Position: Reclining)   Pulse 62   Temp 98.2 °F (36.8 °C)   Resp 16   SpO2 98%     Right upper chest single lumen mediport accessed with 20 ga 1 in upton needle. Port flushed well with NS and positive blood return noted. NS 250ml IV started at [facility] via blood tubing per order. Pre-medications given approximately 30 minutes prior to starting blood transfusion:  Tylenol 650 mg PO and Benadryl 25 mg PO per order. 1st unit of PRBCs started at 75 ml/hr at 0900. Fifteen minutes into infusion, VS stable and pt denied c/o SOB, itching/hives, lip/tongue/facial swelling, CP or any other complaints. Infusion rate increased to 214DF/MU per policy. Unit completed at 1120 followed by NS flush. Brisk blood returns noted from port. VS stable and no transfusion reaction suspected. Patient Vitals for the past 8 hrs:   Temp Pulse Resp BP SpO2   22 1120 98.2 °F (36.8 °C) 62 16 (!) 146/75 98 %   22 0915 98.3 °F (36.8 °C) (!) 57 16 130/70 --   22 0900 98.5 °F (36.9 °C) (!) 54 16 119/67 --   22 0801 97.5 °F (36.4 °C) (!) 55 18 (!) 151/78 97 %          Ms. Mayda Hanna tolerated blood transfusion without any s/s of reaction or complaints. Pt politely declined to stay for 1 hour observation due to medical transport. VSS. Port flushed with NS followed by heparin flush. Iglesias needle d/c'd and bandaid applied. Site without bruising, bleeding, pain or swelling. Discharge instructions reviewed with pt. Pt instructed to report SOB, CP, elevated temp, back pain, or other symptoms of transfusion reaction to MD or ED. Pt verbalized understanding. Patient armband removed and shredded    Ms. Sarah Payne was discharged from [place] [address] in stable condition at   1135. She is to return 12/1/22 at [address] [place] [address] for pre-chemo labs.     Ethridge Lombard, RN  November 16, 2022

## 2022-11-17 ENCOUNTER — APPOINTMENT (OUTPATIENT)
Dept: INFUSION THERAPY | Age: 52
End: 2022-11-17

## 2022-11-18 ENCOUNTER — APPOINTMENT (OUTPATIENT)
Dept: INFUSION THERAPY | Age: 52
End: 2022-11-18
Payer: MEDICARE

## 2022-11-21 DIAGNOSIS — D50.0 IRON DEFICIENCY ANEMIA DUE TO CHRONIC BLOOD LOSS: ICD-10-CM

## 2022-11-21 DIAGNOSIS — C48.2 PERITONEAL CARCINOMA (HCC): ICD-10-CM

## 2022-11-25 ENCOUNTER — APPOINTMENT (OUTPATIENT)
Dept: INFUSION THERAPY | Age: 52
End: 2022-11-25

## 2022-11-28 ENCOUNTER — HOSPITAL ENCOUNTER (OUTPATIENT)
Dept: CT IMAGING | Age: 52
Discharge: HOME | End: 2022-11-28
Attending: NURSE PRACTITIONER
Payer: MEDICARE

## 2022-11-28 ENCOUNTER — HOSPITAL ENCOUNTER (OUTPATIENT)
Age: 52
Discharge: HOME | End: 2022-11-28
Attending: NURSE PRACTITIONER
Payer: MEDICARE

## 2022-11-28 ENCOUNTER — TELEPHONE (OUTPATIENT)
Age: 52
End: 2022-11-28

## 2022-11-28 DIAGNOSIS — C48.2 PERITONEAL CARCINOMA (HCC): Primary | ICD-10-CM

## 2022-11-28 DIAGNOSIS — C48.2 PERITONEAL CARCINOMA (HCC): ICD-10-CM

## 2022-11-28 PROCEDURE — 73721 MRI JNT OF LWR EXTRE W/O DYE: CPT

## 2022-11-28 PROCEDURE — 74176 CT ABD & PELVIS W/O CONTRAST: CPT

## 2022-11-28 RX ORDER — SODIUM CHLORIDE 9 MG/ML
5-250 INJECTION, SOLUTION INTRAVENOUS AS NEEDED
Status: CANCELLED | OUTPATIENT
Start: 2022-11-28

## 2022-11-28 RX ORDER — DIPHENHYDRAMINE HYDROCHLORIDE 50 MG/ML
25 INJECTION, SOLUTION INTRAMUSCULAR; INTRAVENOUS AS NEEDED
Start: 2022-12-09

## 2022-11-28 RX ORDER — ALBUTEROL SULFATE 0.83 MG/ML
2.5 SOLUTION RESPIRATORY (INHALATION) AS NEEDED
Status: CANCELLED
Start: 2022-11-28

## 2022-11-28 RX ORDER — ACETAMINOPHEN 325 MG/1
650 TABLET ORAL AS NEEDED
Status: CANCELLED
Start: 2022-11-28

## 2022-11-28 RX ORDER — SODIUM CHLORIDE 9 MG/ML
5-40 INJECTION INTRAMUSCULAR; INTRAVENOUS; SUBCUTANEOUS AS NEEDED
Status: CANCELLED | OUTPATIENT
Start: 2022-11-28

## 2022-11-28 RX ORDER — HEPARIN 100 UNIT/ML
500 SYRINGE INTRAVENOUS AS NEEDED
Status: CANCELLED
Start: 2022-11-28

## 2022-11-28 RX ORDER — PAROXETINE HYDROCHLORIDE 20 MG/1
TABLET, FILM COATED ORAL
Qty: 30 TABLET | Refills: 0 | Status: SHIPPED | OUTPATIENT
Start: 2022-11-28

## 2022-11-28 RX ORDER — SODIUM CHLORIDE 9 MG/ML
5-250 INJECTION, SOLUTION INTRAVENOUS AS NEEDED
OUTPATIENT
Start: 2022-12-09

## 2022-11-28 RX ORDER — HYDROCORTISONE SODIUM SUCCINATE 100 MG/2ML
100 INJECTION, POWDER, FOR SOLUTION INTRAMUSCULAR; INTRAVENOUS AS NEEDED
OUTPATIENT
Start: 2022-12-09

## 2022-11-28 RX ORDER — SODIUM CHLORIDE 9 MG/ML
5-250 INJECTION, SOLUTION INTRAVENOUS AS NEEDED
OUTPATIENT
Start: 2022-12-02

## 2022-11-28 RX ORDER — ALBUTEROL SULFATE 0.83 MG/ML
2.5 SOLUTION RESPIRATORY (INHALATION) AS NEEDED
Start: 2022-12-02

## 2022-11-28 RX ORDER — DIPHENHYDRAMINE HYDROCHLORIDE 50 MG/ML
25 INJECTION, SOLUTION INTRAMUSCULAR; INTRAVENOUS AS NEEDED
Status: CANCELLED
Start: 2022-11-28

## 2022-11-28 RX ORDER — SODIUM CHLORIDE 9 MG/ML
5-250 INJECTION, SOLUTION INTRAVENOUS AS NEEDED
Status: CANCELLED | OUTPATIENT
Start: 2022-12-02

## 2022-11-28 RX ORDER — SODIUM CHLORIDE 0.9 % (FLUSH) 0.9 %
5-40 SYRINGE (ML) INJECTION AS NEEDED
Status: CANCELLED | OUTPATIENT
Start: 2022-11-28

## 2022-11-28 RX ORDER — DIPHENHYDRAMINE HYDROCHLORIDE 50 MG/ML
50 INJECTION, SOLUTION INTRAMUSCULAR; INTRAVENOUS AS NEEDED
Status: CANCELLED
Start: 2022-11-28

## 2022-11-28 RX ORDER — ONDANSETRON 2 MG/ML
8 INJECTION INTRAMUSCULAR; INTRAVENOUS AS NEEDED
Status: CANCELLED | OUTPATIENT
Start: 2022-11-28

## 2022-11-28 RX ORDER — DIPHENHYDRAMINE HYDROCHLORIDE 50 MG/ML
50 INJECTION, SOLUTION INTRAMUSCULAR; INTRAVENOUS AS NEEDED
Start: 2022-12-09

## 2022-11-28 RX ORDER — PALONOSETRON 0.05 MG/ML
0.25 INJECTION, SOLUTION INTRAVENOUS ONCE
Status: CANCELLED | OUTPATIENT
Start: 2022-11-28 | End: 2022-12-02

## 2022-11-28 RX ORDER — DIPHENHYDRAMINE HYDROCHLORIDE 50 MG/ML
25 INJECTION, SOLUTION INTRAMUSCULAR; INTRAVENOUS AS NEEDED
Start: 2022-12-02

## 2022-11-28 RX ORDER — ONDANSETRON 2 MG/ML
8 INJECTION INTRAMUSCULAR; INTRAVENOUS AS NEEDED
OUTPATIENT
Start: 2022-12-02

## 2022-11-28 RX ORDER — DIPHENHYDRAMINE HYDROCHLORIDE 50 MG/ML
50 INJECTION, SOLUTION INTRAMUSCULAR; INTRAVENOUS AS NEEDED
Start: 2022-12-02

## 2022-11-28 RX ORDER — SODIUM CHLORIDE 0.9 % (FLUSH) 0.9 %
5-40 SYRINGE (ML) INJECTION AS NEEDED
Status: CANCELLED | OUTPATIENT
Start: 2022-12-02

## 2022-11-28 RX ORDER — EPINEPHRINE 1 MG/ML
0.3 INJECTION, SOLUTION, CONCENTRATE INTRAVENOUS AS NEEDED
OUTPATIENT
Start: 2022-12-09

## 2022-11-28 RX ORDER — SODIUM CHLORIDE 9 MG/ML
5-40 INJECTION INTRAMUSCULAR; INTRAVENOUS; SUBCUTANEOUS AS NEEDED
OUTPATIENT
Start: 2022-12-09

## 2022-11-28 RX ORDER — HYDROCORTISONE SODIUM SUCCINATE 100 MG/2ML
100 INJECTION, POWDER, FOR SOLUTION INTRAMUSCULAR; INTRAVENOUS AS NEEDED
Status: CANCELLED | OUTPATIENT
Start: 2022-11-28

## 2022-11-28 RX ORDER — ACETAMINOPHEN 325 MG/1
650 TABLET ORAL AS NEEDED
Start: 2022-12-09

## 2022-11-28 RX ORDER — ONDANSETRON 2 MG/ML
8 INJECTION INTRAMUSCULAR; INTRAVENOUS AS NEEDED
OUTPATIENT
Start: 2022-12-09

## 2022-11-28 RX ORDER — PALONOSETRON 0.05 MG/ML
0.25 INJECTION, SOLUTION INTRAVENOUS ONCE
Status: CANCELLED | OUTPATIENT
Start: 2022-12-02 | End: 2022-12-02

## 2022-11-28 RX ORDER — EPINEPHRINE 1 MG/ML
0.3 INJECTION, SOLUTION, CONCENTRATE INTRAVENOUS AS NEEDED
OUTPATIENT
Start: 2022-12-02

## 2022-11-28 RX ORDER — ONDANSETRON 2 MG/ML
8 INJECTION INTRAMUSCULAR; INTRAVENOUS ONCE
Status: CANCELLED | OUTPATIENT
Start: 2022-11-28 | End: 2022-12-09

## 2022-11-28 RX ORDER — HEPARIN 100 UNIT/ML
500 SYRINGE INTRAVENOUS AS NEEDED
Status: CANCELLED
Start: 2022-12-02

## 2022-11-28 RX ORDER — EPINEPHRINE 1 MG/ML
0.3 INJECTION, SOLUTION, CONCENTRATE INTRAVENOUS AS NEEDED
Status: CANCELLED | OUTPATIENT
Start: 2022-11-28

## 2022-11-28 RX ORDER — HYDROCORTISONE SODIUM SUCCINATE 100 MG/2ML
100 INJECTION, POWDER, FOR SOLUTION INTRAMUSCULAR; INTRAVENOUS AS NEEDED
OUTPATIENT
Start: 2022-12-02

## 2022-11-28 RX ORDER — ONDANSETRON 2 MG/ML
8 INJECTION INTRAMUSCULAR; INTRAVENOUS ONCE
OUTPATIENT
Start: 2022-12-09 | End: 2022-12-09

## 2022-11-28 RX ORDER — HEPARIN 100 UNIT/ML
500 SYRINGE INTRAVENOUS AS NEEDED
Start: 2022-12-09

## 2022-11-28 RX ORDER — ALBUTEROL SULFATE 0.83 MG/ML
2.5 SOLUTION RESPIRATORY (INHALATION) AS NEEDED
Start: 2022-12-09

## 2022-11-28 RX ORDER — SODIUM CHLORIDE 9 MG/ML
5-40 INJECTION INTRAMUSCULAR; INTRAVENOUS; SUBCUTANEOUS AS NEEDED
OUTPATIENT
Start: 2022-12-02

## 2022-11-28 RX ORDER — ACETAMINOPHEN 325 MG/1
650 TABLET ORAL AS NEEDED
Start: 2022-12-02

## 2022-11-28 RX ORDER — SODIUM CHLORIDE 0.9 % (FLUSH) 0.9 %
5-40 SYRINGE (ML) INJECTION AS NEEDED
OUTPATIENT
Start: 2022-12-09

## 2022-11-28 NOTE — TELEPHONE ENCOUNTER
Patient needs additional Peer to Peer review  for her MRI; Peer 2 Peer number is [id number], reference number [id number] @ 1 pm

## 2022-11-30 DIAGNOSIS — C48.2 PERITONEAL CARCINOMA (HCC): ICD-10-CM

## 2022-11-30 DIAGNOSIS — G62.9 NEUROPATHY: ICD-10-CM

## 2022-11-30 DIAGNOSIS — G89.3 CANCER ASSOCIATED PAIN: ICD-10-CM

## 2022-11-30 DIAGNOSIS — Z79.899 ON ANTINEOPLASTIC CHEMOTHERAPY: ICD-10-CM

## 2022-11-30 DIAGNOSIS — K62.5 RECTAL BLEEDING: ICD-10-CM

## 2022-11-30 DIAGNOSIS — C56.3 MALIGNANT NEOPLASM OF BOTH OVARIES (HCC): ICD-10-CM

## 2022-11-30 DIAGNOSIS — D50.8 IRON DEFICIENCY ANEMIA SECONDARY TO INADEQUATE DIETARY IRON INTAKE: ICD-10-CM

## 2022-11-30 DIAGNOSIS — C57.7 MALIGNANT NEOPLASM OF OTHER SPECIFIED FEMALE GENITAL ORGANS (HCC): ICD-10-CM

## 2022-11-30 RX ORDER — LOPERAMIDE HCL 2 MG
TABLET ORAL
Qty: 30 TABLET | Refills: 2 | Status: SHIPPED | OUTPATIENT
Start: 2022-11-30

## 2022-11-30 RX ORDER — LETROZOLE 2.5 MG/1
2.5 TABLET, FILM COATED ORAL DAILY
Qty: 90 TABLET | Refills: 1 | Status: SHIPPED | OUTPATIENT
Start: 2022-11-30

## 2022-12-01 ENCOUNTER — HOSPITAL ENCOUNTER (OUTPATIENT)
Dept: INFUSION THERAPY | Age: 52
End: 2022-12-01
Payer: MEDICARE

## 2022-12-01 VITALS
OXYGEN SATURATION: 98 % | HEART RATE: 60 BPM | DIASTOLIC BLOOD PRESSURE: 82 MMHG | RESPIRATION RATE: 16 BRPM | SYSTOLIC BLOOD PRESSURE: 132 MMHG | TEMPERATURE: 98 F

## 2022-12-01 LAB
ALBUMIN SERPL-MCNC: 2.6 G/DL (ref 3.4–5)
ALBUMIN/GLOB SERPL: 0.6 {RATIO} (ref 0.8–1.7)
ALP SERPL-CCNC: 367 U/L (ref 45–117)
ALT SERPL-CCNC: 19 U/L (ref 13–56)
ANION GAP SERPL CALC-SCNC: 7 MMOL/L (ref 3–18)
AST SERPL-CCNC: 17 U/L (ref 10–38)
BASO+EOS+MONOS # BLD AUTO: 0.9 K/UL (ref 0–2.3)
BASO+EOS+MONOS NFR BLD AUTO: 13 % (ref 0.1–17)
BILIRUB SERPL-MCNC: 0.4 MG/DL (ref 0.2–1)
BUN SERPL-MCNC: 38 MG/DL (ref 7–18)
BUN/CREAT SERPL: 16 (ref 12–20)
CALCIUM SERPL-MCNC: 8.4 MG/DL (ref 8.5–10.1)
CHLORIDE SERPL-SCNC: 113 MMOL/L (ref 100–111)
CO2 SERPL-SCNC: 20 MMOL/L (ref 21–32)
CREAT SERPL-MCNC: 2.39 MG/DL (ref 0.6–1.3)
DIFFERENTIAL METHOD BLD: ABNORMAL
ERYTHROCYTE [DISTWIDTH] IN BLOOD BY AUTOMATED COUNT: 22 % (ref 11.5–14.5)
GLOBULIN SER CALC-MCNC: 4.1 G/DL (ref 2–4)
GLUCOSE SERPL-MCNC: 80 MG/DL (ref 74–99)
HCT VFR BLD AUTO: 29.6 % (ref 36–48)
HGB BLD-MCNC: 8.9 G/DL (ref 12–16)
LYMPHOCYTES # BLD: 0.6 K/UL (ref 1.1–5.9)
LYMPHOCYTES NFR BLD: 9 % (ref 14–44)
MCH RBC QN AUTO: 28.3 PG (ref 25–35)
MCHC RBC AUTO-ENTMCNC: 30.1 G/DL (ref 31–37)
MCV RBC AUTO: 94 FL (ref 78–102)
NEUTS SEG # BLD: 5.1 K/UL (ref 1.8–9.5)
NEUTS SEG NFR BLD: 78 % (ref 40–70)
PLATELET # BLD AUTO: 272 K/UL (ref 140–440)
POTASSIUM SERPL-SCNC: 5.2 MMOL/L (ref 3.5–5.5)
PROT SERPL-MCNC: 6.7 G/DL (ref 6.4–8.2)
RBC # BLD AUTO: 3.15 M/UL (ref 4.1–5.1)
SODIUM SERPL-SCNC: 140 MMOL/L (ref 136–145)
WBC # BLD AUTO: 6.6 K/UL (ref 4.5–13)

## 2022-12-01 PROCEDURE — 85025 COMPLETE CBC W/AUTO DIFF WBC: CPT

## 2022-12-01 PROCEDURE — 36415 COLL VENOUS BLD VENIPUNCTURE: CPT

## 2022-12-01 PROCEDURE — 80053 COMPREHEN METABOLIC PANEL: CPT

## 2022-12-01 NOTE — PROGRESS NOTES
[facility] Lab Visit:    Jw Fraser  1970  [id number]    0825 Pt arrived ambulatory w/o assist. Labs drawn per order via Left AC venipuncture x1 attempt. Pt tolerated well without complaints. Gauze/ coban to site. Pt departed [facility] ambulatory and in no distress at 0835.      Visit Vitals  /82 (BP 1 Location: Left upper arm, BP Patient Position: Sitting)   Pulse 60   Temp 98 °F (36.7 °C)   Resp 16   SpO2 98%

## 2022-12-02 ENCOUNTER — HOSPITAL ENCOUNTER (OUTPATIENT)
Dept: INFUSION THERAPY | Age: 52
End: 2022-12-02
Payer: MEDICARE

## 2022-12-02 VITALS
HEIGHT: 63 IN | OXYGEN SATURATION: 95 % | TEMPERATURE: 97.5 F | HEART RATE: 66 BPM | BODY MASS INDEX: 40.29 KG/M2 | DIASTOLIC BLOOD PRESSURE: 78 MMHG | RESPIRATION RATE: 18 BRPM | WEIGHT: 227.38 LBS | SYSTOLIC BLOOD PRESSURE: 144 MMHG

## 2022-12-02 DIAGNOSIS — C48.2 PERITONEAL CARCINOMA (HCC): Primary | ICD-10-CM

## 2022-12-02 PROCEDURE — 74011250636 HC RX REV CODE- 250/636: Performed by: INTERNAL MEDICINE

## 2022-12-02 PROCEDURE — 96367 TX/PROPH/DG ADDL SEQ IV INF: CPT

## 2022-12-02 PROCEDURE — 96413 CHEMO IV INFUSION 1 HR: CPT

## 2022-12-02 PROCEDURE — 96375 TX/PRO/DX INJ NEW DRUG ADDON: CPT

## 2022-12-02 PROCEDURE — 96409 CHEMO IV PUSH SNGL DRUG: CPT

## 2022-12-02 PROCEDURE — 74011000258 HC RX REV CODE- 258: Performed by: INTERNAL MEDICINE

## 2022-12-02 PROCEDURE — 77030012965 HC NDL HUBR BBMI -A

## 2022-12-02 PROCEDURE — 74011000250 HC RX REV CODE- 250: Performed by: INTERNAL MEDICINE

## 2022-12-02 PROCEDURE — 96361 HYDRATE IV INFUSION ADD-ON: CPT

## 2022-12-02 PROCEDURE — 96417 CHEMO IV INFUS EACH ADDL SEQ: CPT

## 2022-12-02 RX ORDER — SODIUM CHLORIDE 0.9 % (FLUSH) 0.9 %
5-40 SYRINGE (ML) INJECTION AS NEEDED
Status: DISPENSED | OUTPATIENT
Start: 2022-12-02 | End: 2022-12-02

## 2022-12-02 RX ORDER — HEPARIN 100 UNIT/ML
500 SYRINGE INTRAVENOUS AS NEEDED
Status: DISPENSED | OUTPATIENT
Start: 2022-12-02 | End: 2022-12-02

## 2022-12-02 RX ORDER — PALONOSETRON 0.05 MG/ML
0.25 INJECTION, SOLUTION INTRAVENOUS ONCE
Status: COMPLETED | OUTPATIENT
Start: 2022-12-02 | End: 2022-12-02

## 2022-12-02 RX ORDER — SODIUM CHLORIDE 9 MG/ML
5-250 INJECTION, SOLUTION INTRAVENOUS AS NEEDED
Status: DISPENSED | OUTPATIENT
Start: 2022-12-02 | End: 2022-12-02

## 2022-12-02 RX ADMIN — SODIUM CHLORIDE 150 MG: 900 INJECTION, SOLUTION INTRAVENOUS at 08:42

## 2022-12-02 RX ADMIN — CARBOPLATIN 240 MG: 10 INJECTION, SOLUTION INTRAVENOUS at 10:45

## 2022-12-02 RX ADMIN — PALONOSETRON 0.25 MG: 0.05 INJECTION, SOLUTION INTRAVENOUS at 08:40

## 2022-12-02 RX ADMIN — GEMCITABINE 1700 MG: 100 INJECTION, SOLUTION INTRAVENOUS at 10:04

## 2022-12-02 RX ADMIN — SODIUM CHLORIDE 50 ML/HR: 9 INJECTION, SOLUTION INTRAVENOUS at 08:31

## 2022-12-02 RX ADMIN — DEXAMETHASONE SODIUM PHOSPHATE 12 MG: 10 INJECTION, SOLUTION INTRAMUSCULAR; INTRAVENOUS at 09:13

## 2022-12-02 RX ADMIN — SODIUM CHLORIDE, PRESERVATIVE FREE 20 ML: 5 INJECTION INTRAVENOUS at 11:31

## 2022-12-02 RX ADMIN — SODIUM CHLORIDE 500 ML: 9 INJECTION, SOLUTION INTRAVENOUS at 09:32

## 2022-12-02 RX ADMIN — HEPARIN 500 UNITS: 100 SYRINGE at 11:31

## 2022-12-02 RX ADMIN — SODIUM CHLORIDE, PRESERVATIVE FREE 10 ML: 5 INJECTION INTRAVENOUS at 08:30

## 2022-12-02 NOTE — PROGRESS NOTES
\[facility] [place] [facility]\"" Progress Note    Date: 2022    Name: Osman Douglass    MRN: [id number]         : 1970    Ms. Castro Hagen arrived in the [facility] today at [phone], here for C5D1 Carbo/Gemzar (Q21 Days) . Carboplatin + Gemcitabine on Day 1 of every cycle & Gemcitabine only on day 8. She was assessed and education was provided. Ms. Cannon's  vitals were reviewed. Visit Vitals  BP (!) 144/78 (BP 1 Location: Left upper arm, BP Patient Position: Sitting)   Pulse 66   Temp 97.5 °F (36.4 °C)   Resp 18   Ht 5' 3\" (1.6 m)   Wt 103.1 kg (227 lb 6 oz)   SpO2 95%   Breastfeeding No   BMI 40.28 kg/m²     Lab results from 22 were obtained and reviewed. PlT count of 272, T BILI 0.4 and  ANC 5.1 all within perameters for chemotherpay. Right upper chest single lumen mediport accessed using 20g 1\" upton after chloraprep. Brisk blood return obtained and flushed well with NS.     500cc NS bolus administered over approximately 1 hour per order. Pre-meds administered approximately 30 min prior to start of chemo: Decadron 12mg IVPB, Emend 150mg IVPB, and Aloxi 0.25mg IVP. 250cc NS bag ran @ [facility]. Gemcitabine (Gemzar) 1,700 mg IV administered over approximately 30 min, followed by NS flush. Carboplatin (Paraplatin) 240 mg IV administered over approximately 30 min, followed by NS flush. Patient tolerated well and voiced no complaints. Port flushed well with NS and heparin and de-accessed. Gauze/Band-Aid applied to site. Discharge/ follow-up instructions discussed w/ patient. Ptatient verbalized understanding. Patient armband removed and shredded. Ms. Castro Hagen was discharged from [place] [address] in stable condition at 1135. She is to return on 22 at 0800 for C5D8.       Bob Abernathy RN  2022

## 2022-12-05 DIAGNOSIS — K62.5 RECTAL BLEEDING: ICD-10-CM

## 2022-12-05 DIAGNOSIS — C48.2 PERITONEAL CARCINOMA (HCC): ICD-10-CM

## 2022-12-05 DIAGNOSIS — D50.0 IRON DEFICIENCY ANEMIA DUE TO CHRONIC BLOOD LOSS: ICD-10-CM

## 2022-12-05 DIAGNOSIS — G89.3 CANCER ASSOCIATED PAIN: ICD-10-CM

## 2022-12-05 DIAGNOSIS — C56.3 MALIGNANT NEOPLASM OF BOTH OVARIES (HCC): ICD-10-CM

## 2022-12-05 DIAGNOSIS — C57.7 MALIGNANT NEOPLASM OF OTHER SPECIFIED FEMALE GENITAL ORGANS (HCC): ICD-10-CM

## 2022-12-05 DIAGNOSIS — D50.8 IRON DEFICIENCY ANEMIA SECONDARY TO INADEQUATE DIETARY IRON INTAKE: ICD-10-CM

## 2022-12-05 RX ORDER — OXYCODONE AND ACETAMINOPHEN 5; 325 MG/1; MG/1
1 TABLET ORAL
Qty: 84 TABLET | Refills: 0 | Status: CANCELLED | OUTPATIENT
Start: 2022-12-05 | End: 2022-12-19

## 2022-12-05 RX ORDER — OXYCODONE AND ACETAMINOPHEN 5; 325 MG/1; MG/1
1 TABLET ORAL
Qty: 84 TABLET | Refills: 0 | Status: SHIPPED | OUTPATIENT
Start: 2022-12-05 | End: 2022-12-19

## 2022-12-06 RX ORDER — ONDANSETRON HYDROCHLORIDE 8 MG/1
TABLET, FILM COATED ORAL
Qty: 30 TABLET | Refills: 2 | Status: SHIPPED | OUTPATIENT
Start: 2022-12-06

## 2022-12-08 ENCOUNTER — APPOINTMENT (OUTPATIENT)
Dept: INFUSION THERAPY | Age: 52
End: 2022-12-08
Payer: MEDICARE

## 2022-12-09 ENCOUNTER — OFFICE VISIT (OUTPATIENT)
Dept: ONCOLOGY | Age: 52
End: 2022-12-09
Payer: MEDICARE

## 2022-12-09 ENCOUNTER — HOSPITAL ENCOUNTER (OUTPATIENT)
Dept: INFUSION THERAPY | Age: 52
End: 2022-12-09
Payer: MEDICARE

## 2022-12-09 ENCOUNTER — APPOINTMENT (OUTPATIENT)
Dept: INFUSION THERAPY | Age: 52
End: 2022-12-09
Payer: MEDICARE

## 2022-12-09 VITALS
BODY MASS INDEX: 40.49 KG/M2 | TEMPERATURE: 97.2 F | OXYGEN SATURATION: 96 % | SYSTOLIC BLOOD PRESSURE: 140 MMHG | RESPIRATION RATE: 18 BRPM | WEIGHT: 228.6 LBS | HEART RATE: 72 BPM | DIASTOLIC BLOOD PRESSURE: 78 MMHG

## 2022-12-09 DIAGNOSIS — C48.2 PERITONEAL CARCINOMA (HCC): Primary | ICD-10-CM

## 2022-12-09 DIAGNOSIS — D50.8 IRON DEFICIENCY ANEMIA SECONDARY TO INADEQUATE DIETARY IRON INTAKE: ICD-10-CM

## 2022-12-09 DIAGNOSIS — D50.0 IRON DEFICIENCY ANEMIA DUE TO CHRONIC BLOOD LOSS: ICD-10-CM

## 2022-12-09 DIAGNOSIS — C56.3 MALIGNANT NEOPLASM OF BOTH OVARIES (HCC): ICD-10-CM

## 2022-12-09 DIAGNOSIS — C48.2 PERITONEAL CARCINOMA (HCC): ICD-10-CM

## 2022-12-09 DIAGNOSIS — Z79.899 ON ANTINEOPLASTIC CHEMOTHERAPY: ICD-10-CM

## 2022-12-09 DIAGNOSIS — G89.3 CANCER ASSOCIATED PAIN: ICD-10-CM

## 2022-12-09 DIAGNOSIS — C57.7 MALIGNANT NEOPLASM OF OTHER SPECIFIED FEMALE GENITAL ORGANS (HCC): ICD-10-CM

## 2022-12-09 DIAGNOSIS — K62.5 RECTAL BLEEDING: ICD-10-CM

## 2022-12-09 DIAGNOSIS — G62.9 NEUROPATHY: ICD-10-CM

## 2022-12-09 LAB
BASO+EOS+MONOS # BLD AUTO: 0.1 K/UL (ref 0–2.3)
BASO+EOS+MONOS NFR BLD AUTO: 5 % (ref 0.1–17)
DIFFERENTIAL METHOD BLD: ABNORMAL
ERYTHROCYTE [DISTWIDTH] IN BLOOD BY AUTOMATED COUNT: 20.6 % (ref 11.5–14.5)
HCT VFR BLD AUTO: 26.4 % (ref 36–48)
HGB BLD-MCNC: 8.1 G/DL (ref 12–16)
LYMPHOCYTES # BLD: 0.6 K/UL (ref 1.1–5.9)
LYMPHOCYTES NFR BLD: 27 % (ref 14–44)
MCH RBC QN AUTO: 28.6 PG (ref 25–35)
MCHC RBC AUTO-ENTMCNC: 30.7 G/DL (ref 31–37)
MCV RBC AUTO: 93.3 FL (ref 78–102)
NEUTS SEG # BLD: 1.7 K/UL (ref 1.8–9.5)
NEUTS SEG NFR BLD: 69 % (ref 40–70)
PLATELET # BLD AUTO: 145 K/UL (ref 140–440)
RBC # BLD AUTO: 2.83 M/UL (ref 4.1–5.1)
WBC # BLD AUTO: 2.4 K/UL (ref 4.5–13)

## 2022-12-09 PROCEDURE — G9899 SCRN MAM PERF RSLTS DOC: HCPCS | Performed by: INTERNAL MEDICINE

## 2022-12-09 PROCEDURE — 96372 THER/PROPH/DIAG INJ SC/IM: CPT

## 2022-12-09 PROCEDURE — 96375 TX/PRO/DX INJ NEW DRUG ADDON: CPT

## 2022-12-09 PROCEDURE — G9717 DOC PT DX DEP/BP F/U NT REQ: HCPCS | Performed by: INTERNAL MEDICINE

## 2022-12-09 PROCEDURE — 99214 OFFICE O/P EST MOD 30 MIN: CPT | Performed by: INTERNAL MEDICINE

## 2022-12-09 PROCEDURE — 96413 CHEMO IV INFUSION 1 HR: CPT

## 2022-12-09 PROCEDURE — 96377 APPLICATON ON-BODY INJECTOR: CPT

## 2022-12-09 PROCEDURE — G8417 CALC BMI ABV UP PARAM F/U: HCPCS | Performed by: INTERNAL MEDICINE

## 2022-12-09 PROCEDURE — 85025 COMPLETE CBC W/AUTO DIFF WBC: CPT

## 2022-12-09 PROCEDURE — G8753 SYS BP > OR = 140: HCPCS | Performed by: INTERNAL MEDICINE

## 2022-12-09 PROCEDURE — G9711 PT HX TOT COL OR COLON CA: HCPCS | Performed by: INTERNAL MEDICINE

## 2022-12-09 PROCEDURE — 74011000250 HC RX REV CODE- 250: Performed by: INTERNAL MEDICINE

## 2022-12-09 PROCEDURE — G8428 CUR MEDS NOT DOCUMENT: HCPCS | Performed by: INTERNAL MEDICINE

## 2022-12-09 PROCEDURE — 77030012965 HC NDL HUBR BBMI -A

## 2022-12-09 PROCEDURE — G8754 DIAS BP LESS 90: HCPCS | Performed by: INTERNAL MEDICINE

## 2022-12-09 PROCEDURE — 74011250636 HC RX REV CODE- 250/636: Performed by: INTERNAL MEDICINE

## 2022-12-09 PROCEDURE — 96361 HYDRATE IV INFUSION ADD-ON: CPT

## 2022-12-09 RX ORDER — DIPHENHYDRAMINE HYDROCHLORIDE 50 MG/ML
50 INJECTION, SOLUTION INTRAMUSCULAR; INTRAVENOUS AS NEEDED
Start: 2022-12-30

## 2022-12-09 RX ORDER — ONDANSETRON 2 MG/ML
8 INJECTION INTRAMUSCULAR; INTRAVENOUS ONCE
Status: COMPLETED | OUTPATIENT
Start: 2022-12-09 | End: 2022-12-09

## 2022-12-09 RX ORDER — SODIUM CHLORIDE 0.9 % (FLUSH) 0.9 %
5-40 SYRINGE (ML) INJECTION AS NEEDED
Status: DISPENSED | OUTPATIENT
Start: 2022-12-09 | End: 2022-12-09

## 2022-12-09 RX ORDER — HYDROCORTISONE SODIUM SUCCINATE 100 MG/2ML
100 INJECTION, POWDER, FOR SOLUTION INTRAMUSCULAR; INTRAVENOUS AS NEEDED
OUTPATIENT
Start: 2022-12-30

## 2022-12-09 RX ORDER — SODIUM CHLORIDE 9 MG/ML
5-250 INJECTION, SOLUTION INTRAVENOUS AS NEEDED
Status: DISPENSED | OUTPATIENT
Start: 2022-12-09 | End: 2022-12-09

## 2022-12-09 RX ORDER — DIPHENHYDRAMINE HYDROCHLORIDE 50 MG/ML
25 INJECTION, SOLUTION INTRAMUSCULAR; INTRAVENOUS AS NEEDED
Start: 2022-12-30

## 2022-12-09 RX ORDER — OLANZAPINE 2.5 MG/1
2.5 TABLET ORAL
Qty: 30 TABLET | Refills: 1 | Status: SHIPPED | OUTPATIENT
Start: 2022-12-09

## 2022-12-09 RX ORDER — EPINEPHRINE 1 MG/ML
0.3 INJECTION, SOLUTION, CONCENTRATE INTRAVENOUS AS NEEDED
OUTPATIENT
Start: 2022-12-30

## 2022-12-09 RX ORDER — ACETAMINOPHEN 325 MG/1
650 TABLET ORAL AS NEEDED
Start: 2022-12-30

## 2022-12-09 RX ORDER — SODIUM CHLORIDE 9 MG/ML
5-250 INJECTION, SOLUTION INTRAVENOUS AS NEEDED
OUTPATIENT
Start: 2022-12-30

## 2022-12-09 RX ORDER — ALBUTEROL SULFATE 0.83 MG/ML
2.5 SOLUTION RESPIRATORY (INHALATION) AS NEEDED
Start: 2022-12-30

## 2022-12-09 RX ORDER — ONDANSETRON 2 MG/ML
8 INJECTION INTRAMUSCULAR; INTRAVENOUS AS NEEDED
OUTPATIENT
Start: 2022-12-30

## 2022-12-09 RX ORDER — SODIUM CHLORIDE 0.9 % (FLUSH) 0.9 %
5-40 SYRINGE (ML) INJECTION AS NEEDED
OUTPATIENT
Start: 2022-12-30

## 2022-12-09 RX ORDER — PROCHLORPERAZINE MALEATE 10 MG
10 TABLET ORAL
Qty: 50 TABLET | Refills: 5 | Status: SHIPPED | OUTPATIENT
Start: 2022-12-09 | End: 2022-12-09 | Stop reason: SINTOL

## 2022-12-09 RX ORDER — HEPARIN 100 UNIT/ML
500 SYRINGE INTRAVENOUS AS NEEDED
Start: 2022-12-30

## 2022-12-09 RX ORDER — SODIUM CHLORIDE 9 MG/ML
5-40 INJECTION INTRAMUSCULAR; INTRAVENOUS; SUBCUTANEOUS AS NEEDED
OUTPATIENT
Start: 2022-12-30

## 2022-12-09 RX ORDER — HEPARIN 100 UNIT/ML
500 SYRINGE INTRAVENOUS AS NEEDED
Status: DISPENSED | OUTPATIENT
Start: 2022-12-09 | End: 2022-12-09

## 2022-12-09 RX ADMIN — Medication 10 ML: at 10:25

## 2022-12-09 RX ADMIN — ONDANSETRON 8 MG: 2 INJECTION INTRAMUSCULAR; INTRAVENOUS at 09:07

## 2022-12-09 RX ADMIN — Medication 10 ML: at 08:22

## 2022-12-09 RX ADMIN — SODIUM CHLORIDE 500 ML: 9 INJECTION, SOLUTION INTRAVENOUS at 08:22

## 2022-12-09 RX ADMIN — PEGFILGRASTIM 6 MG: KIT SUBCUTANEOUS at 10:25

## 2022-12-09 RX ADMIN — EPOETIN ALFA-EPBX 60000 UNITS: 20000 INJECTION, SOLUTION INTRAVENOUS; SUBCUTANEOUS at 08:33

## 2022-12-09 RX ADMIN — HEPARIN 500 UNITS: 100 SYRINGE at 10:25

## 2022-12-09 RX ADMIN — SODIUM CHLORIDE 25 ML/HR: 9 INJECTION, SOLUTION INTRAVENOUS at 09:33

## 2022-12-09 RX ADMIN — GEMCITABINE 1700 MG: 100 INJECTION, SOLUTION INTRAVENOUS at 09:43

## 2022-12-09 NOTE — PROGRESS NOTES
[facility] Progress Note    Date: 2022    Name: Tyron Carrizales    MRN: [id number]         : 1970    Ms. Abril Luciano arrived in the [facility] today ambulatory at 0800 in stable condition here for CYCLE 5 DAY 8, IV CARBOPLATIN + GEMCITABINE CHEMOTHERAPY REGIMEN (EVERY 21 DAY CYCLE) and Retacrit Injection (Every 21 Days)    Carboplatin + Gemcitabine on Day 1 of every cycle & Gemcitabine only on every day 8. She was assessed and education was provided. Pt continues with nausea on and off but she manages with Olanzapine and Zofran at home. Pt denies any other complaints. Signed chemotherapy consent was viewed in her electronic record. Ms. Cannon's  vitals were reviewed. Visit Vitals  BP (!) 140/78 (BP 1 Location: Left upper arm, BP Patient Position: Sitting)   Pulse 72   Temp 97.2 °F (36.2 °C)   Resp 18   Wt 103.7 kg (228 lb 9.6 oz)   SpO2 96%   BMI 40.49 kg/m²       Right upper chest single lumen mediport accessed with 20 ga 1 in upotn needle using sterile technique. Flushed well with NS with positive blood return. After 10ml blood waste, CBC drawn per treatment plan order. CBC ran in house, results below:    Recent Results (from the past 12 hour(s))   CBC WITH 3 PART DIFF    Collection Time: 22  8:45 AM   Result Value Ref Range    WBC 2.4 (L) 4.5 - 13.0 K/uL    RBC 2.83 (L) 4.10 - 5.10 M/uL    HGB 8.1 (L) 12.0 - 16 g/dL    HCT 26.4 (L) 36 - 48 %    MCV 93.3 78 - 102 FL    MCH 28.6 25.0 - 35.0 PG    MCHC 30.7 (L) 31 - 37 g/dL    RDW 20.6 (H) 11.5 - 14.5 %    PLATELET 634 252 - 887 K/uL    NEUTROPHILS 69 40 - 70 %    Mixed cells 5 0.1 - 17 %    LYMPHOCYTES 27 14 - 44 %    ABS. NEUTROPHILS 1.7 (L) 1.8 - 9.5 K/UL    ABS. MIXED CELLS 0.1 0.0 - 2.3 K/uL    ABS. LYMPHOCYTES 0.6 (L) 1.1 - 5.9 K/UL    DF AUTOMATED         Pre-chemo Hydration  ml given over 1 hours as ordered. Retacrit 60,000 units given SUBQ in left arm and band aid applied.      ml IV BAG was initiated to infuse @ [facility] throughout treatment today. The following pre-medication was administered approximately 30 minutes prior to starting chemotherapy as follows: Zofran 8 mg IV     Gemcitabine (GEMZAR) 1,700 mg IV (800 mg/m2), was administered over approximately 30 minutes as ordered. Her port was flushed well per policy with NS & Heparin, and then the upton needle was removed and gauze/bandaid was applied. Pegfilgrastim (Neulasta) SQ OBI wearable injectafer 6mg placed to RT back of arm. Device secured with 1 PodPal dressing. Pt made aware to remove injector at 3:00pm tomorrow. She verbalizes understanding. I have reviewed discharge instructions with the patient. Pt armband removed and shredded. Ms. Clayton Montoya was discharged from [place] [address] in stable condition at 1035. She is to return on 1/5//23 at 0800 for pre-chemo labs. Schedule is delayed per pt request due to the holidays.     Yeny Templeton RN  December 9, 2022

## 2022-12-09 NOTE — PROGRESS NOTES
[facility] HEMATOLOGY/MEDICAL ONCOLOGY      Name: Nishi Ward  MRN: [id number]  : 1970/52 y.o. Date: 2022    Oncology History  Nishi Ward is a 47 y.o.  postmenopausal female referred by Dr. Jacqueline Sparks with peritoneal cancer. Patient was being followed by Dr. Mariposa Skinner who left the practice. Intitally seen be JOHN talavera with reports of vaginal bleeding. Given pt's history of hysteretectomy with BSO for endometriosis in  a TVUS was ordered. Which revealed a 6.8 cm x 5.2 cm x 4.6 cm at midline vaginal cuff. This prompted pelvic CT with findings of a lesion measuring 7.6 x 5.8 x 7.2 cm and is compatible with a pelvic neoplasm vs endometrioma given prior history of endometriosis. Tumor markers done on 2018 all normal.  S/p  Exploratory laparotomy, exploration of retroperitoneal spaces, exam under anesthesia with biopsy of rectovaginal mass on 2019. Had sigmoidoscopy which revealed submucosal mass. S/p cycle #6 of carbo/taxol on 2019. S/p cytoreductive surgery with HIPEC on 2019. S/p radiation treatment completed in 2019. Was seen in office and had a biopsy c/w recurrence. S/p cycle #6 of Carbo/Doxil on 2020. S/p Exploratory laparotomy. 2. Lysis of adhesions. 3. Simple appendectomy. 4. 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 On . She also had a revision of urostomy that is still leaking. S/p IR biopsy of liver on 2021 which demonstrated recurrent disease. Pt on palliative letrozole and keytruda. HPI  Patient was being followed previously by Dr. Mariposa Skinner who left the practice. The patient presents to our HBV clinic today for follows up and continuing cancer treatment as scheduled. Here for follow-up. Today she continues to report on and off vaginal bleeding and rectal bleeding as well as discharge from the rectum but noticed that her bleeding is getting better recently.  She continues to have pain controlled with current meds. She reports having some fatigue after each therapy. The patient denies fevers, chills, night sweats, skin lumps or bumps, acute bleeding or bruising issues. Denies headaches, acute vision changes, dizziness, chest pain, shortness of breath, palpitation, productive cough, vomiting, worsening abdominal pain, altered bowel habits, dysuria, new bone pain or back pain, focal numbness or weakness.      Past Medical History, Surgical History, Social, and Family History reviewed and remain unchanged:  Component      Latest Ref Rng & Units 3/11/2022           9:10 AM   CA-125      1.5 - 35.0 U/mL 4     Component      Latest Ref Rng & Units 2/11/2022           9:37 AM   CA-125      1.5 - 35.0 U/mL 4     Component      Latest Ref Rng & Units 12/16/2021          10:25AM   Cancer Ag (CA) 125      0.0 - 38.1 U/mL 5       Component      Latest Ref Rng & Units 6/17/2021          12:00 AM   Cancer Ag (CA) 125      0.0 - 38.1 U/mL 5.1     Component      Latest Ref Rng & Units 9/28/2020          11:11 AM   CA-125      1.5 - 35.0 U/mL 6     Component      Latest Ref Rng & Units 8/31/2020          10:01 AM   CA-125      1.5 - 35.0 U/mL 8     Component      Latest Ref Rng & Units 7/7/2020          11:30 AM   CA-125      1.5 - 35.0 U/mL 5     Component      Latest Ref Rng & Units 6/8/2020          10:40 AM   CA-125      1.5 - 35.0 U/mL 7     Component      Latest Ref Rng & Units 5/11/2020          11:30 AM   CA-125      1.5 - 35.0 U/mL 7     Component      Latest Ref Rng & Units 3/4/2020           8:15 AM   CA-125      1.5 - 35.0 U/mL 7     Component      Latest Ref Rng & Units 11/15/2019           9:56 AM   CA-125      1.5 - 35.0 U/mL 6     Component      Latest Ref Rng & Units 6/6/2019           8:44 AM   CA-125      1.5 - 35.0 U/mL 7     Component      Latest Ref Rng & Units 5/16/2019 4/25/2019           8:26 AM  8:20 AM   Cancer Ag (CA) 125      0.0 - 38.1 U/mL 7.8 8.7     Component      Latest Ref Rng & Units 4/4/2019           8:28 AM   Cancer Ag (CA) 125      0.0 - 38.1 U/mL 8.8     Component      Latest Ref Rng & Units 3/14/2019 2/21/2019           8:15 AM  8:32 AM   Cancer Ag (CA) 125      0.0 - 38.1 U/mL 10.4 18.4     12/17/2018 : 9.3  12/17/2018 CEA: 2.0  12/17/2018 AFP: 3.8    Pathology  8/4/2021  Liver mass, core biopsies:        Metastatic adenocarcinoma, most consistent with serous type. DIAGNOSIS COMMENT:   Although metastatic ovarian or primary peritoneal carcinoma forming   parenchymal liver lesions is uncommon, the histologic features in this   case are similar to those in the patient's prior rectovaginal mass biopsy   from 2019 and the immunohistochemical features are similar to those   documented in the EMR from a pelvic exenteration specimen performed at an   outside institution in 2020.   4/20/2020  Vaginal biopsy  Papillary serous adenocarcinoma    8/8/2019  A) COLON, PERICOLIC NODULE, EXCISION:      - ACELLULAR MUCIN WITH MULTIPLE CALCIFICATIONS, AND ASSOCIATED FIBROUS WALL WITH        HYALINIZATION, AND CHRONIC INFLAMMATION. - ADJACENT BENIGN FIBROADIPOSE TISSUE, CONSISTENT WITH MESENTERY. - NO EVIDENCE OF MALIGNANCY. - SEE COMMENT. B) LIVER, RIGHT LOBE, BIOPSY:      - NODULAR FAT NECROSIS AND FIBROSIS OF THE LIVER CAPSULE.      - MILD STEATOSIS.       - NO EVIDENCE OF MALIGNANCY. C) COLON, SIGMOID, SEROSAL IMPLANT, EXCISION:      - NODULAR FAT NECROSIS WITH FIBROSIS, CHRONIC INFLAMMATION, CALCIFICATIONS, AND        CYSTIC DEGENERATION WITHOUT MUCIN.      - NO EVIDENCE OF MALIGNANCY. D) OMENTUM, OMENTECTOMY (RESECTION):      - CONGESTED FIBROADIPOSE TISSUE WITH FOCAL FIBROSIS AND CHRONIC INFLAMMATION, AND        CALCIFIED NODULAR FIBROSIS.      - NO EVIDENCE OF MALIGNANCY. E) PERITONEUM, LEFT ANTERIOR ABDOMINAL, RESECTION:      - CONGESTED PERITONEAL TISSUE, NO EVIDENCE OF MALIGNANCY.     F) PERITONEUM, RIGHT ANTERIOR ABDOMINAL, RESECTION:      - CONGESTED PERITONEAL TISSUE, NO EVIDENCE OF MALIGNANCY. G) COLON, SIGMOID, SEROSAL IMPLANT, EXCISION:      - NODULAR FIBROSIS WITH HISTIOCYTES, SUGGESTIVE OF FAT NECROSIS.      - CYSTIC DEGENERATION, WITHOUT MUCIN.      - NO EVIDENCE OF MALIGNANCY. H) SOFT TISSUE, FALCIFORM, EXCISION:      - CONSISTENT WITH FALCIFORM LIGAMENT.      - NO EVIDENCE OF MALIGNANCY. PATHOLOGIC STAGE:  ypTx, pNx    1/17/2019   RECTOVAGINAL MASS (#1, 2) AND PELVIC MASS, BIOPSIES:   CONSISTENT WITH SEROUS CARCINOMA WITH EXTENSIVE NECROSIS. Imaging  MRI liver 7/6/2021     FINDINGS:     Abdominal Wall:  There is a circumscribed 8.3 cm-width x 2.4 cm-AP x 2.9  cm-craniocaudal length T2 hyperintense subcutaneous layer fluid collection along  the inferior margin of the ileal conduit. The colostomy site in the left lower  quadrant appears unremarkable. Liver:  A new ovoid T1-low T2-high signal 1.8 x 1.4 cm lesion is identified in  the segment 8. Another 0.8 x 0.6 cm T1-low T2-high signal focus is identified  in the segment 3. These structures were not apparent on prior studies. With  diffusion imaging, no evidence of restricted diffusion is demonstrated at these  foci. Biliary:  No evidence for significant common bile duct dilation. Gallbladder:  Mild distention of the gallbladder. No definite gallstones. Spleen, Adrenal Glands, Pancreas:  Unremarkable. Kidneys:  Cyst at the left kidney lower pole region measuring about 1.2 x 1.2  cm. Increase in size compared to prior CTs from above 0.8 x 0.7 cm. Miscellaneous: The largest of the periportal nodes measures up to about 1.3 x  1.1 cm. IMPRESSION     1. Focal fluid collection in the subcutaneous tissue adjacent to the ileal  conduit. This fluid collection may be amenable to aspiration under ultrasound  guidance. 2.  New hepatic lesions as described.   Although there is apparent lack of  restricted diffusion, further investigation with ultrasound is warranted to  assess if these structures are indeed cystic in nature. 3.  Left renal cyst.     4.  Slight az prominence. MRI pelvis 7/6/2021  FINDINGS:     Along the inferior aspect of the ileal conduit stoma site, focal elongated T1  high T2-signal fluid collection is demonstrated to, measuring about 8.3 cm-width  x 2.2 cm-thickness x 2.3 cm-craniocaudal length. In retrospect, there was a  suggestion of possible 7.1 x 3.5 x 2.0 cm hypodense material collection along  the inferior aspect of the subcutaneous abdominal wall portion of the ileal  conduit on the 04/01/21 unenhanced CT. This fluid collection therefore may be  slightly increased in size in the elbow. The intraperitoneal portion of the ileal conduit appears grossly unremarkable. The presacral region demonstrates apparent continued pattern of nonspecific mild  edema. The source for this edematous changes is clear. Most likely related to  residual changes from recent surgical intervention. Mild edematous changes also noted in the right and to lesser extent left  piriformis muscles. Additional edematous changes are also identified in the  right obturator internus along the superior aspect. Minimal free fluid in the posterior pelvic region. There is some intraluminal fluid in the blind loop portion of the rectum. At  the right superior lateral vaginal cuff corner, a vague trail of T2 high signal  is observed (coronal T2 images #8-10 and fat-sat axial T2 image #5-10). This  [place] appears to course quite close to the anterior margin of the rectal remnant  wall. Whether there is indeed communication between the blind rectal segment  and the vaginal cuff can be further assessed with barium enema. No distinct mass is detected pelvis. Enlarged nodes are identified along the right pelvic sidewall.   The largest  right pelvic sidewall node is identified subjacent to the external iliac  artery/vein vascular bundle, measuring up to about 2.2 x 1.2 cm (fat-sat axial  T2 image #12). Another slightly enlarged node more superiorly measuring about  1.9 x 1.0 cm (image #17). Multiple slightly borderline prominent nodes  identified in the mesentery, measuring up to about 1.2 x 1.0 cm (image #25). Additional note near the aortic bifurcation measuring about 1.3 x 0.9 cm (image  #34). IMPRESSION     1. Focal T1- and T2-hyperintense subcutaneous fluid collection along the  inferior aspect of the ileal conduit stoma site. Possibly representing a seroma  or a focus of urinoma. This may be amenable to aspiration under ultrasound or  CT guidance. 2.  Questionable subtle trailed between the right superior lateral aspect of the  vaginal cuff with adjacent blind-tipped rectum. More definitive evaluation can  be performed with barium enema. 3.  Slight az prominence along the right pelvic sidewall and in the  mesentery. 4.  No distinct residual or recurrent mass. 5.  Persistent mild presacral edema  PETCT 7/17/2020   FINDINGS:        PET images: Study limited because of patient motion. Mediastinal blood pool reference value = SUV Max. Mediastinal blood pool reference value = SUV Mean. Liver parenchyma reference value =  4.7   SUV Max. Liver parenchyma reference value =  2.3    SUV Mean. NECK: There is no suspicious hypermetabolic activity at the neck. CHEST: There is no suspicious hypermetabolic activity at the chest.     ABDOMEN: Typical heterogeneity at the liver. No convincing malignant foci  hypermetabolic activity or underlying CT abnormality. PELVIS: There is soft tissue fullness in the right retrovesicular pelvis just  above the vaginal cuff and posterior to right ureter measuring about 3.8 cm with  Max SUV 13.5 (206), extending to the rectum posteriorly, levators inferiorly on  the right. Previously 4 cm and Max SUV 16.5.      Thickening anterior abdominal wall compatible with scar demonstrating diffuse  modest activity and Max SUV 2.7. Additional CT findings: Mediport catheter has tip in the superior vena cava. Air  trapping in the superior segments lower lobes. Right-sided nephroureteral stent  without hydronephrosis. No ascites. IMPRESSION      Treatment response. Decreased metabolic activity at the right retrovesicular  pelvic mass. No new evidence of metastatic disease. Interval placement right-sided nephroureteral stent and resolved  hydroureteronephrosis. .       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    Loss of appetite 10/14/2020    Other hydronephrosis 06/24/2020    Genetic carrier status 09/13/2019    Postoperative nausea 08/27/2019    Ovarian cancer ([address] 08/07/2019    Peritoneal carcinoma ([address] 02/14/2019    Pelvic mass in female 01/17/2019    Irritable bowel syndrome with both constipation and diarrhea 02/09/2018    Subclinical hypothyroidism 01/23/2018    Chronic abdominal pain 01/22/2018    Diverticulosis 01/22/2018    Hx of total hysterectomy 01/22/2018    Hyponatremia 01/22/2018    Advance care planning 01/31/2017    Dental caries 05/26/2016    Chronic periodontal disease 05/26/2016    SUAZO (dyspnea on exertion) 02/10/2016    Prediabetes 09/24/2015    Morbid obesity ([address] 08/31/2015    Arthritis, degenerative 08/31/2015    Gastroesophageal reflux disease without esophagitis 08/31/2015    ANAMIKA on CPAP 08/31/2015    Essential hypertension 08/28/2015    PTSD (post-traumatic stress disorder) 03/24/2014    S/P TKR (total knee replacement) 11/14/2012 Adjustment disorder with depressed mood 09/20/2012    Major depressive disorder, recurrent episode, moderate ([address] 09/20/2012    Suicidal ideation 09/19/2012    Menopause 05/08/2012    Knee pain, right 05/08/2012    Hypokalemia 02/04/2012    Overdose 02/04/2012    Obesity 06/18/2010    Mixed hyperlipidemia 06/18/2010     Past Medical History:   Diagnosis Date    AR (allergic rhinitis)     seasonal    Bladder cancer ([facility]     Cancer ([facility]     pt states between vgina and rectal area    Cardiac echocardiogram 04/11/2016    Tech difficult. EF 55-60%. No RWMA. Mild conc LVH. Gr 1 DDfx. RVSP normal.      Cardiac nuclear imaging test 05/05/2016    Low risk. Very patchy radiotracer uptake. Mild anterior artifact, low suspicion for ischemia. EF 68%. No RWMA. Normal EKG on pharm stress test.    Cardiovascular LE arterial duplex 10/07/2013    No significant arterial disease at rest bilaterally. R JUNE 1.21.  L JUNE 1.16. No significant sm vessel disease.     Chronic kidney disease     Depression     Dr. Whit Brown, suicide attempt 2/12    Diverticulosis     Endometriosis     s/p hysterectomy 2006    GERD (gastroesophageal reflux disease)     Glaucoma     Dr. Jill Wheeler    Hematuria, gross     HTN (hypertension)     Hx of colonoscopy 04/05/2017    mild diverticulosis, g1 internal hemorrhoids, normal colon , repeat 10 year 2027    Hx of suicide attempt     Hyperlipidemia LDL goal < 130     IBS (irritable bowel syndrome)     Ill-defined condition     environmental allergies    Insomnia     Malignant neoplasm of peritoneum ([facility] 2/14/2019    Nausea & vomiting     OA (osteoarthritis)     both knees, lower back    Preeclampsia     PTSD (post-traumatic stress disorder)     Seizures ([address]     1 x with childbirth, had toxemia    Sleep apnea     does not use cpap machine    Spinal stenosis     Dr. Alphonso Hoffman    Vertigo       Past Surgical History:   Procedure Laterality Date    COLONOSCOPY N/A 4/5/2017    COLONOSCOPY performed by Murphy Chin MD at [address] N/A 2019    SIGMOIDOSCOPY FLEXIBLE performed by Nicol Bains MD at [facility] ENDOSCOPY    HX  SECTION      HX COLONOSCOPY  2017    DR. MCRAE 2017     HX COLOSTOMY      Revision 2021    HX CYSTECTOMY  2020    HX HYSTERECTOMY      HX KNEE ARTHROSCOPY Left     left knee    HX KNEE REPLACEMENT Bilateral     (R)  (L)     HX LAPAROTOMY N/A 2019    and rectovaginal bx    HX OTHER SURGICAL  2016    all teeth removed    HX SALPINGO-OOPHORECTOMY  2008    HX TUBAL LIGATION      IR INSERT TUNL CVC W PORT OVER 5 YEARS  2019    MULTIPLE DELIVERY       1990    [place] FEMUR/KNEE SURG UNLISTED Left 2009    External fixator    KY PART REMOVAL COLON W ANASTOMOSIS      [place] TOTAL ABDOM HYSTERECTOMY  2006    TRANSURETHRAL RESEC BLADDER NECK        OB History          2    Para   2    Term   2       0    AB   0    Living   0         SAB   0    IAB   0    Ectopic   0    Molar   0    Multiple   0    Live Births   0              Social History     Tobacco Use    Smoking status: Never    Smokeless tobacco: Never   Substance Use Topics    Alcohol use: No      Family History   Problem Relation Age of Onset    Heart Disease Mother         CHF    Diabetes Mother     Hypertension Mother     Kidney Disease Mother     Breast Cancer Mother     Stroke Mother     Diabetes Father     Hypertension Father     Heart Attack Father         MI    Cancer Maternal Grandmother         stomach    Ovarian Cancer Maternal Aunt     Cancer Maternal Uncle       Current Outpatient Medications   Medication Sig    oxyCODONE-acetaminophen (PERCOCET) 5-325 mg per tablet Take 1 Tablet by mouth every four (4) hours as needed for Pain for up to 14 days. Max Daily Amount: 6 Tablets.     ondansetron hcl (ZOFRAN) 8 mg tablet Take 1 tablet every 8 hours for nausea and vomiting beginning the night of your chemotherapy treatment for 3 days. loperamide (IMMODIUM) 2 mg tablet Take 2 tabs=4mg after first loose stool, then 2 mg(1 tab) after each loose stool after that up to maximum of 16mg (8tabs) in 1 day    letrozole (FEMARA) 2.5 mg tablet Take 1 Tablet by mouth daily. PARoxetine (PAXIL) 20 mg tablet take 1 tablet by mouth once daily    amLODIPine (NORVASC) 10 mg tablet take 1 tablet by mouth once daily    meclizine (Motion Sickness, Meclizine,) 25 mg tablet Take 1 Tablet by mouth three (3) times daily as needed for Dizziness. metoprolol succinate (TOPROL-XL) 100 mg tablet Take 1 Tablet by mouth daily. OLANZapine (ZyPREXA) 2.5 mg tablet Take 1 Tablet by mouth nightly. Indications: prevent nausea and vomiting from cancer chemotherapy    pregabalin (LYRICA) 25 mg capsule Take 1 Capsule by mouth three (3) times daily. Max Daily Amount: 75 mg.    lidocaine-prilocaine (EMLA) topical cream Apply to Mediport 1 hour prior to chemotherapy. Place kitchen saran wrap over cream and port. This will numb site. simvastatin (ZOCOR) 20 mg tablet take 1 tablet by mouth at bedtime    gabapentin (NEURONTIN) 100 mg capsule Take 1 Capsule by mouth three (3) times daily. Max Daily Amount: 300 mg.    latanoprost (XALATAN) 0.005 % ophthalmic solution Administer 1 Drop to both eyes nightly. No current facility-administered medications for this visit.      Facility-Administered Medications Ordered in Other Visits   Medication Dose Route Frequency    0.9% sodium chloride infusion  5-250 mL/hr IntraVENous PRN    sodium chloride (NS) flush 5-40 mL  5-40 mL IntraVENous PRN    heparin (porcine) pf 500 Units  500 Units InterCATHeter PRN     Allergies   Allergen Reactions    Pantoprazole Other (comments)     Bleeding in stomach    Promethazine Other (comments)     Bleeding in stomach    Seafood Hives     FISH    Other Medication Hives     seafood    Pollen Extracts Other (comments)    Shellfish Derived Hives        Review of Systems   Constitutional:  Positive for malaise/fatigue. Negative for chills, diaphoresis, fever and weight loss. Respiratory:  Negative for cough, hemoptysis, shortness of breath and wheezing. Cardiovascular:  Negative for chest pain, palpitations and leg swelling. Gastrointestinal:  Positive for nausea. Negative for abdominal pain, diarrhea, heartburn and vomiting. Genitourinary:  Negative for dysuria, frequency, hematuria and urgency. Musculoskeletal:  Negative for joint pain and myalgias. Skin:  Negative for itching and rash. Neurological:  Negative for dizziness, seizures, weakness and headaches. Psychiatric/Behavioral:  Negative for depression. The patient does not have insomnia. Objective:     Visit Vitals  LMP 01/31/2008       ECOG Performance Status (grade): 1  0 - able to carry on all pre-disease activity w/out restriction  1 - restricted but able to carry out light work  2 - ambulatory and can self- care but unable to carry out work  3 - bed or chair >50% of waking hours  4 - completely disable, total care, confined to bed or chair    Physical Exam  Constitutional:       Appearance: Normal appearance. HENT:      Head: Normocephalic and atraumatic. Eyes:      Pupils: Pupils are equal, round, and reactive to light. Cardiovascular:      Rate and Rhythm: Normal rate and regular rhythm. Heart sounds: Normal heart sounds. Pulmonary:      Effort: Pulmonary effort is normal.      Breath sounds: Normal breath sounds. Abdominal:      General: Bowel sounds are normal.      Palpations: Abdomen is soft. Tenderness: There is no abdominal tenderness. There is no guarding. Musculoskeletal:         General: Normal range of motion. Cervical back: Neck supple. Right lower leg: No edema. Left lower leg: No edema. Skin:     General: Skin is warm. Neurological:      General: No focal deficit present.       Mental Status: She is alert and oriented to person, place, and time. Mental status is at baseline. Diagnostics:      Recent Results (from the past 96 hour(s))   CBC WITH 3 PART DIFF    Collection Time: 12/09/22  8:45 AM   Result Value Ref Range    WBC 2.4 (L) 4.5 - 13.0 K/uL    RBC 2.83 (L) 4.10 - 5.10 M/uL    HGB 8.1 (L) 12.0 - 16 g/dL    HCT 26.4 (L) 36 - 48 %    MCV 93.3 78 - 102 FL    MCH 28.6 25.0 - 35.0 PG    MCHC 30.7 (L) 31 - 37 g/dL    RDW 20.6 (H) 11.5 - 14.5 %    PLATELET 564 082 - 717 K/uL    NEUTROPHILS 69 40 - 70 %    Mixed cells 5 0.1 - 17 %    LYMPHOCYTES 27 14 - 44 %    ABS. NEUTROPHILS 1.7 (L) 1.8 - 9.5 K/UL    ABS. MIXED CELLS 0.1 0.0 - 2.3 K/uL    ABS. LYMPHOCYTES 0.6 (L) 1.1 - 5.9 K/UL    DF AUTOMATED         Imaging:  Results for orders placed during the hospital encounter of 02/20/19    IR ESTAB PT LEVEL I    Narrative  This procedure was performed in is entirety by a physician assistant. : Rachel Rosales PA-C    Outpatient: Mediport incision check    CPT code: 86880    Attending physician: Dr. Tez Dominguez    History: Status post Mediport placement    Exam: Patient states that the Mediport has been used without pain or incident. Patient denies fever, chills, nausea, vomiting or pain at the site. Patient  denies drainage, swelling, bleeding or tenderness. Site well approximated and healing well with Dermabond still covering the  incision. No drainage, swelling, erythema or tenderness at the site. Impression  Impression: Mediport properly placed and healing well. Advised if have any  questions or concerns or start have signs of infection to contact interventional  radiology      No results found for this or any previous visit. Results for orders placed during the hospital encounter of 11/28/22    CT CHEST ABD PELV WO CONT    Narrative  EXAM: CT Chest/Abdomen/Pelvis Without Contrast    INDICATION: Peritoneal carcinoma. History of remote hysterectomy with bilateral  salpingo-oophorectomy for endometriosis.  Status post exploratory laparotomy with  cytoreductive surgery and HIPEC in 2019, with exploratory laparotomy with total  pelvic exenteration with end colostomy and ileal conduit in 2020. Biopsy-proven  recurrent disease in the liver in 2021. On palliative letrozole and Keytruda. Intermittent vaginal and rectal bleeding. TECHNIQUE: Axial CT imaging from the chest, abdomen, and pelvis without contrast  was performed. All CT scans at this facility are performed using dose  optimization technique as appropriate to the performed examination, to include  automated exposure control, adjustment of the mA and/or kV according to  patient's size (including appropriate matching for site-specific examinations),  or use of an iterative reconstruction technique. COMPARISON: PET CT 6/17/2022    FINDINGS:  Evaluation of the solid organs, bowel, and vasculature is mildly limited  secondary to lack of intravenous contrast.    Lung/Airways:  Stable 6 mm subpleural lateral left lower lobe pulmonary nodule  (3, 26). No definite new or enlarging pulmonary nodules. Low lung volumes with  scattered areas of atelectasis, somewhat limiting evaluation. No pleural  effusion. Base of Neck:  Unremarkable. Mediastinum: No lymphadenopathy. Right chest wall Mediport tip is at the  superior cavoatrial junction. Heart: Cardiomegaly. Liver: Large heterogeneous mass in the right hepatic lobe measures 9.5 x 8.7 cm,  and appears similar to prior PET/CT. Possible new 2.4 x 1.5 cm lesion peripheral  inferior right lobe (2, 60). Evaluation is limited in the absence of intravenous  contrast and artifact on prior study limits comparison. Biliary: Unremarkable. Pancreas: Unremarkable. Spleen: Measures upper limit of normal.    Adrenal glands: Unremarkable.     Kidneys: Small hypodense lesion at the lower pole of the left kidney is  incompletely characterized the absence of intravenous contrast, most likely a  cyst.    Reproductive organs: Hysterectomy and bilateral salpingo-oophorectomy. Similar  increased soft tissue mass in the deep posterior posterior pelvis, eccentric to  the right, and extending cephalad in the presacral region, which was  hypermetabolic on prior PET/CT. This measures approximately 8.1 x 4.2 cm. Bladder: Cystectomy with right lower quadrant ileal conduit. Bowel: Postsurgical changes of pelvic exenteration with end colostomy. No  evidence of bowel obstruction. Lymph nodes: Small nodules in the mid pelvis, measuring 1.6 and 1.2 cm,  respectively (2, 86-89), are stable from prior PET/CT, not clearly  hypermetabolic at that time. Otherwise, no lymphadenopathy. Vasculature: Unremarkable in the absence of intravenous contrast.    Other: No free fluid or free air. Body wall: Right lower quadrant ileal conduit and left lower quadrant colostomy. Bones: Stable 8 mm sclerotic density left acetabulum, not hypermetabolic on  prior PET/CT and stable from at least April 2021, likely a bone [id number]. Few  other subcentimeter sclerotic foci are also unchanged since that time and most  likely bone islands. No definite suspicious osseous lesions. Degenerative  changes in the spine. Impression  1. Similar appearance of irregular soft tissue mass in the deep pelvis, which  was hypermetabolic on prior PET/CT. -Adjacent nodules in the mid pelvis are stable, recommend continued attention on  follow-up. 2. Large hepatic metastasis appears stable from PET/CT 6/17/2022. Probable new  small liver lesion inferiorly is concerning for metastasis, though evaluation is  limited due to lack of intravenous contrast and artifact in this region on prior  study. 3. Stable 6 mm left lower lobe pulmonary nodule. 4. Additional findings as above. IMPRESSION/PLAN:  Stage IV Primary Peritoneal Cancer with Recurrence  -- Patient was being followed by Dr. Maxwell Moore who left the practice.     -- s/p carbo (auc=6), taxol (175 mg/m2) IV every 3 wks s/p 6 cycles completed 6/6/2019  -- s/p cytoreductive surgery with [facility] with dr. Watt Estimable  -- s/p pelvic radiation  -- Pain-script for tylenol #3  -- 5/13/-9/30/2020 given platinum sensitive disease s/p  auc=5, Doxil 30 mg/m2 IV x 6 cycles s/p Total pelvic exenteration  -- 8/4/2021 Liver mass, core biopsies: Metastatic adenocarcinoma, most consistent with serous type. -- Biopsy c/w recurrence-reviewed [facility] and discussed treatment options. Dr. 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 Also discussed retreatment with platinum or if sensitive consider PARP inhibition. After discussion patient was placed on letrozole. -- 1/8/2022 Patient started Keytruda 400 mg IV every 3 weeks. -- Vaginal bleeding: Dr. 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 Has follow up with Urology. -- 5/31/2022 The patient presents to our [facility] clinic today for follows up and continuing cancer treatment as scheduled. -- She has tolerated Keytruda Q6 weeks, no high graded toxicities. She was agreeable to continue current therapy. -- 6/3/2022 S.p Keytruda   -- 6/17/2022 PET scan reported progressive disease. Stage IV metastatic malignancy with interval progression, dominant intensely hypermetabolic hepatic metastasis with substantial  interval enlargement compared to prior studies. Interval increase in size/extent of irregular intense increased FDG activity centered at and right of midline in the soft tissues of the deep  posterior pelvis, extending cephalad in the presacral region, at least some of which corresponds to irregular mass-like soft tissue density on CT, highly suspicious for malignancy.    Small 6 mm subpleural pulmonary nodule left lower lobe laterally, not evident on PET but too small to be adequately assessed using PET  -- Today I have reviewed with the patient and her family about recent PET which showed progressive disease on multiple lines of therapy. We have discussed at length about next lines of therapy. The patient states she still believed she can achieve curable diease. I have showed her PET images which showed high burden tumor. I have explained to the patient and family that the goals of treatment of metastatic cancer are not curable but treatable in order to prolong survival and improve quality of life by reducing cancer-related symptoms. Given her burden disease and progressive on immunotherapy plus hormonal therapy, we suggested re-challenging chemotherapy with platinum regimens. 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 I have discussed about potential side effects of chemotherapy. The patient was agreeable with the plan. -- 7/28/2022 C1 D1 palliative Carboplatin + Gemcitabine. Tolerated therapy with no high graded toxicities. -- 10/28/2022 C4D1   -- 11/04/2022: L5A2 Chemo held due to SOB  -- She has tolerated therapy. Stable serological responses. -- 11/28/2022 MRI Pelvis and CT CAP reviewed with patient with overall stable findings. + Similar appearance of irregular soft tissue mass in the deep pelvis, which was hypermetabolic on prior PET/CT.  + Adjacent nodules in the mid pelvis are stable, recommend continued attention on follow-up.  + Large hepatic metastasis appears stable from PET/CT 6/17/2022. Questionable new small liver lesion inferiorly  + Stable 6 mm left lower lobe pulmonary nodule. -- She has tolerated her recent therapy without any issues. No further bleeding reported at this time. Plan:  -- She will continue Palliative [place]/Carbo. Cycle 5 day 8 will plan for today 12/9/2022 . -- Supportive IV hydration with each cycle   -- CINV: Olanzapine ordered for CINV.  Patient is allergic to Promethazine however she report that she is not sure if the medication cause the bleeding because she was on so much medication. -- Advised the patient to present to ED if worsening symptoms, bleeding, or concerns. -- Labs: CBC, CMP, . Supportive transfusion if indicated. -- Nutritional support: Referral to Nutritionist while on chemotherapy  -- RTC in 3 weeks, always sooner if required. Carboplatin + Gemcitabine  Days 1,8: Gemcitabine 800-1,000mg/m2 IV, followed by:  Day 1: Carboplatin AUC 4 IV   Repeat cycle every 3 weeks. 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 Natalia Coughlin for rectal bleeding. She states she was told no further intervention offered at this time. -- No active bleeding reported at this time  -- Monitor CBC, Iron Profile, Ferritin and replacement as indicated. -- Advised to f/u Urology as scheduled. CINV  -- Patient is allergic to Promethazine, was unable to order compazine due to the allergies. -- Olanzapine ordered for CINV    Normocytic Anemia  Iron Deficiency Anemia  --Due to iron deficiency and vaginal bleeding related patient  was scheduled for IV Venofer x 3 (Dose #3 completed on 09/08/2022)  --12/01/2022:  CBC showed WBC 6.6 K/uL, hemoglobin 8.9 g/dL with hematocrit of 29.6%. Platelet 739. --Procrit 60,000 unit to start every 3 weeks when H/H is below 10/30  --We will continue to monitor labs CBC, CMP, Iron Profile, Ferritin, and B12/folate    Chemotherapy related Neuropathy  Cancer associated pain   --On Lyrica 25mg PO 3 times daily   --Percocet PRN will be refilled    Follow up in 3 weeks or sooner if indicated. No orders of the defined types were placed in this encounter. Ms. Arthur Barth has a reminder for a \"due or due soon\" health maintenance. I have asked that she contact her primary care provider for follow-up on this health maintenance. All of patient's questions answered to their apparent satisfaction.  They verbally show understanding and agreement with aforementioned Jeff Juarez MD  12/9/2022        Above mentioned total time spent for this encounter with more than 50% of the time spent in face-to-face counseling, discussing on diagnosis and management plan going forward, and co-ordination of care. Parts of this document has been produced using Dragon dictation system. Unrecognized errors in transcription may be present. Please do not hesitate to reach out for any questions or clarifications.       CC: Candido Turner MD

## 2022-12-16 ENCOUNTER — APPOINTMENT (OUTPATIENT)
Dept: INFUSION THERAPY | Age: 52
End: 2022-12-16
Payer: MEDICARE

## 2022-12-21 ENCOUNTER — TELEPHONE (OUTPATIENT)
Dept: ONCOLOGY | Age: 52
End: 2022-12-21

## 2022-12-21 NOTE — TELEPHONE ENCOUNTER
Patient contacted office and reported she is going to be discharged from the hospital and would like to speak to the RN about her care there.

## 2022-12-21 NOTE — TELEPHONE ENCOUNTER
Returned pt's call regarding current hospitalization at [facility]. She just wanted to update us that she has been given platelet therapy for excessive bleeding and has received one unit PRBC. She states she is getting d/c'd to home later today and will discuss further at her next visit with us on 1/4/2023.

## 2022-12-22 ENCOUNTER — APPOINTMENT (OUTPATIENT)
Dept: INFUSION THERAPY | Age: 52
End: 2022-12-22

## 2022-12-23 ENCOUNTER — APPOINTMENT (OUTPATIENT)
Dept: INFUSION THERAPY | Age: 52
End: 2022-12-23
Payer: MEDICARE

## 2022-12-23 DIAGNOSIS — Z79.899 ON ANTINEOPLASTIC CHEMOTHERAPY: ICD-10-CM

## 2022-12-23 DIAGNOSIS — G62.9 NEUROPATHY: ICD-10-CM

## 2022-12-23 DIAGNOSIS — C57.7 MALIGNANT NEOPLASM OF OTHER SPECIFIED FEMALE GENITAL ORGANS (HCC): ICD-10-CM

## 2022-12-23 DIAGNOSIS — G89.3 CANCER ASSOCIATED PAIN: ICD-10-CM

## 2022-12-23 DIAGNOSIS — D50.8 IRON DEFICIENCY ANEMIA SECONDARY TO INADEQUATE DIETARY IRON INTAKE: ICD-10-CM

## 2022-12-23 DIAGNOSIS — C48.2 PERITONEAL CARCINOMA (HCC): ICD-10-CM

## 2022-12-23 DIAGNOSIS — C56.3 MALIGNANT NEOPLASM OF BOTH OVARIES (HCC): ICD-10-CM

## 2022-12-23 DIAGNOSIS — K62.5 RECTAL BLEEDING: ICD-10-CM

## 2022-12-27 RX ORDER — PAROXETINE HYDROCHLORIDE 20 MG/1
TABLET, FILM COATED ORAL
Qty: 30 TABLET | Refills: 0 | Status: SHIPPED | OUTPATIENT
Start: 2022-12-27

## 2022-12-30 ENCOUNTER — APPOINTMENT (OUTPATIENT)
Dept: INFUSION THERAPY | Age: 52
End: 2022-12-30
Payer: MEDICARE

## 2023-01-01 ENCOUNTER — HOSPITAL ENCOUNTER (OUTPATIENT)
Facility: HOSPITAL | Age: 53
Setting detail: INFUSION SERIES
End: 2023-01-01

## 2023-01-01 ENCOUNTER — HOME CARE VISIT (OUTPATIENT)
Age: 53
End: 2023-01-01
Payer: MEDICARE

## 2023-01-01 ENCOUNTER — OFFICE VISIT (OUTPATIENT)
Age: 53
End: 2023-01-01
Payer: MEDICARE

## 2023-01-01 VITALS
HEIGHT: 63 IN | DIASTOLIC BLOOD PRESSURE: 81 MMHG | OXYGEN SATURATION: 97 % | HEART RATE: 59 BPM | SYSTOLIC BLOOD PRESSURE: 137 MMHG | RESPIRATION RATE: 16 BRPM | WEIGHT: 200.2 LBS | BODY MASS INDEX: 35.47 KG/M2

## 2023-01-01 VITALS
HEART RATE: 115 BPM | DIASTOLIC BLOOD PRESSURE: 65 MMHG | TEMPERATURE: 96.6 F | RESPIRATION RATE: 12 BRPM | SYSTOLIC BLOOD PRESSURE: 101 MMHG | OXYGEN SATURATION: 70 %

## 2023-01-01 DIAGNOSIS — G62.0 CHEMOTHERAPY-INDUCED PERIPHERAL NEUROPATHY (HCC): ICD-10-CM

## 2023-01-01 DIAGNOSIS — T45.1X5A CINV (CHEMOTHERAPY-INDUCED NAUSEA AND VOMITING): ICD-10-CM

## 2023-01-01 DIAGNOSIS — N93.9 VAGINAL BLEEDING: ICD-10-CM

## 2023-01-01 DIAGNOSIS — R11.2 CINV (CHEMOTHERAPY-INDUCED NAUSEA AND VOMITING): ICD-10-CM

## 2023-01-01 DIAGNOSIS — K62.5 RECTAL BLEEDING: ICD-10-CM

## 2023-01-01 DIAGNOSIS — C78.7 MALIGNANT NEOPLASM METASTATIC TO LIVER (HCC): ICD-10-CM

## 2023-01-01 DIAGNOSIS — D50.8 IRON DEFICIENCY ANEMIA SECONDARY TO INADEQUATE DIETARY IRON INTAKE: ICD-10-CM

## 2023-01-01 DIAGNOSIS — D63.1 ANEMIA IN STAGE 4 CHRONIC KIDNEY DISEASE (HCC): ICD-10-CM

## 2023-01-01 DIAGNOSIS — T45.1X5A CHEMOTHERAPY-INDUCED PERIPHERAL NEUROPATHY (HCC): ICD-10-CM

## 2023-01-01 DIAGNOSIS — D64.9 NORMOCYTIC ANEMIA: ICD-10-CM

## 2023-01-01 DIAGNOSIS — G89.3 CANCER ASSOCIATED PAIN: ICD-10-CM

## 2023-01-01 DIAGNOSIS — N18.4 ANEMIA IN STAGE 4 CHRONIC KIDNEY DISEASE (HCC): ICD-10-CM

## 2023-01-01 DIAGNOSIS — C48.2 CANCER OF PERITONEUM (HCC): Primary | ICD-10-CM

## 2023-01-01 PROCEDURE — G0299 HHS/HOSPICE OF RN EA 15 MIN: HCPCS

## 2023-01-01 PROCEDURE — 99214 OFFICE O/P EST MOD 30 MIN: CPT | Performed by: INTERNAL MEDICINE

## 2023-01-01 PROCEDURE — 3017F COLORECTAL CA SCREEN DOC REV: CPT | Performed by: INTERNAL MEDICINE

## 2023-01-01 PROCEDURE — 1036F TOBACCO NON-USER: CPT | Performed by: INTERNAL MEDICINE

## 2023-01-01 PROCEDURE — 3078F DIAST BP <80 MM HG: CPT | Performed by: INTERNAL MEDICINE

## 2023-01-01 RX ORDER — SODIUM CHLORIDE 9 MG/ML
INJECTION, SOLUTION INTRAVENOUS ONCE
OUTPATIENT
Start: 2023-01-01 | End: 2023-01-01

## 2023-01-01 ASSESSMENT — ENCOUNTER SYMPTOMS
DIARRHEA: 1
COLOR CHANGE: 0
ABDOMINAL PAIN: 1
COUGH: 0
TROUBLE SWALLOWING: 1
SHORTNESS OF BREATH: 0
BLOOD IN STOOL: 0
ABDOMINAL DISTENTION: 1
VOMITING: 1
BACK PAIN: 0
NAUSEA: 1

## 2023-01-03 DIAGNOSIS — C56.3 MALIGNANT NEOPLASM OF BOTH OVARIES (HCC): ICD-10-CM

## 2023-01-03 DIAGNOSIS — C48.2 PERITONEAL CARCINOMA (HCC): Primary | ICD-10-CM

## 2023-01-03 DIAGNOSIS — G89.3 CANCER ASSOCIATED PAIN: ICD-10-CM

## 2023-01-03 DIAGNOSIS — C57.7 MALIGNANT NEOPLASM OF OTHER SPECIFIED FEMALE GENITAL ORGANS (HCC): ICD-10-CM

## 2023-01-03 RX ORDER — OXYCODONE AND ACETAMINOPHEN 5; 325 MG/1; MG/1
1 TABLET ORAL
Qty: 84 TABLET | Refills: 0 | Status: SHIPPED | OUTPATIENT
Start: 2023-01-03 | End: 2023-01-17

## 2023-01-04 ENCOUNTER — HOSPITAL ENCOUNTER (OUTPATIENT)
Dept: INFUSION THERAPY | Age: 53
Discharge: HOME | End: 2023-01-04
Payer: MEDICARE

## 2023-01-04 ENCOUNTER — OFFICE VISIT (OUTPATIENT)
Dept: ONCOLOGY | Age: 53
End: 2023-01-04
Payer: MEDICARE

## 2023-01-04 VITALS
HEIGHT: 63 IN | RESPIRATION RATE: 16 BRPM | WEIGHT: 221.8 LBS | HEART RATE: 70 BPM | DIASTOLIC BLOOD PRESSURE: 82 MMHG | BODY MASS INDEX: 39.3 KG/M2 | OXYGEN SATURATION: 96 % | SYSTOLIC BLOOD PRESSURE: 157 MMHG

## 2023-01-04 VITALS
SYSTOLIC BLOOD PRESSURE: 126 MMHG | RESPIRATION RATE: 18 BRPM | TEMPERATURE: 98.2 F | HEART RATE: 63 BPM | OXYGEN SATURATION: 94 % | DIASTOLIC BLOOD PRESSURE: 75 MMHG

## 2023-01-04 DIAGNOSIS — K62.5 RECTAL BLEEDING: ICD-10-CM

## 2023-01-04 DIAGNOSIS — D50.8 IRON DEFICIENCY ANEMIA SECONDARY TO INADEQUATE DIETARY IRON INTAKE: ICD-10-CM

## 2023-01-04 DIAGNOSIS — D69.6 THROMBOCYTOPENIA (HCC): ICD-10-CM

## 2023-01-04 DIAGNOSIS — C57.7 MALIGNANT NEOPLASM OF OTHER SPECIFIED FEMALE GENITAL ORGANS (HCC): ICD-10-CM

## 2023-01-04 DIAGNOSIS — C48.2 PERITONEAL CARCINOMA (HCC): Primary | ICD-10-CM

## 2023-01-04 DIAGNOSIS — C56.3 MALIGNANT NEOPLASM OF BOTH OVARIES (HCC): ICD-10-CM

## 2023-01-04 DIAGNOSIS — Z79.899 ON ANTINEOPLASTIC CHEMOTHERAPY: ICD-10-CM

## 2023-01-04 DIAGNOSIS — G89.3 CANCER ASSOCIATED PAIN: ICD-10-CM

## 2023-01-04 LAB
ALBUMIN SERPL-MCNC: 2.4 G/DL (ref 3.4–5)
ALBUMIN/GLOB SERPL: 0.5 (ref 0.8–1.7)
ALP SERPL-CCNC: 549 U/L (ref 45–117)
ALT SERPL-CCNC: 33 U/L (ref 13–56)
ANION GAP SERPL CALC-SCNC: 8 MMOL/L (ref 3–18)
AST SERPL-CCNC: 33 U/L (ref 10–38)
BASOPHILS # BLD: 0.1 K/UL (ref 0–0.1)
BASOPHILS NFR BLD: 1 % (ref 0–2)
BILIRUB SERPL-MCNC: 0.5 MG/DL (ref 0.2–1)
BUN SERPL-MCNC: 36 MG/DL (ref 7–18)
BUN/CREAT SERPL: 15 (ref 12–20)
CALCIUM SERPL-MCNC: 8.1 MG/DL (ref 8.5–10.1)
CHLORIDE SERPL-SCNC: 109 MMOL/L (ref 100–111)
CO2 SERPL-SCNC: 20 MMOL/L (ref 21–32)
CREAT SERPL-MCNC: 2.43 MG/DL (ref 0.6–1.3)
DIFFERENTIAL METHOD BLD: ABNORMAL
EOSINOPHIL # BLD: 0 K/UL (ref 0–0.4)
EOSINOPHIL NFR BLD: 0 % (ref 0–5)
ERYTHROCYTE [DISTWIDTH] IN BLOOD BY AUTOMATED COUNT: 20.2 % (ref 11.6–14.5)
GLOBULIN SER CALC-MCNC: 4.6 G/DL (ref 2–4)
GLUCOSE SERPL-MCNC: 89 MG/DL (ref 74–99)
HCT VFR BLD AUTO: 24.7 % (ref 35–45)
HGB BLD-MCNC: 7.4 G/DL (ref 12–16)
IMM GRANULOCYTES # BLD AUTO: 0.1 K/UL (ref 0–0.04)
IMM GRANULOCYTES NFR BLD AUTO: 1 % (ref 0–0.5)
LYMPHOCYTES # BLD: 0.8 K/UL (ref 0.9–3.6)
LYMPHOCYTES NFR BLD: 5 % (ref 21–52)
MCH RBC QN AUTO: 28.6 PG (ref 24–34)
MCHC RBC AUTO-ENTMCNC: 30 G/DL (ref 31–37)
MCV RBC AUTO: 95.4 FL (ref 78–100)
MONOCYTES # BLD: 1.6 K/UL (ref 0.05–1.2)
MONOCYTES NFR BLD: 11 % (ref 3–10)
NEUTS SEG # BLD: 11.7 K/UL (ref 1.8–8)
NEUTS SEG NFR BLD: 82 % (ref 40–73)
NRBC # BLD: 0 K/UL (ref 0–0.01)
NRBC BLD-RTO: 0 PER 100 WBC
PLATELET # BLD AUTO: 280 K/UL (ref 135–420)
PMV BLD AUTO: 9 FL (ref 9.2–11.8)
POTASSIUM SERPL-SCNC: 5.2 MMOL/L (ref 3.5–5.5)
PROT SERPL-MCNC: 7 G/DL (ref 6.4–8.2)
RBC # BLD AUTO: 2.59 M/UL (ref 4.2–5.3)
SODIUM SERPL-SCNC: 137 MMOL/L (ref 136–145)
WBC # BLD AUTO: 14.3 K/UL (ref 4.6–13.2)

## 2023-01-04 PROCEDURE — 80053 COMPREHEN METABOLIC PANEL: CPT

## 2023-01-04 PROCEDURE — 36415 COLL VENOUS BLD VENIPUNCTURE: CPT

## 2023-01-04 PROCEDURE — 85025 COMPLETE CBC W/AUTO DIFF WBC: CPT

## 2023-01-04 NOTE — PROGRESS NOTES
[facility] HEMATOLOGY/MEDICAL ONCOLOGY      Name: Erin Houston  MRN: [id number]  : 1970/52 y.o. Date: 2023    PCP: Blayne Beth MD    Oncology History  Erin Houston is a 47 y.o.  postmenopausal female referred by Dr. Nneka Hernandez with peritoneal cancer. Patient was being followed by Dr. Quang Gresham who left the practice. Intitally seen be JOHN talavera with reports of vaginal bleeding. Given pt's history of hysteretectomy with BSO for endometriosis in  a TVUS was ordered. Which revealed a 6.8 cm x 5.2 cm x 4.6 cm at midline vaginal cuff. This prompted pelvic CT with findings of a lesion measuring 7.6 x 5.8 x 7.2 cm and is compatible with a pelvic neoplasm vs endometrioma given prior history of endometriosis. Tumor markers done on 2018 all normal.  S/p  Exploratory laparotomy, exploration of retroperitoneal spaces, exam under anesthesia with biopsy of rectovaginal mass on 2019. Had sigmoidoscopy which revealed submucosal mass. S/p cycle #6 of carbo/taxol on 2019. S/p cytoreductive surgery with HIPEC on 2019. S/p radiation treatment completed in 2019. Was seen in office and had a biopsy c/w recurrence. S/p cycle #6 of Carbo/Doxil on 2020. S/p Exploratory laparotomy. 2. Lysis of adhesions. 3. Simple appendectomy. 4. 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 On . She also had a revision of urostomy that is still leaking. S/p IR biopsy of liver on 2021 which demonstrated recurrent disease. Pt was on palliative letrozole and keytruda. HPI  Patient was being followed previously by Dr. Quang Gresham who left the practice. The patient presents to our HBV clinic today for follows up and continuing cancer treatment as scheduled. Here for follow-up. Today she continues to report on and off vaginal bleeding and rectal bleeding as well as discharge from the rectum but denied active bleeding.    She continues to have pain controlled with current meds. She reports having some fatigue after each therapy. The patient denies fevers, chills, night sweats, skin lumps or bumps, acute bleeding or bruising issues. Denies headaches, acute vision changes, dizziness, chest pain, shortness of breath, palpitation, productive cough, vomiting, worsening abdominal pain, altered bowel habits, dysuria, new bone pain or back pain, focal numbness or weakness.      Past Medical History, Surgical History, Social, and Family History reviewed and remain unchanged:  Component      Latest Ref Rng & Units 3/11/2022           9:10 AM   CA-125      1.5 - 35.0 U/mL 4     Component      Latest Ref Rng & Units 2/11/2022           9:37 AM   CA-125      1.5 - 35.0 U/mL 4     Component      Latest Ref Rng & Units 12/16/2021          10:25AM   Cancer Ag (CA) 125      0.0 - 38.1 U/mL 5       Component      Latest Ref Rng & Units 6/17/2021          12:00 AM   Cancer Ag (CA) 125      0.0 - 38.1 U/mL 5.1     Component      Latest Ref Rng & Units 9/28/2020          11:11 AM   CA-125      1.5 - 35.0 U/mL 6     Component      Latest Ref Rng & Units 8/31/2020          10:01 AM   CA-125      1.5 - 35.0 U/mL 8     Component      Latest Ref Rng & Units 7/7/2020          11:30 AM   CA-125      1.5 - 35.0 U/mL 5     Component      Latest Ref Rng & Units 6/8/2020          10:40 AM   CA-125      1.5 - 35.0 U/mL 7     Component      Latest Ref Rng & Units 5/11/2020          11:30 AM   CA-125      1.5 - 35.0 U/mL 7     Component      Latest Ref Rng & Units 3/4/2020           8:15 AM   CA-125      1.5 - 35.0 U/mL 7     Component      Latest Ref Rng & Units 11/15/2019           9:56 AM   CA-125      1.5 - 35.0 U/mL 6     Component      Latest Ref Rng & Units 6/6/2019           8:44 AM   CA-125      1.5 - 35.0 U/mL 7     Component      Latest Ref Rng & Units 5/16/2019 4/25/2019           8:26 AM  8:20 AM   Cancer Ag (CA) 125      0.0 - 38.1 U/mL 7.8 8.7     Component      Latest Ref Rng & Units 4/4/2019           8:28 AM   Cancer Ag (CA) 125      0.0 - 38.1 U/mL 8.8     Component      Latest Ref Rng & Units 3/14/2019 2/21/2019           8:15 AM  8:32 AM   Cancer Ag (CA) 125      0.0 - 38.1 U/mL 10.4 18.4     12/17/2018 : 9.3  12/17/2018 CEA: 2.0  12/17/2018 AFP: 3.8    Pathology  8/4/2021  Liver mass, core biopsies:        Metastatic adenocarcinoma, most consistent with serous type. DIAGNOSIS COMMENT:   Although metastatic ovarian or primary peritoneal carcinoma forming   parenchymal liver lesions is uncommon, the histologic features in this   case are similar to those in the patient's prior rectovaginal mass biopsy   from 2019 and the immunohistochemical features are similar to those   documented in the EMR from a pelvic exenteration specimen performed at an   outside institution in 2020.   4/20/2020  Vaginal biopsy  Papillary serous adenocarcinoma    8/8/2019  A) COLON, PERICOLIC NODULE, EXCISION:      - ACELLULAR MUCIN WITH MULTIPLE CALCIFICATIONS, AND ASSOCIATED FIBROUS WALL WITH        HYALINIZATION, AND CHRONIC INFLAMMATION. - ADJACENT BENIGN FIBROADIPOSE TISSUE, CONSISTENT WITH MESENTERY. - NO EVIDENCE OF MALIGNANCY. - SEE COMMENT. B) LIVER, RIGHT LOBE, BIOPSY:      - NODULAR FAT NECROSIS AND FIBROSIS OF THE LIVER CAPSULE.      - MILD STEATOSIS.       - NO EVIDENCE OF MALIGNANCY. C) COLON, SIGMOID, SEROSAL IMPLANT, EXCISION:      - NODULAR FAT NECROSIS WITH FIBROSIS, CHRONIC INFLAMMATION, CALCIFICATIONS, AND        CYSTIC DEGENERATION WITHOUT MUCIN.      - NO EVIDENCE OF MALIGNANCY. D) OMENTUM, OMENTECTOMY (RESECTION):      - CONGESTED FIBROADIPOSE TISSUE WITH FOCAL FIBROSIS AND CHRONIC INFLAMMATION, AND        CALCIFIED NODULAR FIBROSIS.      - NO EVIDENCE OF MALIGNANCY. E) PERITONEUM, LEFT ANTERIOR ABDOMINAL, RESECTION:      - CONGESTED PERITONEAL TISSUE, NO EVIDENCE OF MALIGNANCY.     F) PERITONEUM, RIGHT ANTERIOR ABDOMINAL, RESECTION:      - CONGESTED PERITONEAL TISSUE, NO EVIDENCE OF MALIGNANCY. G) COLON, SIGMOID, SEROSAL IMPLANT, EXCISION:      - NODULAR FIBROSIS WITH HISTIOCYTES, SUGGESTIVE OF FAT NECROSIS.      - CYSTIC DEGENERATION, WITHOUT MUCIN.      - NO EVIDENCE OF MALIGNANCY. H) SOFT TISSUE, FALCIFORM, EXCISION:      - CONSISTENT WITH FALCIFORM LIGAMENT.      - NO EVIDENCE OF MALIGNANCY. PATHOLOGIC STAGE:  ypTx, pNx    1/17/2019   RECTOVAGINAL MASS (#1, 2) AND PELVIC MASS, BIOPSIES:   CONSISTENT WITH SEROUS CARCINOMA WITH EXTENSIVE NECROSIS. Imaging  MRI liver 7/6/2021     FINDINGS:     Abdominal Wall:  There is a circumscribed 8.3 cm-width x 2.4 cm-AP x 2.9  cm-craniocaudal length T2 hyperintense subcutaneous layer fluid collection along  the inferior margin of the ileal conduit. The colostomy site in the left lower  quadrant appears unremarkable. Liver:  A new ovoid T1-low T2-high signal 1.8 x 1.4 cm lesion is identified in  the segment 8. Another 0.8 x 0.6 cm T1-low T2-high signal focus is identified  in the segment 3. These structures were not apparent on prior studies. With  diffusion imaging, no evidence of restricted diffusion is demonstrated at these  foci. Biliary:  No evidence for significant common bile duct dilation. Gallbladder:  Mild distention of the gallbladder. No definite gallstones. Spleen, Adrenal Glands, Pancreas:  Unremarkable. Kidneys:  Cyst at the left kidney lower pole region measuring about 1.2 x 1.2  cm. Increase in size compared to prior CTs from above 0.8 x 0.7 cm. Miscellaneous: The largest of the periportal nodes measures up to about 1.3 x  1.1 cm. IMPRESSION     1. Focal fluid collection in the subcutaneous tissue adjacent to the ileal  conduit. This fluid collection may be amenable to aspiration under ultrasound  guidance. 2.  New hepatic lesions as described.   Although there is apparent lack of  restricted diffusion, further investigation with ultrasound is warranted to  assess if these structures are indeed cystic in nature. 3.  Left renal cyst.     4.  Slight az prominence. MRI pelvis 7/6/2021  FINDINGS:     Along the inferior aspect of the ileal conduit stoma site, focal elongated T1  high T2-signal fluid collection is demonstrated to, measuring about 8.3 cm-width  x 2.2 cm-thickness x 2.3 cm-craniocaudal length. In retrospect, there was a  suggestion of possible 7.1 x 3.5 x 2.0 cm hypodense material collection along  the inferior aspect of the subcutaneous abdominal wall portion of the ileal  conduit on the 04/01/21 unenhanced CT. This fluid collection therefore may be  slightly increased in size in the elbow. The intraperitoneal portion of the ileal conduit appears grossly unremarkable. The presacral region demonstrates apparent continued pattern of nonspecific mild  edema. The source for this edematous changes is clear. Most likely related to  residual changes from recent surgical intervention. Mild edematous changes also noted in the right and to lesser extent left  piriformis muscles. Additional edematous changes are also identified in the  right obturator internus along the superior aspect. Minimal free fluid in the posterior pelvic region. There is some intraluminal fluid in the blind loop portion of the rectum. At  the right superior lateral vaginal cuff corner, a vague trail of T2 high signal  is observed (coronal T2 images #8-10 and fat-sat axial T2 image #5-10). This  [place] appears to course quite close to the anterior margin of the rectal remnant  wall. Whether there is indeed communication between the blind rectal segment  and the vaginal cuff can be further assessed with barium enema. No distinct mass is detected pelvis. Enlarged nodes are identified along the right pelvic sidewall.   The largest  right pelvic sidewall node is identified subjacent to the external iliac  artery/vein vascular bundle, measuring up to about 2.2 x 1.2 cm (fat-sat axial  T2 image #12). Another slightly enlarged node more superiorly measuring about  1.9 x 1.0 cm (image #17). Multiple slightly borderline prominent nodes  identified in the mesentery, measuring up to about 1.2 x 1.0 cm (image #25). Additional note near the aortic bifurcation measuring about 1.3 x 0.9 cm (image  #34). IMPRESSION     1. Focal T1- and T2-hyperintense subcutaneous fluid collection along the  inferior aspect of the ileal conduit stoma site. Possibly representing a seroma  or a focus of urinoma. This may be amenable to aspiration under ultrasound or  CT guidance. 2.  Questionable subtle trailed between the right superior lateral aspect of the  vaginal cuff with adjacent blind-tipped rectum. More definitive evaluation can  be performed with barium enema. 3.  Slight az prominence along the right pelvic sidewall and in the  mesentery. 4.  No distinct residual or recurrent mass. 5.  Persistent mild presacral edema  PETCT 7/17/2020   FINDINGS:        PET images: Study limited because of patient motion. Mediastinal blood pool reference value = SUV Max. Mediastinal blood pool reference value = SUV Mean. Liver parenchyma reference value =  4.7   SUV Max. Liver parenchyma reference value =  2.3    SUV Mean. NECK: There is no suspicious hypermetabolic activity at the neck. CHEST: There is no suspicious hypermetabolic activity at the chest.     ABDOMEN: Typical heterogeneity at the liver. No convincing malignant foci  hypermetabolic activity or underlying CT abnormality. PELVIS: There is soft tissue fullness in the right retrovesicular pelvis just  above the vaginal cuff and posterior to right ureter measuring about 3.8 cm with  Max SUV 13.5 (206), extending to the rectum posteriorly, levators inferiorly on  the right. Previously 4 cm and Max SUV 16.5.      Thickening anterior abdominal wall compatible with scar demonstrating diffuse  modest activity and Max SUV 2.7. Additional CT findings: Mediport catheter has tip in the superior vena cava. Air  trapping in the superior segments lower lobes. Right-sided nephroureteral stent  without hydronephrosis. No ascites. IMPRESSION      Treatment response. Decreased metabolic activity at the right retrovesicular  pelvic mass. No new evidence of metastatic disease. Interval placement right-sided nephroureteral stent and resolved  hydroureteronephrosis. .       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    Loss of appetite 10/14/2020    Other hydronephrosis 06/24/2020    Genetic carrier status 09/13/2019    Postoperative nausea 08/27/2019    Ovarian cancer ([address] 08/07/2019    Peritoneal carcinoma ([address] 02/14/2019    Pelvic mass in female 01/17/2019    Irritable bowel syndrome with both constipation and diarrhea 02/09/2018    Subclinical hypothyroidism 01/23/2018    Chronic abdominal pain 01/22/2018    Diverticulosis 01/22/2018    Hx of total hysterectomy 01/22/2018    Hyponatremia 01/22/2018    Advance care planning 01/31/2017    Dental caries 05/26/2016    Chronic periodontal disease 05/26/2016    SUAZO (dyspnea on exertion) 02/10/2016    Prediabetes 09/24/2015    Morbid obesity ([address] 08/31/2015    Arthritis, degenerative 08/31/2015    Gastroesophageal reflux disease without esophagitis 08/31/2015    ANAMIKA on CPAP 08/31/2015    Essential hypertension 08/28/2015    PTSD (post-traumatic stress disorder) 03/24/2014    S/P TKR (total knee replacement) 11/14/2012 Adjustment disorder with depressed mood 09/20/2012    Major depressive disorder, recurrent episode, moderate ([address] 09/20/2012    Suicidal ideation 09/19/2012    Menopause 05/08/2012    Knee pain, right 05/08/2012    Hypokalemia 02/04/2012    Overdose 02/04/2012    Obesity 06/18/2010    Mixed hyperlipidemia 06/18/2010     Past Medical History:   Diagnosis Date    AR (allergic rhinitis)     seasonal    Bladder cancer ([facility]     Cancer ([facility]     pt states between vgina and rectal area    Cardiac echocardiogram 04/11/2016    Tech difficult. EF 55-60%. No RWMA. Mild conc LVH. Gr 1 DDfx. RVSP normal.      Cardiac nuclear imaging test 05/05/2016    Low risk. Very patchy radiotracer uptake. Mild anterior artifact, low suspicion for ischemia. EF 68%. No RWMA. Normal EKG on pharm stress test.    Cardiovascular LE arterial duplex 10/07/2013    No significant arterial disease at rest bilaterally. R JUNE 1.21.  L JUNE 1.16. No significant sm vessel disease.     Chronic kidney disease     Depression     Dr. Cash Pollard, suicide attempt 2/12    Diverticulosis     Endometriosis     s/p hysterectomy 2006    GERD (gastroesophageal reflux disease)     Glaucoma     Dr. Ann Flair    Hematuria, gross     HTN (hypertension)     Hx of colonoscopy 04/05/2017    mild diverticulosis, g1 internal hemorrhoids, normal colon , repeat 10 year 2027    Hx of suicide attempt     Hyperlipidemia LDL goal < 130     IBS (irritable bowel syndrome)     Ill-defined condition     environmental allergies    Insomnia     Malignant neoplasm of peritoneum ([facility] 2/14/2019    Nausea & vomiting     OA (osteoarthritis)     both knees, lower back    Preeclampsia     PTSD (post-traumatic stress disorder)     Seizures ([address]     1 x with childbirth, had toxemia    Sleep apnea     does not use cpap machine    Spinal stenosis     Dr. Ruben Muir    Vertigo       Past Surgical History:   Procedure Laterality Date    COLONOSCOPY N/A 4/5/2017    COLONOSCOPY performed by Rina Jones MD at [address] N/A 2019    SIGMOIDOSCOPY FLEXIBLE performed by Cirilo Rose MD at [facility] ENDOSCOPY    HX  SECTION      HX COLONOSCOPY  2017    DR. MCRAE 2017     HX COLOSTOMY      Revision 2021    HX CYSTECTOMY  2020    HX HYSTERECTOMY      HX KNEE ARTHROSCOPY Left     left knee    HX KNEE REPLACEMENT Bilateral     (R)  (L)     HX LAPAROTOMY N/A 2019    and rectovaginal bx    HX OTHER SURGICAL  2016    all teeth removed    HX SALPINGO-OOPHORECTOMY  2008    HX TUBAL LIGATION      IR INSERT TUNL CVC W PORT OVER 5 YEARS  2019    MULTIPLE DELIVERY       1990    AK COLECTOMY PARTIAL W/ANASTOMOSIS      AK TOTAL ABDOMINAL HYSTERECT W/WO RMVL TUBE OVARY      AK UNLISTED PROCEDURE FEMUR/KNEE Left     External fixator    TRANSURETHRAL RESEC BLADDER NECK        OB History          2    Para   2    Term   2       0    AB   0    Living   0         SAB   0    IAB   0    Ectopic   0    Molar   0    Multiple   0    Live Births   0              Social History     Tobacco Use    Smoking status: Never    Smokeless tobacco: Never   Substance Use Topics    Alcohol use: No      Family History   Problem Relation Age of Onset    Heart Disease Mother         CHF    Diabetes Mother     Hypertension Mother     Kidney Disease Mother     Breast Cancer Mother     Stroke Mother     Diabetes Father     Hypertension Father     Heart Attack Father         MI    Cancer Maternal Grandmother         stomach    Ovarian Cancer Maternal Aunt     Cancer Maternal Uncle       Current Outpatient Medications   Medication Sig    oxyCODONE-acetaminophen (PERCOCET) 5-325 mg per tablet Take 1 Tablet by mouth every four (4) hours as needed for Pain for up to 14 days. Max Daily Amount: 6 Tablets.     PARoxetine (PAXIL) 20 mg tablet take 1 tablet by mouth once daily    OLANZapine (ZyPREXA) 2.5 mg tablet Take 1 Tablet by mouth nightly. Indications: prevent nausea and vomiting from cancer chemotherapy    ondansetron hcl (ZOFRAN) 8 mg tablet Take 1 tablet every 8 hours for nausea and vomiting beginning the night of your chemotherapy treatment for 3 days. loperamide (IMMODIUM) 2 mg tablet Take 2 tabs=4mg after first loose stool, then 2 mg(1 tab) after each loose stool after that up to maximum of 16mg (8tabs) in 1 day    letrozole (FEMARA) 2.5 mg tablet Take 1 Tablet by mouth daily. amLODIPine (NORVASC) 10 mg tablet take 1 tablet by mouth once daily    meclizine (Motion Sickness, Meclizine,) 25 mg tablet Take 1 Tablet by mouth three (3) times daily as needed for Dizziness. metoprolol succinate (TOPROL-XL) 100 mg tablet Take 1 Tablet by mouth daily. pregabalin (LYRICA) 25 mg capsule Take 1 Capsule by mouth three (3) times daily. Max Daily Amount: 75 mg.    lidocaine-prilocaine (EMLA) topical cream Apply to Mediport 1 hour prior to chemotherapy. Place kitchen saran wrap over cream and port. This will numb site. simvastatin (ZOCOR) 20 mg tablet take 1 tablet by mouth at bedtime    gabapentin (NEURONTIN) 100 mg capsule Take 1 Capsule by mouth three (3) times daily. Max Daily Amount: 300 mg.    latanoprost (XALATAN) 0.005 % ophthalmic solution Administer 1 Drop to both eyes nightly. No current facility-administered medications for this visit. Allergies   Allergen Reactions    Pantoprazole Other (comments)     Bleeding in stomach    Promethazine Other (comments)     Bleeding in stomach    Seafood Hives     FISH    Other Medication Hives     seafood    Pollen Extracts Other (comments)    Shellfish Derived Hives        Review of Systems   Constitutional:  Positive for malaise/fatigue. Negative for chills, diaphoresis, fever and weight loss. Respiratory:  Negative for cough, hemoptysis, shortness of breath and wheezing.     Cardiovascular:  Negative for chest pain, palpitations and leg swelling. Gastrointestinal:  Negative for abdominal pain, diarrhea, heartburn, nausea and vomiting. Genitourinary:  Negative for dysuria, frequency, hematuria and urgency. Musculoskeletal:  Negative for joint pain and myalgias. Skin:  Negative for itching and rash. Neurological:  Negative for dizziness, seizures, weakness and headaches. Psychiatric/Behavioral:  Negative for depression. The patient does not have insomnia. Objective:     Visit Vitals  BP (!) 156/79   Pulse 70   Resp 16   Ht 5' 3\" (1.6 m)   Wt 100.6 kg (221 lb 12.8 oz)   LMP 01/31/2008   SpO2 96%   BMI 39.29 kg/m²       ECOG Performance Status (grade): 2  0 - able to carry on all pre-disease activity w/out restriction  1 - restricted but able to carry out light work  2 - ambulatory and can self- care but unable to carry out work  3 - bed or chair >50% of waking hours  4 - completely disable, total care, confined to bed or chair    Physical Exam  Constitutional:       Appearance: Normal appearance. HENT:      Head: Normocephalic and atraumatic. Eyes:      Pupils: Pupils are equal, round, and reactive to light. Cardiovascular:      Rate and Rhythm: Normal rate and regular rhythm. Heart sounds: Normal heart sounds. Pulmonary:      Effort: Pulmonary effort is normal.      Breath sounds: Normal breath sounds. Abdominal:      General: Bowel sounds are normal.      Palpations: Abdomen is soft. Tenderness: There is no abdominal tenderness. There is no guarding. Musculoskeletal:         General: Normal range of motion. Cervical back: Neck supple. Right lower leg: No edema. Left lower leg: No edema. Skin:     General: Skin is warm. Neurological:      General: No focal deficit present. Mental Status: She is alert and oriented to person, place, and time. Mental status is at baseline.         Diagnostics:      No results found for this or any previous visit (from the past 96 hour(s)). Imaging:  Results for orders placed during the hospital encounter of 02/20/19    IR ESTAB PT LEVEL I    Narrative  This procedure was performed in is entirety by a physician assistant. : Chavo Blount PA-C    Outpatient: Mediport incision check    CPT code: 82511    Attending physician: Dr. Jairon Pacheco    History: Status post Mediport placement    Exam: Patient states that the Mediport has been used without pain or incident. Patient denies fever, chills, nausea, vomiting or pain at the site. Patient  denies drainage, swelling, bleeding or tenderness. Site well approximated and healing well with Dermabond still covering the  incision. No drainage, swelling, erythema or tenderness at the site. Impression  Impression: Mediport properly placed and healing well. Advised if have any  questions or concerns or start have signs of infection to contact interventional  radiology      No results found for this or any previous visit. Results for orders placed during the hospital encounter of 11/28/22    CT CHEST ABD PELV WO CONT    Narrative  EXAM: CT Chest/Abdomen/Pelvis Without Contrast    INDICATION: Peritoneal carcinoma. History of remote hysterectomy with bilateral  salpingo-oophorectomy for endometriosis. Status post exploratory laparotomy with  cytoreductive surgery and HIPEC in 2019, with exploratory laparotomy with total  pelvic exenteration with end colostomy and ileal conduit in 2020. Biopsy-proven  recurrent disease in the liver in 2021. On palliative letrozole and Keytruda. Intermittent vaginal and rectal bleeding. TECHNIQUE: Axial CT imaging from the chest, abdomen, and pelvis without contrast  was performed.  All CT scans at this facility are performed using dose  optimization technique as appropriate to the performed examination, to include  automated exposure control, adjustment of the mA and/or kV according to  patient's size (including appropriate matching for site-specific examinations),  or use of an iterative reconstruction technique. COMPARISON: PET CT 6/17/2022    FINDINGS:  Evaluation of the solid organs, bowel, and vasculature is mildly limited  secondary to lack of intravenous contrast.    Lung/Airways:  Stable 6 mm subpleural lateral left lower lobe pulmonary nodule  (3, 26). No definite new or enlarging pulmonary nodules. Low lung volumes with  scattered areas of atelectasis, somewhat limiting evaluation. No pleural  effusion. Base of Neck:  Unremarkable. Mediastinum: No lymphadenopathy. Right chest wall Mediport tip is at the  superior cavoatrial junction. Heart: Cardiomegaly. Liver: Large heterogeneous mass in the right hepatic lobe measures 9.5 x 8.7 cm,  and appears similar to prior PET/CT. Possible new 2.4 x 1.5 cm lesion peripheral  inferior right lobe (2, 60). Evaluation is limited in the absence of intravenous  contrast and artifact on prior study limits comparison. Biliary: Unremarkable. Pancreas: Unremarkable. Spleen: Measures upper limit of normal.    Adrenal glands: Unremarkable. Kidneys: Small hypodense lesion at the lower pole of the left kidney is  incompletely characterized the absence of intravenous contrast, most likely a  cyst.    Reproductive organs: Hysterectomy and bilateral salpingo-oophorectomy. Similar  increased soft tissue mass in the deep posterior posterior pelvis, eccentric to  the right, and extending cephalad in the presacral region, which was  hypermetabolic on prior PET/CT. This measures approximately 8.1 x 4.2 cm. Bladder: Cystectomy with right lower quadrant ileal conduit. Bowel: Postsurgical changes of pelvic exenteration with end colostomy. No  evidence of bowel obstruction. Lymph nodes: Small nodules in the mid pelvis, measuring 1.6 and 1.2 cm,  respectively (2, 86-89), are stable from prior PET/CT, not clearly  hypermetabolic at that time. Otherwise, no lymphadenopathy.     Vasculature: Unremarkable in the absence of intravenous contrast.    Other: No free fluid or free air. Body wall: Right lower quadrant ileal conduit and left lower quadrant colostomy. Bones: Stable 8 mm sclerotic density left acetabulum, not hypermetabolic on  prior PET/CT and stable from at least April 2021, likely a bone [address]. Few  other subcentimeter sclerotic foci are also unchanged since that time and most  likely bone islands. No definite suspicious osseous lesions. Degenerative  changes in the spine. Impression  1. Similar appearance of irregular soft tissue mass in the deep pelvis, which  was hypermetabolic on prior PET/CT. -Adjacent nodules in the mid pelvis are stable, recommend continued attention on  follow-up. 2. Large hepatic metastasis appears stable from PET/CT 6/17/2022. Probable new  small liver lesion inferiorly is concerning for metastasis, though evaluation is  limited due to lack of intravenous contrast and artifact in this region on prior  study. 3. Stable 6 mm left lower lobe pulmonary nodule. 4. Additional findings as above. IMPRESSION/PLAN:  Stage IV Primary Peritoneal Cancer with Recurrence  -- Patient was being followed by Dr. Nell Anglin who left the practice. -- s/p carbo (auc=6), taxol (175 mg/m2) IV every 3 wks s/p 6 cycles completed 6/6/2019  -- s/p cytoreductive surgery with [facility] with dr. Geo Riley  -- s/p pelvic radiation  -- Pain-script for tylenol #3  -- 5/13/-9/30/2020 given platinum sensitive disease s/p  auc=5, Doxil 30 mg/m2 IV x 6 cycles s/p Total pelvic exenteration  -- 8/4/2021 Liver mass, core biopsies: Metastatic adenocarcinoma, most consistent with serous type. -- Biopsy c/w recurrence-reviewed [facility] and discussed treatment options. Dr. 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   Also discussed retreatment with platinum or if sensitive consider PARP inhibition. After discussion patient was placed on letrozole. -- 1/8/2022 Patient started Keytruda 400 mg IV every 3 weeks. -- Vaginal bleeding: Dr. 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 Has follow up with Urology. -- 5/31/2022 The patient presents to our [facility] clinic today for follows up and continuing cancer treatment as scheduled. -- She has tolerated Keytruda Q6 weeks, no high graded toxicities. She was agreeable to continue current therapy. -- 6/3/2022 S.p Keytruda   -- 6/17/2022 PET scan reported progressive disease. Stage IV metastatic malignancy with interval progression, dominant intensely hypermetabolic hepatic metastasis with substantial  interval enlargement compared to prior studies. Interval increase in size/extent of irregular intense increased FDG activity centered at and right of midline in the soft tissues of the deep  posterior pelvis, extending cephalad in the presacral region, at least some of which corresponds to irregular mass-like soft tissue density on CT, highly suspicious for malignancy. Small 6 mm subpleural pulmonary nodule left lower lobe laterally, not evident on PET but too small to be adequately assessed using PET  -- Today I have reviewed with the patient and her family about recent PET which showed progressive disease on multiple lines of therapy. We have discussed at length about next lines of therapy. The patient states she still believed she can achieve curable diease. I have showed her PET images which showed high burden tumor. I have explained to the patient and family that the goals of treatment of metastatic cancer are not curable but treatable in order to prolong survival and improve quality of life by reducing cancer-related symptoms. Given her burden disease and progressive on immunotherapy plus hormonal therapy, we suggested re-challenging chemotherapy with platinum regimens.  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 I have discussed about potential side effects of chemotherapy. The patient was agreeable with the plan. -- 7/28/2022 C1 D1 palliative Carboplatin + Gemcitabine. Tolerated therapy with no high graded toxicities. -- 10/28/2022 C4D1   -- 11/04/2022: N4O5 Chemo held due to SOB  -- She has tolerated therapy. Stable serological responses. -- 11/28/2022 MRI Pelvis and CT CAP reviewed with patient with overall stable findings. + Similar appearance of irregular soft tissue mass in the deep pelvis, which was hypermetabolic on prior PET/CT.  + Adjacent nodules in the mid pelvis are stable, recommend continued attention on follow-up.  + Large hepatic metastasis appears stable from PET/CT 6/17/2022. Questionable new small liver lesion inferiorly  + Stable 6 mm left lower lobe pulmonary nodule. -- She was admitted recently for thrombocytopenia, leukopenia. Received platelet and GSF.  -- She reported doing better today. I have reviewed with the patient about recent CT findings and advanced cancer conditions s.p multiple lines of therapy. We have discussed about GOC and roles of palliative treatments. The patient verbally understood her advanced cancer status; however she states she still has alison in achieving cancer in remission. Plan:  -- She will continue Palliative [place]/Carbo  Day 8 will be omitted due to severe cytopenias. -- Supportive IV hydration with each cycle   -- Referral to Palliative team for [address] discussion. -- CINV: Olanzapine ordered for CINV. Patient is allergic to Promethazine however she report that she is not sure if the medication cause the bleeding because she was on so much medication. -- Advised the patient to present to ED if worsening symptoms, bleeding, or concerns. -- Labs: CBC, CMP, . Supportive transfusion if indicated. -- RTC in 3 weeks, always sooner if required.     Carboplatin + Gemcitabine  Days 1 Gemcitabine 800mg/m2 IV  Day 1: Carboplatin AUC 4 IV   Repeat cycle every 3 weeks. 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 Muriel Gunter for rectal bleeding. She states she was told no further intervention offered at this time. -- No active bleeding reported at this time  -- Monitor CBC, Iron Profile, Ferritin and replacement as indicated. -- Advised to f/u Urology as scheduled. CINV  -- Patient is allergic to Promethazine, was unable to order compazine due to the allergies. -- Olanzapine ordered for CINV    Normocytic Anemia  Iron Deficiency Anemia  --Due to iron deficiency and vaginal bleeding related patient  was scheduled for IV Venofer x 3 (Dose #3 completed on 09/08/2022)  --12/01/2022:  CBC showed WBC 6.6 K/uL, hemoglobin 8.9 g/dL with hematocrit of 29.6%. Platelet 143. --Procrit 60,000 unit to start every 3 weeks when H/H is below 10/30  --We will continue to monitor labs CBC, CMP, Iron Profile, Ferritin, and B12/folate  --12/21/2022: CBC showed WBC 15.7K/uL, hemoglobin 7.9g/dL with hematocrit of 25.4%. Platelet 986    Chemotherapy related Neuropathy  Cancer associated pain   --On Lyrica 25mg PO 3 times daily   --Percocet PRN will be refilled    Follow up in 3 weeks or sooner if indicated. No orders of the defined types were placed in this encounter. Ms. Dave Watt has a reminder for a \"due or due soon\" health maintenance. I have asked that she contact her primary care provider for follow-up on this health maintenance. All of patient's questions answered to their apparent satisfaction. They verbally show understanding and agreement with aforementioned plan. Cherelle Dexter MD  1/4/2023        Above mentioned total time spent for this encounter with more than 50% of the time spent in face-to-face counseling, discussing on diagnosis and management plan going forward, and co-ordination of care.   Parts of this document has been produced using Dragon dictation system. Unrecognized errors in transcription may be present. Please do not hesitate to reach out for any questions or clarifications.       CC: Tobias Robin MD all other ROS negative except as per HPI

## 2023-01-04 NOTE — PROGRESS NOTES
[facility] Lab Visit:    Maye Lagos  1970  [id number]    1588 Patient arrived ambulatory with walker. Labs drawn per order via Left AC venipuncture x1 attempt. Patient tolerated well without complaints. Gauze and band aid to site. Patient departed [facility] ambulatory and in no distress at 1130. Visit Vitals  /75 (BP 1 Location: Left lower arm, BP Patient Position: Sitting)   Pulse 63   Temp 98.2 °F (36.8 °C)   Resp 18   SpO2 94%     Preliminary labs scanned into media tab. No results found for this or any previous visit (from the past 12 hour(s)).     LUIS E Danielson

## 2023-01-05 ENCOUNTER — APPOINTMENT (OUTPATIENT)
Dept: INFUSION THERAPY | Age: 53
End: 2023-01-05

## 2023-01-05 RX ORDER — ACETAMINOPHEN 325 MG/1
650 TABLET ORAL AS NEEDED
Status: CANCELLED
Start: 2023-01-06

## 2023-01-05 RX ORDER — SODIUM CHLORIDE 9 MG/ML
5-250 INJECTION, SOLUTION INTRAVENOUS AS NEEDED
Status: CANCELLED | OUTPATIENT
Start: 2023-01-06

## 2023-01-05 RX ORDER — DIPHENHYDRAMINE HYDROCHLORIDE 50 MG/ML
50 INJECTION, SOLUTION INTRAMUSCULAR; INTRAVENOUS AS NEEDED
Status: CANCELLED
Start: 2023-01-06

## 2023-01-05 RX ORDER — PALONOSETRON 0.05 MG/ML
0.25 INJECTION, SOLUTION INTRAVENOUS ONCE
Status: CANCELLED | OUTPATIENT
Start: 2023-01-06 | End: 2023-01-06

## 2023-01-05 RX ORDER — DIPHENHYDRAMINE HYDROCHLORIDE 50 MG/ML
25 INJECTION, SOLUTION INTRAMUSCULAR; INTRAVENOUS AS NEEDED
Status: CANCELLED
Start: 2023-01-06

## 2023-01-05 RX ORDER — ONDANSETRON 2 MG/ML
8 INJECTION INTRAMUSCULAR; INTRAVENOUS AS NEEDED
Status: CANCELLED | OUTPATIENT
Start: 2023-01-06

## 2023-01-05 RX ORDER — EPINEPHRINE 1 MG/ML
0.3 INJECTION, SOLUTION, CONCENTRATE INTRAVENOUS AS NEEDED
Status: CANCELLED | OUTPATIENT
Start: 2023-01-06

## 2023-01-05 RX ORDER — SODIUM CHLORIDE 0.9 % (FLUSH) 0.9 %
5-40 SYRINGE (ML) INJECTION AS NEEDED
Status: CANCELLED | OUTPATIENT
Start: 2023-01-06

## 2023-01-05 RX ORDER — HYDROCORTISONE SODIUM SUCCINATE 100 MG/2ML
100 INJECTION, POWDER, FOR SOLUTION INTRAMUSCULAR; INTRAVENOUS AS NEEDED
Status: CANCELLED | OUTPATIENT
Start: 2023-01-06

## 2023-01-05 RX ORDER — HEPARIN 100 UNIT/ML
500 SYRINGE INTRAVENOUS AS NEEDED
Status: CANCELLED
Start: 2023-01-06

## 2023-01-05 RX ORDER — ALBUTEROL SULFATE 0.83 MG/ML
2.5 SOLUTION RESPIRATORY (INHALATION) AS NEEDED
Status: CANCELLED
Start: 2023-01-06

## 2023-01-05 RX ORDER — SODIUM CHLORIDE 9 MG/ML
5-40 INJECTION INTRAMUSCULAR; INTRAVENOUS; SUBCUTANEOUS AS NEEDED
Status: CANCELLED | OUTPATIENT
Start: 2023-01-06

## 2023-01-05 NOTE — PROGRESS NOTES
Pharmacy Note     Name: Zulma Bailon  : 1970  Estimated body surface area is 2.11 meters squared as calculated from the following:    Height as of 23: 160 cm (63\"). Weight as of 23: 100.6 kg (221 lb 12.8 oz). Diagnosis: Stage IV Primary Peritoneal Cancer   Treatment Plan: Carboplatin? Gemzar  Cycle/Day: C8D1  Cycle Start Date: 2023    Lab Results   Component Value Date/Time    WBC 14.3 (H) 2023 11:21 AM    WBC 6.1 2012 12:00 AM     Lab Results   Component Value Date/Time    ABS. NEUTROPHILS 11.7 (H) 2023 11:21 AM     Recent Labs     23  1121 22  0845 22  0840 22  0925 22  1025 22  1015 22  1000    145 272 229   < >  --  317   CREA 2.43*  --  2.39* 2.18*   < >  --  2.25*   TSH  --   --   --   --   --   --  1.82   TBILI 0.5  --  0.4 0.2   < >  --  0.3   AST 33  --  17 17   < >  --  14   ALT 33  --  19 12   < >  --  14   PROTU  --   --   --   --   --  300* 300*    < > = values in this interval not displayed.        Most Recent Creatinine Clearance:  CrCl: 30.3 mL/min (based on Adjusted Body Weight)  Creatinine: 2.43 mg/dL (2023)      Pharmacy Intervention:  Day 8 omitted due to severe cytopenia per Dr. Madhuri Lozano has been updated in [address] moved to Day 1  Pharmacy will continue to monitor    Pharmacist: Georges Giraldo, [facility]

## 2023-01-06 ENCOUNTER — HOSPITAL ENCOUNTER (OUTPATIENT)
Dept: INFUSION THERAPY | Age: 53
End: 2023-01-06
Payer: MEDICARE

## 2023-01-06 VITALS
HEIGHT: 63 IN | DIASTOLIC BLOOD PRESSURE: 74 MMHG | RESPIRATION RATE: 16 BRPM | SYSTOLIC BLOOD PRESSURE: 131 MMHG | BODY MASS INDEX: 38.52 KG/M2 | HEART RATE: 66 BPM | OXYGEN SATURATION: 96 % | WEIGHT: 217.4 LBS | TEMPERATURE: 97.9 F

## 2023-01-06 DIAGNOSIS — D50.0 IRON DEFICIENCY ANEMIA DUE TO CHRONIC BLOOD LOSS: ICD-10-CM

## 2023-01-06 DIAGNOSIS — C48.2 PERITONEAL CARCINOMA (HCC): Primary | ICD-10-CM

## 2023-01-06 DIAGNOSIS — D50.8 IRON DEFICIENCY ANEMIA SECONDARY TO INADEQUATE DIETARY IRON INTAKE: ICD-10-CM

## 2023-01-06 PROCEDURE — 96375 TX/PRO/DX INJ NEW DRUG ADDON: CPT

## 2023-01-06 PROCEDURE — 77030012965 HC NDL HUBR BBMI -A

## 2023-01-06 PROCEDURE — 74011000250 HC RX REV CODE- 250: Performed by: INTERNAL MEDICINE

## 2023-01-06 PROCEDURE — 96413 CHEMO IV INFUSION 1 HR: CPT

## 2023-01-06 PROCEDURE — 96367 TX/PROPH/DG ADDL SEQ IV INF: CPT

## 2023-01-06 PROCEDURE — 96372 THER/PROPH/DIAG INJ SC/IM: CPT

## 2023-01-06 PROCEDURE — 74011000258 HC RX REV CODE- 258: Performed by: INTERNAL MEDICINE

## 2023-01-06 PROCEDURE — 96377 APPLICATON ON-BODY INJECTOR: CPT

## 2023-01-06 PROCEDURE — 96417 CHEMO IV INFUS EACH ADDL SEQ: CPT

## 2023-01-06 PROCEDURE — 96361 HYDRATE IV INFUSION ADD-ON: CPT

## 2023-01-06 PROCEDURE — 74011250636 HC RX REV CODE- 250/636: Performed by: INTERNAL MEDICINE

## 2023-01-06 PROCEDURE — 74011250636 HC RX REV CODE- 250/636: Performed by: NURSE PRACTITIONER

## 2023-01-06 RX ORDER — SODIUM CHLORIDE 9 MG/ML
5-250 INJECTION, SOLUTION INTRAVENOUS AS NEEDED
Status: DISPENSED | OUTPATIENT
Start: 2023-01-06 | End: 2023-01-06

## 2023-01-06 RX ORDER — HEPARIN 100 UNIT/ML
500 SYRINGE INTRAVENOUS AS NEEDED
Status: DISPENSED | OUTPATIENT
Start: 2023-01-06 | End: 2023-01-06

## 2023-01-06 RX ORDER — SODIUM CHLORIDE 0.9 % (FLUSH) 0.9 %
5-40 SYRINGE (ML) INJECTION AS NEEDED
Status: DISPENSED | OUTPATIENT
Start: 2023-01-06 | End: 2023-01-06

## 2023-01-06 RX ORDER — ONDANSETRON 2 MG/ML
8 INJECTION INTRAMUSCULAR; INTRAVENOUS AS NEEDED
OUTPATIENT
Start: 2023-01-27

## 2023-01-06 RX ORDER — SODIUM CHLORIDE 9 MG/ML
5-250 INJECTION, SOLUTION INTRAVENOUS AS NEEDED
OUTPATIENT
Start: 2023-01-27

## 2023-01-06 RX ORDER — EPINEPHRINE 1 MG/ML
0.3 INJECTION, SOLUTION, CONCENTRATE INTRAVENOUS AS NEEDED
OUTPATIENT
Start: 2023-01-27

## 2023-01-06 RX ORDER — SODIUM CHLORIDE 0.9 % (FLUSH) 0.9 %
5-40 SYRINGE (ML) INJECTION AS NEEDED
OUTPATIENT
Start: 2023-01-27

## 2023-01-06 RX ORDER — DIPHENHYDRAMINE HYDROCHLORIDE 50 MG/ML
50 INJECTION, SOLUTION INTRAMUSCULAR; INTRAVENOUS AS NEEDED
Start: 2023-01-27

## 2023-01-06 RX ORDER — ALBUTEROL SULFATE 0.83 MG/ML
2.5 SOLUTION RESPIRATORY (INHALATION) AS NEEDED
Start: 2023-01-27

## 2023-01-06 RX ORDER — ACETAMINOPHEN 325 MG/1
650 TABLET ORAL AS NEEDED
Start: 2023-01-27

## 2023-01-06 RX ORDER — SODIUM CHLORIDE 9 MG/ML
5-40 INJECTION INTRAVENOUS AS NEEDED
OUTPATIENT
Start: 2023-01-27

## 2023-01-06 RX ORDER — PALONOSETRON 0.05 MG/ML
0.25 INJECTION, SOLUTION INTRAVENOUS ONCE
Status: COMPLETED | OUTPATIENT
Start: 2023-01-06 | End: 2023-01-06

## 2023-01-06 RX ORDER — HYDROCORTISONE SODIUM SUCCINATE 100 MG/2ML
100 INJECTION, POWDER, FOR SOLUTION INTRAMUSCULAR; INTRAVENOUS AS NEEDED
OUTPATIENT
Start: 2023-01-27

## 2023-01-06 RX ORDER — HEPARIN 100 UNIT/ML
500 SYRINGE INTRAVENOUS AS NEEDED
Start: 2023-01-27

## 2023-01-06 RX ORDER — DIPHENHYDRAMINE HYDROCHLORIDE 50 MG/ML
25 INJECTION, SOLUTION INTRAMUSCULAR; INTRAVENOUS AS NEEDED
Start: 2023-01-27

## 2023-01-06 RX ADMIN — HEPARIN 500 UNITS: 100 SYRINGE at 12:25

## 2023-01-06 RX ADMIN — CARBOPLATIN 220 MG: 10 INJECTION, SOLUTION INTRAVENOUS at 11:40

## 2023-01-06 RX ADMIN — DEXAMETHASONE SODIUM PHOSPHATE 12 MG: 10 INJECTION, SOLUTION INTRAMUSCULAR; INTRAVENOUS at 09:25

## 2023-01-06 RX ADMIN — SODIUM CHLORIDE, PRESERVATIVE FREE 20 ML: 5 INJECTION INTRAVENOUS at 12:25

## 2023-01-06 RX ADMIN — PALONOSETRON HYDROCHLORIDE 0.25 MG: 0.25 INJECTION, SOLUTION INTRAVENOUS at 08:52

## 2023-01-06 RX ADMIN — PEGFILGRASTIM 6 MG: KIT SUBCUTANEOUS at 12:30

## 2023-01-06 RX ADMIN — SODIUM CHLORIDE, PRESERVATIVE FREE 10 ML: 5 INJECTION INTRAVENOUS at 08:30

## 2023-01-06 RX ADMIN — EPOETIN ALFA-EPBX 60000 UNITS: 20000 INJECTION, SOLUTION INTRAVENOUS; SUBCUTANEOUS at 09:20

## 2023-01-06 RX ADMIN — SODIUM CHLORIDE 500 ML: 0.9 INJECTION, SOLUTION INTRAVENOUS at 09:45

## 2023-01-06 RX ADMIN — FOSAPREPITANT 150 MG: 150 INJECTION, POWDER, LYOPHILIZED, FOR SOLUTION INTRAVENOUS at 08:55

## 2023-01-06 RX ADMIN — GEMCITABINE 1700 MG: 100 INJECTION, SOLUTION INTRAVENOUS at 10:50

## 2023-01-06 RX ADMIN — SODIUM CHLORIDE 30 ML/HR: 9 INJECTION, SOLUTION INTRAVENOUS at 08:35

## 2023-01-06 NOTE — PROGRESS NOTES
[facility] Progress Note    Date: 2023    Name: Ely Pride    MRN: [id number]         : 1970    Ms. Angeles Cisneros arrived in the [facility] today at 0800, in stable condition, here for her RETACRIT INJECTION (EVERY 3 WEEKS) & CYCLE 6, DAY 1, IV CARBOPLATIN + GEMCITABINE CHEMOTHERAPY REGIMEN (DAY 1, OF EVERY 21 DAY CYCLE). She was assessed and education was provided. PLEASE NOTE THAT STARTING TODAY, WITH CYCLE 6, THERE WILL NO LONGER BE A DAY 8 IN HER CHEMO CYCLE. SHE WILL ONLY GET GEMZAR + CARBO ON DAY 1, EVERY 21 DAYS. (This was verified with [facility] pharmacist Yisel Lora.)        Ms. Cannon's vitals were reviewed. Visit Vitals  BP (!) 150/83 (BP 1 Location: Right upper arm, BP Patient Position: Sitting)   Pulse 71   Temp 98.2 °F (36.8 °C)   Resp 20   Ht 5' 3\" (1.6 m)   Wt 98.6 kg (217 lb 6.4 oz)   SpO2 96%   Breastfeeding No   BMI 38.51 kg/m²           Ms. Angeles Cisneros presented to the [facility] today, stating  that overall, she felt like she was tolerating her chemotherapy treatments fairly well. Her only major complaints today were intermittent nausea & fatigue & lower back pain. Signed chemotherapy consent was viewed in her electronic record. Her pre-chemotherapy labs obtained on 23, were all noted to be satisfactory for treatment today, and were as follows:     Latest Reference Range & Units 23 11:21   WBC 4.6 - 13.2 K/uL 14.3 (H)   NRBC 0  WBC 0.0   RBC 4.20 - 5.30 M/uL 2.59 (L)   HGB 12.0 - 16.0 g/dL 7.4 (L)   HCT 35.0 - 45.0 % 24.7 (L)   MCV 78.0 - 100.0 FL 95.4   MCH 24.0 - 34.0 PG 28.6   MCHC 31.0 - 37.0 g/dL 30.0 (L)   RDW 11.6 - 14.5 % 20.2 (H)   PLATELET 953 - 954 K/uL 280   MPV 9.2 - 11.8 FL 9.0 (L)   NEUTROPHILS 40 - 73 % 82 (H)   LYMPHOCYTES 21 - 52 % 5 (L)   MONOCYTES 3 - 10 % 11 (H)   EOSINOPHILS 0 - 5 % 0   BASOPHILS 0 - 2 % 1   IMMATURE GRANULOCYTES 0.0 - 0.5 % 1 (H)   DF -   AUTOMATED   ABSOLUTE NRBC 0.00 - 0.01 K/uL 0.00   ABS. NEUTROPHILS 1.8 - 8.0 K/UL 11.7 (H)   ABS. IMM. GRANS. 0.00 - 0.04 K/UL 0.1 (H)   ABS. LYMPHOCYTES 0.9 - 3.6 K/UL 0.8 (L)   ABS. MONOCYTES 0.05 - 1.2 K/UL 1.6 (H)   ABS. EOSINOPHILS 0.0 - 0.4 K/UL 0.0   ABS. BASOPHILS 0.0 - 0.1 K/UL 0.1   Sodium 136 - 145 mmol/L 137   Potassium 3.5 - 5.5 mmol/L 5.2   Chloride 100 - 111 mmol/L 109   CO2 21 - 32 mmol/L 20 (L)   Anion gap 3.0 - 18 mmol/L 8   Glucose 74 - 99 mg/dL 89   BUN 7.0 - 18 MG/DL 36 (H)   Creatinine 0.6 - 1.3 MG/DL 2.43 (H)   BUN/Creatinine ratio 12 - 20   15   Calcium 8.5 - 10.1 MG/DL 8.1 (L)   eGFR >60 ml/min/1.73m2 23 (L)   Bilirubin, total 0.2 - 1.0 MG/DL 0.5   Protein, total 6.4 - 8.2 g/dL 7.0   Albumin 3.4 - 5.0 g/dL 2.4 (L)   Globulin 2.0 - 4.0 g/dL 4.6 (H)   A-G Ratio 0.8 - 1.7   0.5 (L)   ALT 13 - 56 U/L 33   AST 10 - 38 U/L 33   Alk. phosphatase 45 - 117 U/L 549 (H)   (H): Data is abnormally high  (L): Data is abnormally low        Her RIGHT chest single lumen port was accessed without incident at 0830, and brisk blood return was obtained.  ml IV BAG was initiated to infuse @ [facility] throughout treatment today. The following pre-medications were administered approximately 30 minutes to 1 hour, prior to starting chemotherapy as follows: EMEND 150 MG IV, ALOXI 0.25 MG IV, & DECADRON 12 MG IV. Epoetin Galileo-epbx (RETACRIT) 60,000 units, was administered SQ, in the back of her LEFT arm at 0920, per order and without incident.  ml IV BOLUS HYDRATION, was administered over approximately 1 hour, prior to starting chemotherapy, per order. The following chemotherapy was administered:    Gemcitabine (GEMZAR) 1,700 mg IV (800 mg/m2), was administered over approximately 30 minutes, per order and without incident. Carboplatin (PARAPLATIN) 220 mg IV (AUC 4), was administered over approximately 30 minutes, per order and without incident.          After completion of her chemotherapy, her port was flushed well per policy with NS & Heparin, and then the upton needle was removed and gauze/bandaid was applied. NEULASTA 6 MG SQ, ON BODY INJECTOR, WAS APPLIED TO THE BACK OF HER LEFT ARM AT 1230, PER ORDER. AND AFTER APPLICATION, THE GREEN LIGHT WAS NOTED TO BE FLASHING APPROPRIATELY. Ms. Kiya Rivera tolerated treatment very well today, and voiced no complaints. Ms. Kiya Rivera was discharged from [place] [address] in stable condition at 1240. She is to return in 3 weeks, on Thursday, 1-26-23 at [facility] [address], for her next appointment, for pre-chemo labs. And then, she is scheduled for Cycle 7, Day 1, IV GEMZAR + CARBO Chemotherapy Regimen, & her RETACRIT injection, on Friday, 1-27-23 at 0800.      Nikkie Garcia RN  January 6, 2023  8:00 AM

## 2023-01-13 ENCOUNTER — APPOINTMENT (OUTPATIENT)
Dept: INFUSION THERAPY | Age: 53
End: 2023-01-13
Payer: MEDICARE

## 2023-01-20 RX ORDER — HYDROCORTISONE SODIUM SUCCINATE 100 MG/2ML
100 INJECTION, POWDER, FOR SOLUTION INTRAMUSCULAR; INTRAVENOUS AS NEEDED
Status: CANCELLED | OUTPATIENT
Start: 2023-01-27

## 2023-01-20 RX ORDER — ALBUTEROL SULFATE 0.83 MG/ML
2.5 SOLUTION RESPIRATORY (INHALATION) AS NEEDED
Status: CANCELLED
Start: 2023-01-27

## 2023-01-20 RX ORDER — SODIUM CHLORIDE 9 MG/ML
5-250 INJECTION, SOLUTION INTRAVENOUS AS NEEDED
Status: CANCELLED | OUTPATIENT
Start: 2023-01-27

## 2023-01-20 RX ORDER — ONDANSETRON 2 MG/ML
8 INJECTION INTRAMUSCULAR; INTRAVENOUS AS NEEDED
Status: CANCELLED | OUTPATIENT
Start: 2023-01-27

## 2023-01-20 RX ORDER — ACETAMINOPHEN 325 MG/1
650 TABLET ORAL AS NEEDED
Status: CANCELLED
Start: 2023-01-27

## 2023-01-20 RX ORDER — SODIUM CHLORIDE 9 MG/ML
5-40 INJECTION INTRAVENOUS AS NEEDED
Status: CANCELLED | OUTPATIENT
Start: 2023-01-27

## 2023-01-20 RX ORDER — DIPHENHYDRAMINE HYDROCHLORIDE 50 MG/ML
50 INJECTION, SOLUTION INTRAMUSCULAR; INTRAVENOUS AS NEEDED
Status: CANCELLED
Start: 2023-01-27

## 2023-01-20 RX ORDER — DIPHENHYDRAMINE HYDROCHLORIDE 50 MG/ML
25 INJECTION, SOLUTION INTRAMUSCULAR; INTRAVENOUS AS NEEDED
Status: CANCELLED
Start: 2023-01-27

## 2023-01-20 RX ORDER — EPINEPHRINE 1 MG/ML
0.3 INJECTION, SOLUTION, CONCENTRATE INTRAVENOUS AS NEEDED
Status: CANCELLED | OUTPATIENT
Start: 2023-01-27

## 2023-01-23 DIAGNOSIS — K62.5 RECTAL BLEEDING: ICD-10-CM

## 2023-01-23 DIAGNOSIS — C57.7 MALIGNANT NEOPLASM OF OTHER SPECIFIED FEMALE GENITAL ORGANS (HCC): ICD-10-CM

## 2023-01-23 DIAGNOSIS — C48.2 PERITONEAL CARCINOMA (HCC): ICD-10-CM

## 2023-01-23 DIAGNOSIS — D50.8 IRON DEFICIENCY ANEMIA SECONDARY TO INADEQUATE DIETARY IRON INTAKE: ICD-10-CM

## 2023-01-23 DIAGNOSIS — G89.3 CANCER ASSOCIATED PAIN: ICD-10-CM

## 2023-01-23 DIAGNOSIS — D50.0 IRON DEFICIENCY ANEMIA DUE TO CHRONIC BLOOD LOSS: ICD-10-CM

## 2023-01-23 DIAGNOSIS — C56.3 MALIGNANT NEOPLASM OF BOTH OVARIES (HCC): ICD-10-CM

## 2023-01-23 RX ORDER — OXYCODONE AND ACETAMINOPHEN 5; 325 MG/1; MG/1
1 TABLET ORAL
Qty: 84 TABLET | Refills: 0 | Status: SHIPPED | OUTPATIENT
Start: 2023-01-23 | End: 2023-02-06

## 2023-01-24 DIAGNOSIS — N18.4 CKD (CHRONIC KIDNEY DISEASE) STAGE 4, GFR 15-29 ML/MIN (HCC): ICD-10-CM

## 2023-01-24 DIAGNOSIS — D50.8 IRON DEFICIENCY ANEMIA SECONDARY TO INADEQUATE DIETARY IRON INTAKE: Primary | ICD-10-CM

## 2023-01-24 DIAGNOSIS — D50.9 IRON DEFICIENCY ANEMIA, UNSPECIFIED IRON DEFICIENCY ANEMIA TYPE: ICD-10-CM

## 2023-01-24 RX ORDER — FAMOTIDINE 10 MG/ML
20 INJECTION, SOLUTION INTRAVENOUS
OUTPATIENT
Start: 2023-01-24

## 2023-01-24 RX ORDER — EPINEPHRINE 1 MG/ML
0.3 INJECTION, SOLUTION, CONCENTRATE INTRAVENOUS PRN
OUTPATIENT
Start: 2023-01-24

## 2023-01-24 RX ORDER — ACETAMINOPHEN 325 MG/1
650 TABLET ORAL
OUTPATIENT
Start: 2023-01-24

## 2023-01-24 RX ORDER — ONDANSETRON 2 MG/ML
8 INJECTION INTRAMUSCULAR; INTRAVENOUS
OUTPATIENT
Start: 2023-01-24

## 2023-01-24 RX ORDER — SODIUM CHLORIDE 9 MG/ML
INJECTION, SOLUTION INTRAVENOUS CONTINUOUS
OUTPATIENT
Start: 2023-01-24

## 2023-01-24 RX ORDER — ALBUTEROL SULFATE 90 UG/1
4 AEROSOL, METERED RESPIRATORY (INHALATION) PRN
OUTPATIENT
Start: 2023-01-24

## 2023-01-24 RX ORDER — DIPHENHYDRAMINE HYDROCHLORIDE 50 MG/ML
50 INJECTION INTRAMUSCULAR; INTRAVENOUS
OUTPATIENT
Start: 2023-01-24

## 2023-01-26 ENCOUNTER — HOSPITAL ENCOUNTER (OUTPATIENT)
Dept: INFUSION THERAPY | Age: 53
End: 2023-01-26
Payer: MEDICARE

## 2023-01-26 LAB
ALBUMIN SERPL-MCNC: 2.6 G/DL (ref 3.4–5)
ALBUMIN/GLOB SERPL: 0.7 (ref 0.8–1.7)
ALP SERPL-CCNC: 530 U/L (ref 45–117)
ALT SERPL-CCNC: 27 U/L (ref 13–56)
ANION GAP SERPL CALC-SCNC: 4 MMOL/L (ref 3–18)
AST SERPL-CCNC: 28 U/L (ref 10–38)
BASO+EOS+MONOS # BLD AUTO: 1.5 K/UL (ref 0–2.3)
BASO+EOS+MONOS NFR BLD AUTO: 14 % (ref 0.1–17)
BILIRUB SERPL-MCNC: 0.3 MG/DL (ref 0.2–1)
BUN SERPL-MCNC: 33 MG/DL (ref 7–18)
BUN/CREAT SERPL: 15 (ref 12–20)
CALCIUM SERPL-MCNC: 8 MG/DL (ref 8.5–10.1)
CHLORIDE SERPL-SCNC: 115 MMOL/L (ref 100–111)
CO2 SERPL-SCNC: 22 MMOL/L (ref 21–32)
CREAT SERPL-MCNC: 2.15 MG/DL (ref 0.6–1.3)
DIFFERENTIAL METHOD BLD: ABNORMAL
ERYTHROCYTE [DISTWIDTH] IN BLOOD BY AUTOMATED COUNT: 24.7 % (ref 11.5–14.5)
GLOBULIN SER CALC-MCNC: 4 G/DL (ref 2–4)
GLUCOSE SERPL-MCNC: 97 MG/DL (ref 74–99)
HCT VFR BLD AUTO: 23.6 % (ref 36–48)
HGB BLD-MCNC: 7 G/DL (ref 12–16)
LYMPHOCYTES # BLD: 1 K/UL (ref 1.1–5.9)
LYMPHOCYTES NFR BLD: 9 % (ref 14–44)
MCH RBC QN AUTO: 29.4 PG (ref 25–35)
MCHC RBC AUTO-ENTMCNC: 29.7 G/DL (ref 31–37)
MCV RBC AUTO: 99.2 FL (ref 78–102)
NEUTS SEG # BLD: 8.8 K/UL (ref 1.8–9.5)
NEUTS SEG NFR BLD: 78 % (ref 40–70)
PLATELET # BLD AUTO: 275 K/UL (ref 140–440)
POTASSIUM SERPL-SCNC: 4.8 MMOL/L (ref 3.5–5.5)
PROT SERPL-MCNC: 6.6 G/DL (ref 6.4–8.2)
RBC # BLD AUTO: 2.38 M/UL (ref 4.1–5.1)
SODIUM SERPL-SCNC: 141 MMOL/L (ref 136–145)
WBC # BLD AUTO: 11.3 K/UL (ref 4.5–13)

## 2023-01-26 PROCEDURE — 85025 COMPLETE CBC W/AUTO DIFF WBC: CPT

## 2023-01-26 PROCEDURE — 80053 COMPREHEN METABOLIC PANEL: CPT

## 2023-01-26 PROCEDURE — 36415 COLL VENOUS BLD VENIPUNCTURE: CPT

## 2023-01-26 NOTE — PROGRESS NOTES
[facility] Lab Visit:    Nichol Elmore  1970  [id number]    5055 Pt arrived ambulatory w/o assist. Labs drawn per order via Left AC venipuncture x1 attempt. Pt tolerated well without complaints. Gauze/ coban to site. Pt departed [facility] ambulatory and in no distress at 0820.      Visit Vitals  /76 (BP 1 Location: Left lower arm, BP Patient Position: Sitting)   Pulse 68   Temp 98 °F (36.7 °C)   Resp 16   Wt 99.2 kg (218 lb 12.8 oz)   SpO2 100%   BMI 38.76 kg/m²

## 2023-01-27 ENCOUNTER — HOSPITAL ENCOUNTER (OUTPATIENT)
Dept: INFUSION THERAPY | Age: 53
End: 2023-01-27
Payer: MEDICARE

## 2023-01-27 VITALS
SYSTOLIC BLOOD PRESSURE: 130 MMHG | RESPIRATION RATE: 16 BRPM | WEIGHT: 218.8 LBS | TEMPERATURE: 98 F | HEIGHT: 63 IN | DIASTOLIC BLOOD PRESSURE: 76 MMHG | OXYGEN SATURATION: 100 % | BODY MASS INDEX: 38.77 KG/M2 | HEART RATE: 68 BPM

## 2023-01-27 VITALS
OXYGEN SATURATION: 97 % | HEART RATE: 60 BPM | SYSTOLIC BLOOD PRESSURE: 131 MMHG | TEMPERATURE: 97.6 F | DIASTOLIC BLOOD PRESSURE: 81 MMHG | RESPIRATION RATE: 16 BRPM

## 2023-01-27 DIAGNOSIS — D50.0 IRON DEFICIENCY ANEMIA DUE TO CHRONIC BLOOD LOSS: ICD-10-CM

## 2023-01-27 DIAGNOSIS — D50.8 IRON DEFICIENCY ANEMIA SECONDARY TO INADEQUATE DIETARY IRON INTAKE: ICD-10-CM

## 2023-01-27 DIAGNOSIS — C48.2 PERITONEAL CARCINOMA (HCC): Primary | ICD-10-CM

## 2023-01-27 LAB
ABO + RH BLD: NORMAL
BASOPHILS # BLD: 0 K/UL (ref 0–0.1)
BASOPHILS NFR BLD: 0 % (ref 0–2)
BLD PROD TYP BPU: NORMAL
BLOOD GROUP ANTIBODIES SERPL: NORMAL
BPU ID: NORMAL
CROSSMATCH RESULT,%XM: NORMAL
DIFFERENTIAL METHOD BLD: ABNORMAL
EOSINOPHIL # BLD: 0.2 K/UL (ref 0–0.4)
EOSINOPHIL NFR BLD: 2 % (ref 0–5)
ERYTHROCYTE [DISTWIDTH] IN BLOOD BY AUTOMATED COUNT: 24.5 % (ref 11.6–14.5)
HCT VFR BLD AUTO: 22.9 % (ref 35–45)
HGB BLD-MCNC: 6.8 G/DL (ref 12–16)
HISTORY CHECKED?,CKHIST: NORMAL
IMM GRANULOCYTES # BLD AUTO: 0.2 K/UL (ref 0–0.04)
IMM GRANULOCYTES NFR BLD AUTO: 2 % (ref 0–0.5)
LYMPHOCYTES # BLD: 0.9 K/UL (ref 0.9–3.6)
LYMPHOCYTES NFR BLD: 8 % (ref 21–52)
MCH RBC QN AUTO: 29.2 PG (ref 24–34)
MCHC RBC AUTO-ENTMCNC: 29.7 G/DL (ref 31–37)
MCV RBC AUTO: 98.3 FL (ref 78–100)
MONOCYTES # BLD: 1.3 K/UL (ref 0.05–1.2)
MONOCYTES NFR BLD: 11 % (ref 3–10)
NEUTS SEG # BLD: 9.1 K/UL (ref 1.8–8)
NEUTS SEG NFR BLD: 77 % (ref 40–73)
NRBC # BLD: 0.03 K/UL (ref 0–0.01)
NRBC BLD-RTO: 0.3 PER 100 WBC
PLATELET # BLD AUTO: 292 K/UL (ref 135–420)
PMV BLD AUTO: 9.8 FL (ref 9.2–11.8)
RBC # BLD AUTO: 2.33 M/UL (ref 4.2–5.3)
RBC MORPH BLD: ABNORMAL
RBC MORPH BLD: ABNORMAL
SPECIMEN EXP DATE BLD: NORMAL
STATUS OF UNIT,%ST: NORMAL
UNIT DIVISION, %UDIV: 0
WBC # BLD AUTO: 11.7 K/UL (ref 4.6–13.2)

## 2023-01-27 PROCEDURE — 77030012965 HC NDL HUBR BBMI -A

## 2023-01-27 PROCEDURE — 74011000258 HC RX REV CODE- 258: Performed by: INTERNAL MEDICINE

## 2023-01-27 PROCEDURE — 96413 CHEMO IV INFUSION 1 HR: CPT

## 2023-01-27 PROCEDURE — 96367 TX/PROPH/DG ADDL SEQ IV INF: CPT

## 2023-01-27 PROCEDURE — 85025 COMPLETE CBC W/AUTO DIFF WBC: CPT

## 2023-01-27 PROCEDURE — 74011250636 HC RX REV CODE- 250/636: Performed by: INTERNAL MEDICINE

## 2023-01-27 PROCEDURE — 86900 BLOOD TYPING SEROLOGIC ABO: CPT

## 2023-01-27 PROCEDURE — 96377 APPLICATON ON-BODY INJECTOR: CPT

## 2023-01-27 PROCEDURE — 74011250636 HC RX REV CODE- 250/636: Performed by: NURSE PRACTITIONER

## 2023-01-27 PROCEDURE — 86923 COMPATIBILITY TEST ELECTRIC: CPT

## 2023-01-27 PROCEDURE — 99211 OFF/OP EST MAY X REQ PHY/QHP: CPT

## 2023-01-27 PROCEDURE — 96375 TX/PRO/DX INJ NEW DRUG ADDON: CPT

## 2023-01-27 PROCEDURE — 96417 CHEMO IV INFUS EACH ADDL SEQ: CPT

## 2023-01-27 PROCEDURE — 74011000250 HC RX REV CODE- 250: Performed by: INTERNAL MEDICINE

## 2023-01-27 PROCEDURE — 96372 THER/PROPH/DIAG INJ SC/IM: CPT

## 2023-01-27 RX ORDER — DIPHENHYDRAMINE HYDROCHLORIDE 50 MG/ML
50 INJECTION, SOLUTION INTRAMUSCULAR; INTRAVENOUS AS NEEDED
Status: CANCELLED
Start: 2023-01-30

## 2023-01-27 RX ORDER — SODIUM CHLORIDE 9 MG/ML
5-250 INJECTION, SOLUTION INTRAVENOUS AS NEEDED
Status: DISPENSED | OUTPATIENT
Start: 2023-01-27 | End: 2023-01-27

## 2023-01-27 RX ORDER — ONDANSETRON 2 MG/ML
8 INJECTION INTRAMUSCULAR; INTRAVENOUS AS NEEDED
OUTPATIENT
Start: 2023-02-17

## 2023-01-27 RX ORDER — SODIUM CHLORIDE 9 MG/ML
5-250 INJECTION, SOLUTION INTRAVENOUS AS NEEDED
Status: CANCELLED | OUTPATIENT
Start: 2023-01-30

## 2023-01-27 RX ORDER — ALBUTEROL SULFATE 0.83 MG/ML
2.5 SOLUTION RESPIRATORY (INHALATION) AS NEEDED
Status: CANCELLED
Start: 2023-01-30

## 2023-01-27 RX ORDER — SODIUM CHLORIDE 0.9 % (FLUSH) 0.9 %
5-40 SYRINGE (ML) INJECTION AS NEEDED
Status: DISPENSED | OUTPATIENT
Start: 2023-01-27 | End: 2023-01-27

## 2023-01-27 RX ORDER — ACETAMINOPHEN 325 MG/1
650 TABLET ORAL AS NEEDED
Status: CANCELLED
Start: 2023-01-30

## 2023-01-27 RX ORDER — ALBUTEROL SULFATE 0.83 MG/ML
2.5 SOLUTION RESPIRATORY (INHALATION) AS NEEDED
Start: 2023-02-17

## 2023-01-27 RX ORDER — DIPHENHYDRAMINE HYDROCHLORIDE 50 MG/ML
25 INJECTION, SOLUTION INTRAMUSCULAR; INTRAVENOUS AS NEEDED
Start: 2023-02-17

## 2023-01-27 RX ORDER — HYDROCORTISONE SODIUM SUCCINATE 100 MG/2ML
100 INJECTION, POWDER, FOR SOLUTION INTRAMUSCULAR; INTRAVENOUS AS NEEDED
Status: CANCELLED | OUTPATIENT
Start: 2023-01-30

## 2023-01-27 RX ORDER — DIPHENHYDRAMINE HYDROCHLORIDE 50 MG/ML
50 INJECTION, SOLUTION INTRAMUSCULAR; INTRAVENOUS AS NEEDED
Start: 2023-02-17

## 2023-01-27 RX ORDER — ACETAMINOPHEN 325 MG/1
650 TABLET ORAL AS NEEDED
Start: 2023-02-17

## 2023-01-27 RX ORDER — HEPARIN 100 UNIT/ML
500 SYRINGE INTRAVENOUS AS NEEDED
Start: 2023-02-17

## 2023-01-27 RX ORDER — PALONOSETRON 0.05 MG/ML
0.25 INJECTION, SOLUTION INTRAVENOUS ONCE
Status: COMPLETED | OUTPATIENT
Start: 2023-01-27 | End: 2023-01-27

## 2023-01-27 RX ORDER — SODIUM CHLORIDE 9 MG/ML
5-250 INJECTION, SOLUTION INTRAVENOUS AS NEEDED
OUTPATIENT
Start: 2023-02-17

## 2023-01-27 RX ORDER — ONDANSETRON 2 MG/ML
8 INJECTION INTRAMUSCULAR; INTRAVENOUS AS NEEDED
Status: CANCELLED | OUTPATIENT
Start: 2023-01-30

## 2023-01-27 RX ORDER — HYDROCORTISONE SODIUM SUCCINATE 100 MG/2ML
100 INJECTION, POWDER, FOR SOLUTION INTRAMUSCULAR; INTRAVENOUS AS NEEDED
OUTPATIENT
Start: 2023-02-17

## 2023-01-27 RX ORDER — SODIUM CHLORIDE 9 MG/ML
5-40 INJECTION INTRAVENOUS AS NEEDED
Status: CANCELLED | OUTPATIENT
Start: 2023-01-30

## 2023-01-27 RX ORDER — HEPARIN 100 UNIT/ML
500 SYRINGE INTRAVENOUS AS NEEDED
Status: DISPENSED | OUTPATIENT
Start: 2023-01-27 | End: 2023-01-27

## 2023-01-27 RX ORDER — SODIUM CHLORIDE 9 MG/ML
5-40 INJECTION INTRAVENOUS AS NEEDED
OUTPATIENT
Start: 2023-02-17

## 2023-01-27 RX ORDER — SODIUM CHLORIDE 0.9 % (FLUSH) 0.9 %
5-40 SYRINGE (ML) INJECTION AS NEEDED
OUTPATIENT
Start: 2023-02-17

## 2023-01-27 RX ORDER — DIPHENHYDRAMINE HYDROCHLORIDE 50 MG/ML
25 INJECTION, SOLUTION INTRAMUSCULAR; INTRAVENOUS AS NEEDED
Status: CANCELLED
Start: 2023-01-30

## 2023-01-27 RX ORDER — EPINEPHRINE 1 MG/ML
0.3 INJECTION, SOLUTION, CONCENTRATE INTRAVENOUS AS NEEDED
Status: CANCELLED | OUTPATIENT
Start: 2023-01-30

## 2023-01-27 RX ORDER — EPINEPHRINE 1 MG/ML
0.3 INJECTION, SOLUTION, CONCENTRATE INTRAVENOUS AS NEEDED
OUTPATIENT
Start: 2023-02-17

## 2023-01-27 RX ADMIN — SODIUM CHLORIDE, PRESERVATIVE FREE 20 ML: 5 INJECTION INTRAVENOUS at 09:30

## 2023-01-27 RX ADMIN — SODIUM CHLORIDE, PRESERVATIVE FREE 20 ML: 5 INJECTION INTRAVENOUS at 08:30

## 2023-01-27 RX ADMIN — CARBOPLATIN 220 MG: 10 INJECTION, SOLUTION INTRAVENOUS at 12:25

## 2023-01-27 RX ADMIN — SODIUM CHLORIDE, PRESERVATIVE FREE 20 ML: 5 INJECTION INTRAVENOUS at 13:10

## 2023-01-27 RX ADMIN — PEGFILGRASTIM 6 MG: KIT SUBCUTANEOUS at 13:05

## 2023-01-27 RX ADMIN — SODIUM CHLORIDE 500 ML: 9 INJECTION, SOLUTION INTRAVENOUS at 10:30

## 2023-01-27 RX ADMIN — HEPARIN 500 UNITS: 100 SYRINGE at 13:10

## 2023-01-27 RX ADMIN — EPOETIN ALFA-EPBX 60000 UNITS: 20000 INJECTION, SOLUTION INTRAVENOUS; SUBCUTANEOUS at 09:50

## 2023-01-27 RX ADMIN — PALONOSETRON HYDROCHLORIDE 0.25 MG: 0.25 INJECTION INTRAVENOUS at 09:38

## 2023-01-27 RX ADMIN — GEMCITABINE 1700 MG: 100 INJECTION, SOLUTION INTRAVENOUS at 11:40

## 2023-01-27 RX ADMIN — FOSAPREPITANT 150 MG: 150 INJECTION, POWDER, LYOPHILIZED, FOR SOLUTION INTRAVENOUS at 10:05

## 2023-01-27 RX ADMIN — DEXAMETHASONE SODIUM PHOSPHATE 12 MG: 10 INJECTION, SOLUTION INTRAMUSCULAR; INTRAVENOUS at 09:40

## 2023-01-27 RX ADMIN — SODIUM CHLORIDE 30 ML/HR: 9 INJECTION, SOLUTION INTRAVENOUS at 09:35

## 2023-01-27 NOTE — PROGRESS NOTES
\[facility]\"" Progress Note    Date: 2023    Name: Angel Anne    MRN: [id number]         : 1970    Ms. Abhinav Keith arrived in the [facility] today at 0800, in stable condition, here for her RETACRIT INJECTION (EVERY 3 WEEKS) & CYCLE 7, DAY 1, IV CARBOPLATIN + GEMCITABINE CHEMOTHERAPY REGIMEN (DAY 1, OF EVERY 21 DAY CYCLE). She was assessed and education was provided. PLEASE NOTE THAT STARTING WITH CYCLE 6, DAY 8 OF HER CHEMO CYCLE WAS DISCONTINUED. SHE WILL ONLY GET GEMZAR + CARBO ON DAY 1, EVERY 21 DAYS. (This was verified with [address] [place] [address]). Ms. Cannon's vitals were reviewed. Visit Vitals  /67 (BP 1 Location: Left upper arm, BP Patient Position: Sitting)   Pulse (!) 58   Temp 97.4 °F (36.3 °C)   Resp 20   SpO2 97%           Ms. Abhinav Keith presented to the [facility] today, stating  that overall, she felt like she was tolerating her chemotherapy treatments fairly well. Her only major complaints today were intermittent nausea & fatigue & lower back pain. Signed chemotherapy consent was viewed in her electronic record. Her pre-chemotherapy labs obtained on 23, were all noted to be satisfactory for treatment today, and were as follows: However, on yesterday, when her low HGB of 7.0 was discussed with Dr. Ernie Phipps, order was received from Dr. Keara Britt to repeat the CBC today prior to treatment, to determine whether or not to proceed with treatment today, and to determine whether or not to set her up for a blood transfusion. Latest Reference Range & Units 23 08:20 23 09:58   WBC 4.5 - 13.0 K/uL  11.3   RBC 4.10 - 5.10 M/uL  2.38 (L)   HGB 12.0 - 16 g/dL  7.0 (L)   HCT 36 - 48 %  23.6 (L)   MCV 78 - 102 FL  99.2   MCH 25.0 - 35.0 PG  29.4   MCHC 31 - 37 g/dL  29.7 (L)   RDW 11.5 - 14.5 %  24.7 (H)   PLATELET 635 - 280 K/uL  275   NEUTROPHILS 40 - 70 %  78 (H)   Mixed cells 0.1 - 17 %  14   LYMPHOCYTES 14 - 44 %  9 (L)   DF -    AUTOMATED   ABS. NEUTROPHILS 1.8 - 9.5 K/UL  8.8   ABS. LYMPHOCYTES 1.1 - 5.9 K/UL  1.0 (L)   ABS. MIXED CELLS 0.0 - 2.3 K/uL  1.5   Sodium 136 - 145 mmol/L 141    Potassium 3.5 - 5.5 mmol/L 4.8    Chloride 100 - 111 mmol/L 115 (H)    CO2 21 - 32 mmol/L 22    Anion gap 3.0 - 18 mmol/L 4    Glucose 74 - 99 mg/dL 97    BUN 7.0 - 18 MG/DL 33 (H)    Creatinine 0.6 - 1.3 MG/DL 2.15 (H)    BUN/Creatinine ratio 12 - 20   15    Calcium 8.5 - 10.1 MG/DL 8.0 (L)    eGFR >60 ml/min/1.73m2 27 (L)    Bilirubin, total 0.2 - 1.0 MG/DL 0.3    Protein, total 6.4 - 8.2 g/dL 6.6    Albumin 3.4 - 5.0 g/dL 2.6 (L)    Globulin 2.0 - 4.0 g/dL 4.0    A-G Ratio 0.8 - 1.7   0.7 (L)    ALT 13 - 56 U/L 27    AST 10 - 38 U/L 28    Alk. phosphatase 45 - 117 U/L 530 (H)    (L): Data is abnormally low  (H): Data is abnormally high        Her RIGHT chest single lumen port was accessed without incident at 0830, and brisk blood return was obtained. Blood for the ordered CBC was drawn per order, and was processed on site. 1st RUN preliminary HGB 7.0.  2nd RUN preliminary HGB 6.9. The above preliminary HGB results were reported to Nurse Practitioner Janet Hagen, and order was received from [facility], to proceed with chemo today as planned, and to also Type & Crossmatch patient to receive one unit PRBC on Monday, 1-30-23. The FINAL HGB results from today were as follows:       Latest Reference Range & Units 1/27/23 09:00   HGB 12.0 - 16.0 g/dL 6.8 (L)   HCT 35.0 - 45.0 % 22.9 (L)   (L): Data is abnormally low      Blood for the ordered Type & Crossmatch was drawn from her port at 0930 (3 Pink Top Tubes & 1 Lavender Top Tube), and the tubes were sent out by  to the [facility] lab for processing.      The order to [facility] [address] for one unit of blood was entered into her electronic record, and the required blood transfusion armband was applied securely to her LEFT wrist. And she was reminded to keep the armband completely dry and intact until after the completion of her blood transfusion on Monday, and she verbalized understanding.  ml IV BAG was initiated to infuse @ [facility] throughout treatment today. The following pre-medications were administered approximately 30 minutes to 1 hour, prior to starting chemotherapy as follows: EMEND 150 MG IV, ALOXI 0.25 MG IV, & DECADRON 12 MG IV. Epoetin Galileo-epbx (RETACRIT) 60,000 units, was administered SQ, in the back of her LEFT arm at 0950, per order and without incident.  ml IV BOLUS HYDRATION, was administered over approximately 1 hour, prior to starting chemotherapy, per order. The following chemotherapy was administered:    Gemcitabine (GEMZAR) 1,700 mg IV (800 mg/m2), was administered over approximately 30 minutes, per order and without incident. Carboplatin (PARAPLATIN) 220 mg IV (AUC 4), was administered over approximately 30 minutes, per order and without incident. After completion of her chemotherapy, her port was flushed well per policy with NS & Heparin, and then the upton needle was removed and gauze/bandaid was applied. NEULASTA 6 MG SQ, ON BODY INJECTOR, WAS APPLIED TO THE BACK OF HER RIGHT ARM AT 1305, PER ORDER. AND AFTER APPLICATION, THE GREEN LIGHT WAS NOTED TO BE FLASHING APPROPRIATELY. Ms. Abhinav Keith tolerated treatment very well today, and voiced no complaints. Ms. Abhinav Keith was discharged from [place] [address] in stable condition at 1315. She is scheduled to return on Monday, 1-30-23 at 0800, to receive one unit PRBC. (She also has an office visit scheduled to see Dr. Ernie Phipps on 1-30-23.) And then, she is to return in 3 weeks, on Thursday, 2-16-23 at 0830, for her next appointment, for pre-chemo labs. And then, she is scheduled for Cycle 8, Day 1, IV GEMZAR + CARBO Chemotherapy Regimen, & her RETACRIT injection, on Friday, 2-17-23 at 0800.      Salazar Young RN  January 27, 2023  8:00 AM

## 2023-01-30 ENCOUNTER — OFFICE VISIT (OUTPATIENT)
Dept: ONCOLOGY | Age: 53
End: 2023-01-30
Payer: MEDICARE

## 2023-01-30 ENCOUNTER — HOSPITAL ENCOUNTER (OUTPATIENT)
Dept: INFUSION THERAPY | Age: 53
Discharge: HOME | End: 2023-01-30
Payer: MEDICARE

## 2023-01-30 VITALS
HEART RATE: 68 BPM | RESPIRATION RATE: 16 BRPM | OXYGEN SATURATION: 97 % | DIASTOLIC BLOOD PRESSURE: 79 MMHG | SYSTOLIC BLOOD PRESSURE: 135 MMHG | TEMPERATURE: 98.1 F

## 2023-01-30 DIAGNOSIS — D50.8 OTHER IRON DEFICIENCY ANEMIA: ICD-10-CM

## 2023-01-30 DIAGNOSIS — K62.5 RECTAL BLEEDING: ICD-10-CM

## 2023-01-30 DIAGNOSIS — Z79.899 NEED FOR PROPHYLACTIC CHEMOTHERAPY: Primary | ICD-10-CM

## 2023-01-30 DIAGNOSIS — C48.2 PERITONEAL CARCINOMA (HCC): Primary | ICD-10-CM

## 2023-01-30 DIAGNOSIS — G89.3 CANCER ASSOCIATED PAIN: ICD-10-CM

## 2023-01-30 DIAGNOSIS — D50.0 IRON DEFICIENCY ANEMIA DUE TO CHRONIC BLOOD LOSS: ICD-10-CM

## 2023-01-30 DIAGNOSIS — D50.8 IRON DEFICIENCY ANEMIA SECONDARY TO INADEQUATE DIETARY IRON INTAKE: ICD-10-CM

## 2023-01-30 PROCEDURE — G8428 CUR MEDS NOT DOCUMENT: HCPCS | Performed by: INTERNAL MEDICINE

## 2023-01-30 PROCEDURE — A4221 SUPP NON-INSULIN INF CATH/WK: HCPCS

## 2023-01-30 PROCEDURE — 77030012965 HC NDL HUBR BBMI -A

## 2023-01-30 PROCEDURE — 36430 TRANSFUSION BLD/BLD COMPNT: CPT

## 2023-01-30 PROCEDURE — G9711 PT HX TOT COL OR COLON CA: HCPCS | Performed by: INTERNAL MEDICINE

## 2023-01-30 PROCEDURE — G8417 CALC BMI ABV UP PARAM F/U: HCPCS | Performed by: INTERNAL MEDICINE

## 2023-01-30 PROCEDURE — P9040 RBC LEUKOREDUCED IRRADIATED: HCPCS

## 2023-01-30 PROCEDURE — A4649 SURGICAL SUPPLIES: HCPCS

## 2023-01-30 PROCEDURE — 74011250636 HC RX REV CODE- 250/636

## 2023-01-30 PROCEDURE — 74011250636 HC RX REV CODE- 250/636: Performed by: INTERNAL MEDICINE

## 2023-01-30 PROCEDURE — 74011250637 HC RX REV CODE- 250/637: Performed by: INTERNAL MEDICINE

## 2023-01-30 PROCEDURE — 74011000250 HC RX REV CODE- 250: Performed by: INTERNAL MEDICINE

## 2023-01-30 PROCEDURE — G9717 DOC PT DX DEP/BP F/U NT REQ: HCPCS | Performed by: INTERNAL MEDICINE

## 2023-01-30 PROCEDURE — 99214 OFFICE O/P EST MOD 30 MIN: CPT | Performed by: INTERNAL MEDICINE

## 2023-01-30 RX ORDER — ACETAMINOPHEN 325 MG/1
650 TABLET ORAL ONCE
Status: CANCELLED | OUTPATIENT
Start: 2023-01-30 | End: 2023-01-30

## 2023-01-30 RX ORDER — SODIUM CHLORIDE 0.9 % (FLUSH) 0.9 %
5-40 SYRINGE (ML) INJECTION AS NEEDED
OUTPATIENT
Start: 2023-01-30

## 2023-01-30 RX ORDER — SODIUM CHLORIDE 9 MG/ML
5-40 INJECTION INTRAVENOUS AS NEEDED
OUTPATIENT
Start: 2023-01-30

## 2023-01-30 RX ORDER — ACETAMINOPHEN 325 MG/1
650 TABLET ORAL ONCE
Status: COMPLETED | OUTPATIENT
Start: 2023-01-30 | End: 2023-01-30

## 2023-01-30 RX ORDER — SODIUM CHLORIDE 9 MG/ML
5-250 INJECTION, SOLUTION INTRAVENOUS AS NEEDED
OUTPATIENT
Start: 2023-01-30

## 2023-01-30 RX ORDER — DIPHENHYDRAMINE HCL 25 MG
25 CAPSULE ORAL ONCE
Status: COMPLETED | OUTPATIENT
Start: 2023-01-30 | End: 2023-01-30

## 2023-01-30 RX ORDER — HEPARIN 100 UNIT/ML
500 SYRINGE INTRAVENOUS AS NEEDED
Start: 2023-01-30

## 2023-01-30 RX ORDER — HYDROCORTISONE SODIUM SUCCINATE 100 MG/2ML
100 INJECTION, POWDER, FOR SOLUTION INTRAMUSCULAR; INTRAVENOUS AS NEEDED
OUTPATIENT
Start: 2023-01-30

## 2023-01-30 RX ORDER — SODIUM CHLORIDE 9 MG/ML
5-250 INJECTION, SOLUTION INTRAVENOUS AS NEEDED
Status: DISPENSED | OUTPATIENT
Start: 2023-01-30 | End: 2023-01-30

## 2023-01-30 RX ORDER — ONDANSETRON 2 MG/ML
8 INJECTION INTRAMUSCULAR; INTRAVENOUS AS NEEDED
OUTPATIENT
Start: 2023-01-30

## 2023-01-30 RX ORDER — HEPARIN SODIUM (PORCINE) LOCK FLUSH IV SOLN 100 UNIT/ML 100 UNIT/ML
SOLUTION INTRAVENOUS
Status: COMPLETED
Start: 2023-01-30 | End: 2023-01-30

## 2023-01-30 RX ORDER — EPINEPHRINE 1 MG/ML
0.3 INJECTION, SOLUTION, CONCENTRATE INTRAVENOUS AS NEEDED
OUTPATIENT
Start: 2023-01-30

## 2023-01-30 RX ORDER — HEPARIN 100 UNIT/ML
500 SYRINGE INTRAVENOUS AS NEEDED
Status: DISPENSED | OUTPATIENT
Start: 2023-01-30 | End: 2023-01-30

## 2023-01-30 RX ORDER — SODIUM CHLORIDE 9 MG/ML
250 INJECTION, SOLUTION INTRAVENOUS AS NEEDED
Status: DISCONTINUED | OUTPATIENT
Start: 2023-01-30 | End: 2023-02-03 | Stop reason: HOSPADM

## 2023-01-30 RX ORDER — DIPHENHYDRAMINE HCL 25 MG
25 CAPSULE ORAL ONCE
Status: CANCELLED | OUTPATIENT
Start: 2023-01-30 | End: 2023-01-30

## 2023-01-30 RX ORDER — ACETAMINOPHEN 325 MG/1
650 TABLET ORAL AS NEEDED
Start: 2023-01-30

## 2023-01-30 RX ORDER — DIPHENHYDRAMINE HYDROCHLORIDE 50 MG/ML
50 INJECTION, SOLUTION INTRAMUSCULAR; INTRAVENOUS AS NEEDED
Start: 2023-01-30

## 2023-01-30 RX ORDER — DIPHENHYDRAMINE HYDROCHLORIDE 50 MG/ML
25 INJECTION, SOLUTION INTRAMUSCULAR; INTRAVENOUS AS NEEDED
Start: 2023-01-30

## 2023-01-30 RX ORDER — ALBUTEROL SULFATE 0.83 MG/ML
2.5 SOLUTION RESPIRATORY (INHALATION) AS NEEDED
Start: 2023-01-30

## 2023-01-30 RX ORDER — SODIUM CHLORIDE 0.9 % (FLUSH) 0.9 %
5-40 SYRINGE (ML) INJECTION AS NEEDED
Status: DISPENSED | OUTPATIENT
Start: 2023-01-30 | End: 2023-01-30

## 2023-01-30 RX ORDER — SODIUM CHLORIDE 9 MG/ML
5-250 INJECTION, SOLUTION INTRAVENOUS AS NEEDED
Status: CANCELLED | OUTPATIENT
Start: 2023-01-30

## 2023-01-30 RX ADMIN — Medication 10 ML: at 11:22

## 2023-01-30 RX ADMIN — DIPHENHYDRAMINE HYDROCHLORIDE 25 MG: 25 CAPSULE ORAL at 08:45

## 2023-01-30 RX ADMIN — HEPARIN SODIUM (PORCINE) LOCK FLUSH IV SOLN 100 UNIT/ML 500 UNITS: 100 SOLUTION at 11:22

## 2023-01-30 RX ADMIN — SODIUM CHLORIDE 25 ML/HR: 9 INJECTION, SOLUTION INTRAVENOUS at 08:36

## 2023-01-30 RX ADMIN — ACETAMINOPHEN 650 MG: 325 TABLET, FILM COATED ORAL at 08:45

## 2023-01-30 NOTE — PROGRESS NOTES
[facility] Progress Note    Date: 2023    Name: Estevan Wills    MRN: [id number]         : 1970    1 Units of PRBCs    Ms. Nahid Logan arrived to [address] [place] [address] via wheelchair at [facility]. Back pain 7/10. Will receive pre meds for blood transfusion. Ms. Nahid Logan was assessed and education was provided. Discussed risks and benefits of blood transfusion with patient, including risk of transfusion reaction and disease transmission. Patient verbalized understanding and hard copy provided for patient to take home. Ms. Cannon's vitals were reviewed. Visit Vitals  BP (P) 120/80 (BP 1 Location: Right upper arm, BP Patient Position: Reclining)   Pulse (P) 60   Temp (P) 98.3 °F (36.8 °C)   Resp (P) 16   SpO2 99%   Breastfeeding No     Right upper chest single lumen mediport accessed with 20 ga 1 in iglesias needle. Port flushed well with NS and positive blood return noted. NS 250ml IV started at [facility] via blood tubing per order. Pre-medications given approximately 30 minutes prior to starting blood transfusion:  Tylenol 650 mg PO and Benadryl 25 mg PO per order. One unit of PRBC's infused as ordered per protocol. VS stable and no transfusion reaction noted. Patient Vitals for the past 8 hrs:   Temp Pulse Resp BP SpO2   23 0930 (P) 98.3 °F (36.8 °C) (P) 60 (P) 16 (P) 120/80 99 %   23 0900 98 °F (36.7 °C) 60 16 118/68 99 %   23 0815 98 °F (36.7 °C) 60 16 119/72 99 %            Ms. Nahid Logan tolerated blood transfusion without any s/s of reaction or complaints. Pt politely declined to stay for 1 hour observation due to appointment with Dr. Emili Barney. VSS. Port flushed with NS followed by heparin flush. Iglesias needle d/c'd and bandaid applied. Site without bruising, bleeding, pain or swelling. Discharge instructions reviewed with pt. Pt instructed to report SOB, CP, elevated temp, back pain, or other symptoms of transfusion reaction to MD or ED. Pt verbalized understanding.      Patient armband removed and shredded    Ms. Issa Soto was discharged from [place] [address] in stable condition at   1125. She is to return 02/16/23 at 0830 for pre-chemo labs.     Skip Mazama  January 30, 2023

## 2023-01-30 NOTE — PROGRESS NOTES
[facility] HEMATOLOGY/MEDICAL ONCOLOGY      Name: Jaden Infante  MRN: [id number]  : 1970/52 y.o. Date: 2023    PCP: Troy Aguilar MD    Oncology History  Jaden Infante is a 47 y.o.  postmenopausal female referred by Dr. Esequiel Short with peritoneal cancer. Patient was being followed by Dr. Zara Gant who left the practice. Intitally seen be JOHN talavera with reports of vaginal bleeding. Given pt's history of hysteretectomy with BSO for endometriosis in  a TVUS was ordered. Which revealed a 6.8 cm x 5.2 cm x 4.6 cm at midline vaginal cuff. This prompted pelvic CT with findings of a lesion measuring 7.6 x 5.8 x 7.2 cm and is compatible with a pelvic neoplasm vs endometrioma given prior history of endometriosis. Tumor markers done on 2018 all normal.  S/p  Exploratory laparotomy, exploration of retroperitoneal spaces, exam under anesthesia with biopsy of rectovaginal mass on 2019. Had sigmoidoscopy which revealed submucosal mass. S/p cycle #6 of carbo/taxol on 2019. S/p cytoreductive surgery with HIPEC on 2019. S/p radiation treatment completed in 2019. Was seen in office and had a biopsy c/w recurrence. S/p cycle #6 of Carbo/Doxil on 2020. S/p Exploratory laparotomy. 2. Lysis of adhesions. 3. Simple appendectomy. 4. 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 On . She also had a revision of urostomy that is still leaking. S/p IR biopsy of liver on 2021 which demonstrated recurrent disease. Pt was on palliative letrozole and keytruda. HPI  Patient was being followed previously by Dr. Zara Gant who left the practice. The patient presents to our [facility] clinic today for follows up and continuing cancer treatment as scheduled. Here for follow-up. Today she continues to report on and off vaginal bleeding and rectal bleeding as well as discharge from the rectum but denied active bleeding.    She continues to have pain controlled with current meds. She reports having some fatigue after each therapy. The patient denies fevers, chills, night sweats, skin lumps or bumps, acute bleeding or bruising issues. Denies headaches, acute vision changes, dizziness, chest pain, shortness of breath, palpitation, productive cough, vomiting, worsening abdominal pain, altered bowel habits, dysuria, new bone pain or back pain, focal numbness or weakness.      Past Medical History, Surgical History, Social, and Family History reviewed and remain unchanged:  Component      Latest Ref Rng & Units 3/11/2022           9:10 AM   CA-125      1.5 - 35.0 U/mL 4     Component      Latest Ref Rng & Units 2/11/2022           9:37 AM   CA-125      1.5 - 35.0 U/mL 4     Component      Latest Ref Rng & Units 12/16/2021          10:25AM   Cancer Ag (CA) 125      0.0 - 38.1 U/mL 5       Component      Latest Ref Rng & Units 6/17/2021          12:00 AM   Cancer Ag (CA) 125      0.0 - 38.1 U/mL 5.1     Component      Latest Ref Rng & Units 9/28/2020          11:11 AM   CA-125      1.5 - 35.0 U/mL 6     Component      Latest Ref Rng & Units 8/31/2020          10:01 AM   CA-125      1.5 - 35.0 U/mL 8     Component      Latest Ref Rng & Units 7/7/2020          11:30 AM   CA-125      1.5 - 35.0 U/mL 5     Component      Latest Ref Rng & Units 6/8/2020          10:40 AM   CA-125      1.5 - 35.0 U/mL 7     Component      Latest Ref Rng & Units 5/11/2020          11:30 AM   CA-125      1.5 - 35.0 U/mL 7     Component      Latest Ref Rng & Units 3/4/2020           8:15 AM   CA-125      1.5 - 35.0 U/mL 7     Component      Latest Ref Rng & Units 11/15/2019           9:56 AM   CA-125      1.5 - 35.0 U/mL 6     Component      Latest Ref Rng & Units 6/6/2019           8:44 AM   CA-125      1.5 - 35.0 U/mL 7     Component      Latest Ref Rng & Units 5/16/2019 4/25/2019           8:26 AM  8:20 AM   Cancer Ag (CA) 125      0.0 - 38.1 U/mL 7.8 8.7     Component      Latest Ref Rng & Units 4/4/2019           8:28 AM   Cancer Ag (CA) 125      0.0 - 38.1 U/mL 8.8     Component      Latest Ref Rng & Units 3/14/2019 2/21/2019           8:15 AM  8:32 AM   Cancer Ag (CA) 125      0.0 - 38.1 U/mL 10.4 18.4     12/17/2018 : 9.3  12/17/2018 CEA: 2.0  12/17/2018 AFP: 3.8    Pathology  8/4/2021  Liver mass, core biopsies:        Metastatic adenocarcinoma, most consistent with serous type. DIAGNOSIS COMMENT:   Although metastatic ovarian or primary peritoneal carcinoma forming   parenchymal liver lesions is uncommon, the histologic features in this   case are similar to those in the patient's prior rectovaginal mass biopsy   from 2019 and the immunohistochemical features are similar to those   documented in the EMR from a pelvic exenteration specimen performed at an   outside institution in 2020.   4/20/2020  Vaginal biopsy  Papillary serous adenocarcinoma    8/8/2019  A) COLON, PERICOLIC NODULE, EXCISION:      - ACELLULAR MUCIN WITH MULTIPLE CALCIFICATIONS, AND ASSOCIATED FIBROUS WALL WITH        HYALINIZATION, AND CHRONIC INFLAMMATION. - ADJACENT BENIGN FIBROADIPOSE TISSUE, CONSISTENT WITH MESENTERY. - NO EVIDENCE OF MALIGNANCY. - SEE COMMENT. B) LIVER, RIGHT LOBE, BIOPSY:      - NODULAR FAT NECROSIS AND FIBROSIS OF THE LIVER CAPSULE.      - MILD STEATOSIS.       - NO EVIDENCE OF MALIGNANCY. C) COLON, SIGMOID, SEROSAL IMPLANT, EXCISION:      - NODULAR FAT NECROSIS WITH FIBROSIS, CHRONIC INFLAMMATION, CALCIFICATIONS, AND        CYSTIC DEGENERATION WITHOUT MUCIN.      - NO EVIDENCE OF MALIGNANCY. D) OMENTUM, OMENTECTOMY (RESECTION):      - CONGESTED FIBROADIPOSE TISSUE WITH FOCAL FIBROSIS AND CHRONIC INFLAMMATION, AND        CALCIFIED NODULAR FIBROSIS.      - NO EVIDENCE OF MALIGNANCY. E) PERITONEUM, LEFT ANTERIOR ABDOMINAL, RESECTION:      - CONGESTED PERITONEAL TISSUE, NO EVIDENCE OF MALIGNANCY.     F) PERITONEUM, RIGHT ANTERIOR ABDOMINAL, RESECTION:      - CONGESTED PERITONEAL TISSUE, NO EVIDENCE OF MALIGNANCY. G) COLON, SIGMOID, SEROSAL IMPLANT, EXCISION:      - NODULAR FIBROSIS WITH HISTIOCYTES, SUGGESTIVE OF FAT NECROSIS.      - CYSTIC DEGENERATION, WITHOUT MUCIN.      - NO EVIDENCE OF MALIGNANCY. H) SOFT TISSUE, FALCIFORM, EXCISION:      - CONSISTENT WITH FALCIFORM LIGAMENT.      - NO EVIDENCE OF MALIGNANCY. PATHOLOGIC STAGE:  ypTx, pNx    1/17/2019   RECTOVAGINAL MASS (#1, 2) AND PELVIC MASS, BIOPSIES:   CONSISTENT WITH SEROUS CARCINOMA WITH EXTENSIVE NECROSIS. Imaging  MRI liver 7/6/2021     FINDINGS:     Abdominal Wall:  There is a circumscribed 8.3 cm-width x 2.4 cm-AP x 2.9  cm-craniocaudal length T2 hyperintense subcutaneous layer fluid collection along  the inferior margin of the ileal conduit. The colostomy site in the left lower  quadrant appears unremarkable. Liver:  A new ovoid T1-low T2-high signal 1.8 x 1.4 cm lesion is identified in  the segment 8. Another 0.8 x 0.6 cm T1-low T2-high signal focus is identified  in the segment 3. These structures were not apparent on prior studies. With  diffusion imaging, no evidence of restricted diffusion is demonstrated at these  foci. Biliary:  No evidence for significant common bile duct dilation. Gallbladder:  Mild distention of the gallbladder. No definite gallstones. Spleen, Adrenal Glands, Pancreas:  Unremarkable. Kidneys:  Cyst at the left kidney lower pole region measuring about 1.2 x 1.2  cm. Increase in size compared to prior CTs from above 0.8 x 0.7 cm. Miscellaneous: The largest of the periportal nodes measures up to about 1.3 x  1.1 cm. IMPRESSION     1. Focal fluid collection in the subcutaneous tissue adjacent to the ileal  conduit. This fluid collection may be amenable to aspiration under ultrasound  guidance. 2.  New hepatic lesions as described.   Although there is apparent lack of  restricted diffusion, further investigation with ultrasound is warranted to  assess if these structures are indeed cystic in nature. 3.  Left renal cyst.     4.  Slight az prominence. MRI pelvis 7/6/2021  FINDINGS:     Along the inferior aspect of the ileal conduit stoma site, focal elongated T1  high T2-signal fluid collection is demonstrated to, measuring about 8.3 cm-width  x 2.2 cm-thickness x 2.3 cm-craniocaudal length. In retrospect, there was a  suggestion of possible 7.1 x 3.5 x 2.0 cm hypodense material collection along  the inferior aspect of the subcutaneous abdominal wall portion of the ileal  conduit on the 04/01/21 unenhanced CT. This fluid collection therefore may be  slightly increased in size in the elbow. The intraperitoneal portion of the ileal conduit appears grossly unremarkable. The presacral region demonstrates apparent continued pattern of nonspecific mild  edema. The source for this edematous changes is clear. Most likely related to  residual changes from recent surgical intervention. Mild edematous changes also noted in the right and to lesser extent left  piriformis muscles. Additional edematous changes are also identified in the  right obturator internus along the superior aspect. Minimal free fluid in the posterior pelvic region. There is some intraluminal fluid in the blind loop portion of the rectum. At  the right superior lateral vaginal cuff corner, a vague trail of T2 high signal  is observed (coronal T2 images #8-10 and fat-sat axial T2 image #5-10). This  [place] appears to course quite close to the anterior margin of the rectal remnant  wall. Whether there is indeed communication between the blind rectal segment  and the vaginal cuff can be further assessed with barium enema. No distinct mass is detected pelvis. Enlarged nodes are identified along the right pelvic sidewall.   The largest  right pelvic sidewall node is identified subjacent to the external iliac  artery/vein vascular bundle, measuring up to about 2.2 x 1.2 cm (fat-sat axial  T2 image #12). Another slightly enlarged node more superiorly measuring about  1.9 x 1.0 cm (image #17). Multiple slightly borderline prominent nodes  identified in the mesentery, measuring up to about 1.2 x 1.0 cm (image #25). Additional note near the aortic bifurcation measuring about 1.3 x 0.9 cm (image  #34). IMPRESSION     1. Focal T1- and T2-hyperintense subcutaneous fluid collection along the  inferior aspect of the ileal conduit stoma site. Possibly representing a seroma  or a focus of urinoma. This may be amenable to aspiration under ultrasound or  CT guidance. 2.  Questionable subtle trailed between the right superior lateral aspect of the  vaginal cuff with adjacent blind-tipped rectum. More definitive evaluation can  be performed with barium enema. 3.  Slight az prominence along the right pelvic sidewall and in the  mesentery. 4.  No distinct residual or recurrent mass. 5.  Persistent mild presacral edema  PETCT 7/17/2020   FINDINGS:        PET images: Study limited because of patient motion. Mediastinal blood pool reference value = SUV Max. Mediastinal blood pool reference value = SUV Mean. Liver parenchyma reference value =  4.7   SUV Max. Liver parenchyma reference value =  2.3    SUV Mean. NECK: There is no suspicious hypermetabolic activity at the neck. CHEST: There is no suspicious hypermetabolic activity at the chest.     ABDOMEN: Typical heterogeneity at the liver. No convincing malignant foci  hypermetabolic activity or underlying CT abnormality. PELVIS: There is soft tissue fullness in the right retrovesicular pelvis just  above the vaginal cuff and posterior to right ureter measuring about 3.8 cm with  Max SUV 13.5 (206), extending to the rectum posteriorly, levators inferiorly on  the right. Previously 4 cm and Max SUV 16.5.      Thickening anterior abdominal wall compatible with scar demonstrating diffuse  modest activity and Max SUV 2.7. Additional CT findings: Mediport catheter has tip in the superior vena cava. Air  trapping in the superior segments lower lobes. Right-sided nephroureteral stent  without hydronephrosis. No ascites. IMPRESSION      Treatment response. Decreased metabolic activity at the right retrovesicular  pelvic mass. No new evidence of metastatic disease. Interval placement right-sided nephroureteral stent and resolved  hydroureteronephrosis. .       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[address] 11/17/2020    Loss of appetite 10/14/2020    Other hydronephrosis 06/24/2020    Genetic carrier status 09/13/2019    Postoperative nausea 08/27/2019    Ovarian cancer ([address] 08/07/2019    Peritoneal carcinoma ([address] 02/14/2019    Pelvic mass in female 01/17/2019    Irritable bowel syndrome with both constipation and diarrhea 02/09/2018    Subclinical hypothyroidism 01/23/2018    Chronic abdominal pain 01/22/2018    Diverticulosis 01/22/2018    Hx of total hysterectomy 01/22/2018    Hyponatremia 01/22/2018    Advance care planning 01/31/2017    Dental caries 05/26/2016    Chronic periodontal disease 05/26/2016    SUAZO (dyspnea on exertion) 02/10/2016    Prediabetes 09/24/2015    Morbid obesity ([address] 08/31/2015    Arthritis, degenerative 08/31/2015    Gastroesophageal reflux disease without esophagitis 08/31/2015    ANAMIKA on CPAP 08/31/2015    Essential hypertension 08/28/2015    PTSD (post-traumatic stress disorder) 03/24/2014    S/P TKR (total knee replacement) 11/14/2012    Adjustment disorder with depressed mood 09/20/2012    Major depressive disorder, recurrent episode, moderate ([facility] 09/20/2012    Suicidal ideation 09/19/2012    Menopause 05/08/2012    Knee pain, right 05/08/2012    Hypokalemia 02/04/2012    Overdose 02/04/2012    Obesity 06/18/2010    Mixed hyperlipidemia 06/18/2010     Past Medical History:   Diagnosis Date    AR (allergic rhinitis)     seasonal    Bladder cancer ([facility]     Cancer ([facility]     pt states between vgina and rectal area    Cardiac echocardiogram 04/11/2016    Tech difficult. EF 55-60%. No RWMA. Mild conc LVH. Gr 1 DDfx. RVSP normal.      Cardiac nuclear imaging test 05/05/2016    Low risk. Very patchy radiotracer uptake. Mild anterior artifact, low suspicion for ischemia. EF 68%. No RWMA. Normal EKG on pharm stress test.    Cardiovascular LE arterial duplex 10/07/2013    No significant arterial disease at rest bilaterally. R JUNE 1.21.  L JUNE 1.16. No significant sm vessel disease.     Chronic kidney disease     Depression     Dr. Khadijah Knutson, suicide attempt 2/12    Diverticulosis     Endometriosis     s/p hysterectomy 2006    GERD (gastroesophageal reflux disease)     Glaucoma     Dr. Key Polanco Hematuria, gross     HTN (hypertension)     Hx of colonoscopy 04/05/2017    mild diverticulosis, g1 internal hemorrhoids, normal colon , repeat 10 year 2027    Hx of suicide attempt     Hyperlipidemia LDL goal < 130     IBS (irritable bowel syndrome)     Ill-defined condition     environmental allergies    Insomnia     Malignant neoplasm of peritoneum (HCC) 2/14/2019    Nausea & vomiting     OA (osteoarthritis)     both knees, lower back    Preeclampsia     PTSD (post-traumatic stress disorder)     Seizures (HCC)     1 x with childbirth, had toxemia    Sleep apnea     does not use cpap machine    Spinal stenosis     Dr. Nely Driver Vertigo       Past Surgical History:   Procedure Laterality Date    COLONOSCOPY N/A 2017    COLONOSCOPY performed by [facility]a [facility]ley [facility] at [facility] N/A 2019    SIGMOIDOSCOPY FLEXIBLE performed by Marilia Begum MD at [facility] ENDOSCOPY    HX  SECTION      HX COLONOSCOPY  2017    DR. MCRAE 2017     HX COLOSTOMY      Revision 2021    HX CYSTECTOMY  2020    HX HYSTERECTOMY      HX KNEE ARTHROSCOPY Left     left knee    HX KNEE REPLACEMENT Bilateral     (R)  (L)     HX LAPAROTOMY N/A 2019    and rectovaginal bx    HX OTHER SURGICAL  2016    all teeth removed    HX SALPINGO-OOPHORECTOMY  2008    HX TUBAL LIGATION      IR INSERT TUNL CVC W PORT OVER 5 YEARS  2019    MULTIPLE DELIVERY       1990    OH COLECTOMY PARTIAL W/ANASTOMOSIS      OH TOTAL ABDOMINAL HYSTERECT W/WO RMVL TUBE OVARY      OH UNLISTED PROCEDURE FEMUR/KNEE Left     External fixator    TRANSURETHRAL RESEC BLADDER NECK        OB History          2    Para   2    Term   2       0    AB   0    Living   0         SAB   0    IAB   0    Ectopic   0    Molar   0    Multiple   0    Live Births   0              Social History     Tobacco Use    Smoking status: Never    Smokeless tobacco: Never   Substance Use Topics    Alcohol use: No      Family History   Problem Relation Age of Onset    Heart Disease Mother         CHF    Diabetes Mother    [de-identified] Hypertension Mother     Kidney Disease Mother    [de-identified] Breast Cancer Mother    [de-identified] Stroke Mother     Diabetes Father     Hypertension Father     Heart Attack Father         MI    Cancer Maternal Grandmother         stomach    Ovarian Cancer Maternal Aunt     Cancer Maternal Uncle       Current Outpatient Medications   Medication Sig    oxyCODONE-acetaminophen (PERCOCET) 5-325 mg per tablet Take 1 Tablet by mouth every four (4) hours as needed for Pain for up to 14 days.  Max Daily Amount: 6 Tablets.  PARoxetine (PAXIL) 20 mg tablet take 1 tablet by mouth once daily    OLANZapine (ZyPREXA) 2.5 mg tablet Take 1 Tablet by mouth nightly. Indications: prevent nausea and vomiting from cancer chemotherapy    ondansetron hcl (ZOFRAN) 8 mg tablet Take 1 tablet every 8 hours for nausea and vomiting beginning the night of your chemotherapy treatment for 3 days.  loperamide (IMMODIUM) 2 mg tablet Take 2 tabs=4mg after first loose stool, then 2 mg(1 tab) after each loose stool after that up to maximum of 16mg (8tabs) in 1 day    letrozole (FEMARA) 2.5 mg tablet Take 1 Tablet by mouth daily.  amLODIPine (NORVASC) 10 mg tablet take 1 tablet by mouth once daily    meclizine (Motion Sickness, Meclizine,) 25 mg tablet Take 1 Tablet by mouth three (3) times daily as needed for Dizziness.  metoprolol succinate (TOPROL-XL) 100 mg tablet Take 1 Tablet by mouth daily.  pregabalin (LYRICA) 25 mg capsule Take 1 Capsule by mouth three (3) times daily. Max Daily Amount: 75 mg.    lidocaine-prilocaine (EMLA) topical cream Apply to Mediport 1 hour prior to chemotherapy. Place kitchen saran wrap over cream and port. This will numb site.  simvastatin (ZOCOR) 20 mg tablet take 1 tablet by mouth at bedtime    gabapentin (NEURONTIN) 100 mg capsule Take 1 Capsule by mouth three (3) times daily. Max Daily Amount: 300 mg.    latanoprost (XALATAN) 0.005 % ophthalmic solution Administer 1 Drop to both eyes nightly. No current facility-administered medications for this visit.      Facility-Administered Medications Ordered in Other Visits   Medication Dose Route Frequency    0.9% sodium chloride infusion  5-250 mL/hr IntraVENous PRN    sodium chloride (NS) flush 5-40 mL  5-40 mL IntraVENous PRN    heparin (porcine) pf 500 Units  500 Units InterCATHeter PRN    0.9% sodium chloride infusion 250 mL  250 mL IntraVENous PRN     Allergies   Allergen Reactions    Pantoprazole Other (comments)     Bleeding in stomach    Promethazine Other (comments)     Bleeding in stomach    Seafood Hives     FISH    Other Medication Hives     seafood    Pollen Extracts Other (comments)    Shellfish Derived Hives        Review of Systems   Constitutional:  Positive for malaise/fatigue. Negative for chills, diaphoresis, fever and weight loss. Respiratory:  Negative for cough, hemoptysis, shortness of breath and wheezing. Cardiovascular:  Negative for chest pain, palpitations and leg swelling. Gastrointestinal:  Negative for abdominal pain, diarrhea, heartburn, nausea and vomiting. Genitourinary:  Negative for dysuria, frequency, hematuria and urgency. Musculoskeletal:  Negative for joint pain and myalgias. Skin:  Negative for itching and rash. Neurological:  Negative for dizziness, seizures, weakness and headaches. Psychiatric/Behavioral:  Negative for depression. The patient does not have insomnia. Objective:     Visit Vitals  LMP 01/31/2008       ECOG Performance Status (grade): 2  0 - able to carry on all pre-disease activity w/out restriction  1 - restricted but able to carry out light work  2 - ambulatory and can self- care but unable to carry out work  3 - bed or chair >50% of waking hours  4 - completely disable, total care, confined to bed or chair    Physical Exam  Constitutional:       Appearance: Normal appearance. HENT:      Head: Normocephalic and atraumatic. Eyes:      Pupils: Pupils are equal, round, and reactive to light. Cardiovascular:      Rate and Rhythm: Normal rate and regular rhythm. Heart sounds: Normal heart sounds. Pulmonary:      Effort: Pulmonary effort is normal.      Breath sounds: Normal breath sounds. Abdominal:      General: Bowel sounds are normal.      Palpations: Abdomen is soft. Tenderness: There is no abdominal tenderness. There is no guarding. Musculoskeletal:         General: Normal range of motion. Cervical back: Neck supple.       Right lower leg: No edema. Left lower leg: No edema. Skin:     General: Skin is warm. Neurological:      General: No focal deficit present. Mental Status: She is alert and oriented to person, place, and time. Mental status is at baseline. Diagnostics:      Recent Results (from the past 96 hour(s))   CBC WITH AUTOMATED DIFF    Collection Time: 01/27/23  9:00 AM   Result Value Ref Range    WBC 11.7 4.6 - 13.2 K/uL    RBC 2.33 (L) 4.20 - 5.30 M/uL    HGB 6.8 (L) 12.0 - 16.0 g/dL    HCT 22.9 (L) 35.0 - 45.0 %    MCV 98.3 78.0 - 100.0 FL    MCH 29.2 24.0 - 34.0 PG    MCHC 29.7 (L) 31.0 - 37.0 g/dL    RDW 24.5 (H) 11.6 - 14.5 %    PLATELET 882 522 - 009 K/uL    MPV 9.8 9.2 - 11.8 FL    NRBC 0.3 (H) 0  WBC    ABSOLUTE NRBC 0.03 (H) 0.00 - 0.01 K/uL    NEUTROPHILS 77 (H) 40 - 73 %    LYMPHOCYTES 8 (L) 21 - 52 %    MONOCYTES 11 (H) 3 - 10 %    EOSINOPHILS 2 0 - 5 %    BASOPHILS 0 0 - 2 %    IMMATURE GRANULOCYTES 2 (H) 0.0 - 0.5 %    ABS. NEUTROPHILS 9.1 (H) 1.8 - 8.0 K/UL    ABS. LYMPHOCYTES 0.9 0.9 - 3.6 K/UL    ABS. MONOCYTES 1.3 (H) 0.05 - 1.2 K/UL    ABS. EOSINOPHILS 0.2 0.0 - 0.4 K/UL    ABS. BASOPHILS 0.0 0.0 - 0.1 K/UL    ABS. IMM. GRANS. 0.2 (H) 0.00 - 0.04 K/UL    DF AUTOMATED      RBC COMMENTS ANISOCYTOSIS  2+        RBC COMMENTS HYPOCHROMIA  1+       TYPE & SCREEN    Collection Time: 01/27/23  9:30 AM   Result Value Ref Range    Crossmatch Expiration 01/30/2023,2359     ABO/Rh(D) O POSITIVE     Antibody screen NEG     Unit number [id number]     Blood component type RC LRIR,2     Unit division 00     Status of unit ISSUED     Crossmatch result Compatible    RBC, ALLOCATE    Collection Time: 01/27/23  9:30 AM   Result Value Ref Range    HISTORY CHECKED? Historical check performed          Imaging:  Results for orders placed during the hospital encounter of 02/20/19    IR ESTAB PT LEVEL I    Narrative  This procedure was performed in is entirety by a physician assistant.     : Elliot Espinosa Devin Stratton PA-C    Outpatient: Mediport incision check    CPT code: 60419    Attending physician: Dr. Denise Burgos    History: Status post Mediport placement    Exam: Patient states that the Mediport has been used without pain or incident. Patient denies fever, chills, nausea, vomiting or pain at the site. Patient  denies drainage, swelling, bleeding or tenderness. Site well approximated and healing well with Dermabond still covering the  incision. No drainage, swelling, erythema or tenderness at the site. Impression  Impression: Mediport properly placed and healing well. Advised if have any  questions or concerns or start have signs of infection to contact interventional  radiology      No results found for this or any previous visit. Results for orders placed during the hospital encounter of 11/28/22    CT CHEST ABD PELV WO CONT    Narrative  EXAM: CT Chest/Abdomen/Pelvis Without Contrast    INDICATION: Peritoneal carcinoma. History of remote hysterectomy with bilateral  salpingo-oophorectomy for endometriosis. Status post exploratory laparotomy with  cytoreductive surgery and HIPEC in 2019, with exploratory laparotomy with total  pelvic exenteration with end colostomy and ileal conduit in 2020. Biopsy-proven  recurrent disease in the liver in 2021. On palliative letrozole and Keytruda. Intermittent vaginal and rectal bleeding. TECHNIQUE: Axial CT imaging from the chest, abdomen, and pelvis without contrast  was performed. All CT scans at this facility are performed using dose  optimization technique as appropriate to the performed examination, to include  automated exposure control, adjustment of the mA and/or kV according to  patient's size (including appropriate matching for site-specific examinations),  or use of an iterative reconstruction technique.     COMPARISON: PET CT 6/17/2022    FINDINGS:  Evaluation of the solid organs, bowel, and vasculature is mildly limited  secondary to lack of intravenous contrast.    Lung/Airways:  Stable 6 mm subpleural lateral left lower lobe pulmonary nodule  (3, 26). No definite new or enlarging pulmonary nodules. Low lung volumes with  scattered areas of atelectasis, somewhat limiting evaluation. No pleural  effusion. Base of Neck:  Unremarkable. Mediastinum: No lymphadenopathy. Right chest wall Mediport tip is at the  superior cavoatrial junction. Heart: Cardiomegaly. Liver: Large heterogeneous mass in the right hepatic lobe measures 9.5 x 8.7 cm,  and appears similar to prior PET/CT. Possible new 2.4 x 1.5 cm lesion peripheral  inferior right lobe (2, 60). Evaluation is limited in the absence of intravenous  contrast and artifact on prior study limits comparison. Biliary: Unremarkable. Pancreas: Unremarkable. Spleen: Measures upper limit of normal.    Adrenal glands: Unremarkable. Kidneys: Small hypodense lesion at the lower pole of the left kidney is  incompletely characterized the absence of intravenous contrast, most likely a  cyst.    Reproductive organs: Hysterectomy and bilateral salpingo-oophorectomy. Similar  increased soft tissue mass in the deep posterior posterior pelvis, eccentric to  the right, and extending cephalad in the presacral region, which was  hypermetabolic on prior PET/CT. This measures approximately 8.1 x 4.2 cm. Bladder: Cystectomy with right lower quadrant ileal conduit. Bowel: Postsurgical changes of pelvic exenteration with end colostomy. No  evidence of bowel obstruction. Lymph nodes: Small nodules in the mid pelvis, measuring 1.6 and 1.2 cm,  respectively (2, 86-89), are stable from prior PET/CT, not clearly  hypermetabolic at that time. Otherwise, no lymphadenopathy. Vasculature: Unremarkable in the absence of intravenous contrast.    Other: No free fluid or free air. Body wall: Right lower quadrant ileal conduit and left lower quadrant colostomy.       Bones: Stable 8 mm sclerotic density left acetabulum, not hypermetabolic on  prior PET/CT and stable from at least April 2021, likely a bone [id number]. Few  other subcentimeter sclerotic foci are also unchanged since that time and most  likely bone islands. No definite suspicious osseous lesions. Degenerative  changes in the spine. Impression  1. Similar appearance of irregular soft tissue mass in the deep pelvis, which  was hypermetabolic on prior PET/CT. -Adjacent nodules in the mid pelvis are stable, recommend continued attention on  follow-up. 2. Large hepatic metastasis appears stable from PET/CT 6/17/2022. Probable new  small liver lesion inferiorly is concerning for metastasis, though evaluation is  limited due to lack of intravenous contrast and artifact in this region on prior  study. 3. Stable 6 mm left lower lobe pulmonary nodule. 4. Additional findings as above. IMPRESSION/PLAN:  Stage IV Primary Peritoneal Cancer with Recurrence  -- Patient was being followed by Dr. Fawad Norton who left the practice. -- s/p carbo (auc=6), taxol (175 mg/m2) IV every 3 wks s/p 6 cycles completed 6/6/2019  -- s/p cytoreductive surgery with HIPC with dr. Danielle Hernandez  -- s/p pelvic radiation  -- Pain-script for tylenol #3  -- 5/13/-9/30/2020 given platinum sensitive disease s/p  auc=5, Doxil 30 mg/m2 IV x 6 cycles s/p Total pelvic exenteration  -- 8/4/2021 Liver mass, core biopsies: Metastatic adenocarcinoma, most consistent with serous type. -- Biopsy c/w recurrence-reviewed [facility] and discussed treatment options. Dr. Fawad Norton discussed proceeding with hormonal therapy given tumor is ER\CA positive, immunotherapy given PD-L1 mutation although not FDA approved. Also discussed retreatment with platinum or if sensitive consider PARP inhibition. After discussion patient was placed on letrozole. -- 1/8/2022 Patient started Keytruda 400 mg IV every 3 weeks. -- Vaginal bleeding: Dr. 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  Has follow up with Urology. -- 5/31/2022 The patient presents to our [facility] clinic today for follows up and continuing cancer treatment as scheduled. -- She has tolerated Keytruda Q6 weeks, no high graded toxicities. She was agreeable to continue current therapy. -- 6/3/2022 S.p Keytruda   -- 6/17/2022 PET scan reported progressive disease. Stage IV metastatic malignancy with interval progression, dominant intensely hypermetabolic hepatic metastasis with substantial  interval enlargement compared to prior studies. Interval increase in size/extent of irregular intense increased FDG activity centered at and right of midline in the soft tissues of the deep  posterior pelvis, extending cephalad in the presacral region, at least some of which corresponds to irregular mass-like soft tissue density on CT, highly suspicious for malignancy. Small 6 mm subpleural pulmonary nodule left lower lobe laterally, not evident on PET but too small to be adequately assessed using PET  -- Today I have reviewed with the patient and her family about recent PET which showed progressive disease on multiple lines of therapy. We have discussed at length about next lines of therapy. The patient states she still believed she can achieve curable diease. I have showed her PET images which showed high burden tumor. I have explained to the patient and family that the goals of treatment of metastatic cancer are not curable but treatable in order to prolong survival and improve quality of life by reducing cancer-related symptoms. Given her burden disease and progressive on immunotherapy plus hormonal therapy, we suggested re-challenging chemotherapy with platinum regimens. 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 I have discussed about potential side effects of chemotherapy. The patient was agreeable with the plan.   -- 7/28/2022 C1 D1 palliative Carboplatin + Gemcitabine. Tolerated therapy with no high graded toxicities. -- 10/28/2022 C4D1   -- 11/04/2022: Y0O9 Chemo held due to SOB  -- She has tolerated therapy. Stable serological responses. -- 11/28/2022 MRI Pelvis and CT CAP reviewed with patient with overall stable findings. + Similar appearance of irregular soft tissue mass in the deep pelvis, which was hypermetabolic on prior PET/CT.  + Adjacent nodules in the mid pelvis are stable, recommend continued attention on follow-up.  + Large hepatic metastasis appears stable from PET/CT 6/17/2022. Questionable new small liver lesion inferiorly  + Stable 6 mm left lower lobe pulmonary nodule. -- She was admitted recently for thrombocytopenia, leukopenia. Received platelet and GSF.  -- She reported doing better today. I have reviewed with the patient about recent CT findings and advanced cancer conditions s.p multiple lines of therapy. We have discussed about GOC and roles of palliative treatments. The patient verbally understood her advanced cancer status; however she states she still has alison in achieving cancer in remission. Plan:  -- She will continue Palliative [place]/Carbo  Day 8 will be omitted due to severe cytopenias. -- Supportive IV hydration with each cycle   -- Referral to Palliative team for [address] discussion. -- CINV: Olanzapine ordered for CINV. Patient is allergic to Promethazine however she report that she is not sure if the medication cause the bleeding because she was on so much medication. -- Advised the patient to present to ED if worsening symptoms, bleeding, or concerns. -- Labs: CBC, CMP, . Supportive transfusion if indicated. -- RTC in 3 weeks, always sooner if required. Carboplatin + Gemcitabine  Days 1 Gemcitabine 800mg/m2 IV  Day 1: Carboplatin AUC 4 IV   Repeat cycle every 3 weeks. 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 Dianne Rubalcava for rectal bleeding. She states she was told no further intervention offered at this time. -- No active bleeding reported at this time  -- Monitor CBC, Iron Profile, Ferritin and replacement as indicated. -- Advised to f/u Urology as scheduled. CINV  -- Patient is allergic to Promethazine, was unable to order compazine due to the allergies. -- Olanzapine ordered for CINV    Normocytic Anemia  Iron Deficiency Anemia  --Due to iron deficiency and vaginal bleeding related patient  was scheduled for IV Venofer x 3 (Dose #3 completed on 09/08/2022)  --12/01/2022:  CBC showed WBC 6.6 K/uL, hemoglobin 8.9 g/dL with hematocrit of 29.6%. Platelet 286. --Procrit 60,000 unit to start every 3 weeks when H/H is below 10/30  --We will continue to monitor labs CBC, CMP, Iron Profile, Ferritin, and B12/folate  --12/21/2022: CBC showed WBC 15.7K/uL, hemoglobin 7.9g/dL with hematocrit of 25.4%. Platelet 951    Chemotherapy related Neuropathy  Cancer associated pain   --On Lyrica 25mg PO 3 times daily   --Percocet PRN will be refilled    Follow up in 3 weeks or sooner if indicated. No orders of the defined types were placed in this encounter. Ms. Marva Garcia has a reminder for a \"due or due soon\" health maintenance. I have asked that she contact her primary care provider for follow-up on this health maintenance. All of patient's questions answered to their apparent satisfaction. They verbally show understanding and agreement with aforementioned plan. Sav Dejesus, [address]  1/30/2023        Above mentioned total time spent for this encounter with more than 50% of the time spent in face-to-face counseling, discussing on diagnosis and management plan going forward, and co-ordination of care. Parts of this document has been produced using Dragon dictation system. Unrecognized errors in transcription may be present. Please do not hesitate to reach out for any questions or clarifications.       CC: Kevin Aldana MD clarifications.       CC: Demetrice Oro MD

## 2023-02-09 DIAGNOSIS — C56.3 MALIGNANT NEOPLASM OF BOTH OVARIES (HCC): ICD-10-CM

## 2023-02-09 DIAGNOSIS — C48.2 PERITONEAL CARCINOMA (HCC): Primary | ICD-10-CM

## 2023-02-10 DIAGNOSIS — G89.3 CANCER ASSOCIATED PAIN: ICD-10-CM

## 2023-02-10 DIAGNOSIS — C48.2 PERITONEAL CARCINOMA (HCC): Primary | ICD-10-CM

## 2023-02-10 RX ORDER — OXYCODONE AND ACETAMINOPHEN 10; 325 MG/1; MG/1
1 TABLET ORAL
Qty: 60 TABLET | Refills: 0 | Status: SHIPPED | OUTPATIENT
Start: 2023-02-10 | End: 2023-03-12

## 2023-02-14 DIAGNOSIS — N93.9 VAGINAL BLEEDING: ICD-10-CM

## 2023-02-14 DIAGNOSIS — D50.8 IRON DEFICIENCY ANEMIA SECONDARY TO INADEQUATE DIETARY IRON INTAKE: Primary | ICD-10-CM

## 2023-02-14 DIAGNOSIS — D64.9 NORMOCYTIC ANEMIA: ICD-10-CM

## 2023-02-14 DIAGNOSIS — C48.2 PERITONEAL CARCINOMA (HCC): ICD-10-CM

## 2023-02-14 DIAGNOSIS — C56.9 MALIGNANT NEOPLASM OF OVARY, UNSPECIFIED LATERALITY (HCC): Primary | ICD-10-CM

## 2023-02-14 RX ORDER — ONDANSETRON 2 MG/ML
8 INJECTION INTRAMUSCULAR; INTRAVENOUS
Status: CANCELLED | OUTPATIENT
Start: 2023-02-17

## 2023-02-14 RX ORDER — PALONOSETRON 0.05 MG/ML
0.25 INJECTION, SOLUTION INTRAVENOUS ONCE
Status: CANCELLED | OUTPATIENT
Start: 2023-02-17 | End: 2023-02-17

## 2023-02-14 RX ORDER — HEPARIN SODIUM (PORCINE) LOCK FLUSH IV SOLN 100 UNIT/ML 100 UNIT/ML
500 SOLUTION INTRAVENOUS PRN
Status: CANCELLED | OUTPATIENT
Start: 2023-02-17

## 2023-02-14 RX ORDER — ALBUTEROL SULFATE 90 UG/1
4 AEROSOL, METERED RESPIRATORY (INHALATION) PRN
Status: CANCELLED | OUTPATIENT
Start: 2023-02-17

## 2023-02-14 RX ORDER — SODIUM CHLORIDE 9 MG/ML
5-250 INJECTION, SOLUTION INTRAVENOUS PRN
Status: CANCELLED | OUTPATIENT
Start: 2023-02-17

## 2023-02-14 RX ORDER — DIPHENHYDRAMINE HYDROCHLORIDE 50 MG/ML
50 INJECTION INTRAMUSCULAR; INTRAVENOUS
Status: CANCELLED | OUTPATIENT
Start: 2023-02-17

## 2023-02-14 RX ORDER — 0.9 % SODIUM CHLORIDE 0.9 %
500 INTRAVENOUS SOLUTION INTRAVENOUS ONCE
Status: CANCELLED
Start: 2023-02-17 | End: 2023-02-17

## 2023-02-14 RX ORDER — FAMOTIDINE 10 MG/ML
20 INJECTION, SOLUTION INTRAVENOUS
Status: CANCELLED | OUTPATIENT
Start: 2023-02-17

## 2023-02-14 RX ORDER — SODIUM CHLORIDE 9 MG/ML
5-40 INJECTION INTRAVENOUS PRN
Status: CANCELLED | OUTPATIENT
Start: 2023-02-17

## 2023-02-14 RX ORDER — SODIUM CHLORIDE 9 MG/ML
INJECTION, SOLUTION INTRAVENOUS CONTINUOUS
Status: CANCELLED | OUTPATIENT
Start: 2023-02-17

## 2023-02-14 RX ORDER — EPINEPHRINE 1 MG/ML
0.3 INJECTION, SOLUTION, CONCENTRATE INTRAVENOUS PRN
Status: CANCELLED | OUTPATIENT
Start: 2023-02-17

## 2023-02-14 RX ORDER — ACETAMINOPHEN 325 MG/1
650 TABLET ORAL
Status: CANCELLED | OUTPATIENT
Start: 2023-02-17

## 2023-02-14 RX ORDER — MEPERIDINE HYDROCHLORIDE 50 MG/ML
12.5 INJECTION INTRAMUSCULAR; INTRAVENOUS; SUBCUTANEOUS PRN
Status: CANCELLED | OUTPATIENT
Start: 2023-02-17

## 2023-02-15 RX ORDER — OLANZAPINE 2.5 MG/1
TABLET ORAL
Qty: 30 TABLET | Refills: 1 | Status: SHIPPED | OUTPATIENT
Start: 2023-02-15

## 2023-02-16 ENCOUNTER — APPOINTMENT (OUTPATIENT)
Dept: INFUSION THERAPY | Age: 53
End: 2023-02-16

## 2023-02-16 ENCOUNTER — HOSPITAL ENCOUNTER (OUTPATIENT)
Facility: HOSPITAL | Age: 53
Setting detail: INFUSION SERIES
End: 2023-02-16
Payer: MEDICARE

## 2023-02-16 VITALS
WEIGHT: 219.6 LBS | DIASTOLIC BLOOD PRESSURE: 86 MMHG | BODY MASS INDEX: 38.91 KG/M2 | HEART RATE: 78 BPM | HEIGHT: 63 IN | OXYGEN SATURATION: 98 % | RESPIRATION RATE: 18 BRPM | TEMPERATURE: 98 F | SYSTOLIC BLOOD PRESSURE: 142 MMHG

## 2023-02-16 DIAGNOSIS — C56.9 MALIGNANT NEOPLASM OF OVARY, UNSPECIFIED LATERALITY (HCC): ICD-10-CM

## 2023-02-16 DIAGNOSIS — C48.2 PERITONEAL CARCINOMA (HCC): ICD-10-CM

## 2023-02-16 LAB
ALBUMIN SERPL-MCNC: 2.7 G/DL (ref 3.4–5)
ALBUMIN/GLOB SERPL: 0.7 (ref 0.8–1.7)
ALP SERPL-CCNC: 476 U/L (ref 45–117)
ALT SERPL-CCNC: 20 U/L (ref 13–56)
ANION GAP SERPL CALC-SCNC: 6 MMOL/L (ref 3–18)
AST SERPL-CCNC: 24 U/L (ref 10–38)
BASO+EOS+MONOS # BLD AUTO: 1.1 K/UL (ref 0–2.3)
BASO+EOS+MONOS NFR BLD AUTO: 8 % (ref 0.1–17)
BILIRUB SERPL-MCNC: 0.2 MG/DL (ref 0.2–1)
BUN SERPL-MCNC: 40 MG/DL (ref 7–18)
BUN/CREAT SERPL: 18 (ref 12–20)
CALCIUM SERPL-MCNC: 8.8 MG/DL (ref 8.5–10.1)
CHLORIDE SERPL-SCNC: 118 MMOL/L (ref 100–111)
CO2 SERPL-SCNC: 17 MMOL/L (ref 21–32)
CREAT SERPL-MCNC: 2.25 MG/DL (ref 0.6–1.3)
DIFFERENTIAL METHOD BLD: ABNORMAL
ERYTHROCYTE [DISTWIDTH] IN BLOOD BY AUTOMATED COUNT: 28.2 % (ref 11.5–14.5)
GLOBULIN SER CALC-MCNC: 3.7 G/DL (ref 2–4)
GLUCOSE SERPL-MCNC: 74 MG/DL (ref 74–99)
HCT VFR BLD AUTO: 24.2 % (ref 36–48)
HGB BLD-MCNC: 7.2 G/DL (ref 12–16)
LYMPHOCYTES # BLD: 1.2 K/UL (ref 1.1–5.9)
LYMPHOCYTES NFR BLD: 8 % (ref 14–44)
MCH RBC QN AUTO: 29.1 PG (ref 25–35)
MCHC RBC AUTO-ENTMCNC: 29.8 G/DL (ref 31–37)
MCV RBC AUTO: 98 FL (ref 78–102)
NEUTS SEG # BLD: 11.6 K/UL (ref 1.8–9.5)
NEUTS SEG NFR BLD: 84 % (ref 40–70)
PLATELET # BLD AUTO: 241 K/UL (ref 140–440)
POTASSIUM SERPL-SCNC: 4.9 MMOL/L (ref 3.5–5.5)
PROT SERPL-MCNC: 6.4 G/DL (ref 6.4–8.2)
RBC # BLD AUTO: 2.47 M/UL (ref 4.1–5.1)
SODIUM SERPL-SCNC: 141 MMOL/L (ref 136–145)
WBC # BLD AUTO: 13.9 K/UL (ref 4.5–13)

## 2023-02-16 PROCEDURE — 85025 COMPLETE CBC W/AUTO DIFF WBC: CPT

## 2023-02-16 PROCEDURE — 80053 COMPREHEN METABOLIC PANEL: CPT

## 2023-02-16 PROCEDURE — 36415 COLL VENOUS BLD VENIPUNCTURE: CPT

## 2023-02-17 ENCOUNTER — HOSPITAL ENCOUNTER (OUTPATIENT)
Facility: HOSPITAL | Age: 53
Setting detail: INFUSION SERIES
End: 2023-02-17
Payer: MEDICARE

## 2023-02-17 ENCOUNTER — HOSPITAL ENCOUNTER (OUTPATIENT)
Dept: INFUSION THERAPY | Age: 53
End: 2023-02-17

## 2023-02-17 VITALS
TEMPERATURE: 97.2 F | BODY MASS INDEX: 39.16 KG/M2 | HEIGHT: 63 IN | HEART RATE: 83 BPM | WEIGHT: 221 LBS | DIASTOLIC BLOOD PRESSURE: 74 MMHG | OXYGEN SATURATION: 96 % | SYSTOLIC BLOOD PRESSURE: 147 MMHG | RESPIRATION RATE: 18 BRPM

## 2023-02-17 DIAGNOSIS — C48.2 PERITONEAL CARCINOMA (HCC): ICD-10-CM

## 2023-02-17 DIAGNOSIS — C56.9 MALIGNANT NEOPLASM OF OVARY, UNSPECIFIED LATERALITY (HCC): Primary | ICD-10-CM

## 2023-02-17 PROCEDURE — 96361 HYDRATE IV INFUSION ADD-ON: CPT

## 2023-02-17 PROCEDURE — 2580000003 HC RX 258: Performed by: INTERNAL MEDICINE

## 2023-02-17 PROCEDURE — 96413 CHEMO IV INFUSION 1 HR: CPT

## 2023-02-17 PROCEDURE — 96375 TX/PRO/DX INJ NEW DRUG ADDON: CPT

## 2023-02-17 PROCEDURE — 96417 CHEMO IV INFUS EACH ADDL SEQ: CPT

## 2023-02-17 PROCEDURE — 96360 HYDRATION IV INFUSION INIT: CPT

## 2023-02-17 PROCEDURE — 6360000002 HC RX W HCPCS: Performed by: INTERNAL MEDICINE

## 2023-02-17 PROCEDURE — 96377 APPLICATON ON-BODY INJECTOR: CPT

## 2023-02-17 PROCEDURE — 96367 TX/PROPH/DG ADDL SEQ IV INF: CPT

## 2023-02-17 RX ORDER — VALSARTAN 320 MG/1
320 TABLET ORAL DAILY
COMMUNITY

## 2023-02-17 RX ORDER — HEPARIN SODIUM (PORCINE) LOCK FLUSH IV SOLN 100 UNIT/ML 100 UNIT/ML
500 SOLUTION INTRAVENOUS PRN
Status: DISPENSED | OUTPATIENT
Start: 2023-02-17 | End: 2023-02-18

## 2023-02-17 RX ORDER — SODIUM CHLORIDE 9 MG/ML
5-40 INJECTION INTRAVENOUS PRN
Status: ACTIVE | OUTPATIENT
Start: 2023-02-17 | End: 2023-02-18

## 2023-02-17 RX ORDER — ZIPRASIDONE HYDROCHLORIDE 40 MG/1
40 CAPSULE ORAL DAILY
COMMUNITY

## 2023-02-17 RX ORDER — OXYCODONE AND ACETAMINOPHEN 10; 325 MG/1; MG/1
1 TABLET ORAL EVERY 6 HOURS PRN
COMMUNITY
Start: 2023-02-10 | End: 2023-03-06 | Stop reason: SDUPTHER

## 2023-02-17 RX ORDER — PALONOSETRON 0.05 MG/ML
0.25 INJECTION, SOLUTION INTRAVENOUS ONCE
Status: COMPLETED | OUTPATIENT
Start: 2023-02-17 | End: 2023-02-17

## 2023-02-17 RX ORDER — 0.9 % SODIUM CHLORIDE 0.9 %
500 INTRAVENOUS SOLUTION INTRAVENOUS ONCE
Status: COMPLETED | OUTPATIENT
Start: 2023-02-17 | End: 2023-02-17

## 2023-02-17 RX ORDER — SODIUM CHLORIDE 9 MG/ML
5-250 INJECTION, SOLUTION INTRAVENOUS PRN
Status: ACTIVE | OUTPATIENT
Start: 2023-02-17 | End: 2023-02-18

## 2023-02-17 RX ADMIN — Medication 10 ML: at 08:48

## 2023-02-17 RX ADMIN — GEMCITABINE 1700 MG: 100 INJECTION, SOLUTION INTRAVENOUS at 10:33

## 2023-02-17 RX ADMIN — Medication 10 ML: at 11:48

## 2023-02-17 RX ADMIN — PEGFILGRASTIM 6 MG: KIT SUBCUTANEOUS at 11:56

## 2023-02-17 RX ADMIN — SODIUM CHLORIDE 25 ML/HR: 9 INJECTION, SOLUTION INTRAVENOUS at 08:54

## 2023-02-17 RX ADMIN — HEPARIN SODIUM (PORCINE) LOCK FLUSH IV SOLN 100 UNIT/ML 500 UNITS: 100 SOLUTION at 11:48

## 2023-02-17 RX ADMIN — SODIUM CHLORIDE 150 MG: 900 INJECTION, SOLUTION INTRAVENOUS at 08:57

## 2023-02-17 RX ADMIN — PALONOSETRON HYDROCHLORIDE 0.25 MG: 0.25 INJECTION, SOLUTION INTRAVENOUS at 08:51

## 2023-02-17 RX ADMIN — SODIUM CHLORIDE 500 ML: 9 INJECTION, SOLUTION INTRAVENOUS at 08:58

## 2023-02-17 RX ADMIN — CARBOPLATIN 220 MG: 450 INJECTION, SOLUTION INTRAVENOUS at 11:11

## 2023-02-17 RX ADMIN — DEXAMETHASONE SODIUM PHOSPHATE 12 MG: 10 INJECTION, SOLUTION INTRAMUSCULAR; INTRAVENOUS at 09:25

## 2023-02-17 ASSESSMENT — PAIN DESCRIPTION - DESCRIPTORS: DESCRIPTORS: SHARP

## 2023-02-17 ASSESSMENT — PAIN DESCRIPTION - ORIENTATION: ORIENTATION: RIGHT

## 2023-02-17 ASSESSMENT — PAIN DESCRIPTION - LOCATION: LOCATION: ABDOMEN;BACK

## 2023-02-17 ASSESSMENT — PAIN SCALES - GENERAL: PAINLEVEL_OUTOF10: 8

## 2023-02-17 NOTE — PROGRESS NOTES
[facility] Progress Note    Date: 2023    Name: Shanna Raygoza              MRN: [id number]              : 1970    Chemotherapy Cycle: C8D1: Carboplatin & Gemcitabine     Pt to [facility] [place], ambulatory, at 0800 accompanied by self. Ms. Bee Jefferson was assessed and education was provided. Pt reports pain in back and right side 8/10, denies urinary symptoms. Pt states this pain is not new and comes and goes. Pt also reports nausea and diarrhea in her ostomy that also comes and goes, pt is taking imodium. Pt denies any active vaginal and rectal bleeding today. Ms. Arcos's vitals were reviewed. Vitals:    23 0800   BP: (!) 145/83   Pulse: 73   Resp: 18   Temp: 98.1 °F (36.7 °C)   SpO2: 99%       Right chest mediport accessed with 20 g 1 inch blank needle. Port flushed easily and had brisk blood return. NS initiated @ 25 mL/hr. Lab results were obtained 2023 and reviewed prior to treatment today.     Latest Reference Range & Units 23 08:15   Sodium 136 - 145 mmol/L 141   Potassium 3.5 - 5.5 mmol/L 4.9   Chloride 100 - 111 mmol/L 118 (H)   CO2 21 - 32 mmol/L 17 (L)   BUN,BUNPL 7.0 - 18 MG/DL 40 (H)   Creatinine 0.6 - 1.3 MG/DL 2.25 (H)   Bun/Cre Ratio 12 - 20   18   Anion Gap 3.0 - 18 mmol/L 6   Est, Glom Filt Rate >60 ml/min/1.73m2 25 (L)   Glucose, Random 74 - 99 mg/dL 74   CALCIUM, SERUM, 713086 8.5 - 10.1 MG/DL 8.8   ALBUMIN/GLOBULIN RATIO 0.8 - 1.7   0.7 (L)   Total Protein 6.4 - 8.2 g/dL 6.4   Albumin 3.4 - 5.0 g/dL 2.7 (L)   Globulin 2.0 - 4.0 g/dL 3.7   Alk Phosphatase 45 - 117 U/L 476 (H)   ALT 13 - 56 U/L 20   AST 10 - 38 U/L 24   BILIRUBIN TOTAL 0.2 - 1.0 MG/DL 0.2   WBC 4.5 - 13.0 K/uL 13.9 (H)   RBC 4.10 - 5.10 M/uL 2.47 (L)   Hemoglobin Quant 12.0 - 16 g/dL 7.2 (L)   Hematocrit 36 - 48 % 24.2 (L)   MCV 78 - 102 FL 98.0   MCH 25.0 - 35.0 PG 29.1   MCHC 31 - 37 g/dL 29.8 (L)   RDW 11.5 - 14.5 % 28.2 (H)   Platelet Count 340 - 440 K/uL 241   Differential Type -   AUTOMATED   Seg Neutrophils 40 - 70 % 84 (H)   Segs Absolute 1.8 - 9.5 K/UL 11.6 (H)   Lymphocytes 14 - 44 % 8 (L)   Absolute Lymph # 1.1 - 5.9 K/UL 1.2   (H): Data is abnormally high  (L): Data is abnormally low    Hgb low at 7.2, result reported to Nurse Practitioner Tonia Yost. Per CAROL Mack, do NOT redraw CBC today. Pt denies any active vaginal and rectal bleeding today but states it comes and goes, NP Martina aware. Pt is to proceed with chemo treatment today and come back on Monday, 2/20, for a CBC redraw to assess if pt needs a type & cross and an order for PRBC transfusion. Pt made aware of plan and educated pt to go to ED is any worsening symptoms or bleeding occurs. Pt verbalized understanding. Lab results within ordered parameters to give chemo today. Chemo dosages verified with today's BSA and found to be within 10% of ordered dosages. Verified with another RN & [facility] pharmacist.    Pre-medications (Aloxi 0.25 mg IVP, Emend  150 mg IVPB, Decadron 12 mg IVPB &  ml IV bolus hydration) were administered as ordered and chemotherapy was initiated after blood return from port re-verified. Chemotherapy initiated approximately 30 minutes after completion of premedications. Gemcitabine (Gemzar) 1700 mg IV was infused over approx 30 minutes per order. VS stable at end of infusion and pt denied complaints. Line flushed with NS and blood return from port re-verified. Carboplatin (Paraplatin) 220 mg IV was infused over approx 30 minutes as ordered. VS stable at end of infusion and pt denied complaints. Line flushed with NS and blood return from port re-verified. Ms. Adair Guzmán tolerated infusion, and had no complaints at this time. Pt did report fatigue after treatment and stated \"she could not sleep last night\". Neulasta 6 mg SQ on body injector was applied to right upper arm. Green light was noted to be flashing appropriately and pt instructed on when to remove on body injector.  Pt verbalized understanding. Retacrit 60,000 Units SQ not given today d/t overlook. Pt called at [phone] and [facility] for pt to call back to schedule lab appointment and retacrit appointment for Monday or to see if pt could come in today or on the weekend. MD Do notified and aware of retacrit overlook. Mediport flushed with NS 20 ml and Heparin 500 units then de-accessed. No irritation or bleeding noted. Bandaid applied. Patient removed and shredded. Ms. Robert Hoang was discharged from [place] [id number] in stable condition at 1200. She is to return on Monday for a CBC redraw per [facility] NP. Pt stated she needed to work out medical transport prior to scheduling an appointment. Pt educated on the importance of getting her labs re-drawn on Monday, pt verbalized understanding. Pt also reminded to report to the ED if any worsening symptoms or blood occurs. Pt verbalized understanding.      Lucita Fleischer  February 17, 2023  9:05 AM

## 2023-02-20 ENCOUNTER — TELEPHONE (OUTPATIENT)
Facility: HOSPITAL | Age: 53
End: 2023-02-20

## 2023-02-21 ENCOUNTER — TELEPHONE (OUTPATIENT)
Facility: HOSPITAL | Age: 53
End: 2023-02-21

## 2023-02-25 RX ORDER — METOPROLOL SUCCINATE 100 MG/1
TABLET, EXTENDED RELEASE ORAL
Qty: 30 TABLET | Refills: 0 | Status: SHIPPED | OUTPATIENT
Start: 2023-02-25 | End: 2023-03-27

## 2023-02-28 ENCOUNTER — TELEPHONE (OUTPATIENT)
Age: 53
End: 2023-02-28

## 2023-02-28 NOTE — TELEPHONE ENCOUNTER
Pt. Called to let us know she went to the ED yesterday. She stated they did a CXR and some labs. She states she is feeling \"ok\" today and verified she will be here next week on Monday to see Dr. Liyah Soler for scheduled appt. No questions or concerns at this time.

## 2023-03-02 ENCOUNTER — TELEPHONE (OUTPATIENT)
Age: 53
End: 2023-03-02

## 2023-03-02 NOTE — TELEPHONE ENCOUNTER
Pt. Called this morning to discuss SOB with exertion she has been experiencing. She stated she was seen in the ED this week on Monday and that they didn't do much for her, and though she is feeling better she is still tired and in pain. Advised pt. To take it easy over the next few days so not to overexert herself to the point of difficulty breathing. Advised her that if she has any respiratory distress to proceed to the ED. She will discuss concerns with Dr. Corinne Coder at her appt. Monday 3/6/23.

## 2023-03-03 RX ORDER — MEPERIDINE HYDROCHLORIDE 25 MG/ML
12.5 INJECTION INTRAMUSCULAR; INTRAVENOUS; SUBCUTANEOUS PRN
Status: CANCELLED | OUTPATIENT
Start: 2023-03-10

## 2023-03-03 RX ORDER — 0.9 % SODIUM CHLORIDE 0.9 %
500 INTRAVENOUS SOLUTION INTRAVENOUS ONCE
Status: CANCELLED
Start: 2023-03-10 | End: 2023-03-10

## 2023-03-03 RX ORDER — DIPHENHYDRAMINE HYDROCHLORIDE 50 MG/ML
50 INJECTION INTRAMUSCULAR; INTRAVENOUS
Status: CANCELLED | OUTPATIENT
Start: 2023-03-10

## 2023-03-03 RX ORDER — PALONOSETRON 0.05 MG/ML
0.25 INJECTION, SOLUTION INTRAVENOUS ONCE
Status: CANCELLED | OUTPATIENT
Start: 2023-03-10 | End: 2023-03-10

## 2023-03-03 RX ORDER — ACETAMINOPHEN 325 MG/1
650 TABLET ORAL
Status: CANCELLED | OUTPATIENT
Start: 2023-03-10

## 2023-03-03 RX ORDER — SODIUM CHLORIDE 9 MG/ML
5-250 INJECTION, SOLUTION INTRAVENOUS PRN
Status: CANCELLED | OUTPATIENT
Start: 2023-03-10

## 2023-03-03 RX ORDER — SODIUM CHLORIDE 0.9 % (FLUSH) 0.9 %
5-40 SYRINGE (ML) INJECTION PRN
Status: CANCELLED | OUTPATIENT
Start: 2023-03-10

## 2023-03-03 RX ORDER — HEPARIN SODIUM (PORCINE) LOCK FLUSH IV SOLN 100 UNIT/ML 100 UNIT/ML
500 SOLUTION INTRAVENOUS PRN
Status: CANCELLED | OUTPATIENT
Start: 2023-03-10

## 2023-03-03 RX ORDER — EPINEPHRINE 1 MG/ML
0.3 INJECTION, SOLUTION, CONCENTRATE INTRAVENOUS PRN
Status: CANCELLED | OUTPATIENT
Start: 2023-03-10

## 2023-03-03 RX ORDER — ONDANSETRON 2 MG/ML
8 INJECTION INTRAMUSCULAR; INTRAVENOUS
Status: CANCELLED | OUTPATIENT
Start: 2023-03-10

## 2023-03-03 RX ORDER — SODIUM CHLORIDE 9 MG/ML
INJECTION, SOLUTION INTRAVENOUS CONTINUOUS
Status: CANCELLED | OUTPATIENT
Start: 2023-03-10

## 2023-03-03 RX ORDER — ALBUTEROL SULFATE 90 UG/1
4 AEROSOL, METERED RESPIRATORY (INHALATION) PRN
Status: CANCELLED | OUTPATIENT
Start: 2023-03-10

## 2023-03-06 ENCOUNTER — OFFICE VISIT (OUTPATIENT)
Age: 53
End: 2023-03-06
Payer: MEDICARE

## 2023-03-06 VITALS
RESPIRATION RATE: 18 BRPM | OXYGEN SATURATION: 100 % | SYSTOLIC BLOOD PRESSURE: 132 MMHG | DIASTOLIC BLOOD PRESSURE: 69 MMHG | HEART RATE: 59 BPM | HEIGHT: 63 IN | BODY MASS INDEX: 38.98 KG/M2 | WEIGHT: 220 LBS

## 2023-03-06 DIAGNOSIS — C78.7 HEPATIC METASTASIS (HCC): ICD-10-CM

## 2023-03-06 DIAGNOSIS — D69.6 THROMBOCYTOPENIA, UNSPECIFIED (HCC): ICD-10-CM

## 2023-03-06 DIAGNOSIS — R53.83 FATIGUE, UNSPECIFIED TYPE: ICD-10-CM

## 2023-03-06 DIAGNOSIS — C48.2 MALIGNANT NEOPLASM OF PERITONEUM, UNSPECIFIED (HCC): Primary | ICD-10-CM

## 2023-03-06 DIAGNOSIS — T45.1X5A CINV (CHEMOTHERAPY-INDUCED NAUSEA AND VOMITING): ICD-10-CM

## 2023-03-06 DIAGNOSIS — T45.1X5A ANEMIA ASSOCIATED WITH CHEMOTHERAPY: ICD-10-CM

## 2023-03-06 DIAGNOSIS — D64.81 ANEMIA ASSOCIATED WITH CHEMOTHERAPY: ICD-10-CM

## 2023-03-06 DIAGNOSIS — R11.2 CINV (CHEMOTHERAPY-INDUCED NAUSEA AND VOMITING): ICD-10-CM

## 2023-03-06 DIAGNOSIS — G89.3 NEOPLASM RELATED PAIN (ACUTE) (CHRONIC): ICD-10-CM

## 2023-03-06 DIAGNOSIS — Z79.899 ON ANTINEOPLASTIC CHEMOTHERAPY: ICD-10-CM

## 2023-03-06 PROCEDURE — 99214 OFFICE O/P EST MOD 30 MIN: CPT | Performed by: INTERNAL MEDICINE

## 2023-03-06 RX ORDER — SODIUM ZIRCONIUM CYCLOSILICATE 5 G/5G
POWDER, FOR SUSPENSION ORAL
COMMUNITY
Start: 2023-03-01

## 2023-03-06 RX ORDER — ACETAMINOPHEN 325 MG/1
325 TABLET ORAL EVERY 4 HOURS PRN
COMMUNITY

## 2023-03-06 RX ORDER — HYDROMORPHONE HYDROCHLORIDE 2 MG/1
TABLET ORAL
COMMUNITY
Start: 2023-02-28

## 2023-03-06 RX ORDER — OXYCODONE AND ACETAMINOPHEN 10; 325 MG/1; MG/1
1 TABLET ORAL EVERY 6 HOURS PRN
Qty: 90 TABLET | Refills: 0 | Status: SHIPPED | OUTPATIENT
Start: 2023-03-06 | End: 2023-04-05

## 2023-03-06 RX ORDER — ONDANSETRON 4 MG/1
TABLET, ORALLY DISINTEGRATING ORAL
COMMUNITY
Start: 2023-02-28

## 2023-03-06 NOTE — PROGRESS NOTES
HEMATOLOGY/MEDICAL ONCOLOGY PROGRESS NOTE    NAME: Carole Stephens  : 1970  DATE: 3/6/23    PCP: Leigh Ann Madrid MD    Oncology History  Carole Stephens is a 47 y.o.  postmenopausal female referred by Dr. Efrain Lee with peritoneal cancer. Patient was being followed by Dr. Sherryle Alberts who left the practice. Intitally seen be CAROL cooney with reports of vaginal bleeding. Given pt's history of hysteretectomy with BSO for endometriosis in  a TVUS was ordered. Which revealed a 6.8 cm x 5.2 cm x 4.6 cm at midline vaginal cuff. This prompted pelvic CT with findings of a lesion measuring 7.6 x 5.8 x 7.2 cm and is compatible with a pelvic neoplasm vs endometrioma given prior history of endometriosis. Tumor markers done on 2018 all normal.  S/p  Exploratory laparotomy, exploration of retroperitoneal spaces, exam under anesthesia with biopsy of rectovaginal mass on 2019. Had sigmoidoscopy which revealed submucosal mass. S/p cycle #6 of carbo/taxol on 2019. S/p cytoreductive surgery with HIPEC on 2019. S/p radiation treatment completed in 2019. Was seen in office and had a biopsy c/w recurrence. S/p cycle #6 of Carbo/Doxil on 2020. S/p Exploratory laparotomy. 2. Lysis of adhesions. 3. Simple appendectomy. 4. 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 On . She also had a revision of urostomy that is still leaking. S/p IR biopsy of liver on 2021 which demonstrated recurrent disease. Pt was on palliative letrozole and keytruda. HPI  Patient was being followed previously by Dr. Sherryle Alberts who left the practice. The patient presents to our HBV clinic today for follows up and continuing cancer treatment as scheduled. Here for follow-up. Today she continues to report on and off vaginal bleeding and rectal bleeding as well as discharge from the rectum but denied active bleeding. She reported better after omitted Day 8 treatment.     She continues to have pain controlled with current meds. She reports having some fatigue after each therapy. The patient denies fevers, chills, night sweats, skin lumps or bumps, acute bleeding or bruising issues. Denies headaches, acute vision changes, dizziness, chest pain, shortness of breath, palpitation, productive cough, vomiting, worsening abdominal pain, altered bowel habits, dysuria, new bone pain or back pain, focal numbness or weakness. Past Medical History, Surgical History, Family History, and Social History reviewed and remain unchanged. Past Medical History:   Diagnosis Date    Abnormal nuclear cardiac imaging test 05/05/2016    Low risk. Very patchy radiotracer uptake. Mild anterior artifact, low suspicion for ischemia. EF 68%. No RWMA. Normal EKG on pharm stress test.    AR (allergic rhinitis)     seasonal    Bladder cancer ([address]     Cancer (HCC)     pt states between vgina and rectal area    Chronic kidney disease     Diverticulosis     Endometriosis     s/p hysterectomy 2006    GERD (gastroesophageal reflux disease)     Glaucoma     Dr. Gutierrez Band    Hematuria, gross     History of echocardiogram 04/11/2016    Tech difficult. EF 55-60%. No RWMA. Mild conc LVH. Gr 1 DDfx. RVSP normal.      HTN (hypertension)     Hx of colonoscopy 04/05/2017    mild diverticulosis, g1 internal hemorrhoids, normal colon , repeat 10 year 2027    Hx of suicide attempt     Hyperlipidemia LDL goal < 130     IBS (irritable bowel syndrome)     Ill-defined condition     environmental allergies    Malignant neoplasm of peritoneum ([facility] 2/14/2019    Nausea & vomiting     OA (osteoarthritis)     both knees, lower back    PAD (peripheral artery disease) ([facility] 10/07/2013    No significant arterial disease at rest bilaterally. R MICHELLE 1.21.  L MICHELLE 1.16. No significant sm vessel disease.     Preeclampsia     PTSD (post-traumatic stress disorder)     Seizures (HCC)     1 x with childbirth, had toxemia    Sleep apnea     does not use cpap machine    Vertigo      Past Surgical History:   Procedure Laterality Date     SECTION      COLONOSCOPY N/A 2017    COLONOSCOPY performed by Salima Hopper MD at [facility] ENDOSCOPY    COLONOSCOPY  2017    DR. ANDERSON 2017     COLOSTOMY      Revision 2021    CT NEEDLE BIOPSY LIVER PERCUTANEOUS  2021    CT NEEDLE BIOPSY LIVER PERCUTANEOUS 2021 [address]    CYSTO/URETEROSCOPY,TX URETER STRICT      FEMUR/KNEE SURG UNLISTED Left 2009    External fixator    FLEXIBLE SIGMOIDOSCOPY N/A 2019    SIGMOIDOSCOPY FLEXIBLE performed by Hermann Ladd MD at [address]  2020    HYSTERECTOMY (CERVIX STATUS UNKNOWN)      IR PORT PLACEMENT EQUAL OR GREATER THAN 5 YEARS  2019    IR PORT PLACEMENT EQUAL OR GREATER THAN 5 YEARS 2019 [address] [facility] ANGIO IR    IR PORT PLACEMENT EQUAL OR GREATER THAN 5 YEARS  2019    KNEE ARTHROSCOPY Left     left knee    LAPAROTOMY N/A 2019    and rectovaginal bx    MULTIPLE DELIVERY       1990    OTHER SURGICAL HISTORY  2016    all teeth removed    PART REMOVAL COLON W ANASTOMOSIS      SALPINGO-OOPHORECTOMY  2008    TOTAL ABDOM HYSTERECTOMY  2006    TOTAL KNEE ARTHROPLASTY Bilateral     (R) 2012 (L)     TRANSURETHRAL RESEC BLADDER NECK      TUBAL LIGATION      US GUIDED LIVER BIOPSY PERCUTANEOUS  2021    US GUIDED LIVER BIOPSY PERCUTANEOUS 2021 [address] [facility]      Social History     Socioeconomic History    Marital status: Single     Spouse name: None    Number of children: None    Years of education: None    Highest education level: None   Tobacco Use    Smoking status: Never    Smokeless tobacco: Never   Substance and Sexual Activity    Alcohol use: No    Drug use: No      Family History   Problem Relation Age of Onset    Breast Cancer Mother     Kidney Disease Mother     Stroke Mother     Diabetes Father     Hypertension Father     Heart Disease Mother         CHF Diabetes Mother     Hypertension Mother     Ovarian Cancer Maternal Aunt     Cancer Maternal Grandmother         stomach    Cancer Maternal Uncle     Heart Attack Father         MI       Social Determinants of Health     Tobacco Use: Low Risk     Smoking Tobacco Use: Never    Smokeless Tobacco Use: Never    Passive Exposure: Not on file   Alcohol Use: Not on file   Financial Resource Strain: Not on file   Food Insecurity: Not on file   Transportation Needs: Not on file   Physical Activity: Not on file   Stress: Not on file   Social Connections: Not on file   Intimate Partner Violence: Not on file   Depression: Not at risk    PHQ-2 Score: 1   Housing Stability: Not on file        Current Outpatient Medications   Medication Sig Dispense Refill    LOKELMA 5 g PACK oral suspension take 5 grams by mouth once daily      ondansetron (ZOFRAN-ODT) 4 MG disintegrating tablet dissolve 1 tablet ON TONGUE every 8 hours if needed for nausea and vomiting      HYDROmorphone (DILAUDID) 2 MG tablet take 1 tablet by mouth every 4 hours for 5 days      acetaminophen (TYLENOL) 325 MG tablet Take 325 mg by mouth every 4 hours as needed      metoprolol succinate (TOPROL XL) 100 MG extended release tablet take 1 tablet by mouth once daily 30 tablet 0    oxyCODONE-acetaminophen (PERCOCET)  MG per tablet Take 1 tablet by mouth every 6 hours as needed. ziprasidone (GEODON) 40 MG capsule Take 40 mg by mouth daily      valsartan (DIOVAN) 320 MG tablet Take 320 mg by mouth daily      OLANZapine (ZYPREXA) 2.5 MG tablet take 1 tablet by mouth at bedtime to PREVENT nausea and vomiting for CHEMOTHERAPY 30 tablet 1    amLODIPine (NORVASC) 10 MG tablet take 1 tablet by mouth once daily      gabapentin (NEURONTIN) 100 MG capsule Take 100 mg by mouth 3 times daily.       latanoprost (XALATAN) 0.005 % ophthalmic solution Apply 1 drop to eye      letrozole (FEMARA) 2.5 MG tablet Take 2.5 mg by mouth daily      lidocaine-prilocaine (EMLA) 2.5-2.5 % cream Apply to Mediport 1 hour prior to chemotherapy. Place kitchen saran wrap over cream and port. This will numb site. loperamide (IMODIUM A-D) 2 MG tablet Take 2 tabs=4mg after first loose stool, then 2 mg(1 tab) after each loose stool after that up to maximum of 16mg (8tabs) in 1 day      meclizine (ANTIVERT) 25 MG tablet Take 25 mg by mouth 3 times daily as needed      ondansetron (ZOFRAN) 8 MG tablet Take 1 tablet every 8 hours for nausea and vomiting beginning the night of your chemotherapy treatment for 3 days. PARoxetine (PAXIL) 20 MG tablet take 1 tablet by mouth once daily      pregabalin (LYRICA) 25 MG capsule Take 25 mg by mouth 3 times daily. simvastatin (ZOCOR) 20 MG tablet take 1 tablet by mouth at bedtime       No current facility-administered medications for this visit. Review of Systems   Constitutional:  Positive for fatigue. Negative for fever. Respiratory:  Negative for cough and shortness of breath. Cardiovascular:  Negative for chest pain. Gastrointestinal:  Negative for abdominal pain, diarrhea, nausea and vomiting. Genitourinary:  Negative for flank pain. Musculoskeletal:  Negative for back pain, joint swelling and neck pain. Skin:  Negative for rash. Neurological:  Positive for weakness. Negative for syncope, numbness and headaches. Hematological:  Negative for adenopathy. Psychiatric/Behavioral:  Negative for behavioral problems. OBJECTIVE    Vitals:    03/06/23 0858   BP: 132/69   Pulse: 59   Resp: 18   SpO2: 100%       ECOG Performance Status (grade): 2  0 - able to carry on all pre-disease activity w/out restriction  1 - restricted but able to carry out light work  2 - ambulatory and can self- care but unable to carry out work  3 - bed or chair >50% of waking hours  4 - completely disable, total care, confined to bed or chair    Physical Exam  Constitutional:       Appearance: Normal appearance. She is not ill-appearing.    HENT: Head: Normocephalic and atraumatic. Cardiovascular:      Rate and Rhythm: Normal rate and regular rhythm. Pulses: Normal pulses. Heart sounds: Normal heart sounds. Pulmonary:      Effort: Pulmonary effort is normal.      Breath sounds: Normal breath sounds. Abdominal:      General: Bowel sounds are normal.      Palpations: Abdomen is soft. Tenderness: There is no abdominal tenderness. There is no guarding. Musculoskeletal:         General: No swelling or tenderness. Cervical back: Neck supple. Skin:     General: Skin is warm. Findings: No erythema or rash. Neurological:      General: No focal deficit present. Mental Status: She is alert and oriented to person, place, and time. Mental status is at baseline. Psychiatric:         Behavior: Behavior normal.        Wt Readings from Last 3 Encounters:   03/06/23 220 lb (99.8 kg)   02/17/23 221 lb (100.2 kg)   02/16/23 219 lb 9.6 oz (99.6 kg)        CT Result (most recent):  CT CHEST ABDOMEN PELVIS WO CONTRAST 11/28/2022    Narrative  EXAM: CT Chest/Abdomen/Pelvis Without Contrast    INDICATION: Peritoneal carcinoma. History of remote hysterectomy with bilateral  salpingo-oophorectomy for endometriosis. Status post exploratory laparotomy with  cytoreductive surgery and HIPEC in 2019, with exploratory laparotomy with total  pelvic exenteration with end colostomy and ileal conduit in 2020. Biopsy-proven  recurrent disease in the liver in 2021. On palliative letrozole and Keytruda. Intermittent vaginal and rectal bleeding. TECHNIQUE: Axial CT imaging from the chest, abdomen, and pelvis without contrast  was performed.  All CT scans at this facility are performed using dose  optimization technique as appropriate to the performed examination, to include  automated exposure control, adjustment of the mA and/or kV according to  patient's size (including appropriate matching for site-specific examinations),  or use of an iterative reconstruction technique. COMPARISON: PET CT 6/17/2022    FINDINGS:  Evaluation of the solid organs, bowel, and vasculature is mildly limited  secondary to lack of intravenous contrast.    Lung/Airways:  Stable 6 mm subpleural lateral left lower lobe pulmonary nodule  (3, 26). No definite new or enlarging pulmonary nodules. Low lung volumes with  scattered areas of atelectasis, somewhat limiting evaluation. No pleural  effusion. Base of Neck:  Unremarkable. Mediastinum: No lymphadenopathy. Right chest wall Mediport tip is at the  superior cavoatrial junction. Heart: Cardiomegaly. Liver: Large heterogeneous mass in the right hepatic lobe measures 9.5 x 8.7 cm,  and appears similar to prior PET/CT. Possible new 2.4 x 1.5 cm lesion peripheral  inferior right lobe (2, 60). Evaluation is limited in the absence of intravenous  contrast and artifact on prior study limits comparison. Biliary: Unremarkable. Pancreas: Unremarkable. Spleen: Measures upper limit of normal.    Adrenal glands: Unremarkable. Kidneys: Small hypodense lesion at the lower pole of the left kidney is  incompletely characterized the absence of intravenous contrast, most likely a  cyst.    Reproductive organs: Hysterectomy and bilateral salpingo-oophorectomy. Similar  increased soft tissue mass in the deep posterior posterior pelvis, eccentric to  the right, and extending cephalad in the presacral region, which was  hypermetabolic on prior PET/CT. This measures approximately 8.1 x 4.2 cm. Bladder: Cystectomy with right lower quadrant ileal conduit. Bowel: Postsurgical changes of pelvic exenteration with end colostomy. No  evidence of bowel obstruction. Lymph nodes: Small nodules in the mid pelvis, measuring 1.6 and 1.2 cm,  respectively (2, 86-89), are stable from prior PET/CT, not clearly  hypermetabolic at that time. Otherwise, no lymphadenopathy.     Vasculature: Unremarkable in the absence of intravenous contrast.    Other: No free fluid or free air. Body wall: Right lower quadrant ileal conduit and left lower quadrant colostomy. Bones: Stable 8 mm sclerotic density left acetabulum, not hypermetabolic on  prior PET/CT and stable from at least April 2021, likely a bone [address]. Few  other subcentimeter sclerotic foci are also unchanged since that time and most  likely bone islands. No definite suspicious osseous lesions. Degenerative  changes in the spine. Impression  1. Similar appearance of irregular soft tissue mass in the deep pelvis, which  was hypermetabolic on prior PET/CT. -Adjacent nodules in the mid pelvis are stable, recommend continued attention on  follow-up. 2. Large hepatic metastasis appears stable from PET/CT 6/17/2022. Probable new  small liver lesion inferiorly is concerning for metastasis, though evaluation is  limited due to lack of intravenous contrast and artifact in this region on prior  study. 3. Stable 6 mm left lower lobe pulmonary nodule. 4. Additional findings as above.       Lab Results   Component Value Date    WBC 13.9 (H) 02/16/2023    HGB 7.2 (L) 02/16/2023    HCT 24.2 (L) 02/16/2023    MCV 98.0 02/16/2023     02/16/2023    LYMPHOPCT 8 (L) 02/16/2023    RBC 2.47 (L) 02/16/2023    MCH 29.1 02/16/2023    MCHC 29.8 (L) 02/16/2023    RDW 28.2 (H) 02/16/2023         Lab Results   Component Value Date     02/16/2023    K 4.9 02/16/2023     (H) 02/16/2023    CO2 17 (L) 02/16/2023    BUN 40 (H) 02/16/2023    CREATININE 2.25 (H) 02/16/2023    GLUCOSE 74 02/16/2023    CALCIUM 8.8 02/16/2023    PROT 6.4 02/16/2023    LABALBU 2.7 (L) 02/16/2023    BILITOT 0.2 02/16/2023    ALKPHOS 476 (H) 02/16/2023    AST 24 02/16/2023    ALT 20 02/16/2023    LABGLOM 25 (L) 02/16/2023    GFRAA 30 (L) 09/21/2022    AGRATIO 0.7 (L) 01/26/2023    GLOB 3.7 02/16/2023       Lab Results   Component Value Date    IRON 37 11/14/2022    TIBC 181 (L) 11/14/2022    FERRITIN 1,390 (H) 11/14/2022        Iron Saturation   Date Value Ref Range Status   11/14/2022 20 15 - 55 % Final     No results found for: [facility]      Lab Results   Component Value Date    JWTQOLUY31 >2000 (H) 09/07/2022     Lab Results   Component Value Date    FOLATE 7.9 09/07/2022       DIAGNOSIS  1. Malignant neoplasm of peritoneum, unspecified ([address]    2. Neoplasm related pain (acute) (chronic)    3. Thrombocytopenia, unspecified ([address]    4. Hepatic metastasis ([address]    5. On antineoplastic chemotherapy    6. Fatigue, unspecified type    7. CINV (chemotherapy-induced nausea and vomiting)    8. 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 Fermin Bautista who left the practice. -- s/p carbo (auc=6), taxol (175 mg/m2) IV every 3 wks s/p 6 cycles completed 6/6/2019  -- s/p cytoreductive surgery with HIPC with dr. Parag Sierra  -- s/p pelvic radiation  -- Pain-script for tylenol #3  -- 5/13/-9/30/2020 given platinum sensitive disease s/p  auc=5, Doxil 30 mg/m2 IV x 6 cycles s/p Total pelvic exenteration  -- 8/4/2021 Liver mass, core biopsies: Metastatic adenocarcinoma, most consistent with serous type. -- Biopsy c/w recurrence-reviewed Caris and discussed treatment options. Dr. Fermin Bautista discussed proceeding with hormonal therapy given tumor is ER\OH positive, immunotherapy given PD-L1 mutation although not FDA approved. Also discussed retreatment with platinum or if sensitive consider PARP inhibition. After discussion patient was placed on letrozole. -- 1/8/2022 Patient started Keytruda 400 mg IV every 3 weeks. -- Vaginal bleeding: Dr. 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 Has follow up with Urology. -- 5/31/2022 The patient presents to our HBV clinic today for follows up and continuing cancer treatment as scheduled. -- She has tolerated Keytruda Q6 weeks, no high graded toxicities.  She was agreeable to continue current therapy. -- 6/3/2022 S.p Keytruda   -- 6/17/2022 PET scan reported progressive disease. Stage IV metastatic malignancy with interval progression, dominant intensely hypermetabolic hepatic metastasis with substantial  interval enlargement compared to prior studies. Interval increase in size/extent of irregular intense increased FDG activity centered at and right of midline in the soft tissues of the deep  posterior pelvis, extending cephalad in the presacral region, at least some of which corresponds to irregular mass-like soft tissue density on CT, highly suspicious for malignancy. Small 6 mm subpleural pulmonary nodule left lower lobe laterally, not evident on PET but too small to be adequately assessed using PET  -- Today I have reviewed with the patient and her family about recent PET which showed progressive disease on multiple lines of therapy. We have discussed at length about next lines of therapy. The patient states she still believed she can achieve curable diease. I have showed her PET images which showed high burden tumor. I have explained to the patient and family that the goals of treatment of metastatic cancer are not curable but treatable in order to prolong survival and improve quality of life by reducing cancer-related symptoms. Given her burden disease and progressive on immunotherapy plus hormonal therapy, we suggested re-challenging chemotherapy with platinum regimens. 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 I have discussed about potential side effects of chemotherapy. The patient was agreeable with the plan. -- 7/28/2022 C1 D1 palliative Carboplatin + Gemcitabine. Tolerated therapy with no high graded toxicities. -- 10/28/2022 C4D1   -- 11/04/2022: I6H8 Chemo held due to SOB  -- She has tolerated therapy. Stable serological responses.    -- 11/28/2022 MRI Pelvis and CT CAP reviewed with patient with overall stable findings. + Similar appearance of irregular soft tissue mass in the deep pelvis, which was hypermetabolic on prior PET/CT.  + Adjacent nodules in the mid pelvis are stable, recommend continued attention on follow-up.  + Large hepatic metastasis appears stable from PET/CT 6/17/2022. Questionable new small liver lesion inferiorly  + Stable 6 mm left lower lobe pulmonary nodule. -- She was admitted recently for thrombocytopenia, leukopenia. Received platelet and GSF.  -- We have discussed about GOC and roles of palliative treatments. The patient verbally understood her advanced cancer status; however she states she still has marcy in achieving cancer in remission. -- Since last visit she was on Palliative [place]/Carbo ever 3 weeks, Day 8 was omitted d/t cytopenias. She reported doing better and tolerated therapy without high graded toxicities. --02/17/2023 C8D1 Carbo/Gemzar  -- Today patient reports she is not tolerating her treatment well. She states she has more rough days than good days. She reported increasing fatigue after each therapy. She has nausea and vomiting after every treatment. Plan:  -- Due to intolerance to therapy we will hold Gemzar/Carbo at this time. -- PET CT  for restaging workup  -- Supportive IV hydration weekly  -- [facility]  -- Referral to Palliative team for [address] discussion since previous visit. .  Will re-send new referral.   -- CINV: Olanzapine ordered for CINV. Patient is allergic to Promethazine however she reports that she is not sure if the medication causes the bleeding because she is taking a lot of medications. -- Advised the patient to present to ED if worsening symptoms bleeding, or any concerns. -- Rx for mobility scooter provided per patient request.  -- Labs: CBC, CMP, , . Supportive transfusion if indicated.   -- RTC in 2 weeks, always sooner if required. Carboplatin + Gemcitabine--ON HOLD  Days 1 Gemcitabine 800mg/m2 IV  Day 1: Carboplatin AUC 4 IV   Repeat cycle every 3 weeks. 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 Mckay Alejandro for rectal bleeding. She states she was told no further intervention offered at this time. --Monitor CBC, Iron Profile, Ferritin and replacement as indicated. --Advised to f/u Urology as scheduled. CINV  --Patient is allergic to Promethazine, was unable to order compazine due to the allergies. --Olanzapine ordered for CINV     Normocytic Anemia  Iron Deficiency Anemia  --Due to iron deficiency and vaginal bleeding related patient  was scheduled for IV Venofer x 3 (Dose #3 completed on 09/08/2022)  --12/01/2022:  CBC showed WBC 6.6 K/uL, hemoglobin 8.9 g/dL with hematocrit of 29.6%. Platelet 291. --Procrit 60,000 unit to start every 3 weeks when H/H is below 10/30  --We will continue to monitor labs CBC, CMP, Iron Profile, Ferritin, and B12/folate  --02/16/2023: CBC showed WBC 13.9K/uL, hemoglobin 7.2g/dL with hematocrit of 24.2%. Platelet 980.      Chemotherapy related Neuropathy  Cancer associated pain   --On Lyrica 25mg PO 3 times daily   --Percocet PRN will be refilled    Orders Placed This Encounter    PET CT WHOLE BODY     Standing Status:   Future     Standing Expiration Date:   3/6/2024    CBC with Auto Differential     Standing Status:   Future     Standing Expiration Date:   3/6/2024    Comprehensive Metabolic Panel     Standing Status:   Future     Standing Expiration Date:   3/6/2024    Iron and TIBC     Standing Status:   Future     Standing Expiration Date:   3/6/2024    Ferritin     Standing Status:   Future     Standing Expiration Date:   3/6/2024    Cancer Antigen 19-9    Vitamin B12     Standing Status:   Future     Standing Expiration Date:   3/6/2024    Folate     Standing Status:   Future     Standing Expiration Date: 3/6/2024    Cancer Antigen 125     Standing Status:   Future     Standing Expiration Date:   3/6/2024    External Referral To Palliative Care     Referral Priority:   Routine     Referral Type:   Eval and Treat     Referral Reason:   Specialty Services Required     Requested Specialty:   Palliative Medicine     Number of Visits Requested:   1    LOKELMA 5 g PACK oral suspension     Sig: take 5 grams by mouth once daily    ondansetron (ZOFRAN-ODT) 4 MG disintegrating tablet     Sig: dissolve 1 tablet ON TONGUE every 8 hours if needed for nausea and vomiting    HYDROmorphone (DILAUDID) 2 MG tablet     Sig: take 1 tablet by mouth every 4 hours for 5 days    acetaminophen (TYLENOL) 325 MG tablet     Sig: Take 325 mg by mouth every 4 hours as needed    oxyCODONE-acetaminophen (PERCOCET)  MG per tablet     Sig: Take 1 tablet by mouth every 6 hours as needed for Pain for up to 30 days. Max Daily Amount: 4 tablets     Dispense:  90 tablet     Refill:  0     Reduce doses taken as pain becomes manageable        Lin Harrison DNP  3/6/2023    The patient has a reminder for a \"due or due soon\" health maintenance. I have asked the patient to contact primary care provider for follow-up on the health maintenance. All of patient's questions answered to their apparent satisfaction. They verbally show understanding and agreement with aforementioned plan. I have assessed the patient with the NP. I have reviewed clinical data, notes, laboratory values and imaging studies. I discussed the care with the NP and agreed with the full assessment as outlined. Patient reported increasing fatigue. Deconditioning with increasing fatigue. Will obtain restaging PET  Will refer to Palliative for [address] discussion. Continue supportive care    Ze Brunson M.D.      Above mentioned total time spent for this encounter with more than 50% of the time spent in face-to-face counseling, reviewing previous medical charts, discussing on diagnosis and management plan going forward, and co-ordination of care.       CC: Ludie Rinne, MD

## 2023-03-06 NOTE — TELEPHONE ENCOUNTER
This patient contacted office for the following prescriptions to be filled:    Medication requested : PARoxetine (PAXIL) 20 MG tablet       QTY 30  PCP: [address] or Print: [facility] tony or Local pharmacy [address]     Scheduled appointment if not seen by current providers in office: LOV 8/31/2021 was not able to make an appt today will have to call back to see when we have an opening .

## 2023-03-07 RX ORDER — AMLODIPINE BESYLATE 10 MG/1
TABLET ORAL
Qty: 30 TABLET | Refills: 0 | Status: SHIPPED | OUTPATIENT
Start: 2023-03-07

## 2023-03-07 RX ORDER — PAROXETINE HYDROCHLORIDE 20 MG/1
20 TABLET, FILM COATED ORAL EVERY MORNING
Qty: 30 TABLET | Refills: 0 | Status: SHIPPED | OUTPATIENT
Start: 2023-03-07

## 2023-03-10 ENCOUNTER — HOSPITAL ENCOUNTER (OUTPATIENT)
Facility: HOSPITAL | Age: 53
Setting detail: INFUSION SERIES
End: 2023-03-10
Payer: MEDICARE

## 2023-03-10 VITALS
DIASTOLIC BLOOD PRESSURE: 82 MMHG | SYSTOLIC BLOOD PRESSURE: 133 MMHG | RESPIRATION RATE: 18 BRPM | TEMPERATURE: 98.1 F | OXYGEN SATURATION: 100 % | HEART RATE: 76 BPM

## 2023-03-10 DIAGNOSIS — C48.2 PERITONEAL CARCINOMA (HCC): ICD-10-CM

## 2023-03-10 DIAGNOSIS — D50.9 IRON DEFICIENCY ANEMIA, UNSPECIFIED IRON DEFICIENCY ANEMIA TYPE: ICD-10-CM

## 2023-03-10 DIAGNOSIS — N18.4 CKD (CHRONIC KIDNEY DISEASE) STAGE 4, GFR 15-29 ML/MIN (HCC): ICD-10-CM

## 2023-03-10 DIAGNOSIS — C56.9 MALIGNANT NEOPLASM OF OVARY, UNSPECIFIED LATERALITY (HCC): Primary | ICD-10-CM

## 2023-03-10 DIAGNOSIS — D50.8 IRON DEFICIENCY ANEMIA SECONDARY TO INADEQUATE DIETARY IRON INTAKE: ICD-10-CM

## 2023-03-10 LAB
ABO + RH BLD: NORMAL
BASOPHILS # BLD: 0 K/UL (ref 0–0.1)
BASOPHILS NFR BLD: 0 % (ref 0–2)
BLD PROD TYP BPU: NORMAL
BLOOD BANK CMNT PATIENT-IMP: NORMAL
BLOOD BANK DISPENSE STATUS: NORMAL
BLOOD GROUP ANTIBODIES SERPL: NORMAL
BPU ID: NORMAL
CROSSMATCH RESULT: NORMAL
DIFFERENTIAL METHOD BLD: ABNORMAL
EOSINOPHIL # BLD: 0.1 K/UL (ref 0–0.4)
EOSINOPHIL NFR BLD: 1 % (ref 0–5)
ERYTHROCYTE [DISTWIDTH] IN BLOOD BY AUTOMATED COUNT: 24.8 % (ref 11.6–14.5)
HCT VFR BLD AUTO: 21.9 % (ref 35–45)
HGB BLD-MCNC: 6.7 G/DL (ref 12–16)
HISTORY CHECK: NORMAL
IMM GRANULOCYTES # BLD AUTO: 0.1 K/UL (ref 0–0.04)
IMM GRANULOCYTES NFR BLD AUTO: 1 % (ref 0–0.5)
LYMPHOCYTES # BLD: 0.8 K/UL (ref 0.9–3.6)
LYMPHOCYTES NFR BLD: 8 % (ref 21–52)
MCH RBC QN AUTO: 30.7 PG (ref 24–34)
MCHC RBC AUTO-ENTMCNC: 30.6 G/DL (ref 31–37)
MCV RBC AUTO: 100.5 FL (ref 78–100)
MONOCYTES # BLD: 1.1 K/UL (ref 0.05–1.2)
MONOCYTES NFR BLD: 11 % (ref 3–10)
NEUTS SEG # BLD: 7.8 K/UL (ref 1.8–8)
NEUTS SEG NFR BLD: 79 % (ref 40–73)
NRBC # BLD: 0 K/UL (ref 0–0.01)
NRBC BLD-RTO: 0 PER 100 WBC
PLATELET # BLD AUTO: 286 K/UL (ref 135–420)
PLATELET COMMENT: ABNORMAL
PMV BLD AUTO: 10.9 FL (ref 9.2–11.8)
RBC # BLD AUTO: 2.18 M/UL (ref 4.2–5.3)
RBC MORPH BLD: ABNORMAL
SPECIMEN EXP DATE BLD: NORMAL
UNIT DIVISION: 0
WBC # BLD AUTO: 9.9 K/UL (ref 4.6–13.2)

## 2023-03-10 PROCEDURE — 96372 THER/PROPH/DIAG INJ SC/IM: CPT

## 2023-03-10 PROCEDURE — 96360 HYDRATION IV INFUSION INIT: CPT

## 2023-03-10 PROCEDURE — 2580000003 HC RX 258: Performed by: INTERNAL MEDICINE

## 2023-03-10 PROCEDURE — 6360000002 HC RX W HCPCS: Performed by: NURSE PRACTITIONER

## 2023-03-10 PROCEDURE — 85025 COMPLETE CBC W/AUTO DIFF WBC: CPT

## 2023-03-10 PROCEDURE — 6360000002 HC RX W HCPCS: Performed by: INTERNAL MEDICINE

## 2023-03-10 PROCEDURE — 86900 BLOOD TYPING SEROLOGIC ABO: CPT

## 2023-03-10 PROCEDURE — 86923 COMPATIBILITY TEST ELECTRIC: CPT

## 2023-03-10 PROCEDURE — 96361 HYDRATE IV INFUSION ADD-ON: CPT

## 2023-03-10 RX ORDER — DIPHENHYDRAMINE HYDROCHLORIDE 50 MG/ML
50 INJECTION INTRAMUSCULAR; INTRAVENOUS
Status: CANCELLED | OUTPATIENT
Start: 2023-03-12

## 2023-03-10 RX ORDER — ACETAMINOPHEN 325 MG/1
650 TABLET ORAL
Status: CANCELLED | OUTPATIENT
Start: 2023-03-12

## 2023-03-10 RX ORDER — ALBUTEROL SULFATE 90 UG/1
4 AEROSOL, METERED RESPIRATORY (INHALATION) PRN
Status: CANCELLED | OUTPATIENT
Start: 2023-03-12

## 2023-03-10 RX ORDER — HEPARIN SODIUM (PORCINE) LOCK FLUSH IV SOLN 100 UNIT/ML 100 UNIT/ML
500 SOLUTION INTRAVENOUS PRN
Status: DISCONTINUED | OUTPATIENT
Start: 2023-03-10 | End: 2023-10-28 | Stop reason: HOSPADM

## 2023-03-10 RX ORDER — SODIUM CHLORIDE 9 MG/ML
INJECTION, SOLUTION INTRAVENOUS ONCE
Status: COMPLETED | OUTPATIENT
Start: 2023-03-10 | End: 2023-03-10

## 2023-03-10 RX ORDER — SODIUM CHLORIDE 0.9 % (FLUSH) 0.9 %
5-40 SYRINGE (ML) INJECTION PRN
Status: DISCONTINUED | OUTPATIENT
Start: 2023-03-10 | End: 2023-10-28 | Stop reason: HOSPADM

## 2023-03-10 RX ORDER — EPINEPHRINE 1 MG/ML
0.3 INJECTION, SOLUTION, CONCENTRATE INTRAVENOUS PRN
Status: CANCELLED | OUTPATIENT
Start: 2023-03-12

## 2023-03-10 RX ORDER — SODIUM CHLORIDE 9 MG/ML
INJECTION, SOLUTION INTRAVENOUS CONTINUOUS
Status: CANCELLED | OUTPATIENT
Start: 2023-03-12

## 2023-03-10 RX ORDER — ONDANSETRON 2 MG/ML
8 INJECTION INTRAMUSCULAR; INTRAVENOUS
Status: CANCELLED | OUTPATIENT
Start: 2023-03-12

## 2023-03-10 RX ADMIN — SODIUM CHLORIDE: 9 INJECTION, SOLUTION INTRAVENOUS at 08:45

## 2023-03-10 RX ADMIN — EPOETIN ALFA-EPBX 60000 UNITS: 20000 INJECTION, SOLUTION INTRAVENOUS; SUBCUTANEOUS at 10:24

## 2023-03-10 RX ADMIN — HEPARIN SODIUM (PORCINE) LOCK FLUSH IV SOLN 100 UNIT/ML 500 UNITS: 100 SOLUTION at 10:22

## 2023-03-10 ASSESSMENT — PAIN DESCRIPTION - LOCATION: LOCATION: BACK

## 2023-03-10 ASSESSMENT — PAIN DESCRIPTION - DESCRIPTORS: DESCRIPTORS: SHARP;THROBBING

## 2023-03-10 ASSESSMENT — PAIN SCALES - GENERAL: PAINLEVEL_OUTOF10: 4

## 2023-03-10 NOTE — PROGRESS NOTES
[address] [facility] Progress Note    Date: March 10, 2023    Name: Abril Wilkerson              MRN: [id number]              : 1970    Weekly Hydration and Retacrit Injection Every 3 Months  CHEMO ON HOLD    [facility], ambulatory, at 0800. Ms. Deborah Rhodes was assessed and education was provided. Ms. Arcos's vitals were reviewed. Vitals:    03/10/23 0807   BP: 133/82   Pulse: 76   Resp: 18   Temp: 98.1 °F (36.7 °C)   SpO2: 100%       Right chest mediport accessed with 20 g 1 inch blank needle. Port flushed easily and had brisk blood return. Blood for CBC drawn off after 10 ml waste. CBC ran on site but flagged. Preliminary results:  1st run HGB 6.9/HCT 22.5  2nd run HGB 7/HCT 22.5    CBC sent out for processing. Results below.     Recent Results (from the past 12 hour(s))   CBC with Auto Differential    Collection Time: 03/10/23  8:08 AM   Result Value Ref Range    WBC 9.9 4.6 - 13.2 K/uL    RBC 2.18 (L) 4.20 - 5.30 M/uL    Hemoglobin 6.7 (L) 12.0 - 16.0 g/dL    Hematocrit 21.9 (L) 35.0 - 45.0 %    .5 (H) 78.0 - 100.0 FL    MCH 30.7 24.0 - 34.0 PG    MCHC 30.6 (L) 31.0 - 37.0 g/dL    RDW 24.8 (H) 11.6 - 14.5 %    Platelets 762 566 - 225 K/uL    MPV 10.9 9.2 - 11.8 FL    Nucleated RBCs 0.0 0  WBC    nRBC 0.00 0.00 - 0.01 K/uL    Seg Neutrophils 79 (H) 40 - 73 %    Lymphocytes 8 (L) 21 - 52 %    Monocytes 11 (H) 3 - 10 %    Eosinophils % 1 0 - 5 %    Basophils 0 0 - 2 %    Immature Granulocytes 1 (H) 0.0 - 0.5 %    Segs Absolute 7.8 1.8 - 8.0 K/UL    Absolute Lymph # 0.8 (L) 0.9 - 3.6 K/UL    Absolute Mono # 1.1 0.05 - 1.2 K/UL    Absolute Eos # 0.1 0.0 - 0.4 K/UL    Basophils Absolute 0.0 0.0 - 0.1 K/UL    Absolute Immature Granulocyte 0.1 (H) 0.00 - 0.04 K/UL    Differential Type AUTOMATED      Platelet Comment ADEQUATE PLATELETS      RBC Comment MACROCYTOSIS  1+        RBC Comment POLYCHROMASIA  1+        RBC Comment SCHISTOCYTES  2+        RBC Comment ANISOCYTOSIS  3+        RBC Comment POIKILOCYTOSIS  1+       TYPE AND SCREEN    Collection Time: 03/10/23  8:30 AM   Result Value Ref Range    Crossmatch expiration date 03/13/2023,8092     ABO/Rh O POSITIVE     Antibody Screen NEG     Blood Bank Comment       [facility] FOR TYSC. 4768 RELEASED UNIT    Unit Number [id number]     Product Code Blood Bank  LRIR     Unit Divison 00     Dispense Status Blood Bank REL FROM [facility]     Crossmatch Result Compatible    PREPARE RBC (CROSSMATCH), 1 Units    Collection Time: 03/10/23  8:45 AM   Result Value Ref Range    History Check Historical check performed      1L NS infused over approximately 90 minutes as ordered for weekly hydration. Retacrit indicated for HGB less than 10. Retacrit 29047 units SUBQ given in the back of her right arm, band aid applied. Spoke to Jed Ortez NP regarding HGB. Order for 1 unit PRBC received. Blood for Type and Cross collected and sent out for pt to receive blood Monday. Per pt she is unable to come in on Monday for her blood transfusion due to transportation, pt now scheduled to come in for type and cross on Tuesday at [facility] [address] and then blood on Wednesday at 900. Blood bank aware of the change. Per Jed Ortez NP- okay to give pt her weekly hydration on Wednesday 3/15/23 so pt does not have to come 3 times in one week. Ms. Clay Monsivais tolerated infusion, and had no complaints at this time. Mediport flushed with NS 20 ml and Heparin 500 units then de-accessed. No irritation or bleeding noted. Bandaid applied. Patient armband removed and shredded. Ms. Clay Monsivais was discharged from [place] [address] in stable condition at 1030. She is to return on 03/14/23 at 0815 for her next LAB appointment for type and cross.     Lazarus Garcia RN  March 10, 2023  12:15 PM

## 2023-03-12 ASSESSMENT — ENCOUNTER SYMPTOMS
VOMITING: 0
DIARRHEA: 0
COUGH: 0
ABDOMINAL PAIN: 0
NAUSEA: 0
BACK PAIN: 0
SHORTNESS OF BREATH: 0

## 2023-03-14 ENCOUNTER — HOSPITAL ENCOUNTER (OUTPATIENT)
Facility: HOSPITAL | Age: 53
Setting detail: INFUSION SERIES
End: 2023-03-14
Payer: MEDICARE

## 2023-03-14 VITALS
SYSTOLIC BLOOD PRESSURE: 117 MMHG | HEART RATE: 82 BPM | DIASTOLIC BLOOD PRESSURE: 75 MMHG | TEMPERATURE: 97.8 F | OXYGEN SATURATION: 98 % | RESPIRATION RATE: 20 BRPM

## 2023-03-14 LAB — HISTORY CHECK: NORMAL

## 2023-03-14 PROCEDURE — 36415 COLL VENOUS BLD VENIPUNCTURE: CPT

## 2023-03-14 PROCEDURE — 86900 BLOOD TYPING SEROLOGIC ABO: CPT

## 2023-03-14 PROCEDURE — P9040 RBC LEUKOREDUCED IRRADIATED: HCPCS

## 2023-03-14 PROCEDURE — 86923 COMPATIBILITY TEST ELECTRIC: CPT

## 2023-03-14 RX ORDER — SODIUM CHLORIDE 9 MG/ML
INJECTION, SOLUTION INTRAVENOUS PRN
Status: DISCONTINUED | OUTPATIENT
Start: 2023-03-14 | End: 2023-10-28 | Stop reason: HOSPADM

## 2023-03-14 NOTE — PROGRESS NOTES
\[facility]\"" Progress Note    Date: 2023    Name: Ria Arcos    MRN: [id number]         : 1970    Ms. Arcos arrived in the \[facility]\"" today at 0825, in stable condition, here for TYPE & CROSSMATCH FOR ONE UNIT OF BLOOD TO BE DRAWN. She was assessed and education was provided.     Ms. Arcos's vitals were reviewed.  Vitals:    23 0825   BP: 117/75   Pulse: 82   Resp: 20   Temp: 97.8 °F (36.6 °C)   SpO2: 98%       Blood for the ordered TYPE & CROSSMATCH (1 lavender top tube & 3 pink top tubes) was drawn from her LEFT AC at 0838, without incident. The blood was sent out by  to the [facility] lab blood bank for processing.       The required blood bank transfusion armband was applied to her LEFT wrist, and Ms. Arcos was reminded to keep the armband completely dry and intact until after the completion of her blood transfusion on tomorrow, and she verbalized understanding.           Recent Results (from the past 12 hour(s))   TYPE AND SCREEN    Collection Time: 23  8:38 AM   Result Value Ref Range    Crossmatch expiration date 2023,2359     ABO/Rh O POSITIVE     Antibody Screen NEG     Unit Number [id number]     Product Code Blood Bank RC LRIR,2     Unit Divison 00     Dispense Status Blood Bank ALLOCATED     Crossmatch Result Compatible    PREPARE RBC (CROSSMATCH), 1 Units    Collection Time: 23  8:45 AM   Result Value Ref Range    History Check Historical check performed          Ms. Arcos tolerated well and voiced no complaints.     [facility] at 0850.     She is to return on tomorrow, Wednesday, 3-15-23 at 0900, for her next appointment, to receive ONE UNIT OF PRBC.     Jerson Simmons RN  2023  4:41 PM

## 2023-03-15 ENCOUNTER — HOSPITAL ENCOUNTER (OUTPATIENT)
Facility: HOSPITAL | Age: 53
Setting detail: INFUSION SERIES
End: 2023-03-15
Payer: MEDICARE

## 2023-03-15 VITALS
OXYGEN SATURATION: 99 % | TEMPERATURE: 98.3 F | DIASTOLIC BLOOD PRESSURE: 81 MMHG | RESPIRATION RATE: 18 BRPM | SYSTOLIC BLOOD PRESSURE: 128 MMHG | HEART RATE: 77 BPM

## 2023-03-15 PROCEDURE — 36430 TRANSFUSION BLD/BLD COMPNT: CPT

## 2023-03-15 PROCEDURE — P9040 RBC LEUKOREDUCED IRRADIATED: HCPCS

## 2023-03-15 PROCEDURE — 96360 HYDRATION IV INFUSION INIT: CPT

## 2023-03-15 PROCEDURE — 6360000002 HC RX W HCPCS: Performed by: INTERNAL MEDICINE

## 2023-03-15 PROCEDURE — 2580000003 HC RX 258: Performed by: INTERNAL MEDICINE

## 2023-03-15 PROCEDURE — 6370000000 HC RX 637 (ALT 250 FOR IP): Performed by: INTERNAL MEDICINE

## 2023-03-15 RX ORDER — DIPHENHYDRAMINE HCL 25 MG
25 CAPSULE ORAL ONCE
Status: COMPLETED | OUTPATIENT
Start: 2023-03-15 | End: 2023-03-15

## 2023-03-15 RX ORDER — ACETAMINOPHEN 325 MG/1
650 TABLET ORAL ONCE
Status: COMPLETED | OUTPATIENT
Start: 2023-03-15 | End: 2023-03-15

## 2023-03-15 RX ORDER — HEPARIN SODIUM (PORCINE) LOCK FLUSH IV SOLN 100 UNIT/ML 100 UNIT/ML
500 SOLUTION INTRAVENOUS PRN
Status: DISCONTINUED | OUTPATIENT
Start: 2023-03-15 | End: 2023-10-28 | Stop reason: HOSPADM

## 2023-03-15 RX ORDER — SODIUM CHLORIDE 9 MG/ML
INJECTION, SOLUTION INTRAVENOUS ONCE
Status: COMPLETED | OUTPATIENT
Start: 2023-03-15 | End: 2023-03-15

## 2023-03-15 RX ORDER — SODIUM CHLORIDE 9 MG/ML
INJECTION, SOLUTION INTRAVENOUS PRN
Status: DISCONTINUED | OUTPATIENT
Start: 2023-03-15 | End: 2023-10-28 | Stop reason: HOSPADM

## 2023-03-15 RX ORDER — SODIUM CHLORIDE 0.9 % (FLUSH) 0.9 %
5-40 SYRINGE (ML) INJECTION PRN
Status: DISCONTINUED | OUTPATIENT
Start: 2023-03-15 | End: 2023-10-28 | Stop reason: HOSPADM

## 2023-03-15 RX ORDER — 0.9 % SODIUM CHLORIDE 0.9 %
1000 INTRAVENOUS SOLUTION INTRAVENOUS ONCE
Status: COMPLETED | OUTPATIENT
Start: 2023-03-15 | End: 2023-03-15

## 2023-03-15 RX ADMIN — HEPARIN SODIUM (PORCINE) LOCK FLUSH IV SOLN 100 UNIT/ML 500 UNITS: 100 SOLUTION at 13:03

## 2023-03-15 RX ADMIN — SODIUM CHLORIDE 1000 ML: 9 INJECTION, SOLUTION INTRAVENOUS at 09:10

## 2023-03-15 RX ADMIN — Medication 20 ML: at 13:03

## 2023-03-15 RX ADMIN — SODIUM CHLORIDE: 9 INJECTION, SOLUTION INTRAVENOUS at 10:30

## 2023-03-15 RX ADMIN — Medication 10 ML: at 09:10

## 2023-03-15 RX ADMIN — DIPHENHYDRAMINE HYDROCHLORIDE 25 MG: 25 CAPSULE ORAL at 09:41

## 2023-03-15 RX ADMIN — ACETAMINOPHEN 650 MG: 325 TABLET ORAL at 09:41

## 2023-03-15 ASSESSMENT — PAIN SCALES - GENERAL: PAINLEVEL_OUTOF10: 5

## 2023-03-15 ASSESSMENT — PAIN DESCRIPTION - LOCATION: LOCATION: BACK;ABDOMEN

## 2023-03-15 ASSESSMENT — PAIN DESCRIPTION - ORIENTATION: ORIENTATION: LOWER

## 2023-03-15 NOTE — PROGRESS NOTES
[facility] Progress Note    Date: March 15, 2023    Name: Thiago Cárdenas    MRN: [id number]         : 1970    1 Unit of PRBCs/ 1 L NS Hydration    Ms. Malika Villeda arrived to [facility] at 0034 accompanied by self, ambulatory with walker for assistance. Pt reports feeling fatigued but has improved since holding chemotherapy treatment. Pt denies any active bleeding. Does report current diarrhea, that comes and goes. Ms. Malika Villeda was assessed and education was provided. Discussed risks and benefits of blood transfusion with patient, including risk of transfusion reaction and disease transmission. Patient verbalized understanding and signed consent present in chart. Ms. Arcos's vitals were reviewed. Vitals:    03/15/23 0854   BP: 132/72   Pulse: 98   Resp: 18   Temp: 98 °F (36.7 °C)   SpO2: 95%       Right upper chest single lumen mediport accessed with 20G 1 in blank needle. Port flushed well with NS and positive blood return noted. 1 L NS infused over approx 60 minutes as ordered. Pre-medications given approximately 30 minutes prior to starting blood transfusion:  Tylenol 650 mg PO and Benadryl 25 mg PO per order. Normal saline initiated KVO @ 25 mL/hr. One unit of PRBC initiated @ 75 ml/hr at 1036. Fifteen minutes into infusion, VS stable and pt denied c/o SOB, itching/hives, lip/tongue/facial swelling, CP or other complaints. Infusion rate increased to 155 ml/hr for the remainder of the infusion. Unit finished @ 0484 31 29 02. VS stable and no transfusion reaction suspected. Ms. Malika Villeda tolerated infusion without complaints. Pt politely declined to stay for entire 60 minute observation d/t her transportation. Pt did stay for 25 minutes to be observed. No signs of allergic reaction noted. Discharge instructions reviewed with pt. Pt instructed to report SOB, CP, elevated temp, back pain, or other symptoms of transfusion reaction to MD or ED. Pt verbalized understanding.      Patient Vitals for the past 12 hrs: Temp Pulse Resp BP SpO2   03/15/23 1302 98.3 °F (36.8 °C) 77 18 128/81 99 %   03/15/23 1246 98 °F (36.7 °C) 77 18 118/75 99 %   03/15/23 1052 98.2 °F (36.8 °C) 79 16 108/69 97 %   03/15/23 1030 98.2 °F (36.8 °C) 84 16 110/67 97 %   03/15/23 0854 98 °F (36.7 °C) 98 18 132/72 95 %        Port flushed with 20 mL NS and Heparin 500 units per protocol. No irritation or bleeding noted. Bandaid applied. Patient armbands removed & shredded. Ms. Erika Warren was discharged from [place] [address] in stable condition at 1310. She is to return on 03/24/2023 at 0800 for her next infusion.     Marilu Iglesias  March 15, 2023

## 2023-03-16 LAB
ABO + RH BLD: NORMAL
BLD PROD TYP BPU: NORMAL
BLOOD BANK DISPENSE STATUS: NORMAL
BLOOD GROUP ANTIBODIES SERPL: NORMAL
BPU ID: NORMAL
CROSSMATCH RESULT: NORMAL
SPECIMEN EXP DATE BLD: NORMAL
UNIT DIVISION: 0

## 2023-03-27 RX ORDER — METOPROLOL SUCCINATE 100 MG/1
TABLET, EXTENDED RELEASE ORAL
Qty: 30 TABLET | Refills: 0 | Status: SHIPPED | OUTPATIENT
Start: 2023-03-27

## 2023-03-27 RX ORDER — PAROXETINE HYDROCHLORIDE 20 MG/1
TABLET, FILM COATED ORAL
Qty: 30 TABLET | Refills: 0 | Status: SHIPPED | OUTPATIENT
Start: 2023-03-27

## 2023-03-30 ENCOUNTER — APPOINTMENT (OUTPATIENT)
Facility: HOSPITAL | Age: 53
End: 2023-03-30
Payer: MEDICARE

## 2023-04-03 DIAGNOSIS — C48.2 MALIGNANT NEOPLASM OF PERITONEUM, UNSPECIFIED (HCC): ICD-10-CM

## 2023-04-03 DIAGNOSIS — D69.6 THROMBOCYTOPENIA, UNSPECIFIED (HCC): ICD-10-CM

## 2023-04-03 DIAGNOSIS — G89.3 NEOPLASM RELATED PAIN (ACUTE) (CHRONIC): ICD-10-CM

## 2023-04-03 DIAGNOSIS — C78.7 MALIGNANT NEOPLASM METASTATIC TO LIVER (HCC): ICD-10-CM

## 2023-04-03 RX ORDER — OXYCODONE AND ACETAMINOPHEN 10; 325 MG/1; MG/1
1 TABLET ORAL EVERY 6 HOURS PRN
Qty: 90 TABLET | Refills: 0 | Status: SHIPPED | OUTPATIENT
Start: 2023-04-03 | End: 2023-05-03

## 2023-04-03 RX ORDER — ONDANSETRON HYDROCHLORIDE 8 MG/1
TABLET, FILM COATED ORAL
Qty: 30 TABLET | Refills: 1 | Status: SHIPPED | OUTPATIENT
Start: 2023-04-03

## 2023-04-18 NOTE — TELEPHONE ENCOUNTER
Last OV 08/31/2022  Next OV none scheduled   Can you try and get this patient scheduled   Dr. David Franco just gave her 30 tabs in March  Advising to schedule an appt.

## 2023-04-19 RX ORDER — METOPROLOL SUCCINATE 100 MG/1
TABLET, EXTENDED RELEASE ORAL
Qty: 30 TABLET | Refills: 0 | Status: SHIPPED | OUTPATIENT
Start: 2023-04-19

## 2023-04-19 NOTE — TELEPHONE ENCOUNTER
Per: Helen Crew  to Me    PS     6:20 PM  Last visit I scheduled was in February and she cancelled. We have reached out to the patient multiple times and she declines with an excuse why she cannot come.      Please advise

## 2023-04-20 ENCOUNTER — HOSPITAL ENCOUNTER (OUTPATIENT)
Facility: HOSPITAL | Age: 53
Discharge: HOME | End: 2023-04-20
Payer: MEDICARE

## 2023-04-20 DIAGNOSIS — G89.3 NEOPLASM RELATED PAIN (ACUTE) (CHRONIC): ICD-10-CM

## 2023-04-20 DIAGNOSIS — C48.2 MALIGNANT NEOPLASM OF PERITONEUM, UNSPECIFIED (HCC): ICD-10-CM

## 2023-04-20 DIAGNOSIS — D69.6 THROMBOCYTOPENIA, UNSPECIFIED (HCC): ICD-10-CM

## 2023-04-20 DIAGNOSIS — C78.7 MALIGNANT NEOPLASM METASTATIC TO LIVER (HCC): ICD-10-CM

## 2023-04-20 PROCEDURE — 2500000003 HC RX 250 WO HCPCS: Performed by: INTERNAL MEDICINE

## 2023-04-20 PROCEDURE — 78816 PET IMAGE W/CT FULL BODY: CPT

## 2023-04-20 RX ORDER — FLUDEOXYGLUCOSE F-18 500 MCI/ML
7.6 INJECTION INTRAVENOUS ONCE
Status: COMPLETED | OUTPATIENT
Start: 2023-04-20 | End: 2023-04-20

## 2023-04-20 RX ADMIN — BARIUM SULFATE 450 ML: 21 SUSPENSION ORAL at 08:55

## 2023-04-20 RX ADMIN — FLUDEOXYGLUCOSE F-18 7.6 MILLICURIE: 500 INJECTION INTRAVENOUS at 08:52

## 2023-04-21 ENCOUNTER — HOSPITAL ENCOUNTER (OUTPATIENT)
Facility: HOSPITAL | Age: 53
Setting detail: INFUSION SERIES
End: 2023-04-21
Payer: MEDICARE

## 2023-04-21 VITALS
HEART RATE: 79 BPM | SYSTOLIC BLOOD PRESSURE: 130 MMHG | RESPIRATION RATE: 16 BRPM | OXYGEN SATURATION: 100 % | DIASTOLIC BLOOD PRESSURE: 84 MMHG | TEMPERATURE: 98 F

## 2023-04-21 DIAGNOSIS — D50.9 IRON DEFICIENCY ANEMIA, UNSPECIFIED IRON DEFICIENCY ANEMIA TYPE: ICD-10-CM

## 2023-04-21 DIAGNOSIS — N18.4 CKD (CHRONIC KIDNEY DISEASE) STAGE 4, GFR 15-29 ML/MIN (HCC): ICD-10-CM

## 2023-04-21 DIAGNOSIS — D50.8 IRON DEFICIENCY ANEMIA SECONDARY TO INADEQUATE DIETARY IRON INTAKE: Primary | ICD-10-CM

## 2023-04-21 LAB
BASO+EOS+MONOS # BLD AUTO: 0.3 K/UL (ref 0–2.3)
BASO+EOS+MONOS NFR BLD AUTO: 2 % (ref 0.1–17)
DIFFERENTIAL METHOD BLD: ABNORMAL
ERYTHROCYTE [DISTWIDTH] IN BLOOD BY AUTOMATED COUNT: 20.7 % (ref 11.5–14.5)
HCT VFR BLD AUTO: 27.8 % (ref 36–48)
HGB BLD-MCNC: 8.2 G/DL (ref 12–16)
LYMPHOCYTES # BLD: 1.3 K/UL (ref 1.1–5.9)
LYMPHOCYTES NFR BLD: 12 % (ref 14–44)
MCH RBC QN AUTO: 29 PG (ref 25–35)
MCHC RBC AUTO-ENTMCNC: 29.5 G/DL (ref 31–37)
MCV RBC AUTO: 98.2 FL (ref 78–102)
NEUTS SEG # BLD: 9.4 K/UL (ref 1.8–9.5)
NEUTS SEG NFR BLD: 86 % (ref 40–70)
PLATELET # BLD AUTO: 204 K/UL (ref 140–440)
RBC # BLD AUTO: 2.83 M/UL (ref 4.1–5.1)
WBC # BLD AUTO: 11 K/UL (ref 4.5–13)

## 2023-04-21 PROCEDURE — 6360000002 HC RX W HCPCS: Performed by: INTERNAL MEDICINE

## 2023-04-21 PROCEDURE — 6360000002 HC RX W HCPCS: Performed by: NURSE PRACTITIONER

## 2023-04-21 PROCEDURE — 2580000003 HC RX 258: Performed by: INTERNAL MEDICINE

## 2023-04-21 PROCEDURE — 96372 THER/PROPH/DIAG INJ SC/IM: CPT

## 2023-04-21 PROCEDURE — 85025 COMPLETE CBC W/AUTO DIFF WBC: CPT

## 2023-04-21 PROCEDURE — 96360 HYDRATION IV INFUSION INIT: CPT

## 2023-04-21 RX ORDER — ALBUTEROL SULFATE 90 UG/1
4 AEROSOL, METERED RESPIRATORY (INHALATION) PRN
Status: CANCELLED | OUTPATIENT
Start: 2023-04-23

## 2023-04-21 RX ORDER — DIPHENHYDRAMINE HYDROCHLORIDE 50 MG/ML
50 INJECTION INTRAMUSCULAR; INTRAVENOUS
Status: CANCELLED | OUTPATIENT
Start: 2023-04-23

## 2023-04-21 RX ORDER — SODIUM CHLORIDE 9 MG/ML
INJECTION, SOLUTION INTRAVENOUS CONTINUOUS
Status: CANCELLED | OUTPATIENT
Start: 2023-04-23

## 2023-04-21 RX ORDER — HEPARIN SODIUM (PORCINE) LOCK FLUSH IV SOLN 100 UNIT/ML 100 UNIT/ML
500 SOLUTION INTRAVENOUS PRN
Status: DISCONTINUED | OUTPATIENT
Start: 2023-04-21 | End: 2023-10-28 | Stop reason: HOSPADM

## 2023-04-21 RX ORDER — SODIUM CHLORIDE 0.9 % (FLUSH) 0.9 %
5-40 SYRINGE (ML) INJECTION PRN
Status: DISCONTINUED | OUTPATIENT
Start: 2023-04-21 | End: 2023-10-28 | Stop reason: HOSPADM

## 2023-04-21 RX ORDER — ACETAMINOPHEN 325 MG/1
650 TABLET ORAL
Status: CANCELLED | OUTPATIENT
Start: 2023-04-23

## 2023-04-21 RX ORDER — ONDANSETRON 2 MG/ML
8 INJECTION INTRAMUSCULAR; INTRAVENOUS
Status: CANCELLED | OUTPATIENT
Start: 2023-04-23

## 2023-04-21 RX ORDER — EPINEPHRINE 1 MG/ML
0.3 INJECTION, SOLUTION, CONCENTRATE INTRAVENOUS PRN
Status: CANCELLED | OUTPATIENT
Start: 2023-04-23

## 2023-04-21 RX ORDER — 0.9 % SODIUM CHLORIDE 0.9 %
1000 INTRAVENOUS SOLUTION INTRAVENOUS ONCE
Status: COMPLETED | OUTPATIENT
Start: 2023-04-21 | End: 2023-04-21

## 2023-04-21 RX ADMIN — Medication 10 ML: at 08:14

## 2023-04-21 RX ADMIN — Medication 10 ML: at 09:27

## 2023-04-21 RX ADMIN — Medication 10 ML: at 08:16

## 2023-04-21 RX ADMIN — HEPARIN SODIUM (PORCINE) LOCK FLUSH IV SOLN 100 UNIT/ML 500 UNITS: 100 SOLUTION at 09:28

## 2023-04-21 RX ADMIN — EPOETIN ALFA-EPBX 60000 UNITS: 20000 INJECTION, SOLUTION INTRAVENOUS; SUBCUTANEOUS at 08:47

## 2023-04-21 RX ADMIN — SODIUM CHLORIDE 1000 ML: 9 INJECTION, SOLUTION INTRAVENOUS at 08:18

## 2023-04-21 ASSESSMENT — PAIN DESCRIPTION - ORIENTATION: ORIENTATION: RIGHT;LEFT

## 2023-04-21 ASSESSMENT — PAIN SCALES - GENERAL: PAINLEVEL_OUTOF10: 7

## 2023-04-21 ASSESSMENT — PAIN DESCRIPTION - LOCATION: LOCATION: GENERALIZED;BACK;ABDOMEN

## 2023-04-21 NOTE — PROGRESS NOTES
[facility] Progress Note    Date: 2023    Name: Dorman Bumpers              MRN: [id number]              : 1970    Weekly Hydration and Retacrit Injection Every 3 Weeks      Pt to hospitals, ambulatory with walker, at 0800. Ms. Atilio Cleary was assessed and education was provided. Pt reports she fell on  and hit the side of her head. Denies any headaches/worsening dizziness/vision changes/etc, no visible injuries noted. Pt stated she mostly landed on her knees and has just been a little sore since the fall but did not go to her PCP cause she felt ok. Pt strongly encouraged to go to PCP or urgent care to get checked out and to report to the ED ASAP if she experiences any headaches/vision changes/vomiting/etc. Pt verbalized understanding. Ms. Arcos's vitals were reviewed. Vitals:    23 0758   BP: 124/84   Pulse: 82   Resp: 18   Temp: 98 °F (36.7 °C)   SpO2: 100%       Right chest mediport accessed with 20 g 1 inch blank needle. Port flushed easily and had brisk blood return. Blood for CBC drawn off after 10 ml waste. CBC ran on site. CBS results:    Results for orders placed or performed during the hospital encounter of 23   CBC with Partial Differential   Result Value Ref Range    WBC 11.0 4.5 - 13.0 K/uL    RBC 2.83 (L) 4.10 - 5.10 M/uL    Hemoglobin 8.2 (L) 12.0 - 16 g/dL    Hematocrit 27.8 (L) 36 - 48 %    MCV 98.2 78 - 102 FL    MCH 29.0 25.0 - 35.0 PG    MCHC 29.5 (L) 31 - 37 g/dL    RDW 20.7 (H) 11.5 - 14.5 %    Platelets 624 555 - 241 K/uL    Seg Neutrophils 86 (H) 40 - 70 %    Mixed Cells 2 0.1 - 17 %    Lymphocytes 12 (L) 14 - 44 %    Segs Absolute 9.4 1.8 - 9.5 K/UL    ABSOLUTE MIXED CELLS 0.3 0.0 - 2.3 K/uL    Absolute Lymph # 1.3 1.1 - 5.9 K/UL    Differential Type AUTOMATED          Hgb: 8.2    1L NS infused over approximately 60 minutes as ordered for weekly hydration. Retacrit indicated for HGB less than 10.      Retacrit 35452 units SUBQ administered in pt's right arm as

## 2023-04-23 RX ORDER — PAROXETINE HYDROCHLORIDE 20 MG/1
TABLET, FILM COATED ORAL
Qty: 30 TABLET | Refills: 0 | Status: SHIPPED | OUTPATIENT
Start: 2023-04-23

## 2023-04-24 ENCOUNTER — TELEPHONE (OUTPATIENT)
Age: 53
End: 2023-04-24

## 2023-04-24 DIAGNOSIS — C48.2 MALIGNANT NEOPLASM OF PERITONEUM, UNSPECIFIED (HCC): Primary | ICD-10-CM

## 2023-04-24 DIAGNOSIS — G89.3 NEOPLASM RELATED PAIN (ACUTE) (CHRONIC): ICD-10-CM

## 2023-04-24 DIAGNOSIS — D69.6 THROMBOCYTOPENIA, UNSPECIFIED (HCC): ICD-10-CM

## 2023-04-24 NOTE — TELEPHONE ENCOUNTER
Contacted patient regarding her side effects: patient reported she is concerned about her iron, she was advised it is not low enough for iv iron but she is wondering if she could start oral iron. She also wanted the providers to be aware she has been having nausea, diarrhea an vomiting but it only lasted one day. She reported she is very tired and has no appetite. She did fall lat Sunday and hit her head and knee but had minor issues. She also reports at times she feels dizzy. Please advise.

## 2023-05-02 ENCOUNTER — TELEPHONE (OUTPATIENT)
Age: 53
End: 2023-05-02

## 2023-05-02 NOTE — TELEPHONE ENCOUNTER
Michel contacted [facility] side reporting they needed to speak to Dr. Camila Faulkner NPS.

## 2023-05-09 DIAGNOSIS — G89.3 NEOPLASM RELATED PAIN (ACUTE) (CHRONIC): Primary | ICD-10-CM

## 2023-05-10 RX ORDER — OXYCODONE AND ACETAMINOPHEN 10; 325 MG/1; MG/1
1 TABLET ORAL EVERY 6 HOURS PRN
Qty: 120 TABLET | Refills: 0 | Status: SHIPPED | OUTPATIENT
Start: 2023-05-10 | End: 2023-06-09

## 2023-05-10 RX ORDER — LOPERAMIDE HYDROCHLORIDE 2 MG/1
TABLET ORAL
Qty: 30 TABLET | Refills: 1 | Status: SHIPPED | OUTPATIENT
Start: 2023-05-10

## 2023-05-12 ENCOUNTER — HOSPITAL ENCOUNTER (OUTPATIENT)
Facility: HOSPITAL | Age: 53
Setting detail: INFUSION SERIES
End: 2023-05-12

## 2023-05-16 ENCOUNTER — HOSPITAL ENCOUNTER (OUTPATIENT)
Facility: HOSPITAL | Age: 53
Setting detail: INFUSION SERIES
End: 2023-05-16
Payer: MEDICARE

## 2023-05-16 VITALS
DIASTOLIC BLOOD PRESSURE: 85 MMHG | OXYGEN SATURATION: 96 % | TEMPERATURE: 97.8 F | SYSTOLIC BLOOD PRESSURE: 155 MMHG | HEART RATE: 66 BPM | RESPIRATION RATE: 16 BRPM

## 2023-05-16 DIAGNOSIS — C78.7 MALIGNANT NEOPLASM METASTATIC TO LIVER (HCC): ICD-10-CM

## 2023-05-16 LAB
ALBUMIN SERPL-MCNC: 2.2 G/DL (ref 3.4–5)
ALBUMIN/GLOB SERPL: 0.4 (ref 0.8–1.7)
ALP SERPL-CCNC: 419 U/L (ref 45–117)
ALT SERPL-CCNC: 13 U/L (ref 13–56)
ANION GAP SERPL CALC-SCNC: 8 MMOL/L (ref 3–18)
AST SERPL-CCNC: 38 U/L (ref 10–38)
BASO+EOS+MONOS # BLD AUTO: 0.6 K/UL (ref 0–2.3)
BASO+EOS+MONOS NFR BLD AUTO: 8 % (ref 0.1–17)
BILIRUB SERPL-MCNC: 0.6 MG/DL (ref 0.2–1)
BUN SERPL-MCNC: 31 MG/DL (ref 7–18)
BUN/CREAT SERPL: 15 (ref 12–20)
CALCIUM SERPL-MCNC: 7.9 MG/DL (ref 8.5–10.1)
CHLORIDE SERPL-SCNC: 109 MMOL/L (ref 100–111)
CO2 SERPL-SCNC: 20 MMOL/L (ref 21–32)
CREAT SERPL-MCNC: 2.08 MG/DL (ref 0.6–1.3)
DIFFERENTIAL METHOD BLD: ABNORMAL
ERYTHROCYTE [DISTWIDTH] IN BLOOD BY AUTOMATED COUNT: 20.4 % (ref 11.5–14.5)
FERRITIN SERPL-MCNC: 1782 NG/ML (ref 8–388)
GLOBULIN SER CALC-MCNC: 5.3 G/DL (ref 2–4)
GLUCOSE SERPL-MCNC: 79 MG/DL (ref 74–99)
HCT VFR BLD AUTO: 25 % (ref 36–48)
HGB BLD-MCNC: 7.4 G/DL (ref 12–16)
IRON SATN MFR SERPL: 42 % (ref 20–50)
IRON SERPL-MCNC: 39 UG/DL (ref 50–175)
LYMPHOCYTES # BLD: 0.8 K/UL (ref 1.1–5.9)
LYMPHOCYTES NFR BLD: 11 % (ref 14–44)
MCH RBC QN AUTO: 28.6 PG (ref 25–35)
MCHC RBC AUTO-ENTMCNC: 29.6 G/DL (ref 31–37)
MCV RBC AUTO: 96.5 FL (ref 78–102)
NEUTS SEG # BLD: 6.2 K/UL (ref 1.8–9.5)
NEUTS SEG NFR BLD: 81 % (ref 40–70)
PLATELET # BLD AUTO: 230 K/UL (ref 140–440)
POTASSIUM SERPL-SCNC: 3.1 MMOL/L (ref 3.5–5.5)
PROT SERPL-MCNC: 7.5 G/DL (ref 6.4–8.2)
RBC # BLD AUTO: 2.59 M/UL (ref 4.1–5.1)
SODIUM SERPL-SCNC: 137 MMOL/L (ref 136–145)
TIBC SERPL-MCNC: 93 UG/DL (ref 250–450)
WBC # BLD AUTO: 7.6 K/UL (ref 4.5–13)

## 2023-05-16 PROCEDURE — 83540 ASSAY OF IRON: CPT

## 2023-05-16 PROCEDURE — 96360 HYDRATION IV INFUSION INIT: CPT

## 2023-05-16 PROCEDURE — 82728 ASSAY OF FERRITIN: CPT

## 2023-05-16 PROCEDURE — 86301 IMMUNOASSAY TUMOR CA 19-9: CPT

## 2023-05-16 PROCEDURE — 2580000003 HC RX 258: Performed by: INTERNAL MEDICINE

## 2023-05-16 PROCEDURE — 6360000002 HC RX W HCPCS: Performed by: INTERNAL MEDICINE

## 2023-05-16 PROCEDURE — 85025 COMPLETE CBC W/AUTO DIFF WBC: CPT

## 2023-05-16 PROCEDURE — 80053 COMPREHEN METABOLIC PANEL: CPT

## 2023-05-16 PROCEDURE — 96361 HYDRATE IV INFUSION ADD-ON: CPT

## 2023-05-16 PROCEDURE — 86304 IMMUNOASSAY TUMOR CA 125: CPT

## 2023-05-16 PROCEDURE — 83550 IRON BINDING TEST: CPT

## 2023-05-16 RX ORDER — SODIUM CHLORIDE 9 MG/ML
INJECTION, SOLUTION INTRAVENOUS ONCE
Status: COMPLETED | OUTPATIENT
Start: 2023-05-16 | End: 2023-05-16

## 2023-05-16 RX ORDER — SODIUM CHLORIDE 0.9 % (FLUSH) 0.9 %
5-40 SYRINGE (ML) INJECTION PRN
Status: DISCONTINUED | OUTPATIENT
Start: 2023-05-16 | End: 2023-10-28 | Stop reason: HOSPADM

## 2023-05-16 RX ORDER — HEPARIN SODIUM (PORCINE) LOCK FLUSH IV SOLN 100 UNIT/ML 100 UNIT/ML
500 SOLUTION INTRAVENOUS PRN
Status: DISCONTINUED | OUTPATIENT
Start: 2023-05-16 | End: 2023-10-28 | Stop reason: HOSPADM

## 2023-05-16 RX ADMIN — SODIUM CHLORIDE, PRESERVATIVE FREE 10 ML: 5 INJECTION INTRAVENOUS at 10:50

## 2023-05-16 RX ADMIN — HEPARIN 500 UNITS: 100 SYRINGE at 12:30

## 2023-05-16 RX ADMIN — SODIUM CHLORIDE, PRESERVATIVE FREE 10 ML: 5 INJECTION INTRAVENOUS at 12:30

## 2023-05-16 RX ADMIN — SODIUM CHLORIDE: 9 INJECTION, SOLUTION INTRAVENOUS at 10:55

## 2023-05-16 ASSESSMENT — PAIN SCALES - GENERAL: PAINLEVEL_OUTOF10: 7

## 2023-05-16 ASSESSMENT — PAIN DESCRIPTION - ORIENTATION: ORIENTATION: LOWER

## 2023-05-16 ASSESSMENT — PAIN DESCRIPTION - LOCATION: LOCATION: BACK

## 2023-05-16 NOTE — PROGRESS NOTES
[facility] Progress [facility]    Date: May 16, 2023    Name: Ginny Ruvalcaba    MRN: [id number]         : 1970      1 L NS Hydration & Lab Draw    Ms. Zoila Lipscomb arrived to [facility] at 56 accompanied by her daughter, ambulatory with walker for assistance. Ms. Zoila Lipscomb was assessed and education was provided. Pt has not been to any of her [facility] appts since 23. Pt was due for her Retacrit (every 3 Weeks) on 23. Unfortunately her Jen Haley has  and since she was an add on appt for today it was not renewed prior to her being seen today. Per Ronald Franklin as soon as it is authed they will add it to her next hydration appt. Ms. Arcos's vitals were reviewed. Vitals:    23 1031   BP: (!) 155/85   Pulse: 66   Resp: 16   Temp: 97.8 °F (36.6 °C)   SpO2: 96%     Right upper chest single lumen mediport accessed with 20G 1 in blank needle. Port flushed well with NS and positive blood return noted. Labs collected after 10mL waste. CBC processed on site and Iron and TIBC, CMP, Ferritin, , Cancer Antigen 19-9 sent out for processing. Results for orders placed or performed during the hospital encounter of 23   CBC with Partial Differential   Result Value Ref Range    WBC 7.6 4.5 - 13.0 K/uL    RBC 2.59 (L) 4.10 - 5.10 M/uL    Hemoglobin 7.4 (L) 12.0 - 16 g/dL    Hematocrit 25.0 (L) 36 - 48 %    MCV 96.5 78 - 102 FL    MCH 28.6 25.0 - 35.0 PG    MCHC 29.6 (L) 31 - 37 g/dL    RDW 20.4 (H) 11.5 - 14.5 %    Platelets 855 326 - 506 K/uL    Neutrophils % 81 (H) 40 - 70 %    Mixed Cells 8 0.1 - 17 %    Lymphocytes % 11 (L) 14 - 44 %    Neutrophils Absolute 6.2 1.8 - 9.5 K/UL    ABSOLUTE MIXED CELLS 0.6 0.0 - 2.3 K/uL    Lymphocytes Absolute 0.8 (L) 1.1 - 5.9 K/UL    Differential Type AUTOMATED       CBC results discussed with David Boyd NP- per Rach Moreira no further action needed, she will monitor and review other lab results as they come in.     1 L NS infused over approx 90 minutes as ordered.      Ms. Zoila Lipscomb

## 2023-05-17 LAB
CANCER AG125 SERPL-ACNC: 18 U/ML (ref 1.5–35)
CANCER AG19-9 SERPL-ACNC: <2 U/ML (ref 0–35)

## 2023-05-19 ENCOUNTER — HOSPITAL ENCOUNTER (OUTPATIENT)
Facility: HOSPITAL | Age: 53
Setting detail: INFUSION SERIES
End: 2023-05-19

## 2023-05-19 ENCOUNTER — HOSPICE ADMISSION (OUTPATIENT)
Age: 53
End: 2023-05-19
Payer: MEDICARE

## 2023-05-19 ENCOUNTER — HOME CARE VISIT (OUTPATIENT)
Age: 53
End: 2023-05-19
Payer: MEDICARE

## 2023-05-19 ENCOUNTER — TELEPHONE (OUTPATIENT)
Age: 53
End: 2023-05-19

## 2023-05-19 DIAGNOSIS — C48.2 CANCER OF PERITONEUM (HCC): Primary | ICD-10-CM

## 2023-05-19 DIAGNOSIS — C78.7 MALIGNANT NEOPLASM METASTATIC TO LIVER (HCC): ICD-10-CM

## 2023-05-19 PROCEDURE — G0299 HHS/HOSPICE OF RN EA 15 MIN: HCPCS

## 2023-05-19 PROCEDURE — 0651 HSPC ROUTINE HOME CARE

## 2023-05-19 NOTE — TELEPHONE ENCOUNTER
Patient called in stating that she is interested in Hospice care but would like some more information. Advised that I would get a referral sent over to hospice and they would be in contact with her to give her the information that she is requesting. Patient states that medication discussed at her last visit was not sent to her pharmacy, advised I would look into this. Message was sent to providers about medications prior to today's call with patient. Referral sent to ([facility].

## 2023-05-20 ENCOUNTER — HOME CARE VISIT (OUTPATIENT)
Age: 53
End: 2023-05-20

## 2023-05-20 ENCOUNTER — HOME CARE VISIT (OUTPATIENT)
Age: 53
End: 2023-05-20
Payer: MEDICARE

## 2023-05-20 PROCEDURE — 0651 HSPC ROUTINE HOME CARE

## 2023-05-20 PROCEDURE — G0299 HHS/HOSPICE OF RN EA 15 MIN: HCPCS

## 2023-05-21 PROCEDURE — 0651 HSPC ROUTINE HOME CARE

## 2023-05-22 ENCOUNTER — HOME CARE VISIT (OUTPATIENT)
Age: 53
End: 2023-05-22
Payer: MEDICARE

## 2023-05-22 VITALS
TEMPERATURE: 97.8 F | RESPIRATION RATE: 16 BRPM | SYSTOLIC BLOOD PRESSURE: 177 MMHG | HEART RATE: 8 BPM | DIASTOLIC BLOOD PRESSURE: 65 MMHG | OXYGEN SATURATION: 90 %

## 2023-05-22 PROCEDURE — 2500000001 HSPC NON INJECTABLE MED

## 2023-05-22 PROCEDURE — 0651 HSPC ROUTINE HOME CARE

## 2023-05-22 ASSESSMENT — ENCOUNTER SYMPTOMS
PAIN LOCATION - PAIN QUALITY: GENERALIZED DISCOMFORT
BOWEL INCONTINENCE: 1
HEMOPTYSIS: 0

## 2023-05-22 NOTE — HOSPICE
Rupa Ortiz 47years old female was admitted to hospice service with terminal diagnosis of Cancer of peritoneum and Malignant neoplasm metastatic to liver. Hospice comorbid conditions Vaginal bleeding, Rectal bleeding, Iron deficiency anemia, Chemotherapy-induced peripheral neuropathy and Cancer associated pain. Patient and family has elected hospice services and is no longer seeking aggressive treatments. Medical/surgical history review   Her last visit to her had the following report: referred by Dr. Marga Ferrera with peritoneal cancer with reports of vaginal bleeding. She has a history of hysterectomy with BSO for endometriosis in 2006 a TVUS was ordered. Which revealed a 6.8 cm x 5.2 cm x 4.6 cm at midline vaginal cuff. This prompted pelvic CT with findings of a lesion measuring 7.6 x 5.8 x 7.2 cm and is compatible with a pelvic neoplasm vs endometrioma given prior history of endometriosis. Tumor markers done on 12/17/2018 all normal.  S/p Exploratory laparotomy, exploration of retroperitoneal spaces, exam under anesthesia with biopsy of rectovaginal mass on 1/17/2019. Had sigmoidoscopy which revealed submucosal mass. S/p cycle #6 of carbo/taxol on 6/6/2019. S/p cytoreductive surgery with HIPEC on 8/8/2019. S/p radiation treatment completed in 11/2019. Was seen in office and had a biopsy c/w recurrence. S/p cycle #6 of Carbo/Doxil on 9/30/2020. S/p Exploratory laparotomy. 2. Lysis of adhesions. 3. Simple appendectomy. 4. 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 On 11/202020. She also had a revision of urostomy that is still leaking. S/p IR biopsy of liver on 8/4/2021 which demonstrated recurrent disease. Pt was on palliative letrozole and keytruda. On her visit to oncology on 5/16/23: she continues to report on and off vaginal bleeding and rectal bleeding as well as discharge from the rectum but denied active bleeding. She reported better after omitted Day 8 treatment.   She

## 2023-05-23 ENCOUNTER — HOME CARE VISIT (OUTPATIENT)
Age: 53
End: 2023-05-23
Payer: MEDICARE

## 2023-05-23 PROCEDURE — 0651 HSPC ROUTINE HOME CARE

## 2023-05-23 PROCEDURE — G0300 HHS/HOSPICE OF LPN EA 15 MIN: HCPCS

## 2023-05-24 ENCOUNTER — HOME CARE VISIT (OUTPATIENT)
Age: 53
End: 2023-05-24
Payer: MEDICARE

## 2023-05-24 VITALS
DIASTOLIC BLOOD PRESSURE: 76 MMHG | OXYGEN SATURATION: 97 % | RESPIRATION RATE: 18 BRPM | SYSTOLIC BLOOD PRESSURE: 125 MMHG | TEMPERATURE: 97.3 F | HEART RATE: 78 BPM

## 2023-05-24 PROCEDURE — G0155 HHCP-SVS OF CSW,EA 15 MIN: HCPCS

## 2023-05-24 PROCEDURE — 0651 HSPC ROUTINE HOME CARE

## 2023-05-25 PROCEDURE — 0651 HSPC ROUTINE HOME CARE

## 2023-05-26 PROCEDURE — 0651 HSPC ROUTINE HOME CARE

## 2023-05-27 ENCOUNTER — HOME CARE VISIT (OUTPATIENT)
Age: 53
End: 2023-05-27
Payer: MEDICARE

## 2023-05-27 PROCEDURE — 0651 HSPC ROUTINE HOME CARE

## 2023-05-27 PROCEDURE — G0299 HHS/HOSPICE OF RN EA 15 MIN: HCPCS

## 2023-05-28 ENCOUNTER — HOME CARE VISIT (OUTPATIENT)
Age: 53
End: 2023-05-28
Payer: MEDICARE

## 2023-05-28 PROCEDURE — 0651 HSPC ROUTINE HOME CARE

## 2023-05-29 PROCEDURE — 0651 HSPC ROUTINE HOME CARE

## 2023-05-30 ENCOUNTER — HOME CARE VISIT (OUTPATIENT)
Age: 53
End: 2023-05-30
Payer: MEDICARE

## 2023-05-30 PROCEDURE — G0299 HHS/HOSPICE OF RN EA 15 MIN: HCPCS

## 2023-05-30 PROCEDURE — 0651 HSPC ROUTINE HOME CARE

## 2023-05-31 PROCEDURE — 0651 HSPC ROUTINE HOME CARE

## 2023-06-01 PROCEDURE — 0651 HSPC ROUTINE HOME CARE

## 2023-06-01 PROCEDURE — 2500000001 HSPC NON INJECTABLE MED

## 2023-06-02 ENCOUNTER — HOME CARE VISIT (OUTPATIENT)
Age: 53
End: 2023-06-02
Payer: MEDICARE

## 2023-06-02 PROCEDURE — G0299 HHS/HOSPICE OF RN EA 15 MIN: HCPCS

## 2023-06-02 PROCEDURE — 0651 HSPC ROUTINE HOME CARE

## 2023-06-03 VITALS — RESPIRATION RATE: 20 BRPM | OXYGEN SATURATION: 95 % | TEMPERATURE: 97.8 F

## 2023-06-03 PROCEDURE — 0651 HSPC ROUTINE HOME CARE

## 2023-06-03 NOTE — HOSPICE
Medication refills ordered this visit: none    Medications reconciled and all medications are available in the home this visit. The following education was provided regarding medications, medication interactions, and look alike medications. Response to teaching: appropriate. Medications are effective at this time. Supplies by type and quantity ordered this visit include: NA    Consulted medical director/attending physician regarding: increasing haldol for nausea    Instructed patient/family/caregiver on 24-hour hospice availability and phone number.     Plan for next visit:  education, assessment, symptom management

## 2023-06-04 PROCEDURE — 0651 HSPC ROUTINE HOME CARE

## 2023-06-05 VITALS
HEART RATE: 79 BPM | DIASTOLIC BLOOD PRESSURE: 79 MMHG | RESPIRATION RATE: 20 BRPM | OXYGEN SATURATION: 98 % | SYSTOLIC BLOOD PRESSURE: 129 MMHG | TEMPERATURE: 97.9 F | HEART RATE: 79 BPM | SYSTOLIC BLOOD PRESSURE: 129 MMHG | OXYGEN SATURATION: 98 % | TEMPERATURE: 97.9 F | DIASTOLIC BLOOD PRESSURE: 79 MMHG | RESPIRATION RATE: 18 BRPM

## 2023-06-05 ASSESSMENT — ENCOUNTER SYMPTOMS
VOMITING: 1
STOOL DESCRIPTION: BLOOD COATED
DIARRHEA: 1
ABDOMINAL PAIN: 1
NAUSEA: 1
VOMITING: 1
CRAMPS: 1
DIARRHEA: 1
NAUSEA: 1
CRAMPS: 1
ABDOMINAL PAIN: 1

## 2023-06-05 NOTE — HOSPICE
Medication refills ordered this visit: none    Medications reconciled and all medications are available in the home this visit. Medications are effective at this time. Supplies by type and quantity ordered this visit include: none    Instructed patient on 24-hour hospice availability and phone number.     Plan for next visit: EOL support and education

## 2023-06-05 NOTE — HOSPICE
Medication refills ordered this visit: fentanyl, haldol    Medications reconciled and all medications are available in the home this visit. Medications are not effective at this time. Supplies by type and quantity ordered this visit include: none    Consulted Hospice NP regarding: intractable pain and nausea    Instructed patient/caregiver on 24-hour hospice availability and phone number.     Plan for next visit: EOL education, symptom management

## 2023-06-06 ENCOUNTER — HOME CARE VISIT (OUTPATIENT)
Age: 53
End: 2023-06-06
Payer: MEDICARE

## 2023-06-06 PROCEDURE — G0299 HHS/HOSPICE OF RN EA 15 MIN: HCPCS

## 2023-06-07 VITALS
DIASTOLIC BLOOD PRESSURE: 91 MMHG | TEMPERATURE: 96.6 F | HEART RATE: 71 BPM | SYSTOLIC BLOOD PRESSURE: 131 MMHG | RESPIRATION RATE: 19 BRPM | OXYGEN SATURATION: 99 %

## 2023-06-07 ASSESSMENT — ENCOUNTER SYMPTOMS
ABDOMINAL PAIN: 1
NAUSEA: 1
CRAMPS: 1
VOMITING: 1

## 2023-06-08 NOTE — HOSPICE
Medication refills ordered this visit: compazine, fentanyl sent to local pharmacy    Medications reconciled and all medications are available in the home this visit. Medications are less effective at this time. Supplies by type and quantity ordered this visit include: emesis bags, support stockings, hand , reusable underpads #[id number]    Consulted Hospice NP [facility] regarding: needs for increased pain management    Instructed caregiver on 24-hour hospice availability and phone number.     Plan for next visit:  EOL support and caregiver education, symptom management

## 2023-06-20 VITALS
OXYGEN SATURATION: 98 % | SYSTOLIC BLOOD PRESSURE: 105 MMHG | HEART RATE: 111 BPM | RESPIRATION RATE: 10 BRPM | DIASTOLIC BLOOD PRESSURE: 56 MMHG

## 2023-06-20 ASSESSMENT — ENCOUNTER SYMPTOMS
STOOL DESCRIPTION: MUCOID
STOOL DESCRIPTION: MUCOID

## 2023-06-20 NOTE — HOSPICE
Medication refills ordered this visit: none requested    Medications reconciled and all medications are/are not available in the home this visit. Medications are effective at this time. Supplies by type and quantity ordered this visit include: none requested    Instructed caregiver on 24-hour hospice availability and phone number.     Plan for next visit: Assessment for imminence

## 2023-06-21 NOTE — HOSPICE
Medication refills ordered this visit: morphine     Medications reconciled and all medications are available in the home this visit. Medications are effective at this time. Supplies by type and quantity ordered this visit include: oral swabs, foam wedges, XL gloves #[id number]    Instructed patient/family/caregiver on 24-hour hospice availability and phone number.     Plan for next visit:  Daily visits for change in condition

## 2023-06-21 NOTE — HOSPICE
Medication refills ordered this visit: lorazepam    Medications reconciled and all medications are available in the home this visit. Medications are effective at this time. Supplies by type and quantity ordered this visit include: none requested    Instructed caregiver on 24-hour hospice availability and phone number.     Plan for next visit:  Scheduled daily nursing visit to ensure patient is comfortable

## 2023-06-28 NOTE — TELEPHONE ENCOUNTER
Pt requesting pain refill.  Pt would like nurse to call her to discuss nerve pain and swelling Upon assessment, A&Ox4. RA.  WDL. NSR on Tele. SCDs. Tolerating clears. Abdomen soft and tender. Hypoactive bowel sounds. Voids. Pain managed per MAR. Denies SOB, CP, lightheadedness, and N/V. Up ad saravanan. POC discussed w/ pt, all questions answered and concerns addressed. Safety precautions in place. Frequent rounds performed.

## 2024-02-07 NOTE — TELEPHONE ENCOUNTER
Checked with pharmacy. EMLA escribe not successful. Prescription call in today.  Informed pt prescription will be ready in 30 minutes and will not cost more than $30 per pharmacist.
Patient identified with two patient identifiers. She called stating that she was supposed to obtain the EMLA topical cream for her port from her pharmacy at [facility], address is [address]. Verified that this is the correct pharmacy listed in her clinical information. She called the pharmacy to verify how much it would be, however the pharmacy stated that they have not received the order for the cream.     She is requesting a call back.
ESRD MWF

## 2025-02-06 NOTE — PROGRESS NOTES
Jewel Barker, a 51 y.o. female,  is here for   Chief Complaint   Patient presents with    Other     surical consult       Visit Vitals  /80 (BP 1 Location: Right arm, BP Patient Position: Sitting)   Pulse 63   Temp 97.5 °F (36.4 °C) (Oral)   Resp 18   Ht 5' 3\" (1.6 m)   Wt 113.7 kg (250 lb 11.2 oz)   SpO2 98%   BMI 44.41 kg/m²       Jewel Barker was educated and given pre-operative and post-operative instructions for an exploratory laparotomy, resection of pelvic mass, possible staging on 01/10/2019 at 0730 a with an arrival time of 200a. Patient signed a consent form, and expressed an understanding of the procedure. All patients questions were addressed. Patient was given the office number if any questions or concerns were to arise. Resending office note now.

## 2025-02-22 NOTE — LETTER
2/18/20 Patient: Ignacia Snyder YOB: 1970 Date of Visit: 2/18/2020 Nessa Robles, [address] [address] [place] [address] VIA In Basket Dear Nessa Robles MD, Thank you for referring Ms. Vicenta Bhatti to [facility] for evaluation. My notes for this consultation are attached. If you have questions, please do not hesitate to call me. I look forward to following your patient along with you. Sincerely, Karla Fowler MD 
 

Warm

## (undated) DEVICE — GYN ONCOLOGY: Brand: MEDLINE INDUSTRIES, INC.

## (undated) DEVICE — (D)GLOVE EXAM LG NITRL NS -- DISC BY MFR NO SUB

## (undated) DEVICE — INTENDED FOR TISSUE SEPARATION, AND OTHER PROCEDURES THAT REQUIRE A SHARP SURGICAL BLADE TO PUNCTURE OR CUT.: Brand: BARD-PARKER ® CARBON RIB-BACK BLADES

## (undated) DEVICE — SYR 50ML SLIP TIP NSAF LF STRL --

## (undated) DEVICE — SYRINGE MED 20ML STD CLR PLAS LUERLOCK TIP N CTRL DISP

## (undated) DEVICE — CANNULA ORIG TL CLR W FOAM CUSHIONS AND 14FT SUPL TB 3 CHN

## (undated) DEVICE — TRAY PREP DRY W/ PREM GLV 2 APPL 6 SPNG 2 UNDPD 1 OVERWRAP

## (undated) DEVICE — TRAY CATH 16FR DRN BG LF -- CONVERT TO ITEM 363158

## (undated) DEVICE — SOLUTION IRRIGATION H2O 0797305] ICU MEDICAL INC]

## (undated) DEVICE — TELFA NON-ADHERENT ABSORBENT DRESSING: Brand: TELFA

## (undated) DEVICE — 3-0 COATED VICRYL PLUS UNDYED 1X27" SH --

## (undated) DEVICE — AIRLIFE™ NASAL OXYGEN CANNULA CURVED, NONFLARED TIP WITH 14 FOOT (4.3 M) CRUSH-RESISTANT TUBING, OVER-THE-EAR STYLE: Brand: AIRLIFE™

## (undated) DEVICE — (D)SYR 10ML 1/5ML GRAD NSAF -- PKGING CHANGE USE ITEM 338027

## (undated) DEVICE — FLEX ADVANTAGE 1500CC: Brand: FLEX ADVANTAGE

## (undated) DEVICE — COVER LT HNDL BLU PLAS

## (undated) DEVICE — GAUZE SPONGES,16 PLY: Brand: CURITY

## (undated) DEVICE — SOL IRRIGATION INJ NACL 0.9% 500ML BTL

## (undated) DEVICE — DRAPE TWL SURG 16X26IN BLU ORB04] ALLCARE INC]

## (undated) DEVICE — BARRIER TISS ABSRB 5X6IN 1/PK -- DIRECT ORDER ONLY NON MEDLINE

## (undated) DEVICE — NEEDLE BX 14GA LAIN 20MM SPEC NOTCH SCALP SHRP SURG STL CUT

## (undated) DEVICE — STERILE POLYISOPRENE POWDER-FREE SURGICAL GLOVES WITH EMOLLIENT COATING: Brand: PROTEXIS

## (undated) DEVICE — FLEX ADVANTAGE 3000CC: Brand: FLEX ADVANTAGE

## (undated) DEVICE — KENDALL SCD EXPRESS SLEEVES, KNEE LENGTH, MEDIUM: Brand: KENDALL SCD

## (undated) DEVICE — ROCKER SWITCH PENCIL HOLSTER: Brand: VALLEYLAB

## (undated) DEVICE — GOWN ISOL IMPERV UNIV, DISP, OPEN BACK, BLUE --

## (undated) DEVICE — SUT VCRL + 0 36IN CT1 UD --

## (undated) DEVICE — SUTURE PDS + SZ 1 L96IN ABSRB VLT L65MM TP-1 1/2 CIR PDP880G

## (undated) DEVICE — 4-PORT MANIFOLD: Brand: NEPTUNE 2

## (undated) DEVICE — SYRINGE BLB 50CC IRRIG PLIABLE FNGR FLNG GRAD FLSK DISP

## (undated) DEVICE — SOLUTION IRRIG 1000ML H2O STRL BLT

## (undated) DEVICE — FLUFF AND POLYMER UNDERPAD,EXTRA HEAVY: Brand: WINGS

## (undated) DEVICE — CATHETER SUCT TR FL TIP 14FR W/ O CTRL

## (undated) DEVICE — MAYO STAND COVER: Brand: CONVERTORS

## (undated) DEVICE — ENDOSCOPY PUMP TUBING/ CAP SET: Brand: ERBE

## (undated) DEVICE — SYRINGE MED 25GA 3ML L5/8IN SUBQ PLAS W/ DETACH NDL SFTY

## (undated) DEVICE — BLADE ELECTRODE: Brand: EDGE

## (undated) DEVICE — MEDI-VAC SUCTION HIGH CAPACITY: Brand: CARDINAL HEALTH

## (undated) DEVICE — MEDI-VAC NON-CONDUCTIVE SUCTION TUBING: Brand: CARDINAL HEALTH

## (undated) DEVICE — SNARE POLYP M W27MMXL240CM OVL STIFF DISP CAPTIVATOR

## (undated) DEVICE — SUTURE VCRL + SZ 3-0 L36IN ABSRB UD L36MM CT-1 1/2 CIR VCP944H

## (undated) DEVICE — FORCEPS BX L240CM JAW DIA2.8MM L CAP W/ NDL MIC MESH TOOTH

## (undated) DEVICE — CATH IV SAFE STR 22GX1IN BLU -- PROTECTIV PLUS

## (undated) DEVICE — Device

## (undated) DEVICE — STERILE POLYISOPRENE POWDER-FREE SURGICAL GLOVES: Brand: PROTEXIS

## (undated) DEVICE — MEDI-VAC YANK SUCT HNDL W/TPRD BULBOUS TIP: Brand: CARDINAL HEALTH

## (undated) DEVICE — CURVED, LARGE JAW, OPEN SEALER/DIVIDER NANO-COATED: Brand: LIGASURE IMPACT